# Patient Record
Sex: FEMALE | Race: WHITE | NOT HISPANIC OR LATINO | Employment: FULL TIME | URBAN - NONMETROPOLITAN AREA
[De-identification: names, ages, dates, MRNs, and addresses within clinical notes are randomized per-mention and may not be internally consistent; named-entity substitution may affect disease eponyms.]

---

## 2017-06-21 ENCOUNTER — HOSPITAL ENCOUNTER (EMERGENCY)
Facility: HOSPITAL | Age: 32
Discharge: HOME | End: 2017-06-21

## 2017-06-21 VITALS
OXYGEN SATURATION: 100 % | WEIGHT: 190 LBS | HEIGHT: 63 IN | SYSTOLIC BLOOD PRESSURE: 144 MMHG | RESPIRATION RATE: 18 BRPM | TEMPERATURE: 98.1 F | DIASTOLIC BLOOD PRESSURE: 96 MMHG | HEART RATE: 76 BPM | BODY MASS INDEX: 33.66 KG/M2

## 2017-06-21 DIAGNOSIS — J02.9 PHARYNGITIS, ACUTE: Primary | ICD-10-CM

## 2017-06-21 PROCEDURE — 99282 EMERGENCY DEPT VISIT SF MDM: CPT

## 2017-06-21 RX ORDER — IBUPROFEN 600 MG/1
600 TABLET ORAL EVERY 8 HOURS PRN
Qty: 15 TABLET | Refills: 0 | Status: SHIPPED | OUTPATIENT
Start: 2017-06-21 | End: 2018-03-15

## 2017-06-21 RX ORDER — DEXAMETHASONE 2 MG/1
10 TABLET ORAL ONCE
Qty: 5 TABLET | Refills: 0 | Status: SHIPPED | OUTPATIENT
Start: 2017-06-21 | End: 2017-06-21

## 2017-06-21 RX ORDER — AMOXICILLIN AND CLAVULANATE POTASSIUM 875; 125 MG/1; MG/1
1 TABLET, FILM COATED ORAL 2 TIMES DAILY
Qty: 14 TABLET | Refills: 0 | Status: SHIPPED | OUTPATIENT
Start: 2017-06-21 | End: 2017-06-28

## 2017-09-27 ENCOUNTER — HOSPITAL ENCOUNTER (EMERGENCY)
Facility: HOSPITAL | Age: 32
Discharge: HOME | End: 2017-09-27
Attending: EMERGENCY MEDICINE | Admitting: EMERGENCY MEDICINE
Payer: COMMERCIAL

## 2017-09-27 VITALS
HEART RATE: 81 BPM | RESPIRATION RATE: 16 BRPM | BODY MASS INDEX: 34.62 KG/M2 | HEIGHT: 62 IN | TEMPERATURE: 98 F | WEIGHT: 188.13 LBS | DIASTOLIC BLOOD PRESSURE: 88 MMHG | OXYGEN SATURATION: 100 % | SYSTOLIC BLOOD PRESSURE: 142 MMHG

## 2017-09-27 DIAGNOSIS — G56.03 BILATERAL CARPAL TUNNEL SYNDROME: Primary | ICD-10-CM

## 2017-09-27 PROCEDURE — 99283 EMERGENCY DEPT VISIT LOW MDM: CPT

## 2017-09-27 RX ORDER — TRAZODONE HYDROCHLORIDE 100 MG/1
50 TABLET ORAL
COMMUNITY
End: 2018-03-15

## 2017-09-27 RX ORDER — NAPROXEN 500 MG/1
500 TABLET ORAL 2 TIMES DAILY WITH MEALS
Qty: 30 TABLET | Refills: 0 | Status: SHIPPED | OUTPATIENT
Start: 2017-09-27 | End: 2018-03-15

## 2017-09-27 NOTE — ED NOTES
Pt states "lower back pain started last week, since then the L arm numbness has increased"  Pt denies fall/trauma  Appropriate safety measures in place   Call bell in em Pollard RN  09/27/17 0030

## 2017-09-27 NOTE — ED PROVIDER NOTES
History  Chief Complaint   Patient presents with    Numbness     c/o left arm numbness started "past week off and on, would wake up that way, and then it would go away"  Today states "L arm is constantly numb and feels heavy, starting in R fingers"  Denies fall/trauma  Denies facial numbness  Denies headache  Patient is a 56-year-old female  Over the last week she has had intermittent paresthesias to both her upper extremities  It is more pronounced in the left hand  It is less so in the right hand  It is to the thenar aspect of both palms  There is no weakness or paralysis  There are no symptoms in her legs or face  No visual or speech problems  No headache or neck pain  Patient does have some sacral pain  She denies trauma  There is no history of of overuse injury either at home or at work  There are no obvious aggravating or relieving factors  Prior to Admission Medications   Prescriptions Last Dose Informant Patient Reported? Taking?   ibuprofen (MOTRIN) 600 mg tablet   No No   Sig: Take 1 tablet by mouth every 8 (eight) hours as needed for mild pain or moderate pain for up to 5 days   traZODone (DESYREL) 100 mg tablet 9/26/2017 at 2300 Self Yes Yes   Sig: Take 50 mg by mouth daily at bedtime      Facility-Administered Medications: None       Past Medical History:   Diagnosis Date    Depression     Sleep disorder        Past Surgical History:   Procedure Laterality Date    TUBAL LIGATION         History reviewed  No pertinent family history  I have reviewed and agree with the history as documented  Social History   Substance Use Topics    Smoking status: Never Smoker    Smokeless tobacco: Never Used    Alcohol use No        Review of Systems   Constitutional: Negative for chills and fever  HENT: Negative for rhinorrhea and sore throat  Eyes: Negative for pain, redness and visual disturbance  Respiratory: Negative for cough and shortness of breath      Cardiovascular: Negative for chest pain and leg swelling  Gastrointestinal: Negative for abdominal pain, diarrhea and vomiting  Endocrine: Negative for polydipsia and polyuria  Genitourinary: Negative for dysuria, frequency, hematuria, vaginal bleeding and vaginal discharge  Musculoskeletal: Positive for back pain  Negative for neck pain  Skin: Negative for rash and wound  Allergic/Immunologic: Negative for immunocompromised state  Neurological: Positive for numbness  Negative for weakness and headaches  Hematological: Does not bruise/bleed easily  Psychiatric/Behavioral: Negative for hallucinations and suicidal ideas  Physical Exam  ED Triage Vitals [09/27/17 0025]   Temperature Pulse Respirations Blood Pressure SpO2   98 °F (36 7 °C) 81 16 142/88 100 %      Temp Source Heart Rate Source Patient Position - Orthostatic VS BP Location FiO2 (%)   Temporal Monitor Lying Right arm --      Pain Score       7           Physical Exam   Constitutional: She is oriented to person, place, and time  She appears well-developed and well-nourished  HENT:   Head: Normocephalic and atraumatic  Mouth/Throat: Oropharynx is clear and moist    Eyes: Conjunctivae are normal  Right eye exhibits no discharge  Left eye exhibits no discharge  No scleral icterus  Neck: Normal range of motion  Neck supple  Cardiovascular: Normal rate, regular rhythm, normal heart sounds and intact distal pulses  Exam reveals no gallop and no friction rub  No murmur heard  Pulmonary/Chest: Effort normal and breath sounds normal  No stridor  No respiratory distress  She has no wheezes  She has no rales  Abdominal: Soft  Bowel sounds are normal  She exhibits no distension  There is no tenderness  There is no rebound and no guarding  Musculoskeletal: Normal range of motion  She exhibits no edema, tenderness or deformity  No CVA tenderness  No calf tenderness/palpable cords     Neurological: She is alert and oriented to person, place, and time  She has normal strength  No sensory deficit  GCS eye subscore is 4  GCS verbal subscore is 5  GCS motor subscore is 6  Skin: Skin is warm and dry  No rash noted  Psychiatric: She has a normal mood and affect  Her behavior is normal    Vitals reviewed  ED Medications  Medications - No data to display    Diagnostic Studies  Labs Reviewed - No data to display    No orders to display       Procedures  Procedures      Phone Contacts  ED Phone Contact    ED Course  ED Course              NIH Stroke Scale    Flowsheet Row Most Recent Value   Level of Consciousness (1a )  0 Filed at: 09/27/2017 0031   LOC Questions (1b )  0 Filed at: 09/27/2017 0031   LOC Commands (1c )  0 Filed at: 09/27/2017 0031   Best Gaze (2 )  0 Filed at: 09/27/2017 0031   Visual (3 )  0 Filed at: 09/27/2017 0031   Facial Palsy (4 )  0 Filed at: 09/27/2017 0031   Motor Arm, Left (5a )  0 Filed at: 09/27/2017 0031   Motor Arm, Right (5b )  0 Filed at: 09/27/2017 0031   Motor Leg, Left (6a )  0 Filed at: 09/27/2017 0031   Motor Leg, Right (6b )  0 Filed at: 09/27/2017 0031   Limb Ataxia (7 )  0 Filed at: 09/27/2017 0031   Sensory (8 )  0 Filed at: 09/27/2017 0031   Best Language (9 )  0 Filed at: 09/27/2017 0031   Dysarthria (10 )  0 Filed at: 09/27/2017 0031   Extinction and Inattention (11 ) (Formerly Neglect)  0 Filed at: 09/27/2017 0031   Total  0 Filed at: 09/27/2017 0031                        MDM  Number of Diagnoses or Management Options  Diagnosis management comments: Neurologic examination is normal at this time  The distribution of patient's symptoms suggest carpal tunnel syndrome  This does not appear to be a CVA  Patient appropriate for discharge and follow up with Neurology  CritCare Time    Disposition  Final diagnoses:   Bilateral carpal tunnel syndrome     ED Disposition     ED Disposition Condition Comment    Discharge  Danay Cisneros discharge to home/self care      Condition at discharge: Good        Follow-up Information     Follow up With Specialties Details Why [id number] [facility] [id number] [facility]  [place]  In 3 days  [address] [address]  [phone]        Patient's Medications   Discharge Prescriptions    NAPROXEN (NAPROSYN) 500 MG TABLET    Take 1 tablet by mouth 2 (two) times a day with meals As needed for pain and tingling       Start Date: 9/27/2017 End Date: --       Order Dose: 500 mg       Quantity: 30 tablet    Refills: 0     No discharge procedures on file      ED Provider  Electronically Signed by       Nemesio Brown MD  09/27/17 0111

## 2017-11-28 ENCOUNTER — GENERIC CONVERSION - ENCOUNTER (OUTPATIENT)
Dept: OTHER | Facility: OTHER | Age: 32
End: 2017-11-28

## 2018-01-15 NOTE — MISCELLANEOUS
Provider Comments  Provider Comments:   No show  Left msg re: office policy and need to reschedule        Signatures   Electronically signed by : Juan Loja MD; Oct 12 2016 11:43AM EST                       (Author)

## 2018-03-09 ENCOUNTER — HOSPITAL ENCOUNTER (EMERGENCY)
Facility: HOSPITAL | Age: 33
Discharge: HOME | End: 2018-03-10
Attending: EMERGENCY MEDICINE | Admitting: EMERGENCY MEDICINE
Payer: COMMERCIAL

## 2018-03-09 DIAGNOSIS — R10.9 ABDOMINAL WALL PAIN: ICD-10-CM

## 2018-03-09 DIAGNOSIS — T14.8XXA BRUISING: Primary | ICD-10-CM

## 2018-03-09 LAB
ALBUMIN SERPL BCP-MCNC: 3.9 G/DL (ref 3.5–5)
ALP SERPL-CCNC: 75 U/L (ref 46–116)
ALT SERPL W P-5'-P-CCNC: 21 U/L (ref 12–78)
ANION GAP SERPL CALCULATED.3IONS-SCNC: 9 MMOL/L (ref 4–13)
APTT PPP: 28 SECONDS (ref 23–35)
AST SERPL W P-5'-P-CCNC: 13 U/L (ref 5–45)
BASOPHILS # BLD AUTO: 0.03 THOUSANDS/ΜL (ref 0–0.1)
BASOPHILS NFR BLD AUTO: 0 % (ref 0–1)
BILIRUB SERPL-MCNC: 0.5 MG/DL (ref 0.2–1)
BILIRUB UR QL STRIP: NEGATIVE
BUN SERPL-MCNC: 12 MG/DL (ref 5–25)
CALCIUM SERPL-MCNC: 8.7 MG/DL (ref 8.3–10.1)
CHLORIDE SERPL-SCNC: 100 MMOL/L (ref 100–108)
CLARITY UR: CLEAR
CO2 SERPL-SCNC: 26 MMOL/L (ref 21–32)
COLOR UR: YELLOW
CREAT SERPL-MCNC: 0.77 MG/DL (ref 0.6–1.3)
EOSINOPHIL # BLD AUTO: 0.15 THOUSAND/ΜL (ref 0–0.61)
EOSINOPHIL NFR BLD AUTO: 2 % (ref 0–6)
ERYTHROCYTE [DISTWIDTH] IN BLOOD BY AUTOMATED COUNT: 13.2 % (ref 11.6–15.1)
EXT PREG TEST URINE: NEGATIVE
GFR SERPL CREATININE-BSD FRML MDRD: 102 ML/MIN/1.73SQ M
GLUCOSE SERPL-MCNC: 103 MG/DL (ref 65–140)
GLUCOSE UR STRIP-MCNC: NEGATIVE MG/DL
HCT VFR BLD AUTO: 42 % (ref 34.8–46.1)
HGB BLD-MCNC: 14.5 G/DL (ref 11.5–15.4)
HGB UR QL STRIP.AUTO: NEGATIVE
INR PPP: 1.07 (ref 0.86–1.16)
KETONES UR STRIP-MCNC: ABNORMAL MG/DL
LEUKOCYTE ESTERASE UR QL STRIP: NEGATIVE
LIPASE SERPL-CCNC: 84 U/L (ref 73–393)
LYMPHOCYTES # BLD AUTO: 1.71 THOUSANDS/ΜL (ref 0.6–4.47)
LYMPHOCYTES NFR BLD AUTO: 17 % (ref 14–44)
MAGNESIUM SERPL-MCNC: 1.9 MG/DL (ref 1.6–2.6)
MCH RBC QN AUTO: 31.2 PG (ref 26.8–34.3)
MCHC RBC AUTO-ENTMCNC: 34.5 G/DL (ref 31.4–37.4)
MCV RBC AUTO: 90 FL (ref 82–98)
MONOCYTES # BLD AUTO: 0.83 THOUSAND/ΜL (ref 0.17–1.22)
MONOCYTES NFR BLD AUTO: 8 % (ref 4–12)
NEUTROPHILS # BLD AUTO: 7.23 THOUSANDS/ΜL (ref 1.85–7.62)
NEUTS SEG NFR BLD AUTO: 73 % (ref 43–75)
NITRITE UR QL STRIP: NEGATIVE
PH UR STRIP.AUTO: 5.5 [PH] (ref 4.5–8)
PHOSPHATE SERPL-MCNC: 3.1 MG/DL (ref 2.7–4.5)
PLATELET # BLD AUTO: 246 THOUSANDS/UL (ref 149–390)
PMV BLD AUTO: 10.2 FL (ref 8.9–12.7)
POTASSIUM SERPL-SCNC: 3.8 MMOL/L (ref 3.5–5.3)
PROT SERPL-MCNC: 7.5 G/DL (ref 6.4–8.2)
PROT UR STRIP-MCNC: NEGATIVE MG/DL
PROTHROMBIN TIME: 13.8 SECONDS (ref 12.1–14.4)
RBC # BLD AUTO: 4.65 MILLION/UL (ref 3.81–5.12)
SODIUM SERPL-SCNC: 135 MMOL/L (ref 136–145)
SP GR UR STRIP.AUTO: >=1.03 (ref 1–1.03)
TSH SERPL DL<=0.05 MIU/L-ACNC: 3.49 UIU/ML (ref 0.36–3.74)
UROBILINOGEN UR QL STRIP.AUTO: 0.2 E.U./DL
WBC # BLD AUTO: 9.95 THOUSAND/UL (ref 4.31–10.16)

## 2018-03-09 PROCEDURE — 83690 ASSAY OF LIPASE: CPT | Performed by: EMERGENCY MEDICINE

## 2018-03-09 PROCEDURE — 83550 IRON BINDING TEST: CPT | Performed by: EMERGENCY MEDICINE

## 2018-03-09 PROCEDURE — 81003 URINALYSIS AUTO W/O SCOPE: CPT | Performed by: EMERGENCY MEDICINE

## 2018-03-09 PROCEDURE — 85730 THROMBOPLASTIN TIME PARTIAL: CPT | Performed by: EMERGENCY MEDICINE

## 2018-03-09 PROCEDURE — 83540 ASSAY OF IRON: CPT | Performed by: EMERGENCY MEDICINE

## 2018-03-09 PROCEDURE — 85610 PROTHROMBIN TIME: CPT | Performed by: EMERGENCY MEDICINE

## 2018-03-09 PROCEDURE — 84100 ASSAY OF PHOSPHORUS: CPT | Performed by: EMERGENCY MEDICINE

## 2018-03-09 PROCEDURE — 84443 ASSAY THYROID STIM HORMONE: CPT | Performed by: EMERGENCY MEDICINE

## 2018-03-09 PROCEDURE — 82728 ASSAY OF FERRITIN: CPT | Performed by: EMERGENCY MEDICINE

## 2018-03-09 PROCEDURE — 80053 COMPREHEN METABOLIC PANEL: CPT | Performed by: EMERGENCY MEDICINE

## 2018-03-09 PROCEDURE — 83735 ASSAY OF MAGNESIUM: CPT | Performed by: EMERGENCY MEDICINE

## 2018-03-09 PROCEDURE — 85025 COMPLETE CBC W/AUTO DIFF WBC: CPT | Performed by: EMERGENCY MEDICINE

## 2018-03-09 PROCEDURE — 81025 URINE PREGNANCY TEST: CPT | Performed by: EMERGENCY MEDICINE

## 2018-03-09 PROCEDURE — 36415 COLL VENOUS BLD VENIPUNCTURE: CPT

## 2018-03-09 RX ORDER — ACETAMINOPHEN 325 MG/1
650 TABLET ORAL ONCE
Status: COMPLETED | OUTPATIENT
Start: 2018-03-09 | End: 2018-03-09

## 2018-03-09 RX ADMIN — ACETAMINOPHEN 650 MG: 325 TABLET, FILM COATED ORAL at 19:56

## 2018-03-10 VITALS
OXYGEN SATURATION: 98 % | SYSTOLIC BLOOD PRESSURE: 130 MMHG | RESPIRATION RATE: 16 BRPM | BODY MASS INDEX: 31.21 KG/M2 | HEART RATE: 68 BPM | HEIGHT: 62 IN | WEIGHT: 169.6 LBS | DIASTOLIC BLOOD PRESSURE: 75 MMHG | TEMPERATURE: 99.3 F

## 2018-03-10 LAB
FERRITIN SERPL-MCNC: 41 NG/ML (ref 8–388)
IRON SATN MFR SERPL: 24 %
IRON SERPL-MCNC: 80 UG/DL (ref 50–170)
TIBC SERPL-MCNC: 337 UG/DL (ref 250–450)

## 2018-03-10 PROCEDURE — 99284 EMERGENCY DEPT VISIT MOD MDM: CPT

## 2018-03-10 RX ORDER — IBUPROFEN 600 MG/1
600 TABLET ORAL ONCE
Status: COMPLETED | OUTPATIENT
Start: 2018-03-10 | End: 2018-03-10

## 2018-03-10 RX ADMIN — IBUPROFEN 600 MG: 600 TABLET, FILM COATED ORAL at 01:27

## 2018-03-10 NOTE — DISCHARGE INSTRUCTIONS
Abdominal Pain   WHAT YOU NEED TO KNOW:   Abdominal pain can be dull, achy, or sharp  You may have pain in one area of your abdomen, or in your entire abdomen  Your pain may be caused by a condition such as constipation, food sensitivity or poisoning, infection, or a blockage  Abdominal pain can also be from a hernia, appendicitis, or an ulcer  Liver, gallbladder, or kidney conditions can also cause abdominal pain  The cause of your abdominal pain may be unknown  DISCHARGE INSTRUCTIONS:   Return to the emergency department if:   · You have new chest pain or shortness of breath  · You have pulsing pain in your upper abdomen or lower back that suddenly becomes constant  · Your pain is in the right lower abdominal area and worsens with movement  · You have a fever over 100 4°F (38°C) or shaking chills  · You are vomiting and cannot keep food or liquids down  · Your pain does not improve or gets worse over the next 8 to 12 hours  · You see blood in your vomit or bowel movements, or they look black and tarry  · Your skin or the whites of your eyes turn yellow  · You are a woman and have a large amount of vaginal bleeding that is not your monthly period  Contact your healthcare provider if:   · You have pain in your lower back  · You are a man and have pain in your testicles  · You have pain when you urinate  · You have questions or concerns about your condition or care  Follow up with your healthcare provider within 24 hours or as directed:  Write down your questions so you remember to ask them during your visits  Medicines:   · Medicines  may be given to calm your stomach and prevent vomiting or to decrease pain  Ask how to take pain medicine safely  · Take your medicine as directed  Contact your healthcare provider if you think your medicine is not helping or if you have side effects  Tell him of her if you are allergic to any medicine   Keep a list of the medicines, vitamins, and herbs you take  Include the amounts, and when and why you take them  Bring the list or the pill bottles to follow-up visits  Carry your medicine list with you in case of an emergency  © 2017 [employer]  Information is for End User's use only and may not be sold, redistributed or otherwise used for commercial purposes  All illustrations and images included in CareNotes® are the copyrighted property of [employer]  or A[employer] Chowd[employer]  The above information is an  only  It is not intended as medical advice for individual conditions or treatments  Talk to your doctor, nurse or pharmacist before following any medical regimen to see if it is safe and effective for you  Contusion in Adults   WHAT YOU NEED TO KNOW:   A contusion is a bruise that appears on your skin after an injury  A bruise happens when small blood vessels tear but skin does not  When blood vessels tear, blood leaks into nearby tissue, such as soft tissue or muscle  DISCHARGE INSTRUCTIONS:   Return to the emergency department if:   · You have new trouble moving the injured area  · You have tingling or numbness in or near the injured area  · Your hand or foot below the bruise gets cold or turns pale  Contact your healthcare provider if:   · You find a new lump in the injured area  · Your symptoms do not improve with treatment after 4 to 5 days  · You have questions or concerns about your condition or care  Medicines: You may need any of the following:  · NSAIDs  help decrease swelling and pain or fever  This medicine is available with or without a doctor's order  NSAIDs can cause stomach bleeding or kidney problems in certain people  If you take blood thinner medicine, always ask your healthcare provider if NSAIDs are safe for you  Always read the medicine label and follow directions  · Prescription pain medicine  may be given   Do not wait until the pain is severe before you take your medicine  · Take your medicine as directed  Contact your healthcare provider if you think your medicine is not helping or if you have side effects  Tell him of her if you are allergic to any medicine  Keep a list of the medicines, vitamins, and herbs you take  Include the amounts, and when and why you take them  Bring the list or the pill bottles to follow-up visits  Carry your medicine list with you in case of an emergency  Follow up with your healthcare provider as directed: You may need to return within a week to check your injury again  Write down your questions so you remember to ask them during your visits  Help a contusion heal:   · Rest the injured area  or use it less than usual  If you bruised your leg or foot, you may need crutches or a cane to help you walk  This will help you keep weight off your injured body part  · Apply ice  to decrease swelling and pain  Ice may also help prevent tissue damage  Use an ice pack, or put crushed ice in a plastic bag  Cover it with a towel and place it on your bruise for 15 to 20 minutes every hour or as directed  · Use compression  to support the area and decrease swelling  Wrap an elastic bandage around the area over the bruised muscle  Make sure the bandage is not too tight  You should be able to fit 1 finger between the bandage and your skin  · Elevate (raise) your injured body part  above the level of your heart to help decrease pain and swelling  Use pillows, blankets, or rolled towels to elevate the area as often as you can  · Do not drink alcohol  as directed  Alcohol may slow healing  · Do not stretch injured muscles  right after your injury  Ask your healthcare provider when and how you may safely stretch after your injury  Gentle stretches can help increase your flexibility  · Do not massage the area or put heating pads  on the bruise right after your injury  Heat and massage may slow healing   Your healthcare provider may tell you to apply heat after several days  At that time, heat will start to help the injury heal   Prevent another contusion:   · Stretch and warm up before you play sports or exercise  · Wear protective gear when you play sports  Examples are shin guards and padding  · If you begin a new physical activity, start slowly to give your body a chance to adjust   © 2017 [employer]  Information is for End User's use only and may not be sold, redistributed or otherwise used for commercial purposes  All illustrations and images included in CareNotes® are the copyrighted property of [employer]  or Aryan Chowdary  The above information is an  only  It is not intended as medical advice for individual conditions or treatments  Talk to your doctor, nurse or pharmacist before following any medical regimen to see if it is safe and effective for you

## 2018-03-10 NOTE — ED PROVIDER NOTES
History  Chief Complaint   Patient presents with    Abdominal Pain     pt states she has abdominal pain like a pulled muscle for 3 days and bruising on her thighs  Patient: Atiya Jackson  89 y o /female  YOB: 1985  MRN: [id number]  PCP: Evon Chamberlain MD  Date of evaluation: 3/9/2018    (N B  Voice-recognition software may have been used in the preparation of this document )    3 days of low abdominal pain starting when she was lifting at work  It is constant, worse with movement, with no other related symptoms  She also c/o bruising diffusely without known injuries  No epistaxis, bleeding from gums, hematuria, hematochezia, abnl vaginal bleeding  History provided by:  Patient  Abdominal Pain   Pain location:  RLQ and LLQ  Pain quality: aching    Pain radiates to:  Does not radiate  Onset quality:  Sudden  Duration:  3 days  Timing:  Constant  Progression:  Unchanged  Chronicity:  New  Relieved by:  Not moving  Worsened by: Movement  Ineffective treatments:  None tried  Associated symptoms: no anorexia, no belching, no chest pain, no chills, no constipation, no cough, no diarrhea, no dysuria, no fever, no hematuria, no melena, no nausea, no shortness of breath, no vaginal bleeding, no vaginal discharge and no vomiting        Prior to Admission Medications   Prescriptions Last Dose Informant Patient Reported?  Taking?   ibuprofen (MOTRIN) 600 mg tablet   No No   Sig: Take 1 tablet by mouth every 8 (eight) hours as needed for mild pain or moderate pain for up to 5 days   naproxen (NAPROSYN) 500 mg tablet   No No   Sig: Take 1 tablet by mouth 2 (two) times a day with meals As needed for pain and tingling   traZODone (DESYREL) 100 mg tablet  Self Yes No   Sig: Take 50 mg by mouth daily at bedtime      Facility-Administered Medications: None       Past Medical History:   Diagnosis Date    Depression     Sleep disorder        Past Surgical History:   Procedure Laterality Date    TUBAL LIGATION         History reviewed  No pertinent family history  I have reviewed and agree with the history as documented  Social History   Substance Use Topics    Smoking status: Never Smoker    Smokeless tobacco: Never Used    Alcohol use No        Review of Systems   Constitutional: Negative for chills and fever  HENT: Negative for hearing loss, trouble swallowing and voice change  Eyes: Negative for pain, redness and visual disturbance  Respiratory: Negative for cough and shortness of breath  Cardiovascular: Negative for chest pain and palpitations  Gastrointestinal: Positive for abdominal pain  Negative for anorexia, constipation, diarrhea, melena, nausea and vomiting  Genitourinary: Negative for dysuria, hematuria, vaginal bleeding and vaginal discharge  Musculoskeletal: Negative for back pain, gait problem and neck pain  Skin: Negative for color change and rash  Neurological: Negative for weakness and light-headedness  Hematological: Bruises/bleeds easily (new)  Psychiatric/Behavioral: Negative for confusion and decreased concentration  The patient is not nervous/anxious  All other systems reviewed and are negative  Physical Exam  ED Triage Vitals [03/09/18 1925]   Temperature Pulse Respirations Blood Pressure SpO2   99 3 °F (37 4 °C) 90 16 157/97 100 %      Temp Source Heart Rate Source Patient Position - Orthostatic VS BP Location FiO2 (%)   Temporal Monitor Sitting Right arm --      Pain Score       9           Orthostatic Vital Signs  Vitals:    03/09/18 1925 03/09/18 2151 03/10/18 0050 03/10/18 0100   BP: 157/97 135/75 130/75 130/75   Pulse: 90 75 68    Patient Position - Orthostatic VS: Sitting Lying Lying Lying       Physical Exam   Constitutional: She is oriented to person, place, and time  She appears well-developed and well-nourished     HENT:   Mouth/Throat: Oropharynx is clear and moist and mucous membranes are normal    Voice normal   Eyes: EOM are normal  Pupils are equal, round, and reactive to light  Cardiovascular: Normal rate and regular rhythm  Pulmonary/Chest: Effort normal    Abdominal: Soft  Bowel sounds are normal    Tender abdominal wall, not deep tenderness   Neurological: She is alert and oriented to person, place, and time  GCS eye subscore is 4  GCS verbal subscore is 5  GCS motor subscore is 6  Skin: Skin is warm and dry  Capillary refill takes less than 2 seconds  Multiple diffuse brown bruises uniform in color various in size  Psychiatric: She has a normal mood and affect  Her speech is normal and behavior is normal    Nursing note and vitals reviewed  ED Medications  Medications   acetaminophen (TYLENOL) tablet 650 mg (650 mg Oral Given 3/9/18 1956)   ibuprofen (MOTRIN) tablet 600 mg (600 mg Oral Given 3/10/18 0127)       Diagnostic Studies  Results Reviewed     Procedure Component Value Units Date/Time    Iron Saturation % [[id number]] Collected:  03/09/18 1947    Lab Status: In process Specimen:  Blood from Arm, Right Updated:  03/09/18 2225    Ferritin [[id number]] Collected:  03/09/18 1947    Lab Status: In process Specimen:  Blood from Arm, Right Updated:  03/09/18 [place] [[id number]]  (Normal) Collected:  03/09/18 2028    Lab Status:  Final result Specimen:  Blood from Arm, Right Updated:  03/09/18 2058     Protime 13 8 seconds      INR 1 07    APTT [[id number]]  (Normal) Collected:  03/09/18 2028    Lab Status:  Final result Specimen:  Blood from Arm, Right Updated:  03/09/18 2058     PTT 28 seconds     Narrative: Therapeutic Heparin Range = 60-90 seconds    TSH [[id number]]  (Normal) Collected:  03/09/18 1947    Lab Status:  Final result Specimen:  Blood from Arm, Right Updated:  03/09/18 2048     TSH 3RD GENERATON 3 494 uIU/mL     Narrative:         Patients undergoing fluorescein dye angiography may retain small amounts of fluorescein in the body for 48-72 hours post procedure   Samples containing fluorescein can produce falsely depressed TSH values  If the patient had this procedure,a specimen should be resubmitted post fluorescein clearance  The recommended reference ranges for TSH during pregnancy are as follows:  First trimester 0 1 to 2 5 uIU/mL  Second trimester  0 2 to 3 0 uIU/mL  Third trimester 0 3 to 3 0 uIU/m      Magnesium [[id number]]  (Normal) Collected:  03/09/18 1947    Lab Status:  Final result Specimen:  Blood from Arm, Right Updated:  03/09/18 2048     Magnesium 1 9 mg/dL     Phosphorus [[id number]]  (Normal) Collected:  03/09/18 1947    Lab Status:  Final result Specimen:  Blood from Arm, Right Updated:  03/09/18 2048     Phosphorus 3 1 mg/dL     Comprehensive metabolic panel [[id number]]  (Abnormal) Collected:  03/09/18 1947    Lab Status:  Final result Specimen:  Blood from Arm, Right Updated:  03/09/18 2014     Sodium 135 (L) mmol/L      Potassium 3 8 mmol/L      Chloride 100 mmol/L      CO2 26 mmol/L      Anion Gap 9 mmol/L      BUN 12 mg/dL      Creatinine 0 77 mg/dL      Glucose 103 mg/dL      Calcium 8 7 mg/dL      AST 13 U/L      ALT 21 U/L      Alkaline Phosphatase 75 U/L      Total Protein 7 5 g/dL      Albumin 3 9 g/dL      Total Bilirubin 0 50 mg/dL      eGFR 102 ml/min/1 73sq m     Narrative:         National Kidney Disease Education Program recommendations are as follows:  GFR calculation is accurate only with a steady state creatinine  Chronic Kidney disease less than 60 ml/min/1 73 sq  meters  Kidney failure less than 15 ml/min/1 73 sq  meters      Lipase [[id number]]  (Normal) Collected:  03/09/18 1947    Lab Status:  Final result Specimen:  Blood from Arm, Right Updated:  03/09/18 2014     Lipase 84 u/L     UA w Reflex to Microscopic w Reflex to Culture [[id number]]  (Abnormal) Collected:  03/09/18 1947    Lab Status:  Final result Specimen:  Urine from Urine, Other Updated:  03/09/18 1954     Color, UA Yellow     Clarity, UA Clear     Specific Gravity, UA >=1 030     pH, UA 5 5     Leukocytes, UA Negative     Nitrite, UA Negative     Protein, UA Negative mg/dl      Glucose, UA Negative mg/dl      Ketones, UA 15 (1+) (A) mg/dl      Urobilinogen, UA 0 2 E U /dl      Bilirubin, UA Negative     Blood, UA Negative    CBC and differential [[id number]]  (Normal) Collected:  03/09/18 1947    Lab Status:  Final result Specimen:  Blood from Arm, Right Updated:  03/09/18 1953     WBC 9 95 Thousand/uL      RBC 4 65 Million/uL      Hemoglobin 14 5 g/dL      Hematocrit 42 0 %      MCV 90 fL      MCH 31 2 pg      MCHC 34 5 g/dL      RDW 13 2 %      MPV 10 2 fL      Platelets 683 Thousands/uL      Neutrophils Relative 73 %      Lymphocytes Relative 17 %      Monocytes Relative 8 %      Eosinophils Relative 2 %      Basophils Relative 0 %      Neutrophils Absolute 7 23 Thousands/µL      Lymphocytes Absolute 1 71 Thousands/µL      Monocytes Absolute 0 83 Thousand/µL      Eosinophils Absolute 0 15 Thousand/µL      Basophils Absolute 0 03 Thousands/µL     POCT pregnancy, urine [[id number]]  (Normal) Resulted:  03/09/18 1951    Lab Status:  Final result Specimen:  Urine Updated:  03/09/18 1951     EXT PREG TEST UR (Ref: Negative) negative                 No orders to display              Procedures  Procedures       Phone Contacts  ED Phone Contact    ED Course  ED Course                                MDM  Number of Diagnoses or Management Options  Abdominal wall pain:   Bruising:   Diagnosis management comments: Exam is not suggestive of vasculitis  No recognizable pattern of assault  Nml PT, aPTT, platelet count, Hb  She will require further evaluation by her PCP         Amount and/or Complexity of Data Reviewed  Tests in the medicine section of CPT®: ordered and reviewed    Patient Progress  Patient progress: stable    CritCare Time    Disposition  Final diagnoses:   Bruising   Abdominal wall pain     Time reflects when diagnosis was documented in both MDM as applicable and the Disposition within this note     Time User Action Codes Description Comment    3/10/2018 12:38 AM Pravin Carranza Amass  8XXA] Bruising     3/10/2018 12:39 AM Leandro Carranza Add [R10 9] Abdominal wall pain       ED Disposition     ED Disposition Condition Comment    Discharge  Mamie Areas discharge to home/self care  Condition at discharge: Good        Follow-up Information     Follow up With Specialties Details Why 107 Ila Da Silva MD Internal Medicine Call in 3 days Tell about this ER visit  [address]  [phone]          Discharge Medication List as of 3/10/2018  1:25 AM      CONTINUE these medications which have NOT CHANGED    Details   ibuprofen (MOTRIN) 600 mg tablet Take 1 tablet by mouth every 8 (eight) hours as needed for mild pain or moderate pain for up to 5 days, Starting Wed 6/21/2017, Until Mon 6/26/2017, Print      naproxen (NAPROSYN) 500 mg tablet Take 1 tablet by mouth 2 (two) times a day with meals As needed for pain and tingling, Starting Wed 9/27/2017, Print      traZODone (DESYREL) 100 mg tablet Take 50 mg by mouth daily at bedtime, Historical Med           No discharge procedures on file      ED Provider  Electronically Signed by           Paul Bennett MD  03/10/18 0247

## 2018-03-15 ENCOUNTER — OFFICE VISIT (OUTPATIENT)
Dept: INTERNAL MEDICINE CLINIC | Facility: CLINIC | Age: 33
End: 2018-03-15
Payer: COMMERCIAL

## 2018-03-15 VITALS
WEIGHT: 172 LBS | OXYGEN SATURATION: 97 % | HEIGHT: 62 IN | DIASTOLIC BLOOD PRESSURE: 80 MMHG | SYSTOLIC BLOOD PRESSURE: 128 MMHG | BODY MASS INDEX: 31.65 KG/M2 | RESPIRATION RATE: 16 BRPM | TEMPERATURE: 98.8 F | HEART RATE: 67 BPM

## 2018-03-15 DIAGNOSIS — R82.4 URINE KETONES: Primary | ICD-10-CM

## 2018-03-15 DIAGNOSIS — Z12.4 SCREENING FOR CERVICAL CANCER: ICD-10-CM

## 2018-03-15 DIAGNOSIS — M62.838 MUSCLE SPASM: ICD-10-CM

## 2018-03-15 DIAGNOSIS — R10.84 GENERALIZED ABDOMINAL PAIN: ICD-10-CM

## 2018-03-15 LAB
SL AMB  POCT GLUCOSE, UA: NEGATIVE
SL AMB LEUKOCYTE ESTERASE,UA: NEGATIVE
SL AMB POCT BILIRUBIN,UA: NEGATIVE
SL AMB POCT BLOOD,UA: NORMAL
SL AMB POCT CLARITY,UA: CLEAR
SL AMB POCT COLOR,UA: YELLOW
SL AMB POCT KETONES,UA: NEGATIVE
SL AMB POCT NITRITE,UA: NEGATIVE
SL AMB POCT PH,UA: 5
SL AMB POCT SPECIFIC GRAVITY,UA: 1.03
SL AMB POCT URINE PROTEIN: NEGATIVE
SL AMB POCT UROBILINOGEN: 0.2

## 2018-03-15 PROCEDURE — 81002 URINALYSIS NONAUTO W/O SCOPE: CPT | Performed by: NURSE PRACTITIONER

## 2018-03-15 PROCEDURE — 87086 URINE CULTURE/COLONY COUNT: CPT | Performed by: NURSE PRACTITIONER

## 2018-03-15 PROCEDURE — 99214 OFFICE O/P EST MOD 30 MIN: CPT | Performed by: NURSE PRACTITIONER

## 2018-03-15 RX ORDER — METHOCARBAMOL 500 MG/1
TABLET, FILM COATED ORAL
Qty: 20 TABLET | Refills: 0 | Status: SHIPPED | OUTPATIENT
Start: 2018-03-15 | End: 2018-10-19

## 2018-03-15 NOTE — PROGRESS NOTES
Assessment/Plan: Patient would like a muscle relaxer for her abdominal pain and feels she may have pulled a muscle when lifting her boxes  Will give her Robaxin 500 mg by mouth every 12 hours PRN  She states she does not want a US at this time  No abnormalities noted on exam   Urine dip in the office was negative ketones but did show a trace of blood will send out for UA Cs  She did contract for safety in the office and was instructed the importance of continuing therapy  Will follow up in one month of sooner if need be  GYN referral given as well  No problem-specific Assessment & Plan notes found for this encounter  Problem List Items Addressed This Visit     Generalized abdominal pain - Primary    Urine ketones    Relevant Orders    UA (URINE) with reflex to Microscopic    Urine culture      Other Visit Diagnoses     Muscle spasm        Relevant Medications    methocarbamol (ROBAXIN) 500 mg tablet    Screening for cervical cancer        Relevant Orders    Ambulatory referral to Obstetrics / Gynecology            Subjective:      Patient ID: Christian Williamson is a 34 y o  female  Mora Freitas is her today for an ER follow up visit  She states she was at work and she was lifting heavy boxes and started with lower abdominal pain  She states she did get this pain before and that she took muscle relaxers and they did help  She did have blood work done in the ER which was normal and a UA showing + ketones in the urine  She states she did have gestational diabetes while pregnant and is concerned about diabetes  She denies any chest pain, SOB, or palpitations  She does have a history of anxiety, PTSD, and depression and is seeing therapy and was taking Prozac, Clonidine and Trazodone but stopped taking these medications around several weeks ago due to not liking taking pills  She denies any suicidal or homicidal ideations  She states her menses is regular and has not seen a GYN since 2011   She denies any dysuria  She offers no other complaints today  The following portions of the patient's history were reviewed and updated as appropriate:   She  has a past medical history of Cat-scratch disease; Depression; Hypertension; and Sleep disorder  She   Patient Active Problem List    Diagnosis Date Noted    Generalized abdominal pain 03/15/2018    Urine ketones 03/15/2018    Depression 09/12/2016    Intention tremor 09/12/2016     She  has a past surgical history that includes Tubal ligation  Her family history includes Anxiety disorder in her father and mother; Bipolar disorder in her father and mother; Depression in her father and mother; Parkinsonism in her mother; Schizophrenia in her father and mother; Stomach cancer in her paternal grandmother  She  reports that she has never smoked  She has never used smokeless tobacco  She reports that she does not drink alcohol or use drugs  Current Outpatient Prescriptions   Medication Sig Dispense Refill    methocarbamol (ROBAXIN) 500 mg tablet Take one tablet by mouth every 12 hours PRN 20 tablet 0     No current facility-administered medications for this visit  Current Outpatient Prescriptions on File Prior to Visit   Medication Sig    [DISCONTINUED] ibuprofen (MOTRIN) 600 mg tablet Take 1 tablet by mouth every 8 (eight) hours as needed for mild pain or moderate pain for up to 5 days    [DISCONTINUED] naproxen (NAPROSYN) 500 mg tablet Take 1 tablet by mouth 2 (two) times a day with meals As needed for pain and tingling    [DISCONTINUED] traZODone (DESYREL) 100 mg tablet Take 50 mg by mouth daily at bedtime     No current facility-administered medications on file prior to visit  She is allergic to codeine       Review of Systems   Constitutional: Negative  HENT: Negative  Eyes: Negative  Respiratory: Negative  Cardiovascular: Negative  Gastrointestinal: Positive for abdominal pain  Endocrine: Negative  Genitourinary: Negative  Musculoskeletal: Negative  Allergic/Immunologic: Negative  Neurological: Negative  Hematological: Negative  Psychiatric/Behavioral: Negative  Below is the patient's most recent value for Albumin, ALT, AST, BUN, Calcium, Chloride, Cholesterol, CO2, Creatinine, GFR, Glucose, HDL, Hematocrit, Hemoglobin, Hemoglobin A1C, LDL, Magnesium, Phosphorus, Platelets, Potassium, PSA, Sodium, Triglycerides, and WBC  Lab Results   Component Value Date    ALT 21 03/09/2018    AST 13 03/09/2018    BUN 12 03/09/2018    CALCIUM 8 7 03/09/2018     03/09/2018    CO2 26 03/09/2018    CREATININE 0 77 03/09/2018    HCT 42 0 03/09/2018    HGB 14 5 03/09/2018    MG 1 9 03/09/2018    PHOS 3 1 03/09/2018     03/09/2018    K 3 8 03/09/2018     (L) 03/09/2018    WBC 9 95 03/09/2018     Note: for a comprehensive list of the patient's lab results, access the Results Review activity  Objective:      [facility] 02/17/2018          Physical Exam   Constitutional: She is oriented to person, place, and time  She appears well-developed and well-nourished  HENT:   Head: Normocephalic and atraumatic  Right Ear: External ear normal    Left Ear: External ear normal    Nose: Nose normal    Mouth/Throat: Oropharynx is clear and moist    Eyes: Conjunctivae and EOM are normal  Pupils are equal, round, and reactive to light  Neck: Normal range of motion  Neck supple  Cardiovascular: Normal rate, regular rhythm, normal heart sounds and intact distal pulses  Pulmonary/Chest: Effort normal and breath sounds normal    Abdominal: Soft  There is tenderness  Musculoskeletal: Normal range of motion  Neurological: She is alert and oriented to person, place, and time  She has normal reflexes  Skin: Skin is warm and dry  Psychiatric: She has a normal mood and affect  Her behavior is normal  Judgment and thought content normal    Vitals reviewed

## 2018-03-16 LAB — BACTERIA UR CULT: NORMAL

## 2018-03-19 DIAGNOSIS — Z86.2 HISTORY OF BRUISING EASILY: Primary | ICD-10-CM

## 2018-03-19 DIAGNOSIS — R10.2 PELVIC PAIN: ICD-10-CM

## 2018-04-11 ENCOUNTER — LAB (OUTPATIENT)
Dept: LAB | Facility: HOSPITAL | Age: 33
End: 2018-04-11
Payer: COMMERCIAL

## 2018-04-11 DIAGNOSIS — R10.2 PELVIC PAIN: ICD-10-CM

## 2018-04-11 DIAGNOSIS — Z86.2 HISTORY OF BRUISING EASILY: ICD-10-CM

## 2018-04-11 LAB
ALBUMIN SERPL BCP-MCNC: 3.5 G/DL (ref 3.5–5)
ALP SERPL-CCNC: 62 U/L (ref 46–116)
ALT SERPL W P-5'-P-CCNC: 23 U/L (ref 12–78)
ANION GAP SERPL CALCULATED.3IONS-SCNC: 8 MMOL/L (ref 4–13)
AST SERPL W P-5'-P-CCNC: 13 U/L (ref 5–45)
BASOPHILS # BLD AUTO: 0.04 THOUSANDS/ΜL (ref 0–0.1)
BASOPHILS NFR BLD AUTO: 1 % (ref 0–1)
BILIRUB SERPL-MCNC: 0.4 MG/DL (ref 0.2–1)
BILIRUB UR QL STRIP: NEGATIVE
BUN SERPL-MCNC: 10 MG/DL (ref 5–25)
CALCIUM SERPL-MCNC: 9.1 MG/DL
CHLORIDE SERPL-SCNC: 106 MMOL/L (ref 100–108)
CLARITY UR: CLEAR
CO2 SERPL-SCNC: 28 MMOL/L (ref 21–32)
COLOR UR: YELLOW
CREAT SERPL-MCNC: 0.67 MG/DL (ref 0.6–1.3)
EOSINOPHIL # BLD AUTO: 0.56 THOUSAND/ΜL (ref 0–0.61)
EOSINOPHIL NFR BLD AUTO: 7 % (ref 0–6)
ERYTHROCYTE [DISTWIDTH] IN BLOOD BY AUTOMATED COUNT: 13.5 % (ref 11.6–15.1)
GFR SERPL CREATININE-BSD FRML MDRD: 116 ML/MIN/1.73SQ M
GLUCOSE SERPL-MCNC: 89 MG/DL (ref 65–140)
GLUCOSE UR STRIP-MCNC: NEGATIVE MG/DL
HCT VFR BLD AUTO: 41.9 % (ref 34.8–46.1)
HGB BLD-MCNC: 14.2 G/DL (ref 11.5–15.4)
HGB UR QL STRIP.AUTO: NEGATIVE
INR PPP: 1.02 (ref 0.86–1.16)
KETONES UR STRIP-MCNC: NEGATIVE MG/DL
LEUKOCYTE ESTERASE UR QL STRIP: NEGATIVE
LYMPHOCYTES # BLD AUTO: 1.23 THOUSANDS/ΜL (ref 0.6–4.47)
LYMPHOCYTES NFR BLD AUTO: 16 % (ref 14–44)
MCH RBC QN AUTO: 31.6 PG (ref 26.8–34.3)
MCHC RBC AUTO-ENTMCNC: 33.9 G/DL (ref 31.4–37.4)
MCV RBC AUTO: 93 FL (ref 82–98)
MONOCYTES # BLD AUTO: 0.5 THOUSAND/ΜL (ref 0.17–1.22)
MONOCYTES NFR BLD AUTO: 6 % (ref 4–12)
NEUTROPHILS # BLD AUTO: 5.62 THOUSANDS/ΜL (ref 1.85–7.62)
NEUTS SEG NFR BLD AUTO: 70 % (ref 43–75)
NITRITE UR QL STRIP: NEGATIVE
PH UR STRIP.AUTO: 6.5 [PH] (ref 4.5–8)
PLATELET # BLD AUTO: 242 THOUSANDS/UL (ref 149–390)
PMV BLD AUTO: 10.3 FL (ref 8.9–12.7)
POTASSIUM SERPL-SCNC: 4 MMOL/L (ref 3.5–5.3)
PROT SERPL-MCNC: 6.5 G/DL (ref 6.4–8.2)
PROT UR STRIP-MCNC: NEGATIVE MG/DL
PROTHROMBIN TIME: 13.3 SECONDS (ref 12.1–14.4)
RBC # BLD AUTO: 4.5 MILLION/UL (ref 3.81–5.12)
SODIUM SERPL-SCNC: 142 MMOL/L (ref 136–145)
SP GR UR STRIP.AUTO: 1.02 (ref 1–1.03)
UROBILINOGEN UR QL STRIP.AUTO: 0.2 E.U./DL
WBC # BLD AUTO: 7.95 THOUSAND/UL (ref 4.31–10.16)

## 2018-04-11 PROCEDURE — 85025 COMPLETE CBC W/AUTO DIFF WBC: CPT

## 2018-04-11 PROCEDURE — 80053 COMPREHEN METABOLIC PANEL: CPT

## 2018-04-11 PROCEDURE — 81003 URINALYSIS AUTO W/O SCOPE: CPT | Performed by: NURSE PRACTITIONER

## 2018-04-11 PROCEDURE — 85610 PROTHROMBIN TIME: CPT

## 2018-04-11 PROCEDURE — 87086 URINE CULTURE/COLONY COUNT: CPT

## 2018-04-11 PROCEDURE — 36415 COLL VENOUS BLD VENIPUNCTURE: CPT

## 2018-04-11 NOTE — PROGRESS NOTES
Can you let Lizet New know her blood work came back normal and I did get another urine back which was negative for a UTI or ketones

## 2018-04-12 LAB — BACTERIA UR CULT: NORMAL

## 2018-05-01 ENCOUNTER — LAB (OUTPATIENT)
Dept: LAB | Facility: CLINIC | Age: 33
End: 2018-05-01
Payer: COMMERCIAL

## 2018-05-01 ENCOUNTER — TRANSCRIBE ORDERS (OUTPATIENT)
Dept: LAB | Facility: CLINIC | Age: 33
End: 2018-05-01

## 2018-05-01 ENCOUNTER — OFFICE VISIT (OUTPATIENT)
Dept: INTERNAL MEDICINE CLINIC | Facility: CLINIC | Age: 33
End: 2018-05-01
Payer: COMMERCIAL

## 2018-05-01 VITALS
OXYGEN SATURATION: 98 % | BODY MASS INDEX: 32.54 KG/M2 | HEIGHT: 62 IN | HEART RATE: 75 BPM | SYSTOLIC BLOOD PRESSURE: 128 MMHG | DIASTOLIC BLOOD PRESSURE: 82 MMHG | TEMPERATURE: 100.2 F | WEIGHT: 176.8 LBS

## 2018-05-01 DIAGNOSIS — R20.2 NUMBNESS AND TINGLING OF BOTH UPPER EXTREMITIES WHILE SLEEPING: ICD-10-CM

## 2018-05-01 DIAGNOSIS — J30.1 SEASONAL ALLERGIC RHINITIS DUE TO POLLEN: ICD-10-CM

## 2018-05-01 DIAGNOSIS — R20.0 NUMBNESS AND TINGLING OF BOTH UPPER EXTREMITIES WHILE SLEEPING: ICD-10-CM

## 2018-05-01 DIAGNOSIS — R20.2 NUMBNESS AND TINGLING OF BOTH UPPER EXTREMITIES WHILE SLEEPING: Primary | ICD-10-CM

## 2018-05-01 DIAGNOSIS — R20.0 LIP NUMBNESS: ICD-10-CM

## 2018-05-01 DIAGNOSIS — R20.0 NUMBNESS AND TINGLING OF BOTH UPPER EXTREMITIES WHILE SLEEPING: Primary | ICD-10-CM

## 2018-05-01 LAB
25(OH)D3 SERPL-MCNC: 9.9 NG/ML (ref 30–100)
VIT B12 SERPL-MCNC: 632 PG/ML (ref 100–900)

## 2018-05-01 PROCEDURE — 99214 OFFICE O/P EST MOD 30 MIN: CPT | Performed by: NURSE PRACTITIONER

## 2018-05-01 PROCEDURE — 82306 VITAMIN D 25 HYDROXY: CPT

## 2018-05-01 PROCEDURE — 3008F BODY MASS INDEX DOCD: CPT | Performed by: NURSE PRACTITIONER

## 2018-05-01 PROCEDURE — 36415 COLL VENOUS BLD VENIPUNCTURE: CPT

## 2018-05-01 PROCEDURE — 82607 VITAMIN B-12: CPT

## 2018-05-01 RX ORDER — CETIRIZINE HYDROCHLORIDE 10 MG/1
10 TABLET ORAL DAILY
Qty: 30 TABLET | Refills: 3 | Status: SHIPPED | OUTPATIENT
Start: 2018-05-01 | End: 2018-10-19

## 2018-05-01 NOTE — PROGRESS NOTES
Assessment/Plan: Will send patient for EMG of upper extremities due for her ongoing symptoms  Will order a Vitamin B12 and D level due to her ongoing numbness and tingling around her mouth  She was advised if any visual disturbances or facial drooping to go to ER  Will order Zyrtec 10 mg daily for her ongoing allergy symptoms  She was advised if any worsening of symptoms to call the office  No problem-specific Assessment & Plan notes found for this encounter  Problem List Items Addressed This Visit     Numbness and tingling of both upper extremities while sleeping - Primary    Relevant Orders    EMG 2 Limb Upper Extremity    Vitamin B12      Other Visit Diagnoses     Lip numbness        Relevant Orders    Vitamin D 25 hydroxy    Seasonal allergic rhinitis due to pollen        Relevant Medications    cetirizine (ZyrTEC) 10 mg tablet            Subjective:      Patient ID: Oscar Norton is a 34 y o  female  Leonardo Kirby is here today for an acute visit  She states for the past several months she is having pain, burning, and numbness/tingling from her fingertips going up her arms  She states it primarily happens at night and does not really notice any symptoms during the day  She is also having some numbness/tingling around her lips  She denies any visual disturbances, loss of vision, slurred speech, or facial drooping  She states this will occur throughout the day at various times  She denies any chest pain, SOB, or palpitations  She states she is also having really bad allergy symptoms and is having watery eyes, itching, and at times they will become swollen  She offers no other complaints  The following portions of the patient's history were reviewed and updated as appropriate:   She  has a past medical history of Cat-scratch disease; Depression; Hypertension; and Sleep disorder    She   Patient Active Problem List    Diagnosis Date Noted    Numbness and tingling of both upper extremities while sleeping 05/01/2018    Generalized abdominal pain 03/15/2018    Urine ketones 03/15/2018    Depression 09/12/2016    Intention tremor 09/12/2016     She  has a past surgical history that includes Tubal ligation  Her family history includes Anxiety disorder in her father and mother; Bipolar disorder in her father and mother; Depression in her father and mother; Parkinsonism in her mother; Schizophrenia in her father and mother; Stomach cancer in her paternal grandmother  She  reports that she has never smoked  She has never used smokeless tobacco  She reports that she does not drink alcohol or use drugs  Current Outpatient Prescriptions   Medication Sig Dispense Refill    methocarbamol (ROBAXIN) 500 mg tablet Take one tablet by mouth every 12 hours PRN 20 tablet 0    cetirizine (ZyrTEC) 10 mg tablet Take 1 tablet (10 mg total) by mouth daily 30 tablet 3     No current facility-administered medications for this visit  Current Outpatient Prescriptions on File Prior to Visit   Medication Sig    methocarbamol (ROBAXIN) 500 mg tablet Take one tablet by mouth every 12 hours PRN     No current facility-administered medications on file prior to visit  She is allergic to codeine       Review of Systems   Constitutional: Negative  HENT: Negative  Eyes: Positive for redness and itching  Respiratory: Negative  Cardiovascular: Negative  Gastrointestinal: Negative  Endocrine: Negative  Genitourinary: Negative  Musculoskeletal:        Numbness and tingling hands and arms B/L   Skin: Negative  Allergic/Immunologic: Negative  Neurological: Negative  Hematological: Negative  Psychiatric/Behavioral: Negative            Objective:      /82 (BP Location: Right arm, Patient Position: Sitting, Cuff Size: Adult)   Pulse 75   Temp 100 2 °F (37 9 °C) (Temporal)   Ht 5' 2" (1 575 m)   Wt 80 2 kg (176 lb 12 8 oz)   SpO2 98%   BMI 32 34 kg/m²          Physical Exam   Constitutional: She is oriented to person, place, and time  She appears well-developed and well-nourished  HENT:   Head: Normocephalic and atraumatic  Right Ear: External ear normal    Left Ear: External ear normal    Nose: Nose normal    Mouth/Throat: Oropharynx is clear and moist    Eyes: Conjunctivae and EOM are normal  Pupils are equal, round, and reactive to light  Reds reddened and swollen today, no drainage noted   Neck: Normal range of motion  Neck supple  Cardiovascular: Normal rate, regular rhythm, normal heart sounds and intact distal pulses  Pulmonary/Chest: Effort normal and breath sounds normal    Abdominal: Soft  Bowel sounds are normal    Musculoskeletal: Normal range of motion  Negative phalens and tinels sign   Neurological: She is alert and oriented to person, place, and time  She has normal reflexes  Skin: Skin is warm and dry  Psychiatric: She has a normal mood and affect  Her behavior is normal  Judgment and thought content normal    Vitals reviewed

## 2018-05-02 DIAGNOSIS — E55.9 VITAMIN D DEFICIENCY: Primary | ICD-10-CM

## 2018-05-02 RX ORDER — QUINIDINE SULFATE 200 MG
TABLET ORAL
Qty: 4 TABLET | Refills: 3 | Status: SHIPPED | OUTPATIENT
Start: 2018-05-02 | End: 2018-10-19

## 2018-05-08 ENCOUNTER — TELEPHONE (OUTPATIENT)
Dept: INTERNAL MEDICINE CLINIC | Facility: CLINIC | Age: 33
End: 2018-05-08

## 2018-05-22 DIAGNOSIS — E55.9 VITAMIN D DEFICIENCY: Primary | ICD-10-CM

## 2018-08-20 ENCOUNTER — HOSPITAL ENCOUNTER (EMERGENCY)
Facility: HOSPITAL | Age: 33
Discharge: HOME | End: 2018-08-20
Attending: EMERGENCY MEDICINE
Payer: COMMERCIAL

## 2018-08-20 VITALS
OXYGEN SATURATION: 100 % | WEIGHT: 184.4 LBS | SYSTOLIC BLOOD PRESSURE: 159 MMHG | DIASTOLIC BLOOD PRESSURE: 100 MMHG | BODY MASS INDEX: 33.73 KG/M2 | HEART RATE: 94 BPM | TEMPERATURE: 98.6 F | RESPIRATION RATE: 16 BRPM

## 2018-08-20 DIAGNOSIS — K04.7 DENTAL INFECTION: Primary | ICD-10-CM

## 2018-08-20 PROCEDURE — 99282 EMERGENCY DEPT VISIT SF MDM: CPT

## 2018-08-20 RX ORDER — TRAMADOL HYDROCHLORIDE 50 MG/1
TABLET ORAL
Qty: 30 TABLET | Refills: 0 | Status: SHIPPED | OUTPATIENT
Start: 2018-08-20 | End: 2018-10-19

## 2018-08-20 RX ORDER — IBUPROFEN 800 MG/1
800 TABLET ORAL 3 TIMES DAILY
Qty: 30 TABLET | Refills: 0 | Status: SHIPPED | OUTPATIENT
Start: 2018-08-20 | End: 2018-10-19

## 2018-08-20 RX ORDER — TRAMADOL HYDROCHLORIDE 50 MG/1
100 TABLET ORAL ONCE
Status: COMPLETED | OUTPATIENT
Start: 2018-08-20 | End: 2018-08-20

## 2018-08-20 RX ORDER — CLINDAMYCIN HYDROCHLORIDE 150 MG/1
450 CAPSULE ORAL EVERY 8 HOURS SCHEDULED
Qty: 90 CAPSULE | Refills: 0 | Status: SHIPPED | OUTPATIENT
Start: 2018-08-20 | End: 2018-08-30

## 2018-08-20 RX ORDER — CLINDAMYCIN HYDROCHLORIDE 150 MG/1
450 CAPSULE ORAL ONCE
Status: COMPLETED | OUTPATIENT
Start: 2018-08-20 | End: 2018-08-20

## 2018-08-20 RX ADMIN — CLINDAMYCIN HYDROCHLORIDE 450 MG: 150 CAPSULE ORAL at 19:38

## 2018-08-20 RX ADMIN — TRAMADOL HYDROCHLORIDE 100 MG: 50 TABLET, COATED ORAL at 19:37

## 2018-08-20 NOTE — DISCHARGE INSTRUCTIONS
Dental Abscess   WHAT YOU NEED TO KNOW:   A dental abscess is a collection of pus in or around a tooth  A dental abscess is caused by bacteria  The bacteria usually enter the tooth when the enamel (outer part of the tooth) is damaged by tooth decay  Bacteria may also enter the tooth through a break or chip in the tooth, or a cut in the gum  Food particles that are stuck between the teeth for a long time may also lead to an abscess  DISCHARGE INSTRUCTIONS:   Return to the emergency department if:   · You have severe pain  · You have trouble breathing because of pain or swelling  Contact your healthcare provider if:   · Your symptoms get worse, even after treatment  · Your mouth is bleeding  · You cannot eat or drink because of pain or swelling  · Your abscess returns  · You have an injury that causes a crack in your tooth  · You have questions or concerns about your condition or care  Medicines: You may  need any of the following:  · Antibiotics  help treat a bacterial infection  · NSAIDs , such as ibuprofen, help decrease swelling, pain, and fever  This medicine is available with or without a doctor's order  NSAIDs can cause stomach bleeding or kidney problems in certain people  If you take blood thinner medicine, always ask your healthcare provider if NSAIDs are safe for you  Always read the medicine label and follow directions  · Acetaminophen  decreases pain and fever  It is available without a doctor's order  Ask how much to take and how often to take it  Follow directions  Read the labels of all other medicines you are using to see if they also contain acetaminophen, or ask your doctor or pharmacist  Acetaminophen can cause liver damage if not taken correctly  Do not use more than 4 grams (4,000 milligrams) total of acetaminophen in one day  · Prescription pain medicine  may be given  Ask your healthcare provider how to take this medicine safely   Some prescription pain medicines contain acetaminophen  Do not take other medicines that contain acetaminophen without talking to your healthcare provider  Too much acetaminophen may cause liver damage  Prescription pain medicine may cause constipation  Ask your healthcare provider how to prevent or treat constipation  · Take your medicine as directed  Contact your healthcare provider if you think your medicine is not helping or if you have side effects  Tell him of her if you are allergic to any medicine  Keep a list of the medicines, vitamins, and herbs you take  Include the amounts, and when and why you take them  Bring the list or the pill bottles to follow-up visits  Carry your medicine list with you in case of an emergency  Self-care:   · Rinse your mouth every 2 hours with salt water  This will help keep the area clean  · Gently brush your teeth twice a day with a soft tooth brush  This will help keep the area clean  · Eat soft foods as directed  Soft foods may cause less pain  Examples include applesauce, yogurt, and cooked pasta  Ask your healthcare provider how long to follow this instruction  · Apply a warm compress to your tooth or gum  Use a cotton ball or gauze soaked in warm water  Remove the compress in 10 minutes or when it becomes cool  Repeat 3 times a day  Prevent another abscess:   · Brush your teeth at least 2 times a day with fluoride toothpaste  · Use dental floss to clean between your teeth at least once a day  · Rinse your mouth with water or mouthwash after meals and snacks  · Chew sugarless gum after meals and snacks  · Limit foods that are sticky and high in sugar such as raisons  Also limit drinks high in sugar, such as soda  · See your dentist every 6 months for dental cleanings and oral exams  Follow up with your healthcare provider in 24 hours: Your healthcare provider will need to check your teeth and gums   Write down your questions so you remember to ask them during your visits  © 2017 [facility] Information is for End User's use only and may not be sold, redistributed or otherwise used for commercial purposes  All illustrations and images included in CareNotes® are the copyrighted property of A D A M [employer] Inc  or Aryan Chowdary  The above information is an  only  It is not intended as medical advice for individual conditions or treatments  Talk to your doctor, nurse or pharmacist before following any medical regimen to see if it is safe and effective for you

## 2018-08-22 NOTE — ED PROVIDER NOTES
History  Chief Complaint   Patient presents with    Dental Swelling     PT STATES FOR 1 WEEK SHE HAS HAD SWELLING ON LEFT SIDE  TAKING NAPROXEN, AND ANTIBIOTICS WITHOUT RELIEF  DENTSIT APPOINTMENT IN OCTOBER     Patient: Sally Carpio  72 y o /female  YOB: 1985  MRN: [id number]  PCP: Socorro Kwon MD  Date of evaluation: 8/20/2018    (N B   Voice-recognition software may have been used in the preparation of this document  Occasional wrong word or "sound-alike" substitutions may have occurred due to the inherent limitations of voice recognition software  Interpretation should be guided by context )    History provided by:  Patient  Dental Pain   Location:  Lower  Lower teeth location:  20/LL 2nd bicuspid, 19/LL 1st molar and 18/LL 2nd molar (left)  Quality:  Throbbing and constant  Severity:  Severe  Onset quality:  Gradual  Duration:  1 week  Timing:  Constant  Progression:  Worsening  Chronicity:  Recurrent  Context: dental caries and poor dentition    Prior workup: Referral to oral surgery for extraction of all lower teeth  Recent similar problem, temporarily relieved with amoxicillin, now relapsed  Relieved by:  Nothing  Worsened by:  Nothing  Ineffective treatments:  NSAIDs  Associated symptoms: facial pain, facial swelling and gum swelling    Associated symptoms: no difficulty swallowing, no drooling, no fever and no neck swelling    Risk factors comment:  Ongoing dental problems in this patricio      Prior to Admission Medications   Prescriptions Last Dose Informant Patient Reported? Taking?    Cholecalciferol (VITAMIN D3) 5000 units TABS   No No   Sig: Take one tablet by mouth once weekly   Multiple Vitamins-Minerals (VITAMIN D3 COMPLETE) TABS   No No   Sig: Take one tablet by mouth once weekly   cetirizine (ZyrTEC) 10 mg tablet   No No   Sig: Take 1 tablet (10 mg total) by mouth daily   methocarbamol (ROBAXIN) 500 mg tablet  Self No No   Sig: Take one tablet by mouth every 12 hours PRN      Facility-Administered Medications: None       Past Medical History:   Diagnosis Date    Cat-scratch disease     last assessed 10/22/15    Depression     Hypertension     last assessed 11/11/14    Sleep disorder        Past Surgical History:   Procedure Laterality Date    TUBAL LIGATION         Family History   Problem Relation Age of Onset    Anxiety disorder Mother     Bipolar disorder Mother     Depression Mother     Schizophrenia Mother     Parkinsonism Mother         tremor    Anxiety disorder Father     Bipolar disorder Father     Depression Father     Schizophrenia Father     Stomach cancer Paternal Grandmother      I have reviewed and agree with the history as documented  Social History   Substance Use Topics    Smoking status: Never Smoker    Smokeless tobacco: Never Used    Alcohol use No        Review of Systems   Constitutional: Negative for chills and fever  HENT: Positive for dental problem and facial swelling  Negative for drooling, sore throat, trouble swallowing and voice change  Respiratory: Negative for cough and shortness of breath  Cardiovascular: Negative for chest pain and palpitations  Gastrointestinal: Negative for abdominal pain, diarrhea and vomiting  Genitourinary: Negative for decreased urine volume and difficulty urinating  Skin: Negative for color change and rash  Physical Exam  Physical Exam   Constitutional: She appears well-developed and well-nourished  HENT:   Mouth/Throat: Oropharynx is clear and moist and mucous membranes are normal    gum swelling and tenderness in painful area - no fluctuance   Cardiovascular: Normal rate and regular rhythm  Pulmonary/Chest: Effort normal and breath sounds normal    Abdominal: Soft  There is no tenderness  Neurological: She is alert  GCS eye subscore is 4  GCS verbal subscore is 5  GCS motor subscore is 6  Psychiatric: She has a normal mood and affect   Her speech is normal and behavior is normal    Nursing note and vitals reviewed  Vital Signs  ED Triage Vitals [08/20/18 1910]   Temperature Pulse Respirations Blood Pressure SpO2   98 6 °F (37 °C) 94 16 159/100 100 %      Temp Source Heart Rate Source Patient Position - Orthostatic VS BP Location FiO2 (%)   Temporal Monitor Sitting Right arm --      Pain Score       Worst Possible Pain           Vitals:    08/20/18 1910   BP: 159/100   Pulse: 94   Patient Position - Orthostatic VS: Sitting       Visual Acuity      ED Medications  Medications   clindamycin (CLEOCIN) capsule 450 mg (450 mg Oral Given 8/20/18 1938)   traMADol (ULTRAM) tablet 100 mg (100 mg Oral Given 8/20/18 1937)       Diagnostic Studies  Results Reviewed     None                 No orders to display              Procedures  Procedures       Phone Contacts  ED Phone Contact    ED Course                               MDM  CritCare Time    Disposition  Final diagnoses:   Dental infection     Time reflects when diagnosis was documented in both MDM as applicable and the Disposition within this note     Time User Action Codes Description Comment    8/20/2018  7:17 PM Amanda Watkins [K04 7] Dental infection       ED Disposition     ED Disposition Condition Comment    Discharge  Raina Patel discharge to home/self care      Condition at discharge: Good        Follow-up Information     Follow up With Specialties Details Why Hugh  Call For followup [address] [place] [address]  [phone]          Discharge Medication List as of 8/20/2018  7:20 PM      START taking these medications    Details   clindamycin (CLEOCIN) 150 mg capsule Take 3 capsules (450 mg total) by mouth every 8 (eight) hours for 10 days (antibiotic), Starting Mon 8/20/2018, Until Thu 8/30/2018, Print      ibuprofen (MOTRIN) 800 mg tablet Take 1 tablet (800 mg total) by mouth 3 (three) times a day as needed for pain, fever (= 4 of the 200 mg OTC ibuprofen), Starting Mon 8/20/2018, Print      traMADol (ULTRAM) 50 mg tablet 1-2 tab po q4-6h prn severe pain; max 8 per day , Print         CONTINUE these medications which have NOT CHANGED    Details   cetirizine (ZyrTEC) 10 mg tablet Take 1 tablet (10 mg total) by mouth daily, Starting Tue 5/1/2018, Normal      Cholecalciferol (VITAMIN D3) 5000 units TABS Take one tablet by mouth once weekly, Normal      methocarbamol (ROBAXIN) 500 mg tablet Take one tablet by mouth every 12 hours PRN, Normal      Multiple Vitamins-Minerals (VITAMIN D3 COMPLETE) TABS Take one tablet by mouth once weekly, Normal           No discharge procedures on file      ED Provider  Electronically Signed by           Desmond Valera MD  08/22/18 0980

## 2018-10-19 ENCOUNTER — OFFICE VISIT (OUTPATIENT)
Dept: INTERNAL MEDICINE CLINIC | Facility: CLINIC | Age: 33
End: 2018-10-19
Payer: COMMERCIAL

## 2018-10-19 VITALS
DIASTOLIC BLOOD PRESSURE: 86 MMHG | TEMPERATURE: 98.1 F | HEART RATE: 116 BPM | SYSTOLIC BLOOD PRESSURE: 124 MMHG | HEIGHT: 62 IN | OXYGEN SATURATION: 94 % | WEIGHT: 187.1 LBS | BODY MASS INDEX: 34.43 KG/M2

## 2018-10-19 DIAGNOSIS — J06.9 ACUTE UPPER RESPIRATORY INFECTION: Primary | ICD-10-CM

## 2018-10-19 PROCEDURE — 3008F BODY MASS INDEX DOCD: CPT | Performed by: NURSE PRACTITIONER

## 2018-10-19 PROCEDURE — 99214 OFFICE O/P EST MOD 30 MIN: CPT | Performed by: NURSE PRACTITIONER

## 2018-10-19 RX ORDER — PREDNISONE 10 MG/1
TABLET ORAL
Qty: 18 TABLET | Refills: 0 | Status: SHIPPED | OUTPATIENT
Start: 2018-10-19 | End: 2018-11-01 | Stop reason: ALTCHOICE

## 2018-10-19 RX ORDER — BENZONATATE 200 MG/1
200 CAPSULE ORAL 3 TIMES DAILY PRN
Qty: 30 CAPSULE | Refills: 0 | Status: SHIPPED | OUTPATIENT
Start: 2018-10-19 | End: 2018-11-01 | Stop reason: ALTCHOICE

## 2018-10-19 RX ORDER — AZITHROMYCIN 250 MG/1
TABLET, FILM COATED ORAL
Qty: 6 TABLET | Refills: 0 | Status: SHIPPED | OUTPATIENT
Start: 2018-10-19 | End: 2018-10-23

## 2018-10-19 NOTE — PROGRESS NOTES
Assessment/Plan: Will start patient on a Z pack take as directed, Prednisone taper, and Tessalon 200 mg TID PRN for cough  She was advised to continue supportive care increase fluid intake and take Tylenol or Motrin for pain or fever  If any worsening of symptoms she was advised to call the office  No problem-specific Assessment & Plan notes found for this encounter  Problem List Items Addressed This Visit     Acute upper respiratory infection - Primary    Relevant Medications    azithromycin (ZITHROMAX) 250 mg tablet    predniSONE 10 mg tablet    benzonatate (TESSALON) 200 MG capsule            Subjective:      Patient ID: Sarah Matias is a 28 y o  female  Radha Garter is here today for an acute visit  Patient states for the past several days she has been having a dry cough, congestion, sore throat, and at times notices she is wheezing  She denies any chest pain but does have some occasional SOB  She is eating and drinking  She states the cough is the worse and is making her chest feel very tight  She offers no complaints of fever but does have chills on and off  She offers no other concerns  The following portions of the patient's history were reviewed and updated as appropriate:   She  has a past medical history of Cat-scratch disease; Depression; Hypertension; and Sleep disorder  She   Patient Active Problem List    Diagnosis Date Noted    Acute upper respiratory infection 10/19/2018    Numbness and tingling of both upper extremities while sleeping 05/01/2018    Generalized abdominal pain 03/15/2018    Urine ketones 03/15/2018    Depression 09/12/2016    Intention tremor 09/12/2016     She  has a past surgical history that includes Tubal ligation    Her family history includes Anxiety disorder in her father and mother; Bipolar disorder in her father and mother; Depression in her father and mother; Parkinsonism in her mother; Schizophrenia in her father and mother; Stomach cancer in her paternal grandmother  She  reports that she has never smoked  She has never used smokeless tobacco  She reports that she does not drink alcohol or use drugs  Current Outpatient Prescriptions   Medication Sig Dispense Refill    azithromycin (ZITHROMAX) 250 mg tablet Take 2 tablets today then 1 tablet daily x 4 days 6 tablet 0    benzonatate (TESSALON) 200 MG capsule Take 1 capsule (200 mg total) by mouth 3 (three) times a day as needed for cough 30 capsule 0    predniSONE 10 mg tablet 30 mg by mouth daily for 3 days, then 20 mg by mouth daily for 3 days, then 10 mg by mouth daily for 3 days, then stop 18 tablet 0     No current facility-administered medications for this visit  Current Outpatient Prescriptions on File Prior to Visit   Medication Sig    [DISCONTINUED] cetirizine (ZyrTEC) 10 mg tablet Take 1 tablet (10 mg total) by mouth daily (Patient not taking: Reported on 10/19/2018 )    [DISCONTINUED] Cholecalciferol (VITAMIN D3) 5000 units TABS Take one tablet by mouth once weekly (Patient not taking: Reported on 10/19/2018 )    [DISCONTINUED] ibuprofen (MOTRIN) 800 mg tablet Take 1 tablet (800 mg total) by mouth 3 (three) times a day as needed for pain, fever (= 4 of the 200 mg OTC ibuprofen) (Patient not taking: Reported on 10/19/2018 )    [DISCONTINUED] methocarbamol (ROBAXIN) 500 mg tablet Take one tablet by mouth every 12 hours PRN (Patient not taking: Reported on 10/19/2018 )    [DISCONTINUED] Multiple Vitamins-Minerals (VITAMIN D3 COMPLETE) TABS Take one tablet by mouth once weekly (Patient not taking: Reported on 10/19/2018 )    [DISCONTINUED] traMADol (ULTRAM) 50 mg tablet 1-2 tab po q4-6h prn severe pain; max 8 per day  (Patient not taking: Reported on 10/19/2018 )     No current facility-administered medications on file prior to visit  She is allergic to codeine       Review of Systems   Constitutional: Positive for fatigue     HENT: Positive for congestion, postnasal drip and rhinorrhea  Respiratory: Positive for choking and chest tightness  Cardiovascular: Negative  Gastrointestinal: Negative  Endocrine: Negative  Genitourinary: Negative  Musculoskeletal: Negative  Allergic/Immunologic: Negative  Neurological: Negative  Hematological: Negative  Psychiatric/Behavioral: Negative  Objective:      /86 (BP Location: Right arm, Patient Position: Sitting, Cuff Size: Adult)   Pulse (!) 116   Temp 98 1 °F (36 7 °C) (Temporal)   Ht 5' 2" (1 575 m)   Wt 84 9 kg (187 lb 1 6 oz)   SpO2 94%   BMI 34 22 kg/m²          Physical Exam   Constitutional: She is oriented to person, place, and time  She appears well-developed and well-nourished  HENT:   Head: Normocephalic and atraumatic  Right Ear: External ear normal    Left Ear: External ear normal    Post nasal drip noted B/L turbinates red and edematous   Eyes: Pupils are equal, round, and reactive to light  Conjunctivae and EOM are normal    Neck: Normal range of motion  Neck supple  Cardiovascular: Normal rate, regular rhythm, normal heart sounds and intact distal pulses  Pulmonary/Chest: She has wheezes  Abdominal: Soft  Bowel sounds are normal    Musculoskeletal: Normal range of motion  Neurological: She is alert and oriented to person, place, and time  She has normal reflexes  Skin: Skin is warm and dry  Psychiatric: She has a normal mood and affect  Her behavior is normal  Judgment and thought content normal    Vitals reviewed

## 2018-10-19 NOTE — PATIENT INSTRUCTIONS
Upper Respiratory Infection   WHAT YOU NEED TO KNOW:   What is an upper respiratory infection? An upper respiratory infection is also called a common cold  It can affect your nose, throat, ears, and sinuses  What causes a cold? The common cold is caused by a virus  There are many different cold viruses, and each is contagious  This means the virus can be easily spread to another person when the sick person coughs or sneezes  The virus can also be spread if you touch something that a person with a cold has touched  You are more likely to get a cold in the winter  Your risk of getting a cold may be increased if you smoke cigarettes or have allergies, such as hay fever  What are the signs and symptoms of a cold? Cold symptoms are usually worst for the first 3 to 5 days  You may have any of the following:  · Runny or stuffy nose    · Sneezing and coughing    · Sore throat or hoarseness    · Red, watery, and sore eyes    · Fatigue     · Chills and fever    · Headache, body aches, or sore muscles  How is a cold treated? There is no cure for the common cold  Colds are caused by viruses and do not get better with antibiotics  Most people get better in 7 to 14 days  You may continue to cough for 2 to 3 weeks  The following may help decrease your symptoms:  · Decongestants  help reduce nasal congestion and help you breathe more easily  If you take decongestant pills, they may make you feel restless or cause problems with your sleep  Do not use decongestant sprays for more than a few days  · Cough suppressants  help reduce coughing  Ask your healthcare provider which type of cough medicine is best for you  · NSAIDs , such as ibuprofen, help decrease swelling, pain, and fever  NSAIDs can cause stomach bleeding or kidney problems in certain people  If you take blood thinner medicine, always ask your healthcare provider if NSAIDs are safe for you   Always read the medicine label and follow directions  · Acetaminophen  decreases pain and fever  It is available without a doctor's order  Ask how much to take and how often to take it  Follow directions  Read the labels of all other medicines you are using to see if they also contain acetaminophen, or ask your doctor or pharmacist  Acetaminophen can cause liver damage if not taken correctly  Do not use more than 4 grams (4,000 milligrams) total of acetaminophen in one day  How can I manage my cold? · Rest as much as possible  Slowly start to do more each day  · Drink more liquids as directed  Liquids will help thin and loosen mucus so you can cough it up  Liquids will also help prevent dehydration  Liquids that help prevent dehydration include water, fruit juice, and broth  Do not drink liquids that contain caffeine  Caffeine can increase your risk for dehydration  Ask your healthcare provider how much liquid to drink each day  · Soothe a sore throat  Gargle with warm salt water  This helps your sore throat feel better  Make salt water by dissolving ¼ teaspoon salt in 1 cup warm water  You may also suck on hard candy or throat lozenges  You may use a sore throat spray  · Use a humidifier or vaporizer  Use a cool mist humidifier or a vaporizer to increase air moisture in your home  This may make it easier for you to breathe and help decrease your cough  · Use saline nasal drops as directed  These help relieve congestion  · Apply petroleum-based jelly around the outside of your nostrils  This can decrease irritation from blowing your nose  · Do not smoke  Nicotine and other chemicals in cigarettes and cigars can make your symptoms worse  They can also cause infections such as bronchitis or pneumonia  Ask your healthcare provider for information if you currently smoke and need help to quit  E-cigarettes or smokeless tobacco still contain nicotine  Talk to your healthcare provider before you use these products    What can I do to prevent the spread of the common cold? · Try to stay away from other people during the first 2 to 3 days of your cold when it is more easily spread  · Do not share food or drinks  · Do not share hand towels with household members  · Wash your hands often, especially after you blow your nose  Turn away from other people and cover your mouth and nose with a tissue when you sneeze or cough  When should I seek immediate care? · You have chest pain or trouble breathing  When should I contact my healthcare provider? · You have a fever over 102ºF (39ºC)  · Your sore throat gets worse or you see white or yellow spots in your throat  · Your symptoms get worse after 3 to 5 days or your cold is not better in 14 days  · You have a rash anywhere on your skin  · You have large, tender lumps in your neck  · You have thick, green, or yellow drainage from your nose  · You cough up thick yellow, green, or bloody mucus  · You are vomiting for more than 24 hours and cannot keep fluids down  · You have a bad earache  · You have questions or concerns about your condition or care  CARE AGREEMENT:   You have the right to help plan your care  Learn about your health condition and how it may be treated  Discuss treatment options with your caregivers to decide what care you want to receive  You always have the right to refuse treatment  The above information is an  only  It is not intended as medical advice for individual conditions or treatments  Talk to your doctor, nurse or pharmacist before following any medical regimen to see if it is safe and effective for you  © 2017 [employer]  Information is for End User's use only and may not be sold, redistributed or otherwise used for commercial purposes  All illustrations and images included in CareNotes® are the copyrighted property of [employer]  or Aryan Chowdary

## 2018-10-19 NOTE — LETTER
October 19, 2018     Patient: Анна Other   YOB: 1985   Date of Visit: 10/19/2018       To Whom it May Concern:    Miranda Saint is under my professional care  She was seen in my office on 10/19/2018  She may return to work on Monday, 10/22/2018  Please excuse her for 10/19/18 and 10/20/18  If you have any questions or concerns, please don't hesitate to call           Sincerely,          ARIA Lopez        CC: No Recipients

## 2018-10-22 ENCOUNTER — TELEPHONE (OUTPATIENT)
Dept: INTERNAL MEDICINE CLINIC | Facility: CLINIC | Age: 33
End: 2018-10-22

## 2018-10-22 NOTE — TELEPHONE ENCOUNTER
Patient called stating that she is not feeling any better and still wheezing  I asked what medicine she is taking and she said Zpack and tessalon pearls but did not mention the prednisone  I asked her about the prednisone and she stated that the pharmacist told her only those two medicines  I told her to go  the prednisone from the pharmacy and start it immediately and call back tomorrow  I also told her to got to ED if she feels the wheezing is that bad

## 2018-11-01 ENCOUNTER — HOSPITAL ENCOUNTER (EMERGENCY)
Facility: HOSPITAL | Age: 33
Discharge: HOME | End: 2018-11-01
Attending: EMERGENCY MEDICINE
Payer: COMMERCIAL

## 2018-11-01 VITALS
OXYGEN SATURATION: 95 % | WEIGHT: 185 LBS | BODY MASS INDEX: 32.78 KG/M2 | TEMPERATURE: 98.9 F | HEIGHT: 63 IN | RESPIRATION RATE: 18 BRPM | DIASTOLIC BLOOD PRESSURE: 68 MMHG | SYSTOLIC BLOOD PRESSURE: 138 MMHG | HEART RATE: 82 BPM

## 2018-11-01 DIAGNOSIS — G89.29 CHRONIC DENTAL PAIN: Primary | ICD-10-CM

## 2018-11-01 DIAGNOSIS — K08.9 CHRONIC DENTAL PAIN: Primary | ICD-10-CM

## 2018-11-01 PROCEDURE — 99282 EMERGENCY DEPT VISIT SF MDM: CPT

## 2018-11-01 RX ORDER — PENICILLIN V POTASSIUM 500 MG/1
500 TABLET ORAL 4 TIMES DAILY
Qty: 28 TABLET | Refills: 0 | Status: SHIPPED | OUTPATIENT
Start: 2018-11-01 | End: 2018-11-08

## 2018-11-01 RX ORDER — ACETAMINOPHEN 325 MG/1
650 TABLET ORAL EVERY 6 HOURS PRN
Qty: 30 TABLET | Refills: 0 | Status: SHIPPED | OUTPATIENT
Start: 2018-11-01 | End: 2018-11-06

## 2018-11-01 RX ORDER — IBUPROFEN 400 MG/1
400 TABLET ORAL EVERY 6 HOURS PRN
Qty: 30 TABLET | Refills: 0 | Status: SHIPPED | OUTPATIENT
Start: 2018-11-01 | End: 2018-11-30 | Stop reason: ALTCHOICE

## 2018-11-01 NOTE — DISCHARGE INSTRUCTIONS
Dental Abscess   WHAT YOU NEED TO KNOW:   A dental abscess is a collection of pus in or around a tooth  A dental abscess is caused by bacteria  The bacteria usually enter the tooth when the enamel (outer part of the tooth) is damaged by tooth decay  Bacteria may also enter the tooth through a break or chip in the tooth, or a cut in the gum  Food particles that are stuck between the teeth for a long time may also lead to an abscess  DISCHARGE INSTRUCTIONS:   Return to the emergency department if:   · You have severe pain  · You have trouble breathing because of pain or swelling  Contact your healthcare provider if:   · Your symptoms get worse, even after treatment  · Your mouth is bleeding  · You cannot eat or drink because of pain or swelling  · Your abscess returns  · You have an injury that causes a crack in your tooth  · You have questions or concerns about your condition or care  Medicines: You may  need any of the following:  · Antibiotics  help treat a bacterial infection  · NSAIDs , such as ibuprofen, help decrease swelling, pain, and fever  This medicine is available with or without a doctor's order  NSAIDs can cause stomach bleeding or kidney problems in certain people  If you take blood thinner medicine, always ask your healthcare provider if NSAIDs are safe for you  Always read the medicine label and follow directions  · Acetaminophen  decreases pain and fever  It is available without a doctor's order  Ask how much to take and how often to take it  Follow directions  Read the labels of all other medicines you are using to see if they also contain acetaminophen, or ask your doctor or pharmacist  Acetaminophen can cause liver damage if not taken correctly  Do not use more than 4 grams (4,000 milligrams) total of acetaminophen in one day  · Prescription pain medicine  may be given  Ask your healthcare provider how to take this medicine safely   Some prescription pain medicines contain acetaminophen  Do not take other medicines that contain acetaminophen without talking to your healthcare provider  Too much acetaminophen may cause liver damage  Prescription pain medicine may cause constipation  Ask your healthcare provider how to prevent or treat constipation  · Take your medicine as directed  Contact your healthcare provider if you think your medicine is not helping or if you have side effects  Tell him of her if you are allergic to any medicine  Keep a list of the medicines, vitamins, and herbs you take  Include the amounts, and when and why you take them  Bring the list or the pill bottles to follow-up visits  Carry your medicine list with you in case of an emergency  Self-care:   · Rinse your mouth every 2 hours with salt water  This will help keep the area clean  · Gently brush your teeth twice a day with a soft tooth brush  This will help keep the area clean  · Eat soft foods as directed  Soft foods may cause less pain  Examples include applesauce, yogurt, and cooked pasta  Ask your healthcare provider how long to follow this instruction  · Apply a warm compress to your tooth or gum  Use a cotton ball or gauze soaked in warm water  Remove the compress in 10 minutes or when it becomes cool  Repeat 3 times a day  Prevent another abscess:   · Brush your teeth at least 2 times a day with fluoride toothpaste  · Use dental floss to clean between your teeth at least once a day  · Rinse your mouth with water or mouthwash after meals and snacks  · Chew sugarless gum after meals and snacks  · Limit foods that are sticky and high in sugar such as raisons  Also limit drinks high in sugar, such as soda  · See your dentist every 6 months for dental cleanings and oral exams  Follow up with your healthcare provider in 24 hours: Your healthcare provider will need to check your teeth and gums   Write down your questions so you remember to ask them during your visits  © 2017 [employer] Information is for End User's use only and may not be sold, redistributed or otherwise used for commercial purposes  All illustrations and images included in CareNotes® are the copyrighted property of A D [employer] M [employer]  or Aryan Chowdary  The above information is an  only  It is not intended as medical advice for individual conditions or treatments  Talk to your doctor, nurse or pharmacist before following any medical regimen to see if it is safe and effective for you

## 2018-11-01 NOTE — ED PROVIDER NOTES
History  Chief Complaint   Patient presents with    Dental Pain     Pt states she has left sided dental abcess x2 days  appt on the 15th to have all teeth removed     HPI      Patient is a pleasant 28 yof who presents with dental pain in the left lower molar area for the past two days  Pain is achi, non radiating  No systemic fevers, chills, sweats  No focal neurological defects  No visual disturbance  No hearing loss/tinnitus/vertigo  No pain behind the ear  No parotid gland tenderness  No neck pain or trouble swallowing  No deviation of the tonsils to suggest peritonsillar abscess  No obvious drainable intraoral abscess  No facial rash or blisters  No woodiness or swelling underneath the tongue  No claudication when chewing  No headache  MDM patient is a pleasant 28 yof, likely periapical abscess, she has severe dental caries and is going to have all her teeth extracted in the next coming weeks  Will treat with abx  No systemic fevers, chills, sweats  No focal neurological defects  No visual disturbance  No hearing loss/tinnitus/vertigo  No pain behind the ear  No parotid gland tenderness  No neck pain or trouble swallowing  No deviation of the tonsils to suggest peritonsillar abscess  No obvious drainable intraoral abscess  No facial rash or blisters  No woodiness or swelling underneath the tongue  No claudication when chewing  No headache          None       Past Medical History:   Diagnosis Date    Cat-scratch disease     last assessed 10/22/15    Depression     Hypertension     last assessed 11/11/14    Sleep disorder        Past Surgical History:   Procedure Laterality Date    TUBAL LIGATION         Family History   Problem Relation Age of Onset    Anxiety disorder Mother     Bipolar disorder Mother     Depression Mother     Schizophrenia Mother     Parkinsonism Mother         tremor    Anxiety disorder Father     Bipolar disorder Father     Depression Father  Schizophrenia Father     Stomach cancer Paternal Grandmother      I have reviewed and agree with the history as documented  Social History   Substance Use Topics    Smoking status: Never Smoker    Smokeless tobacco: Never Used    Alcohol use No        Review of Systems   HENT: Positive for dental problem  All other systems reviewed and are negative  Physical Exam  Physical Exam   Constitutional: She is oriented to person, place, and time  She appears well-developed and well-nourished  HENT:   Head: Normocephalic and atraumatic  Right Ear: External ear normal    Left Ear: External ear normal    Eyes: Conjunctivae and EOM are normal    Neck: Normal range of motion  Neck supple  No JVD present  No tracheal deviation present  Cardiovascular: Normal rate, regular rhythm and normal heart sounds  Pulmonary/Chest: Effort normal  No respiratory distress  She has no wheezes  She has no rales  Abdominal: Soft  Bowel sounds are normal  There is no tenderness  There is no rebound and no guarding  Musculoskeletal: She exhibits no edema or tenderness  Neurological: She is alert and oriented to person, place, and time  Skin: Skin is warm and dry  No rash noted  No erythema  Psychiatric: She has a normal mood and affect  Thought content normal    Nursing note and vitals reviewed        Vital Signs  ED Triage Vitals [11/01/18 1326]   Temperature Pulse Respirations Blood Pressure SpO2   98 9 °F (37 2 °C) 82 18 138/68 95 %      Temp Source Heart Rate Source Patient Position - Orthostatic VS BP Location FiO2 (%)   Temporal Monitor Sitting Left arm --      Pain Score       Worst Possible Pain           Vitals:    11/01/18 1326   BP: 138/68   Pulse: 82   Patient Position - Orthostatic VS: Sitting       Visual Acuity      ED Medications  Medications - No data to display    Diagnostic Studies  Results Reviewed     None                 No orders to display              Procedures  Procedures       Phone Contacts  ED Phone Contact    ED Course                               MDM  CritCare Time    Disposition  Final diagnoses:   Chronic dental pain     Time reflects when diagnosis was documented in both MDM as applicable and the Disposition within this note     Time User Action Codes Description Comment    11/1/2018  1:40 PM Ángela Sanches Add [K08 9,  G89 29] Chronic dental pain       ED Disposition     ED Disposition Condition Comment    Discharge  Ervin Millard discharge to home/self care  Condition at discharge: Good        Follow-up Information     Follow up With Specialties Details Why Contact Info    Dentist  In 2 days            Discharge Medication List as of 11/1/2018  1:41 PM      START taking these medications    Details   acetaminophen (TYLENOL) 325 mg tablet Take 2 tablets (650 mg total) by mouth every 6 (six) hours as needed for mild pain for up to 5 days, Starting Thu 11/1/2018, Until Tue 11/6/2018, Print      ibuprofen (MOTRIN) 400 mg tablet Take 1 tablet (400 mg total) by mouth every 6 (six) hours as needed for mild pain for up to 5 days, Starting Thu 11/1/2018, Until Tue 11/6/2018, Print      penicillin V potassium (VEETID) 500 mg tablet Take 1 tablet (500 mg total) by mouth 4 (four) times a day for 7 days, Starting Thu 11/1/2018, Until Thu 11/8/2018, Print           No discharge procedures on file      ED Provider  Electronically Signed by           Lyndsay King MD  11/01/18 3827

## 2018-11-30 ENCOUNTER — OFFICE VISIT (OUTPATIENT)
Dept: INTERNAL MEDICINE CLINIC | Facility: CLINIC | Age: 33
End: 2018-11-30
Payer: COMMERCIAL

## 2018-11-30 VITALS
OXYGEN SATURATION: 97 % | DIASTOLIC BLOOD PRESSURE: 80 MMHG | SYSTOLIC BLOOD PRESSURE: 126 MMHG | HEIGHT: 63 IN | BODY MASS INDEX: 33.72 KG/M2 | WEIGHT: 190.3 LBS | TEMPERATURE: 98.8 F | HEART RATE: 99 BPM

## 2018-11-30 DIAGNOSIS — J30.2 SEASONAL ALLERGIC RHINITIS, UNSPECIFIED TRIGGER: ICD-10-CM

## 2018-11-30 DIAGNOSIS — Z12.4 SCREENING FOR CERVICAL CANCER: ICD-10-CM

## 2018-11-30 DIAGNOSIS — Z02.4 DRIVER'S PERMIT PHYSICAL EXAMINATION: Primary | ICD-10-CM

## 2018-11-30 PROBLEM — J06.9 ACUTE UPPER RESPIRATORY INFECTION: Status: RESOLVED | Noted: 2018-10-19 | Resolved: 2018-11-30

## 2018-11-30 PROBLEM — R10.84 GENERALIZED ABDOMINAL PAIN: Status: RESOLVED | Noted: 2018-03-15 | Resolved: 2018-11-30

## 2018-11-30 PROCEDURE — 3008F BODY MASS INDEX DOCD: CPT | Performed by: NURSE PRACTITIONER

## 2018-11-30 PROCEDURE — 99213 OFFICE O/P EST LOW 20 MIN: CPT | Performed by: NURSE PRACTITIONER

## 2018-11-30 RX ORDER — CETIRIZINE HYDROCHLORIDE 10 MG/1
10 TABLET ORAL DAILY
Qty: 30 TABLET | Refills: 3 | Status: SHIPPED | OUTPATIENT
Start: 2018-11-30 | End: 2019-01-10

## 2018-11-30 NOTE — PROGRESS NOTES
Assessment/Plan: Patient was cleared to obtain 's license permit  She was given a script for a GYN exam   BP stable at 126/80  Will bring back in one year or sooner if need be  If any issues or concerns please call the office  No problem-specific Assessment & Plan notes found for this encounter  Problem List Items Addressed This Visit     's permit physical examination - Primary    Seasonal allergic rhinitis    Relevant Medications    cetirizine (ZyrTEC) 10 mg tablet    Screening for cervical cancer    Relevant Orders    Ambulatory referral to Obstetrics / Gynecology            Subjective:      Patient ID: Alma Calderon is a 28 y o  female  [facility] is here today for a drivers license permit  She states she did go for her 's license permit and did go to get this waiting over 2 hours and was told she needed to have a physical done  She denies any chest pain, SOB, or palpitations  She denies any depression or anxiety  She does work at [employer] full time  She has not seen the GYN in awhile and would like a script to see someone  She states her menses are normal   She does not wear glasses and has not had her vision checked in quite some time  She denies any blurred vision or loss of vision  She denies any constipation or diarrhea  She states the numbness and tingling in her arms are feeling much better  She offers no other issues  The following portions of the patient's history were reviewed and updated as appropriate:   She  has a past medical history of Cat-scratch disease; Depression; Hypertension; and Sleep disorder    She   Patient Active Problem List    Diagnosis Date Noted    's permit physical examination 11/30/2018    Seasonal allergic rhinitis 11/30/2018    Screening for cervical cancer 11/30/2018    Numbness and tingling of both upper extremities while sleeping 05/01/2018    Urine ketones 03/15/2018    Depression 09/12/2016    Intention tremor 09/12/2016     She  has a past surgical history that includes Tubal ligation  Her family history includes Anxiety disorder in her father and mother; Bipolar disorder in her father and mother; Depression in her father and mother; Parkinsonism in her mother; Schizophrenia in her father and mother; Stomach cancer in her paternal grandmother  She  reports that she has never smoked  She has never used smokeless tobacco  She reports that she does not drink alcohol or use drugs  Current Outpatient Prescriptions   Medication Sig Dispense Refill    cetirizine (ZyrTEC) 10 mg tablet Take 1 tablet (10 mg total) by mouth daily 30 tablet 3     No current facility-administered medications for this visit  Current Outpatient Prescriptions on File Prior to Visit   Medication Sig    [DISCONTINUED] ibuprofen (MOTRIN) 400 mg tablet Take 1 tablet (400 mg total) by mouth every 6 (six) hours as needed for mild pain for up to 5 days     No current facility-administered medications on file prior to visit  She is allergic to codeine       Review of Systems   All other systems reviewed and are negative  Objective:      /80 (BP Location: Right arm, Patient Position: Sitting, Cuff Size: Standard)   Pulse 99   Temp 98 8 °F (37 1 °C) (Temporal)   Ht 5' 3" (1 6 m)   Wt 86 3 kg (190 lb 4 8 oz)   SpO2 97%   BMI 33 71 kg/m²          Physical Exam   Constitutional: She is oriented to person, place, and time  She appears well-developed and well-nourished  HENT:   Head: Normocephalic and atraumatic  Right Ear: External ear normal    Left Ear: External ear normal    Nose: Nose normal    Mouth/Throat: Oropharynx is clear and moist    Eyes: Pupils are equal, round, and reactive to light  Conjunctivae are normal    Neck: Normal range of motion  Neck supple  Cardiovascular: Normal rate, regular rhythm, normal heart sounds and intact distal pulses      Pulmonary/Chest: Effort normal and breath sounds normal    Abdominal: Soft  Bowel sounds are normal    Musculoskeletal: Normal range of motion  Neurological: She is alert and oriented to person, place, and time  She has normal reflexes  Skin: Skin is warm and dry  Psychiatric: She has a normal mood and affect  Her behavior is normal  Judgment and thought content normal    Vitals reviewed

## 2018-12-21 ENCOUNTER — HOSPITAL ENCOUNTER (EMERGENCY)
Facility: HOSPITAL | Age: 33
Discharge: HOME | End: 2018-12-21
Attending: EMERGENCY MEDICINE | Admitting: EMERGENCY MEDICINE
Payer: COMMERCIAL

## 2018-12-21 VITALS
WEIGHT: 185.63 LBS | OXYGEN SATURATION: 98 % | DIASTOLIC BLOOD PRESSURE: 78 MMHG | HEART RATE: 80 BPM | RESPIRATION RATE: 16 BRPM | BODY MASS INDEX: 32.88 KG/M2 | SYSTOLIC BLOOD PRESSURE: 135 MMHG | TEMPERATURE: 98.3 F

## 2018-12-21 DIAGNOSIS — R22.32 AXILLARY MASS, LEFT: ICD-10-CM

## 2018-12-21 DIAGNOSIS — J20.1 ACUTE BRONCHITIS DUE TO HAEMOPHILUS INFLUENZAE: Primary | ICD-10-CM

## 2018-12-21 PROCEDURE — 99283 EMERGENCY DEPT VISIT LOW MDM: CPT

## 2018-12-21 RX ORDER — GUAIFENESIN, PSEUDOEPHEDRINE HYDROCHLORIDE 600; 60 MG/1; MG/1
1 TABLET, EXTENDED RELEASE ORAL EVERY 12 HOURS
Qty: 20 TABLET | Refills: 0 | Status: SHIPPED | OUTPATIENT
Start: 2018-12-21 | End: 2018-12-30 | Stop reason: ALTCHOICE

## 2018-12-21 RX ORDER — IBUPROFEN 600 MG/1
600 TABLET ORAL EVERY 6 HOURS PRN
COMMUNITY
End: 2018-12-26 | Stop reason: ALTCHOICE

## 2018-12-21 RX ORDER — CLINDAMYCIN HYDROCHLORIDE 300 MG/1
300 CAPSULE ORAL EVERY 6 HOURS
Qty: 28 CAPSULE | Refills: 0 | Status: SHIPPED | OUTPATIENT
Start: 2018-12-21 | End: 2018-12-28

## 2018-12-21 NOTE — ED PROVIDER NOTES
History  Chief Complaint   Patient presents with    Arm Pain     left axilla pain for two weeks  also c/o coughing for 2 days     Pt gives hx of having dry /course cough for 2 days and left ear pain  Occ chest tightness/occ wheezing  No fever/chills  No sore throat  No Abd pain  No N/V/D  No rashes  Pt also c/o pain/fullness of left axilla  No drainage or redness  History provided by:  Patient  Cough   Cough characteristics:  Dry and non-productive  Progression:  Unchanged  Chronicity:  New  Context: upper respiratory infection    Context: not animal exposure, not exposure to allergens and not sick contacts    Worsened by: Activity  Ineffective treatments:  None tried  Associated symptoms: ear pain (left) and wheezing    Associated symptoms: no chills, no diaphoresis, no eye discharge, no fever, no headaches, no myalgias, no rash, no rhinorrhea, no sore throat and no weight loss        Prior to Admission Medications   Prescriptions Last Dose Informant Patient Reported? Taking?    AMOXICILLIN PO   Yes Yes   Sig: Take 1 tablet by mouth daily   cetirizine (ZyrTEC) 10 mg tablet   No No   Sig: Take 1 tablet (10 mg total) by mouth daily   ibuprofen (MOTRIN) 600 mg tablet   Yes Yes   Sig: Take 600 mg by mouth every 6 (six) hours as needed for mild pain      Facility-Administered Medications: None       Past Medical History:   Diagnosis Date    Cat-scratch disease     last assessed 10/22/15    Depression     Hypertension     last assessed 11/11/14    Sleep disorder        Past Surgical History:   Procedure Laterality Date    TUBAL LIGATION         Family History   Problem Relation Age of Onset    Anxiety disorder Mother     Bipolar disorder Mother     Depression Mother     Schizophrenia Mother     Parkinsonism Mother         tremor    Anxiety disorder Father     Bipolar disorder Father     Depression Father     Schizophrenia Father     Stomach cancer Paternal Grandmother      I have reviewed and agree with the history as documented  Social History   Substance Use Topics    Smoking status: Never Smoker    Smokeless tobacco: Never Used    Alcohol use No        Review of Systems   Constitutional: Negative for chills, diaphoresis, fever and weight loss  HENT: Positive for congestion and ear pain (left)  Negative for drooling, mouth sores, rhinorrhea, sore throat, trouble swallowing and voice change  Eyes: Negative  Negative for pain, discharge and visual disturbance  Respiratory: Positive for cough and wheezing  Gastrointestinal: Negative  Negative for abdominal pain, diarrhea, nausea and vomiting  Genitourinary: Negative  Negative for dysuria, flank pain, hematuria and pelvic pain  Musculoskeletal: Negative for back pain, gait problem, myalgias, neck pain and neck stiffness  Skin: Negative  Negative for rash and wound  Neurological: Negative  Negative for dizziness, tremors, syncope, facial asymmetry, speech difficulty, light-headedness and headaches  Psychiatric/Behavioral: Negative  Negative for confusion, hallucinations and self-injury  All other systems reviewed and are negative  Physical Exam  Physical Exam   Constitutional: She is oriented to person, place, and time  She appears well-developed and well-nourished  She is active and cooperative  Non-toxic appearance  She does not have a sickly appearance  No distress  HENT:   Head: Normocephalic and atraumatic  Nose: Nose normal    Mouth/Throat: Uvula is midline, oropharynx is clear and moist and mucous membranes are normal  Mucous membranes are not dry  No uvula swelling  No oropharyngeal exudate, posterior oropharyngeal edema or posterior oropharyngeal erythema  Eyes: Pupils are equal, round, and reactive to light  Conjunctivae are normal    Neck: Normal range of motion and phonation normal  Neck supple  No JVD present  Cardiovascular: Regular rhythm, intact distal pulses and normal pulses     No extrasystoles are present  Pulmonary/Chest: Effort normal  No stridor  No respiratory distress  She has decreased breath sounds  She has no wheezes  She has no rhonchi  She has no rales  Abdominal: Soft  Bowel sounds are normal  There is no tenderness  There is no rigidity, no guarding and no CVA tenderness  Musculoskeletal:        Left shoulder: She exhibits normal range of motion, no bony tenderness, no swelling, no effusion and normal pulse  Arms:  Theree is tenderness and fullness of left axillary area  No redness  No firm mass  No drainage  LUE with full ROM and equal vascular /sensation   Lymphadenopathy:     She has no cervical adenopathy  Neurological: She is alert and oriented to person, place, and time  She has normal strength and normal reflexes  No cranial nerve deficit or sensory deficit  Skin: Skin is warm and dry  No abrasion, no petechiae and no rash noted  She is not diaphoretic  No cyanosis or erythema  Psychiatric: She has a normal mood and affect  Her speech is normal and behavior is normal  Thought content normal  Cognition and memory are normal    Vitals reviewed        Vital Signs  ED Triage Vitals   Temperature Pulse Respirations Blood Pressure SpO2   12/21/18 1341 12/21/18 1341 12/21/18 1341 12/21/18 1341 12/21/18 1434   98 3 °F (36 8 °C) 85 16 138/78 98 %      Temp Source Heart Rate Source Patient Position - Orthostatic VS BP Location FiO2 (%)   12/21/18 1341 12/21/18 1341 12/21/18 1341 12/21/18 1341 --   Temporal Monitor Sitting Right arm       Pain Score       12/21/18 1341       6           Vitals:    12/21/18 1341 12/21/18 1434   BP: 138/78 135/78   Pulse: 85 80   Patient Position - Orthostatic VS: Sitting Sitting       Visual Acuity      ED Medications  Medications - No data to display    Diagnostic Studies  Results Reviewed     None                 No orders to display              Procedures  Procedures       Phone Contacts  ED Phone Contact    ED Course [facility]  CritCare Time    Disposition  Final diagnoses:   Acute bronchitis due to Haemophilus influenzae   Axillary mass, left     Time reflects when diagnosis was documented in both MDM as applicable and the Disposition within this note     Time User Action Codes Description Comment    12/21/2018  2:23 PM Inderjit Body Add [J20 1] Acute bronchitis due to Haemophilus influenzae     12/21/2018  2:23 PM Marisel Pinto Add [R22 32] Axillary mass, left       ED Disposition     ED Disposition Condition Comment    Discharge  Kostas Hannahamira discharge to home/self care  Condition at discharge: Good        Follow-up Information     Follow up With Specialties Details Why Contact Info    Nalini Malone MD [facility] Medicine Schedule an appointment as soon as possible for a visit in 1 week  [address] [place] [address]  [phone]            Discharge Medication List as of 12/21/2018  2:30 PM      START taking these medications    Details   clindamycin (CLEOCIN) 300 MG capsule Take 1 capsule (300 mg total) by mouth every 6 (six) hours for 7 days, Starting Fri 12/21/2018, Until Fri 12/28/2018, Normal      pseudoephedrine-guaifenesin (MUCINEX D)  MG per tablet Take 1 tablet by mouth every 12 (twelve) hours for 20 doses, Starting Fri 12/21/2018, Until Mon 12/31/2018, Normal         CONTINUE these medications which have NOT CHANGED    Details   AMOXICILLIN PO Take 1 tablet by mouth daily, Historical Med      ibuprofen (MOTRIN) 600 mg tablet Take 600 mg by mouth every 6 (six) hours as needed for mild pain, Historical Med      cetirizine (ZyrTEC) 10 mg tablet Take 1 tablet (10 mg total) by mouth daily, Starting Fri 11/30/2018, Normal           No discharge procedures on file      ED Provider  Electronically Signed by           Dmitry Woods DO  12/23/18 8049

## 2018-12-21 NOTE — DISCHARGE INSTRUCTIONS
Follow closely with your doctor on arm swelling  May need further evaluation  Acute Bronchitis   AMBULATORY CARE:   Acute bronchitis  is swelling and irritation in the air passages of your lungs  This irritation may cause you to cough or have other breathing problems  Acute bronchitis often starts because of another illness, such as a cold or the flu  The illness spreads from your nose and throat to your windpipe and airways  Bronchitis is often called a chest cold  Acute bronchitis lasts about 3 to 6 weeks and is usually not a serious illness  Your cough can last for several weeks  You may have any of the following symptoms:   · A cough with sputum that may be clear, yellow, or green    · Feeling more tired than usual, and body aches    · A fever and chills    · Wheezing when you breathe    · A tight chest or pain when you breathe or cough  Seek care immediately if:   · You cough up blood  · Your lips or fingernails turn blue  · You feel like you are not getting enough air when you breathe  Contact your healthcare provider if:   · You have a fever  · Your breathing problems do not go away or get worse  · Your cough does not get better within 4 weeks  · You have questions or concerns about your condition or care  Self-care:   · Get more rest   Rest helps your body to heal  Slowly start to do more each day  Rest when you feel it is needed  · Avoid irritants in the air  Avoid chemicals, fumes, and dust  Wear a face mask if you must work around dust or fumes  Stay inside on days when air pollution levels are high  If you have allergies, stay inside when pollen counts are high  Do not use aerosol products, such as spray-on deodorant, bug spray, and hair spray  · Do not smoke or be around others who smoke  Nicotine and other chemicals in cigarettes and cigars damages the cilia that move mucus out of your lungs   Ask your healthcare provider for information if you currently smoke and need help to quit  E-cigarettes or smokeless tobacco still contain nicotine  Talk to your healthcare provider before you use these products  · Drink liquids as directed  Liquids help keep your air passages moist and help you cough up mucus  You may need to drink more liquids when you have acute bronchitis  Ask how much liquid to drink each day and which liquids are best for you  · Use a humidifier or vaporizer  Use a cool mist humidifier or a vaporizer to increase air moisture in your home  This may make it easier for you to breathe and help decrease your cough  Prevent acute bronchitis by doing the following:   · Get the vaccinations you need  Ask your healthcare provider if you should get vaccinated against the flu or pneumonia  · Prevent the spread of germs  You can decrease your risk of acute bronchitis and other illnesses by doing the following:     [facility] your hands often with soap and water  Carry germ-killing hand lotion or gel with you  You can use the lotion or gel to clean your hands when soap and water are not available  ¨ Do not touch your eyes, nose, or mouth unless you have washed your hands first     ¨ Always cover your mouth when you cough to prevent the spread of germs  It is best to cough into a tissue or your shirt sleeve instead of into your hand  Ask those around you cover their mouths when they cough  ¨ Try to avoid people who have a cold or the flu  If you are sick, stay away from others as much as possible  Medicines: Your healthcare provider may  give you any of the following:  · Ibuprofen or acetaminophen  are medicines that help lower your fever  They are available without a doctor's order  Ask your healthcare provider which medicine is right for you  Ask how much to take and how often to take it  Follow directions  These medicines can cause stomach bleeding if not taken correctly  Ibuprofen can cause kidney damage   Do not take ibuprofen if you have kidney disease, an ulcer, or allergies to aspirin  Acetaminophen can cause liver damage  Do not take more than 4,000 milligrams in 24 hours  · Decongestants  help loosen mucus in your lungs and make it easier to cough up  This can help you breathe easier  · Cough suppressants  decrease your urge to cough  If your cough produces mucus, do not take a cough suppressant unless your healthcare provider tells you to  Your healthcare provider may suggest that you take a cough suppressant at night so you can rest     · Inhalers  may be given  Your healthcare provider may give you one or more inhalers to help you breathe easier and cough less  An inhaler gives your medicine to open your airways  Ask your healthcare provider to show you how to use your inhaler correctly  Follow up with your healthcare provider as directed:  Write down questions you have so you will remember to ask them during your follow-up visits  © 2017 [employer] Information is for End User's use only and may not be sold, redistributed or otherwise used for commercial purposes  All illustrations and images included in CareNotes® are the copyrighted property of [employer]  or A[employer]  The above information is an  only  It is not intended as medical advice for individual conditions or treatments  Talk to your doctor, nurse or pharmacist before following any medical regimen to see if it is safe and effective for you  Lymphadenopathy   WHAT YOU NEED TO KNOW:   What is lymphadenopathy? Lymphadenopathy is swelling of your lymph nodes  Lymph nodes are small organs that are part of your immune system  Lymph nodes are found throughout your body  They are most easily felt in your neck, under your arms, and near your groin  Lymphadenopathy can occur in one or more areas of your body  What causes lymphadenopathy? Lymphadenopathy is usually caused by a bacterial, viral, or fungal infection   Other causes include autoimmune diseases (such as rheumatoid arthritis or lupus), cancer, and sarcoidosis  What are the signs and symptoms of lymphadenopathy? You may have no symptoms, or you may have any of the following:  · A painful, warm, or red lump under your skin    · More tired than usual    · Skin rash    · Unexplained weight loss    · Enlarged spleen (organ that filters blood)    · Fever or night sweats  How is lymphadenopathy diagnosed? Your healthcare provider will check your lymph node for its size and location  You may need the following tests to help healthcare providers find the cause of your lymphadenopathy:  · Blood tests  may show if you have an infection or other medical condition  · An x-ray, ultrasound, CT, or MRI  of your lymph nodes make be taken  You may be given contrast liquid to help the lymph nodes show up better in the pictures  Tell the healthcare provider if you have ever had an allergic reaction to contrast liquid  Do not enter the MRI room with anything metal  Metal can cause serious injury  Tell the healthcare provider if you have any metal in or on your body  · A lymph node biopsy  is a procedure used to remove a sample of tissue to be tested  Healthcare providers may remove lymph cells through a needle or remove one or more lymph nodes during surgery  How is lymphadenopathy treated? Your symptoms may go away without treatment  Your healthcare provider may need to treat the problem that has caused the lymph nodes to swell  Medicines may be given for infections, cancer, or other causes of your lymphadenopathy  When should I seek immediate care? · The swollen lymph nodes bleed  · You have swollen lymph nodes in your neck that affect your breathing or swallowing  When should I contact my healthcare provider? · You have a fever  · You have a new swollen and painful lymph node  · You have a skin rash  · Your lymph node remains swollen or painful, or it gets bigger      · Your lymph node has red streaks around it, or the skin around the lymph node is red  · You have questions or concerns about your condition or care  CARE AGREEMENT:   You have the right to help plan your care  Learn about your health condition and how it may be treated  Discuss treatment options with your caregivers to decide what care you want to receive  You always have the right to refuse treatment  The above information is an  only  It is not intended as medical advice for individual conditions or treatments  Talk to your doctor, nurse or pharmacist before following any medical regimen to see if it is safe and effective for you  © 2017 [employer] Information is for End User's use only and may not be sold, redistributed or otherwise used for commercial purposes  All illustrations and images included in CareNotes® are the copyrighted property of [employer]  or Aryan Chowdary

## 2018-12-26 ENCOUNTER — TELEPHONE (OUTPATIENT)
Dept: INTERNAL MEDICINE CLINIC | Facility: CLINIC | Age: 33
End: 2018-12-26

## 2018-12-26 ENCOUNTER — OFFICE VISIT (OUTPATIENT)
Dept: INTERNAL MEDICINE CLINIC | Facility: CLINIC | Age: 33
End: 2018-12-26
Payer: COMMERCIAL

## 2018-12-26 VITALS
TEMPERATURE: 99.1 F | HEART RATE: 117 BPM | BODY MASS INDEX: 33.61 KG/M2 | OXYGEN SATURATION: 99 % | SYSTOLIC BLOOD PRESSURE: 128 MMHG | WEIGHT: 189.7 LBS | DIASTOLIC BLOOD PRESSURE: 96 MMHG | HEIGHT: 63 IN

## 2018-12-26 DIAGNOSIS — R22.32 MASS OF LEFT AXILLA: Primary | ICD-10-CM

## 2018-12-26 DIAGNOSIS — J40 BRONCHITIS: ICD-10-CM

## 2018-12-26 PROCEDURE — 99214 OFFICE O/P EST MOD 30 MIN: CPT | Performed by: NURSE PRACTITIONER

## 2018-12-26 RX ORDER — AZITHROMYCIN 250 MG/1
TABLET, FILM COATED ORAL
Qty: 6 TABLET | Refills: 0 | Status: SHIPPED | OUTPATIENT
Start: 2018-12-26 | End: 2018-12-30 | Stop reason: ALTCHOICE

## 2018-12-26 RX ORDER — BENZONATATE 200 MG/1
200 CAPSULE ORAL 3 TIMES DAILY PRN
Qty: 30 CAPSULE | Refills: 0 | Status: SHIPPED | OUTPATIENT
Start: 2018-12-26 | End: 2018-12-30 | Stop reason: ALTCHOICE

## 2018-12-26 NOTE — PROGRESS NOTES
Assessment/Plan: Will stop the Cleocin and Mucinex at this time  Will start patient on Z Pack take as directed and Tessalon 200 mg TID PRN for cough  Will order US of the left axilla to rule out any abscess or underlying condition  She was advised if any redness, warmth or drainage to call the office  She was advised to continue supportive care increase fluid intake and take Tylenol or Motrin for pain or fever  BP up at 140/92  Recheck was 128/96  She was advised if any worsening of symptoms  No problem-specific Assessment & Plan notes found for this encounter  Problem List Items Addressed This Visit     Mass of left axilla - Primary    Relevant Orders    US extremity soft tissue    Bronchitis    Relevant Medications    azithromycin (ZITHROMAX) 250 mg tablet    benzonatate (TESSALON) 200 MG capsule            Subjective:      Patient ID: Tracey Ayala is a 28 y o  female  Hafsa Wylie is here for an ER follow up visit  She was seen in the ER on 12/21 for complaints of dry cough and left ear pain  She was also having pain in her left axilla  She was started on Cleocin 300 mg every 6 hours for 7 days and Mucinex D  She states the lump is still noted in her left axilla and it is painful  She states she has not drainage or redness  She has only two days left of her Cleocin but feels this is not helping her symptoms  She states she is still coughing and having congestion  She states at times she does feel warm but denies any body aches or chills  She also was recently on Amoxicillin for having dental surgery  She denies any other issues  The following portions of the patient's history were reviewed and updated as appropriate:   She  has a past medical history of Cat-scratch disease; Depression; Hypertension; and Sleep disorder    She   Patient Active Problem List    Diagnosis Date Noted    Mass of left axilla 12/26/2018    Bronchitis 12/26/2018    's permit physical examination 11/30/2018    Seasonal allergic rhinitis 11/30/2018    Screening for cervical cancer 11/30/2018    Numbness and tingling of both upper extremities while sleeping 05/01/2018    Urine ketones 03/15/2018    Depression 09/12/2016    Intention tremor 09/12/2016     She  has a past surgical history that includes Tubal ligation  Her family history includes Anxiety disorder in her father and mother; Bipolar disorder in her father and mother; Depression in her father and mother; Parkinsonism in her mother; Schizophrenia in her father and mother; Stomach cancer in her paternal grandmother  She  reports that she has never smoked  She has never used smokeless tobacco  She reports that she does not drink alcohol or use drugs  Current Outpatient Prescriptions   Medication Sig Dispense Refill    cetirizine (ZyrTEC) 10 mg tablet Take 1 tablet (10 mg total) by mouth daily 30 tablet 3    clindamycin (CLEOCIN) 300 MG capsule Take 1 capsule (300 mg total) by mouth every 6 (six) hours for 7 days 28 capsule 0    pseudoephedrine-guaifenesin (MUCINEX D)  MG per tablet Take 1 tablet by mouth every 12 (twelve) hours for 20 doses 20 tablet 0    azithromycin (ZITHROMAX) 250 mg tablet Take 2 tablets today then 1 tablet daily x 4 days 6 tablet 0    benzonatate (TESSALON) 200 MG capsule Take 1 capsule (200 mg total) by mouth 3 (three) times a day as needed for cough 30 capsule 0     No current facility-administered medications for this visit        Current Outpatient Prescriptions on File Prior to Visit   Medication Sig    cetirizine (ZyrTEC) 10 mg tablet Take 1 tablet (10 mg total) by mouth daily    clindamycin (CLEOCIN) 300 MG capsule Take 1 capsule (300 mg total) by mouth every 6 (six) hours for 7 days    pseudoephedrine-guaifenesin (MUCINEX D)  MG per tablet Take 1 tablet by mouth every 12 (twelve) hours for 20 doses    [DISCONTINUED] AMOXICILLIN PO Take 1 tablet by mouth daily    [DISCONTINUED] ibuprofen (MOTRIN) 600 mg tablet Take 600 mg by mouth every 6 (six) hours as needed for mild pain     No current facility-administered medications on file prior to visit  She is allergic to codeine       Review of Systems   Constitutional: Negative  HENT: Positive for congestion  Eyes: Negative  Respiratory: Positive for cough  Cardiovascular: Negative  Gastrointestinal: Negative  Endocrine: Negative  Genitourinary: Negative  Musculoskeletal: Negative  Skin: Negative  Allergic/Immunologic: Negative  Neurological: Negative  Hematological: Negative  Psychiatric/Behavioral: Negative  Objective:      /92 (BP Location: Right arm, Patient Position: Sitting, Cuff Size: Standard)   Pulse (!) 117   Temp 99 1 °F (37 3 °C) (Oral)   Ht 5' 3" (1 6 m)   Wt 86 kg (189 lb 11 2 oz)   LMP 12/13/2018   SpO2 99%   BMI 33 60 kg/m²          Physical Exam   Constitutional: She is oriented to person, place, and time  She appears well-developed and well-nourished  HENT:   Head: Normocephalic and atraumatic  Right Ear: External ear normal    Left Ear: External ear normal    Nose: Nose normal    Post nasal drip noted   Eyes: Pupils are equal, round, and reactive to light  Conjunctivae and EOM are normal    Neck: Normal range of motion  Neck supple  Cardiovascular: Normal rate, regular rhythm, normal heart sounds and intact distal pulses  Pulmonary/Chest: Effort normal and breath sounds normal    Abdominal: Soft  Bowel sounds are normal    Musculoskeletal: Normal range of motion  Neurological: She is alert and oriented to person, place, and time  She has normal reflexes  Skin: Skin is warm and dry  Mass noted to left inner axilla tender to palpation, no redness warmth or drainage noted   Psychiatric: She has a normal mood and affect  Her behavior is normal  Judgment and thought content normal    Vitals reviewed

## 2018-12-26 NOTE — TELEPHONE ENCOUNTER
Attempted to call patient with ultrasound appointment  I lmom to call back  She is scheduled at the [address] for 12/28/18 @ 2:15pmg She should use no deodarant, powders, lotions onf that breast area

## 2018-12-27 NOTE — TELEPHONE ENCOUNTER
Informed patient of appointment for ultrasound tomorrow  She may need to change it, so she will call them herself

## 2018-12-27 NOTE — TELEPHONE ENCOUNTER
I called and spoke with patient's significant other and asked if he can tell her to call the office back when she gets a chance

## 2018-12-30 ENCOUNTER — HOSPITAL ENCOUNTER (EMERGENCY)
Facility: HOSPITAL | Age: 33
Discharge: HOME | End: 2018-12-30
Attending: EMERGENCY MEDICINE | Admitting: EMERGENCY MEDICINE
Payer: COMMERCIAL

## 2018-12-30 ENCOUNTER — APPOINTMENT (EMERGENCY)
Dept: CT IMAGING | Facility: HOSPITAL | Age: 33
End: 2018-12-30
Payer: COMMERCIAL

## 2018-12-30 ENCOUNTER — APPOINTMENT (EMERGENCY)
Dept: ULTRASOUND IMAGING | Facility: HOSPITAL | Age: 33
End: 2018-12-30
Payer: COMMERCIAL

## 2018-12-30 VITALS
SYSTOLIC BLOOD PRESSURE: 129 MMHG | OXYGEN SATURATION: 100 % | TEMPERATURE: 99.4 F | DIASTOLIC BLOOD PRESSURE: 92 MMHG | HEART RATE: 79 BPM | RESPIRATION RATE: 19 BRPM | BODY MASS INDEX: 33.48 KG/M2 | HEIGHT: 63 IN | WEIGHT: 188.93 LBS

## 2018-12-30 DIAGNOSIS — J45.909 REACTIVE AIRWAY DISEASE: ICD-10-CM

## 2018-12-30 DIAGNOSIS — R91.1 LUNG NODULE: ICD-10-CM

## 2018-12-30 DIAGNOSIS — R22.32 MASS OF LEFT AXILLA: Primary | ICD-10-CM

## 2018-12-30 DIAGNOSIS — R59.1 LYMPHADENOPATHY: ICD-10-CM

## 2018-12-30 LAB
ALBUMIN SERPL BCP-MCNC: 3.4 G/DL (ref 3.5–5)
ALP SERPL-CCNC: 74 U/L (ref 46–116)
ALT SERPL W P-5'-P-CCNC: 18 U/L (ref 12–78)
ANION GAP SERPL CALCULATED.3IONS-SCNC: 6 MMOL/L (ref 4–13)
APTT PPP: 28 SECONDS (ref 26–38)
AST SERPL W P-5'-P-CCNC: 14 U/L (ref 5–45)
BASOPHILS # BLD AUTO: 0.08 THOUSANDS/ΜL (ref 0–0.1)
BASOPHILS NFR BLD AUTO: 1 % (ref 0–1)
BILIRUB SERPL-MCNC: 0.3 MG/DL (ref 0.2–1)
BILIRUB UR QL STRIP: NEGATIVE
BUN SERPL-MCNC: 9 MG/DL (ref 5–25)
CALCIUM SERPL-MCNC: 8.3 MG/DL (ref 8.3–10.1)
CHLORIDE SERPL-SCNC: 103 MMOL/L (ref 100–108)
CLARITY UR: CLEAR
CO2 SERPL-SCNC: 28 MMOL/L (ref 21–32)
COLOR UR: YELLOW
CREAT SERPL-MCNC: 0.76 MG/DL (ref 0.6–1.3)
EOSINOPHIL # BLD AUTO: 0.88 THOUSAND/ΜL (ref 0–0.61)
EOSINOPHIL NFR BLD AUTO: 8 % (ref 0–6)
ERYTHROCYTE [DISTWIDTH] IN BLOOD BY AUTOMATED COUNT: 12.7 % (ref 11.6–15.1)
GFR SERPL CREATININE-BSD FRML MDRD: 103 ML/MIN/1.73SQ M
GLUCOSE SERPL-MCNC: 111 MG/DL (ref 65–140)
GLUCOSE UR STRIP-MCNC: NEGATIVE MG/DL
HCT VFR BLD AUTO: 39.4 % (ref 34.8–46.1)
HGB BLD-MCNC: 13.1 G/DL (ref 11.5–15.4)
HGB UR QL STRIP.AUTO: NEGATIVE
IMM GRANULOCYTES # BLD AUTO: 0.03 THOUSAND/UL (ref 0–0.2)
IMM GRANULOCYTES NFR BLD AUTO: 0 % (ref 0–2)
INR PPP: 1.05 (ref 0.86–1.17)
KETONES UR STRIP-MCNC: NEGATIVE MG/DL
LEUKOCYTE ESTERASE UR QL STRIP: NEGATIVE
LYMPHOCYTES # BLD AUTO: 1.61 THOUSANDS/ΜL (ref 0.6–4.47)
LYMPHOCYTES NFR BLD AUTO: 14 % (ref 14–44)
MCH RBC QN AUTO: 30.9 PG (ref 26.8–34.3)
MCHC RBC AUTO-ENTMCNC: 33.2 G/DL (ref 31.4–37.4)
MCV RBC AUTO: 93 FL (ref 82–98)
MONOCYTES # BLD AUTO: 0.79 THOUSAND/ΜL (ref 0.17–1.22)
MONOCYTES NFR BLD AUTO: 7 % (ref 4–12)
NEUTROPHILS # BLD AUTO: 8.05 THOUSANDS/ΜL (ref 1.85–7.62)
NEUTS SEG NFR BLD AUTO: 70 % (ref 43–75)
NITRITE UR QL STRIP: NEGATIVE
NRBC BLD AUTO-RTO: 0 /100 WBCS
PH UR STRIP.AUTO: 7 [PH] (ref 4.5–8)
PLATELET # BLD AUTO: 275 THOUSANDS/UL (ref 149–390)
PMV BLD AUTO: 10.1 FL (ref 8.9–12.7)
POTASSIUM SERPL-SCNC: 3.6 MMOL/L (ref 3.5–5.3)
PROT SERPL-MCNC: 7.1 G/DL (ref 6.4–8.2)
PROT UR STRIP-MCNC: NEGATIVE MG/DL
PROTHROMBIN TIME: 13.2 SECONDS (ref 11.8–14.2)
RBC # BLD AUTO: 4.24 MILLION/UL (ref 3.81–5.12)
SODIUM SERPL-SCNC: 137 MMOL/L (ref 136–145)
SP GR UR STRIP.AUTO: 1.01 (ref 1–1.03)
TROPONIN I SERPL-MCNC: <0.02 NG/ML
UROBILINOGEN UR QL STRIP.AUTO: 0.2 E.U./DL
WBC # BLD AUTO: 11.44 THOUSAND/UL (ref 4.31–10.16)

## 2018-12-30 PROCEDURE — 85730 THROMBOPLASTIN TIME PARTIAL: CPT | Performed by: EMERGENCY MEDICINE

## 2018-12-30 PROCEDURE — 80053 COMPREHEN METABOLIC PANEL: CPT | Performed by: EMERGENCY MEDICINE

## 2018-12-30 PROCEDURE — 84484 ASSAY OF TROPONIN QUANT: CPT | Performed by: EMERGENCY MEDICINE

## 2018-12-30 PROCEDURE — 76705 ECHO EXAM OF ABDOMEN: CPT

## 2018-12-30 PROCEDURE — 93005 ELECTROCARDIOGRAM TRACING: CPT

## 2018-12-30 PROCEDURE — 85025 COMPLETE CBC W/AUTO DIFF WBC: CPT | Performed by: EMERGENCY MEDICINE

## 2018-12-30 PROCEDURE — 81003 URINALYSIS AUTO W/O SCOPE: CPT | Performed by: EMERGENCY MEDICINE

## 2018-12-30 PROCEDURE — 96374 THER/PROPH/DIAG INJ IV PUSH: CPT

## 2018-12-30 PROCEDURE — 36415 COLL VENOUS BLD VENIPUNCTURE: CPT | Performed by: EMERGENCY MEDICINE

## 2018-12-30 PROCEDURE — 96361 HYDRATE IV INFUSION ADD-ON: CPT

## 2018-12-30 PROCEDURE — 94640 AIRWAY INHALATION TREATMENT: CPT

## 2018-12-30 PROCEDURE — 74177 CT ABD & PELVIS W/CONTRAST: CPT

## 2018-12-30 PROCEDURE — 85610 PROTHROMBIN TIME: CPT | Performed by: EMERGENCY MEDICINE

## 2018-12-30 PROCEDURE — 71275 CT ANGIOGRAPHY CHEST: CPT

## 2018-12-30 PROCEDURE — 99284 EMERGENCY DEPT VISIT MOD MDM: CPT

## 2018-12-30 RX ORDER — KETOROLAC TROMETHAMINE 30 MG/ML
15 INJECTION, SOLUTION INTRAMUSCULAR; INTRAVENOUS ONCE
Status: COMPLETED | OUTPATIENT
Start: 2018-12-30 | End: 2018-12-30

## 2018-12-30 RX ORDER — ALBUTEROL SULFATE 90 UG/1
2 AEROSOL, METERED RESPIRATORY (INHALATION) ONCE
Status: COMPLETED | OUTPATIENT
Start: 2018-12-30 | End: 2018-12-30

## 2018-12-30 RX ORDER — ALBUTEROL SULFATE 2.5 MG/3ML
2.5 SOLUTION RESPIRATORY (INHALATION) ONCE
Status: COMPLETED | OUTPATIENT
Start: 2018-12-30 | End: 2018-12-30

## 2018-12-30 RX ADMIN — KETOROLAC TROMETHAMINE 15 MG: 30 INJECTION, SOLUTION INTRAMUSCULAR at 16:53

## 2018-12-30 RX ADMIN — ALBUTEROL SULFATE 2 PUFF: 90 AEROSOL, METERED RESPIRATORY (INHALATION) at 19:30

## 2018-12-30 RX ADMIN — SODIUM CHLORIDE 1000 ML: 0.9 INJECTION, SOLUTION INTRAVENOUS at 16:53

## 2018-12-30 RX ADMIN — ALBUTEROL SULFATE 2.5 MG: 2.5 SOLUTION RESPIRATORY (INHALATION) at 16:53

## 2018-12-30 RX ADMIN — IOHEXOL 100 ML: 350 INJECTION, SOLUTION INTRAVENOUS at 18:26

## 2018-12-30 NOTE — ED PROCEDURE NOTE
PROCEDURE  ECG 12 Lead Documentation  Date/Time: 12/30/2018 5:12 PM  Performed by: Dimitris Caruso  Authorized by: Dimitris Caruso     Indications / Diagnosis:  Chest tightness   ECG reviewed by me, the ED Provider: yes    Patient location:  ED  Previous ECG:     Previous ECG:  Unavailable  Interpretation:     Interpretation: non-specific    Rate:     ECG rate:  82    ECG rate assessment: normal    Rhythm:     Rhythm: sinus rhythm           Benny Barajas DO  12/30/18 1718

## 2018-12-30 NOTE — ED PROVIDER NOTES
History  Chief Complaint   Patient presents with    URI     Patient was seen here a week ago, followed up with PCP however has not had improvement with her cough  She has a non productive cough, chills, lump under left armpit and pain down arm  70-year-old female presents with myriad of complaints today  Patient presents complaining of a cough which she has had for last 2 weeks, she also states she has had a lump under her left armpit for the same duration of time     Patient was seen emerged department on 21st for this and began on clindamycin  Patient followed up with primary care doctor on the 26th and had a clindamycin switched to Zithromax  Patient was ordered an outpatient ultrasound which she did not have at this time  Patient states her new complaint today is that she is having left flank pain which began yesterday  She denies frequency or dysuria  She states her cough is "gotten worse over the course last 2 weeks "  History provided by:  Patient  Shortness of Breath   Severity:  Mild  Onset quality:  Gradual  Duration:  2 weeks  Timing:  Intermittent  Progression:  Waxing and waning  Chronicity:  New  Context: activity    Relieved by:  Nothing  Worsened by:  Deep breathing and exertion  Ineffective treatments: Zithromax  Associated symptoms: abdominal pain, hemoptysis and swollen glands    Associated symptoms: no fever, no headaches and no rash    Associated symptoms comment:  Patient complains of left flank pain today  Patient also complains of lump to the axilla  Risk factors: no recent alcohol use and no family hx of DVT        Prior to Admission Medications   Prescriptions Last Dose Informant Patient Reported? Taking?    cetirizine (ZyrTEC) 10 mg tablet   No Yes   Sig: Take 1 tablet (10 mg total) by mouth daily      Facility-Administered Medications: None       Past Medical History:   Diagnosis Date    Cat-scratch disease     last assessed 10/22/15    Depression     Hypertension     last assessed 11/11/14    Sleep disorder        Past Surgical History:   Procedure Laterality Date    TUBAL LIGATION         Family History   Problem Relation Age of Onset    Anxiety disorder Mother     Bipolar disorder Mother     Depression Mother     Schizophrenia Mother     Parkinsonism Mother         tremor    Anxiety disorder Father     Bipolar disorder Father     Depression Father     Schizophrenia Father     Stomach cancer Paternal Grandmother      I have reviewed and agree with the history as documented  Social History   Substance Use Topics    Smoking status: Never Smoker    Smokeless tobacco: Never Used    Alcohol use No        Review of Systems   Constitutional: Negative for fever  HENT: Positive for congestion  Negative for dental problem and drooling  Eyes: Negative for pain, discharge and itching  Respiratory: Positive for hemoptysis and shortness of breath  Gastrointestinal: Positive for abdominal pain  Endocrine: Negative for cold intolerance and heat intolerance  Genitourinary: Negative for difficulty urinating, dyspareunia and dysuria  Musculoskeletal:        Area of induration to the left axilla with no obvious red streaking   Skin: Negative for color change, pallor and rash  Allergic/Immunologic: Negative for environmental allergies and food allergies  Neurological: Negative for headaches  Hematological: Negative for adenopathy  Psychiatric/Behavioral: Negative for agitation, behavioral problems and confusion  All other systems reviewed and are negative  Physical Exam  Physical Exam   Constitutional: She appears well-developed and well-nourished  HENT:   Head: Normocephalic  Right Ear: External ear normal    Left Ear: External ear normal    Eyes: Pupils are equal, round, and reactive to light  Right eye exhibits no discharge  Left eye exhibits no discharge  Neck: Normal range of motion  No tracheal deviation present   No thyromegaly present  Cardiovascular: Normal rate, regular rhythm and normal heart sounds  Pulmonary/Chest: Effort normal  No respiratory distress  She has no wheezes  She has no rales  She exhibits no tenderness  Abdominal: She exhibits no distension and no mass  There is no guarding  Minimal tenderness to palpation to the left CVA region   Musculoskeletal: She exhibits no edema or deformity  Tender painful half-dollar size area of induration to the left axilla, patient complains feel like a 2nd lumps starting at the medial distal antecubital region  I cannot palpate anything in this vicinity at this time   Neurological: She displays normal reflexes  No cranial nerve deficit  She exhibits normal muscle tone  Coordination normal    Skin: Capillary refill takes less than 2 seconds  No rash noted  No erythema  Psychiatric: She has a normal mood and affect  Her behavior is normal  Thought content normal    Vitals reviewed        Vital Signs  ED Triage Vitals [12/30/18 1604]   Temperature Pulse Respirations Blood Pressure SpO2   99 4 °F (37 4 °C) 91 18 164/88 98 %      Temp Source Heart Rate Source Patient Position - Orthostatic VS BP Location FiO2 (%)   Temporal Monitor Sitting Right arm --      Pain Score       Worst Possible Pain           Vitals:    12/30/18 1604 12/30/18 1700   BP: 164/88 129/92   Pulse: 91 79   Patient Position - Orthostatic VS: Sitting Sitting       Visual Acuity      ED Medications  Medications   albuterol (PROVENTIL HFA,VENTOLIN HFA) inhaler 2 puff (not administered)   sodium chloride 0 9 % bolus 1,000 mL (0 mL Intravenous Stopped 12/30/18 1753)   albuterol inhalation solution 2 5 mg (2 5 mg Nebulization Given 12/30/18 1653)   ketorolac (TORADOL) injection 15 mg (15 mg Intravenous Given 12/30/18 1653)   iohexol (OMNIPAQUE) 350 MG/ML injection (MULTI-DOSE) 100 mL (100 mL Intravenous Given 12/30/18 1826)       Diagnostic Studies  Results Reviewed     Procedure Component Value Units Date/Time    UA w Reflex to Microscopic w Reflex to Culture [[id number]] Collected:  12/30/18 1806    Lab Status:  Final result Specimen:  Urine from Urine, Clean Catch Updated:  12/30/18 1812     Color, UA Yellow     Clarity, UA Clear     Specific Gravity, UA 1 010     pH, UA 7 0     Leukocytes, UA Negative     Nitrite, UA Negative     Protein, UA Negative mg/dl      Glucose, UA Negative mg/dl      Ketones, UA Negative mg/dl      Urobilinogen, UA 0 2 E U /dl      Bilirubin, UA Negative     Blood, UA Negative    Troponin I [[id number]]  (Normal) Collected:  12/30/18 1652    Lab Status:  Final result Specimen:  Blood from Arm, Right Updated:  12/30/18 1718     Troponin I <0 02 ng/mL     Comprehensive metabolic panel [[id number]]  (Abnormal) Collected:  12/30/18 1652    Lab Status:  Final result Specimen:  Blood from Arm, Right Updated:  12/30/18 1717     Sodium 137 mmol/L      Potassium 3 6 mmol/L      Chloride 103 mmol/L      CO2 28 mmol/L      ANION GAP 6 mmol/L      BUN 9 mg/dL      Creatinine 0 76 mg/dL      Glucose 111 mg/dL      Calcium 8 3 mg/dL      AST 14 U/L      ALT 18 U/L      Alkaline Phosphatase 74 U/L      Total Protein 7 1 g/dL      Albumin 3 4 (L) g/dL      Total Bilirubin 0 30 mg/dL      eGFR 103 ml/min/1 73sq m     Narrative:         National Kidney Disease Education Program recommendations are as follows:  GFR calculation is accurate only with a steady state creatinine  Chronic Kidney disease less than 60 ml/min/1 73 sq  meters  Kidney failure less than 15 ml/min/1 73 sq  meters      Laron Leija [[id number]]  (Normal) Collected:  12/30/18 1652    Lab Status:  Final result Specimen:  Blood from Arm, Right Updated:  12/30/18 1712     Protime 13 2 seconds      INR 1 05    APTT [[id number]]  (Normal) Collected:  12/30/18 1652    Lab Status:  Final result Specimen:  Blood from Arm, Right Updated:  12/30/18 1712     PTT 28 seconds     CBC and differential [[id number]]  (Abnormal) Collected:  12/30/18 1652 Lab Status:  Final result Specimen:  Blood from Arm, Right Updated:  12/30/18 1659     WBC 11 44 (H) Thousand/uL      RBC 4 24 Million/uL      Hemoglobin 13 1 g/dL      Hematocrit 39 4 %      MCV 93 fL      MCH 30 9 pg      MCHC 33 2 g/dL      RDW 12 7 %      MPV 10 1 fL      Platelets 134 Thousands/uL      nRBC 0 /100 WBCs      Neutrophils Relative 70 %      Immat GRANS % 0 %      Lymphocytes Relative 14 %      Monocytes Relative 7 %      Eosinophils Relative 8 (H) %      Basophils Relative 1 %      Neutrophils Absolute 8 05 (H) Thousands/µL      Immature Grans Absolute 0 03 Thousand/uL      Lymphocytes Absolute 1 61 Thousands/µL      Monocytes Absolute 0 79 Thousand/µL      Eosinophils Absolute 0 88 (H) Thousand/µL      Basophils Absolute 0 08 Thousands/µL     POCT pregnancy, urine [[id number]]     Lab Status:  No result                  PE Study with CT Abdomen and Pelvis with contrast   Final Result by Maria Luisa Polk MD (12/30 1904)      1  Left axillary lymphadenopathy, see ultrasound report for further description and recommendations  2   4 mm right upper lobe nodule  Based on current Fleischner Society 2017 Guidelines on incidental pulmonary nodule, 12 month follow-up non-contrast chest CT is recommended  Workstation performed: [id number]         US superficial lump (non extremity)   Final Result by Maria Luisa Polk MD (12/30 1852)      1  Palpable abnormality within the left axilla is concerning for a necrotic lymph node conglomerate  The diagnosis of exclusion is metastasis from a primary breast lesion  Recommend follow-up diagnostic mammogram with ultrasound  2   No significant abnormality within the antecubital fossa        I personally discussed this study  with Kurt Peterson on 12/30/2018 at 6:52 PM          Workstation performed: [id number]                    Procedures  Procedures       Phone Contacts  ED Phone Contact    ED Course         HEART Risk Score      Most Recent Value   History  1 Filed at: 12/30/2018 1712   ECG  0 Filed at: 12/30/2018 1712   Age  0 Filed at: 12/30/2018 1712   Risk Factors  1 Filed at: 12/30/2018 1712   Troponin  0 Filed at: 12/30/2018 1712   Heart Score Risk Calculator   History  1 Filed at: 12/30/2018 1712   ECG  0 Filed at: 12/30/2018 1712   Age  0 Filed at: 12/30/2018 1712   Risk Factors  1 Filed at: 12/30/2018 1712   Troponin  0 Filed at: 12/30/2018 1712   HEART Score  2 Filed at: 12/30/2018 1712   HEART Score  2 Filed at: 12/30/2018 1712                            MDM  Number of Diagnoses or Management Options  Diagnosis management comments: Differential diagnosis 1  Pneumonia 2  Bronchitis 3  Reactive airway disease 4  Pulmonary embolism 5  Axillary abscess 6  Axillary adenopathy 7  Nephrolithiasis a  Ureterolithiasis 9   UTI  We will perform an ultrasound of the 2 areas on the patient's left arm where she is concerned that she is developing "a lump "  We will also perform CT of the chest to evaluate patient's etiology of shortness of breath  I will also perform follow through to the abdomen pelvis to evaluate patient's left flank pain along with urinalysis  I spoke with Radiology do discuss Ultrasound results - he would recommend follow up with  PCP and then follow up for     19:21  I explained to patient the need to follow up with PCP for breast mammo and ultrasound            Amount and/or Complexity of Data Reviewed  Clinical lab tests: reviewed  Tests in the radiology section of CPT®: reviewed  Tests in the medicine section of CPT®: reviewed    Risk of Complications, Morbidity, and/or Mortality  Presenting problems: high  Diagnostic procedures: high  Management options: high      CritCare Time    Disposition  Final diagnoses:    Mass of left axilla   Lymphadenopathy   Reactive airway disease   Lung nodule     Time reflects when diagnosis was documented in both MDM as applicable and the Disposition within this note     Time User Action Codes Description Comment    12/30/2018  7:23 PM Elmo Marti Add [R22 32] Mass of left axilla     12/30/2018  7:24 PM Elmo Marti Add [R59 1] Lymphadenopathy     12/30/2018  7:24 PM Elmo Marti Add [O41 630] Reactive airway disease     12/30/2018  7:24 PM Elmo Marti Add [R91 1] Lung nodule       ED Disposition     ED Disposition Condition Comment    Discharge  Steve Pina discharge to home/self care  Condition at discharge: Good        Follow-up Information    None         Patient's Medications   Discharge Prescriptions    No medications on file     No discharge procedures on file      ED Provider  Electronically Signed by           Mason Aguilar DO  12/30/18 0913

## 2018-12-31 ENCOUNTER — TELEPHONE (OUTPATIENT)
Dept: INTERNAL MEDICINE CLINIC | Facility: CLINIC | Age: 33
End: 2018-12-31

## 2018-12-31 DIAGNOSIS — R22.32 AXILLARY MASS, LEFT: Primary | ICD-10-CM

## 2018-12-31 LAB
ATRIAL RATE: 82 BPM
P AXIS: 0 DEGREES
PR INTERVAL: 146 MS
QRS AXIS: 30 DEGREES
QRSD INTERVAL: 82 MS
QT INTERVAL: 396 MS
QTC INTERVAL: 462 MS
T WAVE AXIS: 9 DEGREES
VENTRICULAR RATE: 82 BPM

## 2018-12-31 PROCEDURE — 93010 ELECTROCARDIOGRAM REPORT: CPT | Performed by: INTERNAL MEDICINE

## 2018-12-31 NOTE — DISCHARGE INSTRUCTIONS
Asthma   WHAT YOU NEED TO KNOW:   Asthma is a lung disease that makes breathing difficult  Chronic inflammation and reactions to triggers narrow the airways in the lungs  Asthma can become life-threatening if it is not managed  DISCHARGE INSTRUCTIONS:   Return to the emergency department if:   · You have severe shortness of breath  · Your lips or nails turn blue or gray  · The skin around your neck and ribs pulls in with each breath  · You have shortness of breath, even after you take your short-term medicine as directed  · Your peak flow numbers are in the red zone of your AAP  Contact your healthcare provider if:   · You run out of medicine before your next refill is due  · Your symptoms get worse  · You need to take more medicine than usual to control your symptoms  · You have questions or concerns about your condition or care  Medicines:   · Medicines  decrease inflammation, open airways, and make it easier to breathe  Medicines may be inhaled, taken as a pill, or injected  Short-term medicines relieve your symptoms quickly  Long-term medicines are used to prevent future attacks  You may also need medicine to help control your allergies  Ask your healthcare provider for more information about the medicine you are given and how to take it safely  · Take your medicine as directed  Contact your healthcare provider if you think your medicine is not helping or if you have side effects  Tell him of her if you are allergic to any medicine  Keep a list of the medicines, vitamins, and herbs you take  Include the amounts, and when and why you take them  Bring the list or the pill bottles to follow-up visits  Carry your medicine list with you in case of an emergency  Follow up with your healthcare provider as directed: You will need to return to make sure your medicine is working and your symptoms are controlled   You may be referred to an asthma specialist  Anni Watkins may be asked to keep a record of your peak flow values and bring it with you to your appointments  Write down your questions so you remember to ask them during your visits  Manage your symptoms and prevent future attacks:   · Follow your Asthma Action Plan (AAP)  This is a written plan that you and your healthcare provider create  It explains which medicine you need and when to change doses if necessary  It also explains how you can monitor symptoms and use a peak flow meter  The meter measures how well your lungs are working  · Manage other health conditions , such as allergies, acid reflux, and sleep apnea  · Identify and avoid triggers  These may include pets, dust mites, mold, and cockroaches  · Do not smoke or be around others who smoke  Nicotine and other chemicals in cigarettes and cigars can cause lung damage  Ask your healthcare provider for information if you currently smoke and need help to quit  E-cigarettes or smokeless tobacco still contain nicotine  Talk to your healthcare provider before you use these products  · Ask about the flu vaccine  The flu can make your asthma worse  You may need a yearly flu shot  © 2017 [address] [facility] Information is for End User's use only and may not be sold, redistributed or otherwise used for commercial purposes  All illustrations and images included in CareNotes® are the copyrighted property of [employer] D [employer] M [employer]  or [employer]  The above information is an  only  It is not intended as medical advice for individual conditions or treatments  Talk to your doctor, nurse or pharmacist before following any medical regimen to see if it is safe and effective for you  Moderate and Severe Persistent Asthma, Ambulatory Care   GENERAL INFORMATION:   Moderate or severe persistent asthma occurs  when you have asthma symptoms every day  Your normal activities are affected by wheezing, shortness of breath, or chest tightness   You have frequent flare-ups when your symptoms become worse  Flare-ups at night can affect your sleep and happen at least once a week  Seek immediate care for the following symptoms:   · Lips or fingernails turn gray or blue    · Any severe symptoms    · The skin around your neck and ribs pulls in with each breath    · Peak flow numbers are in the red zone of your asthma action plan  Treatment for moderate or severe persistent asthma  includes medicines to decrease inflammation in your lungs  Medicines also open your airways and make it easier to breathe  The medicines may be inhaled, injected, or given as a pill  You may need medicine to relieve symptoms quickly and to prevent future attacks  Allergy shots may be given to help control allergies that trigger your asthma  Manage moderate or severe persistent asthma:   · Follow your asthma action plan  This is a written plan that you and your healthcare provider create  It explains which medicine you need and when to change doses if needed  The plan also explains how you can monitor symptoms and use a peak flow meter  The meter measures how well air moves in and out of your lungs  · Identify and avoid triggers  Keep your home free of pets, dust mites, cockroaches, and mold  · Manage other health conditions  such as allergies, sinus problems, sleep apnea, or acid reflux  · Do not smoke and avoid others who smoke  If you smoke, it is never too late to quit  Quitting smoking may reduce your symptoms  Ask your healthcare provider for information if you need help quitting  · Ask about a flu vaccine  The flu can make your asthma worse  You may need a yearly flu shot  Follow up with your healthcare provider as directed:  Write down your questions so you remember to ask them during your visits  CARE AGREEMENT:   You have the right to help plan your care  Learn about your health condition and how it may be treated   Discuss treatment options with your caregivers to decide what care you want to receive  You always have the right to refuse treatment  The above information is an  only  It is not intended as medical advice for individual conditions or treatments  Talk to your doctor, nurse or pharmacist before following any medical regimen to see if it is safe and effective for you  © 2014 [address] is for End User's use only and may not be sold, redistributed or otherwise used for commercial purposes  All illustrations and images included in CareNotes® are the copyrighted property of [employer]  or Aryan Chowdary  Pulmonary Nodules   WHAT YOU NEED TO KNOW:   What are pulmonary nodules? Pulmonary nodules are areas of abnormal tissue in your lungs  You may not have any symptoms, or you may have chest tightness, a cough, chest pain, or shortness of breath  Nodules are usually found with an x-ray or CT scan  Most nodules are not cancerous  However, it is still important for you to return for follow-up testing to monitor your condition  What increases my risk for pulmonary nodules? · Smoking    · A history of a lung infection or lung surgery    · Exposure to asbestos or air pollution  How are pulmonary nodules managed? · Your healthcare provider will refer you to a pulmonologist  Some nodules do not need treatment  You may need a CT scan every 3 to 12 months to monitor your nodules for any change or growth  You may need to continue these tests for 1 to 3 years  · If you have a history of smoking, a family history of lung cancer, or the nodule is large, you may need a PET scan or a biopsy  A PET scan takes pictures of your lungs after a small amount of radiation is injected into your body  A biopsy is a sample of tissue taken from the nodule  A biopsy can be done with a needle, or during a bronchoscopy or surgery  Ask your healthcare provider for more information about a lung biopsy    How can I decrease my risk for lung cancer? Do not smoke  Nicotine and other chemicals in cigarettes and cigars can cause lung damage or cancer  Stay away from others who smoke  Ask your healthcare provider for information if you or someone close to you currently smokes and needs help to quit  E-cigarettes or smokeless tobacco still contain nicotine  Talk to your healthcare provider before you use these products  Call 911 for any of the following:   · You have severe shortness of breath or trouble breathing  · Your lips or nails look blue or pale  When should I seek immediate care? · You cough up blood  · You suddenly feel lightheaded or are short of breath  · You have chest pain when you take a deep breath or cough  · You cannot think clearly  When should I contact my healthcare provider? · Your symptoms do not improve  · You have new symptoms  · You have questions or concerns about your condition or care  CARE AGREEMENT:   You have the right to help plan your care  Learn about your health condition and how it may be treated  Discuss treatment options with your caregivers to decide what care you want to receive  You always have the right to refuse treatment  The above information is an  only  It is not intended as medical advice for individual conditions or treatments  Talk to your doctor, nurse or pharmacist before following any medical regimen to see if it is safe and effective for you  © 2017 [address] Information is for End User's use only and may not be sold, redistributed or otherwise used for commercial purposes  All illustrations and images included in CareNotes® are the copyrighted property of A D A M , Inc  or Aryan Chowdary

## 2018-12-31 NOTE — TELEPHONE ENCOUNTER
Patient called and stated she was in the ED over the weekend  She stated the Provider she saw ordered the axillary US to have that completed, and it showed a lung nodule  He is recommending we put in a referral for Pulmonology and that we also order a mammogram on the patient and breast us  I did explain to the patient that the breat us is ordered and she is scheduled for that today at 1:30  She stated that she did not know she was scheduled and will need to change the appointment  I provided her with the phone number to central scheduling  I did let her know that you are not in the office until 1/3/19 and that I will speak to you RE: Mammogram and Pulmonary  I did give her the phone number to Pulmonary as she wanted to schedule the appointment

## 2019-01-02 ENCOUNTER — HOSPITAL ENCOUNTER (EMERGENCY)
Facility: HOSPITAL | Age: 34
Discharge: HOME | End: 2019-01-02
Attending: EMERGENCY MEDICINE | Admitting: EMERGENCY MEDICINE
Payer: COMMERCIAL

## 2019-01-02 ENCOUNTER — APPOINTMENT (EMERGENCY)
Dept: RADIOLOGY | Facility: HOSPITAL | Age: 34
End: 2019-01-02
Payer: COMMERCIAL

## 2019-01-02 VITALS
HEART RATE: 104 BPM | SYSTOLIC BLOOD PRESSURE: 156 MMHG | HEIGHT: 62 IN | RESPIRATION RATE: 18 BRPM | BODY MASS INDEX: 34.04 KG/M2 | WEIGHT: 185 LBS | DIASTOLIC BLOOD PRESSURE: 114 MMHG | TEMPERATURE: 98.8 F | OXYGEN SATURATION: 94 %

## 2019-01-02 DIAGNOSIS — R59.0 AXILLARY LYMPHADENOPATHY: ICD-10-CM

## 2019-01-02 DIAGNOSIS — I10 HYPERTENSION: ICD-10-CM

## 2019-01-02 DIAGNOSIS — J06.9 ACUTE UPPER RESPIRATORY INFECTION: Primary | ICD-10-CM

## 2019-01-02 PROCEDURE — 71046 X-RAY EXAM CHEST 2 VIEWS: CPT

## 2019-01-02 PROCEDURE — 99283 EMERGENCY DEPT VISIT LOW MDM: CPT

## 2019-01-02 RX ORDER — BENZONATATE 100 MG/1
100 CAPSULE ORAL EVERY 8 HOURS
Qty: 21 CAPSULE | Refills: 0 | Status: SHIPPED | OUTPATIENT
Start: 2019-01-02 | End: 2019-01-07

## 2019-01-02 RX ORDER — TRAMADOL HYDROCHLORIDE 50 MG/1
50 TABLET ORAL ONCE
Status: COMPLETED | OUTPATIENT
Start: 2019-01-02 | End: 2019-01-02

## 2019-01-02 RX ORDER — TRAMADOL HYDROCHLORIDE 50 MG/1
50 TABLET ORAL EVERY 8 HOURS PRN
Qty: 10 TABLET | Refills: 0 | Status: SHIPPED | OUTPATIENT
Start: 2019-01-02 | End: 2019-01-10

## 2019-01-02 RX ADMIN — TRAMADOL HYDROCHLORIDE 50 MG: 50 TABLET, COATED ORAL at 18:32

## 2019-01-02 NOTE — TELEPHONE ENCOUNTER
They are unsure if the lump under her arm is cancerous or something inflammatory this could be putting pressure on her arms and causing some of her continued symptoms    The next step would be sending her to Hematology

## 2019-01-02 NOTE — ED PROVIDER NOTES
History  Chief Complaint   Patient presents with    Cough     59-year-old female with complaints of nonproductive cough for 2 weeks with shortness of breath at times and some wheezing at night  Positive chest pain with cough  No fever chills  No sore throat or rhinorrhea  Patient was treated with erythromycin and Zithromax by her PCP  Patient also complains of a lymph node near her left axilla for 1 month for which she is being worked up as an outpatient - she states it is "killing" her  Pt c/i intermittent dizziness and frontal HA's  No n/v/d  Pt was seen at [facility] ED on 12/30 and had labs incl troponin, EKG, and CTA chest/abd pelvis only showing left axillary node and lung nodule  She was Rx'd proventil inhaler        History provided by:  Patient  Cough   Cough characteristics:  Non-productive  Timing:  Constant  Progression:  Unchanged  Relieved by:  Nothing  Worsened by:  Nothing  Associated symptoms: chest pain, headaches and shortness of breath (At times)    Associated symptoms: no chills, no fever, no myalgias, no rhinorrhea and no sore throat        Prior to Admission Medications   Prescriptions Last Dose Informant Patient Reported? Taking?    cetirizine (ZyrTEC) 10 mg tablet   No Yes   Sig: Take 1 tablet (10 mg total) by mouth daily      Facility-Administered Medications: None       Past Medical History:   Diagnosis Date    Cat-scratch disease     last assessed 10/22/15    Depression     Hypertension     last assessed 11/11/14    Sleep disorder        Past Surgical History:   Procedure Laterality Date    TUBAL LIGATION         Family History   Problem Relation Age of Onset    Anxiety disorder Mother     Bipolar disorder Mother     Depression Mother     Schizophrenia Mother     Parkinsonism Mother         tremor    Anxiety disorder Father     Bipolar disorder Father     Depression Father     Schizophrenia Father     Stomach cancer Paternal Grandmother      I have reviewed and agree with the history as documented  Social History   Substance Use Topics    Smoking status: Never Smoker    Smokeless tobacco: Never Used    Alcohol use No        Review of Systems   Constitutional: Negative for chills and fever  HENT: Negative for rhinorrhea and sore throat  Respiratory: Positive for cough and shortness of breath (At times)  Cardiovascular: Positive for chest pain  Gastrointestinal: Negative for diarrhea, nausea and vomiting  Musculoskeletal: Negative for myalgias  Neurological: Positive for dizziness and headaches  Negative for weakness and numbness  Physical Exam  Physical Exam   Constitutional: She is oriented to person, place, and time  She appears well-developed and well-nourished  HENT:   Nose: Nose normal    Mouth/Throat: Oropharynx is clear and moist  No oropharyngeal exudate  Eyes: Pupils are equal, round, and reactive to light  Conjunctivae are normal    Neck: Normal range of motion  Neck supple  Cardiovascular: Normal rate, regular rhythm and normal heart sounds  No murmur heard  Pulmonary/Chest: Effort normal and breath sounds normal  No respiratory distress  She has no wheezes  She has no rales  Good air movement   Abdominal: Soft  Bowel sounds are normal  There is no tenderness  There is no guarding  Musculoskeletal: Normal range of motion  She exhibits no edema or tenderness  No calf tenderness; no definitive lymph node palpated near the left axilla   Neurological: She is alert and oriented to person, place, and time  She exhibits normal muscle tone  5/5 motor, nl sens   Skin: Skin is warm and dry  Psychiatric: She has a normal mood and affect  Her behavior is normal    Nursing note and vitals reviewed        Vital Signs  ED Triage Vitals [01/02/19 1801]   Temperature Pulse Respirations Blood Pressure SpO2   98 8 °F (37 1 °C) 105 16 (!) 156/114 99 %      Temp Source Heart Rate Source Patient Position - Orthostatic VS BP Location FiO2 (%) Temporal -- -- -- --      Pain Score       Worst Possible Pain           Vitals:    01/02/19 1801   BP: (!) 156/114   Pulse: 105       Visual Acuity      ED Medications  Medications   traMADol (ULTRAM) tablet 50 mg (50 mg Oral Given 1/2/19 1832)       Diagnostic Studies  Results Reviewed     None                 XR chest 2 views   ED Interpretation by Lo Correa MD (01/02 1857)   CXR - NAD                 Procedures  Procedures       Phone Contacts  ED Phone Contact    ED Course  ED Course as of Jan 02 1915 Wed Jan 02, 2019 1856 CTA chest/abd/pelvis from 12/30/18: IMPRESSION:   1   Left axillary lymphadenopathy, see ultrasound report for further description and recommendations  2   4 mm right upper lobe nodule  Based on current Fleischner Society 2017 Guidelines on incidental pulmonary nodule, 12 month follow-up non-contrast chest CT is recommended    1857 I d/w patient that her sx are likely viral   Will Rx Tessilon Perls  She continues to c/o pain in left axilla - will give 3 days of tramadol    1858 Pt understands her BP is high and the need for f/u  She also knows about her lung nodule                                MDM  CritCare Time    Disposition  Final diagnoses:   Acute upper respiratory infection   Hypertension   Axillary lymphadenopathy     Time reflects when diagnosis was documented in both MDM as applicable and the Disposition within this note     Time User Action Codes Description Comment    1/2/2019  6:59 PM Marianne Laxmi Add [J06 9] Acute upper respiratory infection     1/2/2019  6:59 PM Marianne Laxmi Add [I10] Hypertension     1/2/2019  7:00 PM Marianne Laxmi Add [R59 0] Axillary lymphadenopathy       ED Disposition     ED Disposition Condition Comment    Discharge  Kim Beech discharge to home/self care      Condition at discharge: Stable        Follow-up Information     Follow up With Specialties Details Why Nadia Kaur [address], [address], Nurse Practitioner Schedule an appointment as soon as possible for a visit in 1 day Return if you worsen or any new problems occur  [address] [place] [address]  [phone]            Patient's Medications   Discharge Prescriptions    BENZONATATE (TESSALON PERLES) 100 MG CAPSULE    Take 1 capsule (100 mg total) by mouth every 8 (eight) hours       Start Date: 1/2/2019  End Date: --       Order Dose: 100 mg       Quantity: 21 capsule    Refills: 0    TRAMADOL (ULTRAM) 50 MG TABLET    Take 1 tablet (50 mg total) by mouth every 8 (eight) hours as needed for moderate pain for up to 10 doses       Start Date: 1/2/2019  End Date: --       Order Dose: 50 mg       Quantity: 10 tablet    Refills: 0     No discharge procedures on file      ED Provider  Electronically Signed by           Patti Navarrete MD  01/02/19 106 Abby Osman MD  01/02/19 106 Abby Osman MD  01/02/19 [id number]

## 2019-01-02 NOTE — TELEPHONE ENCOUNTER
Patient stated she is having some chest discomfort but states it is related to her bronchitis  I did advise her to go to the ER if she continues to have symptoms  She said "Ok"  She did say that they ultrasounded her entire arm when she was in the ER last and "didnt' find anything"

## 2019-01-02 NOTE — ED NOTES
Call rec'd from female claiming to be pt's sister and demanding to know "why nothing's being done for her" and why and ultrasound has not been ordered  Advised caller that pt has rec'd pain medication and the physician orders what is appropriate  Caller abruptly hung up       Jean Paul Lagos RN  01/02/19 [id number]

## 2019-01-02 NOTE — ED NOTES
Pt states L axillary lump present X 1 month, has been seen by PCP and ED's X 4 in past weeks   Advised by PCP to return to ED today     Melanie Gruber RN  01/02/19 [id number]

## 2019-01-02 NOTE — ED NOTES
Patient transported to X-ray and returned via W/C  Pt smiling, laughing, conversing with xray tech       Rubén Mckee RN  01/02/19 1167

## 2019-01-02 NOTE — TELEPHONE ENCOUNTER
If she is continuing to have symptoms she should go to ER I know she was there last week but not sure if this is related to the area of concern in her armpit    Is she having any chest pain or SOB

## 2019-01-02 NOTE — TELEPHONE ENCOUNTER
Jonny Elizabethgissel called today she wanted to let us know that central scheduling did call her  They had to cancel the U/S for tomorrow as they want the mammogram first and then u/s if needed  They scheduled the mammogram for 1/8/19 per catrachito and will then do the u/s if they find anything  She stated she did get an appt scheduled with pulmonary as well just to see  Jonny Gonzalez did have a complaint today of pain in the left arm> She said it starts at the armpit area and goes down the arm into the wrist  She is also c/o of leg cramps, getting dizzy  She would like to know if theres anything we can do?

## 2019-01-03 NOTE — ED NOTES
CXR results reviewed, encouraged follow up with PCP  Sister still not pleased with care provided       Gayathri Escalona RN  01/02/19 [id number]

## 2019-01-03 NOTE — ED NOTES
Attempted to discharge pt  Pt's sister now present demanding more information regarding the pt and requesting to speak with the "patient advocate    I know every hospital has one"  Supervisor paged       Isaiah Monsalve RN  01/02/19 1911

## 2019-01-03 NOTE — DISCHARGE INSTRUCTIONS
Hypertension   WHAT YOU NEED TO KNOW:   Hypertension is high blood pressure (BP)  Your BP is the force of your blood moving against the walls of your arteries  Normal BP is less than 120/80  Prehypertension is between 120/80 and 139/89  Hypertension is 140/90 or higher  Hypertension causes your BP to get so high that your heart has to work much harder than normal  This can damage your heart  You can control hypertension with a healthy lifestyle or medicines  A controlled blood pressure helps protect your organs, such as your heart, lungs, brain, and kidneys  DISCHARGE INSTRUCTIONS:   Call 911 for any of the following:   · You have discomfort in your chest that feels like squeezing, pressure, fullness, or pain  · You become confused or have difficulty speaking  · You suddenly feel lightheaded or have trouble breathing  · You have pain or discomfort in your back, neck, jaw, stomach, or arm  Return to the emergency department if:   · You have a severe headache or vision loss  · You have weakness in an arm or leg  Contact your healthcare provider if:   · You feel faint, dizzy, confused, or drowsy  · You have been taking your BP medicine and your BP is still higher than your healthcare provider says it should be  · You have questions or concerns about your condition or care  Medicines: You may  need any of the following:  · Medicine  may be used to help lower your BP  You may need more than one type of medicine  Take the medicine exactly as directed  · Diuretics  help decrease extra fluid that collects in your body  This will help lower your BP  You may urinate more often while you take this medicine  · Cholesterol medicine  helps lower your cholesterol level  A low cholesterol level helps prevent heart disease and makes it easier to control your blood pressure  · Take your medicine as directed    Contact your healthcare provider if you think your medicine is not helping or if you have side effects  Tell him or her if you are allergic to any medicine  Keep a list of the medicines, vitamins, and herbs you take  Include the amounts, and when and why you take them  Bring the list or the pill bottles to follow-up visits  Carry your medicine list with you in case of an emergency  Follow up with your healthcare provider as directed: You will need to return to have your BP checked and to have other lab tests done  Write down your questions so you remember to ask them during your visits  Manage hypertension:  Talk with your healthcare provider about these and other ways to manage hypertension:  · Check your BP at home  Sit and rest for 5 minutes before you take your BP  Extend your arm and support it on a flat surface  Your arm should be at the same level as your heart  Follow the directions that came with your BP monitor  If possible, take at least 2 BP readings each time  Take your BP at least twice a day at the same times each day, such as morning and evening  Keep a record of your BP readings and bring it to your follow-up visits  Ask your healthcare provider what your BP should be  · Limit sodium (salt) as directed  Too much sodium can affect your fluid balance  Check labels to find low-sodium or no-salt-added foods  Some low-sodium foods use potassium salts for flavor  Too much potassium can also cause health problems  Your healthcare provider will tell you how much sodium and potassium are safe for you to have in a day  He or she may recommend that you limit sodium to 2,300 mg a day  · Follow the meal plan recommended by your healthcare provider  A dietitian or your provider can give you more information on low-sodium plans or the DASH (Dietary Approaches to Stop Hypertension) eating plan  The DASH plan is low in sodium, unhealthy fats, and total fat  It is high in potassium, calcium, and fiber  · Exercise to maintain a healthy weight    Exercise at least 30 minutes per day, on most days of the week  This will help decrease your blood pressure  Ask your healthcare provider about the best exercise plan for you  · Decrease stress  This may help lower your BP  Learn ways to relax, such as deep breathing or listening to music  · Limit alcohol  Women should limit alcohol to 1 drink a day  Men should limit alcohol to 2 drinks a day  A drink of alcohol is 12 ounces of beer, 5 ounces of wine, or 1½ ounces of liquor  · Do not smoke  Nicotine and other chemicals in cigarettes and cigars can increase your BP and also cause lung damage  Ask your healthcare provider for information if you currently smoke and need help to quit  E-cigarettes or smokeless tobacco still contain nicotine  Talk to your healthcare provider before you use these products  · Manage any other health conditions you have  Health conditions such as diabetes can increase your risk for hypertension  Follow your healthcare provider's instructions and take all your medicines as directed  © 2017 [address] [employer] Information is for End User's use only and may not be sold, redistributed or otherwise used for commercial purposes  All illustrations and images included in CareNotes® are the copyrighted property of [employer]  or [employer]  The above information is an  only  It is not intended as medical advice for individual conditions or treatments  Talk to your doctor, nurse or pharmacist before following any medical regimen to see if it is safe and effective for you  Lymphadenopathy   WHAT YOU NEED TO KNOW:   Lymphadenopathy is swelling of your lymph nodes  Lymph nodes are small organs that are part of your immune system  The lymph nodes are found throughout your body  They are most easily felt in your neck, under your arms, and near your groin  Lymphadenopathy can occur in one or more areas of your body  It is usually caused by an infection    DISCHARGE INSTRUCTIONS:   Return to the emergency department if:   · The swollen lymph nodes bleed  · You have swollen lymph nodes in your neck that affect your breathing or swallowing  Contact your healthcare provider if:   · You have a fever  · You have a new swollen and painful lymph node  · You have a skin rash  · Your lymph node remains swollen or painful, or it gets bigger  · Your lymph node has red streaks around it, or the skin around the lymph node is red  · You have questions or concerns about your condition or care  Follow up with your healthcare provider as directed:  Write down your questions so you remember to ask them during your visits  Self-care:   · Do not poke or squeeze  the swollen lymph nodes  · Apply heat to the swollen glands  You may use warm compresses, or an electric heating pad set on low  · Rest as needed  If you have a fever, rest until your temperature returns to normal  Return to your normal daily activities slowly after your fever is gone  © 2017 [address] [facility] Information is for End User's use only and may not be sold, redistributed or otherwise used for commercial purposes  All illustrations and images included in CareNotes® are the copyrighted property of [employer]  or [employer]  The above information is an  only  It is not intended as medical advice for individual conditions or treatments  Talk to your doctor, nurse or pharmacist before following any medical regimen to see if it is safe and effective for you  Upper Respiratory Infection   WHAT YOU NEED TO KNOW:   An upper respiratory infection is also called the common cold  It is an infection that can affect your nose, throat, ears, and sinuses  For healthy people, the common cold is usually not serious and does not need special treatment  Cold symptoms are usually worst for the first 3 to 5 days  Most people get better in 7 to 14 days   You may continue to cough for 2 to 3 weeks  Colds are caused by viruses and do not get better with antibiotics  DISCHARGE INSTRUCTIONS:   Return to the emergency department if:   · You have chest pain or trouble breathing  Contact your healthcare provider if:   · You have a fever over 102ºF (39°C)  · Your sore throat gets worse or you see white or yellow spots in your throat  · Your symptoms get worse after 3 to 5 days or your cold is not better in 14 days  · You have a rash anywhere on your skin  · You have large, tender lumps in your neck  · You have thick, green or yellow drainage from your nose  · You cough up thick yellow, green, or bloody mucus  · You have vomiting for more than 24 hours and cannot keep fluids down  · You have a bad earache  · You have questions or concerns about your condition or care  Medicines: You may need any of the following:  · Decongestants  help reduce nasal congestion and help you breathe more easily  If you take decongestant pills, they may make you feel restless or cause problems with your sleep  Do not use decongestant sprays for more than a few days  · Cough suppressants  help reduce coughing  Ask your healthcare provider which type of cough medicine is best for you  · NSAIDs , such as ibuprofen, help decrease swelling, pain, and fever  NSAIDs can cause stomach bleeding or kidney problems in certain people  If you take blood thinner medicine, always ask your healthcare provider if NSAIDs are safe for you  Always read the medicine label and follow directions  · Acetaminophen  decreases pain and fever  It is available without a doctor's order  Ask how much to take and how often to take it  Follow directions  Read the labels of all other medicines you are using to see if they also contain acetaminophen, or ask your doctor or pharmacist  Acetaminophen can cause liver damage if not taken correctly   Do not use more than 4 grams (4,000 milligrams) total of acetaminophen in one day  · Take your medicine as directed  Contact your healthcare provider if you think your medicine is not helping or if you have side effects  Tell him or her if you are allergic to any medicine  Keep a list of the medicines, vitamins, and herbs you take  Include the amounts, and when and why you take them  Bring the list or the pill bottles to follow-up visits  Carry your medicine list with you in case of an emergency  Follow up with your healthcare provider as directed:  Write down your questions so you remember to ask them during your visits  Self-care:   · Rest as much as possible  Slowly start to do more each day  · Drink more liquids as directed  Liquids will help thin and loosen mucus so you can cough it up  Liquids will also help prevent dehydration  Liquids that help prevent dehydration include water, fruit juice, and broth  Do not drink liquids that contain caffeine  Caffeine can increase your risk for dehydration  Ask your healthcare provider how much liquid to drink each day  · Soothe a sore throat  Gargle with warm salt water  This helps your sore throat feel better  Make salt water by dissolving ¼ teaspoon salt in 1 cup warm water  You may also suck on hard candy or throat lozenges  You may use a sore throat spray  · Use a humidifier or vaporizer  Use a cool mist humidifier or a vaporizer to increase air moisture in your home  This may make it easier for you to breathe and help decrease your cough  · Use saline nasal drops as directed  These help relieve congestion  · Apply petroleum-based jelly around the outside of your nostrils  This can decrease irritation from blowing your nose  · Do not smoke  Nicotine and other chemicals in cigarettes and cigars can make your symptoms worse  They can also cause infections such as bronchitis or pneumonia  Ask your healthcare provider for information if you currently smoke and need help to quit  E-cigarettes or smokeless tobacco still contain nicotine  Talk to your healthcare provider before you use these products  Prevent spreading your cold to others:   · Try to stay away from other people during the first 2 to 3 days of your cold when it is more easily spread  · Do not share food or drinks  · Do not share hand towels with household members  · Wash your hands often, especially after you blow your nose  Turn away from other people and cover your mouth and nose with a tissue when you sneeze or cough  © 2017 [address] [facility] Information is for End User's use only and may not be sold, redistributed or otherwise used for commercial purposes  All illustrations and images included in CareNotes® are the copyrighted property of [employer]  or A[employer]  The above information is an  only  It is not intended as medical advice for individual conditions or treatments  Talk to your doctor, nurse or pharmacist before following any medical regimen to see if it is safe and effective for you

## 2019-01-04 ENCOUNTER — HOSPITAL ENCOUNTER (OUTPATIENT)
Dept: MAMMOGRAPHY | Facility: CLINIC | Age: 34
Discharge: HOME | End: 2019-01-04
Payer: COMMERCIAL

## 2019-01-04 ENCOUNTER — HOSPITAL ENCOUNTER (OUTPATIENT)
Dept: ULTRASOUND IMAGING | Facility: CLINIC | Age: 34
Discharge: HOME | End: 2019-01-04
Payer: COMMERCIAL

## 2019-01-04 ENCOUNTER — TELEPHONE (OUTPATIENT)
Dept: INTERNAL MEDICINE CLINIC | Facility: CLINIC | Age: 34
End: 2019-01-04

## 2019-01-04 ENCOUNTER — HOSPITAL ENCOUNTER (EMERGENCY)
Facility: HOSPITAL | Age: 34
Discharge: HOME | End: 2019-01-04
Attending: EMERGENCY MEDICINE | Admitting: EMERGENCY MEDICINE
Payer: COMMERCIAL

## 2019-01-04 VITALS
SYSTOLIC BLOOD PRESSURE: 135 MMHG | HEIGHT: 62 IN | OXYGEN SATURATION: 100 % | TEMPERATURE: 100 F | HEART RATE: 82 BPM | WEIGHT: 187.61 LBS | DIASTOLIC BLOOD PRESSURE: 79 MMHG | BODY MASS INDEX: 34.52 KG/M2 | RESPIRATION RATE: 16 BRPM

## 2019-01-04 VITALS — HEIGHT: 62 IN | WEIGHT: 187 LBS | BODY MASS INDEX: 34.41 KG/M2

## 2019-01-04 DIAGNOSIS — R59.9 SWOLLEN LYMPH NODES: Primary | ICD-10-CM

## 2019-01-04 DIAGNOSIS — R22.32 MASS OF LEFT AXILLA: ICD-10-CM

## 2019-01-04 DIAGNOSIS — R22.32 AXILLARY MASS, LEFT: ICD-10-CM

## 2019-01-04 DIAGNOSIS — M54.5 LOW BACK PAIN, UNSPECIFIED BACK PAIN LATERALITY, UNSPECIFIED CHRONICITY, WITH SCIATICA PRESENCE UNSPECIFIED: Primary | ICD-10-CM

## 2019-01-04 PROCEDURE — 77066 DX MAMMO INCL CAD BI: CPT

## 2019-01-04 PROCEDURE — G0279 TOMOSYNTHESIS, MAMMO: HCPCS

## 2019-01-04 PROCEDURE — 99283 EMERGENCY DEPT VISIT LOW MDM: CPT

## 2019-01-04 PROCEDURE — 96372 THER/PROPH/DIAG INJ SC/IM: CPT

## 2019-01-04 RX ORDER — ACETAMINOPHEN 160 MG/5ML
650 SUSPENSION, ORAL (FINAL DOSE FORM) ORAL ONCE
Status: COMPLETED | OUTPATIENT
Start: 2019-01-04 | End: 2019-01-04

## 2019-01-04 RX ORDER — CEPHALEXIN 250 MG/1
500 CAPSULE ORAL 4 TIMES DAILY
Qty: 80 CAPSULE | Refills: 0 | Status: SHIPPED | OUTPATIENT
Start: 2019-01-04 | End: 2019-01-10

## 2019-01-04 RX ORDER — KETOROLAC TROMETHAMINE 30 MG/ML
15 INJECTION, SOLUTION INTRAMUSCULAR; INTRAVENOUS ONCE
Status: COMPLETED | OUTPATIENT
Start: 2019-01-04 | End: 2019-01-04

## 2019-01-04 RX ORDER — ACETAMINOPHEN 325 MG/1
650 TABLET ORAL EVERY 6 HOURS PRN
Qty: 30 TABLET | Refills: 0 | Status: SHIPPED | OUTPATIENT
Start: 2019-01-04 | End: 2019-01-10

## 2019-01-04 RX ORDER — IBUPROFEN 400 MG/1
400 TABLET ORAL EVERY 6 HOURS PRN
Qty: 30 TABLET | Refills: 0 | Status: SHIPPED | OUTPATIENT
Start: 2019-01-04 | End: 2019-01-10

## 2019-01-04 RX ADMIN — KETOROLAC TROMETHAMINE 15 MG: 30 INJECTION, SOLUTION INTRAMUSCULAR at 15:42

## 2019-01-04 RX ADMIN — ACETAMINOPHEN 650 MG: 160 SUSPENSION ORAL at 15:41

## 2019-01-04 NOTE — DISCHARGE INSTRUCTIONS
Lymphadenopathy   WHAT YOU NEED TO KNOW:   Lymphadenopathy is swelling of your lymph nodes  Lymph nodes are small organs that are part of your immune system  The lymph nodes are found throughout your body  They are most easily felt in your neck, under your arms, and near your groin  Lymphadenopathy can occur in one or more areas of your body  It is usually caused by an infection  DISCHARGE INSTRUCTIONS:   Return to the emergency department if:   · The swollen lymph nodes bleed  · You have swollen lymph nodes in your neck that affect your breathing or swallowing  Contact your healthcare provider if:   · You have a fever  · You have a new swollen and painful lymph node  · You have a skin rash  · Your lymph node remains swollen or painful, or it gets bigger  · Your lymph node has red streaks around it, or the skin around the lymph node is red  · You have questions or concerns about your condition or care  Follow up with your healthcare provider as directed:  Write down your questions so you remember to ask them during your visits  Self-care:   · Do not poke or squeeze  the swollen lymph nodes  · Apply heat to the swollen glands  You may use warm compresses, or an electric heating pad set on low  · Rest as needed  If you have a fever, rest until your temperature returns to normal  Return to your normal daily activities slowly after your fever is gone  © 2017 [address] Information is for End User's use only and may not be sold, redistributed or otherwise used for commercial purposes  All illustrations and images included in CareNotes® are the copyrighted property of [employer]  or Aryan Chowd[employer]  The above information is an  only  It is not intended as medical advice for individual conditions or treatments  Talk to your doctor, nurse or pharmacist before following any medical regimen to see if it is safe and effective for you

## 2019-01-04 NOTE — PROGRESS NOTES
Meryle Kanaris did speak with patient regarding results and was advised to go to [facility] ER due to her continued pain   WIll make appointment with Dr Myesha Eli about findings and patient is coming in to office on Monday

## 2019-01-07 ENCOUNTER — OFFICE VISIT (OUTPATIENT)
Dept: INTERNAL MEDICINE CLINIC | Facility: CLINIC | Age: 34
End: 2019-01-07
Payer: COMMERCIAL

## 2019-01-07 ENCOUNTER — TELEPHONE (OUTPATIENT)
Dept: INTERNAL MEDICINE CLINIC | Facility: CLINIC | Age: 34
End: 2019-01-07

## 2019-01-07 VITALS
SYSTOLIC BLOOD PRESSURE: 122 MMHG | OXYGEN SATURATION: 98 % | DIASTOLIC BLOOD PRESSURE: 90 MMHG | WEIGHT: 189.4 LBS | HEIGHT: 62 IN | TEMPERATURE: 98.4 F | BODY MASS INDEX: 34.85 KG/M2 | HEART RATE: 86 BPM

## 2019-01-07 DIAGNOSIS — Z23 NEED FOR TUBERCULOSIS VACCINATION: ICD-10-CM

## 2019-01-07 DIAGNOSIS — R22.32 MASS OF LEFT AXILLA: Primary | ICD-10-CM

## 2019-01-07 PROBLEM — Z02.4 DRIVER'S PERMIT PHYSICAL EXAMINATION: Status: RESOLVED | Noted: 2018-11-30 | Resolved: 2019-01-07

## 2019-01-07 PROCEDURE — 86580 TB INTRADERMAL TEST: CPT | Performed by: NURSE PRACTITIONER

## 2019-01-07 PROCEDURE — 99214 OFFICE O/P EST MOD 30 MIN: CPT | Performed by: NURSE PRACTITIONER

## 2019-01-07 RX ORDER — KETOROLAC TROMETHAMINE 10 MG/1
10 TABLET, FILM COATED ORAL EVERY 6 HOURS PRN
Qty: 30 TABLET | Refills: 0 | Status: SHIPPED | OUTPATIENT
Start: 2019-01-07 | End: 2019-01-07 | Stop reason: DRUGHIGH

## 2019-01-07 RX ORDER — KETOROLAC TROMETHAMINE 10 MG/1
10 TABLET, FILM COATED ORAL EVERY 6 HOURS PRN
Qty: 12 TABLET | Refills: 0 | Status: SHIPPED | OUTPATIENT
Start: 2019-01-07 | End: 2019-01-08 | Stop reason: DRUGHIGH

## 2019-01-07 NOTE — TELEPHONE ENCOUNTER
[address] [facility] to cx the trazodone #30, and to keep the #12  Pharmacy aware and cx the #30

## 2019-01-07 NOTE — ED PROVIDER NOTES
History  Chief Complaint   Patient presents with    Mass     patient states that she has inflammed lymp node under left armpit     HPI    Patient is a 69-year-old female that reports to the emergency department with an inflamed lymph node under the left armpit  She has had this for several months now  On exam she is pleasant, interactive, no acute distress  No fevers, chills, sweats  No redness or signs of infection  There was some stranding seen on a prior imaging, as such, will treat with antibiotics and have the patient follow up with surgery asap  The patient (and any family present) verbalized understanding of the discharge instructions and warnings that would necessitate return to the Emergency Department  Gave verbal in addition to written discharge instructions  Specifically highlighted areas of special concern regarding the written and verbal discharge instructions and return precautions  All questions were answered prior to discharge  Prior to Admission Medications   Prescriptions Last Dose Informant Patient Reported?  Taking?   benzonatate (TESSALON PERLES) 100 mg capsule   No No   Sig: Take 1 capsule (100 mg total) by mouth every 8 (eight) hours   cetirizine (ZyrTEC) 10 mg tablet   No Yes   Sig: Take 1 tablet (10 mg total) by mouth daily   traMADol (ULTRAM) 50 mg tablet   No Yes   Sig: Take 1 tablet (50 mg total) by mouth every 8 (eight) hours as needed for moderate pain for up to 10 doses      Facility-Administered Medications: None       Past Medical History:   Diagnosis Date    Cat-scratch disease     last assessed 10/22/15    Depression     Hypertension     last assessed 11/11/14    Sleep disorder        Past Surgical History:   Procedure Laterality Date    BREAST BIOPSY Left     2016    TUBAL LIGATION         Family History   Problem Relation Age of Onset    Anxiety disorder Mother     Bipolar disorder Mother     Depression Mother    Lc Bermudez Schizophrenia Mother    Lc Bermudez Parkinsonism Mother         tremor    Anxiety disorder Father     Bipolar disorder Father     Depression Father     Schizophrenia Father     Stomach cancer Paternal Grandmother      I have reviewed and agree with the history as documented  Social History   Substance Use Topics    Smoking status: Never Smoker    Smokeless tobacco: Never Used    Alcohol use No        Review of Systems   All other systems reviewed and are negative  Physical Exam  Physical Exam   Constitutional: She is oriented to person, place, and time  She appears well-developed and well-nourished  HENT:   Head: Normocephalic and atraumatic  Right Ear: External ear normal    Left Ear: External ear normal    Eyes: Conjunctivae and EOM are normal    Neck: Normal range of motion  Neck supple  No JVD present  No tracheal deviation present  Cardiovascular: Normal rate, regular rhythm and normal heart sounds  Pulmonary/Chest: Effort normal  No respiratory distress  She has no wheezes  She has no rales  Abdominal: Soft  Bowel sounds are normal  There is no tenderness  There is no rebound and no guarding  Musculoskeletal: She exhibits no edema  Tender swollen lymph node under left arm   Neurological: She is alert and oriented to person, place, and time  Skin: Skin is warm and dry  No rash noted  No erythema  Psychiatric: She has a normal mood and affect  Thought content normal    Nursing note and vitals reviewed        Vital Signs  ED Triage Vitals   Temperature Pulse Respirations Blood Pressure SpO2   01/04/19 1516 01/04/19 1516 01/04/19 1516 01/04/19 1519 01/04/19 1516   100 °F (37 8 °C) 82 16 135/79 100 %      Temp src Heart Rate Source Patient Position - Orthostatic VS BP Location FiO2 (%)   -- 01/04/19 1516 01/04/19 1516 01/04/19 1516 --    Monitor Lying Right arm       Pain Score       01/04/19 1516       Worst Possible Pain           Vitals:    01/04/19 1516 01/04/19 1519   BP:  135/79   Pulse: 82    Patient Position - Orthostatic VS: Lying Lying       Visual Acuity      ED Medications  Medications   ketorolac (TORADOL) injection 15 mg (15 mg Intramuscular Given 1/4/19 1542)   acetaminophen (TYLENOL) oral suspension 650 mg (650 mg Oral Given 1/4/19 1541)       Diagnostic Studies  Results Reviewed     None                 No orders to display              Procedures  Procedures       Phone Contacts  ED Phone Contact    ED Course  ED Course as of Jan 06 2355 Fri Jan 04, 2019   [address] [facility] Wednesday, Dr Serenity braga, Jan 9th 10:30am                                [facility]  CritCare Time    Disposition  Final diagnoses:   Swollen lymph nodes     Time reflects when diagnosis was documented in both MDM as applicable and the Disposition within this note     Time User Action Codes Description Comment    1/4/2019  3:31 PM Mehnaz Crandall, [address] [R59 9] Swollen lymph nodes       ED Disposition     ED Disposition Condition Comment    Discharge  Wendy Marks discharge to home/self care      Condition at discharge: Good        Follow-up Information     Follow up With Specialties Details Why Na Natasha 033, [address], Nurse Practitioner In 2 days  [address]  [phone]      [facility] Dr Serenity Moreno    Wednesday, Dr Serenity braga, Jan 9th 10:30am - call Dr Serenity Moreno to [facility] [phone] PCP Ad           Discharge Medication List as of 1/4/2019  3:40 PM      START taking these medications    Details   acetaminophen (TYLENOL) 325 mg tablet Take 2 tablets (650 mg total) by mouth every 6 (six) hours as needed for mild pain for up to 5 days, Starting Fri 1/4/2019, Until Wed 1/9/2019, Print      cephalexin (KEFLEX) 250 mg capsule Take 2 capsules (500 mg total) by mouth 4 (four) times a day for 10 days, Starting Fri 1/4/2019, Until Mon 1/14/2019, Print      ibuprofen (MOTRIN) 400 mg tablet Take 1 tablet (400 mg total) by mouth every 6 (six) hours as needed for mild pain for up to 5 days, Starting Fri 1/4/2019, Until Wed 1/9/2019, Print         CONTINUE these medications which have NOT CHANGED    Details   cetirizine (ZyrTEC) 10 mg tablet Take 1 tablet (10 mg total) by mouth daily, Starting Fri 11/30/2018, Normal      traMADol (ULTRAM) 50 mg tablet Take 1 tablet (50 mg total) by mouth every 8 (eight) hours as needed for moderate pain for up to 10 doses, Starting Wed 1/2/2019, Print      benzonatate (TESSALON PERLES) 100 mg capsule Take 1 capsule (100 mg total) by mouth every 8 (eight) hours, Starting Wed 1/2/2019, Normal           No discharge procedures on file      ED Provider  Electronically Signed by           Linnea Franklin MD  01/06/19 7535

## 2019-01-07 NOTE — PROGRESS NOTES
Assessment/Plan: Patient does have a follow up appointment with surgery on 1/9  Recent mammogram did show There is partially imaged extensive hypertrophic left axillary lymphadenopathy  Please note this was recently imaged with both CT and targeted ultrasound on 12/30/2018  This was also imaged in 2015 overall these appear to have a similar appearance to prior with areas of necrosis  There are probably a greater number of nodes than on the prior  In 2015 this underwent a core biopsy sample, at which time the hypertrophic and partially necrotic nodes were shown to represent chronic inflammation and caseating granulomas  This finding would most commonly be associated with tuberculous infection or less commonly histoplasmosis  Review of the recent CT shows no typical pulmonary sequela of either of the above  Patient did have similar symptoms in 2015 with biopsy of the area showing caseating granulomas and chronic inflammation  Patient did have a recent CT of the chest while in the ER showing 4 mm right upper lobe nodule  Based on current Fleischner Society 2017 Guidelines on incidental pulmonary nodule, 12 month follow-up non-contrast chest CT is recommended  She did have prior work up done with ID 10/12 and did have biopsies done which were negative for AFB and fungal stains  At this time they were working her up for possible cat scratch disease due to a recent cat scratch at that time  Patient did have Baronella titers done which did showing IgG levels  Will refer patient back to ID and will order other labs  She will get PPD today in the office and last PPD was done when she was a child  Will call in Toradol 10 mg every 6 hours PRN for 3 days for pain as this did help her symptoms in the ER when administered  She was advised if any worsening of symptoms to go to ER  Bp was 122/90 and is having higher readings at home as well  Will bring her back in 2 weeks for BP recheck        No problem-specific Assessment & Plan notes found for this encounter  Problem List Items Addressed This Visit     Mass of left axilla - Primary    Relevant Medications    ketorolac (TORADOL) 10 mg tablet    Other Relevant Orders    Comprehensive metabolic panel    CBC and differential    Iron Panel    VERONICA Screen w/ Reflex to Titer/Pattern            Subjective:      Patient ID: Alba Rhodes is a 28 y o  female  Daivd Leghorn is here today for an ER follow up visit  She has been in the ER multiple times after finding a lump noted under her left axilla  She did have a mammogram done showing possible abscess that can not exclude TB or histoplasmosis  She was started on Cleocin and then I did start her on Azithromycin for complaints of a URI  She then was started on Keflex by the ER on Friday  She was having extreme pain as well and was given Tramadol and then Toradol in the ER  She does have an appointment on Wednesday with surgery  Hypertension   This is a recurrent problem  The problem has been waxing and waning since onset  The problem is uncontrolled  Associated symptoms include malaise/fatigue and orthopnea  There are no associated agents to hypertension  There are no known risk factors for coronary artery disease  The following portions of the patient's history were reviewed and updated as appropriate:   She  has a past medical history of Cat-scratch disease; Depression; Hypertension; and Sleep disorder  She   Patient Active Problem List    Diagnosis Date Noted    Mass of left axilla 12/26/2018    Bronchitis 12/26/2018    Seasonal allergic rhinitis 11/30/2018    Screening for cervical cancer 11/30/2018    Numbness and tingling of both upper extremities while sleeping 05/01/2018    Urine ketones 03/15/2018    Depression 09/12/2016    Intention tremor 09/12/2016     She  has a past surgical history that includes Tubal ligation and Breast biopsy (Left)    Her family history includes Anxiety disorder in her father and mother; Bipolar disorder in her father and mother; Depression in her father and mother; Parkinsonism in her mother; Schizophrenia in her father and mother; Stomach cancer in her paternal grandmother  She  reports that she has never smoked  She has never used smokeless tobacco  She reports that she does not drink alcohol or use drugs  Current Outpatient Prescriptions   Medication Sig Dispense Refill    acetaminophen (TYLENOL) 325 mg tablet Take 2 tablets (650 mg total) by mouth every 6 (six) hours as needed for mild pain for up to 5 days 30 tablet 0    cephalexin (KEFLEX) 250 mg capsule Take 2 capsules (500 mg total) by mouth 4 (four) times a day for 10 days 80 capsule 0    cetirizine (ZyrTEC) 10 mg tablet Take 1 tablet (10 mg total) by mouth daily 30 tablet 3    ibuprofen (MOTRIN) 400 mg tablet Take 1 tablet (400 mg total) by mouth every 6 (six) hours as needed for mild pain for up to 5 days 30 tablet 0    traMADol (ULTRAM) 50 mg tablet Take 1 tablet (50 mg total) by mouth every 8 (eight) hours as needed for moderate pain for up to 10 doses 10 tablet 0    ketorolac (TORADOL) 10 mg tablet Take 1 tablet (10 mg total) by mouth every 6 (six) hours as needed for moderate pain X 3 days 12 tablet 0     No current facility-administered medications for this visit        Current Outpatient Prescriptions on File Prior to Visit   Medication Sig    acetaminophen (TYLENOL) 325 mg tablet Take 2 tablets (650 mg total) by mouth every 6 (six) hours as needed for mild pain for up to 5 days    cephalexin (KEFLEX) 250 mg capsule Take 2 capsules (500 mg total) by mouth 4 (four) times a day for 10 days    cetirizine (ZyrTEC) 10 mg tablet Take 1 tablet (10 mg total) by mouth daily    ibuprofen (MOTRIN) 400 mg tablet Take 1 tablet (400 mg total) by mouth every 6 (six) hours as needed for mild pain for up to 5 days    traMADol (ULTRAM) 50 mg tablet Take 1 tablet (50 mg total) by mouth every 8 (eight) hours as needed for moderate pain for up to 10 doses    [DISCONTINUED] benzonatate (TESSALON PERLES) 100 mg capsule Take 1 capsule (100 mg total) by mouth every 8 (eight) hours (Patient not taking: Reported on 1/7/2019 )     No current facility-administered medications on file prior to visit  She is allergic to codeine       Review of Systems   Constitutional: Positive for fatigue and malaise/fatigue  HENT: Negative  Eyes: Negative  Respiratory: Negative  Cardiovascular: Positive for orthopnea  Gastrointestinal: Negative  Endocrine: Negative  Genitourinary: Negative  Musculoskeletal:        Left axilla pain   Skin: Negative  Allergic/Immunologic: Negative  Neurological: Negative  Hematological: Negative  Psychiatric/Behavioral: Negative  Objective:      /90 (BP Location: Right arm, Patient Position: Sitting, Cuff Size: Large)   Pulse 86   Temp 98 4 °F (36 9 °C) (Oral)   Ht 5' 2" (1 575 m)   Wt 85 9 kg (189 lb 6 4 oz)   LMP 12/13/2018   SpO2 98%   BMI 34 64 kg/m²          Physical Exam   Constitutional: She is oriented to person, place, and time  She appears well-developed and well-nourished  HENT:   Head: Normocephalic and atraumatic  Right Ear: External ear normal    Left Ear: External ear normal    Nose: Nose normal    Mouth/Throat: Oropharynx is clear and moist    Eyes: Pupils are equal, round, and reactive to light  Conjunctivae and EOM are normal    Neck: Normal range of motion  Neck supple  Cardiovascular: Normal rate, regular rhythm, normal heart sounds and intact distal pulses  Pulmonary/Chest: Effort normal and breath sounds normal    Abdominal: Soft  Bowel sounds are normal    Musculoskeletal: Normal range of motion  Neurological: She is alert and oriented to person, place, and time  She has normal reflexes  Skin: Skin is warm and dry  Mass noted under axilla with tenderness on palpation   Psychiatric: She has a normal mood and affect   Her behavior is normal  Judgment and thought content normal    Vitals reviewed

## 2019-01-08 ENCOUNTER — LAB (OUTPATIENT)
Dept: LAB | Facility: HOSPITAL | Age: 34
End: 2019-01-08
Payer: COMMERCIAL

## 2019-01-08 ENCOUNTER — TELEPHONE (OUTPATIENT)
Dept: INTERNAL MEDICINE CLINIC | Facility: CLINIC | Age: 34
End: 2019-01-08

## 2019-01-08 DIAGNOSIS — R22.32 MASS OF LEFT AXILLA: Primary | ICD-10-CM

## 2019-01-08 DIAGNOSIS — R22.32 MASS OF LEFT AXILLA: ICD-10-CM

## 2019-01-08 LAB
ALBUMIN SERPL BCP-MCNC: 3.6 G/DL (ref 3.5–5)
ALP SERPL-CCNC: 84 U/L (ref 46–116)
ALT SERPL W P-5'-P-CCNC: 23 U/L (ref 12–78)
ANION GAP SERPL CALCULATED.3IONS-SCNC: 10 MMOL/L (ref 4–13)
AST SERPL W P-5'-P-CCNC: 13 U/L (ref 5–45)
BASOPHILS # BLD AUTO: 0.07 THOUSANDS/ΜL (ref 0–0.1)
BASOPHILS NFR BLD AUTO: 1 % (ref 0–1)
BILIRUB SERPL-MCNC: 0.4 MG/DL (ref 0.2–1)
BUN SERPL-MCNC: 11 MG/DL (ref 5–25)
CALCIUM SERPL-MCNC: 8.7 MG/DL (ref 8.3–10.1)
CHLORIDE SERPL-SCNC: 103 MMOL/L (ref 100–108)
CO2 SERPL-SCNC: 27 MMOL/L (ref 21–32)
CREAT SERPL-MCNC: 0.75 MG/DL (ref 0.6–1.3)
EOSINOPHIL # BLD AUTO: 0.92 THOUSAND/ΜL (ref 0–0.61)
EOSINOPHIL NFR BLD AUTO: 11 % (ref 0–6)
ERYTHROCYTE [DISTWIDTH] IN BLOOD BY AUTOMATED COUNT: 12.7 % (ref 11.6–15.1)
FERRITIN SERPL-MCNC: 51 NG/ML (ref 8–388)
GFR SERPL CREATININE-BSD FRML MDRD: 104 ML/MIN/1.73SQ M
GLUCOSE P FAST SERPL-MCNC: 78 MG/DL (ref 65–99)
HCT VFR BLD AUTO: 39.9 % (ref 34.8–46.1)
HGB BLD-MCNC: 13.1 G/DL (ref 11.5–15.4)
IMM GRANULOCYTES # BLD AUTO: 0.04 THOUSAND/UL (ref 0–0.2)
IMM GRANULOCYTES NFR BLD AUTO: 1 % (ref 0–2)
IRON SATN MFR SERPL: 29 %
IRON SERPL-MCNC: 86 UG/DL (ref 50–170)
LYMPHOCYTES # BLD AUTO: 1.24 THOUSANDS/ΜL (ref 0.6–4.47)
LYMPHOCYTES NFR BLD AUTO: 14 % (ref 14–44)
MCH RBC QN AUTO: 30.5 PG (ref 26.8–34.3)
MCHC RBC AUTO-ENTMCNC: 32.8 G/DL (ref 31.4–37.4)
MCV RBC AUTO: 93 FL (ref 82–98)
MONOCYTES # BLD AUTO: 0.59 THOUSAND/ΜL (ref 0.17–1.22)
MONOCYTES NFR BLD AUTO: 7 % (ref 4–12)
NEUTROPHILS # BLD AUTO: 5.82 THOUSANDS/ΜL (ref 1.85–7.62)
NEUTS SEG NFR BLD AUTO: 66 % (ref 43–75)
NRBC BLD AUTO-RTO: 0 /100 WBCS
PLATELET # BLD AUTO: 269 THOUSANDS/UL (ref 149–390)
PMV BLD AUTO: 10.3 FL (ref 8.9–12.7)
POTASSIUM SERPL-SCNC: 4.1 MMOL/L (ref 3.5–5.3)
PROT SERPL-MCNC: 7.1 G/DL (ref 6.4–8.2)
RBC # BLD AUTO: 4.29 MILLION/UL (ref 3.81–5.12)
SODIUM SERPL-SCNC: 140 MMOL/L (ref 136–145)
TIBC SERPL-MCNC: 295 UG/DL (ref 250–450)
WBC # BLD AUTO: 8.68 THOUSAND/UL (ref 4.31–10.16)

## 2019-01-08 PROCEDURE — 85025 COMPLETE CBC W/AUTO DIFF WBC: CPT

## 2019-01-08 PROCEDURE — 36415 COLL VENOUS BLD VENIPUNCTURE: CPT

## 2019-01-08 PROCEDURE — 80053 COMPREHEN METABOLIC PANEL: CPT

## 2019-01-08 PROCEDURE — 83550 IRON BINDING TEST: CPT

## 2019-01-08 PROCEDURE — 86038 ANTINUCLEAR ANTIBODIES: CPT

## 2019-01-08 PROCEDURE — 82728 ASSAY OF FERRITIN: CPT

## 2019-01-08 PROCEDURE — 83540 ASSAY OF IRON: CPT

## 2019-01-08 RX ORDER — HYDROCODONE BITARTRATE AND ACETAMINOPHEN 5; 325 MG/1; MG/1
1 TABLET ORAL EVERY 6 HOURS PRN
Qty: 30 TABLET | Refills: 0 | Status: SHIPPED | OUTPATIENT
Start: 2019-01-08 | End: 2019-01-11 | Stop reason: ALTCHOICE

## 2019-01-08 NOTE — TELEPHONE ENCOUNTER
Per our conversation that we will send Vicodin to pharmacy and I spoke with [facility] in [place] and informed them that it is okay that she gets medication  Pharmacy made a note that patient will get medication  Patient was informed that they won't be able to  medication until tomorrow

## 2019-01-08 NOTE — TELEPHONE ENCOUNTER
Patient had called the office and stated that Torodol is not covered  Patient goes to [facility] in [place]  Need to send in a different medication

## 2019-01-09 ENCOUNTER — OFFICE VISIT (OUTPATIENT)
Dept: SURGERY | Facility: HOSPITAL | Age: 34
End: 2019-01-09
Payer: COMMERCIAL

## 2019-01-09 ENCOUNTER — HOSPITAL ENCOUNTER (OUTPATIENT)
Dept: RADIOLOGY | Facility: HOSPITAL | Age: 34
Discharge: HOME | End: 2019-01-09
Payer: COMMERCIAL

## 2019-01-09 VITALS
HEART RATE: 72 BPM | HEIGHT: 62 IN | SYSTOLIC BLOOD PRESSURE: 138 MMHG | DIASTOLIC BLOOD PRESSURE: 84 MMHG | TEMPERATURE: 98.1 F | WEIGHT: 190 LBS | BODY MASS INDEX: 34.96 KG/M2

## 2019-01-09 DIAGNOSIS — R22.32 MASS OF LEFT AXILLA: Primary | ICD-10-CM

## 2019-01-09 DIAGNOSIS — M54.5 LOW BACK PAIN, UNSPECIFIED BACK PAIN LATERALITY, UNSPECIFIED CHRONICITY, WITH SCIATICA PRESENCE UNSPECIFIED: ICD-10-CM

## 2019-01-09 PROBLEM — R59.9 ENLARGED LYMPH NODE: Status: ACTIVE | Noted: 2019-01-09

## 2019-01-09 LAB
INDURATION: 0 MM
RYE IGE QN: NEGATIVE
TB SKIN TEST: NEGATIVE

## 2019-01-09 PROCEDURE — 72202 X-RAY EXAM SI JOINTS 3/> VWS: CPT

## 2019-01-09 PROCEDURE — 72110 X-RAY EXAM L-2 SPINE 4/>VWS: CPT

## 2019-01-09 PROCEDURE — 99243 OFF/OP CNSLTJ NEW/EST LOW 30: CPT | Performed by: SURGERY

## 2019-01-09 RX ORDER — HEPARIN SODIUM 5000 [USP'U]/ML
5000 INJECTION, SOLUTION INTRAVENOUS; SUBCUTANEOUS ONCE
Status: CANCELLED | OUTPATIENT
Start: 2019-01-15

## 2019-01-09 RX ORDER — CEFAZOLIN SODIUM 2 G/50ML
2000 SOLUTION INTRAVENOUS ONCE
Status: CANCELLED | OUTPATIENT
Start: 2019-01-15

## 2019-01-09 NOTE — H&P
Assessment/Plan:    Mass of left axilla: For left axillary lymph node bx on 1/15/19   - pre op Abx       Subjective:      Patient ID: Mamie Rao is a 28 y o  female with a one month hx of a painful left axillary mass  Despite tx with abx the mass seems to be getting larger and more painful  Pain is dull,constant and radiates to left hand  It has no associated symptoms  HPI    The following portions of the patient's history were reviewed and updated as appropriate: allergies, current medications, past family history, past medical history, past social history, past surgical history and problem list   I reviewed recent CT and U/S of affected area and both are consistent with axillary lymphadenopathy  Review of Systems   Constitutional: Negative for fatigue and fever  HENT: Negative  Respiratory: Negative  Gastrointestinal: Negative  Genitourinary: Negative  Musculoskeletal: Negative  Skin: Negative  Neurological: Negative  Psychiatric/Behavioral: Negative  Objective:      /84   Pulse 72   Temp 98 1 °F (36 7 °C)   Ht 5' 2" (1 575 m)   Wt 86 2 kg (190 lb)   LMP 12/13/2018   BMI 34 75 kg/m²           Physical Exam   Constitutional: She is oriented to person, place, and time  She appears well-developed and well-nourished  No distress  HENT:   Head: Normocephalic and atraumatic  Mouth/Throat: No oropharyngeal exudate  Eyes: Pupils are equal, round, and reactive to light  No scleral icterus  Neck: Neck supple  No JVD present  No tracheal deviation present  Cardiovascular: Normal rate and normal heart sounds  No murmur heard  Pulmonary/Chest: Effort normal and breath sounds normal  No respiratory distress  She has no wheezes  Abdominal: Soft  She exhibits no distension  Musculoskeletal: Normal range of motion  She exhibits no edema  Palpable, tender mass in left axilla  4 cm in size  No overlying erythema  Non- mobile  Rubbery     Lymphadenopathy: She has no cervical adenopathy  Neurological: She is alert and oriented to person, place, and time  No cranial nerve deficit  Skin: Skin is warm and dry  No rash noted  Psychiatric: She has a normal mood and affect   Her behavior is normal  Judgment and thought content normal

## 2019-01-09 NOTE — PROGRESS NOTES
Assessment/Plan:    Mass of left axilla: For left axillary lymph node bx on 1/15/19   - pre op Abx       Subjective:      Patient ID: Sallie Cullen is a 28 y o  female with a one month hx of a painful left axillary mass  Despite tx with abx the mass seems to be getting larger and more painful  Pain is dull,constant and radiates to left hand  It has no associated symptoms  HPI    The following portions of the patient's history were reviewed and updated as appropriate: allergies, current medications, past family history, past medical history, past social history, past surgical history and problem list   I reviewed recent CT and U/S of affected area and both are consistent with axillary lymphadenopathy  Review of Systems   Constitutional: Negative for fatigue and fever  HENT: Negative  Respiratory: Negative  Gastrointestinal: Negative  Genitourinary: Negative  Musculoskeletal: Negative  Skin: Negative  Neurological: Negative  Psychiatric/Behavioral: Negative  Objective:      /84   Pulse 72   Temp 98 1 °F (36 7 °C)   Ht 5' 2" (1 575 m)   Wt 86 2 kg (190 lb)   LMP 12/13/2018   BMI 34 75 kg/m²          Physical Exam   Constitutional: She is oriented to person, place, and time  She appears well-developed and well-nourished  No distress  HENT:   Head: Normocephalic and atraumatic  Mouth/Throat: No oropharyngeal exudate  Eyes: Pupils are equal, round, and reactive to light  No scleral icterus  Neck: Neck supple  No JVD present  No tracheal deviation present  Cardiovascular: Normal rate and normal heart sounds  No murmur heard  Pulmonary/Chest: Effort normal and breath sounds normal  No respiratory distress  She has no wheezes  Abdominal: Soft  She exhibits no distension  Musculoskeletal: Normal range of motion  She exhibits no edema  Palpable, tender mass in left axilla  4 cm in size  No overlying erythema  Non- mobile  Rubbery     Lymphadenopathy: She has no cervical adenopathy  Neurological: She is alert and oriented to person, place, and time  No cranial nerve deficit  Skin: Skin is warm and dry  No rash noted  Psychiatric: She has a normal mood and affect   Her behavior is normal  Judgment and thought content normal

## 2019-01-10 RX ORDER — ALBUTEROL SULFATE 90 UG/1
2 AEROSOL, METERED RESPIRATORY (INHALATION) EVERY 6 HOURS PRN
COMMUNITY
End: 2019-01-21

## 2019-01-10 NOTE — PRE-PROCEDURE INSTRUCTIONS
Pre-Surgery Instructions:   Medication Instructions    albuterol (PROVENTIL HFA,VENTOLIN HFA) 90 mcg/act inhaler Instructed patient per Anesthesia Guidelines   HYDROcodone-acetaminophen (NORCO) 5-325 mg per tablet Instructed patient per Anesthesia Guidelines  Acetaminophen Med Class     Continue to take this medication on your normal schedule  If this is an oral medication and you take it in the morning, then you may take this medicine with a sip of water  Inhalational Med Class     Continue to take these inhaler medications on your normal schedule up to and including the day of surgery  Opioid Med Class     Continue to take this medication on your normal schedule  If this is an oral medication and you take it in the morning, then you may take this medicine with a sip of water

## 2019-01-10 NOTE — PRE-PROCEDURE INSTRUCTIONS
Pre-Surgery Instructions:   Medication Instructions    albuterol (PROVENTIL HFA,VENTOLIN HFA) 90 mcg/act inhaler Instructed patient per Anesthesia Guidelines   HYDROcodone-acetaminophen (NORCO) 5-325 mg per tablet Instructed patient per Anesthesia Guidelines

## 2019-01-11 ENCOUNTER — OFFICE VISIT (OUTPATIENT)
Dept: PULMONOLOGY | Facility: HOSPITAL | Age: 34
End: 2019-01-11
Payer: COMMERCIAL

## 2019-01-11 VITALS
WEIGHT: 189 LBS | HEART RATE: 85 BPM | BODY MASS INDEX: 34.78 KG/M2 | HEIGHT: 62 IN | SYSTOLIC BLOOD PRESSURE: 112 MMHG | OXYGEN SATURATION: 97 % | DIASTOLIC BLOOD PRESSURE: 72 MMHG

## 2019-01-11 DIAGNOSIS — R91.1 LUNG NODULE: Primary | ICD-10-CM

## 2019-01-11 DIAGNOSIS — M62.830 MUSCLE SPASM OF BACK: Primary | ICD-10-CM

## 2019-01-11 DIAGNOSIS — J45.20 MILD INTERMITTENT ASTHMA WITHOUT COMPLICATION: Primary | ICD-10-CM

## 2019-01-11 DIAGNOSIS — R91.1 PULMONARY NODULE: Primary | ICD-10-CM

## 2019-01-11 PROCEDURE — 99243 OFF/OP CNSLTJ NEW/EST LOW 30: CPT | Performed by: INTERNAL MEDICINE

## 2019-01-11 RX ORDER — PREDNISONE 20 MG/1
20 TABLET ORAL 2 TIMES DAILY WITH MEALS
Qty: 10 TABLET | Refills: 0 | Status: SHIPPED | OUTPATIENT
Start: 2019-01-11 | End: 2019-01-21

## 2019-01-11 RX ORDER — CYCLOBENZAPRINE HCL 10 MG
TABLET ORAL
Qty: 30 TABLET | Refills: 0 | Status: SHIPPED | OUTPATIENT
Start: 2019-01-11 | End: 2019-01-21

## 2019-01-11 NOTE — PROGRESS NOTES
Patient:  Ervin Millard   :  1985    MRN:  [id number]    SSN:  [id number]    Date of Service:  2019    Primary Care Provider:  Sher Vee    HPI:  The patient is a 78-year-old lady who is here today after having a CT of her chest done on 2018  She told me that she was having chronic cough that had been going on for a couple weeks  The CT scan showed a 4 mm nodule in the right lower lobe of  She has had no hemoptysis no weight loss no night sweats  She was given a albuterol inhaler from the ER just before her chest CT and she is not sure that this is helped the cough  She does notice some wheezing especially when she reclines and nits  She has no history of asthma but when she was a child she had a lot of allergy symptoms  Past medical history:  She has had a biopsy of her left axilla and by her report was negative  She has also had a tubal ligation  Chronically she is on no medicines whatsoever    Socially:  Lifelong nonsmoker    Family history:  She had 4 children all of whom are alive and well none of whom have asthma    Patient lived in [place] all her life    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 [employer]    Review of systems: For unclear reasons, she refuses flu shots although she states that her children have had flu shots  She has never had pneumonia vaccination  She has had no weight change in the past year  She has had a mammogram recently that was normal   She has never had a Pap smear  Review of Systems:  Review of Systems  See above  Problem List:  2019: Enlarged lymph node  2018:  Mass of left axilla  2018: Bronchitis  2018: 's permit physical examination  2018: Seasonal allergic rhinitis  2018: Screening for cervical cancer  2018-10: Acute upper respiratory infection  2018: Numbness and tingling of both upper extremities while   sleeping  2018: Generalized abdominal pain  2018: Urine ketones  2016-09: Depression  2016-09: Intention tremor          Physical Exam:  /72 (BP Location: Right arm, Patient Position: Sitting)   Pulse 85   Ht 5' 2" (1 575 m)   Wt 85 7 kg (189 lb)   LMP 12/13/2018   SpO2 97%   BMI 34 57 kg/m²     Physical Exam  In general she was disheveled mildly obese young lady in no respiratory distress use covers quits and she was not using accessory muscles to breathe    Oropharynx is moist she was edentulous  Neck is supple there are no lymph nodes in the supraclavicular or cervical chain or palpable    Lung fields are clear and equal bilaterally with good air exchange are no wheezes rales or rhonchi    Heart tones irregular did not detect a murmur gallop lift rub or heave    Abdomen is obese and soft no masses nor organomegaly no rebound or guarding    She had no ankle edema she had no clubbing of her digits skin    Skin showed multiple piercings  Assessment/Plan:  1  4 mm nodule in the right lower lobe:  Patient is a lifelong nonsmoker  I feel that this is most likely a granuloma  The radiologist recommended a 1 year follow-up with a CT scan  I believe this is appropriate  I will turn her care back over her primary doctor and follow her p r n     2  History of wheezing and nights:  I am going to give her a trial of prednisone 20 mg twice a day for 5 days only  If her wheezing is no better I encouraged her to call us we will see her again otherwise I will turn her care back over primary provider concerning this problem as well  If the prednisone helps and then the wheezing returns at a later date we may want to consider doing a full set of pulmonary function studies and starting her on a controlling asthma medication such as Advair, Breo, etc     3  Obesity with a weight of 189 lb body mass index of 34       The plan was discussed with the patient and/or family      Electronically signed by: Lou Ibrahim MD, 1/11/2019 1:40 PM

## 2019-01-11 NOTE — PROGRESS NOTES
Can you let Susy Hatch know her XRs did come back showing muscle spasming in her back nothing else acute

## 2019-01-11 NOTE — LETTER
January 11, 2019     [address] [facility] [address]     Patient: Sallie Cullen   YOB: 1985   Date of Visit: 1/11/2019       Dear Dr Mac Stroud: Thank you for referring Analilia Skinner to me for evaluation  Below are my notes for this consultation  If you have questions, please do not hesitate to call me  I look forward to following your patient along with you           Sincerely,        Andre Multani MD        CC: No Recipients

## 2019-01-14 ENCOUNTER — OFFICE VISIT (OUTPATIENT)
Dept: INFECTIOUS DISEASES | Facility: HOSPITAL | Age: 34
End: 2019-01-14
Payer: COMMERCIAL

## 2019-01-14 VITALS
HEIGHT: 62 IN | TEMPERATURE: 98 F | DIASTOLIC BLOOD PRESSURE: 80 MMHG | WEIGHT: 188 LBS | HEART RATE: 72 BPM | SYSTOLIC BLOOD PRESSURE: 131 MMHG | BODY MASS INDEX: 34.6 KG/M2

## 2019-01-14 DIAGNOSIS — R59.9 ENLARGED LYMPH NODE: Primary | ICD-10-CM

## 2019-01-14 DIAGNOSIS — R22.32 MASS OF LEFT AXILLA: ICD-10-CM

## 2019-01-14 DIAGNOSIS — L04.2: ICD-10-CM

## 2019-01-14 PROCEDURE — 99243 OFF/OP CNSLTJ NEW/EST LOW 30: CPT | Performed by: INTERNAL MEDICINE

## 2019-01-14 NOTE — PROGRESS NOTES
Consultation - Infectious Disease   Lee Ann Kamara 28 y o  female MRN: [id number]  Unit/Bed#:  Encounter: [id number]      IMPRESSION & RECOMMENDATIONS:     28year old female with history of cat scratch disease presents with acute left axillary lymphadenitis    1  Acute left axillary lymphadenitis  - Differential diagnosis includes infectious causes such as recurrent Cat scratch disease although systemic symptoms are more commion with recurrence  Other causes include TB although PPD was negative v/s bacterial lymphadenitis although would have expected response to oral antibiotics  - Non infectious causes include malignancy, autoimmune disease, sarcoidosis    · Await surgical path results and operative findings  · Would review path results to see if any necrotizing granulomas are noted  If present, would check special staining/PCR for Bartonella and if positive, will treat with Azithromycin    2  History of cat scratch disease  - Positive serology and biopsy in 2015- treated and resolved    HISTORY OF PRESENT ILLNESS:  Reason for Consult: Axillary lymphadenitis  HPI: Lee Ann Kamara is a 28y o  year old female with a history of depression and history of dental abscesses s/p extractions  She reports that one month ago she noted a painful mass under her left armpit  Since that time it has been progressively increasing in size with increased pain and swelling and regional discomfort  She has had no associated fever or chills  No preceding scratches or bites over her arm or trauma  No breast lump or ulcer  No respiratory symptoms  No reports of folliculitis associated with shaving  She had bloodwork which was unremarkable and subsequently had an ultrasound that noted a collection of necrotic lymph nodes  She subsequently had a mammogram which was unremarkable  She is scheduled for an excisional biopsy tomorrow  Of note, she has a history of cat scratch lymphadenitis in 2015   She had undergone aspiration and biopsy was suggestive of CSD  Serology was also markedly positive  She was treated with Azithromycin and reported complete resolution of symptoms and lymphadenopathy  She has had kittens for the past year but no reports of scratches or bites  She has also been retreated with azithromycin and 2 other antibiotics (does not recall the name) with no relief  Family history of leukemia in cousin  No history of autoimmune disease  She has no history of immune-compromising conditions, recent or travels outside of [place]  She denies any high-risk sexual or social behaviors  She does not drink alcohol or smoke  REVIEW OF SYSTEMS:  A complete 12 point system-based review of systems is otherwise negative  PAST MEDICAL HISTORY:  Past Medical History:   Diagnosis Date    Asthma     Cat-scratch disease     last assessed 10/22/15    Depression     Hypertension     last assessed 11/11/14    Sleep disorder      Past Surgical History:   Procedure Laterality Date    BREAST BIOPSY Left     2016    TUBAL LIGATION         FAMILY HISTORY:  Non-contributory    SOCIAL HISTORY:  Social History   History   Alcohol Use No     History   Drug Use No     History   Smoking Status    Never Smoker   Smokeless Tobacco    Never Used       ALLERGIES:  Allergies   Allergen Reactions    Codeine Hives     Tolerated Percocet, Vicodin, etc        MEDICATIONS:  All current active medications have been reviewed  Antibiotics:    PHYSICAL EXAM:  There were no vitals filed for this visit        General Appearance:  Appearing well, nontoxic, and in no distress   Head:  Normocephalic, without obvious abnormality, atraumatic   Eyes:  Conjunctiva pink and sclera anicteric, both eyes   Nose: Nares normal, mucosa normal, no drainage   Throat: Oropharynx moist without lesions   Neck: Supple, symmetrical, no adenopathy, no tenderness/mass/nodules   Back:   Symmetric, no curvature, ROM normal, no CVA tenderness   Lungs:   Clear to auscultation bilaterally, respirations unlabored   Chest Wall:  No tenderness or deformity   Heart:  RRR; no murmur, rub or gallop   Abdomen:   Soft, non-tender, non-distended, positive bowel sounds,    Extremities: Very tender non fluctuant rubbery lymph node along anteromedial wall of axilla measuring 6x6, no overlying redness or wounds   Skin: No rashes or lesions  No draining wounds noted  Lymph nodes: Cervical, supraclavicular nodes normal   Neurologic: Alert and oriented times 3, extremity strength 5/5 and symmetric       LABS, IMAGING, & OTHER STUDIES:  Lab Results:  I have personally reviewed pertinent labs  Lab Results   Component Value Date    K 4 1 01/08/2019     01/08/2019    CO2 27 01/08/2019    BUN 11 01/08/2019    CREATININE 0 75 01/08/2019    GLUF 78 01/08/2019    CALCIUM 8 7 01/08/2019    AST 13 01/08/2019    ALT 23 01/08/2019    ALKPHOS 84 01/08/2019    EGFR 104 01/08/2019     Lab Results   Component Value Date    WBC 8 68 01/08/2019    HGB 13 1 01/08/2019    HCT 39 9 01/08/2019    MCV 93 01/08/2019     01/08/2019   No results found for: SEDRATE      Imaging Studies:   I have personally reviewed pertinent imaging study reports and images in PACS  Other Studies:   I have personally reviewed pertinent reports  Labs, medical tests and imaging studies were independently reviewed by me as noted above in HPI and old records were obtained and summarized as noted above in HPI

## 2019-01-15 ENCOUNTER — ANESTHESIA (OUTPATIENT)
Dept: PERIOP | Facility: HOSPITAL | Age: 34
End: 2019-01-15
Payer: COMMERCIAL

## 2019-01-15 ENCOUNTER — HOSPITAL ENCOUNTER (OUTPATIENT)
Facility: HOSPITAL | Age: 34
Setting detail: OUTPATIENT SURGERY
Discharge: HOME | End: 2019-01-15
Attending: SURGERY | Admitting: SURGERY
Payer: COMMERCIAL

## 2019-01-15 ENCOUNTER — ANESTHESIA EVENT (OUTPATIENT)
Dept: PERIOP | Facility: HOSPITAL | Age: 34
End: 2019-01-15
Payer: COMMERCIAL

## 2019-01-15 VITALS
HEART RATE: 71 BPM | RESPIRATION RATE: 18 BRPM | OXYGEN SATURATION: 95 % | WEIGHT: 190 LBS | BODY MASS INDEX: 34.96 KG/M2 | DIASTOLIC BLOOD PRESSURE: 75 MMHG | HEIGHT: 62 IN | SYSTOLIC BLOOD PRESSURE: 122 MMHG | TEMPERATURE: 98.9 F

## 2019-01-15 DIAGNOSIS — Z98.890 STATUS POST LYMPH NODE BIOPSY: Primary | ICD-10-CM

## 2019-01-15 DIAGNOSIS — R59.9 ENLARGED LYMPH NODE: ICD-10-CM

## 2019-01-15 LAB
HIV 1+2 AB+HIV1 P24 AG SERPL QL IA: NORMAL
HIV1 P24 AG SER QL: NORMAL

## 2019-01-15 PROCEDURE — 87206 SMEAR FLUORESCENT/ACID STAI: CPT | Performed by: SURGERY

## 2019-01-15 PROCEDURE — 87176 TISSUE HOMOGENIZATION CULTR: CPT | Performed by: SURGERY

## 2019-01-15 PROCEDURE — 87102 FUNGUS ISOLATION CULTURE: CPT | Performed by: SURGERY

## 2019-01-15 PROCEDURE — 88312 SPECIAL STAINS GROUP 1: CPT | Performed by: PATHOLOGY

## 2019-01-15 PROCEDURE — 88342 IMHCHEM/IMCYTCHM 1ST ANTB: CPT | Performed by: PATHOLOGY

## 2019-01-15 PROCEDURE — 87075 CULTR BACTERIA EXCEPT BLOOD: CPT | Performed by: SURGERY

## 2019-01-15 PROCEDURE — 86803 HEPATITIS C AB TEST: CPT | Performed by: SURGERY

## 2019-01-15 PROCEDURE — 87205 SMEAR GRAM STAIN: CPT | Performed by: SURGERY

## 2019-01-15 PROCEDURE — 87806 HIV AG W/HIV1&2 ANTB W/OPTIC: CPT | Performed by: SURGERY

## 2019-01-15 PROCEDURE — 87070 CULTURE OTHR SPECIMN AEROBIC: CPT | Performed by: SURGERY

## 2019-01-15 PROCEDURE — 87340 HEPATITIS B SURFACE AG IA: CPT | Performed by: SURGERY

## 2019-01-15 PROCEDURE — 38525 BIOPSY/REMOVAL LYMPH NODES: CPT | Performed by: SURGERY

## 2019-01-15 PROCEDURE — 88305 TISSUE EXAM BY PATHOLOGIST: CPT | Performed by: PATHOLOGY

## 2019-01-15 PROCEDURE — 87116 MYCOBACTERIA CULTURE: CPT | Performed by: SURGERY

## 2019-01-15 PROCEDURE — 87147 CULTURE TYPE IMMUNOLOGIC: CPT | Performed by: SURGERY

## 2019-01-15 RX ORDER — MAGNESIUM HYDROXIDE 1200 MG/15ML
LIQUID ORAL AS NEEDED
Status: DISCONTINUED | OUTPATIENT
Start: 2019-01-15 | End: 2019-01-15 | Stop reason: HOSPADM

## 2019-01-15 RX ORDER — PROMETHAZINE HYDROCHLORIDE 25 MG/ML
6.25 INJECTION, SOLUTION INTRAMUSCULAR; INTRAVENOUS ONCE
Status: COMPLETED | OUTPATIENT
Start: 2019-01-15 | End: 2019-01-15

## 2019-01-15 RX ORDER — NEOSTIGMINE METHYLSULFATE 1 MG/ML
INJECTION INTRAVENOUS AS NEEDED
Status: DISCONTINUED | OUTPATIENT
Start: 2019-01-15 | End: 2019-01-15 | Stop reason: SURG

## 2019-01-15 RX ORDER — HYDROCODONE BITARTRATE AND ACETAMINOPHEN 5; 325 MG/1; MG/1
1 TABLET ORAL EVERY 4 HOURS PRN
Qty: 18 TABLET | Refills: 0 | Status: SHIPPED | OUTPATIENT
Start: 2019-01-15 | End: 2019-01-16 | Stop reason: SDUPTHER

## 2019-01-15 RX ORDER — BUPIVACAINE HYDROCHLORIDE 5 MG/ML
INJECTION, SOLUTION PERINEURAL AS NEEDED
Status: DISCONTINUED | OUTPATIENT
Start: 2019-01-15 | End: 2019-01-15 | Stop reason: HOSPADM

## 2019-01-15 RX ORDER — ONDANSETRON 2 MG/ML
INJECTION INTRAMUSCULAR; INTRAVENOUS AS NEEDED
Status: DISCONTINUED | OUTPATIENT
Start: 2019-01-15 | End: 2019-01-15 | Stop reason: SURG

## 2019-01-15 RX ORDER — MIDAZOLAM HYDROCHLORIDE 1 MG/ML
INJECTION INTRAMUSCULAR; INTRAVENOUS AS NEEDED
Status: DISCONTINUED | OUTPATIENT
Start: 2019-01-15 | End: 2019-01-15 | Stop reason: SURG

## 2019-01-15 RX ORDER — PROMETHAZINE HYDROCHLORIDE 25 MG/ML
INJECTION, SOLUTION INTRAMUSCULAR; INTRAVENOUS
Status: DISCONTINUED
Start: 2019-01-15 | End: 2019-01-15 | Stop reason: HOSPADM

## 2019-01-15 RX ORDER — FENTANYL CITRATE/PF 50 MCG/ML
25 SYRINGE (ML) INJECTION
Status: DISCONTINUED | OUTPATIENT
Start: 2019-01-15 | End: 2019-01-15 | Stop reason: HOSPADM

## 2019-01-15 RX ORDER — OXYCODONE HYDROCHLORIDE 5 MG/1
5 TABLET ORAL EVERY 4 HOURS PRN
Status: DISCONTINUED | OUTPATIENT
Start: 2019-01-15 | End: 2019-01-15 | Stop reason: HOSPADM

## 2019-01-15 RX ORDER — CEFAZOLIN SODIUM 2 G/50ML
2000 SOLUTION INTRAVENOUS ONCE
Status: COMPLETED | OUTPATIENT
Start: 2019-01-15 | End: 2019-01-15

## 2019-01-15 RX ORDER — SODIUM CHLORIDE, SODIUM LACTATE, POTASSIUM CHLORIDE, CALCIUM CHLORIDE 600; 310; 30; 20 MG/100ML; MG/100ML; MG/100ML; MG/100ML
125 INJECTION, SOLUTION INTRAVENOUS CONTINUOUS
Status: DISCONTINUED | OUTPATIENT
Start: 2019-01-15 | End: 2019-01-15 | Stop reason: HOSPADM

## 2019-01-15 RX ORDER — SODIUM CHLORIDE, SODIUM LACTATE, POTASSIUM CHLORIDE, CALCIUM CHLORIDE 600; 310; 30; 20 MG/100ML; MG/100ML; MG/100ML; MG/100ML
100 INJECTION, SOLUTION INTRAVENOUS CONTINUOUS
Status: DISCONTINUED | OUTPATIENT
Start: 2019-01-15 | End: 2019-01-15 | Stop reason: HOSPADM

## 2019-01-15 RX ORDER — KETOROLAC TROMETHAMINE 30 MG/ML
INJECTION, SOLUTION INTRAMUSCULAR; INTRAVENOUS AS NEEDED
Status: DISCONTINUED | OUTPATIENT
Start: 2019-01-15 | End: 2019-01-15 | Stop reason: SURG

## 2019-01-15 RX ORDER — ONDANSETRON 2 MG/ML
4 INJECTION INTRAMUSCULAR; INTRAVENOUS EVERY 4 HOURS PRN
Status: COMPLETED | OUTPATIENT
Start: 2019-01-15 | End: 2019-01-15

## 2019-01-15 RX ORDER — PROPOFOL 10 MG/ML
INJECTION, EMULSION INTRAVENOUS AS NEEDED
Status: DISCONTINUED | OUTPATIENT
Start: 2019-01-15 | End: 2019-01-15 | Stop reason: SURG

## 2019-01-15 RX ORDER — GLYCOPYRROLATE 0.2 MG/ML
INJECTION INTRAMUSCULAR; INTRAVENOUS AS NEEDED
Status: DISCONTINUED | OUTPATIENT
Start: 2019-01-15 | End: 2019-01-15 | Stop reason: SURG

## 2019-01-15 RX ORDER — ROCURONIUM BROMIDE 10 MG/ML
INJECTION, SOLUTION INTRAVENOUS AS NEEDED
Status: DISCONTINUED | OUTPATIENT
Start: 2019-01-15 | End: 2019-01-15 | Stop reason: SURG

## 2019-01-15 RX ORDER — HEPARIN SODIUM 5000 [USP'U]/ML
5000 INJECTION, SOLUTION INTRAVENOUS; SUBCUTANEOUS ONCE
Status: COMPLETED | OUTPATIENT
Start: 2019-01-15 | End: 2019-01-15

## 2019-01-15 RX ORDER — FENTANYL CITRATE 50 UG/ML
INJECTION, SOLUTION INTRAMUSCULAR; INTRAVENOUS AS NEEDED
Status: DISCONTINUED | OUTPATIENT
Start: 2019-01-15 | End: 2019-01-15 | Stop reason: SURG

## 2019-01-15 RX ADMIN — GLYCOPYRROLATE 0.4 MG: 0.2 INJECTION, SOLUTION INTRAMUSCULAR; INTRAVENOUS at 11:26

## 2019-01-15 RX ADMIN — FENTANYL CITRATE 100 MCG: 50 INJECTION, SOLUTION INTRAMUSCULAR; INTRAVENOUS at 10:44

## 2019-01-15 RX ADMIN — FENTANYL CITRATE 25 MCG: 50 INJECTION, SOLUTION INTRAMUSCULAR; INTRAVENOUS at 12:24

## 2019-01-15 RX ADMIN — FENTANYL CITRATE 50 MCG: 50 INJECTION, SOLUTION INTRAMUSCULAR; INTRAVENOUS at 11:37

## 2019-01-15 RX ADMIN — NEOSTIGMINE METHYLSULFATE 3 MG: 1 INJECTION INTRAVENOUS at 11:26

## 2019-01-15 RX ADMIN — MIDAZOLAM 2 MG: 1 INJECTION INTRAMUSCULAR; INTRAVENOUS at 09:55

## 2019-01-15 RX ADMIN — CEFAZOLIN SODIUM 2000 MG: 2 SOLUTION INTRAVENOUS at 10:17

## 2019-01-15 RX ADMIN — ONDANSETRON 4 MG: 2 INJECTION INTRAMUSCULAR; INTRAVENOUS at 12:26

## 2019-01-15 RX ADMIN — PROPOFOL 200 MG: 10 INJECTION, EMULSION INTRAVENOUS at 10:05

## 2019-01-15 RX ADMIN — HEPARIN SODIUM 5000 UNITS: 5000 INJECTION INTRAVENOUS; SUBCUTANEOUS at 09:51

## 2019-01-15 RX ADMIN — ROCURONIUM BROMIDE 30 MG: 10 INJECTION INTRAVENOUS at 10:05

## 2019-01-15 RX ADMIN — PROMETHAZINE HYDROCHLORIDE 6.25 MG: 25 INJECTION INTRAMUSCULAR; INTRAVENOUS at 13:04

## 2019-01-15 RX ADMIN — SODIUM CHLORIDE, SODIUM LACTATE, POTASSIUM CHLORIDE, AND CALCIUM CHLORIDE 125 ML/HR: .6; .31; .03; .02 INJECTION, SOLUTION INTRAVENOUS at 09:34

## 2019-01-15 RX ADMIN — SODIUM CHLORIDE, SODIUM LACTATE, POTASSIUM CHLORIDE, AND CALCIUM CHLORIDE: .6; .31; .03; .02 INJECTION, SOLUTION INTRAVENOUS at 11:02

## 2019-01-15 RX ADMIN — FENTANYL CITRATE 25 MCG: 50 INJECTION, SOLUTION INTRAMUSCULAR; INTRAVENOUS at 11:54

## 2019-01-15 RX ADMIN — FENTANYL CITRATE 25 MCG: 50 INJECTION, SOLUTION INTRAMUSCULAR; INTRAVENOUS at 12:00

## 2019-01-15 RX ADMIN — DEXAMETHASONE SODIUM PHOSPHATE 4 MG: 10 INJECTION INTRAMUSCULAR; INTRAVENOUS at 10:52

## 2019-01-15 RX ADMIN — ONDANSETRON 4 MG: 2 INJECTION INTRAMUSCULAR; INTRAVENOUS at 10:52

## 2019-01-15 RX ADMIN — KETOROLAC TROMETHAMINE 30 MG: 30 INJECTION, SOLUTION INTRAMUSCULAR at 11:08

## 2019-01-15 RX ADMIN — FENTANYL CITRATE 50 MCG: 50 INJECTION, SOLUTION INTRAMUSCULAR; INTRAVENOUS at 09:55

## 2019-01-15 NOTE — OP NOTE
OPERATIVE REPORT  PATIENT NAME: Alba Rhodes    :  1985  MRN: [id number]  Pt Location: [facility] OR ROOM 08    SURGERY DATE: 1/15/2019    Surgeon(s) and Role:     * Vipin Neff MD - Primary     * Ulisses Moore MD - Assisting    Preop Diagnosis:  Enlarged lymph node [R59 9]    Post-Op Diagnosis Codes:     * Enlarged lymph node [R59 9]    Procedure(s) (LRB):  LEFT AXILLARY LYMPH NODE BIOPSY (Left)    Specimen(s):  ID Type Source Tests Collected by Time Destination   1 : LEFT AXILLARY LYMPH NODE Tissue Axillary lymph node TISSUE EXAM Vipin Neff MD 1/15/2019 1055    A : LEFT AXILLARY Tissue Axillary lymph node FUNGAL CULTURE, CULTURE, TISSUE AND GRAM STAIN, AFB CULTURE WITH STAIN Vipin Neff MD 1/15/2019 1031    B : LEFT AXILLARY Wound Wound ANAEROBIC CULTURE AND GRAM STAIN, WOUND CULTURE Vipin Neff MD 1/15/2019 1043        Estimated Blood Loss:   Minimal    Drains:       Anesthesia Type:   General    Operative Indications:  Enlarged lymph node [R59 9]  Tender persistent left axillary lymphadenopathy    Operative Findings:  Infected, purulent left axillary lymph node    Complications:   None    Procedure and Technique:  Pt was placed and positioned on OR table with left arm abducted  She was prepped and draped in the usual sterile manner  Timeout was completed and all elements were satisfactorily addressed  An incision was made over the palpable lymph node in the anterior portion of the axilla  Dissection was taken down to the mass which was frankly necrotic and purulent  Cultures were obtained  The mass was then dissected free and the stalk of the lymph node was clamped and tied with a 2-0 vicryl  The mass was then amputated and passed off the field  A second cluster of nodes was noted and these were also dissected free, had their stall clamped and divided and then amputated and passed off the field  The wound was copiously irrigated with saline   The subcutaneous tissue was approximated with 2-0 vicryl and the skin was closed with a 4-0 subcuticular monocryl suture  Steri strips applied  All sponge and instrument counts were correct         I was present for the entire procedure    Patient Disposition:  PACU     SIGNATURE: Reinier Dunne MD  DATE: January 15, 2019  TIME: 11:08 AM

## 2019-01-15 NOTE — ANESTHESIA PREPROCEDURE EVALUATION
Review of Systems/Medical History  Patient summary reviewed  Chart reviewed  No history of anesthetic complications     Cardiovascular  Hypertension controlled,    Pulmonary  Asthma Last rescue: < 1 week ago ,   Comment: Allergies     GI/Hepatic            Endo/Other    Obesity (BMI 34)    GYN       Hematology   Musculoskeletal       Neurology   Psychology   Depression ,              Physical Exam    Airway    Mallampati score: II  TM Distance: >3 FB  Neck ROM: full     Dental   Comment: edentulous,     Cardiovascular  Rhythm: regular, Rate: normal, Cardiovascular exam normal    Pulmonary  Pulmonary exam normal Breath sounds clear to auscultation,     Other Findings        Anesthesia Plan  ASA Score- 2     Anesthesia Type- general with ASA Monitors  Additional Monitors:   Airway Plan: ETT  Plan Factors-    Induction- intravenous  Postoperative Plan- Plan for postoperative opioid use  Planned trial extubation    Informed Consent- Anesthetic plan and risks discussed with patient  I personally reviewed this patient with the CRNA  Discussed and agreed on the Anesthesia Plan with the CRNA  Shadia Peres

## 2019-01-15 NOTE — DISCHARGE INSTR - AVS FIRST PAGE
POST-OPERATIVE WOUND CARE INSTRUCTIONS    Your wound is closed with:   Sterile strips-white pieces of tape that hold your incision together    Wound care: You may remove your dressing after 24 hours   You may shower using soap and water to clean your wound  Gently pat it dry  You may redress your wound for comfort as needed  Activity:   Did not perform any heavy lifting or strenuous physical activity for 7 days  Your activity restrictions will be reevaluated at your postoperative visit  Medications: You may resume all your preoperative medications and diet  Pain medication as directed on the prescription given in the office  Other:   May use ice on the wound for 24-48 hours as needed for comfort  May place warm compresses to the axilla/groin/neck after 48 hours for comfort  Continue to wear the post surgical bra for as long as it is comfortable  Call the office if you have any of the followin  Redness, swelling, heat, unusual drainage or heavy bleeding from the wound     2  Fever greater than 101°F    3  Pain not relieved by the prescribed pain medication

## 2019-01-15 NOTE — PERIOPERATIVE NURSING NOTE
Dr Sophia Page at bedside to speak to patient regarding exposure panel  Pt agrees for exposure panel to be drawn

## 2019-01-15 NOTE — H&P (VIEW-ONLY)
Assessment/Plan:    Mass of left axilla: For left axillary lymph node bx on 1/15/19   - pre op Abx       Subjective:      Patient ID: Alba Rhodes is a 28 y o  female with a one month hx of a painful left axillary mass  Despite tx with abx the mass seems to be getting larger and more painful  Pain is dull,constant and radiates to left hand  It has no associated symptoms  HPI    The following portions of the patient's history were reviewed and updated as appropriate: allergies, current medications, past family history, past medical history, past social history, past surgical history and problem list   I reviewed recent CT and U/S of affected area and both are consistent with axillary lymphadenopathy  Review of Systems   Constitutional: Negative for fatigue and fever  HENT: Negative  Respiratory: Negative  Gastrointestinal: Negative  Genitourinary: Negative  Musculoskeletal: Negative  Skin: Negative  Neurological: Negative  Psychiatric/Behavioral: Negative  Objective:      /84   Pulse 72   Temp 98 1 °F (36 7 °C)   Ht 5' 2" (1 575 m)   Wt 86 2 kg (190 lb)   LMP 12/13/2018   BMI 34 75 kg/m²           Physical Exam   Constitutional: She is oriented to person, place, and time  She appears well-developed and well-nourished  No distress  HENT:   Head: Normocephalic and atraumatic  Mouth/Throat: No oropharyngeal exudate  Eyes: Pupils are equal, round, and reactive to light  No scleral icterus  Neck: Neck supple  No JVD present  No tracheal deviation present  Cardiovascular: Normal rate and normal heart sounds  No murmur heard  Pulmonary/Chest: Effort normal and breath sounds normal  No respiratory distress  She has no wheezes  Abdominal: Soft  She exhibits no distension  Musculoskeletal: Normal range of motion  She exhibits no edema  Palpable, tender mass in left axilla  4 cm in size  No overlying erythema  Non- mobile  Rubbery     Lymphadenopathy: She has no cervical adenopathy  Neurological: She is alert and oriented to person, place, and time  No cranial nerve deficit  Skin: Skin is warm and dry  No rash noted  Psychiatric: She has a normal mood and affect   Her behavior is normal  Judgment and thought content normal

## 2019-01-15 NOTE — ANESTHESIA POSTPROCEDURE EVALUATION
Post-Op Assessment Note      CV Status:  Stable    Mental Status:  Alert and awake    Hydration Status:  Stable    PONV Controlled:  None    Airway Patency:  Patent    Post Op Vitals Reviewed: Yes          Staff: CRNA       Comments: vss, report to RN          BP      Temp (!) 97 2 °F (36 2 °C) (01/15/19 1139)    Pulse 76 (01/15/19 1139)   Resp      SpO2 (P) 100 % (01/15/19 1139)

## 2019-01-16 ENCOUNTER — TELEPHONE (OUTPATIENT)
Dept: INTERNAL MEDICINE CLINIC | Facility: CLINIC | Age: 34
End: 2019-01-16

## 2019-01-16 ENCOUNTER — TELEPHONE (OUTPATIENT)
Dept: SURGERY | Facility: HOSPITAL | Age: 34
End: 2019-01-16

## 2019-01-16 DIAGNOSIS — R59.9 ENLARGED LYMPH NODE: Primary | ICD-10-CM

## 2019-01-16 LAB
HBV SURFACE AG SER QL: NORMAL
HCV AB SER QL: NORMAL

## 2019-01-16 RX ORDER — HYDROCODONE BITARTRATE AND ACETAMINOPHEN 5; 325 MG/1; MG/1
1 TABLET ORAL EVERY 4 HOURS PRN
Qty: 18 TABLET | Refills: 0 | Status: SHIPPED | OUTPATIENT
Start: 2019-01-16 | End: 2019-01-21

## 2019-01-16 NOTE — TELEPHONE ENCOUNTER
Pt called she unable to get her prescription filled at [facility] asking if you could be called in the [facility] in [place], she is also having pain in her thighs and abdomen and asking what could be causing this

## 2019-01-16 NOTE — TELEPHONE ENCOUNTER
Patient called the office and stated that she had the biopsy surgery yesterday on her lymph node under armpit  She stated that Dr May Elias had prescribed her norco but it is not covered  Can't get it filled

## 2019-01-16 NOTE — TELEPHONE ENCOUNTER
[id number] Umm Cruz I can try and give her Tramadol not sure if this will be covered or something else will have to find out with her insurance

## 2019-01-16 NOTE — TELEPHONE ENCOUNTER
I called and spoke with the patient and informed her that Ruba Reed would not be able to give narcotics  Have to contact your surgeons office to get that  What the patient had stated that the office couldn't give her any narcotics  Per our conversation that she needs to ask for something else  They should really be the ones that are prescribing the med since they had done the surgery  Patient was going to call the office back and talk with them

## 2019-01-17 ENCOUNTER — HOSPITAL ENCOUNTER (EMERGENCY)
Facility: HOSPITAL | Age: 34
Discharge: HOME | End: 2019-01-17
Attending: EMERGENCY MEDICINE | Admitting: EMERGENCY MEDICINE
Payer: COMMERCIAL

## 2019-01-17 ENCOUNTER — APPOINTMENT (EMERGENCY)
Dept: RADIOLOGY | Facility: HOSPITAL | Age: 34
End: 2019-01-17
Payer: COMMERCIAL

## 2019-01-17 ENCOUNTER — APPOINTMENT (EMERGENCY)
Dept: CT IMAGING | Facility: HOSPITAL | Age: 34
End: 2019-01-17
Payer: COMMERCIAL

## 2019-01-17 VITALS
TEMPERATURE: 99 F | DIASTOLIC BLOOD PRESSURE: 92 MMHG | SYSTOLIC BLOOD PRESSURE: 140 MMHG | RESPIRATION RATE: 16 BRPM | WEIGHT: 189.15 LBS | HEART RATE: 73 BPM | BODY MASS INDEX: 34.6 KG/M2 | OXYGEN SATURATION: 96 %

## 2019-01-17 DIAGNOSIS — R10.9 ABDOMINAL PAIN: Primary | ICD-10-CM

## 2019-01-17 DIAGNOSIS — E86.0 DEHYDRATION: ICD-10-CM

## 2019-01-17 DIAGNOSIS — K59.00 CONSTIPATION: ICD-10-CM

## 2019-01-17 LAB
ALBUMIN SERPL BCP-MCNC: 3.4 G/DL (ref 3.5–5)
ALP SERPL-CCNC: 81 U/L (ref 46–116)
ALT SERPL W P-5'-P-CCNC: 25 U/L (ref 12–78)
AMPHETAMINES SERPL QL SCN: NEGATIVE
ANION GAP SERPL CALCULATED.3IONS-SCNC: 5 MMOL/L (ref 4–13)
APAP SERPL-MCNC: <2 UG/ML (ref 10–30)
AST SERPL W P-5'-P-CCNC: 8 U/L (ref 5–45)
BACTERIA TISS AEROBE CULT: ABNORMAL
BARBITURATES UR QL: NEGATIVE
BASOPHILS # BLD AUTO: 0.08 THOUSANDS/ΜL (ref 0–0.1)
BASOPHILS NFR BLD AUTO: 1 % (ref 0–1)
BENZODIAZ UR QL: POSITIVE
BILIRUB SERPL-MCNC: 0.2 MG/DL (ref 0.2–1)
BILIRUB UR QL STRIP: NEGATIVE
BUN SERPL-MCNC: 13 MG/DL (ref 5–25)
CALCIUM SERPL-MCNC: 9 MG/DL (ref 8.3–10.1)
CHLORIDE SERPL-SCNC: 105 MMOL/L (ref 100–108)
CK SERPL-CCNC: 22 U/L (ref 26–192)
CLARITY UR: CLEAR
CO2 SERPL-SCNC: 32 MMOL/L (ref 21–32)
COCAINE UR QL: NEGATIVE
COLOR UR: YELLOW
CREAT SERPL-MCNC: 0.73 MG/DL (ref 0.6–1.3)
EOSINOPHIL # BLD AUTO: 0.12 THOUSAND/ΜL (ref 0–0.61)
EOSINOPHIL NFR BLD AUTO: 1 % (ref 0–6)
ERYTHROCYTE [DISTWIDTH] IN BLOOD BY AUTOMATED COUNT: 12.8 % (ref 11.6–15.1)
EXT PREG TEST URINE: NEGATIVE
GFR SERPL CREATININE-BSD FRML MDRD: 108 ML/MIN/1.73SQ M
GLUCOSE SERPL-MCNC: 90 MG/DL (ref 65–140)
GLUCOSE UR STRIP-MCNC: NEGATIVE MG/DL
GRAM STN SPEC: ABNORMAL
HCT VFR BLD AUTO: 42 % (ref 34.8–46.1)
HGB BLD-MCNC: 13.5 G/DL (ref 11.5–15.4)
HGB UR QL STRIP.AUTO: NEGATIVE
IMM GRANULOCYTES # BLD AUTO: 0.09 THOUSAND/UL (ref 0–0.2)
IMM GRANULOCYTES NFR BLD AUTO: 1 % (ref 0–2)
KETONES UR STRIP-MCNC: NEGATIVE MG/DL
LEUKOCYTE ESTERASE UR QL STRIP: NEGATIVE
LYMPHOCYTES # BLD AUTO: 2.24 THOUSANDS/ΜL (ref 0.6–4.47)
LYMPHOCYTES NFR BLD AUTO: 17 % (ref 14–44)
MAGNESIUM SERPL-MCNC: 1.8 MG/DL (ref 1.6–2.6)
MCH RBC QN AUTO: 30.8 PG (ref 26.8–34.3)
MCHC RBC AUTO-ENTMCNC: 32.1 G/DL (ref 31.4–37.4)
MCV RBC AUTO: 96 FL (ref 82–98)
METHADONE UR QL: NEGATIVE
MONOCYTES # BLD AUTO: 1.15 THOUSAND/ΜL (ref 0.17–1.22)
MONOCYTES NFR BLD AUTO: 9 % (ref 4–12)
NEUTROPHILS # BLD AUTO: 9.35 THOUSANDS/ΜL (ref 1.85–7.62)
NEUTS SEG NFR BLD AUTO: 71 % (ref 43–75)
NITRITE UR QL STRIP: NEGATIVE
NRBC BLD AUTO-RTO: 0 /100 WBCS
OPIATES UR QL SCN: NEGATIVE
PCP UR QL: NEGATIVE
PH UR STRIP.AUTO: 6 [PH] (ref 4.5–8)
PLATELET # BLD AUTO: 286 THOUSANDS/UL (ref 149–390)
PMV BLD AUTO: 9.8 FL (ref 8.9–12.7)
POTASSIUM SERPL-SCNC: 4 MMOL/L (ref 3.5–5.3)
PROT SERPL-MCNC: 7.4 G/DL (ref 6.4–8.2)
PROT UR STRIP-MCNC: NEGATIVE MG/DL
RBC # BLD AUTO: 4.39 MILLION/UL (ref 3.81–5.12)
SODIUM SERPL-SCNC: 142 MMOL/L (ref 136–145)
SP GR UR STRIP.AUTO: >=1.03 (ref 1–1.03)
THC UR QL: NEGATIVE
TROPONIN I SERPL-MCNC: <0.02 NG/ML
UROBILINOGEN UR QL STRIP.AUTO: 0.2 E.U./DL
WBC # BLD AUTO: 13.03 THOUSAND/UL (ref 4.31–10.16)

## 2019-01-17 PROCEDURE — 80329 ANALGESICS NON-OPIOID 1 OR 2: CPT | Performed by: EMERGENCY MEDICINE

## 2019-01-17 PROCEDURE — 82550 ASSAY OF CK (CPK): CPT | Performed by: EMERGENCY MEDICINE

## 2019-01-17 PROCEDURE — 74177 CT ABD & PELVIS W/CONTRAST: CPT

## 2019-01-17 PROCEDURE — 36415 COLL VENOUS BLD VENIPUNCTURE: CPT | Performed by: EMERGENCY MEDICINE

## 2019-01-17 PROCEDURE — 81003 URINALYSIS AUTO W/O SCOPE: CPT | Performed by: EMERGENCY MEDICINE

## 2019-01-17 PROCEDURE — 96374 THER/PROPH/DIAG INJ IV PUSH: CPT

## 2019-01-17 PROCEDURE — 96361 HYDRATE IV INFUSION ADD-ON: CPT

## 2019-01-17 PROCEDURE — 85025 COMPLETE CBC W/AUTO DIFF WBC: CPT | Performed by: EMERGENCY MEDICINE

## 2019-01-17 PROCEDURE — 71045 X-RAY EXAM CHEST 1 VIEW: CPT

## 2019-01-17 PROCEDURE — 99285 EMERGENCY DEPT VISIT HI MDM: CPT

## 2019-01-17 PROCEDURE — 80053 COMPREHEN METABOLIC PANEL: CPT | Performed by: EMERGENCY MEDICINE

## 2019-01-17 PROCEDURE — 93005 ELECTROCARDIOGRAM TRACING: CPT

## 2019-01-17 PROCEDURE — 71275 CT ANGIOGRAPHY CHEST: CPT

## 2019-01-17 PROCEDURE — 80307 DRUG TEST PRSMV CHEM ANLYZR: CPT | Performed by: EMERGENCY MEDICINE

## 2019-01-17 PROCEDURE — 84484 ASSAY OF TROPONIN QUANT: CPT | Performed by: EMERGENCY MEDICINE

## 2019-01-17 PROCEDURE — 83735 ASSAY OF MAGNESIUM: CPT | Performed by: EMERGENCY MEDICINE

## 2019-01-17 PROCEDURE — 81025 URINE PREGNANCY TEST: CPT | Performed by: EMERGENCY MEDICINE

## 2019-01-17 PROCEDURE — 96375 TX/PRO/DX INJ NEW DRUG ADDON: CPT

## 2019-01-17 RX ORDER — METOCLOPRAMIDE HYDROCHLORIDE 5 MG/ML
10 INJECTION INTRAMUSCULAR; INTRAVENOUS ONCE
Status: COMPLETED | OUTPATIENT
Start: 2019-01-17 | End: 2019-01-17

## 2019-01-17 RX ORDER — DIPHENHYDRAMINE HYDROCHLORIDE 50 MG/ML
25 INJECTION INTRAMUSCULAR; INTRAVENOUS ONCE
Status: COMPLETED | OUTPATIENT
Start: 2019-01-17 | End: 2019-01-17

## 2019-01-17 RX ORDER — KETOROLAC TROMETHAMINE 30 MG/ML
15 INJECTION, SOLUTION INTRAMUSCULAR; INTRAVENOUS ONCE
Status: COMPLETED | OUTPATIENT
Start: 2019-01-17 | End: 2019-01-17

## 2019-01-17 RX ORDER — POLYETHYLENE GLYCOL 3350 17 G/17G
17 POWDER, FOR SOLUTION ORAL DAILY
Qty: 68 G | Refills: 0 | Status: SHIPPED | OUTPATIENT
Start: 2019-01-17 | End: 2019-01-21

## 2019-01-17 RX ADMIN — METOCLOPRAMIDE 10 MG: 5 INJECTION, SOLUTION INTRAMUSCULAR; INTRAVENOUS at 17:20

## 2019-01-17 RX ADMIN — KETOROLAC TROMETHAMINE 15 MG: 30 INJECTION, SOLUTION INTRAMUSCULAR at 17:20

## 2019-01-17 RX ADMIN — IOHEXOL 85 ML: 350 INJECTION, SOLUTION INTRAVENOUS at 18:01

## 2019-01-17 RX ADMIN — IODIXANOL 60 ML: 320 INJECTION, SOLUTION INTRAVASCULAR at 18:17

## 2019-01-17 RX ADMIN — SODIUM CHLORIDE 1000 ML: 0.9 INJECTION, SOLUTION INTRAVENOUS at 17:19

## 2019-01-17 RX ADMIN — DIPHENHYDRAMINE HYDROCHLORIDE 25 MG: 50 INJECTION INTRAMUSCULAR; INTRAVENOUS at 17:20

## 2019-01-17 NOTE — ED PROVIDER NOTES
History  Chief Complaint   Patient presents with    Abdominal Pain     Patient states abdominal pain since yesterday  Patient states she had a lymph node biopsy done on Tuesday under her left arm  Patient also states she has pain in her right lower leg  Patient is a 76-year-old female here with parents coming in for abdominal pain  Patient states that started yesterday  On Tuesday she had if infected purulent lymph node removed by Dr Tayo Pathak  Patient states she was doing well until yesterday  She reports as a diffuse generalized abdominal discomfort  She has no fevers, nausea, vomiting, diarrhea  She last had pizza approximately 2 hr go without any discomfort  She took a Vicodin this morning without any relief  She has no trauma to the abdomen  She has never had any EGD or colonoscopy  She has no abdominal surgeries  She has no vaginal bleeding spotting discharge  She has no urinary signs and symptoms  History provided by:  Patient   used: No    Abdominal Pain   Pain location:  Generalized  Pain quality: cramping    Pain radiates to:  Does not radiate  Pain severity:  Mild  Onset quality:  Gradual  Duration:  2 days  Timing:  Constant  Progression:  Unchanged  Chronicity:  New  Context: not alcohol use, not awakening from sleep, not diet changes, not eating, not laxative use, not medication withdrawal, not previous surgeries, not recent illness, not recent sexual activity, not recent travel, not retching, not sick contacts, not suspicious food intake and not trauma    Relieved by:  Nothing  Worsened by: Movement  Ineffective treatments: Vicodin    Associated symptoms: no anorexia, no belching, no chest pain, no chills, no constipation, no cough, no diarrhea, no dysuria, no fatigue, no fever, no flatus, no hematemesis, no hematochezia, no hematuria, no melena, no nausea, no shortness of breath, no sore throat, no vaginal bleeding, no vaginal discharge and no vomiting Risk factors: obesity    Risk factors: no alcohol abuse, no aspirin use, not elderly, has not had multiple surgeries, no NSAID use and no recent hospitalization        Prior to Admission Medications   Prescriptions Last Dose Informant Patient Reported? Taking? HYDROcodone-acetaminophen (NORCO) 5-325 mg per tablet   No No   Sig: Take 1 tablet by mouth every 4 (four) hours as needed for pain Max Daily Amount: 6 tablets   albuterol (PROVENTIL HFA,VENTOLIN HFA) 90 mcg/act inhaler   Yes No   Sig: Inhale 2 puffs every 6 (six) hours as needed for wheezing   cyclobenzaprine (FLEXERIL) 10 mg tablet   No No   Sig: Take one tablet by mouth every 12 hours as needed   predniSONE 20 mg tablet   No No   Sig: Take 1 tablet (20 mg total) by mouth 2 (two) times a day with meals      Facility-Administered Medications: None       Past Medical History:   Diagnosis Date    Asthma     Cat-scratch disease     last assessed 10/22/15    Depression     Hypertension     last assessed 11/11/14    Sleep disorder        Past Surgical History:   Procedure Laterality Date    BREAST BIOPSY Left     2016    DE BX/REMV,LYMPH NODE,DEEP AXILL Left 1/15/2019    Procedure: LEFT AXILLARY LYMPH NODE BIOPSY;  Surgeon: Marisol Oliveira MD;  Location: [facility];  Service: General    TUBAL LIGATION         Family History   Problem Relation Age of Onset    Anxiety disorder Mother     Bipolar disorder Mother     Depression Mother     Schizophrenia Mother     Parkinsonism Mother         tremor    Anxiety disorder Father     Bipolar disorder Father     Depression Father     Schizophrenia Father     Stomach cancer Paternal Grandmother      I have reviewed and agree with the history as documented  Social History   Substance Use Topics    Smoking status: Never Smoker    Smokeless tobacco: Never Used    Alcohol use No        Review of Systems   Constitutional: Negative for chills, diaphoresis, fatigue and fever     HENT: Negative for ear pain and sore throat  Eyes: Negative for visual disturbance  Respiratory: Negative for cough, chest tightness and shortness of breath  Cardiovascular: Negative for chest pain and palpitations  Gastrointestinal: Positive for abdominal pain  Negative for anorexia, constipation, diarrhea, flatus, hematemesis, hematochezia, melena, nausea and vomiting  Genitourinary: Negative for difficulty urinating, dysuria, hematuria, vaginal bleeding and vaginal discharge  Musculoskeletal: Negative for back pain and neck pain  Skin: Negative for rash  Neurological: Negative for weakness  Psychiatric/Behavioral: Negative for confusion  All other systems reviewed and are negative  Physical Exam  Physical Exam   Constitutional: She is oriented to person, place, and time  She appears well-developed and well-nourished  No distress  HENT:   Head: Normocephalic and atraumatic  Mouth/Throat: Oropharynx is clear and moist    Eyes: Pupils are equal, round, and reactive to light  Conjunctivae and EOM are normal    Neck: Normal range of motion  Neck supple  Cardiovascular: Regular rhythm, normal heart sounds and intact distal pulses  Tachycardia present  No murmur heard  Pulses:       Radial pulses are 2+ on the right side, and 2+ on the left side  Dorsalis pedis pulses are 2+ on the right side, and 2+ on the left side  Pulmonary/Chest: Effort normal and breath sounds normal  No stridor  No respiratory distress  Abdominal: Soft  Bowel sounds are normal  She exhibits no distension  There is generalized tenderness  Negative Rovsing's  Negative psoas and obturator  Negative McBurney's and Dunn's  No peritoneal signs  Musculoskeletal: Normal range of motion  She exhibits no edema  Patient with full active range of motion of bilateral lower extremities  Patient has no lower extremity edema  There is no deformities  There are no rashes or lesions    Patient has tenderness at the right Achilles tendon  She has no posterior calf or knee tenderness  Neurological: She is alert and oriented to person, place, and time  She displays normal reflexes  No cranial nerve deficit or sensory deficit  She exhibits normal muscle tone  Coordination normal    Skin: Skin is warm  Capillary refill takes less than 2 seconds  She is not diaphoretic  Left axilla - well healing scars with steri strips  No surrounding erythema or warmth  No active drainage  Nursing note and vitals reviewed        Vital Signs  ED Triage Vitals [01/17/19 1603]   Temperature Pulse Respirations Blood Pressure SpO2   100 °F (37 8 °C) (!) 110 16 157/99 98 %      Temp Source Heart Rate Source Patient Position - Orthostatic VS BP Location FiO2 (%)   Temporal Monitor Sitting Right arm --      Pain Score       8           Vitals:    01/17/19 1603 01/17/19 1742 01/17/19 1830   BP: 157/99 127/69 140/92   Pulse: (!) 110 80 73   Patient Position - Orthostatic VS: Sitting Lying Sitting       Visual Acuity      ED Medications  Medications   sodium chloride 0 9 % bolus 1,000 mL (1,000 mL Intravenous New Bag 1/17/19 1719)   metoclopramide (REGLAN) injection 10 mg (10 mg Intravenous Given 1/17/19 1720)   diphenhydrAMINE (BENADRYL) injection 25 mg (25 mg Intravenous Given 1/17/19 1720)   ketorolac (TORADOL) injection 15 mg (15 mg Intravenous Given 1/17/19 1720)   iohexol (OMNIPAQUE) 350 MG/ML injection (SINGLE-DOSE) 100 mL (85 mL Intravenous Given 1/17/19 1801)   iodixanol (VISIPAQUE) 320 MG/ML injection 60 mL (60 mL Intravenous Given 1/17/19 1817)       Diagnostic Studies  Results Reviewed     Procedure Component Value Units Date/Time    Rapid drug screen, urine [[id number]]  (Abnormal) Collected:  01/17/19 1705    Lab Status:  Final result Specimen:  Urine from Urine, Clean Catch Updated:  01/17/19 1721     Amph/Meth UR Negative     Barbiturate Ur Negative     Benzodiazepine Urine Positive (A)     Cocaine Urine Negative     Methadone Urine Negative Opiate Urine Negative     PCP Ur Negative     THC Urine Negative    Narrative:         Presumptive report  If requested, specimen will be sent to reference lab for confirmation  FOR MEDICAL PURPOSES ONLY  IF CONFIRMATION NEEDED PLEASE CONTACT THE LAB WITHIN 5 DAYS  Drug Screen Cutoff Levels:  AMPHETAMINE/METHAMPHETAMINES  1000 ng/mL  BARBITURATES     200 ng/mL  BENZODIAZEPINES     200 ng/mL  COCAINE      300 ng/mL  METHADONE      300 ng/mL  OPIATES      300 ng/mL  PHENCYCLIDINE     25 ng/mL  THC       50 ng/mL    Comprehensive metabolic panel [[id number]]  (Abnormal) Collected:  01/17/19 1654    Lab Status:  Final result Specimen:  Blood from Arm, Left Updated:  01/17/19 1717     Sodium 142 mmol/L      Potassium 4 0 mmol/L      Chloride 105 mmol/L      CO2 32 mmol/L      ANION GAP 5 mmol/L      BUN 13 mg/dL      Creatinine 0 73 mg/dL      Glucose 90 mg/dL      Calcium 9 0 mg/dL      AST 8 U/L      ALT 25 U/L      Alkaline Phosphatase 81 U/L      Total Protein 7 4 g/dL      Albumin 3 4 (L) g/dL      Total Bilirubin 0 20 mg/dL      eGFR 108 ml/min/1 73sq m     Narrative:         National Kidney Disease Education Program recommendations are as follows:  GFR calculation is accurate only with a steady state creatinine  Chronic Kidney disease less than 60 ml/min/1 73 sq  meters  Kidney failure less than 15 ml/min/1 73 sq  meters      Magnesium [[id number]]  (Normal) Collected:  01/17/19 1654    Lab Status:  Final result Specimen:  Blood from Arm, Left Updated:  01/17/19 1717     Magnesium 1 8 mg/dL     Acetaminophen level [[id number]]  (Abnormal) Collected:  01/17/19 1654    Lab Status:  Final result Specimen:  Blood from Arm, Left Updated:  01/17/19 1716     Acetaminophen Level <2 (L) ug/mL     Troponin I [[id number]]  (Normal) Collected:  01/17/19 1654    Lab Status:  Final result Specimen:  Blood from Arm, Left Updated:  01/17/19 1716     Troponin I <0 02 ng/mL     CK Total with Reflex CKMB [[id number]]  (Abnormal) Collected:  01/17/19 1654    Lab Status:  Final result Specimen:  Blood from Arm, Left Updated:  01/17/19 1713     Total CK 22 (L) U/L     UA w Reflex to Microscopic [[id number]] Collected:  01/17/19 1705    Lab Status:  Final result Specimen:  Urine from Urine, Clean Catch Updated:  01/17/19 1713     Color, UA Yellow     Clarity, UA Clear     Specific Gravity, UA >=1 030     pH, UA 6 0     Leukocytes, UA Negative     Nitrite, UA Negative     Protein, UA Negative mg/dl      Glucose, UA Negative mg/dl      Ketones, UA Negative mg/dl      Urobilinogen, UA 0 2 E U /dl      Bilirubin, UA Negative     Blood, UA Negative    POCT pregnancy, urine [[id number]]  (Normal) Resulted:  01/17/19 1705    Lab Status:  Final result Updated:  01/17/19 1705     EXT PREG TEST UR (Ref: Negative) negative    CBC and differential [[id number]]  (Abnormal) Collected:  01/17/19 1654    Lab Status:  Final result Specimen:  Blood from Arm, Left Updated:  01/17/19 1700     WBC 13 03 (H) Thousand/uL      RBC 4 39 Million/uL      Hemoglobin 13 5 g/dL      Hematocrit 42 0 %      MCV 96 fL      MCH 30 8 pg      MCHC 32 1 g/dL      RDW 12 8 %      MPV 9 8 fL      Platelets 316 Thousands/uL      nRBC 0 /100 WBCs      Neutrophils Relative 71 %      Immat GRANS % 1 %      Lymphocytes Relative 17 %      Monocytes Relative 9 %      Eosinophils Relative 1 %      Basophils Relative 1 %      Neutrophils Absolute 9 35 (H) Thousands/µL      Immature Grans Absolute 0 09 Thousand/uL      Lymphocytes Absolute 2 24 Thousands/µL      Monocytes Absolute 1 15 Thousand/µL      Eosinophils Absolute 0 12 Thousand/µL      Basophils Absolute 0 08 Thousands/µL                  CT pe study w abdomen pelvis w contrast   Final Result by Grace Cash MD (01/17 1858)      No evidence for pulmonary embolism  Left axillary adenopathy  Postbiopsy changes to the left axilla including fluid and soft tissue air  Large amount of stool throughout the colon testing constipation  Workstation performed: [id number]         XR chest 1 view portable    (Results Pending)              Procedures  Procedures       Phone Contacts  ED Phone Contact    ED Course         HEART Risk Score      Most Recent Value   History  0 Filed at: 01/17/2019 1734   ECG  1 Filed at: 01/17/2019 1734   Age  0 Filed at: 01/17/2019 1734   Risk Factors  0 Filed at: 01/17/2019 1734   Troponin  0 Filed at: 01/17/2019 1734   Heart Score Risk Calculator   History  0 Filed at: 01/17/2019 1734   ECG  1 Filed at: 01/17/2019 1734   Age  0 Filed at: 01/17/2019 1734   Risk Factors  0 Filed at: 01/17/2019 1734   Troponin  0 Filed at: 01/17/2019 1734   HEART Score  1 Filed at: 01/17/2019 1734   HEART Score  1 Filed at: 01/17/2019 1734            PERC Rule for PE      Most Recent Value   PERC Rule for PE   Age >=50  0 Filed at: 01/17/2019 1734   HR >=100  1 Filed at: 01/17/2019 1734   O2 Sat on room air < 95%  0 Filed at: 01/17/2019 1734   History of PE or DVT  0 Filed at: 01/17/2019 1734   Recent trauma or surgery  1 Filed at: 01/17/2019 1734   Hemoptysis  0 Filed at: 01/17/2019 1734   Exogenous estrogen  0 Filed at: 01/17/2019 1734   Unilateral leg swelling  0 Filed at: 01/17/2019 1734   PERC Rule for PE Results  2 Filed at: 01/17/2019 1734                Wells' Criteria for PE      Most Recent Value   Wells' Criteria for PE   Clinical signs and symptoms of DVT  0 Filed at: 01/17/2019 1734   PE is primary diagnosis or equally likely  0 Filed at: 01/17/2019 1734   HR >100  1 5 Filed at: 01/17/2019 1734   Immobilization at least 3 days or Surgery in the previous 4 weeks  1 5 Filed at: 01/17/2019 1734   Previous, objectively diagnosed PE or DVT  0 Filed at: 01/17/2019 1734   Hemoptysis  0 Filed at: 01/17/2019 1734   Malignancy with treatment within 6 months or palliative  0 Filed at: 01/17/2019 1734   Wells' Criteria Total  3 Filed at: 01/17/2019 1734            [facility]  Number of Diagnoses or Management Options  Abdominal pain: Constipation:   Dehydration:   Diagnosis management comments: Patient 28year old female coming in today with abdominal pain that started yesterday  Patient is nontoxic appearing on exam however she is tachycardic with a temperature of a 100°  Will start abdominal workup including labs, EKG and troponin  Will also get a portable chest x-ray  EKG INTERPRETATION at 4:33 p m  RHYTHM:  Sinus tachy at 100 beats per minute  AXIS:  Normal axis  INTERVALS:  NM interval measured at 160 beats per minute  QRS COMPLEX:  QRS measured at 82 milliseconds  ST SEGMENT:  Nonspecific ST segment changes  Artifact present  QT INTERVAL:  QTC measured at 435 millisecond  COMPARED WITH PRIOR   [place] Organ Interpretation by Ramiro Cantu DO    5:33 PM  Patient's lab reviewed  No evidence of end-organ damage or acute kidney injury  No electrolyte dysfunction  Patient does have a mild leukocytosis which can be reactive due to recent surgery  Will Re vital as well as go to CT rule out PE as well as intra-abdominal pathology  Patient did just have surgery 2 days ago  D-dimer would be elevated  7:16 PM  CT reveals no acute pathology  No PE however there is noted constipation  Patient remains hemodynamically stable  Discussed with patient that during anesthesia and post with her narcotics this is a sought common side effect  Will give MiraLax for home  Discussed with her to avoid narcotics as this can increase the constipation  She remains alert oriented x3, cranial nerves 2-12 grossly intact with no focal deficit  Mild diffuse abdominal discomfort  However no peritoneal signs  No guarding or rebound  Will discharge home with follow-up instructions with PCP    Portions of the record may have been created with voice recognition software  Occasional wrong word or "sound a like" substitutions may have occurred due to the inherent limitations of voice recognition software   Read the chart carefully and recognize, using context, where substitutions have occurred  Amount and/or Complexity of Data Reviewed  Clinical lab tests: ordered and reviewed  Tests in the radiology section of CPT®: ordered and reviewed  Tests in the medicine section of CPT®: ordered and reviewed  Independent visualization of images, tracings, or specimens: yes      CritCare Time    Disposition  Final diagnoses:   Abdominal pain   Constipation   Dehydration     Time reflects when diagnosis was documented in both MDM as applicable and the Disposition within this note     Time User Action Codes Description Comment    1/17/2019  7:15 PM Jane Charles Add [R10 9] Abdominal pain     1/17/2019  7:15 PM Kylah Charles Add [K59 00] Constipation     1/17/2019  7:15 PM Sharita Charles Add [E86 0] Dehydration       ED Disposition     ED Disposition Condition Comment    Discharge  Tracey Ayala discharge to home/self care  Condition at discharge: Stable        Follow-up Information     Follow up With Specialties Details Why Contact Info      Schedule an appointment as soon as possible for a visit      Kemi Grayson, [address] [facility], Nurse Practitioner Schedule an appointment as soon as possible for a visit in 1 day  [address] [place] [address]  [phone]            Patient's Medications   Discharge Prescriptions    POLYETHYLENE GLYCOL (MIRALAX) 17 G PACKET    Take 17 g by mouth daily for 4 days       Start Date: 1/17/2019 End Date: 1/21/2019       Order Dose: 17 g       Quantity: 68 g    Refills: 0     No discharge procedures on file      ED Provider  Electronically Signed by           Ghada Hines DO  01/18/19 [id number]

## 2019-01-18 ENCOUNTER — TELEPHONE (OUTPATIENT)
Dept: INTERNAL MEDICINE CLINIC | Facility: CLINIC | Age: 34
End: 2019-01-18

## 2019-01-18 LAB
ATRIAL RATE: 101 BPM
BACTERIA SPEC ANAEROBE CULT: NO GROWTH
BACTERIA WND AEROBE CULT: NO GROWTH
GRAM STN SPEC: NORMAL
GRAM STN SPEC: NORMAL
P AXIS: 31 DEGREES
PR INTERVAL: 160 MS
QRS AXIS: 10 DEGREES
QRSD INTERVAL: 82 MS
QT INTERVAL: 336 MS
QTC INTERVAL: 435 MS
T WAVE AXIS: 14 DEGREES
VENTRICULAR RATE: 101 BPM

## 2019-01-18 PROCEDURE — 93010 ELECTROCARDIOGRAM REPORT: CPT | Performed by: INTERNAL MEDICINE

## 2019-01-18 NOTE — DISCHARGE INSTRUCTIONS
Acute Abdominal Pain   AMBULATORY CARE:   Acute abdominal pain  usually starts suddenly and gets worse quickly  Seek care immediately if:   · You vomit blood or cannot stop vomiting  · You have blood in your bowel movement or it looks like tar  · You have bleeding from your rectum  · Your abdomen is larger than usual, more painful, and hard  · You have severe pain in your abdomen  · You stop passing gas and having bowel movements  · You feel weak, dizzy, or faint  Contact your healthcare provider if:   · You have a fever  · You have new signs and symptoms  · Your symptoms do not get better with treatment  · You have questions or concerns about your condition or care  Treatment for acute abdominal pain  may depend on the cause of your abdominal pain  You may need any of the following:  · Medicines  may be given to decrease pain, treat an infection, and manage your symptoms, such as constipation  · Surgery  may be needed to treat a serious cause of abdominal pain  Examples include surgery to treat appendicitis or a blockage in your bowels  Manage your symptoms:   · Apply heat  on your abdomen for 20 to 30 minutes every 2 hours for as many days as directed  Heat helps decrease pain and muscle spasms  · Manage your stress  Stress may cause abdominal pain  Your healthcare provider may recommend relaxation techniques and deep breathing exercises to help decrease your stress  Your healthcare provider may recommend you talk to someone about your stress or anxiety, such as a counselor or a trusted friend  Get plenty of sleep and exercise regularly  · Limit or do not drink alcohol  Alcohol can make your abdominal pain worse  Ask your healthcare provider if it is safe for you to drink alcohol  Also ask how much is safe for you to drink  · Do not smoke  Nicotine and other chemicals in cigarettes can damage your esophagus and stomach   Ask your healthcare provider for information if you currently smoke and need help to quit  E-cigarettes or smokeless tobacco still contain nicotine  Talk to your healthcare provider before you use these products  Make changes to the food you eat as directed:  Do not eat foods that cause abdominal pain or other symptoms  Eat small meals more often  · Eat more high-fiber foods if you are constipated  High-fiber foods include fruits, vegetables, whole-grain foods, and legumes  · Do not eat foods that cause gas if you have bloating  Examples include broccoli, cabbage, and cauliflower  Do not drink soda or carbonated drinks, because these may also cause gas  · Do not eat foods or drinks that contain sorbitol or fructose if you have diarrhea and bloating  Some examples are fruit juices, candy, jelly, and sugar-free gum  · Do not eat high-fat foods, such as fried foods, cheeseburgers, hot dogs, and desserts  · Limit or do not drink caffeine  Caffeine may make symptoms, such as heart burn or nausea, worse  · Drink plenty of liquids to prevent dehydration from diarrhea or vomiting  Ask your healthcare provider how much liquid to drink each day and which liquids are best for you  Follow up with your healthcare provider as directed:  Write down your questions so you remember to ask them during your visits  © 2017 [facility] Information is for End User's use only and may not be sold, redistributed or otherwise used for commercial purposes  All illustrations and images included in CareNotes® are the copyrighted property of [employer]  or Aryan Chowdary  The above information is an  only  It is not intended as medical advice for individual conditions or treatments  Talk to your doctor, nurse or pharmacist before following any medical regimen to see if it is safe and effective for you      Constipation   WHAT YOU NEED TO KNOW:   Constipation is when you have hard, dry bowel movements, or you go longer than usual between bowel movements  DISCHARGE INSTRUCTIONS:   Seek care immediately if:   · You have blood in your bowel movements  · You have a fever and abdominal pain with the constipation  Contact your healthcare provider if:   · Your constipation gets worse  · You start to vomit  · You have questions or concerns about your condition or care  Medicines:   · Medicine or a fiber supplement  may help make your bowel movement softer  A laxative may help relax and loosen your intestines to help you have a bowel movement  You may also be given medicine to increase fluid in your intestines  The fluid may help move bowel movements through your intestines  · Take your medicine as directed  Contact your healthcare provider if you think your medicine is not helping or if you have side effects  Tell him of her if you are allergic to any medicine  Keep a list of the medicines, vitamins, and herbs you take  Include the amounts, and when and why you take them  Bring the list or the pill bottles to follow-up visits  Carry your medicine list with you in case of an emergency  Manage your constipation:   · Drink liquids as directed  You may need to drink extra liquids to help soften and move your bowels  Ask how much liquid to drink each day and which liquids are best for you  · Eat high-fiber foods  This may help decrease constipation by adding bulk to your bowel movements  High-fiber foods include fruit, vegetables, whole-grain breads and cereals, and beans  Your healthcare provider or dietitian can help you create a high-fiber meal plan  · Exercise regularly  Regular physical activity can help stimulate your intestines  Ask which exercises are best for you  · Schedule a time each day to have a bowel movement  This may help train your body to have regular bowel movements  Bend forward while you are on the toilet to help move the bowel movement out   Sit on the toilet for at least 10 minutes, even if you do not have a bowel movement  Follow up with your healthcare provider as directed:  Write down your questions so you remember to ask them during your visits  © 2017 [employer]  Information is for End User's use only and may not be sold, redistributed or otherwise used for commercial purposes  All illustrations and images included in CareNotes® are the copyrighted property of A [employer] M [employer]  or Aryan Chowdary  The above information is an  only  It is not intended as medical advice for individual conditions or treatments  Talk to your doctor, nurse or pharmacist before following any medical regimen to see if it is safe and effective for you  Dehydration   WHAT YOU NEED TO KNOW:   Dehydration is a condition that develops when your body does not have enough fluid  You may become dehydrated if you do not drink enough water or lose too much fluid  Fluid loss may also cause loss of electrolytes (minerals), such as sodium  DISCHARGE INSTRUCTIONS:   Seek care immediately if:   · You have a seizure  · You are confused or cannot think clearly  · You are extremely sleepy, or another person cannot wake you  · You become dizzy or faint when you stand  · You are not able to urinate  · You have trouble breathing  · You have a fast or irregular heartbeat  · Your hands or feet are cold, or your face is pale  Contact your healthcare provider if:   · You have trouble drinking liquids because you are vomiting  · Your symptoms get worse  · You have a fever  · You feel very weak or tired  · You have questions or concerns about your condition or care  Follow up with your healthcare provider as directed:  Write down your questions so you remember to ask them during your visits  Prevent or manage dehydration:   · Drink liquids as directed    Liquids that contain water, sugar, and minerals can help your body hold in fluid and help prevent dehydration  Drink liquids throughout the day, not just when you feel thirsty  Men should drink about 3 liters (13 eight-ounce cups) of liquid each day  Women should drink about 2 liters (9 eight-ounce cups) of liquid each day  Drink even more liquid if you will be outdoors, in the sun for a long time, or exercising  · Stay cool  Limit the time you spend outdoors during the hottest part of the day  Dress in lightweight clothes  · Keep track of how often you urinate  If you urinate less than usual or your urine is darker, drink more liquids  © 2017 [facility] Information is for End User's use only and may not be sold, redistributed or otherwise used for commercial purposes  All illustrations and images included in CareNotes® are the copyrighted property of [employer]  or Aryan Chowdary  The above information is an  only  It is not intended as medical advice for individual conditions or treatments  Talk to your doctor, nurse or pharmacist before following any medical regimen to see if it is safe and effective for you

## 2019-01-18 NOTE — TELEPHONE ENCOUNTER
Patient's sister states that this is second time she is calling today for the ECG results on patient  She states that she spoke to [place] this morning and gave her the phone number that she can be reached at but has not heard from anyone  She also states that [place] told her that you have not read the results yet but that you would look at them and call her back before you left today

## 2019-01-19 NOTE — TELEPHONE ENCOUNTER
If there was a serious problem the ER would have not discharged her    I will speak with Dr Sha Robbins and call her Monday

## 2019-01-21 ENCOUNTER — OFFICE VISIT (OUTPATIENT)
Dept: CARDIOLOGY CLINIC | Facility: CLINIC | Age: 34
End: 2019-01-21
Payer: COMMERCIAL

## 2019-01-21 ENCOUNTER — TELEPHONE (OUTPATIENT)
Dept: SURGERY | Facility: HOSPITAL | Age: 34
End: 2019-01-21

## 2019-01-21 ENCOUNTER — APPOINTMENT (EMERGENCY)
Dept: CT IMAGING | Facility: HOSPITAL | Age: 34
End: 2019-01-21
Payer: COMMERCIAL

## 2019-01-21 ENCOUNTER — HOSPITAL ENCOUNTER (EMERGENCY)
Facility: HOSPITAL | Age: 34
Discharge: HOME | End: 2019-01-21
Attending: EMERGENCY MEDICINE | Admitting: EMERGENCY MEDICINE
Payer: COMMERCIAL

## 2019-01-21 ENCOUNTER — TELEPHONE (OUTPATIENT)
Dept: OTHER | Facility: HOSPITAL | Age: 34
End: 2019-01-21

## 2019-01-21 VITALS
TEMPERATURE: 98.1 F | OXYGEN SATURATION: 99 % | RESPIRATION RATE: 20 BRPM | HEART RATE: 88 BPM | SYSTOLIC BLOOD PRESSURE: 142 MMHG | BODY MASS INDEX: 15.57 KG/M2 | DIASTOLIC BLOOD PRESSURE: 85 MMHG | WEIGHT: 85.13 LBS

## 2019-01-21 VITALS
WEIGHT: 188 LBS | HEIGHT: 62 IN | BODY MASS INDEX: 34.6 KG/M2 | SYSTOLIC BLOOD PRESSURE: 146 MMHG | DIASTOLIC BLOOD PRESSURE: 88 MMHG | HEART RATE: 70 BPM

## 2019-01-21 DIAGNOSIS — R07.89 OTHER CHEST PAIN: ICD-10-CM

## 2019-01-21 DIAGNOSIS — Z51.89 VISIT FOR WOUND CHECK: Primary | ICD-10-CM

## 2019-01-21 DIAGNOSIS — D72.829 LEUKOCYTOSIS: ICD-10-CM

## 2019-01-21 DIAGNOSIS — R94.31 ABNORMAL EKG: Primary | ICD-10-CM

## 2019-01-21 DIAGNOSIS — I10 ESSENTIAL HYPERTENSION: ICD-10-CM

## 2019-01-21 LAB
ALBUMIN SERPL BCP-MCNC: 3.8 G/DL (ref 3.5–5)
ALP SERPL-CCNC: 100 U/L (ref 46–116)
ALT SERPL W P-5'-P-CCNC: 18 U/L (ref 12–78)
ANION GAP SERPL CALCULATED.3IONS-SCNC: 7 MMOL/L (ref 4–13)
APTT PPP: 26 SECONDS (ref 26–38)
AST SERPL W P-5'-P-CCNC: 11 U/L (ref 5–45)
BASOPHILS # BLD AUTO: 0.07 THOUSANDS/ΜL (ref 0–0.1)
BASOPHILS NFR BLD AUTO: 1 % (ref 0–1)
BILIRUB SERPL-MCNC: 0.3 MG/DL (ref 0.2–1)
BILIRUB UR QL STRIP: NEGATIVE
BUN SERPL-MCNC: 14 MG/DL (ref 5–25)
CALCIUM SERPL-MCNC: 9.6 MG/DL (ref 8.3–10.1)
CHLORIDE SERPL-SCNC: 100 MMOL/L (ref 100–108)
CLARITY UR: CLEAR
CO2 SERPL-SCNC: 31 MMOL/L (ref 21–32)
COLOR UR: YELLOW
CREAT SERPL-MCNC: 0.81 MG/DL (ref 0.6–1.3)
EOSINOPHIL # BLD AUTO: 0.81 THOUSAND/ΜL (ref 0–0.61)
EOSINOPHIL NFR BLD AUTO: 6 % (ref 0–6)
ERYTHROCYTE [DISTWIDTH] IN BLOOD BY AUTOMATED COUNT: 12.8 % (ref 11.6–15.1)
EXT PREG TEST URINE: NEGATIVE
GFR SERPL CREATININE-BSD FRML MDRD: 95 ML/MIN/1.73SQ M
GLUCOSE SERPL-MCNC: 104 MG/DL (ref 65–140)
GLUCOSE UR STRIP-MCNC: NEGATIVE MG/DL
HCT VFR BLD AUTO: 47.9 % (ref 34.8–46.1)
HGB BLD-MCNC: 15.8 G/DL (ref 11.5–15.4)
HGB UR QL STRIP.AUTO: NEGATIVE
HOLD SPECIMEN: NORMAL
IMM GRANULOCYTES # BLD AUTO: 0.06 THOUSAND/UL (ref 0–0.2)
IMM GRANULOCYTES NFR BLD AUTO: 1 % (ref 0–2)
INR PPP: 0.98 (ref 0.86–1.17)
KETONES UR STRIP-MCNC: NEGATIVE MG/DL
LACTATE SERPL-SCNC: 1 MMOL/L (ref 0.5–2)
LEUKOCYTE ESTERASE UR QL STRIP: NEGATIVE
LYMPHOCYTES # BLD AUTO: 1.5 THOUSANDS/ΜL (ref 0.6–4.47)
LYMPHOCYTES NFR BLD AUTO: 12 % (ref 14–44)
MCH RBC QN AUTO: 30.7 PG (ref 26.8–34.3)
MCHC RBC AUTO-ENTMCNC: 33 G/DL (ref 31.4–37.4)
MCV RBC AUTO: 93 FL (ref 82–98)
MONOCYTES # BLD AUTO: 1 THOUSAND/ΜL (ref 0.17–1.22)
MONOCYTES NFR BLD AUTO: 8 % (ref 4–12)
NEUTROPHILS # BLD AUTO: 9.33 THOUSANDS/ΜL (ref 1.85–7.62)
NEUTS SEG NFR BLD AUTO: 72 % (ref 43–75)
NITRITE UR QL STRIP: NEGATIVE
NRBC BLD AUTO-RTO: 0 /100 WBCS
PH UR STRIP.AUTO: 6 [PH] (ref 4.5–8)
PLATELET # BLD AUTO: 316 THOUSANDS/UL (ref 149–390)
PMV BLD AUTO: 10 FL (ref 8.9–12.7)
POTASSIUM SERPL-SCNC: 4.2 MMOL/L (ref 3.5–5.3)
PROT SERPL-MCNC: 8.3 G/DL (ref 6.4–8.2)
PROT UR STRIP-MCNC: NEGATIVE MG/DL
PROTHROMBIN TIME: 12.5 SECONDS (ref 11.8–14.2)
RBC # BLD AUTO: 5.15 MILLION/UL (ref 3.81–5.12)
SODIUM SERPL-SCNC: 138 MMOL/L (ref 136–145)
SP GR UR STRIP.AUTO: 1.02 (ref 1–1.03)
UROBILINOGEN UR QL STRIP.AUTO: 0.2 E.U./DL
WBC # BLD AUTO: 12.77 THOUSAND/UL (ref 4.31–10.16)

## 2019-01-21 PROCEDURE — 81025 URINE PREGNANCY TEST: CPT | Performed by: PHYSICIAN ASSISTANT

## 2019-01-21 PROCEDURE — 99284 EMERGENCY DEPT VISIT MOD MDM: CPT

## 2019-01-21 PROCEDURE — 80053 COMPREHEN METABOLIC PANEL: CPT | Performed by: PHYSICIAN ASSISTANT

## 2019-01-21 PROCEDURE — 96374 THER/PROPH/DIAG INJ IV PUSH: CPT

## 2019-01-21 PROCEDURE — 99204 OFFICE O/P NEW MOD 45 MIN: CPT | Performed by: PHYSICIAN ASSISTANT

## 2019-01-21 PROCEDURE — 85610 PROTHROMBIN TIME: CPT | Performed by: PHYSICIAN ASSISTANT

## 2019-01-21 PROCEDURE — 96361 HYDRATE IV INFUSION ADD-ON: CPT

## 2019-01-21 PROCEDURE — 85025 COMPLETE CBC W/AUTO DIFF WBC: CPT | Performed by: PHYSICIAN ASSISTANT

## 2019-01-21 PROCEDURE — 71260 CT THORAX DX C+: CPT

## 2019-01-21 PROCEDURE — 85730 THROMBOPLASTIN TIME PARTIAL: CPT | Performed by: PHYSICIAN ASSISTANT

## 2019-01-21 PROCEDURE — 81003 URINALYSIS AUTO W/O SCOPE: CPT | Performed by: PHYSICIAN ASSISTANT

## 2019-01-21 PROCEDURE — 87040 BLOOD CULTURE FOR BACTERIA: CPT | Performed by: PHYSICIAN ASSISTANT

## 2019-01-21 PROCEDURE — 83605 ASSAY OF LACTIC ACID: CPT | Performed by: PHYSICIAN ASSISTANT

## 2019-01-21 RX ORDER — SULFAMETHOXAZOLE AND TRIMETHOPRIM 800; 160 MG/1; MG/1
1 TABLET ORAL 2 TIMES DAILY
Qty: 14 TABLET | Refills: 0 | Status: SHIPPED | OUTPATIENT
Start: 2019-01-21 | End: 2019-01-28

## 2019-01-21 RX ORDER — ACETAMINOPHEN 325 MG/1
650 TABLET ORAL ONCE
Status: COMPLETED | OUTPATIENT
Start: 2019-01-21 | End: 2019-01-21

## 2019-01-21 RX ORDER — CEPHALEXIN 500 MG/1
500 CAPSULE ORAL 4 TIMES DAILY
Qty: 28 CAPSULE | Refills: 0 | Status: SHIPPED | OUTPATIENT
Start: 2019-01-21 | End: 2019-01-28

## 2019-01-21 RX ORDER — CEPHALEXIN 250 MG/1
500 CAPSULE ORAL ONCE
Status: COMPLETED | OUTPATIENT
Start: 2019-01-21 | End: 2019-01-21

## 2019-01-21 RX ORDER — SULFAMETHOXAZOLE AND TRIMETHOPRIM 800; 160 MG/1; MG/1
1 TABLET ORAL ONCE
Status: COMPLETED | OUTPATIENT
Start: 2019-01-21 | End: 2019-01-21

## 2019-01-21 RX ORDER — ONDANSETRON 2 MG/ML
4 INJECTION INTRAMUSCULAR; INTRAVENOUS ONCE
Status: COMPLETED | OUTPATIENT
Start: 2019-01-21 | End: 2019-01-21

## 2019-01-21 RX ADMIN — IOHEXOL 85 ML: 350 INJECTION, SOLUTION INTRAVENOUS at 19:53

## 2019-01-21 RX ADMIN — ONDANSETRON HYDROCHLORIDE 4 MG: 2 SOLUTION INTRAMUSCULAR; INTRAVENOUS at 18:16

## 2019-01-21 RX ADMIN — CEPHALEXIN 500 MG: 250 CAPSULE ORAL at 21:01

## 2019-01-21 RX ADMIN — SULFAMETHOXAZOLE AND TRIMETHOPRIM 1 TABLET: 800; 160 TABLET ORAL at 21:01

## 2019-01-21 RX ADMIN — SODIUM CHLORIDE 1000 ML: 0.9 INJECTION, SOLUTION INTRAVENOUS at 17:54

## 2019-01-21 RX ADMIN — ACETAMINOPHEN 650 MG: 325 TABLET, FILM COATED ORAL at 19:57

## 2019-01-21 NOTE — ASSESSMENT & PLAN NOTE
Possible anterior infarct when compared to prior EKGs   This could be due to lead placement or body habitus     An Exercise stress echo will be checked to be sure there is no WMA in the anterior region

## 2019-01-21 NOTE — ASSESSMENT & PLAN NOTE
She is not currently being medically treated  Diet and exercise are recommended and I am in agreement at this time

## 2019-01-21 NOTE — TELEPHONE ENCOUNTER
Called patient back- path suspicious for bartonella because of rare positive warthin starry stain  Will await PCR results  Patient has completed a course of azithromycin  Coag negative staph on routine culture likely is a contaminant      ----- Message from Tasha Perez RN sent at 1/21/2019 11:05 AM EST -----  Regarding: FW: Test Results Question  Contact: [phone]  Pt would like a call re: coag neg staph on culture  Thanks! !  ----- Message -----  From: Atiya Jackson  Sent: 1/19/2019   9:17 AM  To: Infectious Disease [place]  Subject: Test Results Question                            Can you explain this to me some please?

## 2019-01-21 NOTE — PROGRESS NOTES
[facility]   Outpatient Note  Atiya Jackson  1985  [id number]  [facility] [id number] [phone]    Subjective:   Atiya Jackson is a 28 y o  female    The patient is referred to our office for an abnormal EKG that was obtained while she was in the hospital for a workup regarding a a lymph node resection and biopsy  She developed SOB and chest tightness that was thought to be pulmonic in nature  An EKG was obtained as a precautionary measure, and happened to be slightly changed from prior normal EKGs  Troponin levels were normal  Her chest pain was non-exertional and was only caused by the inability to take a deep breath  The EKG showed a possible anterior wall MI when compared to prior EKG           Social History  History   Smoking Status    Never Smoker   Smokeless Tobacco    Never Used   ,   History   Alcohol Use No   ,   History   Drug Use No     Family History   Problem Relation Age of Onset    Anxiety disorder Mother     Bipolar disorder Mother     Depression Mother     Schizophrenia Mother     Parkinsonism Mother         tremor    Anxiety disorder Father     Bipolar disorder Father     Depression Father     Schizophrenia Father     Stomach cancer Paternal Grandmother        Medical and Surgical History  Past Medical History:   Diagnosis Date    Asthma     Cat-scratch disease     last assessed 10/22/15    Depression     Hypertension     last assessed 11/11/14    Sleep disorder      Past Surgical History:   Procedure Laterality Date    BREAST BIOPSY Left     2016    MD BX/REMV,LYMPH NODE,DEEP AXILL Left 1/15/2019    Procedure: LEFT AXILLARY LYMPH NODE BIOPSY;  Surgeon: Maurilio Beckwith MD;  Location: [facility];  Service: General    TUBAL LIGATION           Current Outpatient Prescriptions:     albuterol (PROVENTIL HFA,VENTOLIN HFA) 90 mcg/act inhaler, Inhale 2 puffs every 6 (six) hours as needed for wheezing, Disp: , Rfl:     cyclobenzaprine (FLEXERIL) 10 mg tablet, Take one tablet by mouth every 12 hours as needed (Patient not taking: Reported on 1/21/2019 ), Disp: 30 tablet, Rfl: 0    HYDROcodone-acetaminophen (NORCO) 5-325 mg per tablet, Take 1 tablet by mouth every 4 (four) hours as needed for pain Max Daily Amount: 6 tablets (Patient not taking: Reported on 1/21/2019 ), Disp: 18 tablet, Rfl: 0    polyethylene glycol (MIRALAX) 17 g packet, Take 17 g by mouth daily for 4 days (Patient not taking: Reported on 1/21/2019 ), Disp: 68 g, Rfl: 0    predniSONE 20 mg tablet, Take 1 tablet (20 mg total) by mouth 2 (two) times a day with meals (Patient not taking: Reported on 1/21/2019 ), Disp: 10 tablet, Rfl: 0  Allergies   Allergen Reactions    Codeine Hives     Tolerated Percocet, Vicodin, etc        Review of Systems   Constitution: Negative  HENT: Negative  Eyes: Negative  Cardiovascular: Positive for chest pain  Negative for claudication, cyanosis, dyspnea on exertion, irregular heartbeat, leg swelling, near-syncope, orthopnea, palpitations, paroxysmal nocturnal dyspnea and syncope  Respiratory: Positive for shortness of breath and wheezing  Negative for cough, hemoptysis, sleep disturbances due to breathing, snoring and sputum production  Endocrine: Negative  Hematologic/Lymphatic: Negative  Skin: Negative  Musculoskeletal: Negative  Gastrointestinal: Negative  Genitourinary: Negative  Neurological: Negative  Psychiatric/Behavioral: Negative  Allergic/Immunologic: Negative  Objective:   /88   Pulse 70   Ht 5' 2" (1 575 m)   Wt 85 3 kg (188 lb)   LMP 01/13/2019   BMI 34 39 kg/m²   Physical Exam   Constitutional: She is oriented to person, place, and time  She appears well-developed and well-nourished  HENT:   Head: Normocephalic and atraumatic     Mouth/Throat: Oropharynx is clear and moist    Eyes: Conjunctivae and EOM are normal  No scleral icterus  Neck: Normal range of motion  Neck supple  No JVD present  No tracheal deviation present  Cardiovascular: Normal rate, regular rhythm, normal heart sounds and intact distal pulses  Exam reveals no gallop and no friction rub  No murmur heard  Aorta not palpable   Pulmonary/Chest: Effort normal and breath sounds normal  No respiratory distress  She has no wheezes  She has no rales  She exhibits no tenderness  Abdominal: Soft  Bowel sounds are normal  She exhibits no distension  There is no tenderness  Musculoskeletal: Normal range of motion  She exhibits no edema or tenderness  Neurological: She is alert and oriented to person, place, and time  Skin: Skin is warm and dry  No rash noted  No erythema  No pallor  Psychiatric: She has a normal mood and affect  Her behavior is normal    Nursing note and vitals reviewed  Lab Review:   No results found for: CHOL  No results found for: HDL  No results found for: LDLCALC  No results found for: TRIG  Results Reviewed     None        Results Reviewed     None        Results Reviewed     None          Recent Cardiovascular Testing:   none    ECG Review:   Normal sinus, anterior wall MI changed when compared to EKG on 12/31/18    Assessment and Plan:     Problem List Items Addressed This Visit        Cardiovascular and Mediastinum    Essential hypertension     She is not currently being medically treated  Diet and exercise are recommended and I am in agreement at this time  Relevant Orders    Echo stress test w contrast if indicated       Other    Other chest pain     This was more likely related to pulmonary etiology    But stress test will rule out cardiac cause            Relevant Orders    Echo stress test w contrast if indicated    Abnormal EKG - Primary     Possible anterior infarct when compared to prior EKGs   This could be due to lead placement or body habitus     An Exercise stress echo will be checked to be sure there is no WMA in the anterior region  Relevant Orders    Echo stress test w contrast if indicated           Additional Plan:   If stress test is normal, we would be happy to see her on an as needed basis  thank you for the consult

## 2019-01-21 NOTE — ED PROVIDER NOTES
History  Chief Complaint   Patient presents with    Wound Check     pt had lymph node surgery done by dr Samantha Agudelo on 1/15/2019  now largd     Patient presents to the emergency department today via private vehicle with family  She provides her own history stating she woke up this morning with a headache with some nausea  She noted that she had some drainage from the left axillary recent surgical site  There was an extensive review of records may  She states she has increased pain and drainage today  She has not noted drainage thus far until today since the surgery  She states she called the surgical office who told her to come to the emergency department  She is noted to have evidence of tachycardia as well as low-grade fever here today  None       Past Medical History:   Diagnosis Date    Asthma     Cat-scratch disease     last assessed 10/22/15    Depression     Hypertension     last assessed 11/11/14    Sleep disorder        Past Surgical History:   Procedure Laterality Date    BREAST BIOPSY Left     2016    MO BX/REMV,LYMPH NODE,DEEP AXILL Left 1/15/2019    Procedure: LEFT AXILLARY LYMPH NODE BIOPSY;  Surgeon: Vipin Neff MD;  Location: [facility];  Service: General    TUBAL LIGATION         Family History   Problem Relation Age of Onset    Anxiety disorder Mother     Bipolar disorder Mother     Depression Mother     Schizophrenia Mother     Parkinsonism Mother         tremor    Anxiety disorder Father     Bipolar disorder Father     Depression Father     Schizophrenia Father     Stomach cancer Paternal Grandmother      I have reviewed and agree with the history as documented  Social History   Substance Use Topics    Smoking status: Never Smoker    Smokeless tobacco: Never Used    Alcohol use No        Review of Systems   Constitutional: Negative for appetite change, chills, diaphoresis, fatigue, fever and unexpected weight change  HENT: Negative      Eyes: Negative  Respiratory: Negative  Negative for chest tightness  Cardiovascular: Negative  Endocrine: Negative  Genitourinary: Negative  Musculoskeletal:        Left axillary pain   Skin: Positive for wound  Allergic/Immunologic: Negative  Hematological: Negative  Psychiatric/Behavioral: Negative  All other systems reviewed and are negative  Physical Exam  Physical Exam   Constitutional: She is oriented to person, place, and time  She appears well-developed and well-nourished  No distress  HENT:   Head: Normocephalic  Eyes: Pupils are equal, round, and reactive to light  EOM are normal    Neck: Normal range of motion  Cardiovascular:   Regular tachycardic rate around 115 beats per minute   Pulmonary/Chest: Effort normal    Abdominal: Soft  There is no tenderness  Musculoskeletal: Normal range of motion  Neurological: She is alert and oriented to person, place, and time  Skin: Capillary refill takes less than 2 seconds  She is not diaphoretic  Vertical axillary incision noted  Wound appears well with the exception of the very most inferior aspect of the wound which appears to be very slightly dehisced  Steri-Strips were initially in place and now are very damp and removed by myself   Psychiatric: She has a normal mood and affect  Vitals reviewed        Vital Signs  ED Triage Vitals [01/21/19 1657]   Temperature Pulse Respirations Blood Pressure SpO2   100 5 °F (38 1 °C) (!) 114 20 (!) 140/104 98 %      Temp Source Heart Rate Source Patient Position - Orthostatic VS BP Location FiO2 (%)   Temporal Right Sitting Right arm --      Pain Score       8           Vitals:    01/21/19 1657 01/21/19 1933   BP: (!) 140/104 142/85   Pulse: (!) 114 88   Patient Position - Orthostatic VS: Sitting Lying       Visual Acuity      ED Medications  Medications   cephalexin (KEFLEX) capsule 500 mg (not administered)   sulfamethoxazole-trimethoprim (BACTRIM DS) 800-160 mg per tablet 1 tablet (not administered)   sodium chloride 0 9 % bolus 1,000 mL (0 mL Intravenous Stopped 1/21/19 1933)   ondansetron (ZOFRAN) injection 4 mg (4 mg Intravenous Given 1/21/19 1816)   acetaminophen (TYLENOL) tablet 650 mg (650 mg Oral Given 1/21/19 1957)   iohexol (OMNIPAQUE) 350 MG/ML injection (SINGLE-DOSE) 85 mL (85 mL Intravenous Given 1/21/19 1953)       Diagnostic Studies  Results Reviewed     Procedure Component Value Units Date/Time    [facility] draw [[id number]] Collected:  01/21/19 1816    Lab Status:  Final result Specimen:  Blood Updated:  01/21/19 2001    Narrative: The following orders were created for panel order [facility] draw  Procedure                               Abnormality         Status                     ---------                               -----------         ------                     Kyara Urena top on JRRO[[id number]]                                 Final result               Green / Black tube on CORI[[id number]]                       Final result                 Please view results for these tests on the individual orders      UA w Reflex to Microscopic [[id number]] Collected:  01/21/19 1935    Lab Status:  Final result Specimen:  Urine from Urine, Clean Catch Updated:  01/21/19 1945     Color, UA Yellow     Clarity, UA Clear     Specific Gravity, UA 1 025     pH, UA 6 0     Leukocytes, UA Negative     Nitrite, UA Negative     Protein, UA Negative mg/dl      Glucose, UA Negative mg/dl      Ketones, UA Negative mg/dl      Urobilinogen, UA 0 2 E U /dl      Bilirubin, UA Negative     Blood, UA Negative    POCT pregnancy, urine [[id number]]  (Normal) Resulted:  01/21/19 1935    Lab Status:  Final result Updated:  01/21/19 1936     EXT PREG TEST UR (Ref: Negative) Negative    Lactic acid, plasma [[id number]]  (Normal) Collected:  01/21/19 1752    Lab Status:  Final result Specimen:  Blood from Arm, Right Updated:  01/21/19 1835     LACTIC ACID 1 0 mmol/L     Narrative:         Result may be elevated if tourniquet was used during collection  Comprehensive metabolic panel [[id number]]  (Abnormal) Collected:  01/21/19 1752    Lab Status:  Final result Specimen:  Blood from Arm, Right Updated:  01/21/19 1832     Sodium 138 mmol/L      Potassium 4 2 mmol/L      Chloride 100 mmol/L      CO2 31 mmol/L      ANION GAP 7 mmol/L      BUN 14 mg/dL      Creatinine 0 81 mg/dL      Glucose 104 mg/dL      Calcium 9 6 mg/dL      AST 11 U/L      ALT 18 U/L      Alkaline Phosphatase 100 U/L      Total Protein 8 3 (H) g/dL      Albumin 3 8 g/dL      Total Bilirubin 0 30 mg/dL      eGFR 95 ml/min/1 73sq m     Narrative:         National Kidney Disease Education Program recommendations are as follows:  GFR calculation is accurate only with a steady state creatinine  Chronic Kidney disease less than 60 ml/min/1 73 sq  meters  Kidney failure less than 15 ml/min/1 73 sq  meters      Protime-INR [[id number]]  (Normal) Collected:  01/21/19 1752    Lab Status:  Final result Specimen:  Blood from Arm, Right Updated:  01/21/19 1821     Protime 12 5 seconds      INR 0 98    APTT [[id number]]  (Normal) Collected:  01/21/19 1752    Lab Status:  Final result Specimen:  Blood from Arm, Right Updated:  01/21/19 1821     PTT 26 seconds     CBC and differential [[id number]]  (Abnormal) Collected:  01/21/19 1752    Lab Status:  Final result Specimen:  Blood from Arm, Right Updated:  01/21/19 1813     WBC 12 77 (H) Thousand/uL      RBC 5 15 (H) Million/uL      Hemoglobin 15 8 (H) g/dL      Hematocrit 47 9 (H) %      MCV 93 fL      MCH 30 7 pg      MCHC 33 0 g/dL      RDW 12 8 %      MPV 10 0 fL      Platelets 862 Thousands/uL      nRBC 0 /100 WBCs      Neutrophils Relative 72 %      Immat GRANS % 1 %      Lymphocytes Relative 12 (L) %      Monocytes Relative 8 %      Eosinophils Relative 6 %      Basophils Relative 1 %      Neutrophils Absolute 9 33 (H) Thousands/µL      Immature Grans Absolute 0 06 Thousand/uL      Lymphocytes Absolute 1 50 Thousands/µL Monocytes Absolute 1 00 Thousand/µL      Eosinophils Absolute 0 81 (H) Thousand/µL      Basophils Absolute 0 07 Thousands/µL     Blood culture #2 [[id number]] Collected:  01/21/19 1752    Lab Status: In process Specimen:  Blood from Arm, Right Updated:  01/21/19 1810    Blood culture #1 [[id number]]     Lab Status:  No result Specimen:  Blood                  CT chest with contrast   Final Result by Abena Díaz MD (01/21 2002)      Left axillary lymphadenopathy again seen with postoperative changes present related to recent biopsy  Soft tissue gas and inflammatory changes may be postoperative however developing infectious etiology cannot be excluded  No discrete organized abscess    collection is identified  Workstation performed: [id number]                    Procedures  Procedures       Phone Contacts  ED Phone Contact    ED Course  ED Course as of Jan 21 2048 Mon Jan 21, 2019   1719 Blood Pressure: (!) 140/104   1719 Temperature: 100 5 °F (38 1 °C)   1719 Pulse: (!) 114   1719 Respirations: 20   1719 SpO2: 98 %   1831 INR: 0 98   1831 PTT: 26   1831 WBC: (!) 12 77   1831 Hemoglobin: (!) 15 8   1831 Platelet Count: 238   1930 eGFR: 95   1930 Chloride: 100   1940 Pt at CT    2013 Impression       Left axillary lymphadenopathy again seen with postoperative changes present related to recent biopsy   Soft tissue gas and inflammatory changes may be postoperative however developing infectious etiology cannot be excluded   No discrete organized abscess   collection is identified  2035 Blood, UA: Negative   2035 Bilirubin, UA: Negative   2035 Clarity, UA: Clear   2039 Current heart rate 91    2040 In summary this is a 79-year-old female who presents for evaluation of left axillary pain with discharge  A CT scan shows no identifiable abscess collection  Her current heart rate has improved as well as her temperature  She does not wish to be admitted    She does have follow-up in a day and half with surgery  I believe it is appropriate due to the fact that her white count is trending down to place her on Keflex and Bactrim as an outpatient  MDM  CritCare Time    Disposition  Final diagnoses:   Visit for wound check   Leukocytosis - Slightly improved     Time reflects when diagnosis was documented in both MDM as applicable and the Disposition within this note     Time User Action Codes Description Comment    1/21/2019  8:45 PM Venice Jeans Add [Z51 89] Visit for wound check     1/21/2019  8:45 PM [place] Jeans Add [A23 105] Leukocytosis     1/21/2019  8:45 PM Velia Marbella SKY Modify [A10 982] Leukocytosis Slightly improved      ED Disposition     ED Disposition Condition Comment    Discharge  Wendy Marks discharge to home/self care  Condition at discharge: Good        Follow-up Information     Follow up With Specialties Details Why Natali Gonsalez MD [facility] Medicine Schedule an appointment as soon as possible for a visit  [address]      Rip Gauthier MD General Surgery, [facility]  in 1 day  [address] [id number] [phone]            Patient's Medications   Discharge Prescriptions    CEPHALEXIN (KEFLEX) 500 MG CAPSULE    Take 1 capsule (500 mg total) by mouth 4 (four) times a day for 7 days       Start Date: 1/21/2019 End Date: 1/28/2019       Order Dose: 500 mg       Quantity: 28 capsule    Refills: 0    SULFAMETHOXAZOLE-TRIMETHOPRIM (BACTRIM DS) 800-160 MG PER TABLET    Take 1 tablet by mouth 2 (two) times a day for 7 days smx-tmp DS (BACTRIM) 800-160 mg tabs (1tab q12 D10)       Start Date: 1/21/2019 End Date: 1/28/2019       Order Dose: 1 tablet       Quantity: 14 tablet    Refills: 0     No discharge procedures on file      ED Provider  Electronically Signed by           Randi Heck PA-C  01/21/19 2048

## 2019-01-21 NOTE — TELEPHONE ENCOUNTER
Did let Trixie's sister and reviewed that ekg was not changed from previous, and that if Eduardo Obregon would like to go to see cardiology to f/u, she can  Did speak with Eduardo Obregon and reviewed  She did want to f/u with cardiology  Offered to schedule an appt for her, but she stated she would schedule herself

## 2019-01-21 NOTE — TELEPHONE ENCOUNTER
Pt called having some drainage and pain, nausea and vomitting I made a follow up for Wednesday but I did advise pt to go to ED for evaluation

## 2019-01-22 NOTE — DISCHARGE INSTRUCTIONS
Heat Pack Application   WHAT YOU NEED TO KNOW:   Heat is used to increase blood flow to an injured area to promote healing after an injury or surgery  Heat also helps decrease pain  You can apply heat with an electric heating pad, hot water bottle, or warm compress  Heat should be put applied for about 20 to 30 minutes or as long and as often as directed  Always put a cloth between your skin and the heat pack to prevent burns  Check your skin for color changes or blisters about every 5 minutes  Remove the heat if you notice skin changes  DISCHARGE INSTRUCTIONS:   Electric heating pad: Make sure the cord is not broken open in any areas before you plug it in  Once plugged in, set to the heat setting you were told to use  Cover the heating pad to protect your skin  Hot water bottle:  Fill the hot water bottle about half full so it does not get too heavy  Remove air from the bottle by squeezing it until you see water at the opening  Cap the bottle and wrap a cloth around it before placing on your skin  Warm compress:  Fill a sink with hot water that is 131°F  Wet a towel in the water and squeeze out extra water  Place the towel inside a waterproof cover before placing on your skin  Contact your healthcare provider if:   · You see blisters, whitening of your skin, or more swelling after you use heat  · You have redness that does not improve after you use heat  · You have questions about the use of heat packs  © 2017 [employer] Information is for End User's use only and may not be sold, redistributed or otherwise used for commercial purposes  All illustrations and images included in CareNotes® are the copyrighted property of A D A M [employer] Inc  or Aryan Chowdary  The above information is an  only  It is not intended as medical advice for individual conditions or treatments   Talk to your doctor, nurse or pharmacist before following any medical regimen to see if it is safe and effective for you

## 2019-01-23 ENCOUNTER — OFFICE VISIT (OUTPATIENT)
Dept: SURGERY | Facility: HOSPITAL | Age: 34
End: 2019-01-23

## 2019-01-23 VITALS
WEIGHT: 186 LBS | DIASTOLIC BLOOD PRESSURE: 81 MMHG | SYSTOLIC BLOOD PRESSURE: 129 MMHG | TEMPERATURE: 99.4 F | HEIGHT: 62 IN | BODY MASS INDEX: 34.23 KG/M2 | HEART RATE: 105 BPM

## 2019-01-23 DIAGNOSIS — R59.9 ENLARGED LYMPH NODE: Primary | ICD-10-CM

## 2019-01-23 PROCEDURE — 99024 POSTOP FOLLOW-UP VISIT: CPT | Performed by: SURGERY

## 2019-01-23 NOTE — ASSESSMENT & PLAN NOTE
Status post lymph node excision of left axilla with postoperative drainage  Mild blanching erythema seen in the ER and started antibiotics  Patient encouraged to finish the entire course antibiotics  Potential for cat scratch disease however PCR still pending  Patient is currently following with Infectious Disease    Patient will follow up in 1 week for wound check

## 2019-01-23 NOTE — PROGRESS NOTES
Assessment/Plan:    Enlarged lymph node  Status post lymph node excision of left axilla with postoperative drainage  Mild blanching erythema seen in the ER and started antibiotics  Potential for cat scratch disease however PC are still pending  Patient is currently following with Infectious Disease  Patient will follow up in 1 week for wound check       Diagnoses and all orders for this visit:    Enlarged lymph node          Subjective:      Patient ID: Tylor Cazares is a 28 y o  female  59-year-old female status post excisional biopsy of left axillary lymph nodes  Patient with history of cat scratch disease in currently following with Infectious Disease  Currently still complains of some significant pain along the incision  Also some noted pink drainage  Was recently seen in the ER for worsening pain and low-grade fevers  Underwent a CT of the chest at that time which showed potential gas which could be postoperative versus developing infection and no obvious organized fluid collection  She was started on antibiotics by the ER physician  Currently states she has been having some low-grade temperatures and she is 89 here in the office  Does still complain of some left axillary pain but overall states that this somewhat improved  She is also following closely with the fracture disease was awaiting PCR results of before it has definitively treating for Bartonella  Denies any chest pain shortness of breath  The following portions of the patient's history were reviewed and updated as appropriate:   She  has a past medical history of Asthma; Cat-scratch disease; Depression; Hypertension; and Sleep disorder    She   Patient Active Problem List    Diagnosis Date Noted    Other chest pain 01/21/2019    Abnormal EKG 01/21/2019    Essential hypertension 01/21/2019    Enlarged lymph node 01/09/2019    Mass of left axilla 12/26/2018    Bronchitis 12/26/2018    Seasonal allergic rhinitis 11/30/2018    Screening for cervical cancer 11/30/2018    Numbness and tingling of both upper extremities while sleeping 05/01/2018    Urine ketones 03/15/2018    Depression 09/12/2016    Intention tremor 09/12/2016     She  has a past surgical history that includes Tubal ligation; Breast biopsy (Left); and pr bx/remv,lymph node,deep axill (Left, 1/15/2019)  Her family history includes Anxiety disorder in her father and mother; Bipolar disorder in her father and mother; Depression in her father and mother; Parkinsonism in her mother; Schizophrenia in her father and mother; Stomach cancer in her paternal grandmother  She  reports that she has never smoked  She has never used smokeless tobacco  She reports that she does not drink alcohol or use drugs  Current Outpatient Prescriptions   Medication Sig Dispense Refill    cephalexin (KEFLEX) 500 mg capsule Take 1 capsule (500 mg total) by mouth 4 (four) times a day for 7 days 28 capsule 0    sulfamethoxazole-trimethoprim (BACTRIM DS) 800-160 mg per tablet Take 1 tablet by mouth 2 (two) times a day for 7 days smx-tmp DS (BACTRIM) 800-160 mg tabs (1tab q12 D10) 14 tablet 0     No current facility-administered medications for this visit  She is allergic to codeine       Review of Systems   Constitutional: Negative  Gastrointestinal: Negative  Skin: Positive for color change (Mild blanching erythema on the superior portion of the wound ) and wound ( left axilla)  Negative for pallor and rash  Objective:      /81   Pulse 105   Temp 99 4 °F (37 4 °C)   Ht 5' 2" (1 575 m)   Wt 84 4 kg (186 lb)   LMP 01/13/2019   BMI 34 02 kg/m²          Physical Exam   Constitutional: She appears well-developed and well-nourished  No distress  Skin: Skin is warm  No rash noted  She is not diaphoretic  There is erythema (Mild blanching erythema of the superior portion of the wound of the left axilla)  No pallor     Left axilla incision clean no active drainage noted although there is some serosanguineous fluid on the gauze  There is approximately a 0 5 cm dehiscence at the skin level at the inferior portion of the wound  Significant tenderness  No crepitus noted  Drainage non foul smelling   Vitals reviewed

## 2019-01-26 LAB — BACTERIA BLD CULT: NORMAL

## 2019-01-27 ENCOUNTER — HOSPITAL ENCOUNTER (EMERGENCY)
Facility: HOSPITAL | Age: 34
Discharge: HOME | End: 2019-01-27
Attending: EMERGENCY MEDICINE | Admitting: EMERGENCY MEDICINE
Payer: COMMERCIAL

## 2019-01-27 ENCOUNTER — APPOINTMENT (EMERGENCY)
Dept: ULTRASOUND IMAGING | Facility: HOSPITAL | Age: 34
End: 2019-01-27
Payer: COMMERCIAL

## 2019-01-27 VITALS
SYSTOLIC BLOOD PRESSURE: 140 MMHG | HEART RATE: 125 BPM | DIASTOLIC BLOOD PRESSURE: 99 MMHG | BODY MASS INDEX: 34.03 KG/M2 | RESPIRATION RATE: 18 BRPM | OXYGEN SATURATION: 99 % | TEMPERATURE: 98.8 F | WEIGHT: 186.07 LBS

## 2019-01-27 DIAGNOSIS — G89.18 POSTOPERATIVE PAIN: Primary | ICD-10-CM

## 2019-01-27 DIAGNOSIS — I88.9 AXILLARY LYMPHADENITIS: ICD-10-CM

## 2019-01-27 LAB
ANION GAP SERPL CALCULATED.3IONS-SCNC: 11 MMOL/L (ref 4–13)
BASOPHILS # BLD AUTO: 0.01 THOUSANDS/ΜL (ref 0–0.1)
BASOPHILS NFR BLD AUTO: 0 % (ref 0–1)
BUN SERPL-MCNC: 12 MG/DL (ref 5–25)
CALCIUM SERPL-MCNC: 8.7 MG/DL (ref 8.3–10.1)
CHLORIDE SERPL-SCNC: 102 MMOL/L (ref 100–108)
CO2 SERPL-SCNC: 26 MMOL/L (ref 21–32)
CREAT SERPL-MCNC: 0.8 MG/DL (ref 0.6–1.3)
EOSINOPHIL # BLD AUTO: 0.74 THOUSAND/ΜL (ref 0–0.61)
EOSINOPHIL NFR BLD AUTO: 14 % (ref 0–6)
ERYTHROCYTE [DISTWIDTH] IN BLOOD BY AUTOMATED COUNT: 13.1 % (ref 11.6–15.1)
GFR SERPL CREATININE-BSD FRML MDRD: 96 ML/MIN/1.73SQ M
GLUCOSE SERPL-MCNC: 101 MG/DL (ref 65–140)
HCT VFR BLD AUTO: 43.1 % (ref 34.8–46.1)
HGB BLD-MCNC: 14.3 G/DL (ref 11.5–15.4)
IMM GRANULOCYTES # BLD AUTO: 0.02 THOUSAND/UL (ref 0–0.2)
IMM GRANULOCYTES NFR BLD AUTO: 0 % (ref 0–2)
LACTATE SERPL-SCNC: 1.1 MMOL/L (ref 0.5–2)
LYMPHOCYTES # BLD AUTO: 0.52 THOUSANDS/ΜL (ref 0.6–4.47)
LYMPHOCYTES NFR BLD AUTO: 10 % (ref 14–44)
MCH RBC QN AUTO: 30.6 PG (ref 26.8–34.3)
MCHC RBC AUTO-ENTMCNC: 33.2 G/DL (ref 31.4–37.4)
MCV RBC AUTO: 92 FL (ref 82–98)
MONOCYTES # BLD AUTO: 0.74 THOUSAND/ΜL (ref 0.17–1.22)
MONOCYTES NFR BLD AUTO: 14 % (ref 4–12)
NEUTROPHILS # BLD AUTO: 3.11 THOUSANDS/ΜL (ref 1.85–7.62)
NEUTS SEG NFR BLD AUTO: 62 % (ref 43–75)
NRBC BLD AUTO-RTO: 0 /100 WBCS
PLATELET # BLD AUTO: 193 THOUSANDS/UL (ref 149–390)
PMV BLD AUTO: 10.1 FL (ref 8.9–12.7)
POTASSIUM SERPL-SCNC: 4.4 MMOL/L (ref 3.5–5.3)
RBC # BLD AUTO: 4.67 MILLION/UL (ref 3.81–5.12)
SODIUM SERPL-SCNC: 139 MMOL/L (ref 136–145)
WBC # BLD AUTO: 5.14 THOUSAND/UL (ref 4.31–10.16)

## 2019-01-27 PROCEDURE — 80048 BASIC METABOLIC PNL TOTAL CA: CPT | Performed by: PHYSICIAN ASSISTANT

## 2019-01-27 PROCEDURE — 93971 EXTREMITY STUDY: CPT

## 2019-01-27 PROCEDURE — 85025 COMPLETE CBC W/AUTO DIFF WBC: CPT | Performed by: PHYSICIAN ASSISTANT

## 2019-01-27 PROCEDURE — 93971 EXTREMITY STUDY: CPT | Performed by: SURGERY

## 2019-01-27 PROCEDURE — 83605 ASSAY OF LACTIC ACID: CPT | Performed by: PHYSICIAN ASSISTANT

## 2019-01-27 PROCEDURE — 99284 EMERGENCY DEPT VISIT MOD MDM: CPT

## 2019-01-27 PROCEDURE — 96374 THER/PROPH/DIAG INJ IV PUSH: CPT

## 2019-01-27 RX ORDER — KETOROLAC TROMETHAMINE 10 MG/1
10 TABLET, FILM COATED ORAL 3 TIMES DAILY PRN
Qty: 20 TABLET | Refills: 0 | Status: SHIPPED | OUTPATIENT
Start: 2019-01-27 | End: 2019-02-18 | Stop reason: ALTCHOICE

## 2019-01-27 RX ORDER — KETOROLAC TROMETHAMINE 30 MG/ML
15 INJECTION, SOLUTION INTRAMUSCULAR; INTRAVENOUS ONCE
Status: COMPLETED | OUTPATIENT
Start: 2019-01-27 | End: 2019-01-27

## 2019-01-27 RX ORDER — ACETAMINOPHEN 325 MG/1
650 TABLET ORAL EVERY 6 HOURS PRN
Qty: 30 TABLET | Refills: 0 | Status: SHIPPED | OUTPATIENT
Start: 2019-01-27 | End: 2019-02-18 | Stop reason: ALTCHOICE

## 2019-01-27 RX ADMIN — KETOROLAC TROMETHAMINE 15 MG: 30 INJECTION, SOLUTION INTRAMUSCULAR at 14:37

## 2019-01-27 NOTE — DISCHARGE INSTRUCTIONS
Adenitis   WHAT YOU NEED TO KNOW:   Adenitis is a condition that causes your lymph nodes to become swollen and tender You may also have a fever  Adenitis is a sign of infection usually caused by bacteria  DISCHARGE INSTRUCTIONS:   Medicines: You may  need any of the following:  · Antibiotics  will treat your bacterial infection  · NSAIDs or acetaminophen  will help decrease pain, swelling, and fever  These medicines are available without a doctor's order  NSAIDs can cause stomach bleeding or kidney problems in certain people  If you take blood thinner medicine, always ask if NSAIDs are safe for you  Always read the medicine label and follow directions  Do not give these medicines to children under 10months of age without direction from your child's healthcare provider  · Take your medicine as directed  Contact your healthcare provider if you think your medicine is not helping or if you have side effects  Tell him of her if you are allergic to any medicine  Keep a list of the medicines, vitamins, and herbs you take  Include the amounts, and when and why you take them  Bring the list or the pill bottles to follow-up visits  Carry your medicine list with you in case of an emergency  Follow up with your healthcare provider within 2 days: You may be referred to a dentist or need more tests  Write down your questions so you remember to ask them during your visits  Manage your symptoms:   · Apply moist heat  on your swollen lymph nodes for 20 to 30 minutes every 2 hours or as directed  Heat helps decrease pain and swelling  You can make a moist heat pack by soaking a small towel in hot water  Let it cool until you can hold it with your bare hands  Then wring out the excess water  Place the towel in a plastic bag, and wrap the bag with a dry towel around the bag  Place the pack over your swollen lymph nodes  · Elevate your head and upper back    Keep your head and upper back elevated when you rest, such as in a recliner  Place extra pillows under your head and neck when you sleep in bed  Elevation helps decrease swelling  Contact your healthcare provider if:   · Your symptoms do not improve after 10 days of treatment  · You have questions or concerns about your condition or care  Return to the emergency department if:   · You have new or worsening redness or swelling  · You develop a large, soft bump that may leak pus  · You have difficulty breathing or swallowing  © 2017 [facility] Information is for End User's use only and may not be sold, redistributed or otherwise used for commercial purposes  All illustrations and images included in CareNotes® are the copyrighted property of [employer]  or Aryan Chowdary  The above information is an  only  It is not intended as medical advice for individual conditions or treatments  Talk to your doctor, nurse or pharmacist before following any medical regimen to see if it is safe and effective for you  Warm Compress or Soak   WHAT YOU NEED TO KNOW:   A warm compress or soak helps improve blood flow to tissues and relieve pain and swelling  This will help you heal from an injury or illness  You may need a warm compress or soak to help manage any of the following:  · A sinus infection or upper respiratory infection    · A blocked tear duct, eye infection, or a stye    · A skin abscess or infection    · An ingrown toenail    · An ear infection    · A soft or deep tissue injury    · A muscle or joint injury, such as a sprain  DISCHARGE INSTRUCTIONS:   Contact your healthcare provider if:   · Your symptoms do not improve or you have new symptoms  · You see blisters on the area where you applied the compress or soak  · You have questions or concerns about your condition or care  How to prepare and use a moist warm compress:   Your healthcare provider will tell you how often to apply a warm compress:  · Wash your hands      · Use a washcloth, small towel, or gauze as your compress  · You can place the compress under running water or place it in a bowl with warm water  Check the temperature of the water with a thermometer  The water should not be warmer than 100°F for babies, 105°F for children, and 120°F for adults  Adults should use water that is 105°F if they will apply the compress to an eye  · If directed, add 1 tablespoon of salt to the water  Squeeze extra water out of the compress  · Place the compress directly on the area  If directed, gently massage the area with the compress  Check your skin in 2 minutes for blisters or bright red skin  Your skin should look pink to light red  · You may need to rewarm the compress every 5 minutes  · Remove the compress in 15 to 30 minutes, or when the compress starts to feel cold  Gently pat your skin dry with a clean towel  · Wash your hands  · Reapply the compress as many times as directed each day  Use a clean compress every time  How to use a dry warm compress:  A dry compress may be a hot water bottle or a heating pad  You can also buy a prepared hot pack  Follow the package directions for how to use these devices  Cover a bottle or hot pack with a towel before you apply it to your skin  Do not leave a dry compress on your skin for more than 20 minutes or as directed  Do not fall asleep with a dry compress on your skin  A dry compress may burn your skin if it is left on for too long  How to prepare and use a warm soak:   · Fill a clean container or tub with warm water and soap  The container should be deep enough to cover the area completely  · Check the temperature of the water with a thermometer  The water should not be warmer than 100°F for children and babies, and 110°F for adults  · If directed, add 1 tablespoon of salt to the water  · Remove any bandages  · Soak the area for 30 minutes or as long as directed   Gently pat your skin dry when you are done soaking  · Replace bandages as directed  · Clean the container or tub when finished  · Wash your hands  Follow up with your healthcare provider as directed:  Write down your questions so you remember to ask them during your visits  © 2017 [employer] Information is for End User's use only and may not be sold, redistributed or otherwise used for commercial purposes  All illustrations and images included in CareNotes® are the copyrighted property of [employer]  or Aryan Chowdary  The above information is an  only  It is not intended as medical advice for individual conditions or treatments  Talk to your doctor, nurse or pharmacist before following any medical regimen to see if it is safe and effective for you

## 2019-01-27 NOTE — ED NOTES
Patient has an inflamed incision underneath her armpit that extends about three inches  The top of the incision is excoriated and the bottom of the incision has "popped open" and is draining clear fluids  The incision is red and raised   The area is painful to the patient to touch [id number] Andre Liu RN  01/27/19 5886

## 2019-01-27 NOTE — ED PROVIDER NOTES
History  Chief Complaint   Patient presents with    Wound Check     had biopsy of left axilla by dr Stef Haas on 1/15/19  now area red painful and drainage     28 yr female presents to ED today Sunday 01/27/19 for evaluation of left axilla wound increasing pain and redness with new swelling into left forearm and hand  Onset: gradual  Duration: 1 month  Location: left axilla, now with pain and swelling into arm and hand  Frequency: constant, worsening  Character:throbbing, burning  Severity: severe  Better with: nothing  Worse with: movement, palpation  Tx prior to arrival with: currently on keflex/bactrim today is day 6 of abx; no other current meds or local treatment aside from bandages  Assoc Sx: no f/c no n/v/d no rash/wounds elsewhere no sob/cp/palpitations normal urine not pregnant/breastfeeding    pt seen by multiple providers for this over past 2 months, did undergo left axillary lymph node resection 1/15/19 by general surgery, had 2 postop visit in ED 1/17 and 1/21 and 1 postop visit with surgery 1/23 in office so far  Has been on zithromax, keflex, bactrim, doxycycline in the past 2 months  ID saw her in the office two weeks ago 1/14, is awaiting PCR results regarding bartonella as has h/o Cat scratch in 2015 tx and resolved per notes  Only growth was staph coag neg otherwise all fungal/AFB/gram stains were negative  She had CT chest twice in the past 2 months showing lung nodule/granuloma, without lung mass or PE  Axillary lymph node  Pt has had tachycardia on a few occasions as well, she actually saw cardiology last week 1/21 for incidental abnormal EKG and was overall reassured and will be going for stress test           History provided by:  Patient      Prior to Admission Medications   Prescriptions Last Dose Informant Patient Reported? Taking?    cephalexin (KEFLEX) 500 mg capsule   No Yes   Sig: Take 1 capsule (500 mg total) by mouth 4 (four) times a day for 7 days sulfamethoxazole-trimethoprim (BACTRIM DS) 800-160 mg per tablet   No Yes   Sig: Take 1 tablet by mouth 2 (two) times a day for 7 days smx-tmp DS (BACTRIM) 800-160 mg tabs (1tab q12 D10)      Facility-Administered Medications: None       Past Medical History:   Diagnosis Date    Asthma     Cat-scratch disease     last assessed 10/22/15    Depression     Hypertension     last assessed 11/11/14    Sleep disorder        Past Surgical History:   Procedure Laterality Date    BREAST BIOPSY Left     2016    [place] BX/REMV,LYMPH NODE,DEEP AXILL Left 1/15/2019    Procedure: LEFT AXILLARY LYMPH NODE BIOPSY;  Surgeon: Bhavna Sorenson MD;  Location: [facility];  Service: General    TUBAL LIGATION         Family History   Problem Relation Age of Onset    Anxiety disorder Mother     Bipolar disorder Mother     Depression Mother     Schizophrenia Mother     Parkinsonism Mother         tremor    Anxiety disorder Father     Bipolar disorder Father     Depression Father     Schizophrenia Father     Stomach cancer Paternal Grandmother      I have reviewed and agree with the history as documented  Social History   Substance Use Topics    Smoking status: Never Smoker    Smokeless tobacco: Never Used    Alcohol use No        Review of Systems   Constitutional: Negative for chills and fever  HENT: Negative for congestion and sore throat  Eyes: Negative for pain and visual disturbance  Respiratory: Negative for cough and shortness of breath  Cardiovascular: Negative for chest pain and leg swelling  Gastrointestinal: Negative for abdominal pain, diarrhea and vomiting  Genitourinary: Negative for decreased urine volume and difficulty urinating  Musculoskeletal: Negative for back pain and gait problem  Skin: Positive for wound (left axilla)  Negative for rash  Allergic/Immunologic: Negative for immunocompromised state  Neurological: Negative for dizziness and headaches     Hematological: Positive for adenopathy (left axilla)  Does not bruise/bleed easily  Physical Exam  Physical Exam   Constitutional: She is oriented to person, place, and time  She appears well-developed and well-nourished  No distress  HENT:   Head: Normocephalic and atraumatic  Mouth/Throat: Oropharynx is clear and moist    Eyes: Pupils are equal, round, and reactive to light  Conjunctivae are normal    Neck: Normal range of motion  Neck supple  Cardiovascular: Normal rate, regular rhythm, normal heart sounds and intact distal pulses  No murmur heard  Pulmonary/Chest: Effort normal and breath sounds normal  No respiratory distress  She exhibits no tenderness  Abdominal: Soft  Bowel sounds are normal    Musculoskeletal: She exhibits no edema  Mild puffiness to dorsal left hand, forearm   Lymphadenopathy:     She has no cervical adenopathy  She has axillary adenopathy  Right axillary: No lateral adenopathy present  Left axillary: Lateral (left axilla with semi healed straight incision (lymphadenectomy) with) adenopathy present  Right: No supraclavicular adenopathy present  Left: No supraclavicular adenopathy present  Neurological: She is alert and oriented to person, place, and time  Skin: Skin is warm and dry  Capillary refill takes less than 2 seconds  No rash noted  She is not diaphoretic  No erythema  No pallor  Psychiatric: She has a normal mood and affect  Nursing note and vitals reviewed        Vital Signs  ED Triage Vitals [01/27/19 1125]   Temperature Pulse Respirations Blood Pressure SpO2   98 8 °F (37 1 °C) (!) 125 18 140/99 99 %      Temp Source Heart Rate Source Patient Position - Orthostatic VS BP Location FiO2 (%)   Temporal Right Sitting Right arm --      Pain Score       Worst Possible Pain           Vitals:    01/27/19 1125   BP: 140/99   Pulse: (!) 125   Patient Position - Orthostatic VS: Sitting       Visual Acuity      ED Medications  Medications   ketorolac (TORADOL) injection 15 mg (15 mg Intravenous Given 1/27/19 1437)       Diagnostic Studies  Results Reviewed     Procedure Component Value Units Date/Time    [place] draw [[id number]] Collected:  01/27/19 1227    Lab Status:  Final result Specimen:  Blood Updated:  01/27/19 1401    Narrative: The following orders were created for panel order [place] draw  Procedure                               Abnormality         Status                     ---------                               -----------         ------                     Loyde Rigoberto top on LBZP[[id number]]                                 Final result                 Please view results for these tests on the individual orders  Lactic acid, plasma [[id number]]  (Normal) Collected:  01/27/19 1221    Lab Status:  Final result Specimen:  Blood from Arm, Right Updated:  01/27/19 1252     LACTIC ACID 1 1 mmol/L     Narrative:         Result may be elevated if tourniquet was used during collection  Basic metabolic panel [[id number]] Collected:  01/27/19 1221    Lab Status:  Final result Specimen:  Blood from Arm, Right Updated:  01/27/19 1243     Sodium 139 mmol/L      Potassium 4 4 mmol/L      Chloride 102 mmol/L      CO2 26 mmol/L      ANION GAP 11 mmol/L      BUN 12 mg/dL      Creatinine 0 80 mg/dL      Glucose 101 mg/dL      Calcium 8 7 mg/dL      eGFR 96 ml/min/1 73sq m     Narrative:         National Kidney Disease Education Program recommendations are as follows:  GFR calculation is accurate only with a steady state creatinine  Chronic Kidney disease less than 60 ml/min/1 73 sq  meters  Kidney failure less than 15 ml/min/1 73 sq  meters      CBC and differential [[id number]]  (Abnormal) Collected:  01/27/19 1221    Lab Status:  Final result Specimen:  Blood from Arm, Right Updated:  01/27/19 1230     WBC 5 14 Thousand/uL      RBC 4 67 Million/uL      Hemoglobin 14 3 g/dL      Hematocrit 43 1 %      MCV 92 fL      MCH 30 6 pg      MCHC 33 2 g/dL      RDW 13 1 % MPV 10 1 fL      Platelets 362 Thousands/uL      nRBC 0 /100 WBCs      Neutrophils Relative 62 %      Immat GRANS % 0 %      Lymphocytes Relative 10 (L) %      Monocytes Relative 14 (H) %      Eosinophils Relative 14 (H) %      Basophils Relative 0 %      Neutrophils Absolute 3 11 Thousands/µL      Immature Grans Absolute 0 02 Thousand/uL      Lymphocytes Absolute 0 52 (L) Thousands/µL      Monocytes Absolute 0 74 Thousand/µL      Eosinophils Absolute 0 74 (H) Thousand/µL      Basophils Absolute 0 01 Thousands/µL                  VAS upper limb venous duplex scan, unilateral/limited    (Results Pending)              Procedures  Procedures       Phone Contacts  ED Phone Contact    ED Course  ED Course as of Jan 27 1542   Sun Jan 27, 2019   1241 WBC: 5 14   1241 Hemoglobin: 14 3   1241 HCT: 43 1   1241 Platelet Count: 055   1255 LACTIC ACID: 1 1   1255 Sodium: 139   1255 Potassium: 4 4   1255 Chloride: 102   1255 CO2: 26   1255 Anion Gap: 11   1255 BUN: 12   1255 Creatinine: 0 80   1255 Glucose, Random: 101   1255 Calcium: 8 7   1255 eGFR: 96   1312 Labwork is overall very normal today  Improved/resolved compared to 6 days ago  Pt is going for US shortly       2928 Pt over at [address] now    1411 Doppler negative for DVT left upper extremity    1447 Reassess patient doing OK  Reviewed labs and US results  Took additional images of wound for comparison to priors 1/21 in ED  Will prep for discharge with PO nsaid and acetaminophen for pain control  To call for wound check with surgery office this week          MDM  Number of Diagnoses or Management Options  Postoperative pain: established and worsening     Amount and/or Complexity of Data Reviewed  Review and summarize past medical records: yes  Discuss the patient with other providers: yes  Independent visualization of images, tracings, or specimens: yes    Risk of Complications, Morbidity, and/or Mortality  Presenting problems: moderate  Diagnostic procedures: moderate  Management options: moderate    Patient Progress  Patient progress: stable    CritCare Time    Disposition  Final diagnoses:   Postoperative pain     Time reflects when diagnosis was documented in both MDM as applicable and the Disposition within this note     Time User Action Codes Description Comment    1/27/2019  2:30 PM Onesimo Esquedabarbi [G89 18] Postoperative pain     1/27/2019  2:30 PM Hermes Byrd Add [I88 9] Axillary lymphadenitis     1/27/2019  2:30 PM Hermes Byrd Modify [I88 9] Axillary lymphadenitis subsequent encounter      ED Disposition     ED Disposition Condition Comment    Discharge  Coby Bravo discharge to home/self care      Condition at discharge: Good        Follow-up Information     Follow up With Specialties Details Why Contact Info Additional 088 Jorge Shepherd MD Family Medicine Schedule an appointment as soon as possible for a visit For wound re-check [address] [place] [address] 17  [facility] Schedule an appointment as soon as possible for a visit For wound re-check [address] [phone]  [address] [place] [address], [phone]          Discharge Medication List as of 1/27/2019  2:51 PM      START taking these medications    Details   acetaminophen (TYLENOL) 325 mg tablet Take 2 tablets (650 mg total) by mouth every 6 (six) hours as needed (pain), Starting Sun 1/27/2019, Normal      ketorolac (TORADOL) 10 mg tablet Take 1 tablet (10 mg total) by mouth 3 (three) times a day as needed for moderate pain, Starting Sun 1/27/2019, Normal         CONTINUE these medications which have NOT CHANGED    Details   cephalexin (KEFLEX) 500 mg capsule Take 1 capsule (500 mg total) by mouth 4 (four) times a day for 7 days, Starting Mon 1/21/2019, Until Mon 1/28/2019, Print sulfamethoxazole-trimethoprim (BACTRIM DS) 800-160 mg per tablet Take 1 tablet by mouth 2 (two) times a day for 7 days smx-tmp DS (BACTRIM) 800-160 mg tabs (1tab q12 D10), Starting Mon 1/21/2019, Until Mon 1/28/2019, Print           No discharge procedures on file      ED Provider  Electronically Signed by           Rickie Gaona PA-C  01/27/19 1890

## 2019-01-28 ENCOUNTER — TELEPHONE (OUTPATIENT)
Dept: SURGERY | Facility: HOSPITAL | Age: 34
End: 2019-01-28

## 2019-01-28 NOTE — TELEPHONE ENCOUNTER
Called patient insurance company for second time to get paperwork for patient, have not received fax, spoke to patient she is going to call to have them fax again

## 2019-01-29 ENCOUNTER — OFFICE VISIT (OUTPATIENT)
Dept: SURGERY | Facility: HOSPITAL | Age: 34
End: 2019-01-29

## 2019-01-29 VITALS
HEIGHT: 62 IN | DIASTOLIC BLOOD PRESSURE: 89 MMHG | HEART RATE: 119 BPM | WEIGHT: 179 LBS | BODY MASS INDEX: 32.94 KG/M2 | SYSTOLIC BLOOD PRESSURE: 140 MMHG | TEMPERATURE: 99.1 F

## 2019-01-29 DIAGNOSIS — R22.32 MASS OF LEFT AXILLA: Primary | ICD-10-CM

## 2019-01-29 DIAGNOSIS — G89.18 POSTOPERATIVE PAIN: ICD-10-CM

## 2019-01-29 PROCEDURE — 99024 POSTOP FOLLOW-UP VISIT: CPT | Performed by: SURGERY

## 2019-01-29 NOTE — PROGRESS NOTES
Assessment/Plan:    Enlarged lymph node  Status post excision of biopsy of necrotic lymph nodes of the left axilla  Continues to have some pain and swelling of the left axilla and left arm  Multiple trips to the ER  Recent trip she underwent a duplex which did not reveal any clot  On exam there is a small wound dehiscence with some serous drainage  There is still some mild erythema around the wound which could just be from Wound Healing peeling in addition there is some noted blanching erythema that shows posterior to the incision  She is currently on both Bactrim and Keflex  CT scan from the 21st of this month was unremarkable for any significant fluid collection  Patient will finish the course of antibiotics  She will follow up with Dr Jessica Bojorquez infectious disease on this coming Monday  I will send her for an ultrasound of the left axilla to rule out any underlying fluid collection  The edema of the left upper extremity likely represents lymphedema from the recent excision  Will continue to monitor for now but if does not improve she may need to go to lymphedema Clinic for appropriate wrapping  I will see her in 2 weeks to follow up in the wound  Diagnoses and all orders for this visit:    Mass of left axilla  -     US extremity soft tissue; Future    Postoperative pain  -     US extremity soft tissue; Future          Subjective:      Patient ID: Lee Ann Kamara is a 28 y o  female  69-year-old female with a history of Bartonella now status post excision of necrotic lymph nodes left axilla, presents for follow-up  Patient continues to have significant pain at the at the incision of the left axilla  She also continues to have some serous drainage  Non foul smelling  Denies any fevers or chills  Multiple trips to the ER due to this worsening pain  CT scan on the 21st revealed postoperative changes with no fluid collection    Her recent this the 27th she underwent a duplex of the left upper extremity due to some edema and was found not to have any clots  She is currently on antibiotics for potential surrounding infection of the left axilla incision  The incision itself is slightly red as per the patient does have some drainage  Patient currently states the pain is more less stable continues to throbbing now travels down her left arm  She also complains of some worsening edema of the left upper extremity  The following portions of the patient's history were reviewed and updated as appropriate:   She  has a past medical history of Asthma; Cat-scratch disease; Depression; Hypertension; and Sleep disorder  She   Patient Active Problem List    Diagnosis Date Noted    Other chest pain 01/21/2019    Abnormal EKG 01/21/2019    Essential hypertension 01/21/2019    Enlarged lymph node 01/09/2019    Mass of left axilla 12/26/2018    Bronchitis 12/26/2018    Seasonal allergic rhinitis 11/30/2018    Screening for cervical cancer 11/30/2018    Numbness and tingling of both upper extremities while sleeping 05/01/2018    Urine ketones 03/15/2018    Depression 09/12/2016    Intention tremor 09/12/2016     She  has a past surgical history that includes Tubal ligation; Breast biopsy (Left); and pr bx/remv,lymph node,deep axill (Left, 1/15/2019)  Her family history includes Anxiety disorder in her father and mother; Bipolar disorder in her father and mother; Depression in her father and mother; Parkinsonism in her mother; Schizophrenia in her father and mother; Stomach cancer in her paternal grandmother  She  reports that she has never smoked  She has never used smokeless tobacco  She reports that she does not drink alcohol or use drugs    Current Outpatient Prescriptions   Medication Sig Dispense Refill    acetaminophen (TYLENOL) 325 mg tablet Take 2 tablets (650 mg total) by mouth every 6 (six) hours as needed (pain) 30 tablet 0    ketorolac (TORADOL) 10 mg tablet Take 1 tablet (10 mg total) by mouth 3 (three) times a day as needed for moderate pain 20 tablet 0     No current facility-administered medications for this visit  She is allergic to codeine       Review of Systems   Constitutional: Negative for activity change, appetite change, chills, diaphoresis, fatigue, fever and unexpected weight change  Skin: Positive for color change (Left upper extremity at/axilla) and wound  Pallor:  left axilla  Objective:      /89   Pulse (!) 119   Temp 99 1 °F (37 3 °C)   Ht 5' 2" (1 575 m)   Wt 81 2 kg (179 lb)   LMP 01/13/2019   BMI 32 74 kg/m²          Physical Exam   Constitutional: She appears well-developed and well-nourished  No distress  Musculoskeletal: She exhibits edema (Left upper extremity) and tenderness (Left axilla upper extremity)  There is a left axillary incision which is slightly swollen and the more superior anterior position with some noted erythema  No fluctuance noted there is some firmness consistent with postop changes  The more inferior posterior portion incision is slightly open approximately 3 millimeters and there appears to be some serous drainage  There is some noted posterior blanching erythema  The entire area is very tender with palpation  Cold to touch though  Skin: Skin is warm  No rash noted  She is not diaphoretic  There is erythema  No pallor  Vitals reviewed

## 2019-01-29 NOTE — ASSESSMENT & PLAN NOTE
Status post excision of biopsy of necrotic lymph nodes of the left axilla  Continues to have some pain and swelling of the left axilla and left arm  Multiple trips to the ER  Recent trip she underwent a duplex which did not reveal any clot  On exam there is a small wound dehiscence with some serous drainage  There is still some mild erythema around the wound which could just be from Wound Healing peeling in addition there is some noted blanching erythema that shows posterior to the incision  She is currently on both Bactrim and Keflex  CT scan from the 21st of this month was unremarkable for any significant fluid collection  Patient will finish the course of antibiotics  She will follow up with Dr Mary Lopez infectious disease on this coming Monday  I will send her for an ultrasound of the left axilla to rule out any underlying fluid collection  The edema of the left upper extremity likely represents lymphedema from the recent excision  Will continue to monitor for now but if does not improve she may need to go to lymphedema Clinic for appropriate wrapping  I will see her in 2 weeks to follow up in the wound

## 2019-02-01 ENCOUNTER — HOSPITAL ENCOUNTER (OUTPATIENT)
Dept: ULTRASOUND IMAGING | Facility: HOSPITAL | Age: 34
Discharge: HOME | End: 2019-02-01
Attending: SURGERY
Payer: COMMERCIAL

## 2019-02-01 DIAGNOSIS — G89.18 POSTOPERATIVE PAIN: ICD-10-CM

## 2019-02-01 DIAGNOSIS — R22.32 MASS OF LEFT AXILLA: ICD-10-CM

## 2019-02-01 PROCEDURE — 76882 US LMTD JT/FCL EVL NVASC XTR: CPT

## 2019-02-04 ENCOUNTER — PATIENT MESSAGE (OUTPATIENT)
Dept: SURGERY | Facility: HOSPITAL | Age: 34
End: 2019-02-04

## 2019-02-04 ENCOUNTER — OFFICE VISIT (OUTPATIENT)
Dept: INFECTIOUS DISEASES | Facility: HOSPITAL | Age: 34
End: 2019-02-04
Payer: COMMERCIAL

## 2019-02-04 ENCOUNTER — APPOINTMENT (OUTPATIENT)
Dept: LAB | Facility: HOSPITAL | Age: 34
End: 2019-02-04
Attending: INTERNAL MEDICINE
Payer: COMMERCIAL

## 2019-02-04 VITALS
TEMPERATURE: 99.8 F | BODY MASS INDEX: 34.78 KG/M2 | HEIGHT: 62 IN | WEIGHT: 189 LBS | DIASTOLIC BLOOD PRESSURE: 90 MMHG | HEART RATE: 82 BPM | SYSTOLIC BLOOD PRESSURE: 149 MMHG

## 2019-02-04 DIAGNOSIS — A28.1 CAT-SCRATCH DISEASE: ICD-10-CM

## 2019-02-04 DIAGNOSIS — L04.9 LYMPHADENITIS, ACUTE: ICD-10-CM

## 2019-02-04 DIAGNOSIS — Z98.890 S/P LYMPH NODE BIOPSY: ICD-10-CM

## 2019-02-04 DIAGNOSIS — D72.10 EOSINOPHILIA: ICD-10-CM

## 2019-02-04 DIAGNOSIS — I88.8 NECROTIZING GRANULOMA PRESENT ON BIOPSY OF LYMPH NODE: ICD-10-CM

## 2019-02-04 DIAGNOSIS — L04.9 LYMPHADENITIS, ACUTE: Primary | ICD-10-CM

## 2019-02-04 LAB
ALBUMIN SERPL BCP-MCNC: 3.7 G/DL (ref 3.5–5)
ALP SERPL-CCNC: 78 U/L (ref 46–116)
ALT SERPL W P-5'-P-CCNC: 27 U/L (ref 12–78)
ANION GAP SERPL CALCULATED.3IONS-SCNC: 9 MMOL/L (ref 4–13)
AST SERPL W P-5'-P-CCNC: 13 U/L (ref 5–45)
BASOPHILS # BLD AUTO: 0.12 THOUSANDS/ΜL (ref 0–0.1)
BASOPHILS NFR BLD AUTO: 1 % (ref 0–1)
BILIRUB SERPL-MCNC: 0.3 MG/DL (ref 0.2–1)
BUN SERPL-MCNC: 10 MG/DL (ref 5–25)
CALCIUM SERPL-MCNC: 9 MG/DL (ref 8.3–10.1)
CHLORIDE SERPL-SCNC: 103 MMOL/L (ref 100–108)
CO2 SERPL-SCNC: 26 MMOL/L (ref 21–32)
CREAT SERPL-MCNC: 0.65 MG/DL (ref 0.6–1.3)
EOSINOPHIL # BLD AUTO: 1.25 THOUSAND/ΜL (ref 0–0.61)
EOSINOPHIL NFR BLD AUTO: 14 % (ref 0–6)
ERYTHROCYTE [DISTWIDTH] IN BLOOD BY AUTOMATED COUNT: 13.4 % (ref 11.6–15.1)
GFR SERPL CREATININE-BSD FRML MDRD: 116 ML/MIN/1.73SQ M
GLUCOSE SERPL-MCNC: 99 MG/DL (ref 65–140)
HCT VFR BLD AUTO: 42.9 % (ref 34.8–46.1)
HGB BLD-MCNC: 13.8 G/DL (ref 11.5–15.4)
IMM GRANULOCYTES # BLD AUTO: 0.03 THOUSAND/UL (ref 0–0.2)
IMM GRANULOCYTES NFR BLD AUTO: 0 % (ref 0–2)
LYMPHOCYTES # BLD AUTO: 1.52 THOUSANDS/ΜL (ref 0.6–4.47)
LYMPHOCYTES NFR BLD AUTO: 17 % (ref 14–44)
MCH RBC QN AUTO: 30.1 PG (ref 26.8–34.3)
MCHC RBC AUTO-ENTMCNC: 32.2 G/DL (ref 31.4–37.4)
MCV RBC AUTO: 94 FL (ref 82–98)
MONOCYTES # BLD AUTO: 0.59 THOUSAND/ΜL (ref 0.17–1.22)
MONOCYTES NFR BLD AUTO: 7 % (ref 4–12)
NEUTROPHILS # BLD AUTO: 5.39 THOUSANDS/ΜL (ref 1.85–7.62)
NEUTS SEG NFR BLD AUTO: 61 % (ref 43–75)
NRBC BLD AUTO-RTO: 0 /100 WBCS
PLATELET # BLD AUTO: 300 THOUSANDS/UL (ref 149–390)
PMV BLD AUTO: 9.7 FL (ref 8.9–12.7)
POTASSIUM SERPL-SCNC: 4.5 MMOL/L (ref 3.5–5.3)
PROT SERPL-MCNC: 7.1 G/DL (ref 6.4–8.2)
RBC # BLD AUTO: 4.58 MILLION/UL (ref 3.81–5.12)
SODIUM SERPL-SCNC: 138 MMOL/L (ref 136–145)
WBC # BLD AUTO: 8.9 THOUSAND/UL (ref 4.31–10.16)

## 2019-02-04 PROCEDURE — 99214 OFFICE O/P EST MOD 30 MIN: CPT | Performed by: INTERNAL MEDICINE

## 2019-02-04 PROCEDURE — 86611 BARTONELLA ANTIBODY: CPT

## 2019-02-04 PROCEDURE — 36415 COLL VENOUS BLD VENIPUNCTURE: CPT | Performed by: INTERNAL MEDICINE

## 2019-02-04 PROCEDURE — 86038 ANTINUCLEAR ANTIBODIES: CPT

## 2019-02-04 PROCEDURE — 85025 COMPLETE CBC W/AUTO DIFF WBC: CPT | Performed by: INTERNAL MEDICINE

## 2019-02-04 PROCEDURE — 80053 COMPREHEN METABOLIC PANEL: CPT | Performed by: INTERNAL MEDICINE

## 2019-02-04 NOTE — PROGRESS NOTES
FOLLOW UP - Infectious Disease   Niecy Clemons 28 y o  female MRN: [id number]  Unit/Bed#:  Encounter: [id number]      IMPRESSION & RECOMMENDATIONS:     28year old female with history of cat scratch disease presents with acute left axillary lymphadenitis     1  Acute left axillary lymphadenitis  - s/p excisional biopsy  - Path notes necrotizing granulomatous infection, some Warthin Starry stain positivity but negative PCR  Differential diagnosis includes infectious causes such as recurrent Cat scratch disease  Other causes include TB although PPD was negative   - Non infectious causes include malignancy, autoimmune disease, sarcoidosis  - Patient had post op pain and swellng with concern for post op infection that now appears to be responding to oral antibiotics     · Repeat bartonella serology, check CBC/diff and VERONICA  · Recommend symptomatic care and monitoring  If symptoms worsen or she notes recurrent swelling/erythema will need to pursue additional work up  · No further abx treatment at present, patient has completed a course of azithromycin     2  History of cat scratch disease  - Positive serology and biopsy in 2015- treated and resolved     2/7 Addendum: Patient has new peripheral eosinophilia  No new symptoms  She has new onset wheezing and mild shortness of breath, no history of asthma in the past    Plan to refer to Oncology and check stool for parasites    HISTORY OF PRESENT ILLNESS:    HPI: Niecy Clemons is a 28y o  year old female with a history of cat scratch disease who was seen in the office on 1/14 with acute left axillary lymphadenitis  INTERVAL HISTORY:  She underwent an excision biopsy on 1/14  Path noted necrotizing granulomatous inflammation involving fibroadipose tissue  Warthin Starry stain highlights very rare occasional possible organisms raising the possibility of Bartonella, although PCR was negative   She was seen in the ER on 1/21 with a low grade fever of 100 5 and left axillary pain and discharge  CT showed post op changes  She was discharged from the ER on keflex and bactrim  She has been evaluated multiple times since then for axillary pain and discharge  Denies fevers, chills, or sweats  Denies nausea, vomiting, or diarrhea  REVIEW OF SYSTEMS:  A complete 12 point system-based review of systems is otherwise negative  PAST MEDICAL HISTORY:  Past Medical History:   Diagnosis Date    Asthma     Cat-scratch disease     last assessed 10/22/15    Depression     Hypertension     last assessed 11/11/14    Sleep disorder      Past Surgical History:   Procedure Laterality Date    BREAST BIOPSY Left     2016    LA BX/REMV,LYMPH NODE,DEEP AXILL Left 1/15/2019    Procedure: LEFT AXILLARY LYMPH NODE BIOPSY;  Surgeon: Elyssa Harper MD;  Location: [facility];  Service: General    TUBAL LIGATION         FAMILY HISTORY:  Non-contributory    SOCIAL HISTORY:  Social History   History   Alcohol Use No     History   Drug Use No     History   Smoking Status    Never Smoker   Smokeless Tobacco    Never Used       ALLERGIES:  Allergies   Allergen Reactions    Codeine Hives     Tolerated Percocet, Vicodin, etc        MEDICATIONS:  All current active medications have been reviewed    Antibiotics:    PHYSICAL EXAM:  Vitals:    02/04/19 1024   BP: 149/90   Pulse: 82   Temp: 99 8 °F (37 7 °C)   Weight: 85 7 kg (189 lb)   Height: 5' 2" (1 575 m)         General Appearance:  Appearing well, nontoxic, and in no distress   Head:  Normocephalic, without obvious abnormality, atraumatic   Eyes:  Conjunctiva pink and sclera anicteric, both eyes   Nose: Nares normal, mucosa normal, no drainage   Throat: Oropharynx moist without lesions   Neck: Supple, symmetrical, no adenopathy, no tenderness/mass/nodules   Back:   Symmetric, no curvature, ROM normal, no CVA tenderness   Lungs:   Clear to auscultation bilaterally, respirations unlabored   Chest Wall:  No tenderness or deformity   Heart:  RRR; no murmur, rub or gallop   Abdomen:   Soft, non-tender, non-distended, positive bowel sounds,    Extremities: Mild edema left arm    Skin: Mild superficial wound dehiscence, no drainage, no surrounding erythema  Tender to palpation   Lymph nodes: Cervical, supraclavicular nodes normal   Neurologic: Alert and oriented times 3, extremity strength 5/5 and symmetric       LABS, IMAGING, & OTHER STUDIES:  Lab Results:  I have personally reviewed pertinent labs  Lab Results   Component Value Date    K 4 4 01/27/2019     01/27/2019    CO2 26 01/27/2019    BUN 12 01/27/2019    CREATININE 0 80 01/27/2019    GLUF 78 01/08/2019    CALCIUM 8 7 01/27/2019    AST 11 01/21/2019    ALT 18 01/21/2019    ALKPHOS 100 01/21/2019    EGFR 96 01/27/2019     Lab Results   Component Value Date    WBC 5 14 01/27/2019    HGB 14 3 01/27/2019    HCT 43 1 01/27/2019    MCV 92 01/27/2019     01/27/2019   No results found for: SEDRATE      Imaging Studies:   I have personally reviewed pertinent imaging study reports and images in PACS  Other Studies:   I have personally reviewed pertinent reports  Labs, medical tests and imaging studies were independently reviewed by me as noted above in HPI and old records were obtained and summarized as noted above in HPI

## 2019-02-05 ENCOUNTER — OFFICE VISIT (OUTPATIENT)
Dept: SURGERY | Facility: HOSPITAL | Age: 34
End: 2019-02-05

## 2019-02-05 VITALS
HEIGHT: 62 IN | SYSTOLIC BLOOD PRESSURE: 126 MMHG | TEMPERATURE: 97.9 F | WEIGHT: 190 LBS | BODY MASS INDEX: 34.96 KG/M2 | HEART RATE: 97 BPM | DIASTOLIC BLOOD PRESSURE: 82 MMHG

## 2019-02-05 DIAGNOSIS — I89.0 LYMPHEDEMA: Primary | ICD-10-CM

## 2019-02-05 PROCEDURE — 99024 POSTOP FOLLOW-UP VISIT: CPT | Performed by: SURGERY

## 2019-02-05 NOTE — LETTER
February 8, 2019     Patient: Julio Baires   YOB: 1985   Date of Visit: 2/5/2019       To Whom it May Concern:    Nereyda Monsivais is under my professional care  She was seen in my office on 2/5/2019  She is unable to return to work until 3/4/2019 due to her medical condition  She will be re-evaluated on 2/28/19  If you have any questions or concerns, please don't hesitate to call           Sincerely,          Carrie Evans DO        CC: No Recipients

## 2019-02-06 LAB — RYE IGE QN: NEGATIVE

## 2019-02-07 ENCOUNTER — OFFICE VISIT (OUTPATIENT)
Dept: INTERNAL MEDICINE CLINIC | Facility: CLINIC | Age: 34
End: 2019-02-07
Payer: COMMERCIAL

## 2019-02-07 ENCOUNTER — TELEPHONE (OUTPATIENT)
Dept: INTERNAL MEDICINE CLINIC | Facility: CLINIC | Age: 34
End: 2019-02-07

## 2019-02-07 VITALS
BODY MASS INDEX: 35.2 KG/M2 | WEIGHT: 191.3 LBS | SYSTOLIC BLOOD PRESSURE: 128 MMHG | TEMPERATURE: 98.5 F | HEIGHT: 62 IN | HEART RATE: 88 BPM | OXYGEN SATURATION: 98 % | DIASTOLIC BLOOD PRESSURE: 80 MMHG

## 2019-02-07 DIAGNOSIS — R22.32 MASS OF LEFT AXILLA: Primary | ICD-10-CM

## 2019-02-07 DIAGNOSIS — A28.1 CAT-SCRATCH DISEASE: ICD-10-CM

## 2019-02-07 DIAGNOSIS — Z91.89: ICD-10-CM

## 2019-02-07 PROBLEM — Z12.4 SCREENING FOR CERVICAL CANCER: Status: RESOLVED | Noted: 2018-11-30 | Resolved: 2019-02-07

## 2019-02-07 PROBLEM — J40 BRONCHITIS: Status: RESOLVED | Noted: 2018-12-26 | Resolved: 2019-02-07

## 2019-02-07 LAB
B HENSELAE IGG TITR SER IF: ABNORMAL TITER
B HENSELAE IGM TITR SER IF: NEGATIVE TITER
B QUINTANA IGG TITR SER IF: NEGATIVE TITER
B QUINTANA IGM TITR SER IF: NEGATIVE TITER
SL AMB POCT HEMOGLOBIN AIC: 5.3 (ref ?–6.5)

## 2019-02-07 PROCEDURE — 83036 HEMOGLOBIN GLYCOSYLATED A1C: CPT | Performed by: NURSE PRACTITIONER

## 2019-02-07 PROCEDURE — 3044F HG A1C LEVEL LT 7.0%: CPT | Performed by: NURSE PRACTITIONER

## 2019-02-07 PROCEDURE — 99214 OFFICE O/P EST MOD 30 MIN: CPT | Performed by: NURSE PRACTITIONER

## 2019-02-07 NOTE — PROGRESS NOTES
Assessment/Plan:Acute left axillary Lymphadenitis-  s/p excisional biopsy  Path notes necrotizing granulomatous infection, some Warthin Starry stain positivity but negative PCR  Differential diagnosis includes infectious causes such as recurrent Cat scratch disease  Other causes include TB although PPD was negative     Non infectious causes include malignancy, autoimmune disease, sarcoidosis  Patient had post op pain and swellng with concern for post op infection that now appears to be responding to oral antibiotics  Patient was noted to have increased eosinophilia  She is to have repeat lab work and stools for parasites and was referred to Oncology  History of Cat Scratch Disease- positive serology and biopsy in 2015 treated and resolved  Patient was advised if she does not hear back from Oncology to call our office and will schedule  A1C in the office was 5 3 which was stable  She is following up with Surgery and at this time we did notify Dr Phi Alba office regarding her continued pain and drainage  She is following up with the wound clinic on Monday  They did advise her if any increased pain or drainage to go to ER  Will follow up as needed at this time  No problem-specific Assessment & Plan notes found for this encounter  Problem List Items Addressed This Visit     Mass of left axilla - Primary    Cat-scratch disease      Other Visit Diagnoses     At risk for altered blood glucose level        Relevant Orders    POCT hemoglobin A1c (Completed)            Subjective:      Patient ID: Ervin Millard is a 28 y o  female  Blondie Reas is here today for an acute visit  She did have an excision biopsy on 1/14  Pathology noted necrotizing granulomatous inflammation involving fibroadipose tissue  Warthin starry stain highlights were very rare occasional possible organisms raising the possibility of Bartonella, although PCR was negative    She was then seen in the ER on 1/21 with a low grade fever of 100 5 and left axillary pain  Recent CT showing post op changes she was then sent home on Keflex and Bactrim  She has followed back up with Surgery regarding her pain in her left axilla and lymphedema in her left arm  She is going to the Lymphedema clinic on Monday  She states her wound did open up more and she is having pain and yellow thick drainage  She currently is not on an antibiotic  She does need to make an appointment with Oncology and does need to give stool samples  She denies any fever,chills, or body aches  She states she is getting very worried about her lab work and she was told by ID to get checked for DM  She states she is to have other lab work done for ID and did call regarding her Eosinophilias being elevated on her lab work  She is getting very frustrated with everything  She offers no other issues  The following portions of the patient's history were reviewed and updated as appropriate:   She  has a past medical history of Asthma; Cat-scratch disease; Depression; Hypertension; and Sleep disorder  She   Patient Active Problem List    Diagnosis Date Noted    Cat-scratch disease 02/07/2019    Lymphedema 02/05/2019    Other chest pain 01/21/2019    Abnormal EKG 01/21/2019    Essential hypertension 01/21/2019    Enlarged lymph node 01/09/2019    Mass of left axilla 12/26/2018    Seasonal allergic rhinitis 11/30/2018    Numbness and tingling of both upper extremities while sleeping 05/01/2018    Urine ketones 03/15/2018    Depression 09/12/2016    Intention tremor 09/12/2016     She  has a past surgical history that includes Tubal ligation; Breast biopsy (Left); and pr bx/remv,lymph node,deep axill (Left, 1/15/2019)    Her family history includes Anxiety disorder in her father and mother; Bipolar disorder in her father and mother; Depression in her father and mother; Parkinsonism in her mother; Schizophrenia in her father and mother; Stomach cancer in her paternal grandmother  She  reports that she has never smoked  She has never used smokeless tobacco  She reports that she does not drink alcohol or use drugs  Current Outpatient Prescriptions   Medication Sig Dispense Refill    acetaminophen (TYLENOL) 325 mg tablet Take 2 tablets (650 mg total) by mouth every 6 (six) hours as needed (pain) 30 tablet 0    ketorolac (TORADOL) 10 mg tablet Take 1 tablet (10 mg total) by mouth 3 (three) times a day as needed for moderate pain 20 tablet 0     No current facility-administered medications for this visit  Current Outpatient Prescriptions on File Prior to Visit   Medication Sig    acetaminophen (TYLENOL) 325 mg tablet Take 2 tablets (650 mg total) by mouth every 6 (six) hours as needed (pain)    ketorolac (TORADOL) 10 mg tablet Take 1 tablet (10 mg total) by mouth 3 (three) times a day as needed for moderate pain     No current facility-administered medications on file prior to visit  She is allergic to codeine       Review of Systems   Constitutional: Negative  HENT: Negative  Eyes: Negative  Respiratory: Negative  Cardiovascular: Negative  Gastrointestinal: Negative  Endocrine: Negative  Genitourinary: Negative  Musculoskeletal: Positive for joint swelling  Skin: Positive for wound  Allergic/Immunologic: Negative  Neurological: Negative  Hematological: Negative  Psychiatric/Behavioral: Negative  Below is the patient's most recent value for Albumin, ALT, AST, BUN, Calcium, Chloride, Cholesterol, CO2, Creatinine, GFR, Glucose, HDL, Hematocrit, Hemoglobin, Hemoglobin A1C, LDL, Magnesium, Phosphorus, Platelets, Potassium, PSA, Sodium, Triglycerides, and WBC     Lab Results   Component Value Date    ALT 27 02/04/2019    AST 13 02/04/2019    BUN 10 02/04/2019    CALCIUM 9 0 02/04/2019     02/04/2019    CO2 26 02/04/2019    CREATININE 0 65 02/04/2019    HCT 42 9 02/04/2019    HGB 13 8 02/04/2019    HGBA1C 5 3 02/07/2019    MG 1 8 01/17/2019    PHOS 3 1 03/09/2018     02/04/2019    K 4 5 02/04/2019    WBC 8 90 02/04/2019     Note: for a comprehensive list of the patient's lab results, access the Results Review activity  Objective:      /80 (BP Location: Right arm, Patient Position: Sitting, Cuff Size: Adult)   Pulse 88   Temp 98 5 °F (36 9 °C) (Temporal)   Ht 5' 2" (1 575 m)   Wt 86 8 kg (191 lb 4 8 oz)   LMP 01/13/2019   SpO2 98%   BMI 34 99 kg/m²          Physical Exam   Constitutional: She is oriented to person, place, and time  She appears well-developed and well-nourished  HENT:   Head: Normocephalic and atraumatic  Right Ear: External ear normal    Left Ear: External ear normal    Nose: Nose normal    Mouth/Throat: Oropharynx is clear and moist    Eyes: Pupils are equal, round, and reactive to light  Conjunctivae and EOM are normal    Neck: Normal range of motion  Neck supple  Cardiovascular: Normal rate, regular rhythm, normal heart sounds and intact distal pulses  Pulmonary/Chest: Effort normal and breath sounds normal    Abdominal: Soft  Musculoskeletal: Normal range of motion  Neurological: She is alert and oriented to person, place, and time  She has normal reflexes  Skin: Skin is warm and dry  Lymphedema noted to left arm, left axillary incision slightly swollen there is slight wound dehiscence noted with tenderness noted on exam   Small amount of yellow drainage noted no redness noted   Psychiatric: She has a normal mood and affect  Her behavior is normal  Judgment and thought content normal    Vitals reviewed

## 2019-02-07 NOTE — TELEPHONE ENCOUNTER
Called Dr Zaina Rose office and made aware of Trixie's concerns  Per Megha Banuelos the area where she had surgery has opened more, and it is painful, and burning  Per the office, they spoke with Dr Jory Diaz, and she stated that Megha Vin has an appt with wound care and with the lymphedema clinic on Monday, and that she can f/u there  If the area opens further, or is more painful to go to the ED  Did review with Megha Banuelos

## 2019-02-08 PROBLEM — R59.9 ENLARGED LYMPH NODE: Status: RESOLVED | Noted: 2019-01-09 | Resolved: 2019-02-08

## 2019-02-08 NOTE — ASSESSMENT & PLAN NOTE
Will have patient follow-up with lymphedema Clinic given the persistent swelling of the left upper extremity

## 2019-02-08 NOTE — PROGRESS NOTES
Assessment/Plan:    Lymphedema  Will have patient follow-up with lymphedema Clinic given the persistent swelling of the left upper extremity  Diagnoses and all orders for this visit:    Lymphedema  -     Ambulatory referral to PT/OT lymphedema therapy; Future          Subjective:      Patient ID: Dana Napier is a 28 y o  female  57-year-old female with a history of cat scratch disease, now status post excision of multiple necrotic lymph nodes the left axilla by my partner Dr Cathryn Bear, also suspicious for recurrent cat scratch disease for which she has been treated, now with persistent pain left axilla presents for follow-up  Overall patient is doing okay  She states she was feeling better however recently she started having worsening pain  She underwent ultrasound of the left upper extremity/axilla for which did not reveal any noted fluid collection  Denies any nausea vomiting  No fevers or chills  Does state there is some small amount of drainage from the wound  The following portions of the patient's history were reviewed and updated as appropriate:   She  has a past medical history of Asthma; Cat-scratch disease; Depression; Hypertension; and Sleep disorder  She   Patient Active Problem List    Diagnosis Date Noted    Cat-scratch disease 02/07/2019    Lymphedema 02/05/2019    Other chest pain 01/21/2019    Abnormal EKG 01/21/2019    Essential hypertension 01/21/2019    Mass of left axilla 12/26/2018    Seasonal allergic rhinitis 11/30/2018    Numbness and tingling of both upper extremities while sleeping 05/01/2018    Urine ketones 03/15/2018    Depression 09/12/2016    Intention tremor 09/12/2016     She  has a past surgical history that includes Tubal ligation; Breast biopsy (Left); and pr bx/remv,lymph node,deep axill (Left, 1/15/2019)    Her family history includes Anxiety disorder in her father and mother; Bipolar disorder in her father and mother; Depression in her father and mother; Parkinsonism in her mother; Schizophrenia in her father and mother; Stomach cancer in her paternal grandmother  She  reports that she has never smoked  She has never used smokeless tobacco  She reports that she does not drink alcohol or use drugs  Current Outpatient Prescriptions   Medication Sig Dispense Refill    acetaminophen (TYLENOL) 325 mg tablet Take 2 tablets (650 mg total) by mouth every 6 (six) hours as needed (pain) 30 tablet 0    ketorolac (TORADOL) 10 mg tablet Take 1 tablet (10 mg total) by mouth 3 (three) times a day as needed for moderate pain 20 tablet 0     No current facility-administered medications for this visit  She is allergic to codeine       Review of Systems   Constitutional: Negative  Musculoskeletal:        Left axillary pain  Skin: Positive for wound  Negative for color change, pallor and rash  Patient states that the redness of the left axilla has resolved  Objective:      /82   Pulse 97   Temp 97 9 °F (36 6 °C)   Ht 5' 2" (1 575 m)   Wt 86 2 kg (190 lb)   LMP 01/13/2019   BMI 34 75 kg/m²          Physical Exam   Constitutional: She appears well-developed and well-nourished  No distress  Musculoskeletal: She exhibits edema and tenderness  Noted edema of the left axillary region in addition to left upper extremity likely consistent lymphedema from excision multiple lymph nodes  Market tenderness at the site incision  No obvious drainage  No erythema  Skin: Skin is warm  No rash noted  She is not diaphoretic  No erythema  No pallor  The previous left axillary a erythema that was noted just posterior to the incision has resolved  There is still small dehiscence of the wound  No obvious drainage  Noted edema of the surrounding skin and subcutaneous tissue   Vitals reviewed

## 2019-02-08 NOTE — ASSESSMENT & PLAN NOTE
Patient status post excision of multiple lymph nodes the left upper extremity which were necrotic  She was treated for cat scratch disease  Currently still have some left axillary pain  Recent ultrasound showing no discrete fluid collection although there is some edema in the tissue  On exam wound does not appear to be any worse  Scant drainage noted  Small dehiscence in the posterior portion of the incision  For now will likely send her to [facility]ymphedema Clinic for this persistent pain and swelling left upper extremity  I believe she is also scheduled to be seen at the wound center for this small dehiscence

## 2019-02-09 ENCOUNTER — HOSPITAL ENCOUNTER (EMERGENCY)
Facility: HOSPITAL | Age: 34
Discharge: HOME | End: 2019-02-09
Attending: EMERGENCY MEDICINE
Payer: COMMERCIAL

## 2019-02-09 ENCOUNTER — APPOINTMENT (EMERGENCY)
Dept: RADIOLOGY | Facility: HOSPITAL | Age: 34
End: 2019-02-09
Payer: COMMERCIAL

## 2019-02-09 VITALS
WEIGHT: 188.49 LBS | HEIGHT: 62 IN | HEART RATE: 96 BPM | TEMPERATURE: 98.8 F | BODY MASS INDEX: 34.69 KG/M2 | DIASTOLIC BLOOD PRESSURE: 72 MMHG | RESPIRATION RATE: 16 BRPM | SYSTOLIC BLOOD PRESSURE: 127 MMHG | OXYGEN SATURATION: 97 %

## 2019-02-09 DIAGNOSIS — T81.30XA WOUND DEHISCENCE: ICD-10-CM

## 2019-02-09 DIAGNOSIS — J06.9 URI (UPPER RESPIRATORY INFECTION): ICD-10-CM

## 2019-02-09 DIAGNOSIS — J40 BRONCHITIS: Primary | ICD-10-CM

## 2019-02-09 PROCEDURE — 99283 EMERGENCY DEPT VISIT LOW MDM: CPT

## 2019-02-09 PROCEDURE — 94640 AIRWAY INHALATION TREATMENT: CPT

## 2019-02-09 PROCEDURE — 87070 CULTURE OTHR SPECIMN AEROBIC: CPT | Performed by: EMERGENCY MEDICINE

## 2019-02-09 PROCEDURE — 87205 SMEAR GRAM STAIN: CPT | Performed by: EMERGENCY MEDICINE

## 2019-02-09 PROCEDURE — 71046 X-RAY EXAM CHEST 2 VIEWS: CPT

## 2019-02-09 RX ORDER — ALBUTEROL SULFATE 90 UG/1
2 AEROSOL, METERED RESPIRATORY (INHALATION) EVERY 4 HOURS PRN
Qty: 1 INHALER | Refills: 0 | Status: SHIPPED | OUTPATIENT
Start: 2019-02-09 | End: 2020-10-19 | Stop reason: SDUPTHER

## 2019-02-09 RX ORDER — CLINDAMYCIN HYDROCHLORIDE 300 MG/1
300 CAPSULE ORAL EVERY 6 HOURS
Qty: 40 CAPSULE | Refills: 0 | Status: SHIPPED | OUTPATIENT
Start: 2019-02-09 | End: 2019-02-18 | Stop reason: ALTCHOICE

## 2019-02-09 RX ORDER — ALBUTEROL SULFATE 2.5 MG/3ML
2.5 SOLUTION RESPIRATORY (INHALATION) ONCE
Status: COMPLETED | OUTPATIENT
Start: 2019-02-09 | End: 2019-02-09

## 2019-02-09 RX ADMIN — ALBUTEROL SULFATE 2.5 MG: 2.5 SOLUTION RESPIRATORY (INHALATION) at 10:30

## 2019-02-09 NOTE — DISCHARGE INSTRUCTIONS
Medication as prescribed  Lots of clear liquids  See Dr Brenda Dumont this week  Acute Bronchitis   AMBULATORY CARE:   Acute bronchitis  is swelling and irritation in the air passages of your lungs  This irritation may cause you to cough or have other breathing problems  Acute bronchitis often starts because of another illness, such as a cold or the flu  The illness spreads from your nose and throat to your windpipe and airways  Bronchitis is often called a chest cold  Acute bronchitis lasts about 3 to 6 weeks and is usually not a serious illness  Your cough can last for several weeks  You may have any of the following symptoms:   · A cough with sputum that may be clear, yellow, or green    · Feeling more tired than usual, and body aches    · A fever and chills    · Wheezing when you breathe    · A tight chest or pain when you breathe or cough  Seek care immediately if:   · You cough up blood  · Your lips or fingernails turn blue  · You feel like you are not getting enough air when you breathe  Contact your healthcare provider if:   · You have a fever  · Your breathing problems do not go away or get worse  · Your cough does not get better within 4 weeks  · You have questions or concerns about your condition or care  Self-care:   · Get more rest   Rest helps your body to heal  Slowly start to do more each day  Rest when you feel it is needed  · Avoid irritants in the air  Avoid chemicals, fumes, and dust  Wear a face mask if you must work around dust or fumes  Stay inside on days when air pollution levels are high  If you have allergies, stay inside when pollen counts are high  Do not use aerosol products, such as spray-on deodorant, bug spray, and hair spray  · Do not smoke or be around others who smoke  Nicotine and other chemicals in cigarettes and cigars damages the cilia that move mucus out of your lungs   Ask your healthcare provider for information if you currently smoke and need help to quit  E-cigarettes or smokeless tobacco still contain nicotine  Talk to your healthcare provider before you use these products  · Drink liquids as directed  Liquids help keep your air passages moist and help you cough up mucus  You may need to drink more liquids when you have acute bronchitis  Ask how much liquid to drink each day and which liquids are best for you  · Use a humidifier or vaporizer  Use a cool mist humidifier or a vaporizer to increase air moisture in your home  This may make it easier for you to breathe and help decrease your cough  Prevent acute bronchitis by doing the following:   · Get the vaccinations you need  Ask your healthcare provider if you should get vaccinated against the flu or pneumonia  · Prevent the spread of germs  You can decrease your risk of acute bronchitis and other illnesses by doing the following:     [facility] your hands often with soap and water  Carry germ-killing hand lotion or gel with you  You can use the lotion or gel to clean your hands when soap and water are not available  ¨ Do not touch your eyes, nose, or mouth unless you have washed your hands first     ¨ Always cover your mouth when you cough to prevent the spread of germs  It is best to cough into a tissue or your shirt sleeve instead of into your hand  Ask those around you cover their mouths when they cough  ¨ Try to avoid people who have a cold or the flu  If you are sick, stay away from others as much as possible  Medicines: Your healthcare provider may  give you any of the following:  · Ibuprofen or acetaminophen  are medicines that help lower your fever  They are available without a doctor's order  Ask your healthcare provider which medicine is right for you  Ask how much to take and how often to take it  Follow directions  These medicines can cause stomach bleeding if not taken correctly  Ibuprofen can cause kidney damage   Do not take ibuprofen if you have kidney disease, an ulcer, or allergies to aspirin  Acetaminophen can cause liver damage  Do not take more than 4,000 milligrams in 24 hours  · Decongestants  help loosen mucus in your lungs and make it easier to cough up  This can help you breathe easier  · Cough suppressants  decrease your urge to cough  If your cough produces mucus, do not take a cough suppressant unless your healthcare provider tells you to  Your healthcare provider may suggest that you take a cough suppressant at night so you can rest     · Inhalers  may be given  Your healthcare provider may give you one or more inhalers to help you breathe easier and cough less  An inhaler gives your medicine to open your airways  Ask your healthcare provider to show you how to use your inhaler correctly  Follow up with your healthcare provider as directed:  Write down questions you have so you will remember to ask them during your follow-up visits  © 2017 [employer] [employer] Information is for End User's use only and may not be sold, redistributed or otherwise used for commercial purposes  All illustrations and images included in CareNotes® are the copyrighted property of [employer]  or Aryan Chowdary  The above information is an  only  It is not intended as medical advice for individual conditions or treatments  Talk to your doctor, nurse or pharmacist before following any medical regimen to see if it is safe and effective for you  Upper Respiratory Infection   WHAT YOU NEED TO KNOW:   An upper respiratory infection is also called the common cold  It is an infection that can affect your nose, throat, ears, and sinuses  For healthy people, the common cold is usually not serious and does not need special treatment  Cold symptoms are usually worst for the first 3 to 5 days  Most people get better in 7 to 14 days  You may continue to cough for 2 to 3 weeks   Colds are caused by viruses and do not get better with antibiotics  DISCHARGE INSTRUCTIONS:   Seek care immediately if:   · You have chest pain or trouble breathing  Contact your healthcare provider if:   · You have a fever over 102ºF (39°C)  · Your sore throat gets worse or you see white or yellow spots in your throat  · Your symptoms get worse after 3 to 5 days or your cold is not better in 14 days  · You have a rash anywhere on your skin  · You have large, tender lumps in your neck  · You have thick, green, or yellow drainage from your nose  · You cough up thick yellow, green, or bloody mucus  · You are vomiting for more than 24 hours and cannot keep fluids down  · You have a bad earache  · You have questions or concerns about your condition or care  Medicines: You may need any of the following:  · Decongestants  help reduce nasal congestion and help you breathe more easily  If you take decongestant pills, they may make you feel restless or cause problems with your sleep  Do not use decongestant sprays for more than a few days  · Cough suppressants  help reduce coughing  Ask your healthcare provider which type of cough medicine is best for you  · NSAIDs , such as ibuprofen, help decrease swelling, pain, and fever  NSAIDs can cause stomach bleeding or kidney problems in certain people  If you take blood thinner medicine, always ask your healthcare provider if NSAIDs are safe for you  Always read the medicine label and follow directions  · Acetaminophen  decreases pain and fever  It is available without a doctor's order  Ask how much to take and how often to take it  Follow directions  Read the labels of all other medicines you are using to see if they also contain acetaminophen, or ask your doctor or pharmacist  Acetaminophen can cause liver damage if not taken correctly  Do not use more than 4 grams (4,000 milligrams) total of acetaminophen in one day  · Take your medicine as directed    Contact your healthcare provider if you think your medicine is not helping or if you have side effects  Tell him or her if you are allergic to any medicine  Keep a list of the medicines, vitamins, and herbs you take  Include the amounts, and when and why you take them  Bring the list or the pill bottles to follow-up visits  Carry your medicine list with you in case of an emergency  Follow up with your healthcare provider as directed:  Write down your questions so you remember to ask them during your visits  Self-care:   · Rest as much as possible  Slowly start to do more each day  · Drink more liquids as directed  Liquids will help thin and loosen mucus so you can cough it up  Liquids will also help prevent dehydration  Liquids that help prevent dehydration include water, fruit juice, and broth  Do not drink liquids that contain caffeine  Caffeine can increase your risk for dehydration  Ask your healthcare provider how much liquid to drink each day  · Soothe a sore throat  Gargle with warm salt water  This helps your sore throat feel better  Make salt water by dissolving ¼ teaspoon salt in 1 cup warm water  You may also suck on hard candy or throat lozenges  You may use a sore throat spray  · Use a humidifier or vaporizer  Use a cool mist humidifier or a vaporizer to increase air moisture in your home  This may make it easier for you to breathe and help decrease your cough  · Use saline nasal drops as directed  These help relieve congestion  · Apply petroleum-based jelly around the outside of your nostrils  This can decrease irritation from blowing your nose  · Do not smoke  Nicotine and other chemicals in cigarettes and cigars can make your symptoms worse  They can also cause infections such as bronchitis or pneumonia  Ask your healthcare provider for information if you currently smoke and need help to quit  E-cigarettes or smokeless tobacco still contain nicotine   Talk to your healthcare provider before you use these products  Prevent spreading your cold to others:   · Try to stay away from other people during the first 2 to 3 days of your cold when it is more easily spread  · Do not share food or drinks  · Do not share hand towels with household members  · Wash your hands often, especially after you blow your nose  Turn away from other people and cover your mouth and nose with a tissue when you sneeze or cough  © 2017 [address] [employer] [employer] Information is for End User's use only and may not be sold, redistributed or otherwise used for commercial purposes  All illustrations and images included in CareNotes® are the copyrighted property of [employer]  or Aryan Chowdary  The above information is an  only  It is not intended as medical advice for individual conditions or treatments  Talk to your doctor, nurse or pharmacist before following any medical regimen to see if it is safe and effective for you  Wound Infection   WHAT YOU NEED TO KNOW:   What is a wound infection? A wound infection occurs when bacteria enters a break in the skin  The infection may involve just the skin, or affect deeper tissues or organs close to the wound  What increases my risk for a wound infection? Anything that decreases your body's ability to heal wounds may put you at risk for a wound infection  This includes any of the following:  · Age older than 72    · Smoking or being overweight    · Medical conditions that weaken the immune system such as diabetes, HIV, or cancer    · Medicines that cause a weak immune system such as steroids    · Radiation, chemotherapy, or poor nutrition    · Foreign objects in the wound such as glass or metal    · Decreased blood flow to the wound caused by high blood pressure, or blocked or narrowed blood vessels  What are the signs and symptoms of a wound infection?   Your symptoms may start a few days after you get the wound, or may not occur for a month or two after the wound happens:  · Fever    · Warm, red, painful, or swollen skin near the wound    · Blood or pus coming from the wound     · A foul odor coming from the wound  How is a wound infection diagnosed? Your healthcare provider will ask about your medical history and examine you  He will ask how and when you were wounded  You may have any of the following tests:  · Blood tests  may be done to check for infection  · X-ray or CT  may be done to look for infection in deep tissues or a foreign object in your wound  You may be given contrast liquid to help the pictures show up better  Tell the healthcare provider if you have ever had an allergic reaction to contrast liquid  · A wound culture  is a sample of fluid or tissue that taken from the wound  It is sent to a lab and tested for the germ that is causing the infection  How is a wound infection treated? Treatment will depend on how severe the wound is, its location, and whether other areas are affected  It may also depend on your health and the length of time you have had the wound  Ask your healthcare provider about these and other treatments you may need:  · Medicine  will be given to treat the infection and decrease pain and swelling  · Wound care  may be done to clean your wound and help it heal  A wound vacuum may also be placed over your wound to help it heal      · Hyperbaric oxygen therapy  (HBO) may be used to get more oxygen to your tissues to help them heal  The pressurized oxygen is given as you sit in a pressure chamber  · Surgery  may be needed to clean the wound or remove infected or dead tissue  Surgery may also be needed to remove a foreign object  How can I help my wound heal?   · Care for your wound as directed  Keep your wound clean and dry  You may need to cover your wound when you bathe so it does not get wet  Clean your wound as directed with soap and water or wound   Put on new, clean bandages as directed  Change your bandages when they get wet or dirty  · Eat a variety of healthy foods  Examples include fruits, vegetables, whole-grain breads, low-fat dairy products, beans, lean meats, and fish  Healthy foods may help you heal faster  You may also need to take vitamins and minerals  Ask if you need to be on a special diet  · Manage other health conditions  Follow your healthcare provider's directions to manage health conditions that can cause slow wound healing  Examples include high blood pressure and diabetes  · Do not smoke  Nicotine and other chemicals in cigarettes and cigars can cause slow wound healing  Ask your healthcare provider for information if you currently smoke and need help to quit  E-cigarettes or smokeless tobacco still contain nicotine  Talk to your healthcare provider before you use these products  When should I seek immediate care? · You feel short of breath  · Your heart is beating faster than usual      · You feel confused  · Blood soaks through your bandages  · Your wound comes apart or feels like it is ripping  · You have severe pain  · You see red streaks coming from the infected area  When should I contact my healthcare provider? · You have a fever or chills  · You have more pain, redness, or swelling near your wound  · Your symptoms do not improve  · The skin around your wound feels numb  · You have questions or concerns about your condition or care  CARE AGREEMENT:   You have the right to help plan your care  Learn about your health condition and how it may be treated  Discuss treatment options with your caregivers to decide what care you want to receive  You always have the right to refuse treatment  The above information is an  only  It is not intended as medical advice for individual conditions or treatments   Talk to your doctor, nurse or pharmacist before following any medical regimen to see if it is safe and effective for you  © 2017 [address]  Information is for End User's use only and may not be sold, redistributed or otherwise used for commercial purposes  All illustrations and images included in CareNotes® are the copyrighted property of [employer]  or Aryan Chowdary  Wound Dehiscence   WHAT YOU NEED TO KNOW:   What is wound dehiscence? Wound dehiscence is when part or all of a wound comes apart  The wound may come apart if it does not heal completely, or it may heal and then open again  A surgical wound is an example of a wound can that develop dehiscence  Wound dehiscence can become life-threatening  What are the signs and symptoms of wound dehiscence? Wounds may split open even when they appear to be healing  You may notice the following when your wound starts to come apart:  · A feeling that the wound is ripping apart or giving way     · Leaking pink or yellow fluid from the wound    · Signs of infection at the wound site, such as yellow or green pus, swelling, redness, or warmth  What increases my risk for wound dehiscence? · Wound infection, or blood or fluid under the wound    · Diabetes, or liver, kidney, or heart disease    · Being overweight or not getting enough nutrition    · Smoking    · Certain medicines, such as steroids or immune-therapy drugs    · Anything that puts pressure on the wound such as coughing or lifting    · A weak immune system  How is wound dehiscence diagnosed and treated? Your healthcare provider will know your wound has opened by looking at it  You may need an ultrasound, x-ray, or CT to check for problems deeper in the wound  You may need any of the following to treat wound dehiscence:  · Medicines  may be needed to treat an infection, help your wound heal, or decrease pain  · Daily wound care  includes examining, cleaning, and bandaging your wound  If your wound is left open to heal, you will need to pack your wound with bandages      · A wound vacuum  is a device that is placed over your wound  This device helps remove fluid or infection from your wound so it can heal and close  · Splints or binders  may be used to decrease stress on your wound and help hold it together  · Surgery  may be done to remove infected tissue or close the open wound  Skin grafts, mesh, or stitches may be used to close your wound  How should I care for my wound? · Wash your hands often  Use soap and water  Wash your hands before and after you touch your wound  This will help to prevent an infection  · Clean your wound as directed  Ask your healthcare provider if it okay to shower or take a bath  Let the soap and water run over your wound  Gently pat the area dry  Look for signs of infection, such as redness, swelling, or pus  · Change your bandages as directed  Replace bandages after you clean the wound or bathe  Change your bandages when they get wet or dirty  If directed, pack your wound  Change the packing as directed  · Do not swim or go in hot tubs until your healthcare provider says it is okay  Hot tubs and pools can cause infection and prevent wound healing  · Wear your binder or splint at all times or as directed  These devices help hold your wound together  · Use devices as directed to help the wound heal   Your healthcare provider will show you how to care for your wound device  What can I do to promote healing? · Rest as directed  Do not lift anything heavier than 5 pounds  Do not do activities that may put stress on your wound, such as running or sports  Ask your healthcare provider when you can return to your usual activities  · Eat foods high in protein  Protein will help your wound heal  Protein can be found in lean meat, fish, beans, and low-fat dairy  Your healthcare provider may also recommend certain drinks for added protein  · Do not smoke    Nicotine and other chemicals in cigarettes and cigars can prevent your wound from healing  Ask your healthcare provider for information if you currently smoke and need help to quit  E-cigarettes or smokeless tobacco still contain nicotine  Talk to your healthcare provider before you use these products  When should I seek immediate care? · Your heart is beating faster than usual, or you feel dizzy or lightheaded  · Blood soaks through your bandage  · You see tissue coming through your wound  · You feel like your wound is opening up more  · Your wound oozes yellow or green pus, looks swollen or red, or feels warm  When should I contact my healthcare provider? · You have a fever or chills  · Your wound leaks fluid or a small amount of blood  · Your pain gets worse or does not get better after you take pain medicine  · You have nausea or are vomiting  · You have questions or concerns about your condition or care  CARE AGREEMENT:   You have the right to help plan your care  Learn about your health condition and how it may be treated  Discuss treatment options with your caregivers to decide what care you want to receive  You always have the right to refuse treatment  The above information is an  only  It is not intended as medical advice for individual conditions or treatments  Talk to your doctor, nurse or pharmacist before following any medical regimen to see if it is safe and effective for you  © 2017 [address] Information is for End User's use only and may not be sold, redistributed or otherwise used for commercial purposes  All illustrations and images included in CareNotes® are the copyrighted property of [employer]  or Aryan Chowdary

## 2019-02-09 NOTE — ED PROVIDER NOTES
History  Chief Complaint   Patient presents with    URI     SInce thursday patient states she has been having a dry/congested cough, right ear pain,wheezing     Pt with multiple complaints  Pt states primarily here for 3rd day of head/chest congestion, right ear pain, green nasal,  l discharge  Tried OTC cold med with no help  Denies fever/chills  No CP or Abd pain  No N/V/D  Pt also has had lymph node bx left axilla done Ryan 15 and has had recent issues with wound dehiscence  Pt following with Dr Federica Corado Pt saw PCP 2/5 as well  Pt states area has opened more and has foul smelling discharge  Again pt denies fever/chills  History provided by:  Patient  URI   Presenting symptoms: congestion, cough (dry), ear pain (right) and rhinorrhea    Presenting symptoms: no fever    Progression:  Worsening  Chronicity:  New  Relieved by:  Nothing  Ineffective treatments:  OTC medications  Associated symptoms: wheezing    Associated symptoms: no arthralgias, no headaches, no myalgias, no neck pain, no sneezing and no swollen glands    Risk factors: no chronic cardiac disease, no chronic kidney disease, no diabetes mellitus, no recent travel and no sick contacts    Wound Check    Treatments since wound repair include regular soap and water washings  There has been colored discharge from the wound  There is no redness present  The pain has worsened  Prior to Admission Medications   Prescriptions Last Dose Informant Patient Reported?  Taking?   acetaminophen (TYLENOL) 325 mg tablet   No Yes   Sig: Take 2 tablets (650 mg total) by mouth every 6 (six) hours as needed (pain)   ketorolac (TORADOL) 10 mg tablet   No Yes   Sig: Take 1 tablet (10 mg total) by mouth 3 (three) times a day as needed for moderate pain      Facility-Administered Medications: None       Past Medical History:   Diagnosis Date    Asthma     Cat-scratch disease     last assessed 10/22/15    Depression     Hypertension     last assessed 11/11/14    Sleep disorder        Past Surgical History:   Procedure Laterality Date    BREAST BIOPSY Left     2016    [place] BX/REMV,LYMPH NODE,DEEP AXILL Left 1/15/2019    Procedure: LEFT AXILLARY LYMPH NODE BIOPSY;  Surgeon: Bhavna Sorenson MD;  Location: [facility];  Service: General    TUBAL LIGATION         Family History   Problem Relation Age of Onset    Anxiety disorder Mother     Bipolar disorder Mother     Depression Mother     Schizophrenia Mother     Parkinsonism Mother         tremor    Anxiety disorder Father     Bipolar disorder Father     Depression Father     Schizophrenia Father     Stomach cancer Paternal Grandmother      I have reviewed and agree with the history as documented  Social History     Tobacco Use    Smoking status: Never Smoker    Smokeless tobacco: Never Used   Substance Use Topics    Alcohol use: No    Drug use: No        Review of Systems   Constitutional: Negative  Negative for activity change, appetite change, chills, diaphoresis and fever  HENT: Positive for congestion, ear pain (right), rhinorrhea and sinus pressure  Negative for drooling, mouth sores, sneezing, trouble swallowing and voice change  Eyes: Negative  Negative for pain, discharge and visual disturbance  Respiratory: Positive for cough (dry) and wheezing  Cardiovascular: Negative  Negative for chest pain, palpitations and leg swelling  Gastrointestinal: Negative  Negative for abdominal pain, diarrhea, nausea and vomiting  Genitourinary: Negative  Negative for dysuria, flank pain, hematuria and pelvic pain  Musculoskeletal: Negative  Negative for arthralgias, back pain, joint swelling, myalgias, neck pain and neck stiffness  Skin: Positive for wound  Negative for rash  Neurological: Negative  Negative for dizziness, tremors, syncope, facial asymmetry, speech difficulty, numbness and headaches  Psychiatric/Behavioral: Negative    Negative for confusion, hallucinations and self-injury  All other systems reviewed and are negative  Physical Exam  Physical Exam   Constitutional: She is oriented to person, place, and time  She appears well-developed and well-nourished  She is active and cooperative  Non-toxic appearance  She does not have a sickly appearance  She does not appear ill  No distress  HENT:   Head: Normocephalic  Right Ear: Hearing, tympanic membrane and ear canal normal    Left Ear: Hearing, tympanic membrane and ear canal normal    Nose: Nose normal    Mouth/Throat: Uvula is midline, oropharynx is clear and moist and mucous membranes are normal  Mucous membranes are not dry  No oropharyngeal exudate, posterior oropharyngeal edema or posterior oropharyngeal erythema  Eyes: Pupils are equal, round, and reactive to light  Conjunctivae, EOM and lids are normal    Neck: Normal range of motion and phonation normal  Neck supple  No JVD present  Cardiovascular: Normal rate, regular rhythm and intact distal pulses  No extrasystoles are present  Pulmonary/Chest: Effort normal  No stridor  No respiratory distress  She has decreased breath sounds  She has wheezes (few scattered)  Rhonchi: few course-cleared with cough  She has no rales  Abdominal: Soft  Bowel sounds are normal  There is no rigidity, no guarding and no CVA tenderness  Lymphadenopathy:     She has no cervical adenopathy  Neurological: She is alert and oriented to person, place, and time  She has normal strength and normal reflexes  No cranial nerve deficit  Gait normal    Skin: Skin is warm and dry  Capillary refill takes less than 2 seconds  No rash noted  She is not diaphoretic  No cyanosis  Psychiatric: Her speech is normal and behavior is normal  Thought content normal  Her mood appears anxious  Her affect is not inappropriate  Cognition and memory are normal    Vitals reviewed        Vital Signs  ED Triage Vitals   Temperature Pulse Respirations Blood Pressure SpO2   02/09/19 0945 02/09/19 0945 02/09/19 0945 02/09/19 0945 02/09/19 0945   98 8 °F (37 1 °C) (!) 111 18 125/76 100 %      Temp Source Heart Rate Source Patient Position - Orthostatic VS BP Location FiO2 (%)   02/09/19 0945 02/09/19 0945 02/09/19 0945 02/09/19 0945 --   Temporal Monitor Sitting Right arm       Pain Score       02/09/19 1030       Worst Possible Pain           Vitals:    02/09/19 0945 02/09/19 1030 02/09/19 1100 02/09/19 1200   BP: 125/76 123/79 122/72 127/72   Pulse: (!) 111 92 89 96   Patient Position - Orthostatic VS: Sitting Sitting Sitting Sitting       Visual Acuity      ED Medications  Medications   albuterol inhalation solution 2 5 mg (2 5 mg Nebulization Given 2/9/19 1030)       Diagnostic Studies  Results Reviewed     Procedure Component Value Units Date/Time    Wound culture and Gram stain [[id number]]  (Abnormal) Collected:  02/09/19 1029    Lab Status:  Final result Specimen:  Wound from Axillary Updated:  02/11/19 1128     Wound Culture 2+ Growth of      Gram Stain Result Rare Epithelial cells per low power field      No polys seen      1+ Gram positive cocci in pairs      1+ Gram positive rods                 XR chest 2 views   Final Result by Christ Wray DO (02/09 2204)      No acute cardiopulmonary disease              Workstation performed: [id number]                    Procedures  Procedures       Phone Contacts  ED Phone Contact    ED Course  ED Course as of Feb 11 1308   Sat Feb 09, 2019   1147 Discussed OP tx and seeing surgeon for wound dehiscence and culture results                                  MDM    Disposition  Final diagnoses:   Bronchitis   Wound dehiscence   URI (upper respiratory infection)     Time reflects when diagnosis was documented in both MDM as applicable and the Disposition within this note     Time User Action Codes Description Comment    2/9/2019 11:51 AM Erick Sweeney Add [J40] Bronchitis     2/9/2019 11:51 AM Erick Sweeney Add [T81 30XA] Wound dehiscence 2/11/2019  1:02 PM Yesi Lighter Add [J06 9] URI (upper respiratory infection)       ED Disposition     ED Disposition Condition Date/Time Comment    Discharge  Sat Feb 9, 2019 11:56 AM Анна Other discharge to home/self care  Condition at discharge: Stable        Follow-up Information     Follow up With Specialties Details Why Nadia Kaur [address], [address], Nurse Practitioner   [address],  General Surgery Schedule an appointment as soon as possible for a visit  [address] [place] [address]  [phone]            Discharge Medication List as of 2/9/2019 11:52 AM      START taking these medications    Details   albuterol (PROVENTIL HFA,VENTOLIN HFA) 90 mcg/act inhaler Inhale 2 puffs every 4 (four) hours as needed for wheezing, Starting Sat 2/9/2019, Print      clindamycin (CLEOCIN) 300 MG capsule Take 1 capsule (300 mg total) by mouth every 6 (six) hours for 10 days, Starting Sat 2/9/2019, Until Tue 2/19/2019, Print         CONTINUE these medications which have NOT CHANGED    Details   acetaminophen (TYLENOL) 325 mg tablet Take 2 tablets (650 mg total) by mouth every 6 (six) hours as needed (pain), Starting Sun 1/27/2019, Normal      ketorolac (TORADOL) 10 mg tablet Take 1 tablet (10 mg total) by mouth 3 (three) times a day as needed for moderate pain, Starting Sun 1/27/2019, Normal           No discharge procedures on file      ED Provider  Electronically Signed by           Dior Olson DO  02/11/19 5017

## 2019-02-10 ENCOUNTER — HOSPITAL ENCOUNTER (EMERGENCY)
Facility: HOSPITAL | Age: 34
Discharge: HOME | End: 2019-02-10
Attending: EMERGENCY MEDICINE | Admitting: EMERGENCY MEDICINE
Payer: COMMERCIAL

## 2019-02-10 VITALS
DIASTOLIC BLOOD PRESSURE: 93 MMHG | HEART RATE: 97 BPM | RESPIRATION RATE: 18 BRPM | BODY MASS INDEX: 34.97 KG/M2 | WEIGHT: 190.04 LBS | OXYGEN SATURATION: 99 % | HEIGHT: 62 IN | SYSTOLIC BLOOD PRESSURE: 140 MMHG | TEMPERATURE: 98.8 F

## 2019-02-10 DIAGNOSIS — J32.9 SINUSITIS: ICD-10-CM

## 2019-02-10 DIAGNOSIS — R22.0 LEFT FACIAL SWELLING: Primary | ICD-10-CM

## 2019-02-10 PROCEDURE — 99283 EMERGENCY DEPT VISIT LOW MDM: CPT

## 2019-02-10 RX ORDER — PREDNISONE 20 MG/1
40 TABLET ORAL ONCE
Status: COMPLETED | OUTPATIENT
Start: 2019-02-10 | End: 2019-02-10

## 2019-02-10 RX ORDER — CLINDAMYCIN HYDROCHLORIDE 150 MG/1
300 CAPSULE ORAL ONCE
Status: COMPLETED | OUTPATIENT
Start: 2019-02-10 | End: 2019-02-10

## 2019-02-10 RX ORDER — PREDNISONE 20 MG/1
TABLET ORAL
Qty: 9 TABLET | Refills: 0 | Status: SHIPPED | OUTPATIENT
Start: 2019-02-10 | End: 2019-02-18 | Stop reason: ALTCHOICE

## 2019-02-10 RX ADMIN — PREDNISONE 40 MG: 20 TABLET ORAL at 11:24

## 2019-02-10 RX ADMIN — CLINDAMYCIN HYDROCHLORIDE 300 MG: 150 CAPSULE ORAL at 11:24

## 2019-02-10 NOTE — ED PROVIDER NOTES
History  Chief Complaint   Patient presents with    Eye Problem     Swelling left eye started last night,Was seen yesterday for bronchitis     Pt was seen yesterday in ER for URI/bronchitis and wound dehiscence left axilla  Was prescribed ABX and inhaler  Pt states she did not get RX filled yet  Here today with new c/o left cheek swelling  Since last PM  Took a benadryl tab last PM-none since  No change  DEnies any new exposures  No eye trauma/compalints  No visual change  No fever/chills  No throat closing/pain  History provided by:  Patient      Prior to Admission Medications   Prescriptions Last Dose Informant Patient Reported?  Taking?   acetaminophen (TYLENOL) 325 mg tablet   No No   Sig: Take 2 tablets (650 mg total) by mouth every 6 (six) hours as needed (pain)   albuterol (PROVENTIL HFA,VENTOLIN HFA) 90 mcg/act inhaler   No No   Sig: Inhale 2 puffs every 4 (four) hours as needed for wheezing   clindamycin (CLEOCIN) 300 MG capsule   No No   Sig: Take 1 capsule (300 mg total) by mouth every 6 (six) hours for 10 days   ketorolac (TORADOL) 10 mg tablet   No No   Sig: Take 1 tablet (10 mg total) by mouth 3 (three) times a day as needed for moderate pain      Facility-Administered Medications: None       Past Medical History:   Diagnosis Date    Asthma     Cat-scratch disease     last assessed 10/22/15    Depression     Hypertension     last assessed 11/11/14    Sleep disorder        Past Surgical History:   Procedure Laterality Date    BREAST BIOPSY Left     2016    [place] BX/REMV,LYMPH NODE,DEEP AXILL Left 1/15/2019    Procedure: LEFT AXILLARY LYMPH NODE BIOPSY;  Surgeon: Salvador Alcantara MD;  [facility];  Service: General    TUBAL LIGATION         Family History   Problem Relation Age of Onset    Anxiety disorder Mother     Bipolar disorder Mother     Depression Mother     Schizophrenia Mother     Parkinsonism Mother         tremor    Anxiety disorder Father     Bipolar disorder Father  Depression Father     Schizophrenia Father     Stomach cancer Paternal Grandmother      I have reviewed and agree with the history as documented  Social History     Tobacco Use    Smoking status: Never Smoker    Smokeless tobacco: Never Used   Substance Use Topics    Alcohol use: No    Drug use: No        Review of Systems   Constitutional: Negative for activity change, appetite change, chills, diaphoresis and fever  HENT: Positive for congestion, facial swelling (under left eye) and sinus pressure  Negative for drooling, ear discharge, ear pain, mouth sores, nosebleeds, trouble swallowing and voice change  Dental problem: pt is edentulous  Eyes: Negative  Negative for photophobia, pain, discharge, redness, itching and visual disturbance  Respiratory: Negative for apnea, choking and shortness of breath  Cardiovascular: Negative  Negative for chest pain and palpitations  Gastrointestinal: Negative  Negative for abdominal pain, nausea and vomiting  Genitourinary: Negative  Negative for dysuria, flank pain, hematuria and pelvic pain  Musculoskeletal: Negative  Negative for joint swelling, myalgias, neck pain and neck stiffness  Skin: Negative  Negative for rash and wound  Neurological: Negative for dizziness, tremors, syncope, speech difficulty, numbness and headaches  Psychiatric/Behavioral: Negative  All other systems reviewed and are negative  Physical Exam  Physical Exam   Constitutional: She is oriented to person, place, and time  She appears well-developed and well-nourished  She is active and cooperative  Non-toxic appearance  She does not have a sickly appearance  She does not appear ill  No distress  HENT:   Head: Normocephalic and atraumatic         Right Ear: Hearing, tympanic membrane and ear canal normal    Left Ear: Hearing, tympanic membrane and ear canal normal    Nose: Nose normal    Mouth/Throat: Oropharynx is clear and moist  Mucous membranes are not dry and not cyanotic  No oropharyngeal exudate, posterior oropharyngeal edema or posterior oropharyngeal erythema  Eyes: Pupils are equal, round, and reactive to light  Conjunctivae and EOM are normal    Neck: Normal range of motion and phonation normal  Neck supple  No spinous process tenderness and no muscular tenderness present  No tracheal deviation present  Cardiovascular: Normal rate, regular rhythm, intact distal pulses and normal pulses  No extrasystoles are present  Pulmonary/Chest: Effort normal  No accessory muscle usage or stridor  No tachypnea  No respiratory distress  She has decreased breath sounds  She has no wheezes  Abdominal: Soft  Bowel sounds are normal  There is no tenderness  There is no rigidity, no guarding and no CVA tenderness  Neurological: She is alert and oriented to person, place, and time  She has normal strength and normal reflexes  No cranial nerve deficit  Skin: Skin is warm and dry  No rash noted  She is not diaphoretic  No cyanosis  No pallor  Psychiatric: She has a normal mood and affect  Her speech is normal and behavior is normal  Thought content normal  Cognition and memory are normal    Vitals reviewed        Vital Signs  ED Triage Vitals [02/10/19 1023]   Temperature Pulse Respirations Blood Pressure SpO2   98 8 °F (37 1 °C) 97 18 140/93 99 %      Temp Source Heart Rate Source Patient Position - Orthostatic VS BP Location FiO2 (%)   Temporal Monitor Sitting Right arm --      Pain Score       7           Vitals:    02/10/19 1023   BP: 140/93   Pulse: 97   Patient Position - Orthostatic VS: Sitting       Visual Acuity  Visual Acuity      Most Recent Value   Visual acuity R eye is  20/20   Visual acuity Left eye is  20/20   Visual acuity in both eyes is  20/20          ED Medications  Medications   clindamycin (CLEOCIN) capsule 300 mg (300 mg Oral Given 2/10/19 1124)   predniSONE tablet 40 mg (40 mg Oral Given 2/10/19 1124)       Diagnostic Studies  Results Reviewed     None                 No orders to display              Procedures  Procedures       Phone Contacts  ED Phone Contact    ED Course     Discussed probable sinus correlation, need to get ABX, will add prednisone  Also again discussed close follow up with Moshe Roach Lights                          MDM    Disposition  Final diagnoses:   Left facial swelling   Sinusitis     Time reflects when diagnosis was documented in both MDM as applicable and the Disposition within this note     Time User Action Codes Description Comment    2/10/2019 11:16 AM Florencio Reed Add [R22 0] Left facial swelling     2/10/2019 11:16 AM Maydeon Brianna Add [J32 9] Sinusitis       ED Disposition     ED Disposition Condition Date/Time Comment    Discharge Stable Sun Feb 10, 2019 11:16 AM Mio Davidson discharge to home/self care              Follow-up Information     Follow up With Specialties Details Why Nadia Kaur [address], [address] Nurse Practitioner Schedule an appointment as soon as possible for a visit   [address] General Surgery Schedule an appointment as soon as possible for a visit   Rachna Kenney [address] [place] [address]  [phone]            Discharge Medication List as of 2/10/2019 11:20 AM      CONTINUE these medications which have NOT CHANGED    Details   acetaminophen (TYLENOL) 325 mg tablet Take 2 tablets (650 mg total) by mouth every 6 (six) hours as needed (pain), Starting Sun 1/27/2019, Normal      albuterol (PROVENTIL HFA,VENTOLIN HFA) 90 mcg/act inhaler Inhale 2 puffs every 4 (four) hours as needed for wheezing, Starting Sat 2/9/2019, Print      clindamycin (CLEOCIN) 300 MG capsule Take 1 capsule (300 mg total) by mouth every 6 (six) hours for 10 days, Starting Sat 2/9/2019, Until Tue 2/19/2019, Print      ketorolac (TORADOL) 10 mg tablet Take 1 tablet (10 mg total) by mouth 3 (three) times a day as needed for moderate pain, Starting Sun 1/27/2019, Normal           No discharge procedures on file      ED Provider  Electronically Signed by           Roxanne Santa, DO  02/11/19 1212 [facility], DO  02/11/19 1871

## 2019-02-10 NOTE — DISCHARGE INSTRUCTIONS
Pt antibiotics and prednisone as prescribed  Lots of clear liquids   See your doctors this week for recheck

## 2019-02-11 ENCOUNTER — EVALUATION (OUTPATIENT)
Dept: PHYSICAL THERAPY | Facility: HOME HEALTHCARE | Age: 34
End: 2019-02-11
Payer: COMMERCIAL

## 2019-02-11 ENCOUNTER — APPOINTMENT (OUTPATIENT)
Dept: WOUND CARE | Facility: CLINIC | Age: 34
End: 2019-02-11
Payer: COMMERCIAL

## 2019-02-11 DIAGNOSIS — I89.0 LYMPHEDEMA: ICD-10-CM

## 2019-02-11 LAB
BACTERIA WND AEROBE CULT: ABNORMAL
GRAM STN SPEC: ABNORMAL

## 2019-02-11 PROCEDURE — 99213 OFFICE O/P EST LOW 20 MIN: CPT | Performed by: REGISTERED NURSE

## 2019-02-11 PROCEDURE — 97535 SELF CARE MNGMENT TRAINING: CPT | Performed by: PHYSICAL THERAPIST

## 2019-02-11 PROCEDURE — 97162 PT EVAL MOD COMPLEX 30 MIN: CPT | Performed by: PHYSICAL THERAPIST

## 2019-02-11 NOTE — PROGRESS NOTES
PT Evaluation     Today's date: 2019  Patient name: Mio Davidson  : 1985  MRN: [id number]  Referring provider: Mary Snell DO  Dx:   Encounter Diagnosis     ICD-10-CM    1  Lymphedema I89 0 Ambulatory referral to PT/OT lymphedema therapy                  Assessment  Assessment details: Patient presents to OPPT with s/s consistent with left UE lymphedema  PT interventions to include CDT (complete decongestive therapy) including MLD (manual lymphatic drainage) and compression using short stretch bandages as able  PT to further provide extensive patient education on self bandaging, self MLD techniques, and skin care  Patient was provided with information to purchase compression sleeve due to minimal edema present at this time and pt request to avoid compression bandage application if able  PT reviewed wearing scheduled, garment care, and long term maintenance plan with pt verbalizing understanding  She will return to therapy to address edema or garment instruction as needed  Thank you for this referral and the opportunity to participate in the care of this patient    Impairments: pain with function  Other impairment: lymphedema in L UE     Goals  STGs to be achieved in 2 - 4 weeks  Demonstrate at least 75% compliance with self MLD  Demonstrate at least 75% compliance with self compression  Demonstrate at least 75% compliance with self skin and nail inspection  Free from s/s of infection  Demonstrate understanding of importance of compliance with POC    LTGs to be achieved in 4 - 6 weeks  Demonstrate at least 100% compliance with self MLD  Demonstrate at least 100% compliance with self compression  Demonstrate at least 100% compliance with self skin and nail inspection  Free from s/s of infection  Demonstrate understanding of day/night compression wearing schedule  Obtain alternative compression to ensure independence with self management    Plan  Patient would benefit from: skilled physical therapy  Other planned therapy interventions: CDT (complete decongestive therapy)   Frequency: 1-2x/week  Duration in weeks: 4  Plan of Care beginning date: 2019  Plan of Care expiration date: 3/11/2019  Treatment plan discussed with: patient        Subjective Evaluation    History of Present Illness  Mechanism of injury: Pt reporting that she felt a lump which was painful in the L axilla and went to see physician  She had lymph nodes removed to biopsy on 1/15/19; pt states that more lymph nodes were removed than anticipated due to infection and necrotic tissue  MD is now referring pt to OPPT for management of edema in L UE  She will return to MD again on 19  Quality of life: good    Pain  At best pain ratin  At worst pain ratin    Treatments  Current treatment: physical therapy  Current treatment comments: lymphedema treatment       Patient Goals  Patient goals for therapy: decreased edema          Objective     Functional Assessment        Comments  Lymphedema Evaluation    Medical Considerations:  (-)  Cardiac  (-)  Pulmonary  (-)  Kidney  (-)  Liver  (-) Current Fever/Infection  (+) Current/Recent Wounds    ROM Considerations: ROM WFL     Strength Considerations: R UE: grossly 5/5   L UE: grossly 4 to 4-/5    Girth:     UE girth measurements (cm)      Right           Left   Hand                                       19 0               20 0  Wrist                                       16 4               18 5  +8 cm                           20 5               23 3  +16 cm                          26 0               27 0   +24 cm                                   28 3               31 2  +32                                         35 0               38 5

## 2019-02-13 ENCOUNTER — TELEPHONE (OUTPATIENT)
Dept: INTERNAL MEDICINE CLINIC | Facility: CLINIC | Age: 34
End: 2019-02-13

## 2019-02-13 ENCOUNTER — CONSULT (OUTPATIENT)
Dept: HEMATOLOGY ONCOLOGY | Facility: CLINIC | Age: 34
End: 2019-02-13
Payer: COMMERCIAL

## 2019-02-13 VITALS
OXYGEN SATURATION: 98 % | HEIGHT: 62 IN | HEART RATE: 73 BPM | TEMPERATURE: 97.6 F | DIASTOLIC BLOOD PRESSURE: 100 MMHG | BODY MASS INDEX: 34.23 KG/M2 | SYSTOLIC BLOOD PRESSURE: 140 MMHG | WEIGHT: 186 LBS | RESPIRATION RATE: 18 BRPM

## 2019-02-13 DIAGNOSIS — D72.10 EOSINOPHILIA: ICD-10-CM

## 2019-02-13 DIAGNOSIS — I88.8 NECROTIZING GRANULOMA PRESENT ON BIOPSY OF LYMPH NODE: Primary | ICD-10-CM

## 2019-02-13 DIAGNOSIS — R91.1 LUNG NODULE < 6CM ON CT: ICD-10-CM

## 2019-02-13 PROBLEM — IMO0001 LUNG NODULE < 6CM ON CT: Status: ACTIVE | Noted: 2019-02-13

## 2019-02-13 PROBLEM — D71 GRANULOMATOUS DISEASE (HCC): Status: ACTIVE | Noted: 2019-02-13

## 2019-02-13 PROCEDURE — 99244 OFF/OP CNSLTJ NEW/EST MOD 40: CPT | Performed by: INTERNAL MEDICINE

## 2019-02-13 NOTE — PATIENT INSTRUCTIONS
The patient is aware seek medical attention for fever i e  Temperature 100 5° or higher, chills, persistent or progressive pain of the left axillary area, left upper extremity swelling, cough, shortness of breath, excessive fatigue, or if other new problems arise

## 2019-02-13 NOTE — TELEPHONE ENCOUNTER
Did review with [facility] and she states they hadn't called her yet  She did see wound care on Monday, and they packed it, and she stated that they will see her again on Thursday  At that point they will look and see if they need it repacked or resutured  She went to oncology today and they are doing testing for  Sarcoidosis

## 2019-02-13 NOTE — TELEPHONE ENCOUNTER
Regarding: FW: Test Results Question  Contact: [phone]      ----- Message -----  From: ARIA Duran  Sent: 2/13/2019   3:35 PM  To: Romeo Sanchez  Subject: FW: Test Results Question                        Can you let Hafsa Wylie know her wound culture did come back negative and that is the important one we do look at  The ER should have notified her  Is she following up with wound management? ?    ----- Message -----  From: Romeo Sanchez  Sent: 2/13/2019   3:00 PM  To: ARIA Duran  Subject: FW: Test Results Question                            ----- Message -----  From: Tracey Ayala  Sent: 2/13/2019   2:58 PM  To: [facility]  Subject: Test Results Question                            ----- Message from [address] [place] [address] 951, Generic sent at 2/13/2019  2:58 PM EST -----    I have a question about CULTURE, WOUND resulted on 2/11/19 at 11:28 AM   Can you please explain what this means

## 2019-02-13 NOTE — PROGRESS NOTES
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 diagnosed by ultrasound-guided biopsy on October 12, 2015 and Bartonella hensella IgG 1:2560, IgM negative and Bartonella sorenson IgG 1:640, IgM negative serology from November 5, 2015 implying cat scratch disease  She presents with left axillary area pain since November 2018 and then upper extremity lymphedema since January 15, 2019  She had been diagnosed with bronchitis twice recently on October 22, 2018 at which time she was treated with a 5 day course of azithromycin and then again on February 9, 2019 at which time she was treated with a 10 day course of clindamycin 300 mg q 6 hours  She has history of allergies all year round and postnasal drip  There has been no fever, chills or sore throat  She has been seen by a lymphedema therapist who recommended a left upper extremity compression sleeve 23 hr per day, 7 days per week    Hematology/Oncology History:  None    Review of Systems:     General: Feels well, no chills or swaets  Head and Neck: No nosebleeds, no oral cavity or throat soreness  Cardiovascular: No chest pain, no lower extremity edema  Respriatory:  Cough has abated with clindamycin and albuterol meter dosed inhaler  She has dyspnea on exertion especially going up steps  GI: Appetite is good, no abdominal pain, chronic constipation has been managed with polyethylene glycol  : No urinary frequency  Menstrual periods have been regular and of normal flow (her last pelvic examination was about 7 years ago )  Musculoskeletal: No back pains or joint pain    She has had muscle cramps of the distal lower extremities at nighttime since later in October 2018  Skin: No skin rash  Neurological: No headache, no numbness, no weakness  Hematologic: No easy bruising  Psychiatric: No emotional problems    Medications:    Current Outpatient Medications   Medication Sig Dispense Refill    acetaminophen (TYLENOL) 325 mg tablet Take 2 tablets (650 mg total) by mouth every 6 (six) hours as needed (pain) 30 tablet 0    albuterol (PROVENTIL HFA,VENTOLIN HFA) 90 mcg/act inhaler Inhale 2 puffs every 4 (four) hours as needed for wheezing 1 Inhaler 0    clindamycin (CLEOCIN) 300 MG capsule Take 1 capsule (300 mg total) by mouth every 6 (six) hours for 10 days 40 capsule 0    ketorolac (TORADOL) 10 mg tablet Take 1 tablet (10 mg total) by mouth 3 (three) times a day as needed for moderate pain 20 tablet 0    predniSONE 20 mg tablet Take 3 tablets daily for 3 days 9 tablet 0     No current facility-administered medications for this visit  Past Medical History: Allergies possibly asthma  Cat scratch fever October 2015    Past Surgical History:    Tubal ligation  Complete upper and lower dental extractions    Family History:    Her mother age 47 has familial tremor and many other health issues (of which the patient does not know details)  Her father age 64 has allergies  Her sister has asthma and fibromyalgia  She has 2 sons and 2 daughters, 9, 15, 13 and 16years old, all 3 of her children are in good health  Social History:    She is  and lives with her   She works [employer] and [employer]  She has never been a cigarette smoker  She is not an alcohol drinker  Physical Examination:    General appearance: Appears well  Head: Normocephalic  Eyes: Extraocular movements intact  Ears: No gross hearing deficit  Oropharynx: Clear  Neck: Supple, No lymphadenopathy  Chest: No axillary adenopathy, there is a dry surgical dressing over the left anterior axilla    Lungs: Clear to auscultation bilaterally  Heart: Regular rate and rhythm  Abdomen: No tenderness, bowel sounds normal, no masses, no hepatic or splenic enlargement; No inguinal  lymphadenopathy  Extremities: There is mild proximal and distal left upper extremity lymphedema including the left hand  No lower extremity edema bilaterally  Skin: No rashes or ecchymoses  Neurologic: Grossly intact, no focal neurological deficit  Psychiatric: Oriented to person, place and time, normal mood and affect    ECOG 1    Laboratory:    From August 8, 2019:  VERONICA is negative    From February 4, 2019:  Bartonella henselea IgG 1:1280, IgM negative and Bartonella sorenson IgG and IgM are negative, creatinine 0 65, calcium 9 0, AST 13, ALT 27, alk-phos 78, bili 0 3, WBC 8 90 with absolute eosinophil count of 1 25 (0 0-0 61), hemoglobin 13 8, platelets 330, on WBC differential neutrophils 61%, lymphs 17%, monos 7%, eos 14%, basos 1%    Excision of left axillary node from January 15, 2019: There is necrotizing granulomatous inflammation involving fibroadipose tissue, a well-defined lymph node is not identified, GMS stain is negative for fungal elements, AFB stain is negative for AFB, Gram stain is negative for bacterial organisms,Warthin Starry stain highlights rare occasional organisms raising possibility of Bartonella although not definitive, Bartonella PCR is negative  3D bilateral diagnostic mammogram from January 4, 2019: There are scattered areas of fibroglandular density, there are multiple nodes in the left axilla with associated surrounding soft tissue stranding and inflammation, both breast appear within normal limits      CT angiogram of chest with contrast enhanced CT of abdomen pelvis on January 17, 2019:  No evidence of pulmonary embolism, there is stable 4 mm RUL nodule compared to December 30, 2018, mediastinum and hilar are unremarkable, left axillary adenopathy with post biopsy changes of the left axilla including fluid and soft tissue air, liver, spleen, pancreas, adrenals, kidneys are unremarkable, no abdominal pelvic lymphadenopathy there is a large amount of stool throughout the colon, no destructive osseous lesion  Assessment:    1  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 diagnosed by excision of left axillary node from January 15, 2019  There is history of cat scratch disease involving the left axillary tail of the left breast diagnosed in October 2015  I reviewed the surgical pathology with Dr Nneka Salas by telephone today who noted that the left axillary tissue review is not suggestive of underlying neoplasm and may represent sarcoidosis although so keratosis is usually not necrotizing  2  Right upper lobe lung nodule 4 mm 1st noted on CT scan of December 30, 2018  3  Eosinophilia, mild, is most likely related to underlying allergies, possibly there is an underlying inflammatory condition such as sarcoidosis  Recommendations:    Further evaluation is to include angiotensin-converting enzyme to assess for potential of sarcoidosis and LD to assess for underlying lymphoproliferative or myeloproliferative disorder although not highly suspected  A follow-up contrast enhanced CT of the chest is recommended in regards to the 4 mm RUL nodule and left axillary area inflammation  Also, the patient is being evaluated by pulmonologist Dr Jose Watts in regards to 4 mm RLL nodule and asthma  Gynecology follow-up is recommended as to pelvic examination to screen for pelvic malignancies  The patient and her stepmother who was with her in the office today are aware seek medical attention for fever i e  temperature 100 5° or higher, chills, persistent or progressive pain of the left axillary area, left upper extremity swelling, cough, shortness of breath, excessive fatigue, or if other new problems arise  Otherwise, plan to see her again in 3 months

## 2019-02-14 ENCOUNTER — APPOINTMENT (OUTPATIENT)
Dept: LAB | Facility: HOSPITAL | Age: 34
End: 2019-02-14
Attending: INTERNAL MEDICINE
Payer: COMMERCIAL

## 2019-02-14 ENCOUNTER — APPOINTMENT (OUTPATIENT)
Dept: WOUND CARE | Facility: CLINIC | Age: 34
End: 2019-02-14
Payer: COMMERCIAL

## 2019-02-14 DIAGNOSIS — I88.8 NECROTIZING GRANULOMA PRESENT ON BIOPSY OF LYMPH NODE: ICD-10-CM

## 2019-02-14 DIAGNOSIS — D72.10 EOSINOPHILIA: ICD-10-CM

## 2019-02-14 LAB
ALBUMIN SERPL BCP-MCNC: 3.6 G/DL (ref 3.5–5)
ALP SERPL-CCNC: 85 U/L (ref 46–116)
ALT SERPL W P-5'-P-CCNC: 21 U/L (ref 12–78)
ANION GAP SERPL CALCULATED.3IONS-SCNC: 10 MMOL/L (ref 4–13)
AST SERPL W P-5'-P-CCNC: 8 U/L (ref 5–45)
BASOPHILS # BLD AUTO: 0.03 THOUSANDS/ΜL (ref 0–0.1)
BASOPHILS NFR BLD AUTO: 0 % (ref 0–1)
BILIRUB SERPL-MCNC: 0.3 MG/DL (ref 0.2–1)
BUN SERPL-MCNC: 13 MG/DL (ref 5–25)
CALCIUM SERPL-MCNC: 9.1 MG/DL (ref 8.3–10.1)
CHLORIDE SERPL-SCNC: 101 MMOL/L (ref 100–108)
CO2 SERPL-SCNC: 28 MMOL/L (ref 21–32)
CREAT SERPL-MCNC: 0.75 MG/DL (ref 0.6–1.3)
EOSINOPHIL # BLD AUTO: 0.02 THOUSAND/ΜL (ref 0–0.61)
EOSINOPHIL NFR BLD AUTO: 0 % (ref 0–6)
ERYTHROCYTE [DISTWIDTH] IN BLOOD BY AUTOMATED COUNT: 13.2 % (ref 11.6–15.1)
GFR SERPL CREATININE-BSD FRML MDRD: 104 ML/MIN/1.73SQ M
GLUCOSE P FAST SERPL-MCNC: 94 MG/DL (ref 65–99)
HCT VFR BLD AUTO: 43.3 % (ref 34.8–46.1)
HGB BLD-MCNC: 14.1 G/DL (ref 11.5–15.4)
IMM GRANULOCYTES # BLD AUTO: 0.12 THOUSAND/UL (ref 0–0.2)
IMM GRANULOCYTES NFR BLD AUTO: 1 % (ref 0–2)
LDH SERPL-CCNC: 88 U/L (ref 81–234)
LYMPHOCYTES # BLD AUTO: 1.72 THOUSANDS/ΜL (ref 0.6–4.47)
LYMPHOCYTES NFR BLD AUTO: 12 % (ref 14–44)
MCH RBC QN AUTO: 30.7 PG (ref 26.8–34.3)
MCHC RBC AUTO-ENTMCNC: 32.6 G/DL (ref 31.4–37.4)
MCV RBC AUTO: 94 FL (ref 82–98)
MONOCYTES # BLD AUTO: 0.93 THOUSAND/ΜL (ref 0.17–1.22)
MONOCYTES NFR BLD AUTO: 7 % (ref 4–12)
NEUTROPHILS # BLD AUTO: 11.14 THOUSANDS/ΜL (ref 1.85–7.62)
NEUTS SEG NFR BLD AUTO: 80 % (ref 43–75)
NRBC BLD AUTO-RTO: 0 /100 WBCS
PLATELET # BLD AUTO: 284 THOUSANDS/UL (ref 149–390)
PMV BLD AUTO: 9.9 FL (ref 8.9–12.7)
POTASSIUM SERPL-SCNC: 4.3 MMOL/L (ref 3.5–5.3)
PROT SERPL-MCNC: 7.2 G/DL (ref 6.4–8.2)
RBC # BLD AUTO: 4.6 MILLION/UL (ref 3.81–5.12)
SODIUM SERPL-SCNC: 139 MMOL/L (ref 136–145)
WBC # BLD AUTO: 13.96 THOUSAND/UL (ref 4.31–10.16)

## 2019-02-14 PROCEDURE — 80053 COMPREHEN METABOLIC PANEL: CPT

## 2019-02-14 PROCEDURE — 82164 ANGIOTENSIN I ENZYME TEST: CPT

## 2019-02-14 PROCEDURE — 83615 LACTATE (LD) (LDH) ENZYME: CPT

## 2019-02-14 PROCEDURE — 99213 OFFICE O/P EST LOW 20 MIN: CPT

## 2019-02-14 PROCEDURE — 85025 COMPLETE CBC W/AUTO DIFF WBC: CPT

## 2019-02-14 PROCEDURE — 36415 COLL VENOUS BLD VENIPUNCTURE: CPT

## 2019-02-15 ENCOUNTER — APPOINTMENT (OUTPATIENT)
Dept: LAB | Facility: HOSPITAL | Age: 34
End: 2019-02-15
Attending: INTERNAL MEDICINE
Payer: COMMERCIAL

## 2019-02-15 DIAGNOSIS — D72.10 EOSINOPHILIA: ICD-10-CM

## 2019-02-15 LAB — ACE SERPL-CCNC: 18 U/L (ref 14–82)

## 2019-02-15 PROCEDURE — 87177 OVA AND PARASITES SMEARS: CPT

## 2019-02-15 PROCEDURE — 87209 SMEAR COMPLEX STAIN: CPT

## 2019-02-17 LAB — O+P STL CONC: NORMAL

## 2019-02-18 ENCOUNTER — OFFICE VISIT (OUTPATIENT)
Dept: PULMONOLOGY | Facility: HOSPITAL | Age: 34
End: 2019-02-18
Payer: COMMERCIAL

## 2019-02-18 ENCOUNTER — TELEPHONE (OUTPATIENT)
Dept: HEMATOLOGY ONCOLOGY | Facility: CLINIC | Age: 34
End: 2019-02-18

## 2019-02-18 ENCOUNTER — TELEPHONE (OUTPATIENT)
Dept: INFECTIOUS DISEASES | Facility: CLINIC | Age: 34
End: 2019-02-18

## 2019-02-18 ENCOUNTER — OFFICE VISIT (OUTPATIENT)
Dept: SURGERY | Facility: HOSPITAL | Age: 34
End: 2019-02-18
Payer: COMMERCIAL

## 2019-02-18 ENCOUNTER — TELEPHONE (OUTPATIENT)
Dept: INTERNAL MEDICINE CLINIC | Facility: CLINIC | Age: 34
End: 2019-02-18

## 2019-02-18 VITALS
DIASTOLIC BLOOD PRESSURE: 84 MMHG | BODY MASS INDEX: 34.96 KG/M2 | OXYGEN SATURATION: 96 % | HEIGHT: 62 IN | SYSTOLIC BLOOD PRESSURE: 130 MMHG | WEIGHT: 190 LBS | HEART RATE: 111 BPM

## 2019-02-18 VITALS
HEART RATE: 111 BPM | BODY MASS INDEX: 34.96 KG/M2 | HEIGHT: 62 IN | WEIGHT: 190 LBS | TEMPERATURE: 98.1 F | SYSTOLIC BLOOD PRESSURE: 130 MMHG | DIASTOLIC BLOOD PRESSURE: 84 MMHG

## 2019-02-18 DIAGNOSIS — D72.10 EOSINOPHILIA: ICD-10-CM

## 2019-02-18 DIAGNOSIS — J30.9 ALLERGIC RHINITIS, UNSPECIFIED SEASONALITY, UNSPECIFIED TRIGGER: ICD-10-CM

## 2019-02-18 DIAGNOSIS — A28.1 CAT-SCRATCH DISEASE: Primary | ICD-10-CM

## 2019-02-18 DIAGNOSIS — J45.40 MODERATE PERSISTENT ASTHMA WITHOUT COMPLICATION: Primary | ICD-10-CM

## 2019-02-18 PROBLEM — J45.909 ASTHMA: Status: ACTIVE | Noted: 2019-02-18

## 2019-02-18 LAB — FUNGUS SPEC CULT: NORMAL

## 2019-02-18 PROCEDURE — 99214 OFFICE O/P EST MOD 30 MIN: CPT | Performed by: INTERNAL MEDICINE

## 2019-02-18 PROCEDURE — 99211 OFF/OP EST MAY X REQ PHY/QHP: CPT | Performed by: SURGERY

## 2019-02-18 RX ORDER — FLUTICASONE FUROATE AND VILANTEROL 100; 25 UG/1; UG/1
1 POWDER RESPIRATORY (INHALATION) DAILY
Qty: 28 EACH | Refills: 12 | Status: SHIPPED | OUTPATIENT
Start: 2019-02-18 | End: 2019-10-30

## 2019-02-18 NOTE — TELEPHONE ENCOUNTER
Called Verline Necessary and lmom to return call  She called back and reviewed  Verbraeden Necessary would like to have the labs done again next week

## 2019-02-18 NOTE — TELEPHONE ENCOUNTER
Regarding: FW: Test Results Question  Contact: [phone]  Can you let Yadiel Knapp know her White count is up most likely due to her arm and the recent surgery she did have I can repeat the lab again in one week    ----- Message -----  From: Georgiana Preciado  Sent: 2/18/2019   7:17 AM  To: ARIA Messer  Subject: FW: Test Results Question                            ----- Message -----  From: Coby Bravo  Sent: 2/16/2019  11:52 AM  To: [facility]  Subject: Test Results Question                            ----- Message from [address] [place] [address] [id number], [facility] sent at 2/16/2019 11:52 AM EST -----    I have a question about CBC WITH DIFFERENTIAL resulted on 2/14/19 at 9:51 AM Can you explain what it means

## 2019-02-18 NOTE — PROGRESS NOTES
Pulmonary Follow Up Note   Antolin Amor 28 y o  female MRN: [id number]  2/18/2019      Assessment:  1  Cough, dyspnea, wheeze - symptoms consistent with moderate persistent asthma  · Symptoms are consistent with asthma  · Will start ICS/LABA with Breo 100/25 1puff daily and prn ANNALISA  · May consider trial of singulair in the future  · Check complete PFTs  · Consider NE RAST panel, IgE testing in the future  · Declines Flu vaccine today    2  Allergic Rhinitis  · Add trial of flonase daily  · Consider Neilmed sinus rinse    3  Eosinophilia - trend, may consider anti-IL-5 therapies in future should symptoms not be controlled    4  Pulmonary Nodule - further imaging scheduled per Dr Shahab Alberts:    Diagnoses and all orders for this visit:    Moderate persistent asthma without complication  -     fluticasone-vilanterol (BREO ELLIPTA) 100-25 mcg/inh inhaler; Inhale 1 puff daily Rinse mouth after use  -     Pulmonary function test; Future    Eosinophilia    Allergic rhinitis, unspecified seasonality, unspecified trigger        Return in about 3 months (around 5/18/2019) for Recheck  History of Present Illness   HPI:  Antolin Amor is a 28 y o  female who presents for cough and wheeze follow up  She was initially seen by Dr Judy Rojas in Jan 2019 for reported cough and dyspnea with noted 4mm RUL pulmonary nodule seen during evaluation for axillary granulomatous inflammation  She has been followed by Dr Maco Shah and has repeat CT chest scheduled at 3 month interval  At last visit with Dr Judy Rojas, she was given course of prednisone and advised to return of symptoms persist     She reports frequent nonproductive cough, wheeze and dyspnea  She is using her rescue inhaler 2-3 times a day and awakens frequently at night  She reported triggered with cold air, strong odors and smoke inhalation  She reported post nasal drainage symptoms  She denied reflux, no dysphagia, no hemoptysis    She denied any other regions of adenopathy, no rashes  She gets relief with rescue inhaler use  She denied change with OCS previously but just completed course for bronchitis last week  Additional Social History  · Lifelong nonsmoker  · Works at [employer]  · Pet dog/cats    Review of Systems   Constitutional: Negative for activity change, appetite change, fatigue and fever  HENT: Positive for postnasal drip and rhinorrhea  Negative for ear pain, sneezing, sore throat and trouble swallowing  Eyes: Negative for visual disturbance  Respiratory: Positive for cough, shortness of breath and wheezing  Cardiovascular: Negative for chest pain, palpitations and leg swelling  Gastrointestinal: Negative for abdominal distention, abdominal pain, diarrhea, nausea and vomiting  Endocrine: Positive for cold intolerance  Negative for heat intolerance  Musculoskeletal: Negative for arthralgias and myalgias  Skin: Negative for rash  Allergic/Immunologic: Negative for immunocompromised state  Neurological: Negative for syncope and headaches  Hematological: Negative for adenopathy  Psychiatric/Behavioral: Positive for sleep disturbance         Historical Information   Past Medical History:   Diagnosis Date    Asthma     Cat-scratch disease     last assessed 10/22/15    Depression     Hypertension     last assessed 11/11/14    Sleep disorder      Past Surgical History:   Procedure Laterality Date    BREAST BIOPSY Left     2016    FL BX/REMV,LYMPH NODE,DEEP AXILL Left 1/15/2019    Procedure: LEFT AXILLARY LYMPH NODE BIOPSY;  Surgeon: Vipin Neff MD;  Location: [facility];  Service: General    TUBAL LIGATION       Family History   Problem Relation Age of Onset    Anxiety disorder Mother     Bipolar disorder Mother     Depression Mother     Schizophrenia Mother     Parkinsonism Mother         tremor    Anxiety disorder Father     Bipolar disorder Father     Depression Father     Schizophrenia Father     Stomach cancer Paternal Grandmother          Meds/Allergies     Current Outpatient Medications:     albuterol (PROVENTIL HFA,VENTOLIN HFA) 90 mcg/act inhaler, Inhale 2 puffs every 4 (four) hours as needed for wheezing, Disp: 1 Inhaler, Rfl: 0    fluticasone-vilanterol (BREO ELLIPTA) 100-25 mcg/inh inhaler, Inhale 1 puff daily Rinse mouth after use , Disp: 28 each, Rfl: 12  Allergies   Allergen Reactions    Codeine Hives     Tolerated Percocet, Vicodin, etc        Vitals: Blood pressure 130/84, pulse (!) 111, height 5' 2" (1 575 m), weight 86 2 kg (190 lb), last menstrual period 02/03/2019, SpO2 96 %, not currently breastfeeding  Body mass index is 34 75 kg/m²  Oxygen Therapy  SpO2: 96 %      Physical Exam  Physical Exam   Constitutional: She is oriented to person, place, and time  She appears well-developed and well-nourished  No distress  HENT:   Head: Normocephalic and atraumatic  Right Ear: External ear normal    Left Ear: External ear normal    Mouth/Throat: No oropharyngeal exudate  Moderate nasal turbinate erythema and nodularity  Mild posterior pharyngeal cobblestoning   Eyes: Pupils are equal, round, and reactive to light  Conjunctivae are normal  Right eye exhibits no discharge  Left eye exhibits no discharge  No scleral icterus  Neck: No JVD present  No tracheal deviation present  Cardiovascular: Normal rate, regular rhythm and normal heart sounds  No murmur heard  Pulmonary/Chest: Effort normal and breath sounds normal  No stridor  No respiratory distress  She has no wheezes  She has no rales  Clear no wheeze or rales   Abdominal: Soft  Bowel sounds are normal  She exhibits no distension  There is no tenderness  Musculoskeletal: She exhibits no edema or deformity  Lymphadenopathy:     She has no cervical adenopathy  Neurological: She is alert and oriented to person, place, and time  Skin: Skin is warm and dry  No rash noted  No erythema  Psychiatric: She has a normal mood and affect  Her behavior is normal        Labs: I have personally reviewed pertinent lab results  Lab Results   Component Value Date    WBC 13 96 (H) 02/14/2019    HGB 14 1 02/14/2019    HCT 43 3 02/14/2019    MCV 94 02/14/2019     02/14/2019     Lab Results   Component Value Date    CALCIUM 9 1 02/14/2019    K 4 3 02/14/2019    CO2 28 02/14/2019     02/14/2019    BUN 13 02/14/2019    CREATININE 0 75 02/14/2019     No results found for: IGE  Lab Results   Component Value Date    ALT 21 02/14/2019    AST 8 02/14/2019    ALKPHOS 85 02/14/2019     ABS Eos reviewed - currently 20 but have ranged 740-->1250    Imaging and other studies: I have personally reviewed pertinent reports  and I have personally reviewed pertinent films in PACS  CT Chest 1/21/19 - images personally reviewed - left axillary adenopathy noted, no sig mediastinal adenopathy, noted 4mm RUL nodule not reported but appears less conspicuous from 12/30/18 imaging    Melissa Elias DO, [facility]

## 2019-02-18 NOTE — PATIENT INSTRUCTIONS
· Start Breo 100/25 1puff once a day  · Use rescue inhaler when short of breath or wheeze  · Start flonase 1 spray each nostril daily  · Follow up in 3 months    Asthma, Ambulatory Care   GENERAL INFORMATION:   Asthma  is a lung disease that makes breathing difficult  Chronic inflammation and reactions to triggers narrow the airways in your lungs  Asthma can become life-threatening if it is not managed  Common symptoms include the following:   · Coughing     · Wheezing     · Shortness of breath     · Chest tightness  Seek immediate care for the following symptoms:   · Severe shortness of breath    · Blue or gray lips or nails    · Skin around your neck and ribs pulls in with each breath    · Shortness of breath, even after you take your short-term medicine as directed     · Peak flow numbers in the red zone of your asthma action plan  Treatment for asthma  will depend on how severe it is  Medicine may decrease inflammation, open airways, and make it easier to breathe  Medicines may be inhaled, taken as a pill, or injected  Short-term medicines relieve your symptoms quickly  Long-term medicines are used to prevent future attacks  You may also need medicine to help control your allergies  Manage and prevent future asthma attacks:   · Follow your asthma action pan  This is a written plan that you and your healthcare provider create  It explains which medicine you need and when to change doses if necessary  It also explains how you can monitor symptoms and use a peak flow meter  The meter measures how well your lungs are working  · Manage other health conditions , such as allergies, acid reflux, and sleep apnea  · Identify and avoid triggers  These may include pets, dust mites, mold, and cockroaches  · Do not smoke and avoid others who smoke  If you smoke, it is never too late to quit  Ask your healthcare provider if you need help quitting  · Ask about a flu vaccine    The flu can make your asthma worse  You may need a yearly flu shot  Follow up with your healthcare provider as directed: You will need to return to make sure your medicine is working and your symptoms are controlled  You may be referred to an asthma or allergy specialist  Silverandre Feliciano may be asked to keep a record of your peak flow values and bring it with you to your appointments  Write down your questions so you remember to ask them during your visits  CARE AGREEMENT:   You have the right to help plan your care  Learn about your health condition and how it may be treated  Discuss treatment options with your caregivers to decide what care you want to receive  You always have the right to refuse treatment  The above information is an  only  It is not intended as medical advice for individual conditions or treatments  Talk to your doctor, nurse or pharmacist before following any medical regimen to see if it is safe and effective for you  © 2014 [address] is for End User's use only and may not be sold, redistributed or otherwise used for commercial purposes  All illustrations and images included in CareNotes® are the copyrighted property of A D A M , Inc  or Aryan Chowdary

## 2019-02-18 NOTE — TELEPHONE ENCOUNTER
Patient is requesting her bloodwork results in Epic,she seen the results and has questions about levels being high?  Also she said there is two diagnosis in her last visit with Dr Maria Del Carmen Lindsey that she has questions about/

## 2019-02-18 NOTE — PROGRESS NOTES
Assessment/Plan:    Cat-scratch disease:  S/p Lymph node biopsy  Open surgical wound  - healing well    - follow in wound care   - Wound care instructions given    Subjective:      Patient ID: Tylor Cazares is a 28 y o  female who had frankly infected lymph node excised 1 month ago that was found be secondary to infection  Now has open wound in left axilla at excision site  HPI    The following portions of the patient's history were reviewed and updated as appropriate: allergies, current medications, past family history, past medical history, past social history, past surgical history and problem list     Review of Systems   Constitutional: Negative  Skin: Positive for wound  Objective:      /84   Pulse (!) 111   Temp 98 1 °F (36 7 °C)   Ht 5' 2" (1 575 m)   Wt 86 2 kg (190 lb)   LMP 02/03/2019   BMI 34 75 kg/m²          Physical Exam   Constitutional: She appears well-nourished  No distress  Skin: Skin is warm  Partially open wound in left axilla with cavity  No erythema, mild tenderness  Seropurulent drainage on packing but no fluid in cavity

## 2019-02-18 NOTE — TELEPHONE ENCOUNTER
Received a call from pt that she missed a call from Dr Charla Khan  Per Dr Charla Khan, she left a message on patients voicemail that her antibody test was positive and that the stool test was negative  We will wait to see what the test that the oncologist ordered shows  Contacted pt back to make sure she got the voicemail and explained to patient that this was the plan for now and that we would contact her after she follows up with the oncologist testing  Pt insists on speaking to the doctor  I informed Dr Charla Khan of this

## 2019-02-19 ENCOUNTER — DOCUMENTATION (OUTPATIENT)
Dept: CARDIOLOGY CLINIC | Facility: HOSPITAL | Age: 34
End: 2019-02-19

## 2019-02-19 ENCOUNTER — TELEPHONE (OUTPATIENT)
Dept: CARDIOLOGY CLINIC | Facility: CLINIC | Age: 34
End: 2019-02-19

## 2019-02-19 ENCOUNTER — HOSPITAL ENCOUNTER (OUTPATIENT)
Dept: NON INVASIVE DIAGNOSTICS | Facility: HOSPITAL | Age: 34
Discharge: HOME | End: 2019-02-19

## 2019-02-19 DIAGNOSIS — I10 ESSENTIAL HYPERTENSION: ICD-10-CM

## 2019-02-19 DIAGNOSIS — R94.31 ABNORMAL EKG: ICD-10-CM

## 2019-02-19 DIAGNOSIS — R07.89 OTHER CHEST PAIN: ICD-10-CM

## 2019-02-19 NOTE — PROGRESS NOTES
Pt @ [facility] this am for SE  Unable to do D/T /108  Pt informs that ''BP has been running on the high side and primary care had talked about starting a low dose BP med but has not yet " Call to Rin Crane' office, ordering provider  Spoke with MA there  Informed that pt has OV with Dr Makenna Otoole @ [facility] on 2/21 and he can determine if pt needs BP med @ that time  Informed pt of same

## 2019-02-19 NOTE — TELEPHONE ENCOUNTER
I returned call to patient yesterday and reviewed her lab work  She had some specific questions about her diagnosis and asked to speak with Dr Beltran Woody regarding this - can you have him talk to the patient when he returns to the office    I did explain that he was off yesterday    Thank you

## 2019-02-19 NOTE — TELEPHONE ENCOUNTER
TIESHA:  Nurse from [facility] called  Patient presented for stress echo today, which was ordered by Gerald Miner s/gisselle Elkins [facility]  Resting heart rate was 140/108 at rest with a manual cuff with a pulse of 88 bpm    Per protocol, the test was cancelled  According to the patient, her BP does run high, at times, and her PCP had talked to her about the possibility of starting a blood pressure medication in the future  She is scheduled for a new patient appt with Dr Dorita Arias on Thursday, 2/21/19 at [facility]

## 2019-02-21 ENCOUNTER — OFFICE VISIT (OUTPATIENT)
Dept: CARDIOLOGY CLINIC | Facility: HOSPITAL | Age: 34
End: 2019-02-21
Payer: COMMERCIAL

## 2019-02-21 ENCOUNTER — APPOINTMENT (OUTPATIENT)
Dept: WOUND CARE | Facility: CLINIC | Age: 34
End: 2019-02-21
Payer: COMMERCIAL

## 2019-02-21 VITALS
HEIGHT: 62 IN | SYSTOLIC BLOOD PRESSURE: 136 MMHG | HEART RATE: 88 BPM | WEIGHT: 193.4 LBS | DIASTOLIC BLOOD PRESSURE: 94 MMHG | BODY MASS INDEX: 35.59 KG/M2

## 2019-02-21 DIAGNOSIS — R94.31 ABNORMAL EKG: ICD-10-CM

## 2019-02-21 DIAGNOSIS — I10 ESSENTIAL HYPERTENSION: Primary | ICD-10-CM

## 2019-02-21 PROCEDURE — 99214 OFFICE O/P EST MOD 30 MIN: CPT | Performed by: INTERNAL MEDICINE

## 2019-02-21 PROCEDURE — 93000 ELECTROCARDIOGRAM COMPLETE: CPT | Performed by: INTERNAL MEDICINE

## 2019-02-21 PROCEDURE — 99213 OFFICE O/P EST LOW 20 MIN: CPT

## 2019-02-21 RX ORDER — AMLODIPINE BESYLATE 5 MG/1
5 TABLET ORAL DAILY
Qty: 30 TABLET | Refills: 3 | Status: SHIPPED | OUTPATIENT
Start: 2019-02-21 | End: 2019-05-21 | Stop reason: SDUPTHER

## 2019-02-21 NOTE — PROGRESS NOTES
Cardiology Follow Up    Shellie Silva  1985  [id number]  [address] [facility]  [address] [place] [address]  [phone]    1  Essential hypertension     2  Abnormal EKG         Diagnoses and all orders for this visit:    Essential hypertension    Abnormal EKG    Other orders  -     POCT ECG      I had the pleasure of seeing Shellie Silva for a follow up visit  Interval History: none    History of the presenting illness, Discussion/Summary and My Plan are as follows: She is a very pleasant 62-year-old lady without a prior history of diabetes, hypertension or dyslipidemia or cardiac disease  She does not have a family history of premature vascular disease or hypertension in first-degree relatives  Her grandparents but not her parents have hypertension  Recently during an axillary lymph node resection, had complained of chest pains which might have been purely musculoskeletal based on the description, with an ECG that looks slightly different with loss of R-waves in V3 leading to evaluation by Cardiology at [facility] and stress test was ordered  She presented for the stress echocardiogram and diastolic blood pressure was 104 and this was cancel  She is asymptomatic from a cardiac standpoint, till five weeks ago, she was physically quite active-unloading at [employer] any cardiac symptoms  Plan:    Hypertension:  Start amlodipine 5 mg  TSH was normal in March 2018, normal BMP  Check TSH with her next blood work    She will complete her stress echocardiogram     Provided stress echocardiogram was unremarkable and she remains asymptomatic, she can follow up as needed  Follow up as needed    Results for Tasneem Meza (MRN [id number]) as of 2/21/2019 14:01   Ref   Range 3/9/2018 19:47   TSH 3RD GENERATON Latest Ref Range: 0 358 - 3 740 uIU/mL 3 494       Patient Active Problem List   Diagnosis  Depression    Intention tremor    Urine ketones    Numbness and tingling of both upper extremities while sleeping    Allergic rhinitis    Mass of left axilla    Other chest pain    Abnormal EKG    Essential hypertension    Lymphedema    Cat-scratch disease    Wound dehiscence    Granulomatous disease (HCC)    Lung nodule < 6cm on CT    Eosinophilia    Asthma     Past Medical History:   Diagnosis Date    Asthma     Cat-scratch disease     last assessed 10/22/15    Depression     Hypertension     last assessed 11/11/14    Sleep disorder      Social History     Socioeconomic History    Marital status: /Civil Union     Spouse name: Not on file    Number of children: Not on file    Years of education: Not on file    Highest education level: Not on file   Occupational History    Not on file   Social Needs    Financial resource strain: Not on file    Food insecurity:     Worry: Not on file     Inability: Not on file    Transportation needs:     Medical: Not on file     Non-medical: Not on file   Tobacco Use    Smoking status: Never Smoker    Smokeless tobacco: Never Used   Substance and Sexual Activity    Alcohol use: No    Drug use: No    Sexual activity: Yes     Partners: Male     Birth control/protection: None   Lifestyle    Physical activity:     Days per week: Not on file     Minutes per session: Not on file    Stress: Not on file   Relationships    Social connections:     Talks on phone: Not on file     Gets together: Not on file     Attends Jain service: Not on file     Active member of club or organization: Not on file     Attends meetings of clubs or organizations: Not on file     Relationship status: Not on file    Intimate partner violence:     Fear of current or ex partner: Not on file     Emotionally abused: Not on file     Physically abused: Not on file     Forced sexual activity: Not on file   Other Topics Concern    Not on file   Social History Narrative Always uses seat belt    Daily caffeine consumption(occasional)    Economic stress    Exercises daily    Lives with     Lives with relatives    Pets: Dog      Family History   Problem Relation Age of Onset    Anxiety disorder Mother     Bipolar disorder Mother     Depression Mother     Schizophrenia Mother     Parkinsonism Mother         tremor    Anxiety disorder Father     Bipolar disorder Father     Depression Father     Schizophrenia Father     Stomach cancer Paternal Grandmother      Past Surgical History:   Procedure Laterality Date    BREAST BIOPSY Left     2016    DE BX/REMV,LYMPH NODE,DEEP AXILL Left 1/15/2019    Procedure: LEFT AXILLARY LYMPH NODE BIOPSY;  Surgeon: Pancho Irving MD;  [facility];  Service: General    TUBAL LIGATION         Current Outpatient Medications:     albuterol (PROVENTIL HFA,VENTOLIN HFA) 90 mcg/act inhaler, Inhale 2 puffs every 4 (four) hours as needed for wheezing, Disp: 1 Inhaler, Rfl: 0    fluticasone-vilanterol (BREO ELLIPTA) 100-25 mcg/inh inhaler, Inhale 1 puff daily Rinse mouth after use , Disp: 28 each, Rfl: 12  Allergies   Allergen Reactions    Codeine Hives     Tolerated Percocet, Vicodin, etc        Labs:not applicable  Imaging: Xr Chest 2 Views    Result Date: 2/9/2019  Narrative: CHEST INDICATION:  Cough  COMPARISON:  1/17/2019, CT chest 1/21/2019 EXAM PERFORMED/VIEWS:  XR CHEST PA & LATERAL FINDINGS: Cardiomediastinal silhouette appears unremarkable  The lungs are clear  No pneumothorax or pleural effusion  Osseous structures appear within normal limits for patient age  Impression: No acute cardiopulmonary disease  Workstation performed: [id number]     Vas Upper Limb Venous Duplex Scan, Unilateral/limited    Result Date: 1/27/2019  Narrative:  [facility] REPORT [facility]ICAL: Indications:  Left arm pain and swelling for over 1 week  History of left axillary lymphnode biopsy on 1/15/2019   Risk Factors The patient has no history of DVT  FINDINGS:  Segment       Right            Left                        Impression       Impression       Int  Jugular  Normal (Patent)  Normal (Patent)     CONCLUSION:  Impression RIGHT UPPER LIMB LIMITED: Evaluation shows no evidence of thrombus in the internal jugular vein, subclavian vein, and the brachiocephalic vein  LEFT UPPER LIMB: No evidence of acute or chronic deep vein thrombosis  No evidence of superficial thrombophlebitis noted  Doppler evaluation shows a normal response to augmentation maneuvers  Technical findings given to Nya Darby   SIGNATURE: Electronically Signed by: Marii Sanchez on 2019-01-27 07:46:09 PM    Us Extremity Soft Tissue    Result Date: 2/5/2019  Narrative: EXTREMITY SOFT TISSUE ULTRASOUND INDICATION:   G89 18: Other acute postprocedural pain R22 32: Localized swelling, mass and lump, left upper limb  COMPARISON:  9/28/2015 TECHNIQUE:   Real-time ultrasound of the left axilla and area of concern was performed with a transducer with both volumetric sweeps and still imaging techniques  FINDINGS:  There is no fluid collection within the area of concern  There is some subcutaneous edema within the soft tissues  Impression: No well-formed fluid collection within or concern  Subcutaneous edema within the soft tissues is present  Workstation performed: [id number]       Review of Systems:  Review of Systems   Constitutional: Negative  HENT: Negative  Eyes: Negative  Respiratory: Negative  Cardiovascular: Negative  Gastrointestinal: Negative  Endocrine: Negative  Genitourinary: Negative  Musculoskeletal: Negative  Allergic/Immunologic: Negative  Neurological: Negative  Hematological: Negative  Psychiatric/Behavioral: Positive for decreased concentration  Negative for agitation, behavioral problems, confusion, dysphoric mood, hallucinations and self-injury  The patient is nervous/anxious  The patient is not hyperactive  Physical Exam:  /94 (BP Location: Right arm, Patient Position: Sitting, Cuff Size: Standard)   Pulse 88   Ht 5' 2" (1 575 m)   Wt 87 7 kg (193 lb 6 4 oz)   LMP 02/03/2019   BMI 35 37 kg/m²   Physical Exam   Constitutional: She appears well-developed and well-nourished  No distress  HENT:   Head: Normocephalic  Eyes: Pupils are equal, round, and reactive to light  Right eye exhibits no discharge  Left eye exhibits no discharge  Neck: Normal range of motion  No JVD present  No tracheal deviation present  No thyromegaly present  Cardiovascular: Normal rate and regular rhythm  Exam reveals no gallop and no friction rub  No murmur heard  Pulmonary/Chest: Effort normal  No stridor  No respiratory distress  She has no wheezes  Skin: Skin is warm  She is not diaphoretic  Psychiatric: She has a normal mood and affect

## 2019-02-25 ENCOUNTER — APPOINTMENT (OUTPATIENT)
Dept: LAB | Facility: HOSPITAL | Age: 34
End: 2019-02-25
Attending: INTERNAL MEDICINE
Payer: COMMERCIAL

## 2019-02-25 ENCOUNTER — HOSPITAL ENCOUNTER (OUTPATIENT)
Dept: PULMONOLOGY | Facility: HOSPITAL | Age: 34
Discharge: HOME | End: 2019-02-25
Payer: COMMERCIAL

## 2019-02-25 DIAGNOSIS — J45.40 MODERATE PERSISTENT ASTHMA WITHOUT COMPLICATION: ICD-10-CM

## 2019-02-25 LAB — TSH SERPL DL<=0.05 MIU/L-ACNC: 2.51 UIU/ML (ref 0.36–3.74)

## 2019-02-25 PROCEDURE — 94060 EVALUATION OF WHEEZING: CPT | Performed by: INTERNAL MEDICINE

## 2019-02-25 PROCEDURE — 94729 DIFFUSING CAPACITY: CPT | Performed by: INTERNAL MEDICINE

## 2019-02-25 PROCEDURE — 84443 ASSAY THYROID STIM HORMONE: CPT | Performed by: INTERNAL MEDICINE

## 2019-02-25 PROCEDURE — 94760 N-INVAS EAR/PLS OXIMETRY 1: CPT

## 2019-02-25 PROCEDURE — 94729 DIFFUSING CAPACITY: CPT

## 2019-02-25 PROCEDURE — 94727 GAS DIL/WSHOT DETER LNG VOL: CPT

## 2019-02-25 PROCEDURE — 94060 EVALUATION OF WHEEZING: CPT

## 2019-02-25 PROCEDURE — 94726 PLETHYSMOGRAPHY LUNG VOLUMES: CPT | Performed by: INTERNAL MEDICINE

## 2019-02-25 PROCEDURE — 36415 COLL VENOUS BLD VENIPUNCTURE: CPT | Performed by: INTERNAL MEDICINE

## 2019-02-25 RX ORDER — ALBUTEROL SULFATE 2.5 MG/3ML
2.5 SOLUTION RESPIRATORY (INHALATION) ONCE AS NEEDED
Status: COMPLETED | OUTPATIENT
Start: 2019-02-25 | End: 2019-02-25

## 2019-02-25 RX ADMIN — ALBUTEROL SULFATE 2.5 MG: 2.5 SOLUTION RESPIRATORY (INHALATION) at 08:47

## 2019-02-26 ENCOUNTER — TELEPHONE (OUTPATIENT)
Dept: CARDIOLOGY CLINIC | Facility: CLINIC | Age: 34
End: 2019-02-26

## 2019-02-26 NOTE — TELEPHONE ENCOUNTER
----- Message from Ginette Johnson MD sent at 2/26/2019  3:59 PM EST -----  Results are normal  Please notify pt

## 2019-02-28 ENCOUNTER — APPOINTMENT (OUTPATIENT)
Dept: WOUND CARE | Facility: CLINIC | Age: 34
End: 2019-02-28
Payer: COMMERCIAL

## 2019-02-28 PROCEDURE — 97597 DBRDMT OPN WND 1ST 20 CM/<: CPT

## 2019-03-05 DIAGNOSIS — I89.0 LYMPHEDEMA: Primary | ICD-10-CM

## 2019-03-05 LAB
MYCOBACTERIUM SPEC CULT: NORMAL
RHODAMINE-AURAMINE STN SPEC: NORMAL

## 2019-03-05 NOTE — PROGRESS NOTES
PT DISCHARGE     Pt without need to return to therapy after initial evaluation and instruction on purchase of compression garment  She will be D/C from [facility] due to script expiration at this time  Thank you!

## 2019-03-14 ENCOUNTER — APPOINTMENT (OUTPATIENT)
Dept: WOUND CARE | Facility: CLINIC | Age: 34
End: 2019-03-14
Payer: COMMERCIAL

## 2019-03-14 PROCEDURE — 99213 OFFICE O/P EST LOW 20 MIN: CPT

## 2019-03-26 ENCOUNTER — HOSPITAL ENCOUNTER (EMERGENCY)
Facility: HOSPITAL | Age: 34
Discharge: HOME | End: 2019-03-26
Attending: EMERGENCY MEDICINE
Payer: COMMERCIAL

## 2019-03-26 VITALS
DIASTOLIC BLOOD PRESSURE: 104 MMHG | BODY MASS INDEX: 36.25 KG/M2 | WEIGHT: 198.19 LBS | RESPIRATION RATE: 16 BRPM | OXYGEN SATURATION: 100 % | HEART RATE: 104 BPM | TEMPERATURE: 99 F | SYSTOLIC BLOOD PRESSURE: 151 MMHG

## 2019-03-26 DIAGNOSIS — L02.91 ABSCESS: Primary | ICD-10-CM

## 2019-03-26 PROCEDURE — 87205 SMEAR GRAM STAIN: CPT | Performed by: PHYSICIAN ASSISTANT

## 2019-03-26 PROCEDURE — 99283 EMERGENCY DEPT VISIT LOW MDM: CPT

## 2019-03-26 PROCEDURE — 87070 CULTURE OTHR SPECIMN AEROBIC: CPT | Performed by: PHYSICIAN ASSISTANT

## 2019-03-26 RX ORDER — SULFAMETHOXAZOLE AND TRIMETHOPRIM 800; 160 MG/1; MG/1
1 TABLET ORAL 2 TIMES DAILY
Qty: 14 TABLET | Refills: 0 | OUTPATIENT
Start: 2019-03-26 | End: 2019-03-27

## 2019-03-26 RX ORDER — CEPHALEXIN 500 MG/1
500 CAPSULE ORAL EVERY 6 HOURS SCHEDULED
Qty: 28 CAPSULE | Refills: 0 | Status: SHIPPED | OUTPATIENT
Start: 2019-03-26 | End: 2019-04-02

## 2019-03-26 NOTE — ED PROVIDER NOTES
History  Chief Complaint   Patient presents with    Abscess     pt has abscess on left side under panus  noticed sunday  states painful  Patient presents to the emergency department today via private vehicle alone offering complaint of an abscess in for abdominal region the supraumbilical region under her pannus slightly left-sided  Denies bleeding or drainage  Has been present over the last 2-3 days  Has been utilizing warm soaks  Denies any fevers chills sweats currently however states 2 days ago she felt so she was febrile  She is eating and drinking normally  No nausea vomiting  Producing urine and stool without difficulty  Prior to Admission Medications   Prescriptions Last Dose Informant Patient Reported? Taking? albuterol (PROVENTIL HFA,VENTOLIN HFA) 90 mcg/act inhaler  Self No No   Sig: Inhale 2 puffs every 4 (four) hours as needed for wheezing   amLODIPine (NORVASC) 5 mg tablet   No No   Sig: Take 1 tablet (5 mg total) by mouth daily   fluticasone-vilanterol (BREO ELLIPTA) 100-25 mcg/inh inhaler   No No   Sig: Inhale 1 puff daily Rinse mouth after use        Facility-Administered Medications: None       Past Medical History:   Diagnosis Date    Asthma     Cat-scratch disease     last assessed 10/22/15    Depression     Hypertension     last assessed 11/11/14    Sleep disorder        Past Surgical History:   Procedure Laterality Date    BREAST BIOPSY Left     2016    [place] BX/REMV,LYMPH NODE,DEEP AXILL Left 1/15/2019    Procedure: LEFT AXILLARY LYMPH NODE BIOPSY;  Surgeon: Perry Strange MD;  [facility];  Service: General    TUBAL LIGATION         Family History   Problem Relation Age of Onset    Anxiety disorder Mother     Bipolar disorder Mother     Depression Mother     Schizophrenia Mother     Parkinsonism Mother         tremor    Anxiety disorder Father     Bipolar disorder Father     Depression Father     Schizophrenia Father     Stomach cancer Paternal Grandmother      I have reviewed and agree with the history as documented  Social History     Tobacco Use    Smoking status: Never Smoker    Smokeless tobacco: Never Used   Substance Use Topics    Alcohol use: No    Drug use: No        Review of Systems   Constitutional: Negative  HENT: Negative  Eyes: Negative  Respiratory: Negative  Cardiovascular: Negative  Gastrointestinal: Negative for abdominal distention, abdominal pain, anal bleeding, blood in stool, constipation, diarrhea, nausea, rectal pain and vomiting  Endocrine: Negative  Genitourinary: Negative  Musculoskeletal: Negative  Skin: Negative for color change, pallor, rash and wound  Abdominal abscess   Allergic/Immunologic: Negative  Neurological: Negative  Hematological: Negative  Psychiatric/Behavioral: Negative  All other systems reviewed and are negative  Physical Exam  Physical Exam   Constitutional: She is oriented to person, place, and time  She appears well-developed and well-nourished  No distress  HENT:   Head: Atraumatic  Eyes: Pupils are equal, round, and reactive to light  EOM are normal    Neck: Normal range of motion  Cardiovascular: Normal rate and regular rhythm  Pulmonary/Chest: Effort normal and breath sounds normal  No stridor  No respiratory distress  She has no wheezes  She has no rales  She exhibits no tenderness  Abdominal: Soft  There is no tenderness  Musculoskeletal: Normal range of motion  Neurological: She is alert and oriented to person, place, and time  Skin: Capillary refill takes less than 2 seconds  She is not diaphoretic  Patient has an approximate 3 cm x 2 cm soft fluctuance abdominal abscess under the pannus left side  No surrounding erythema  No bleeding or drainage   Psychiatric: She has a normal mood and affect  Vitals reviewed        Vital Signs  ED Triage Vitals [03/26/19 1105]   Temperature Pulse Respirations Blood Pressure SpO2   99 °F (37 2 °C) 104 16 (!) 151/104 100 %      Temp Source Heart Rate Source Patient Position - Orthostatic VS BP Location FiO2 (%)   Temporal Monitor Sitting Right arm --      Pain Score       8           Vitals:    03/26/19 1105   BP: (!) 151/104   Pulse: 104   Patient Position - Orthostatic VS: Sitting         Visual Acuity      ED Medications  Medications - No data to display    Diagnostic Studies  Results Reviewed     None                 No orders to display              Procedures  Procedures       Phone Contacts  ED Phone Contact    ED Course  ED Course as of Mar 26 1126   Tue Mar 26, 2019   1112 Blood Pressure(!): 151/104   1112 Temperature: 99 °F (37 2 °C)   1112 Pulse: 104   1112 Respirations: 16   1112 SpO2: 100 %                               MDM    Disposition  Final diagnoses:   None     ED Disposition     None      Follow-up Information    None         Patient's Medications   Discharge Prescriptions    No medications on file     No discharge procedures on file      ED Provider  Electronically Signed by           Diamond Preston PA-C  03/29/19 0900

## 2019-03-26 NOTE — ED PROCEDURE NOTE
Procedure  Incision/Drainage  Date/Time: 3/26/2019 11:29 AM  Performed by: Michael Sanford PA-C  Authorized by: Michael Sanford PA-C     Patient location:  Bedside  Other Assisting Provider: No    Consent:     Consent obtained:  Verbal    Consent given by:  Patient    Risks discussed:  Bleeding, incomplete drainage, pain, infection and damage to other organs  Universal protocol:     Procedure explained and questions answered to patient or proxy's satisfaction: yes      Immediately prior to procedure a time out was called: yes      Patient identity confirmed:  Verbally with patient  Location:     Type:  Abscess    Size:  [id number]    Location: lower abdomen  Pre-procedure details:     Skin preparation:  Betadine  Anesthesia (see MAR for exact dosages): Anesthesia method:  Topical application    Topical anesthesia: Ethyl chloride  Procedure details:     Complexity:  Simple    Needle aspiration: no      Incision types:  Stab incision    Scalpel blade:  11    Approach:  Open    Incision depth:  Subcutaneous    Wound management:  Probed and deloculated and irrigated with saline    Drainage:  Purulent    Drainage amount: Moderate    Wound treatment:  Wound left open    Packing materials:  None  Post-procedure details:     Patient tolerance of procedure:   Tolerated well, no immediate complications                     Michael Sanford PA-C  03/26/19 1132

## 2019-03-27 ENCOUNTER — APPOINTMENT (EMERGENCY)
Dept: CT IMAGING | Facility: HOSPITAL | Age: 34
End: 2019-03-27
Payer: COMMERCIAL

## 2019-03-27 ENCOUNTER — HOSPITAL ENCOUNTER (EMERGENCY)
Facility: HOSPITAL | Age: 34
Discharge: HOME | End: 2019-03-27
Attending: EMERGENCY MEDICINE | Admitting: EMERGENCY MEDICINE
Payer: COMMERCIAL

## 2019-03-27 ENCOUNTER — TELEPHONE (OUTPATIENT)
Dept: INFECTIOUS DISEASES | Facility: CLINIC | Age: 34
End: 2019-03-27

## 2019-03-27 VITALS
RESPIRATION RATE: 20 BRPM | HEIGHT: 63 IN | WEIGHT: 198.41 LBS | SYSTOLIC BLOOD PRESSURE: 117 MMHG | HEART RATE: 112 BPM | OXYGEN SATURATION: 98 % | BODY MASS INDEX: 35.16 KG/M2 | TEMPERATURE: 98.9 F | DIASTOLIC BLOOD PRESSURE: 68 MMHG

## 2019-03-27 DIAGNOSIS — Z88.1 ALLERGY TO ANTIBIOTIC: Primary | ICD-10-CM

## 2019-03-27 LAB
ALBUMIN SERPL BCP-MCNC: 3.5 G/DL (ref 3.5–5)
ALP SERPL-CCNC: 82 U/L (ref 46–116)
ALT SERPL W P-5'-P-CCNC: 20 U/L (ref 12–78)
ANION GAP SERPL CALCULATED.3IONS-SCNC: 12 MMOL/L (ref 4–13)
AST SERPL W P-5'-P-CCNC: 14 U/L (ref 5–45)
BASOPHILS # BLD MANUAL: 0 THOUSAND/UL (ref 0–0.1)
BASOPHILS NFR MAR MANUAL: 0 % (ref 0–1)
BILIRUB SERPL-MCNC: 0.4 MG/DL (ref 0.2–1)
BUN SERPL-MCNC: 12 MG/DL (ref 5–25)
CALCIUM SERPL-MCNC: 9 MG/DL (ref 8.3–10.1)
CHLORIDE SERPL-SCNC: 104 MMOL/L (ref 100–108)
CO2 SERPL-SCNC: 21 MMOL/L (ref 21–32)
CREAT SERPL-MCNC: 0.84 MG/DL (ref 0.6–1.3)
DEPRECATED D DIMER PPP: 523 NG/ML (FEU)
EOSINOPHIL # BLD MANUAL: 0.27 THOUSAND/UL (ref 0–0.4)
EOSINOPHIL NFR BLD MANUAL: 2 % (ref 0–6)
ERYTHROCYTE [DISTWIDTH] IN BLOOD BY AUTOMATED COUNT: 13.2 % (ref 11.6–15.1)
GFR SERPL CREATININE-BSD FRML MDRD: 91 ML/MIN/1.73SQ M
GLUCOSE SERPL-MCNC: 110 MG/DL (ref 65–140)
HCT VFR BLD AUTO: 45.1 % (ref 34.8–46.1)
HGB BLD-MCNC: 14.8 G/DL (ref 11.5–15.4)
LYMPHOCYTES # BLD AUTO: 0.27 THOUSAND/UL (ref 0.6–4.47)
LYMPHOCYTES # BLD AUTO: 2 % (ref 14–44)
MCH RBC QN AUTO: 30 PG (ref 26.8–34.3)
MCHC RBC AUTO-ENTMCNC: 32.8 G/DL (ref 31.4–37.4)
MCV RBC AUTO: 92 FL (ref 82–98)
MONOCYTES # BLD AUTO: 0.13 THOUSAND/UL (ref 0–1.22)
MONOCYTES NFR BLD: 1 % (ref 4–12)
NEUTROPHILS # BLD MANUAL: 12.71 THOUSAND/UL (ref 1.85–7.62)
NEUTS BAND NFR BLD MANUAL: 2 % (ref 0–8)
NEUTS SEG NFR BLD AUTO: 93 % (ref 43–75)
NRBC BLD AUTO-RTO: 0 /100 WBCS
PLATELET # BLD AUTO: 216 THOUSANDS/UL (ref 149–390)
PLATELET BLD QL SMEAR: ADEQUATE
PMV BLD AUTO: 10.1 FL (ref 8.9–12.7)
POTASSIUM SERPL-SCNC: 3.5 MMOL/L (ref 3.5–5.3)
PROT SERPL-MCNC: 7.1 G/DL (ref 6.4–8.2)
RBC # BLD AUTO: 4.93 MILLION/UL (ref 3.81–5.12)
RBC MORPH BLD: NORMAL
SODIUM SERPL-SCNC: 137 MMOL/L (ref 136–145)
TOTAL CELLS COUNTED SPEC: 100
WBC # BLD AUTO: 13.38 THOUSAND/UL (ref 4.31–10.16)

## 2019-03-27 PROCEDURE — 96361 HYDRATE IV INFUSION ADD-ON: CPT

## 2019-03-27 PROCEDURE — 85027 COMPLETE CBC AUTOMATED: CPT | Performed by: PHYSICIAN ASSISTANT

## 2019-03-27 PROCEDURE — 85007 BL SMEAR W/DIFF WBC COUNT: CPT | Performed by: PHYSICIAN ASSISTANT

## 2019-03-27 PROCEDURE — 96374 THER/PROPH/DIAG INJ IV PUSH: CPT

## 2019-03-27 PROCEDURE — 96375 TX/PRO/DX INJ NEW DRUG ADDON: CPT

## 2019-03-27 PROCEDURE — 99284 EMERGENCY DEPT VISIT MOD MDM: CPT

## 2019-03-27 PROCEDURE — 71275 CT ANGIOGRAPHY CHEST: CPT

## 2019-03-27 PROCEDURE — 36415 COLL VENOUS BLD VENIPUNCTURE: CPT | Performed by: PHYSICIAN ASSISTANT

## 2019-03-27 PROCEDURE — 80053 COMPREHEN METABOLIC PANEL: CPT | Performed by: PHYSICIAN ASSISTANT

## 2019-03-27 PROCEDURE — 85379 FIBRIN DEGRADATION QUANT: CPT | Performed by: PHYSICIAN ASSISTANT

## 2019-03-27 RX ORDER — EPINEPHRINE 0.3 MG/.3ML
0.3 INJECTION SUBCUTANEOUS ONCE
Qty: 0.3 ML | Refills: 0 | Status: SHIPPED | OUTPATIENT
Start: 2019-03-27 | End: 2019-12-26

## 2019-03-27 RX ORDER — METHYLPREDNISOLONE SODIUM SUCCINATE 125 MG/2ML
125 INJECTION, POWDER, LYOPHILIZED, FOR SOLUTION INTRAMUSCULAR; INTRAVENOUS ONCE
Status: COMPLETED | OUTPATIENT
Start: 2019-03-27 | End: 2019-03-27

## 2019-03-27 RX ORDER — METHYLPREDNISOLONE 4 MG/1
TABLET ORAL
Qty: 21 TABLET | Refills: 0 | Status: SHIPPED | OUTPATIENT
Start: 2019-03-27 | End: 2019-06-18

## 2019-03-27 RX ORDER — KETOROLAC TROMETHAMINE 30 MG/ML
15 INJECTION, SOLUTION INTRAMUSCULAR; INTRAVENOUS ONCE
Status: COMPLETED | OUTPATIENT
Start: 2019-03-27 | End: 2019-03-27

## 2019-03-27 RX ORDER — DIPHENHYDRAMINE HYDROCHLORIDE 50 MG/ML
25 INJECTION INTRAMUSCULAR; INTRAVENOUS ONCE
Status: COMPLETED | OUTPATIENT
Start: 2019-03-27 | End: 2019-03-27

## 2019-03-27 RX ORDER — HYDROXYZINE HYDROCHLORIDE 25 MG/1
25 TABLET, FILM COATED ORAL EVERY 6 HOURS
Qty: 12 TABLET | Refills: 0 | Status: SHIPPED | OUTPATIENT
Start: 2019-03-27 | End: 2019-06-18

## 2019-03-27 RX ORDER — FAMOTIDINE 20 MG/1
20 TABLET, FILM COATED ORAL ONCE
Status: COMPLETED | OUTPATIENT
Start: 2019-03-27 | End: 2019-03-27

## 2019-03-27 RX ADMIN — FAMOTIDINE 20 MG: 20 TABLET ORAL at 17:12

## 2019-03-27 RX ADMIN — METHYLPREDNISOLONE SODIUM SUCCINATE 125 MG: 125 INJECTION, POWDER, FOR SOLUTION INTRAMUSCULAR; INTRAVENOUS at 17:13

## 2019-03-27 RX ADMIN — SODIUM CHLORIDE 1000 ML: 0.9 INJECTION, SOLUTION INTRAVENOUS at 17:16

## 2019-03-27 RX ADMIN — KETOROLAC TROMETHAMINE 15 MG: 30 INJECTION, SOLUTION INTRAMUSCULAR; INTRAVENOUS at 17:45

## 2019-03-27 RX ADMIN — DIPHENHYDRAMINE HYDROCHLORIDE 25 MG: 50 INJECTION INTRAMUSCULAR; INTRAVENOUS at 17:13

## 2019-03-27 RX ADMIN — SODIUM CHLORIDE 1000 ML: 0.9 INJECTION, SOLUTION INTRAVENOUS at 18:15

## 2019-03-27 RX ADMIN — IOHEXOL 85 ML: 350 INJECTION, SOLUTION INTRAVENOUS at 18:56

## 2019-03-27 NOTE — TELEPHONE ENCOUNTER
Pt call to report to Dr Leticia Kirkpatrick that pt has an abscess on pt's left side of abdomen  It was drained in the ED yesterday  Pt has been having burning in her eyes over the last week  She questions whether any of these things can be related to her enlarged lymph node on the left side

## 2019-03-28 ENCOUNTER — APPOINTMENT (OUTPATIENT)
Dept: WOUND CARE | Facility: CLINIC | Age: 34
End: 2019-03-28
Payer: COMMERCIAL

## 2019-03-28 ENCOUNTER — TELEPHONE (OUTPATIENT)
Dept: INFECTIOUS DISEASES | Facility: CLINIC | Age: 34
End: 2019-03-28

## 2019-03-28 LAB
BACTERIA WND AEROBE CULT: ABNORMAL
GRAM STN SPEC: ABNORMAL
GRAM STN SPEC: ABNORMAL

## 2019-03-28 PROCEDURE — 97597 DBRDMT OPN WND 1ST 20 CM/<: CPT

## 2019-03-28 NOTE — ED PROVIDER NOTES
History  Chief Complaint   Patient presents with    Abscess     pt has abscess on left side under panus  noticed sunday  states painful  HPI    Prior to Admission Medications   Prescriptions Last Dose Informant Patient Reported? Taking? albuterol (PROVENTIL HFA,VENTOLIN HFA) 90 mcg/act inhaler  Self No Yes   Sig: Inhale 2 puffs every 4 (four) hours as needed for wheezing   amLODIPine (NORVASC) 5 mg tablet   No Yes   Sig: Take 1 tablet (5 mg total) by mouth daily   fluticasone-vilanterol (BREO ELLIPTA) 100-25 mcg/inh inhaler   No Yes   Sig: Inhale 1 puff daily Rinse mouth after use  Facility-Administered Medications: None       Past Medical History:   Diagnosis Date    Asthma     Cat-scratch disease     last assessed 10/22/15    Depression     Hypertension     last assessed 11/11/14    Sleep disorder        Past Surgical History:   Procedure Laterality Date    BREAST BIOPSY Left     2016    [place] BX/REMV,LYMPH NODE,DEEP AXILL Left 1/15/2019    Procedure: LEFT AXILLARY LYMPH NODE BIOPSY;  Surgeon: Bhavna Sorenson MD;  [facility];  Service: General    TUBAL LIGATION         Family History   Problem Relation Age of Onset    Anxiety disorder Mother     Bipolar disorder Mother     Depression Mother     Schizophrenia Mother     Parkinsonism Mother         tremor    Anxiety disorder Father     Bipolar disorder Father     Depression Father     Schizophrenia Father     Stomach cancer Paternal Grandmother      I have reviewed and agree with the history as documented      Social History     Tobacco Use    Smoking status: Never Smoker    Smokeless tobacco: Never Used   Substance Use Topics    Alcohol use: No    Drug use: No        Review of Systems    Physical Exam  Physical Exam    Vital Signs  ED Triage Vitals [03/26/19 1105]   Temperature Pulse Respirations Blood Pressure SpO2   99 °F (37 2 °C) 104 16 (!) 151/104 100 %      Temp Source Heart Rate Source Patient Position - Orthostatic VS BP Location FiO2 (%)   Temporal Monitor Sitting Right arm --      Pain Score       8           Vitals:    03/26/19 1105   BP: (!) 151/104   Pulse: 104   Patient Position - Orthostatic VS: Sitting         Visual Acuity      ED Medications  Medications - No data to display    Diagnostic Studies  Results Reviewed     Procedure Component Value Units Date/Time    Wound culture and Gram stain [[id number]]  (Abnormal) Collected:  03/26/19 1131    Lab Status:  Final result Specimen:  Wound from Abdominal Updated:  03/28/19 1249     Wound Culture 1+ Growth of      Gram Stain Result 1+ Polys      1+ Gram positive cocci in pairs                 No orders to display              Procedures  Procedures       Phone Contacts  ED Phone Contact    ED Course                               MDM    Disposition  Final diagnoses:   Abscess     Time reflects when diagnosis was documented in both MDM as applicable and the Disposition within this note     Time User Action Codes Description Comment    3/26/2019 11:31 AM Jonny SKY Add [L02 91] Abscess       ED Disposition     ED Disposition Condition Date/Time Comment    Discharge Good Tue Mar 26, 2019 11:31 AM Lisa Leahy discharge to home/self care              Follow-up Information     Follow up With Specialties Details Why Nadia [address], Nurse Practitioner Schedule an appointment as soon as possible for a visit   [address] [place] [address]  [phone]            Discharge Medication List as of 3/26/2019 11:32 AM      START taking these medications    Details   cephalexin (KEFLEX) 500 mg capsule Take 1 capsule (500 mg total) by mouth every 6 (six) hours for 7 days, Starting Tue 3/26/2019, Until Tue 4/2/2019, Print      sulfamethoxazole-trimethoprim (BACTRIM DS) 800-160 mg per tablet Take 1 tablet by mouth 2 (two) times a day for 7 days smx-tmp DS (BACTRIM) 800-160 mg tabs (1tab q12 D10), Starting Tue 3/26/2019, Until Tue 4/2/2019, Print         CONTINUE these medications which have NOT CHANGED    Details   albuterol (PROVENTIL HFA,VENTOLIN HFA) 90 mcg/act inhaler Inhale 2 puffs every 4 (four) hours as needed for wheezing, Starting Sat 2/9/2019, Print      amLODIPine (NORVASC) 5 mg tablet Take 1 tablet (5 mg total) by mouth daily, Starting Thu 2/21/2019, Normal      fluticasone-vilanterol (BREO ELLIPTA) 100-25 mcg/inh inhaler Inhale 1 puff daily Rinse mouth after use , Starting Mon 2/18/2019, Normal           No discharge procedures on file      ED Provider  Electronically Signed by           Yosvany Colunga MD  03/29/19 0614

## 2019-03-28 NOTE — TELEPHONE ENCOUNTER
Contacted and discussed with pt, scheduled appt with pt     ----- Message from Phuong Ross MD sent at 3/28/2019 11:15 AM EDT -----  Unlikely to be related  However looks like the lymph node swelling is unchanged and it has been over 6 weeks  Recommend a non urgent follow up with me  Once I see her I will discuss with Oncology and Surgery if any additional evaluation is needed      Thanks    ----- Message -----  From: Neal Goldman  Sent: 3/27/2019   1:18 PM  To: Phuong Ross MD

## 2019-04-01 ENCOUNTER — TELEPHONE (OUTPATIENT)
Dept: INTERNAL MEDICINE CLINIC | Facility: CLINIC | Age: 34
End: 2019-04-01

## 2019-04-04 ENCOUNTER — HOSPITAL ENCOUNTER (OUTPATIENT)
Dept: NON INVASIVE DIAGNOSTICS | Facility: HOSPITAL | Age: 34
Discharge: HOME | End: 2019-04-04
Payer: COMMERCIAL

## 2019-04-04 DIAGNOSIS — R07.89 OTHER CHEST PAIN: ICD-10-CM

## 2019-04-04 DIAGNOSIS — I10 ESSENTIAL HYPERTENSION: ICD-10-CM

## 2019-04-04 DIAGNOSIS — R94.31 ABNORMAL EKG: ICD-10-CM

## 2019-04-04 PROCEDURE — 93351 STRESS TTE COMPLETE: CPT | Performed by: INTERNAL MEDICINE

## 2019-04-04 PROCEDURE — 93350 STRESS TTE ONLY: CPT

## 2019-04-05 LAB
CHEST PAIN STATEMENT: NORMAL
MAX DIASTOLIC BP: 88 MMHG
MAX HEART RATE: 164 BPM
MAX PREDICTED HEART RATE: 186 BPM
MAX. SYSTOLIC BP: 128 MMHG
PROTOCOL NAME: NORMAL
REASON FOR TERMINATION: NORMAL
TARGET HR FORMULA: NORMAL
TEST INDICATION: NORMAL
TIME IN EXERCISE PHASE: NORMAL

## 2019-05-06 ENCOUNTER — HOSPITAL ENCOUNTER (OUTPATIENT)
Dept: CT IMAGING | Facility: HOSPITAL | Age: 34
Discharge: HOME | End: 2019-05-06
Attending: INTERNAL MEDICINE
Payer: COMMERCIAL

## 2019-05-06 DIAGNOSIS — I88.8 NECROTIZING GRANULOMA PRESENT ON BIOPSY OF LYMPH NODE: ICD-10-CM

## 2019-05-06 PROCEDURE — 71260 CT THORAX DX C+: CPT

## 2019-05-06 RX ADMIN — IOHEXOL 85 ML: 350 INJECTION, SOLUTION INTRAVENOUS at 08:41

## 2019-05-21 ENCOUNTER — TELEPHONE (OUTPATIENT)
Dept: INTERNAL MEDICINE CLINIC | Facility: CLINIC | Age: 34
End: 2019-05-21

## 2019-05-21 DIAGNOSIS — I10 ESSENTIAL HYPERTENSION: ICD-10-CM

## 2019-05-21 RX ORDER — AMLODIPINE BESYLATE 5 MG/1
5 TABLET ORAL DAILY
Qty: 90 TABLET | Refills: 2 | Status: SHIPPED | OUTPATIENT
Start: 2019-05-21 | End: 2019-12-26

## 2019-05-24 DIAGNOSIS — R20.0 NUMBNESS AND TINGLING OF BOTH UPPER EXTREMITIES WHILE SLEEPING: Primary | ICD-10-CM

## 2019-05-24 DIAGNOSIS — R20.2 NUMBNESS AND TINGLING OF BOTH UPPER EXTREMITIES WHILE SLEEPING: Primary | ICD-10-CM

## 2019-05-30 ENCOUNTER — TELEPHONE (OUTPATIENT)
Dept: INTERNAL MEDICINE CLINIC | Facility: CLINIC | Age: 34
End: 2019-05-30

## 2019-06-13 ENCOUNTER — TRANSCRIBE ORDERS (OUTPATIENT)
Dept: ADMINISTRATIVE | Facility: HOSPITAL | Age: 34
End: 2019-06-13

## 2019-06-13 DIAGNOSIS — G56.00 CARPAL TUNNEL SYNDROME, UNSPECIFIED LATERALITY: Primary | ICD-10-CM

## 2019-06-18 ENCOUNTER — OFFICE VISIT (OUTPATIENT)
Dept: INTERNAL MEDICINE CLINIC | Facility: CLINIC | Age: 34
End: 2019-06-18
Payer: COMMERCIAL

## 2019-06-18 VITALS
BODY MASS INDEX: 33.96 KG/M2 | TEMPERATURE: 99.1 F | DIASTOLIC BLOOD PRESSURE: 78 MMHG | WEIGHT: 191.7 LBS | SYSTOLIC BLOOD PRESSURE: 122 MMHG | HEIGHT: 63 IN | OXYGEN SATURATION: 97 % | HEART RATE: 90 BPM

## 2019-06-18 DIAGNOSIS — D50.9 IRON DEFICIENCY ANEMIA, UNSPECIFIED IRON DEFICIENCY ANEMIA TYPE: ICD-10-CM

## 2019-06-18 DIAGNOSIS — M54.50 LUMBAR BACK PAIN: ICD-10-CM

## 2019-06-18 DIAGNOSIS — M54.2 NECK PAIN: Primary | ICD-10-CM

## 2019-06-18 PROCEDURE — 1036F TOBACCO NON-USER: CPT | Performed by: NURSE PRACTITIONER

## 2019-06-18 PROCEDURE — 99214 OFFICE O/P EST MOD 30 MIN: CPT | Performed by: NURSE PRACTITIONER

## 2019-06-18 PROCEDURE — 3008F BODY MASS INDEX DOCD: CPT | Performed by: NURSE PRACTITIONER

## 2019-06-18 RX ORDER — METHYLPREDNISOLONE 4 MG/1
TABLET ORAL
Qty: 21 EACH | Refills: 0 | Status: SHIPPED | OUTPATIENT
Start: 2019-06-18 | End: 2019-12-26

## 2019-06-18 RX ORDER — METHOCARBAMOL 750 MG/1
TABLET, FILM COATED ORAL
Qty: 60 TABLET | Refills: 0 | Status: SHIPPED | OUTPATIENT
Start: 2019-06-18 | End: 2019-12-26

## 2019-07-08 ENCOUNTER — APPOINTMENT (EMERGENCY)
Dept: CT IMAGING | Facility: HOSPITAL | Age: 34
End: 2019-07-08
Payer: COMMERCIAL

## 2019-07-08 ENCOUNTER — HOSPITAL ENCOUNTER (EMERGENCY)
Facility: HOSPITAL | Age: 34
Discharge: HOME | End: 2019-07-08
Attending: EMERGENCY MEDICINE
Payer: COMMERCIAL

## 2019-07-08 VITALS
TEMPERATURE: 98 F | RESPIRATION RATE: 18 BRPM | HEART RATE: 81 BPM | SYSTOLIC BLOOD PRESSURE: 146 MMHG | DIASTOLIC BLOOD PRESSURE: 88 MMHG | OXYGEN SATURATION: 98 %

## 2019-07-08 DIAGNOSIS — M54.9 BACK PAIN: Primary | ICD-10-CM

## 2019-07-08 LAB
BILIRUB UR QL STRIP: NEGATIVE
CLARITY UR: CLEAR
COLOR UR: YELLOW
EXT PREG TEST URINE: NEGATIVE
EXT. CONTROL ED NAV: NORMAL
GLUCOSE UR STRIP-MCNC: NEGATIVE MG/DL
HGB UR QL STRIP.AUTO: NEGATIVE
KETONES UR STRIP-MCNC: NEGATIVE MG/DL
LEUKOCYTE ESTERASE UR QL STRIP: NEGATIVE
NITRITE UR QL STRIP: NEGATIVE
PH UR STRIP.AUTO: 5.5 [PH]
PROT UR STRIP-MCNC: NEGATIVE MG/DL
SP GR UR STRIP.AUTO: >=1.03 (ref 1–1.03)
UROBILINOGEN UR QL STRIP.AUTO: 0.2 E.U./DL

## 2019-07-08 PROCEDURE — 81025 URINE PREGNANCY TEST: CPT | Performed by: EMERGENCY MEDICINE

## 2019-07-08 PROCEDURE — 74176 CT ABD & PELVIS W/O CONTRAST: CPT

## 2019-07-08 PROCEDURE — 99284 EMERGENCY DEPT VISIT MOD MDM: CPT

## 2019-07-08 PROCEDURE — 96372 THER/PROPH/DIAG INJ SC/IM: CPT

## 2019-07-08 PROCEDURE — 99283 EMERGENCY DEPT VISIT LOW MDM: CPT | Performed by: EMERGENCY MEDICINE

## 2019-07-08 PROCEDURE — 81003 URINALYSIS AUTO W/O SCOPE: CPT | Performed by: EMERGENCY MEDICINE

## 2019-07-08 RX ORDER — KETOROLAC TROMETHAMINE 30 MG/ML
30 INJECTION, SOLUTION INTRAMUSCULAR; INTRAVENOUS ONCE
Status: COMPLETED | OUTPATIENT
Start: 2019-07-08 | End: 2019-07-08

## 2019-07-08 RX ORDER — METHOCARBAMOL 500 MG/1
500 TABLET, FILM COATED ORAL 2 TIMES DAILY
Qty: 20 TABLET | Refills: 0 | Status: SHIPPED | OUTPATIENT
Start: 2019-07-08 | End: 2019-12-26

## 2019-07-08 RX ORDER — METHOCARBAMOL 500 MG/1
500 TABLET, FILM COATED ORAL ONCE
Status: COMPLETED | OUTPATIENT
Start: 2019-07-08 | End: 2019-07-08

## 2019-07-08 RX ORDER — NAPROXEN 500 MG/1
500 TABLET ORAL 2 TIMES DAILY WITH MEALS
Qty: 30 TABLET | Refills: 0 | Status: SHIPPED | OUTPATIENT
Start: 2019-07-08 | End: 2019-12-26

## 2019-07-08 RX ADMIN — KETOROLAC TROMETHAMINE 30 MG: 30 INJECTION, SOLUTION INTRAMUSCULAR at 16:25

## 2019-07-08 RX ADMIN — METHOCARBAMOL TABLETS 500 MG: 500 TABLET, COATED ORAL at 16:25

## 2019-07-08 NOTE — ED PROVIDER NOTES
Pt Name: Adriane Rich  MRN: [id number]  [place] 1985  Age/Sex: 27 y o  female  Date of evaluation: 7/8/2019  PCP: Cornelio Conrad, [address] [place] [address]    Chief Complaint   Patient presents with    Back Pain     right sided flank pain for 2 days and increased urination         HPI    Kate Jay presents to the Emergency Department complaining of right side flank pain  She has no known injury  No fever  No vomiting or diarrhea  She has no prior hx of similar  HPI      Past Medical and Surgical History    Past Medical History:   Diagnosis Date    Asthma     Cat-scratch disease     last assessed 10/22/15    Depression     Hypertension     last assessed 11/11/14    Sleep disorder        Past Surgical History:   Procedure Laterality Date    BREAST BIOPSY Left     2016    [place] BX/REMV,LYMPH NODE,DEEP AXILL Left 1/15/2019    Procedure: LEFT AXILLARY LYMPH NODE BIOPSY;  Surgeon: Rafyf Parker MD;  [facility];  Service: General    TUBAL LIGATION         Family History   Problem Relation Age of Onset    Anxiety disorder Mother     Bipolar disorder Mother     Depression Mother     Schizophrenia Mother     Parkinsonism Mother         tremor    Anxiety disorder Father     Bipolar disorder Father     Depression Father     Schizophrenia Father     Stomach cancer Paternal Grandmother        Social History     Tobacco Use    Smoking status: Never Smoker    Smokeless tobacco: Never Used   Substance Use Topics    Alcohol use: No    Drug use: No              Allergies    Allergies   Allergen Reactions    Bactrim [Sulfamethoxazole-Trimethoprim]     Codeine Hives     Tolerated Percocet, Vicodin, etc        Home Medications    Prior to Admission medications    Medication Sig Start Date End Date Taking?  Authorizing Provider   albuterol (PROVENTIL HFA,VENTOLIN HFA) 90 mcg/act inhaler Inhale 2 puffs every 4 (four) hours as needed for wheezing 2/9/19   Naresh Quijano DO   amLODIPine (NORVASC) 5 mg tablet Take 1 tablet (5 mg total) by mouth daily 5/21/19   Zac Martines MD   EPINEPHrine (EPIPEN) 0 3 mg/0 3 mL SOAJ Inject 0 3 mL (0 3 mg total) into a muscle once for 1 dose 3/27/19 6/18/19  Cecil Rojas PA-C   fluticasone-vilanterol (BREO ELLIPTA) 100-25 mcg/inh inhaler Inhale 1 puff daily Rinse mouth after use  2/18/19   Cheryl Raymundo DO   methocarbamol (ROBAXIN) 750 mg tablet Take one tablet by mouth every 8 hours as needed 6/18/19   ARIA Carpenter   methylPREDNISolone 4 MG tablet therapy pack Use as directed on package 6/18/19   ARIA Carpenter           Review of Systems    Review of Systems   Constitutional: Negative for activity change, appetite change, chills, diaphoresis, fatigue and fever  HENT: Negative for congestion, postnasal drip, rhinorrhea, sinus pressure, sneezing and sore throat  Eyes: Negative for pain and visual disturbance  Respiratory: Negative for cough, chest tightness and shortness of breath  Cardiovascular: Negative for chest pain, palpitations and leg swelling  Gastrointestinal: Negative for abdominal distention, abdominal pain, constipation, diarrhea, nausea and vomiting  Endocrine: Negative for polydipsia, polyphagia and polyuria  Genitourinary: Positive for flank pain and frequency  Negative for decreased urine volume, difficulty urinating, dysuria and hematuria  Musculoskeletal: Negative for arthralgias, gait problem, joint swelling and neck pain  Skin: Negative for pallor and rash  Allergic/Immunologic: Negative for immunocompromised state  Neurological: Negative for syncope, speech difficulty, weakness, light-headedness, numbness and headaches  All other systems reviewed and are negative          Physical Exam      ED Triage Vitals [07/08/19 1430]   Temperature Pulse Respirations Blood Pressure SpO2   98 °F (36 7 °C) 81 18 146/88 98 %      Temp Source Heart Rate Source Patient Position - Orthostatic VS BP Location FiO2 (%)   Temporal Left Sitting Left arm --      Pain Score       9               Physical Exam   Constitutional: She is oriented to person, place, and time  She appears well-developed and well-nourished  No distress  HENT:   Head: Normocephalic and atraumatic  Nose: Nose normal    Mouth/Throat: Oropharynx is clear and moist    Eyes: Pupils are equal, round, and reactive to light  Conjunctivae, EOM and lids are normal    Neck: Normal range of motion  Neck supple  Cardiovascular: Normal rate, regular rhythm and normal heart sounds  Exam reveals no gallop and no friction rub  No murmur heard  Pulmonary/Chest: Effort normal and breath sounds normal  No accessory muscle usage  No respiratory distress  She has no wheezes  She has no rales  Abdominal: Soft  She exhibits no distension  There is no tenderness  There is no rebound and no guarding  Musculoskeletal:        Arms:  Neurological: She is alert and oriented to person, place, and time  No cranial nerve deficit or sensory deficit  Skin: Skin is warm and dry  No rash noted  She is not diaphoretic  No erythema  Psychiatric: She has a normal mood and affect  Her speech is normal and behavior is normal  Judgment and thought content normal    Nursing note and vitals reviewed  Assessment and 81 Carmina Medina is a 27 y o  female who presents with right flank pain  Physical examination otherwise unremarkable  Differential diagnosis (not completely inclusive) includes renal colic, pyelonephritis, shingles prior to development of rash  Plan will be to perform diagnostic testing and treat symptomatically        MDM    Diagnostic Results      Labs:    Results for orders placed or performed during the hospital encounter of 07/08/19   UA w Reflex to Microscopic w Reflex to Culture   Result Value Ref Range    Color, UA Yellow     Clarity, UA Clear     Specific Gravity, UA >=1 030 1 003 - 1 030    pH, UA 5 5 4 5, 5 0, 5 5, 6 0, 6 5, 7 0, 7  5, 8 0    Leukocytes, UA Negative Negative    Nitrite, UA Negative Negative    Protein, UA Negative Negative mg/dl    Glucose, UA Negative Negative mg/dl    Ketones, UA Negative Negative mg/dl    Urobilinogen, UA 0 2 0 2, 1 0 E U /dl E U /dl    Bilirubin, UA Negative Negative    Blood, UA Negative Negative   POCT pregnancy, urine   Result Value Ref Range    EXT PREG TEST UR (Ref: Negative) negative     Control valid        All labs reviewed and utilized in the medical decision making process    Radiology:    CT renal stone study abdomen pelvis without contrast   Final Result      No acute CT findings  Workstation performed: [id number]             All radiology studies independently viewed by me and interpreted by the radiologist     Procedure    Procedures    [facility]      ED Course of Care and Re-Assessments      Medications   ketorolac (TORADOL) injection 30 mg (30 mg Intramuscular Given 7/8/19 1625)   methocarbamol (ROBAXIN) tablet 500 mg (500 mg Oral Given 7/8/19 1625)           FINAL IMPRESSION    Final diagnoses:   Back pain         DISPOSITION/PLAN    Time reflects when diagnosis was documented in both MDM as applicable and the Disposition within this note     Time User Action Codes Description Comment    7/8/2019  4:59 PM Skylar Watkins [M54 9] Back pain       ED Disposition     ED Disposition Condition Date/Time Comment    Discharge Stable Mon Jul 8, 2019  4:59 PM Page Gall discharge to home/self care              Follow-up Information     Follow up With Specialties Details Why Nadia Kaur 017, [address] Nurse Practitioner Schedule an appointment as soon as possible for a visit   [address] [place] [address]  [phone]              PATIENT REFERRED TO:    Marlon 9610, [address] [facility] [address]  [phone]    Schedule an appointment as soon as possible for a visit         DISCHARGE MEDICATIONS:    Discharge Medication List as of 7/8/2019  5:00 PM      START taking these medications    Details   !! methocarbamol (ROBAXIN) 500 mg tablet Take 1 tablet (500 mg total) by mouth 2 (two) times a day, Starting Mon 7/8/2019, Print      naproxen (NAPROSYN) 500 mg tablet Take 1 tablet (500 mg total) by mouth 2 (two) times a day with meals, Starting Mon 7/8/2019, Print       !! - Potential duplicate medications found  Please discuss with provider  CONTINUE these medications which have NOT CHANGED    Details   albuterol (PROVENTIL HFA,VENTOLIN HFA) 90 mcg/act inhaler Inhale 2 puffs every 4 (four) hours as needed for wheezing, Starting Sat 2/9/2019, Print      amLODIPine (NORVASC) 5 mg tablet Take 1 tablet (5 mg total) by mouth daily, Starting Tue 5/21/2019, Normal      EPINEPHrine (EPIPEN) 0 3 mg/0 3 mL SOAJ Inject 0 3 mL (0 3 mg total) into a muscle once for 1 dose, Starting Wed 3/27/2019, Print      fluticasone-vilanterol (BREO ELLIPTA) 100-25 mcg/inh inhaler Inhale 1 puff daily Rinse mouth after use , Starting Mon 2/18/2019, Normal      !! methocarbamol (ROBAXIN) 750 mg tablet Take one tablet by mouth every 8 hours as needed, Normal      methylPREDNISolone 4 MG tablet therapy pack Use as directed on package, Normal       !! - Potential duplicate medications found  Please discuss with provider  No discharge procedures on file           Lexa Edgar, DO Lexa Edgar DO  07/09/19 2924

## 2019-09-23 ENCOUNTER — HOSPITAL ENCOUNTER (EMERGENCY)
Facility: HOSPITAL | Age: 34
Discharge: HOME | End: 2019-09-23
Attending: EMERGENCY MEDICINE
Payer: COMMERCIAL

## 2019-09-23 ENCOUNTER — APPOINTMENT (EMERGENCY)
Dept: RADIOLOGY | Facility: HOSPITAL | Age: 34
End: 2019-09-23
Payer: COMMERCIAL

## 2019-09-23 VITALS
HEART RATE: 85 BPM | SYSTOLIC BLOOD PRESSURE: 151 MMHG | HEIGHT: 62 IN | TEMPERATURE: 98.5 F | BODY MASS INDEX: 33.35 KG/M2 | WEIGHT: 181.22 LBS | RESPIRATION RATE: 18 BRPM | OXYGEN SATURATION: 100 % | DIASTOLIC BLOOD PRESSURE: 96 MMHG

## 2019-09-23 DIAGNOSIS — M79.672 FOOT PAIN, LEFT: Primary | ICD-10-CM

## 2019-09-23 PROCEDURE — 29515 APPLICATION SHORT LEG SPLINT: CPT | Performed by: EMERGENCY MEDICINE

## 2019-09-23 PROCEDURE — 73610 X-RAY EXAM OF ANKLE: CPT

## 2019-09-23 PROCEDURE — 73630 X-RAY EXAM OF FOOT: CPT

## 2019-09-23 PROCEDURE — 99284 EMERGENCY DEPT VISIT MOD MDM: CPT | Performed by: EMERGENCY MEDICINE

## 2019-09-23 PROCEDURE — 99284 EMERGENCY DEPT VISIT MOD MDM: CPT

## 2019-09-23 PROCEDURE — 73590 X-RAY EXAM OF LOWER LEG: CPT

## 2019-09-23 RX ORDER — NAPROXEN 500 MG/1
500 TABLET ORAL 2 TIMES DAILY WITH MEALS
Qty: 14 TABLET | Refills: 0 | Status: SHIPPED | OUTPATIENT
Start: 2019-09-23 | End: 2019-12-26

## 2019-09-23 RX ORDER — ACETAMINOPHEN 325 MG/1
650 TABLET ORAL ONCE
Status: COMPLETED | OUTPATIENT
Start: 2019-09-23 | End: 2019-09-23

## 2019-09-23 RX ADMIN — ACETAMINOPHEN 650 MG: 325 TABLET, FILM COATED ORAL at 08:43

## 2019-09-23 NOTE — ED PROCEDURE NOTE
PROCEDURE  Splint application  Date/Time: 9/23/2019 8:32 AM  Performed by: Robert Booth DO  Authorized by: Robert Booth DO     Patient location:  Bedside  Procedure performed by emergency physician: Yes    Other Assisting Provider: Yes (comment)    Consent:     Consent obtained:  Verbal    Consent given by:  Patient    Risks discussed:  Discoloration    Alternatives discussed:  No treatment  Universal protocol:     Patient identity confirmed:  Verbally with patient  Indication:     Indications: sprain/strain    Pre-procedure details:     Sensation:  Normal  Procedure details:     Laterality:  Left    Location:  Ankle    Splint type:  Short leg    Supplies:  Ortho-Glass  Post-procedure details:     Pain:  Improved    Sensation:  Normal    Neurovascular Exam: skin pink      Patient tolerance of procedure:   Tolerated well, no immediate complications         Robert Booth DO  09/23/19 3179

## 2019-09-23 NOTE — ED PROVIDER NOTES
History  Chief Complaint   Patient presents with    Foot Pain     fell on stair at 0320 today  Landed on left side  C/o left foot/ankle pain radiating up leg  Denies LOC, or other injury  26-year-old female presents complaining of pain in the left midfoot after inversion this morning at 0320  Patient states she was walking down the steps missed a step and inverted her ankle  She denies any pain in the hands wrists head chest abdomen pelvis  History provided by:  Patient  Foot Injury - Major   Location:  Foot  Foot location:  L foot  Pain details:     Quality:  Aching    Severity:  Mild    Onset quality:  Sudden    Duration:  5 hours    Timing:  Intermittent    Progression:  Waxing and waning  Chronicity:  New  Relieved by:  Nothing  Worsened by: Adduction and bearing weight  Ineffective treatments:  None tried  Associated symptoms: decreased ROM    Associated symptoms: no back pain    Risk factors: obesity    Risk factors: no concern for non-accidental trauma and no frequent fractures        Prior to Admission Medications   Prescriptions Last Dose Informant Patient Reported? Taking? EPINEPHrine (EPIPEN) 0 3 mg/0 3 mL SOAJ  Self No No   Sig: Inject 0 3 mL (0 3 mg total) into a muscle once for 1 dose   albuterol (PROVENTIL HFA,VENTOLIN HFA) 90 mcg/act inhaler  Self No No   Sig: Inhale 2 puffs every 4 (four) hours as needed for wheezing   amLODIPine (NORVASC) 5 mg tablet  Self No No   Sig: Take 1 tablet (5 mg total) by mouth daily   fluticasone-vilanterol (BREO ELLIPTA) 100-25 mcg/inh inhaler  Self No No   Sig: Inhale 1 puff daily Rinse mouth after use     methocarbamol (ROBAXIN) 500 mg tablet   No No   Sig: Take 1 tablet (500 mg total) by mouth 2 (two) times a day   methocarbamol (ROBAXIN) 750 mg tablet   No No   Sig: Take one tablet by mouth every 8 hours as needed   methylPREDNISolone 4 MG tablet therapy pack   No No   Sig: Use as directed on package   naproxen (NAPROSYN) 500 mg tablet   No No   Sig: Take 1 tablet (500 mg total) by mouth 2 (two) times a day with meals      Facility-Administered Medications: None       Past Medical History:   Diagnosis Date    Asthma     Cat-scratch disease     last assessed 10/22/15    Depression     Hypertension     last assessed 11/11/14    Sleep disorder        Past Surgical History:   Procedure Laterality Date    BREAST BIOPSY Left     2016    [place] BX/REMV,LYMPH NODE,DEEP AXILL Left 1/15/2019    Procedure: LEFT AXILLARY LYMPH NODE BIOPSY;  Surgeon: Esme Reynoso MD;  [facility];  Service: General    TUBAL LIGATION         Family History   Problem Relation Age of Onset    Anxiety disorder Mother     Bipolar disorder Mother     Depression Mother     Schizophrenia Mother     Parkinsonism Mother         tremor    Anxiety disorder Father     Bipolar disorder Father     Depression Father     Schizophrenia Father     Stomach cancer Paternal Grandmother      I have reviewed and agree with the history as documented  Social History     Tobacco Use    Smoking status: Never Smoker    Smokeless tobacco: Never Used   Substance Use Topics    Alcohol use: No    Drug use: No        Review of Systems   Constitutional: Negative  HENT: Negative  Negative for congestion  Eyes: Negative  Respiratory: Negative  Cardiovascular: Negative  Gastrointestinal: Negative  Endocrine: Negative  Genitourinary: Negative  Musculoskeletal: Negative for back pain  Tender palpation the left midfoot   Skin: Negative for color change, pallor and rash  Allergic/Immunologic: Negative  Neurological: Negative  Hematological: Negative  Psychiatric/Behavioral: Negative for agitation  All other systems reviewed and are negative  Physical Exam  Physical Exam   Constitutional: She appears well-developed and well-nourished  HENT:   Head: Normocephalic     Right Ear: External ear normal    Left Ear: External ear normal    Eyes: Pupils are equal, round, and reactive to light  Right eye exhibits no discharge  Left eye exhibits no discharge  Neck: No JVD present  No tracheal deviation present  Cardiovascular: Normal rate, regular rhythm and normal heart sounds  Pulmonary/Chest: Effort normal and breath sounds normal    Abdominal: Soft  There is no tenderness  Musculoskeletal: She exhibits tenderness  Tender palpation in the left midfoot  Neurological: She is alert  She displays normal reflexes  No cranial nerve deficit  Coordination normal    Skin: Skin is warm  Capillary refill takes less than 2 seconds  No erythema  Psychiatric: She has a normal mood and affect  Her behavior is normal    Vitals reviewed  Vital Signs  ED Triage Vitals   Temperature Pulse Respirations Blood Pressure SpO2   09/23/19 0827 09/23/19 0828 09/23/19 0828 09/23/19 0828 09/23/19 0828   98 5 °F (36 9 °C) 85 18 151/96 100 %      Temp Source Heart Rate Source Patient Position - Orthostatic VS BP Location FiO2 (%)   09/23/19 0827 09/23/19 0828 09/23/19 0828 09/23/19 0828 --   Temporal Monitor Sitting Right arm       Pain Score       09/23/19 0828       Worst Possible Pain           Vitals:    09/23/19 0828   BP: 151/96   Pulse: 85   Patient Position - Orthostatic VS: Sitting         Visual Acuity      ED Medications  Medications   acetaminophen (TYLENOL) tablet 650 mg (650 mg Oral Given 9/23/19 0843)       Diagnostic Studies  Results Reviewed     Procedure Component Value Units Date/Time    POCT pregnancy, urine [[id number]]     Lab Status:  No result                  XR tibia fibula 2 views LEFT   ED Interpretation by Kacey Zepeda DO (09/23 6293)   No fx       Final Result by José Miguel Parks MD (09/23 0416)      No acute osseous abnormality  Workstation performed: [id number]         XR ankle 3+ views LEFT   ED Interpretation by Kacey Zepeda DO (09/23 9367)   No fx       Final Result by José Miguel Parks MD (09/23 4058)      No acute osseous abnormality  Workstation performed: [id number]         XR foot 3+ views LEFT   ED Interpretation by Kandis Mathias DO (09/23 1742)   No fx       Final Result by Lennox Valadez MD (09/23 6587)      No acute osseous abnormality  Workstation performed: [id number]                    Procedures  Procedures       ED Course                               MDM  Number of Diagnoses or Management Options  Diagnosis management comments: Differential diagnosis 1  Sprain 2  Strain 3  Fracture       Amount and/or Complexity of Data Reviewed  Tests in the radiology section of CPT®: reviewed and ordered    Risk of Complications, Morbidity, and/or Mortality  Presenting problems: low  Diagnostic procedures: low  Management options: low        Disposition  Final diagnoses: Foot pain, left     Time reflects when diagnosis was documented in both MDM as applicable and the Disposition within this note     Time User Action Codes Description Comment    9/23/2019  8:57 AM Jose David Watkins [[id number]] Foot pain, left       ED Disposition     ED Disposition Condition Date/Time Comment    Discharge Stable Mon Sep 23, 2019  8:57 AM Mee Going discharge to home/self care  Follow-up Information     Follow up With Specialties Details Why Contact Info    Jasmin Kim MD Orthopedic Surgery   [address] [place] [address]  [phone]            Patient's Medications   Discharge Prescriptions    NAPROXEN (NAPROSYN) 500 MG TABLET    Take 1 tablet (500 mg total) by mouth 2 (two) times a day with meals       Start Date: 9/23/2019 End Date: --       Order Dose: 500 mg       Quantity: 14 tablet    Refills: 0     No discharge procedures on file      ED Provider  Electronically Signed by           Kandis Mathias DO  09/23/19 5931

## 2019-09-24 ENCOUNTER — OFFICE VISIT (OUTPATIENT)
Dept: OBGYN CLINIC | Facility: CLINIC | Age: 34
End: 2019-09-24
Payer: COMMERCIAL

## 2019-09-24 VITALS
SYSTOLIC BLOOD PRESSURE: 128 MMHG | HEART RATE: 79 BPM | BODY MASS INDEX: 33.68 KG/M2 | DIASTOLIC BLOOD PRESSURE: 89 MMHG | WEIGHT: 183 LBS | HEIGHT: 62 IN

## 2019-09-24 DIAGNOSIS — S93.402A SPRAIN OF UNSPECIFIED LIGAMENT OF LEFT ANKLE, INITIAL ENCOUNTER: Primary | ICD-10-CM

## 2019-09-24 DIAGNOSIS — S90.32XA CONTUSION OF LEFT FOOT, INITIAL ENCOUNTER: ICD-10-CM

## 2019-09-24 PROCEDURE — 99203 OFFICE O/P NEW LOW 30 MIN: CPT | Performed by: ORTHOPAEDIC SURGERY

## 2019-09-24 NOTE — PROGRESS NOTES
Assessment/Plan:  Assessment/Plan   Diagnoses and all orders for this visit:    Sprain of unspecified ligament of left ankle, initial encounter    Contusion of left foot, initial encounter     Discussed physical exam findings as well as radiographic findings with patient at the time of visit  She has been provided with a Cam walker boot to be used for all weight-bearing activity including work  She has also been provided a note that states that she can return to work using the boot  She can use ice and NSAIDs/Tylenol as needed for pain, soreness and swelling  She does not need to continue use of the crutches as long she can walk without a limp  She will be seen for follow-up in 2 weeks for re-evaluation  Subjective:   Patient ID: Emely Fowler is a 27 y o  female  HPI  Patient presents today with chief complaint of left generalized foot and ankle pain secondary to injury sustained 9/23/2019  Patient reports that she was getting ready for work early in the morning at her home, when she tripped down a flight of stairs and landed hard on her left foot resulting in a forced inversion VLADIMIR  She was seen at the local [address] [place] [address] ED where x-rays were taken initially read as negative  She was placed in a posterior slab Ortho Glass splint and given nonweightbearing restriction via the use of bilateral crutches  On today's presentation she states that she continues to have pain on the top of her foot, and on the outside of her ankle  She reports swelling and bruising but denies any numbness or tingling    She currently takes Advil as needed for pain, but she says it does not provide significant relief    The following portions of the patient's history were reviewed and updated as appropriate: allergies, current medications, past family history, past medical history, past social history, past surgical history and problem list     Past Medical History:   Diagnosis Date    Asthma     Cat-scratch disease last assessed 10/22/15    Depression     Hypertension     last assessed 11/11/14    Sleep disorder      Past Surgical History:   Procedure Laterality Date    BREAST BIOPSY Left     2016    MD BX/REMV,LYMPH NODE,DEEP AXILL Left 1/15/2019    Procedure: LEFT AXILLARY LYMPH NODE BIOPSY;  Surgeon: Ramy Andres MD;  [facility];  Service: General    TUBAL LIGATION       Family History   Problem Relation Age of Onset    Anxiety disorder Mother     Bipolar disorder Mother     Depression Mother    Amna Honour Schizophrenia Mother     Parkinsonism Mother         tremor    Anxiety disorder Father     Bipolar disorder Father     Depression Father     Schizophrenia Father     Stomach cancer Paternal Grandmother      Social History     Socioeconomic History    Marital status: /Civil Union     Spouse name: None    Number of children: None    Years of education: None    Highest education level: None   Occupational History    None   Social Needs    Financial resource strain: None    Food insecurity:     Worry: None     Inability: None    Transportation needs:     Medical: None     Non-medical: None   Tobacco Use    Smoking status: Never Smoker    Smokeless tobacco: Never Used   Substance and Sexual Activity    Alcohol use: No    Drug use: No    Sexual activity: Yes     Partners: Male     Birth control/protection: None   Lifestyle    Physical activity:     Days per week: None     Minutes per session: None    Stress: None   Relationships    Social connections:     Talks on phone: None     Gets together: None     Attends Worship service: None     Active member of club or organization: None     Attends meetings of clubs or organizations: None     Relationship status: None    Intimate partner violence:     Fear of current or ex partner: None     Emotionally abused: None     Physically abused: None     Forced sexual activity: None   Other Topics Concern    None   Social History Narrative Always uses seat belt    Daily caffeine consumption(occasional)    Economic stress    Exercises daily    Lives with     Lives with relatives    Pets: Dog       Current Outpatient Medications:     albuterol (PROVENTIL HFA,VENTOLIN HFA) 90 mcg/act inhaler, Inhale 2 puffs every 4 (four) hours as needed for wheezing (Patient not taking: Reported on 9/24/2019), Disp: 1 Inhaler, Rfl: 0    amLODIPine (NORVASC) 5 mg tablet, Take 1 tablet (5 mg total) by mouth daily (Patient not taking: Reported on 9/24/2019), Disp: 90 tablet, Rfl: 2    EPINEPHrine (EPIPEN) 0 3 mg/0 3 mL SOAJ, Inject 0 3 mL (0 3 mg total) into a muscle once for 1 dose, Disp: 0 3 mL, Rfl: 0    fluticasone-vilanterol (BREO ELLIPTA) 100-25 mcg/inh inhaler, Inhale 1 puff daily Rinse mouth after use  (Patient not taking: Reported on 9/24/2019), Disp: 28 each, Rfl: 12    methocarbamol (ROBAXIN) 500 mg tablet, Take 1 tablet (500 mg total) by mouth 2 (two) times a day (Patient not taking: Reported on 9/24/2019), Disp: 20 tablet, Rfl: 0    methocarbamol (ROBAXIN) 750 mg tablet, Take one tablet by mouth every 8 hours as needed (Patient not taking: Reported on 9/24/2019), Disp: 60 tablet, Rfl: 0    methylPREDNISolone 4 MG tablet therapy pack, Use as directed on package (Patient not taking: Reported on 9/24/2019), Disp: 21 each, Rfl: 0    naproxen (NAPROSYN) 500 mg tablet, Take 1 tablet (500 mg total) by mouth 2 (two) times a day with meals (Patient not taking: Reported on 9/24/2019), Disp: 30 tablet, Rfl: 0    naproxen (NAPROSYN) 500 mg tablet, Take 1 tablet (500 mg total) by mouth 2 (two) times a day with meals (Patient not taking: Reported on 9/24/2019), Disp: 14 tablet, Rfl: 0    Allergies   Allergen Reactions    Bactrim [Sulfamethoxazole-Trimethoprim]     Codeine Hives     Tolerated Percocet, Vicodin, etc        Review of Systems   Constitutional: Negative for chills, fever and unexpected weight change     HENT: Negative for hearing loss, nosebleeds and sore throat  Eyes: Negative for pain, redness and visual disturbance  Respiratory: Negative for cough, shortness of breath and wheezing  Cardiovascular: Negative for chest pain, palpitations and leg swelling  Gastrointestinal: Negative for abdominal pain, nausea and vomiting  Endocrine: Negative for polydipsia and polyuria  Genitourinary: Negative for dysuria and hematuria  Musculoskeletal:        As noted in HPI   Skin: Negative for rash and wound  Neurological: Negative for dizziness, numbness and headaches  Psychiatric/Behavioral: Negative for decreased concentration and suicidal ideas  The patient is not nervous/anxious  Objective:  /89   Pulse 79   Ht 5' 2" (1 575 m)   Wt 83 kg (183 lb)   LMP 09/13/2019   BMI 33 47 kg/m²     Ortho Exam  Left foot and ankle - patient presents with splint intact and observing nonweightbearing restriction via the use of bilateral crutches  Patient is neurovascularly intact both prior to and following splint removal   There is notable soft tissue swelling and medial colored ecchymotic findings in the lateral aspect of the ankle and dorsum of the left foot  She is tender to palpation over the ATFL and CFL  She is also tender to palpation at the base of the 5th metatarsal, and over the shaft of the 4th, and 3rd metatarsals  She is nontender to palpation over the head of the fibula, fibular shaft, lateral malleolus, or medial malleolus  She has no pain reproduction with calcaneal squeeze  Achilles tendon is intact  Negative Homans sign  She is able to demonstrate active plantar flexion, extension, inversion, and eversion; although motion is limited due to pain  2+ tibialis posterior and dorsal pedal pulses with brisk capillary refill  Sensation intact distally  Physical Exam   Constitutional: She is oriented to person, place, and time  She appears well-developed and well-nourished     HENT:   Right Ear: External ear normal    Left Ear: External ear normal    Nose: Nose normal    Eyes: Pupils are equal, round, and reactive to light  Conjunctivae and EOM are normal    Neck: Normal range of motion  Cardiovascular: Intact distal pulses  Pulmonary/Chest: Effort normal    Musculoskeletal: Normal range of motion  Neurological: She is alert and oriented to person, place, and time  Skin: Skin is warm and dry  Psychiatric: She has a normal mood and affect  Her behavior is normal  Judgment and thought content normal        Attending Physician has personally reviewed pertinent imaging and/or reports in PACS, impression is as follows:    Review of radiographic series taken 9/23/2019 of the left ankle and foot show no signs of osseous abnormalities or malalignment        Scribe Attestation    I,:   Jae Herrmann am acting as a scribe while in the presence of the attending physician :        I,:   Suhail Sanders MD personally performed the services described in this documentation    as scribed in my presence :

## 2019-09-24 NOTE — LETTER
September 24, 2019     Patient: Danay Cisneros   YOB: 1985   Date of Visit: 9/24/2019       To Whom it May Concern:    Malachi Whipple is under my professional care  She was seen in my office on 9/24/2019  She is cleared to return to weight-bearing activity as tolerated in the Cam boot  She will be re-evaluated in 2 weeks  If you have any questions or concerns, please don't hesitate to call           Sincerely,          Hayes Carson MD        CC: No Recipients

## 2019-10-28 ENCOUNTER — TELEPHONE (OUTPATIENT)
Dept: PULMONOLOGY | Facility: CLINIC | Age: 34
End: 2019-10-28

## 2019-10-30 DIAGNOSIS — J45.909 ASTHMA: Primary | ICD-10-CM

## 2019-10-30 NOTE — TELEPHONE ENCOUNTER
Alternate formulary to [facility] options are wixela, flovent, airduo, fluticasone   Will forward for med to be sent to pharmacy

## 2019-12-21 ENCOUNTER — APPOINTMENT (EMERGENCY)
Dept: RADIOLOGY | Facility: HOSPITAL | Age: 34
End: 2019-12-21
Payer: COMMERCIAL

## 2019-12-21 ENCOUNTER — HOSPITAL ENCOUNTER (EMERGENCY)
Facility: HOSPITAL | Age: 34
Discharge: HOME | End: 2019-12-21
Attending: EMERGENCY MEDICINE | Admitting: EMERGENCY MEDICINE
Payer: COMMERCIAL

## 2019-12-21 ENCOUNTER — APPOINTMENT (EMERGENCY)
Dept: NON INVASIVE DIAGNOSTICS | Facility: HOSPITAL | Age: 34
End: 2019-12-21
Payer: COMMERCIAL

## 2019-12-21 VITALS
SYSTOLIC BLOOD PRESSURE: 160 MMHG | HEART RATE: 77 BPM | TEMPERATURE: 97.9 F | OXYGEN SATURATION: 100 % | BODY MASS INDEX: 34.04 KG/M2 | DIASTOLIC BLOOD PRESSURE: 99 MMHG | WEIGHT: 185 LBS | RESPIRATION RATE: 16 BRPM | HEIGHT: 62 IN

## 2019-12-21 DIAGNOSIS — M25.561 RIGHT KNEE PAIN: ICD-10-CM

## 2019-12-21 DIAGNOSIS — S83.91XA SPRAIN OF RIGHT KNEE, UNSPECIFIED LIGAMENT, INITIAL ENCOUNTER: Primary | ICD-10-CM

## 2019-12-21 LAB
ANION GAP SERPL CALCULATED.3IONS-SCNC: 7 MMOL/L (ref 4–13)
BASOPHILS # BLD AUTO: 0.06 THOUSANDS/ΜL (ref 0–0.1)
BASOPHILS NFR BLD AUTO: 1 % (ref 0–1)
BUN SERPL-MCNC: 7 MG/DL (ref 5–25)
CALCIUM SERPL-MCNC: 8.8 MG/DL (ref 8.3–10.1)
CHLORIDE SERPL-SCNC: 104 MMOL/L (ref 100–108)
CO2 SERPL-SCNC: 27 MMOL/L (ref 21–32)
CREAT SERPL-MCNC: 0.75 MG/DL (ref 0.6–1.3)
EOSINOPHIL # BLD AUTO: 0.52 THOUSAND/ΜL (ref 0–0.61)
EOSINOPHIL NFR BLD AUTO: 5 % (ref 0–6)
ERYTHROCYTE [DISTWIDTH] IN BLOOD BY AUTOMATED COUNT: 12.7 % (ref 11.6–15.1)
GFR SERPL CREATININE-BSD FRML MDRD: 104 ML/MIN/1.73SQ M
GLUCOSE SERPL-MCNC: 99 MG/DL (ref 65–140)
HCT VFR BLD AUTO: 42 % (ref 34.8–46.1)
HGB BLD-MCNC: 13.7 G/DL (ref 11.5–15.4)
IMM GRANULOCYTES # BLD AUTO: 0.03 THOUSAND/UL (ref 0–0.2)
IMM GRANULOCYTES NFR BLD AUTO: 0 % (ref 0–2)
LYMPHOCYTES # BLD AUTO: 1.55 THOUSANDS/ΜL (ref 0.6–4.47)
LYMPHOCYTES NFR BLD AUTO: 14 % (ref 14–44)
MCH RBC QN AUTO: 31.2 PG (ref 26.8–34.3)
MCHC RBC AUTO-ENTMCNC: 32.6 G/DL (ref 31.4–37.4)
MCV RBC AUTO: 96 FL (ref 82–98)
MONOCYTES # BLD AUTO: 0.76 THOUSAND/ΜL (ref 0.17–1.22)
MONOCYTES NFR BLD AUTO: 7 % (ref 4–12)
NEUTROPHILS # BLD AUTO: 8.56 THOUSANDS/ΜL (ref 1.85–7.62)
NEUTS SEG NFR BLD AUTO: 73 % (ref 43–75)
NRBC BLD AUTO-RTO: 0 /100 WBCS
PLATELET # BLD AUTO: 250 THOUSANDS/UL (ref 149–390)
PMV BLD AUTO: 10 FL (ref 8.9–12.7)
POTASSIUM SERPL-SCNC: 3.9 MMOL/L (ref 3.5–5.3)
RBC # BLD AUTO: 4.39 MILLION/UL (ref 3.81–5.12)
SODIUM SERPL-SCNC: 138 MMOL/L (ref 136–145)
WBC # BLD AUTO: 11.48 THOUSAND/UL (ref 4.31–10.16)

## 2019-12-21 PROCEDURE — 93970 EXTREMITY STUDY: CPT

## 2019-12-21 PROCEDURE — 80048 BASIC METABOLIC PNL TOTAL CA: CPT | Performed by: EMERGENCY MEDICINE

## 2019-12-21 PROCEDURE — 36415 COLL VENOUS BLD VENIPUNCTURE: CPT | Performed by: EMERGENCY MEDICINE

## 2019-12-21 PROCEDURE — 73564 X-RAY EXAM KNEE 4 OR MORE: CPT

## 2019-12-21 PROCEDURE — 85025 COMPLETE CBC W/AUTO DIFF WBC: CPT | Performed by: EMERGENCY MEDICINE

## 2019-12-21 PROCEDURE — 99284 EMERGENCY DEPT VISIT MOD MDM: CPT | Performed by: EMERGENCY MEDICINE

## 2019-12-21 PROCEDURE — 99284 EMERGENCY DEPT VISIT MOD MDM: CPT

## 2019-12-21 RX ORDER — NAPROXEN 500 MG/1
500 TABLET ORAL 2 TIMES DAILY WITH MEALS
Qty: 14 TABLET | Refills: 0 | Status: SHIPPED | OUTPATIENT
Start: 2019-12-21 | End: 2019-12-26

## 2019-12-21 NOTE — ED PROVIDER NOTES
History  Chief Complaint   Patient presents with    Knee Pain     patient reports three weeks of right posterior knee pain that is getting worse  patient has taken motrin, tylenol, robaxin without relief  13-year-old female presents complaining of right posterior knee pain which been present for greater than 3 weeks  She states she did have appoint with PCP for this but missed it because she could not make the appointment  She states the pain is intermittent is located behind her right knee and occasional trouble down her right leg  She states she is on her feet a lot for work  She works at [employer]  She denies any specific injury to the knee  History provided by:  Patient  Knee Pain   Location:  Knee  Time since incident:  3 weeks  Pain details:     Quality:  Cramping and dull    Radiates to: Into the right calf  Severity:  Mild    Onset quality:  Gradual    Duration:  3 weeks    Timing:  Constant  Associated symptoms: decreased ROM    Associated symptoms: no back pain and no fatigue    Risk factors: no concern for non-accidental trauma        Prior to Admission Medications   Prescriptions Last Dose Informant Patient Reported? Taking? EPINEPHrine (EPIPEN) 0 3 mg/0 3 mL SOAJ  Self No No   Sig: Inject 0 3 mL (0 3 mg total) into a muscle once for 1 dose   albuterol (PROVENTIL HFA,VENTOLIN HFA) 90 mcg/act inhaler 12/20/2019 at Unknown time Self No Yes   Sig: Inhale 2 puffs every 4 (four) hours as needed for wheezing   amLODIPine (NORVASC) 5 mg tablet  Self No No   Sig: Take 1 tablet (5 mg total) by mouth daily   Patient not taking: Reported on 9/24/2019   fluticasone-salmeterol (ADVAIR, [address]) 250-50 mcg/dose inhaler 12/20/2019 at Unknown time  No Yes   Sig: Inhale 1 puff 2 (two) times a day Rinse mouth after use     methocarbamol (ROBAXIN) 500 mg tablet   No No   Sig: Take 1 tablet (500 mg total) by mouth 2 (two) times a day   Patient not taking: Reported on 9/24/2019   methocarbamol (ROBAXIN) 750 mg tablet   No No   Sig: Take one tablet by mouth every 8 hours as needed   Patient not taking: Reported on 9/24/2019   methylPREDNISolone 4 MG tablet therapy pack   No No   Sig: Use as directed on package   Patient not taking: Reported on 9/24/2019   naproxen (NAPROSYN) 500 mg tablet   No No   Sig: Take 1 tablet (500 mg total) by mouth 2 (two) times a day with meals   Patient not taking: Reported on 9/24/2019   naproxen (NAPROSYN) 500 mg tablet   No No   Sig: Take 1 tablet (500 mg total) by mouth 2 (two) times a day with meals   Patient not taking: Reported on 9/24/2019      Facility-Administered Medications: None       Past Medical History:   Diagnosis Date    Asthma     Cat-scratch disease     last assessed 10/22/15    Depression     Hypertension     last assessed 11/11/14    Sleep disorder        Past Surgical History:   Procedure Laterality Date    BREAST BIOPSY Left     2016   [facility] D[facility]AL SURGERY      [facility] BX/REMV,LYMPH NODE,DEEP AXILL Left 1/15/2019    Procedure: LEFT AXILLARY LYMPH NODE BIOPSY;  Surgeon: Tegan Jackson MD;  [facility];  Service: General    TUBAL LIGATION         Family History   Problem Relation Age of Onset    Anxiety disorder Mother     Bipolar disorder Mother     Depression Mother     Schizophrenia Mother     Parkinsonism Mother         tremor    Anxiety disorder Father     Bipolar disorder Father     Depression Father     Schizophrenia Father     Stomach cancer Paternal Grandmother      I have reviewed and agree with the history as documented  Social History     Tobacco Use    Smoking status: Never Smoker    Smokeless tobacco: Never Used   Substance Use Topics    Alcohol use: No    Drug use: No        Review of Systems   Constitutional: Negative  Negative for fatigue  HENT: Negative  Eyes: Negative  Respiratory: Negative  Cardiovascular: Negative  Gastrointestinal: Negative  Endocrine: Negative  Genitourinary: Negative  Musculoskeletal: Positive for gait problem  Negative for back pain  Right posterior knee pain   Skin: Negative  Allergic/Immunologic: Negative  Hematological: Negative  Psychiatric/Behavioral: Negative  All other systems reviewed and are negative  Physical Exam  Physical Exam   Constitutional: She appears well-developed and well-nourished  HENT:   Head: Normocephalic  Right Ear: External ear normal    Left Ear: External ear normal    Eyes: Pupils are equal, round, and reactive to light  Neck: Normal range of motion  No tracheal deviation present  No thyromegaly present  Cardiovascular: Normal rate, regular rhythm and normal heart sounds  Pulmonary/Chest: Effort normal  No stridor  No respiratory distress  She has no wheezes  Abdominal: Soft  She exhibits no distension  There is no tenderness  Musculoskeletal:   Negative Homans sign  Tender palpation to the right popliteal fossa  Neurological: She is alert  She displays normal reflexes  No cranial nerve deficit  She exhibits normal muscle tone  Coordination normal    Skin: Skin is warm  Capillary refill takes less than 2 seconds  Psychiatric: She has a normal mood and affect  Her behavior is normal    Vitals reviewed        Vital Signs  ED Triage Vitals [12/21/19 1329]   Temperature Pulse Respirations Blood Pressure SpO2   97 9 °F (36 6 °C) 77 16 160/99 100 %      Temp Source Heart Rate Source Patient Position - Orthostatic VS BP Location FiO2 (%)   Temporal Monitor Sitting Left arm --      Pain Score       Worst Possible Pain           Vitals:    12/21/19 1329   BP: 160/99   Pulse: 77   Patient Position - Orthostatic VS: Sitting         Visual Acuity      ED Medications  Medications - No data to display    Diagnostic Studies  Results Reviewed     Procedure Component Value Units Date/Time    Basic metabolic panel [[id number]] Collected:  12/21/19 1336    Lab Status:  Final result Specimen:  Blood from Arm, Right Updated: 12/21/19 1350     Sodium 138 mmol/L      Potassium 3 9 mmol/L      Chloride 104 mmol/L      CO2 27 mmol/L      ANION GAP 7 mmol/L      BUN 7 mg/dL      Creatinine 0 75 mg/dL      Glucose 99 mg/dL      Calcium 8 8 mg/dL      eGFR 104 ml/min/1 73sq m     Narrative:       [place] guidelines for Chronic Kidney Disease (CKD):     Stage 1 with normal or high GFR (GFR > 90 mL/min/1 73 square meters)    Stage 2 Mild CKD (GFR = 60-89 mL/min/1 73 square meters)    Stage 3A Moderate CKD (GFR = 45-59 mL/min/1 73 square meters)    Stage 3B Moderate CKD (GFR = 30-44 mL/min/1 73 square meters)    Stage 4 Severe CKD (GFR = 15-29 mL/min/1 73 square meters)    Stage 5 End Stage CKD (GFR <15 mL/min/1 73 square meters)  Note: GFR calculation is accurate only with a steady state creatinine    CBC and differential [[id number]]  (Abnormal) Collected:  12/21/19 1336    Lab Status:  Final result Specimen:  Blood from Arm, Right Updated:  12/21/19 1341     WBC 11 48 Thousand/uL      RBC 4 39 Million/uL      Hemoglobin 13 7 g/dL      Hematocrit 42 0 %      MCV 96 fL      MCH 31 2 pg      MCHC 32 6 g/dL      RDW 12 7 %      MPV 10 0 fL      Platelets 465 Thousands/uL      nRBC 0 /100 WBCs      Neutrophils Relative 73 %      Immat GRANS % 0 %      Lymphocytes Relative 14 %      Monocytes Relative 7 %      Eosinophils Relative 5 %      Basophils Relative 1 %      Neutrophils Absolute 8 56 Thousands/µL      Immature Grans Absolute 0 03 Thousand/uL      Lymphocytes Absolute 1 55 Thousands/µL      Monocytes Absolute 0 76 Thousand/µL      Eosinophils Absolute 0 52 Thousand/µL      Basophils Absolute 0 06 Thousands/µL                  XR knee 4+ views Right injury   ED Interpretation by Fiorella Crook DO (12/21 2409)   No fx       VAS lower limb venous duplex study, complete bilateral    (Results Pending)              Procedures  Procedures         ED Course                               MDM  Number of Diagnoses or Management Options  Sprain of right knee, unspecified ligament, initial encounter:   Diagnosis management comments: Differential diagnosis 1  Baker cyst 2  DVT 3  Electrolyte abnormality in 4  Sprain  Amount and/or Complexity of Data Reviewed  Clinical lab tests: ordered and reviewed  Tests in the radiology section of CPT®: ordered and reviewed          Disposition  Final diagnoses:   Sprain of right knee, unspecified ligament, initial encounter     Time reflects when diagnosis was documented in both MDM as applicable and the Disposition within this note     Time User Action Codes Description Comment    12/21/2019  1:33 PM Martine Watkins Shannan Domingo Sprain of right knee, unspecified ligament, initial encounter     12/21/2019  2:02 PM Adalberto Harley Located within [facility] Right knee pain       ED Disposition     ED Disposition Condition Date/Time Comment    Discharge Stable Sat Dec 21, 2019  1:33 PM Anabella Thorne discharge to home/self care  Follow-up Information     Follow up With Specialties Details Why Contact Info    Khadar Anne MD Orthopedic Surgery   [address] [place] [address]  [phone]            Patient's Medications   Discharge Prescriptions    NAPROXEN (NAPROSYN) 500 MG TABLET    Take 1 tablet (500 mg total) by mouth 2 (two) times a day with meals       Start Date: 12/21/2019End Date: --       Order Dose: 500 mg       Quantity: 14 tablet    Refills: 0     No discharge procedures on file      ED Provider  Electronically Signed by           Fiorella Crook DO  12/21/19 Stu Nina DO  12/21/19 5985

## 2019-12-22 PROCEDURE — 93970 EXTREMITY STUDY: CPT | Performed by: SURGERY

## 2019-12-26 ENCOUNTER — OFFICE VISIT (OUTPATIENT)
Dept: INTERNAL MEDICINE CLINIC | Facility: CLINIC | Age: 34
End: 2019-12-26
Payer: COMMERCIAL

## 2019-12-26 VITALS
SYSTOLIC BLOOD PRESSURE: 132 MMHG | OXYGEN SATURATION: 99 % | HEIGHT: 62 IN | WEIGHT: 184.9 LBS | BODY MASS INDEX: 34.03 KG/M2 | HEART RATE: 75 BPM | DIASTOLIC BLOOD PRESSURE: 86 MMHG | TEMPERATURE: 99.7 F

## 2019-12-26 DIAGNOSIS — M25.561 ACUTE PAIN OF RIGHT KNEE: Primary | ICD-10-CM

## 2019-12-26 PROBLEM — R07.89 OTHER CHEST PAIN: Status: RESOLVED | Noted: 2019-01-21 | Resolved: 2019-12-26

## 2019-12-26 PROCEDURE — 3008F BODY MASS INDEX DOCD: CPT | Performed by: NURSE PRACTITIONER

## 2019-12-26 PROCEDURE — 99213 OFFICE O/P EST LOW 20 MIN: CPT | Performed by: NURSE PRACTITIONER

## 2019-12-26 PROCEDURE — 1036F TOBACCO NON-USER: CPT | Performed by: NURSE PRACTITIONER

## 2019-12-26 RX ORDER — METHYLPREDNISOLONE 4 MG/1
TABLET ORAL
Qty: 21 EACH | Refills: 0 | Status: SHIPPED | OUTPATIENT
Start: 2019-12-26 | End: 2020-01-08 | Stop reason: ALTCHOICE

## 2019-12-26 RX ORDER — NAPROXEN 500 MG/1
500 TABLET ORAL 2 TIMES DAILY WITH MEALS
Qty: 60 TABLET | Refills: 0 | Status: SHIPPED | OUTPATIENT
Start: 2019-12-26 | End: 2020-01-08 | Stop reason: ALTCHOICE

## 2019-12-26 NOTE — PROGRESS NOTES
Assessment/Plan: Will get authorization for MRI of the right knee  Will give a medrol dose pack and Naprosyn as needed for her continued pain  She was advised if pain continues will send her to Ortho  Will notify her once MRI is approved  No problem-specific Assessment & Plan notes found for this encounter  Problem List Items Addressed This Visit        Other    Acute pain of right knee - Primary    Relevant Medications    methylPREDNISolone 4 MG tablet therapy pack    naproxen (NAPROSYN) 500 mg tablet    Other Relevant Orders    MRI knee right  wo contrast      Other Visit Diagnoses     BMI 33 0-33 9,adult                Subjective:      Patient ID: Joshua Frazier is a 27 y o  female  Jacqueline Lomeli is here today for an ER follow up visit  She was seen in the ER on 12/21 with complaints of right knee pain  She did have an  XR and doppler done which was normal   She states she is having a lot of pain in the back of her leg and feels like something is going to pop  She did get a knee brace but can not work with this on  She states she is having such pain some days she can barely but pressure on it  She was taking OTC medication but nothing is alleviating the pain  She denies any injury and does work at [employer] and is standing her whole shift  She offers no other issues  The following portions of the patient's history were reviewed and updated as appropriate:   She  has a past medical history of Asthma, Cat-scratch disease, Depression, Hypertension, and Sleep disorder    She   Patient Active Problem List    Diagnosis Date Noted    Acute pain of right knee 12/26/2019    Neck pain 06/18/2019    Lumbar back pain 06/18/2019    Asthma 02/18/2019    Granulomatous disease ([facility] [address] ) 02/13/2019    Lung nodule < 6cm on CT 02/13/2019    Eosinophilia 02/13/2019    Wound dehiscence 02/09/2019    Cat-scratch disease 02/07/2019    Lymphedema 02/05/2019    Abnormal EKG 01/21/2019    Essential hypertension 01/21/2019  Mass of left axilla 12/26/2018    Allergic rhinitis 11/30/2018    Numbness and tingling of both upper extremities while sleeping 05/01/2018    Urine ketones 03/15/2018    Depression 09/12/2016    Intention tremor 09/12/2016     She  has a past surgical history that includes Tubal ligation; Breast biopsy (Left); pr bx/remv,lymph node,deep axill (Left, 1/15/2019); and Dental surgery  Her family history includes Anxiety disorder in her father and mother; Bipolar disorder in her father and mother; Depression in her father and mother; Parkinsonism in her mother; Schizophrenia in her father and mother; Stomach cancer in her paternal grandmother  She  reports that she has never smoked  She has never used smokeless tobacco  She reports that she does not drink alcohol or use drugs  Current Outpatient Medications   Medication Sig Dispense Refill    albuterol (PROVENTIL HFA,VENTOLIN HFA) 90 mcg/act inhaler Inhale 2 puffs every 4 (four) hours as needed for wheezing 1 Inhaler 0    fluticasone-salmeterol (ADVAIR, WIXELA) 250-50 mcg/dose inhaler Inhale 1 puff 2 (two) times a day Rinse mouth after use  3 Inhaler 2    methylPREDNISolone 4 MG tablet therapy pack Use as directed on package 21 each 0    naproxen (NAPROSYN) 500 mg tablet Take 1 tablet (500 mg total) by mouth 2 (two) times a day with meals As needed 60 tablet 0     No current facility-administered medications for this visit  Current Outpatient Medications on File Prior to Visit   Medication Sig    albuterol (PROVENTIL HFA,VENTOLIN HFA) 90 mcg/act inhaler Inhale 2 puffs every 4 (four) hours as needed for wheezing    fluticasone-salmeterol (ADVAIR, WIXELA) 250-50 mcg/dose inhaler Inhale 1 puff 2 (two) times a day Rinse mouth after use      [DISCONTINUED] amLODIPine (NORVASC) 5 mg tablet Take 1 tablet (5 mg total) by mouth daily (Patient not taking: Reported on 9/24/2019)    [DISCONTINUED] EPINEPHrine (EPIPEN) 0 3 mg/0 3 mL SOAJ Inject 0 3 mL (0 3 mg total) into a muscle once for 1 dose    [DISCONTINUED] methocarbamol (ROBAXIN) 500 mg tablet Take 1 tablet (500 mg total) by mouth 2 (two) times a day (Patient not taking: Reported on 9/24/2019)    [DISCONTINUED] methocarbamol (ROBAXIN) 750 mg tablet Take one tablet by mouth every 8 hours as needed (Patient not taking: Reported on 9/24/2019)    [DISCONTINUED] methylPREDNISolone 4 MG tablet therapy pack Use as directed on package (Patient not taking: Reported on 9/24/2019)    [DISCONTINUED] naproxen (NAPROSYN) 500 mg tablet Take 1 tablet (500 mg total) by mouth 2 (two) times a day with meals (Patient not taking: Reported on 9/24/2019)    [DISCONTINUED] naproxen (NAPROSYN) 500 mg tablet Take 1 tablet (500 mg total) by mouth 2 (two) times a day with meals (Patient not taking: Reported on 9/24/2019)    [DISCONTINUED] naproxen (NAPROSYN) 500 mg tablet Take 1 tablet (500 mg total) by mouth 2 (two) times a day with meals     No current facility-administered medications on file prior to visit  She is allergic to bactrim [sulfamethoxazole-trimethoprim] and codeine       Review of Systems   Constitutional: Negative  HENT: Negative  Eyes: Negative  Respiratory: Negative  Cardiovascular: Negative  Gastrointestinal: Negative  Endocrine: Negative  Genitourinary: Negative  Musculoskeletal:        Right posterior knee pain   Skin: Negative  Allergic/Immunologic: Negative  Neurological: Negative  Hematological: Negative  Psychiatric/Behavioral: Negative  Objective:      /86 (BP Location: Left arm, Patient Position: Sitting, Cuff Size: Large)   Pulse 75   Temp 99 7 °F (37 6 °C) (Temporal)   Ht 5' 2" (1 575 m)   Wt 83 9 kg (184 lb 14 4 oz)   LMP 12/21/2019   SpO2 99%   BMI 33 82 kg/m²          Physical Exam   Constitutional: She is oriented to person, place, and time  She appears well-developed and well-nourished     Cardiovascular: Normal rate, regular rhythm, normal heart sounds and intact distal pulses  Pulmonary/Chest: Effort normal and breath sounds normal    Musculoskeletal: She exhibits tenderness  Pain with flexion and extension to posterior aspect of right knee   Neurological: She is alert and oriented to person, place, and time  Skin: Skin is warm and dry  Capillary refill takes less than 2 seconds  Psychiatric: She has a normal mood and affect  Her behavior is normal  Judgment normal    Vitals reviewed  BMI Counseling: Body mass index is 33 82 kg/m²  The BMI is above normal  Nutrition recommendations include reducing portion sizes, decreasing overall calorie intake, 3-5 servings of fruits/vegetables daily, reducing fast food intake, consuming healthier snacks, decreasing soda and/or juice intake, moderation in carbohydrate intake, increasing intake of lean protein, reducing intake of saturated fat and trans fat and reducing intake of cholesterol

## 2019-12-26 NOTE — PATIENT INSTRUCTIONS

## 2020-01-08 ENCOUNTER — OFFICE VISIT (OUTPATIENT)
Dept: OBGYN CLINIC | Facility: CLINIC | Age: 35
End: 2020-01-08
Payer: COMMERCIAL

## 2020-01-08 VITALS
SYSTOLIC BLOOD PRESSURE: 138 MMHG | DIASTOLIC BLOOD PRESSURE: 97 MMHG | HEART RATE: 83 BPM | RESPIRATION RATE: 16 BRPM | BODY MASS INDEX: 34.04 KG/M2 | HEIGHT: 62 IN | WEIGHT: 185 LBS

## 2020-01-08 DIAGNOSIS — S83.231A COMPLEX TEAR OF MEDIAL MENISCUS OF RIGHT KNEE AS CURRENT INJURY, INITIAL ENCOUNTER: Primary | ICD-10-CM

## 2020-01-08 PROCEDURE — 20610 DRAIN/INJ JOINT/BURSA W/O US: CPT | Performed by: ORTHOPAEDIC SURGERY

## 2020-01-08 PROCEDURE — 99214 OFFICE O/P EST MOD 30 MIN: CPT | Performed by: ORTHOPAEDIC SURGERY

## 2020-01-08 RX ORDER — BUPIVACAINE HYDROCHLORIDE 5 MG/ML
6 INJECTION, SOLUTION EPIDURAL; INTRACAUDAL
Status: COMPLETED | OUTPATIENT
Start: 2020-01-08 | End: 2020-01-08

## 2020-01-08 RX ORDER — METHYLPREDNISOLONE ACETATE 40 MG/ML
2 INJECTION, SUSPENSION INTRA-ARTICULAR; INTRALESIONAL; INTRAMUSCULAR; SOFT TISSUE
Status: COMPLETED | OUTPATIENT
Start: 2020-01-08 | End: 2020-01-08

## 2020-01-08 RX ADMIN — BUPIVACAINE HYDROCHLORIDE 6 ML: 5 INJECTION, SOLUTION EPIDURAL; INTRACAUDAL at 14:43

## 2020-01-08 RX ADMIN — METHYLPREDNISOLONE ACETATE 2 ML: 40 INJECTION, SUSPENSION INTRA-ARTICULAR; INTRALESIONAL; INTRAMUSCULAR; SOFT TISSUE at 14:43

## 2020-01-08 NOTE — PROGRESS NOTES
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 [employer]  Patient has sense of instability in the knee  Symptoms are gradually getting worse  Patient presents with radiographs of the knee      Current Medications  Current Outpatient Medications   Medication Sig Dispense Refill    albuterol (PROVENTIL HFA,VENTOLIN HFA) 90 mcg/act inhaler Inhale 2 puffs every 4 (four) hours as needed for wheezing 1 Inhaler 0    fluticasone-salmeterol (ADVAIR, WIXELA) 250-50 mcg/dose inhaler Inhale 1 puff 2 (two) times a day Rinse mouth after use  3 Inhaler 2     No current facility-administered medications for this visit          Current Problems    Active Problems:   Patient Active Problem List    Diagnosis Date Noted    Acute pain of right knee 12/26/2019    Neck pain 06/18/2019    Lumbar back pain 06/18/2019    Asthma 02/18/2019    [facility]ulomatous disease ([facility] [address] ) 02/13/2019    Lung nodule < 6cm on CT 02/13/2019    Eosinophilia 02/13/2019    Wound dehiscence 02/09/2019    Cat-scratch disease 02/07/2019    Lymphedema 02/05/2019    Abnormal EKG 01/21/2019    Essential hypertension 01/21/2019    Mass of left axilla 12/26/2018    Allergic rhinitis 11/30/2018    Numbness and tingling of both upper extremities while sleeping 05/01/2018    Urine ketones 03/15/2018    Depression 09/12/2016    Intention tremor 09/12/2016         Review of Systems:    General: negative for - chills, fatigue, fever,  weight gain or weight loss  Psychological: negative for - anxiety, behavioral disorder, concentration difficulties  Ophthalmic: negative for - blurry vision, decreased vision, double vision,      Past Medical History:   Past Medical History:   Diagnosis Date    Asthma     Cat-scratch disease     last assessed 10/22/15    Depression     Hypertension     last assessed 11/11/14    Sleep disorder        Past Surgical History:   Past Surgical History:   Procedure Laterality Date    BREAST BIOPSY Left     2016   [facility]      OR BX/REMV,LYMPH NODE,DEEP AXILL Left 1/15/2019    Procedure: LEFT AXILLARY LYMPH NODE BIOPSY;  Surgeon: Tarsha Deluna MD;  Location:  MAIN OR;  Service: General    TUBAL LIGATION         Family History:  Family history reviewed and non-contributory  Family History   Problem Relation Age of Onset    Anxiety disorder Mother     Bipolar disorder Mother     Depression Mother     Schizophrenia Mother     Parkinsonism Mother         tremor    Anxiety disorder Father     Bipolar disorder Father     Depression Father     Schizophrenia Father     Stomach cancer Paternal Grandmother        Social History:  Social History     Socioeconomic History    Marital status: /Civil Union     Spouse name: None    Number of children: None    Years of education: None    Highest education level: None   Occupational History    None   Social Needs    Financial resource strain: None    Food insecurity:     Worry: None     Inability: None    Transportation needs:     Medical: None     Non-medical: None   Tobacco Use    Smoking status: Never Smoker    Smokeless tobacco: Never Used   Substance and Sexual Activity    Alcohol use: No    Drug use: No    Sexual activity: Yes     Partners: Male     Birth control/protection: None   Lifestyle    Physical activity:     Days per week: None     Minutes per session: None    Stress: None   Relationships    Social connections:     Talks on phone: None     Gets together: None     Attends Anabaptist service: None     Active member of club or organization: None     Attends meetings of clubs or organizations: None     Relationship status: None    Intimate partner violence:     Fear of current or ex partner: None     Emotionally abused: None     Physically abused: None     Forced sexual activity: None   Other Topics Concern    None   Social History Narrative    Always uses seat belt    Daily caffeine consumption(occasional)    Economic stress    Exercises daily    Lives with     Lives with relatives    Pets: Dog       Allergies:    Allergies   Allergen Reactions    Bactrim [Sulfamethoxazole-Trimethoprim]     Codeine Hives     Tolerated Percocet, Vicodin, etc            Physical ExaminationBP 138/97 (BP Location: Right arm, Patient Position: Sitting, Cuff Size: Large)   Pulse 83   Resp 16   Ht 5' 2" (1 575 m)   Wt 83 9 kg (185 lb)   LMP 12/21/2019   BMI 33 84 kg/m²   Gen: Alert and oriented to person, place, time  HEENT: EOMI, eyes clear, moist mucus membranes, hearing intact      Orthopedic Exam   right knee minimal effusion present   Full extension   flexion beyond 100 causes discomfort in the knee   medial and lateral joint line tenderness  Medial more than lateral with Blanca's positive   fullness of the popliteal fossa   calf is soft non-tender no distal deficits   radiographs right knee normal          Impression   right knee pain due to meniscus tear        Plan     discussed treatment with the patient   right knee joint injected with Depo-Medrol and local anesthetic   patient ice the knee, use over-the-counter pain medication, do home exercise program and start physical therapy   follow-up in 2 months   if the knee still symptomatic we will arrange for an MRI  Large joint arthrocentesis: R knee  Date/Time: 1/8/2020 2:43 PM  Consent given by: patient  Site marked: site marked  Timeout: Immediately prior to procedure a time out was called to verify the correct patient, procedure, equipment, support staff and site/side marked as required   Supporting Documentation  Indications: pain and joint swelling   Procedure Details  Location: knee - R knee  Preparation: Patient was prepped and draped in the usual sterile fashion  Needle size: 22 G  Ultrasound guidance: no  Approach: anterolateral  Medications administered: 6 mL bupivacaine (PF) 0 5 %; 2 mL methylPREDNISolone acetate 40 mg/mL    Patient tolerance: patient tolerated the procedure well with no immediate complications  Dressing:  Sterile dressing applied        Lexa Wells MD        Portions of the record may have been created with voice recognition software  Occasional wrong word or "sound a like" substitutions may have occurred due to the inherent limitations of voice recognition software  Read the chart carefully and recognize, using context, where substitutions have occurred

## 2020-01-08 NOTE — PATIENT INSTRUCTIONS
Bakers Cyst   WHAT YOU NEED TO KNOW:   What is a Bakers cyst?  A Bakers cyst, or popliteal cyst, is a bulging lump behind your knee  Inside the lump is a sac filled with fluid  The cyst is caused by fluid buildup in your knee joint  This can happen if you have a knee injury, such as a cartilage tear  Osteoarthritis or rheumatoid arthritis can also cause an abnormal buildup of joint fluid  What are the signs and symptoms of a Bakers cyst?   · A lump or swelling in the back of your knee when you stand or walk    · Knee swelling that goes away when you bend your knee    · Knee pain    · Stiffness or tightness in your knee that may get worse with movement  How is a Bakers cyst diagnosed? · Transillumination  is a test that can show if the cyst is filled with fluid  Your healthcare provider will shine a light through your cyst     · X-ray, MRI, or ultrasound  pictures may be taken of the bones and tissues in your knee joint  The pictures will show any problems, such as arthritis, a knee injury, or fluid buildup  You may be given contrast liquid to help the pictures show up better  Tell the healthcare provider if you have ever had an allergic reaction to contrast liquid  Do not enter the MRI room with anything metal  Metal can cause serious injury  Tell the healthcare provider if you have any metal in or on your body  How is a Bakers cyst treated? A Bakers cyst will usually go away on its own  If it is large and painful, you may need any of the following:  · NSAIDs  help decrease swelling and pain  This medicine is available without a doctor's order  Your healthcare provider will tell you which medicine to take and how often to take it  Follow directions  NSAIDs can cause stomach bleeding or kidney problems if they are not taken correctly  · Steroid medicine  may be injected into the cyst to decrease fluid, redness, pain, and swelling       · Aspiration  is a procedure used to drain fluid from the cyst through a needle  · Arthroscopic surgery  is done to remove the cyst completely or repair any torn or damaged cartilage  A scope and small surgical instruments are inserted through a small incision in your knee  A scope is a flexible tube with a light, camera, and magnifying glass on the end  How can I care for my knee? · Rest as needed  Limit movement as your knee heals  This will help decrease the risk of more damage to your knee  You may need crutches to take weight off your injured knee  Use crutches as directed  · Ice your knee  Ice helps decrease swelling and pain  Use an ice pack, or put ice in a plastic bag  Cover the ice pack with a towel and place the ice on your knee for 15 to 20 minutes, 3 to 4 times each day  Do this for 2 to 3 days  · Support your knee  Wrap your knee with an elastic bandage  Ask your healthcare provider if you need a brace for more support  This will help decrease swelling and movement so your knee can heal     · Elevate your knee  Use pillows to raise your knee above the level of your heart as often as you can  This will help decrease swelling  · Go to physical therapy as directed  A physical therapist teaches you exercises to help improve movement and strength, and to decrease pain  When should I seek immediate care? · You have severe pain  · You have bruising on the ankle below the cyst     · Your calf turns blue below the cyst     · Your calf or knee is swollen or bleeding  When should I contact my healthcare provider? · You have a fever  · Your pain does not improve with medicine  · You have questions or concerns about your condition or care  CARE AGREEMENT:   You have the right to help plan your care  Learn about your health condition and how it may be treated  Discuss treatment options with your caregivers to decide what care you want to receive  You always have the right to refuse treatment  The above information is an  only   It is not intended as medical advice for individual conditions or treatments  Talk to your doctor, nurse or pharmacist before following any medical regimen to see if it is safe and effective for you  © 2017 [employer]  Information is for End User's use only and may not be sold, redistributed or otherwise used for commercial purposes  All illustrations and images included in CareNotes® are the copyrighted property of [employer]  or Aryan Chowdary  Meniscus Tear   AMBULATORY CARE:   A meniscus tear  is a tear in the cartilage of your knee  The meniscus is a piece of cartilage (strong tissue) between your thighbone and shinbone  The meniscus helps to cushion your knee joint and keep it stable  Common symptoms include the following:   · A pop or tear when the injury happens    · Pain and swelling    · Tenderness    · Stiffness    · Popping, catching, or locking of your knee    · Not being able to extend your knee fully  Call 911 for any of the following:   · Your arm or leg feels warm, tender, and painful  It may look swollen and red  Seek care immediately if:   · You cannot move your knee at all  Contact your healthcare provider if:   · Your symptoms do not improve with treatment  · You have questions or concerns about your condition or care  Treatment for a meniscus tear  depends on the type of tear you have  Some types of meniscus tears can heal on their own  You may need any of the following:  · NSAIDs , such as ibuprofen, help decrease swelling, pain, and fever  This medicine is available with or without a doctor's order  NSAIDs can cause stomach bleeding or kidney problems in certain people  If you take blood thinner medicine, always ask if NSAIDs are safe for you  Always read the medicine label and follow directions  Do not give these medicines to children under 10months of age without direction from your child's healthcare provider  · Rest  your knee   Avoid activities that make the swelling or pain worse  You may need to avoid putting weight on your leg while you have pain  Your healthcare provider may recommend that you use crutches  · Apply ice  on your knee for 15 to 20 minutes every hour or as directed  Use an ice pack, or put crushed ice in a plastic bag  Cover it with a towel  Ice helps prevent tissue damage and decreases swelling and pain  · Compress  your knee with an elastic bandage, air cast, medical boot, or splint to reduce swelling  Ask your healthcare provider which compression device to use, and how tight it should be  · Elevate  your knee above the level of your heart as often as you can  This will help decrease swelling and pain  Prop your knee on pillows or blankets to keep it elevated comfortably  · Surgery  may be needed if your symptoms do not improve  Your healthcare provider may trim away or repair damaged tissue  Follow up with your healthcare provider as directed:  Write down your questions so you remember to ask them during your visits  © 2017 [employer] [facility] Information is for End User's use only and may not be sold, redistributed or otherwise used for commercial purposes  All illustrations and images included in CareNotes® are the copyrighted property of [employer]  or Aryan Chowdary  The above information is an  only  It is not intended as medical advice for individual conditions or treatments  Talk to your doctor, nurse or pharmacist before following any medical regimen to see if it is safe and effective for you  Knee Exercises   WHAT YOU NEED TO KNOW:   What do I need to know about knee exercises? Knee exercises help strengthen the muscles around your knee  Strong muscles can help reduce pain and decrease your risk of future injury  Knee exercises also help you heal after an injury or surgery  · Start slow  These are beginning exercises   Ask your healthcare provider if you need to see a physical therapist for more advanced exercises  As you get stronger, you may be able to do more sets of each exercise or add weights  · Stop if you feel pain  It is normal to feel some discomfort at first  Regular exercise will help decrease your discomfort over time  · Do the exercises on both legs  Do this so both knees remain strong  · Warm up before you do knee exercises  Walk or ride a stationary bike for 5 or 10 minutes to warm your muscles  How do I perform knee stretches safely? Always stretch before you do strengthening exercises  Do these stretching exercises again after you do the strengthening exercises  Do these stretches 4 or 5 days a week, or as directed  · Standing calf stretch: Face a wall and place both palms flat on the wall, or hold the back of a chair for balance  Keep a slight bend in your knees  Take a big step backward with one leg  Keep your other leg directly under you  Keep both heels flat and press your hips forward  Hold the stretch for 30 seconds, and then relax for 30 seconds  Switch legs  Repeat 2 or 3 times on each leg  · Standing quadriceps stretch:  Stand and place one hand against a wall or hold the back of a chair for balance  With your weight on one leg, bend your other leg and grab your ankle  Bring your heel toward your buttocks  Hold the stretch for 30 to 60 seconds  Switch legs  Repeat 2 or 3 times on each leg  · Sitting hamstring stretch:  Sit with both legs straight in front of you  Do not point or flex your toes  Place your palms on the floor and slide your hands forward until you feel the stretch  Do not round your back  Hold the stretch for 30 seconds  Repeat 2 or 3 times  How do I perform knee strengthening exercises safely? Do these exercises 4 or 5 days a week, or as directed  · Standing half squats:  Stand with your feet shoulder-width apart  Lean your back against a wall or hold the back of a chair for balance, if needed  Slowly sit down about 10 inches, as if you are going to sit in a chair  Your body weight should be mostly over your heels  Hold the squat for 5 seconds, then rise to a standing position  Do 3 sets of 10 squats to strengthen your buttocks and thighs  · Standing hamstring curls: Face a wall and place both palms flat on the wall, or hold the back of a chair for balance  With your weight on one leg, lift your other foot as close to your buttocks as you can  Hold for 5 seconds and then lower your leg  Do 2 sets of 10 curls on each leg  This exercise strengthens the muscles in the back of your thigh  · Standing calf raises:  Face a wall and place both palms flat on the wall, or hold the back of a chair for balance  Stand up straight, and do not lean  Place all your weight on one leg by lifting the other foot off the floor  Raise the heel of the foot that is on the floor as high as you can and then lower it  Do 2 sets of 10 calf raises on each leg to strengthen your calf muscles  · Straight leg lifts:  Lie on your stomach with straight legs  Fold your arms in front of you and rest your head in your arms  Tighten your leg muscles and raise one leg as high as you can  Hold for 5 seconds, then lower your leg  Do 2 sets of 10 lifts on each leg to strengthen your buttocks  · Sitting leg lifts:  Sit in a chair  Slowly straighten and raise one leg  Squeeze your thigh muscles and hold for 5 seconds  Relax and return your foot to the floor  Do 2 sets of 10 lifts on each leg  This helps strengthen the muscles in the front of your thigh  When should I contact my healthcare provider? · You have new pain or your pain becomes worse  · You have questions or concerns about your condition or care  CARE AGREEMENT:   You have the right to help plan your care  Learn about your health condition and how it may be treated   Discuss treatment options with your caregivers to decide what care you want to receive  You always have the right to refuse treatment  The above information is an  only  It is not intended as medical advice for individual conditions or treatments  Talk to your doctor, nurse or pharmacist before following any medical regimen to see if it is safe and effective for you  © 2017 [employer] Information is for End User's use only and may not be sold, redistributed or otherwise used for commercial purposes  All illustrations and images included in CareNotes® are the copyrighted property of [employer]  or Aryan Chowdary

## 2020-02-26 ENCOUNTER — TELEPHONE (OUTPATIENT)
Dept: INTERNAL MEDICINE CLINIC | Facility: CLINIC | Age: 35
End: 2020-02-26

## 2020-02-26 DIAGNOSIS — J45.909 ASTHMA: ICD-10-CM

## 2020-02-26 NOTE — TELEPHONE ENCOUNTER
Pt called stating she cannot get into [facility] right away  She asked for an appt for depression  Stated she needed one after her work time  I asked her if she felt like she needed to be seen right away stated no    Informed her if she neeeds help right away to go to [facility]

## 2020-02-28 ENCOUNTER — OFFICE VISIT (OUTPATIENT)
Dept: INTERNAL MEDICINE CLINIC | Facility: CLINIC | Age: 35
End: 2020-02-28
Payer: COMMERCIAL

## 2020-02-28 VITALS
TEMPERATURE: 98.8 F | WEIGHT: 184.1 LBS | OXYGEN SATURATION: 99 % | RESPIRATION RATE: 18 BRPM | DIASTOLIC BLOOD PRESSURE: 84 MMHG | SYSTOLIC BLOOD PRESSURE: 132 MMHG | HEART RATE: 98 BPM | BODY MASS INDEX: 33.88 KG/M2 | HEIGHT: 62 IN

## 2020-02-28 DIAGNOSIS — Z13.6 SCREENING FOR CARDIOVASCULAR CONDITION: ICD-10-CM

## 2020-02-28 DIAGNOSIS — F32.A ANXIETY AND DEPRESSION: Primary | ICD-10-CM

## 2020-02-28 DIAGNOSIS — F41.9 ANXIETY AND DEPRESSION: Primary | ICD-10-CM

## 2020-02-28 DIAGNOSIS — I10 ESSENTIAL HYPERTENSION: ICD-10-CM

## 2020-02-28 PROCEDURE — 3079F DIAST BP 80-89 MM HG: CPT | Performed by: NURSE PRACTITIONER

## 2020-02-28 PROCEDURE — 3075F SYST BP GE 130 - 139MM HG: CPT | Performed by: NURSE PRACTITIONER

## 2020-02-28 PROCEDURE — 99213 OFFICE O/P EST LOW 20 MIN: CPT | Performed by: NURSE PRACTITIONER

## 2020-02-28 PROCEDURE — 1036F TOBACCO NON-USER: CPT | Performed by: NURSE PRACTITIONER

## 2020-02-28 PROCEDURE — 3008F BODY MASS INDEX DOCD: CPT | Performed by: NURSE PRACTITIONER

## 2020-02-28 RX ORDER — BUSPIRONE HYDROCHLORIDE 7.5 MG/1
7.5 TABLET ORAL 2 TIMES DAILY
Qty: 60 TABLET | Refills: 5 | Status: SHIPPED | OUTPATIENT
Start: 2020-02-28 | End: 2020-08-26

## 2020-02-28 RX ORDER — FLUOXETINE 10 MG/1
10 CAPSULE ORAL DAILY
Qty: 30 CAPSULE | Refills: 3 | Status: SHIPPED | OUTPATIENT
Start: 2020-02-28 | End: 2020-12-14

## 2020-02-28 NOTE — PATIENT INSTRUCTIONS
Anxiety   WHAT YOU NEED TO KNOW:   What do I need to know about anxiety? Anxiety is a condition that causes you to feel extremely worried or nervous  The feelings are so strong that they can cause problems with your daily activities or sleep  Anxiety may be triggered by something you fear, or it may happen without a cause  Family or work stress, smoking, caffeine, and alcohol can increase your risk for anxiety  Certain medicines or health conditions can also increase your risk  Anxiety can become a long-term condition if it is not managed or treated  What other common signs and symptoms may occur with anxiety? · Fatigue or muscle tightness     · Shaking, restlessness, or irritability     · Problems focusing     · Trouble sleeping     · Feeling jumpy, easily startled, or dizzy     · Rapid heartbeat or shortness of breath  What do I need to tell my healthcare provider about my anxiety? Tell your healthcare provider when your symptoms began and what triggers them  Tell your provider if anxiety affects your daily activities  Your provider will also ask about your medical history and if you have family members with a similar condition  Tell your provider about your past and present alcohol, nicotine, or drug use  What can I do to manage anxiety? You may get medicines to help you feel calm and relaxed, and to decrease your symptoms  Medicines are usually given together with therapy or other treatments  The following can help you manage anxiety:  · Talk to someone about your anxiety  Your healthcare provider may suggest counseling  Cognitive behavioral therapy can help you understand and change how you react to events that trigger your symptoms  You might feel more comfortable talking with a friend or family member about your anxiety  Choose someone you know will be supportive and encouraging  · Find ways to relax  Activities such as exercise, meditation, or listening to music can help you relax   Spend time with friends, or do things you enjoy  · Practice deep breathing  Deep breathing can help you relax when you feel anxious  Focus on taking slow, deep breaths several times a day, or during an anxiety attack  Breathe in through your nose and out through your mouth  · Create a regular sleep routine  Regular sleep can help you feel calmer during the day  Go to sleep and wake up at the same times every day  Do not watch television or use the computer right before bed  Your room should be comfortable, dark, and quiet  · Eat a variety of healthy foods  Healthy foods include fruits, vegetables, low-fat dairy products, lean meats, fish, whole-grain breads, and cooked beans  Healthy foods can help you feel less anxious and have more energy  · Exercise regularly  Exercise can increase your energy level  Exercise may also lift your mood and help you sleep better  Your healthcare provider can help you create an exercise plan  · Do not smoke  Nicotine and other chemicals in cigarettes and cigars can increase anxiety  Ask your healthcare provider for information if you currently smoke and need help to quit  E-cigarettes or smokeless tobacco still contain nicotine  Talk to your healthcare provider before you use these products  · Do not have caffeine  Caffeine can make your symptoms worse  Do not have foods or drinks that are meant to increase your energy level  · Limit or do not drink alcohol  Ask your healthcare provider if alcohol is safe for you  You may not be able to drink alcohol if you take certain anxiety or depression medicines  Limit alcohol to 1 drink per day if you are a woman  Limit alcohol to 2 drinks per day if you are a man  A drink of alcohol is 12 ounces of beer, 5 ounces of wine, or 1½ ounces of liquor  · Do not use drugs  Drugs can make your anxiety worse  It can also make anxiety hard to manage  Talk to your healthcare provider if you use drugs and want help to quit    Call 911 if:   · You have chest pain, tightness, or heaviness that may spread to your shoulders, arms, jaw, neck, or back  · You feel like hurting yourself or someone else  When should I contact my healthcare provider? · Your symptoms get worse or do not get better with treatment  · Your anxiety keeps you from doing your regular daily activities  · You have new symptoms since your last visit  · You have questions or concerns about your condition or care  CARE AGREEMENT:   You have the right to help plan your care  Learn about your health condition and how it may be treated  Discuss treatment options with your caregivers to decide what care you want to receive  You always have the right to refuse treatment  The above information is an  only  It is not intended as medical advice for individual conditions or treatments  Talk to your doctor, nurse or pharmacist before following any medical regimen to see if it is safe and effective for you  © 2017 [employer] Information is for End User's use only and may not be sold, redistributed or otherwise used for commercial purposes  All illustrations and images included in CareNotes® are the copyrighted property of [employer]  or Aryan Chowdary

## 2020-02-28 NOTE — PROGRESS NOTES
Assessment/Plan: Will order fasting labs  Will start on Prozac 10 mg daily with Buspar 7 5 mg BID  She does contract for safety and is seeing [facility] next month  Bp at 132/84 recheck 132/82  Will bring back in one month and will recheck BP then  No problem-specific Assessment & Plan notes found for this encounter  Problem List Items Addressed This Visit        Cardiovascular and Mediastinum    Essential hypertension    Relevant Orders    Comprehensive metabolic panel    CBC and differential    TSH, 3rd generation with Free T4 reflex       Other    Anxiety and depression - Primary    Relevant Medications    FLUoxetine (PROzac) 10 mg capsule    busPIRone (BUSPAR) 7 5 mg tablet    Other Relevant Orders    Comprehensive metabolic panel    CBC and differential    TSH, 3rd generation with Free T4 reflex      Other Visit Diagnoses     Screening for cardiovascular condition        Relevant Orders    Lipid panel            Subjective:      Patient ID: Alissa Day is a 27 y o  female  [place] is here today for an acute visit  She state she is having worsening anxiety and depression  She is seeing [facility] next month  She has seen counseling in the past and was on Prozac which did help  She is not sure what is causing her symptoms  She does have PTSD  She state at times she is getting very hot and feels dizzy  She was on Norvasc around two years ago but stopped taking it  She denies any suicidal ideations  She has not had labs done in quite some time as well  She offers no other issues  The following portions of the patient's history were reviewed and updated as appropriate:   She  has a past medical history of Asthma, Cat-scratch disease, Depression, Hypertension, and Sleep disorder    She   Patient Active Problem List    Diagnosis Date Noted    Anxiety and depression 02/28/2020    Acute pain of right knee 12/26/2019    Neck pain 06/18/2019    Lumbar back pain 06/18/2019    Asthma 02/18/2019    Granulomatous disease ([facility] [address] ) 02/13/2019    Lung nodule < 6cm on CT 02/13/2019    Eosinophilia 02/13/2019    Wound dehiscence 02/09/2019    Cat-scratch disease 02/07/2019    Lymphedema 02/05/2019    Abnormal EKG 01/21/2019    Essential hypertension 01/21/2019    Mass of left axilla 12/26/2018    Allergic rhinitis 11/30/2018    Numbness and tingling of both upper extremities while sleeping 05/01/2018    Urine ketones 03/15/2018    Depression 09/12/2016    Intention tremor 09/12/2016     She  has a past surgical history that includes Tubal ligation; Breast biopsy (Left); pr bx/remv,lymph node,deep axill (Left, 1/15/2019); and Dental surgery  Her family history includes Anxiety disorder in her father and mother; Bipolar disorder in her father and mother; Depression in her father and mother; Parkinsonism in her mother; Schizophrenia in her father and mother; Stomach cancer in her paternal grandmother  She  reports that she has never smoked  She has never used smokeless tobacco  She reports that she does not drink alcohol or use drugs  Current Outpatient Medications   Medication Sig Dispense Refill    albuterol (PROVENTIL HFA,VENTOLIN HFA) 90 mcg/act inhaler Inhale 2 puffs every 4 (four) hours as needed for wheezing 1 Inhaler 0    fluticasone-salmeterol (ADVAIR, WIXELA) 250-50 mcg/dose inhaler Inhale 1 puff 2 (two) times a day Rinse mouth after use  3 Inhaler 2    busPIRone (BUSPAR) 7 5 mg tablet Take 1 tablet (7 5 mg total) by mouth 2 (two) times a day 60 tablet 5    FLUoxetine (PROzac) 10 mg capsule Take 1 capsule (10 mg total) by mouth daily 30 capsule 3     No current facility-administered medications for this visit        Current Outpatient Medications on File Prior to Visit   Medication Sig    albuterol (PROVENTIL HFA,VENTOLIN HFA) 90 mcg/act inhaler Inhale 2 puffs every 4 (four) hours as needed for wheezing    fluticasone-salmeterol (ADVAIR, WIXELA) 250-50 mcg/dose inhaler Inhale 1 puff 2 (two) times a day Rinse mouth after use  No current facility-administered medications on file prior to visit  She is allergic to bactrim [sulfamethoxazole-trimethoprim] and codeine       Review of Systems   All other systems reviewed and are negative  Objective:      /84 (BP Location: Right arm, Patient Position: Sitting, Cuff Size: Adult)   Pulse 98   Temp 98 8 °F (37 1 °C) (Oral)   Resp 18   Ht 5' 2" (1 575 m)   Wt 83 5 kg (184 lb 1 6 oz)   SpO2 99%   BMI 33 67 kg/m²          Physical Exam   Constitutional: She is oriented to person, place, and time  She appears well-developed and well-nourished  HENT:   Head: Normocephalic and atraumatic  Right Ear: External ear normal    Left Ear: External ear normal    Nose: Nose normal    Mouth/Throat: Oropharynx is clear and moist    Eyes: Pupils are equal, round, and reactive to light  Conjunctivae and EOM are normal    Neck: Normal range of motion  Neck supple  Cardiovascular: Normal rate, regular rhythm, normal heart sounds and intact distal pulses  Pulmonary/Chest: Effort normal and breath sounds normal    Abdominal: Soft  Bowel sounds are normal    Musculoskeletal: Normal range of motion  Neurological: She is alert and oriented to person, place, and time  Skin: Skin is warm and dry  Capillary refill takes less than 2 seconds  Psychiatric: She has a normal mood and affect  Her behavior is normal  Judgment and thought content normal    Vitals reviewed

## 2020-02-29 ENCOUNTER — APPOINTMENT (OUTPATIENT)
Dept: LAB | Facility: HOSPITAL | Age: 35
End: 2020-02-29
Payer: COMMERCIAL

## 2020-02-29 DIAGNOSIS — F32.A ANXIETY AND DEPRESSION: ICD-10-CM

## 2020-02-29 DIAGNOSIS — I10 ESSENTIAL HYPERTENSION: ICD-10-CM

## 2020-02-29 DIAGNOSIS — Z13.6 SCREENING FOR CARDIOVASCULAR CONDITION: ICD-10-CM

## 2020-02-29 DIAGNOSIS — D50.9 IRON DEFICIENCY ANEMIA, UNSPECIFIED IRON DEFICIENCY ANEMIA TYPE: ICD-10-CM

## 2020-02-29 DIAGNOSIS — F41.9 ANXIETY AND DEPRESSION: ICD-10-CM

## 2020-02-29 LAB
ALBUMIN SERPL BCP-MCNC: 3.9 G/DL (ref 3.5–5)
ALP SERPL-CCNC: 70 U/L (ref 46–116)
ALT SERPL W P-5'-P-CCNC: 20 U/L (ref 12–78)
ANION GAP SERPL CALCULATED.3IONS-SCNC: 10 MMOL/L (ref 4–13)
AST SERPL W P-5'-P-CCNC: 9 U/L (ref 5–45)
BASOPHILS # BLD AUTO: 0.07 THOUSANDS/ΜL (ref 0–0.1)
BASOPHILS NFR BLD AUTO: 1 % (ref 0–1)
BILIRUB SERPL-MCNC: 0.4 MG/DL (ref 0.2–1)
BUN SERPL-MCNC: 12 MG/DL (ref 5–25)
CALCIUM SERPL-MCNC: 9 MG/DL (ref 8.3–10.1)
CHLORIDE SERPL-SCNC: 105 MMOL/L (ref 100–108)
CHOLEST SERPL-MCNC: 185 MG/DL (ref 50–200)
CO2 SERPL-SCNC: 27 MMOL/L (ref 21–32)
CREAT SERPL-MCNC: 0.85 MG/DL (ref 0.6–1.3)
EOSINOPHIL # BLD AUTO: 0.29 THOUSAND/ΜL (ref 0–0.61)
EOSINOPHIL NFR BLD AUTO: 4 % (ref 0–6)
ERYTHROCYTE [DISTWIDTH] IN BLOOD BY AUTOMATED COUNT: 12.8 % (ref 11.6–15.1)
FERRITIN SERPL-MCNC: 38 NG/ML (ref 8–388)
GFR SERPL CREATININE-BSD FRML MDRD: 89 ML/MIN/1.73SQ M
GLUCOSE P FAST SERPL-MCNC: 72 MG/DL (ref 65–99)
HCT VFR BLD AUTO: 44.4 % (ref 34.8–46.1)
HDLC SERPL-MCNC: 56 MG/DL
HGB BLD-MCNC: 14.5 G/DL (ref 11.5–15.4)
IMM GRANULOCYTES # BLD AUTO: 0.01 THOUSAND/UL (ref 0–0.2)
IMM GRANULOCYTES NFR BLD AUTO: 0 % (ref 0–2)
IRON SATN MFR SERPL: 33 %
IRON SERPL-MCNC: 116 UG/DL (ref 50–170)
LDLC SERPL CALC-MCNC: 117 MG/DL (ref 0–100)
LYMPHOCYTES # BLD AUTO: 1.24 THOUSANDS/ΜL (ref 0.6–4.47)
LYMPHOCYTES NFR BLD AUTO: 15 % (ref 14–44)
MCH RBC QN AUTO: 31 PG (ref 26.8–34.3)
MCHC RBC AUTO-ENTMCNC: 32.7 G/DL (ref 31.4–37.4)
MCV RBC AUTO: 95 FL (ref 82–98)
MONOCYTES # BLD AUTO: 0.59 THOUSAND/ΜL (ref 0.17–1.22)
MONOCYTES NFR BLD AUTO: 7 % (ref 4–12)
NEUTROPHILS # BLD AUTO: 6 THOUSANDS/ΜL (ref 1.85–7.62)
NEUTS SEG NFR BLD AUTO: 73 % (ref 43–75)
NONHDLC SERPL-MCNC: 129 MG/DL
NRBC BLD AUTO-RTO: 0 /100 WBCS
PLATELET # BLD AUTO: 250 THOUSANDS/UL (ref 149–390)
PMV BLD AUTO: 10 FL (ref 8.9–12.7)
POTASSIUM SERPL-SCNC: 4 MMOL/L (ref 3.5–5.3)
PROT SERPL-MCNC: 7.7 G/DL (ref 6.4–8.2)
RBC # BLD AUTO: 4.68 MILLION/UL (ref 3.81–5.12)
SODIUM SERPL-SCNC: 142 MMOL/L (ref 136–145)
TIBC SERPL-MCNC: 349 UG/DL (ref 250–450)
TRIGL SERPL-MCNC: 58 MG/DL
TSH SERPL DL<=0.05 MIU/L-ACNC: 2.08 UIU/ML (ref 0.36–3.74)
WBC # BLD AUTO: 8.2 THOUSAND/UL (ref 4.31–10.16)

## 2020-02-29 PROCEDURE — 36415 COLL VENOUS BLD VENIPUNCTURE: CPT

## 2020-02-29 PROCEDURE — 83540 ASSAY OF IRON: CPT

## 2020-02-29 PROCEDURE — 84443 ASSAY THYROID STIM HORMONE: CPT

## 2020-02-29 PROCEDURE — 83550 IRON BINDING TEST: CPT

## 2020-02-29 PROCEDURE — 82728 ASSAY OF FERRITIN: CPT

## 2020-02-29 PROCEDURE — 80053 COMPREHEN METABOLIC PANEL: CPT

## 2020-02-29 PROCEDURE — 85025 COMPLETE CBC W/AUTO DIFF WBC: CPT

## 2020-02-29 PROCEDURE — 80061 LIPID PANEL: CPT

## 2020-03-22 DIAGNOSIS — J45.909 ASTHMA: ICD-10-CM

## 2020-03-24 ENCOUNTER — TELEPHONE (OUTPATIENT)
Dept: PULMONOLOGY | Facility: CLINIC | Age: 35
End: 2020-03-24

## 2020-03-25 ENCOUNTER — TELEPHONE (OUTPATIENT)
Dept: PULMONOLOGY | Facility: CLINIC | Age: 35
End: 2020-03-25

## 2020-03-25 NOTE — TELEPHONE ENCOUNTER
I received a message, called stating she requires a prior auth for Advair  After reviewing chart, pt was to rto 5/2019 and cx / ns   I lmlm for pt to c/b, will need appt before auth will be obtained

## 2020-05-11 DIAGNOSIS — K13.0 ANGULAR CHEILITIS: Primary | ICD-10-CM

## 2020-05-11 DIAGNOSIS — Z13.21 ENCOUNTER FOR VITAMIN DEFICIENCY SCREENING: ICD-10-CM

## 2020-05-11 DIAGNOSIS — R23.8 EASY BRUISING: ICD-10-CM

## 2020-05-11 RX ORDER — FLUCONAZOLE 150 MG/1
TABLET ORAL
Qty: 3 TABLET | Refills: 0 | Status: SHIPPED | OUTPATIENT
Start: 2020-05-11 | End: 2020-05-13

## 2020-05-16 ENCOUNTER — NURSE TRIAGE (OUTPATIENT)
Dept: OTHER | Facility: OTHER | Age: 35
End: 2020-05-16

## 2020-05-17 ENCOUNTER — OFFICE VISIT (OUTPATIENT)
Dept: URGENT CARE | Facility: CLINIC | Age: 35
End: 2020-05-17
Payer: COMMERCIAL

## 2020-05-17 VITALS
RESPIRATION RATE: 16 BRPM | TEMPERATURE: 98.8 F | HEART RATE: 93 BPM | WEIGHT: 180 LBS | HEIGHT: 61 IN | OXYGEN SATURATION: 99 % | BODY MASS INDEX: 33.99 KG/M2

## 2020-05-17 DIAGNOSIS — R50.9 FEVER, UNSPECIFIED FEVER CAUSE: Primary | ICD-10-CM

## 2020-05-17 PROCEDURE — 99283 EMERGENCY DEPT VISIT LOW MDM: CPT | Performed by: PHYSICIAN ASSISTANT

## 2020-05-17 PROCEDURE — U0003 INFECTIOUS AGENT DETECTION BY NUCLEIC ACID (DNA OR RNA); SEVERE ACUTE RESPIRATORY SYNDROME CORONAVIRUS 2 (SARS-COV-2) (CORONAVIRUS DISEASE [COVID-19]), AMPLIFIED PROBE TECHNIQUE, MAKING USE OF HIGH THROUGHPUT TECHNOLOGIES AS DESCRIBED BY CMS-2020-01-R: HCPCS | Performed by: PHYSICIAN ASSISTANT

## 2020-05-17 PROCEDURE — 99203 OFFICE O/P NEW LOW 30 MIN: CPT | Performed by: PHYSICIAN ASSISTANT

## 2020-05-17 PROCEDURE — G0382 LEV 3 HOSP TYPE B ED VISIT: HCPCS | Performed by: PHYSICIAN ASSISTANT

## 2020-05-18 ENCOUNTER — TELEPHONE (OUTPATIENT)
Dept: URGENT CARE | Facility: CLINIC | Age: 35
End: 2020-05-18

## 2020-05-18 ENCOUNTER — APPOINTMENT (OUTPATIENT)
Dept: LAB | Facility: HOSPITAL | Age: 35
End: 2020-05-18
Payer: COMMERCIAL

## 2020-05-18 DIAGNOSIS — K13.0 ANGULAR CHEILITIS: ICD-10-CM

## 2020-05-18 DIAGNOSIS — R23.8 EASY BRUISING: ICD-10-CM

## 2020-05-18 DIAGNOSIS — Z13.21 ENCOUNTER FOR VITAMIN DEFICIENCY SCREENING: ICD-10-CM

## 2020-05-18 LAB
25(OH)D3 SERPL-MCNC: 11.5 NG/ML (ref 30–100)
ALBUMIN SERPL BCP-MCNC: 3.4 G/DL (ref 3.5–5)
ALP SERPL-CCNC: 77 U/L (ref 46–116)
ALT SERPL W P-5'-P-CCNC: 50 U/L (ref 12–78)
ANION GAP SERPL CALCULATED.3IONS-SCNC: 7 MMOL/L (ref 4–13)
AST SERPL W P-5'-P-CCNC: 41 U/L (ref 5–45)
BASOPHILS # BLD AUTO: 0.05 THOUSANDS/ΜL (ref 0–0.1)
BASOPHILS NFR BLD AUTO: 1 % (ref 0–1)
BILIRUB SERPL-MCNC: 0.3 MG/DL (ref 0.2–1)
BUN SERPL-MCNC: 10 MG/DL (ref 5–25)
CALCIUM SERPL-MCNC: 8.4 MG/DL (ref 8.3–10.1)
CHLORIDE SERPL-SCNC: 103 MMOL/L (ref 100–108)
CO2 SERPL-SCNC: 27 MMOL/L (ref 21–32)
CREAT SERPL-MCNC: 0.78 MG/DL (ref 0.6–1.3)
EOSINOPHIL # BLD AUTO: 0.25 THOUSAND/ΜL (ref 0–0.61)
EOSINOPHIL NFR BLD AUTO: 3 % (ref 0–6)
ERYTHROCYTE [DISTWIDTH] IN BLOOD BY AUTOMATED COUNT: 13.1 % (ref 11.6–15.1)
GFR SERPL CREATININE-BSD FRML MDRD: 99 ML/MIN/1.73SQ M
GLUCOSE P FAST SERPL-MCNC: 113 MG/DL (ref 65–99)
HCT VFR BLD AUTO: 42.8 % (ref 34.8–46.1)
HGB BLD-MCNC: 13.8 G/DL (ref 11.5–15.4)
IMM GRANULOCYTES # BLD AUTO: 0.02 THOUSAND/UL (ref 0–0.2)
IMM GRANULOCYTES NFR BLD AUTO: 0 % (ref 0–2)
LYMPHOCYTES # BLD AUTO: 0.88 THOUSANDS/ΜL (ref 0.6–4.47)
LYMPHOCYTES NFR BLD AUTO: 10 % (ref 14–44)
MCH RBC QN AUTO: 30.7 PG (ref 26.8–34.3)
MCHC RBC AUTO-ENTMCNC: 32.2 G/DL (ref 31.4–37.4)
MCV RBC AUTO: 95 FL (ref 82–98)
MONOCYTES # BLD AUTO: 0.63 THOUSAND/ΜL (ref 0.17–1.22)
MONOCYTES NFR BLD AUTO: 7 % (ref 4–12)
NEUTROPHILS # BLD AUTO: 7.13 THOUSANDS/ΜL (ref 1.85–7.62)
NEUTS SEG NFR BLD AUTO: 79 % (ref 43–75)
NRBC BLD AUTO-RTO: 0 /100 WBCS
PLATELET # BLD AUTO: 202 THOUSANDS/UL (ref 149–390)
PMV BLD AUTO: 10.8 FL (ref 8.9–12.7)
POTASSIUM SERPL-SCNC: 3.6 MMOL/L (ref 3.5–5.3)
PROT SERPL-MCNC: 6.8 G/DL (ref 6.4–8.2)
RBC # BLD AUTO: 4.5 MILLION/UL (ref 3.81–5.12)
SODIUM SERPL-SCNC: 137 MMOL/L (ref 136–145)
T4 FREE SERPL-MCNC: 0.84 NG/DL (ref 0.76–1.46)
TSH SERPL DL<=0.05 MIU/L-ACNC: 5.7 UIU/ML (ref 0.36–3.74)
VIT B12 SERPL-MCNC: 640 PG/ML (ref 100–900)
WBC # BLD AUTO: 8.96 THOUSAND/UL (ref 4.31–10.16)

## 2020-05-18 PROCEDURE — 85025 COMPLETE CBC W/AUTO DIFF WBC: CPT

## 2020-05-18 PROCEDURE — 36415 COLL VENOUS BLD VENIPUNCTURE: CPT

## 2020-05-18 PROCEDURE — 84439 ASSAY OF FREE THYROXINE: CPT

## 2020-05-18 PROCEDURE — 84443 ASSAY THYROID STIM HORMONE: CPT

## 2020-05-18 PROCEDURE — 82306 VITAMIN D 25 HYDROXY: CPT

## 2020-05-18 PROCEDURE — 82607 VITAMIN B-12: CPT

## 2020-05-18 PROCEDURE — 80053 COMPREHEN METABOLIC PANEL: CPT

## 2020-05-19 LAB — SARS-COV-2 RNA SPEC QL NAA+PROBE: NOT DETECTED

## 2020-05-20 DIAGNOSIS — E55.9 VITAMIN D DEFICIENCY: Primary | ICD-10-CM

## 2020-05-20 RX ORDER — ERGOCALCIFEROL 1.25 MG/1
50000 CAPSULE ORAL WEEKLY
Qty: 4 CAPSULE | Refills: 3 | Status: SHIPPED | OUTPATIENT
Start: 2020-05-20 | End: 2021-02-13 | Stop reason: SDUPTHER

## 2020-05-21 ENCOUNTER — TELEPHONE (OUTPATIENT)
Dept: URGENT CARE | Facility: CLINIC | Age: 35
End: 2020-05-21

## 2020-08-07 DIAGNOSIS — K13.0 CHEILITIS: Primary | ICD-10-CM

## 2020-08-07 RX ORDER — FLUCONAZOLE 150 MG/1
TABLET ORAL
Qty: 4 TABLET | Refills: 0 | Status: SHIPPED | OUTPATIENT
Start: 2020-08-07 | End: 2020-08-10

## 2020-08-09 ENCOUNTER — PATIENT MESSAGE (OUTPATIENT)
Dept: INTERNAL MEDICINE CLINIC | Facility: CLINIC | Age: 35
End: 2020-08-09

## 2020-08-26 DIAGNOSIS — F41.9 ANXIETY AND DEPRESSION: ICD-10-CM

## 2020-08-26 DIAGNOSIS — F32.A ANXIETY AND DEPRESSION: ICD-10-CM

## 2020-08-26 RX ORDER — BUSPIRONE HYDROCHLORIDE 7.5 MG/1
TABLET ORAL
Qty: 60 TABLET | Refills: 5 | Status: SHIPPED | OUTPATIENT
Start: 2020-08-26 | End: 2020-12-14

## 2020-10-19 ENCOUNTER — APPOINTMENT (EMERGENCY)
Dept: RADIOLOGY | Facility: HOSPITAL | Age: 35
End: 2020-10-19
Payer: COMMERCIAL

## 2020-10-19 ENCOUNTER — HOSPITAL ENCOUNTER (EMERGENCY)
Facility: HOSPITAL | Age: 35
Discharge: HOME | End: 2020-10-19
Attending: EMERGENCY MEDICINE | Admitting: EMERGENCY MEDICINE
Payer: COMMERCIAL

## 2020-10-19 VITALS
HEART RATE: 94 BPM | WEIGHT: 193.12 LBS | HEIGHT: 63 IN | SYSTOLIC BLOOD PRESSURE: 150 MMHG | RESPIRATION RATE: 20 BRPM | BODY MASS INDEX: 34.22 KG/M2 | OXYGEN SATURATION: 95 % | TEMPERATURE: 98.3 F | DIASTOLIC BLOOD PRESSURE: 90 MMHG

## 2020-10-19 DIAGNOSIS — J40 BRONCHITIS: Primary | ICD-10-CM

## 2020-10-19 DIAGNOSIS — J45.901 ASTHMA EXACERBATION: ICD-10-CM

## 2020-10-19 LAB
ALBUMIN SERPL BCP-MCNC: 3.7 G/DL (ref 3.5–5)
ALP SERPL-CCNC: 76 U/L (ref 46–116)
ALT SERPL W P-5'-P-CCNC: 16 U/L (ref 12–78)
ANION GAP SERPL CALCULATED.3IONS-SCNC: 6 MMOL/L (ref 4–13)
APTT PPP: 27 SECONDS (ref 23–37)
AST SERPL W P-5'-P-CCNC: 13 U/L (ref 5–45)
BASOPHILS # BLD AUTO: 0.07 THOUSANDS/ΜL (ref 0–0.1)
BASOPHILS NFR BLD AUTO: 1 % (ref 0–1)
BILIRUB SERPL-MCNC: 0.5 MG/DL (ref 0.2–1)
BUN SERPL-MCNC: 9 MG/DL (ref 5–25)
CALCIUM SERPL-MCNC: 9 MG/DL (ref 8.3–10.1)
CHLORIDE SERPL-SCNC: 104 MMOL/L (ref 100–108)
CO2 SERPL-SCNC: 28 MMOL/L (ref 21–32)
CREAT SERPL-MCNC: 0.69 MG/DL (ref 0.6–1.3)
D DIMER PPP FEU-MCNC: 0.29 UG/ML FEU
EOSINOPHIL # BLD AUTO: 0.82 THOUSAND/ΜL (ref 0–0.61)
EOSINOPHIL NFR BLD AUTO: 11 % (ref 0–6)
ERYTHROCYTE [DISTWIDTH] IN BLOOD BY AUTOMATED COUNT: 13.1 % (ref 11.6–15.1)
EXT PREG TEST URINE: NEGATIVE
EXT. CONTROL ED NAV: NORMAL
GFR SERPL CREATININE-BSD FRML MDRD: 112 ML/MIN/1.73SQ M
GLUCOSE SERPL-MCNC: 101 MG/DL (ref 65–140)
HCT VFR BLD AUTO: 42.7 % (ref 34.8–46.1)
HGB BLD-MCNC: 14.2 G/DL (ref 11.5–15.4)
IMM GRANULOCYTES # BLD AUTO: 0.04 THOUSAND/UL (ref 0–0.2)
IMM GRANULOCYTES NFR BLD AUTO: 1 % (ref 0–2)
INR PPP: 1.06 (ref 0.84–1.19)
LYMPHOCYTES # BLD AUTO: 1.07 THOUSANDS/ΜL (ref 0.6–4.47)
LYMPHOCYTES NFR BLD AUTO: 14 % (ref 14–44)
MAGNESIUM SERPL-MCNC: 1.9 MG/DL (ref 1.6–2.6)
MCH RBC QN AUTO: 31.1 PG (ref 26.8–34.3)
MCHC RBC AUTO-ENTMCNC: 33.3 G/DL (ref 31.4–37.4)
MCV RBC AUTO: 94 FL (ref 82–98)
MONOCYTES # BLD AUTO: 0.55 THOUSAND/ΜL (ref 0.17–1.22)
MONOCYTES NFR BLD AUTO: 7 % (ref 4–12)
NEUTROPHILS # BLD AUTO: 5.24 THOUSANDS/ΜL (ref 1.85–7.62)
NEUTS SEG NFR BLD AUTO: 66 % (ref 43–75)
NRBC BLD AUTO-RTO: 0 /100 WBCS
NT-PROBNP SERPL-MCNC: 46 PG/ML
PLATELET # BLD AUTO: 255 THOUSANDS/UL (ref 149–390)
PMV BLD AUTO: 10 FL (ref 8.9–12.7)
POTASSIUM SERPL-SCNC: 4.1 MMOL/L (ref 3.5–5.3)
PROT SERPL-MCNC: 7.4 G/DL (ref 6.4–8.2)
PROTHROMBIN TIME: 13.6 SECONDS (ref 11.6–14.5)
RBC # BLD AUTO: 4.56 MILLION/UL (ref 3.81–5.12)
SARS-COV-2 RNA RESP QL NAA+PROBE: NEGATIVE
SODIUM SERPL-SCNC: 138 MMOL/L (ref 136–145)
TROPONIN I SERPL-MCNC: <0.02 NG/ML
WBC # BLD AUTO: 7.79 THOUSAND/UL (ref 4.31–10.16)

## 2020-10-19 PROCEDURE — 84484 ASSAY OF TROPONIN QUANT: CPT | Performed by: PHYSICIAN ASSISTANT

## 2020-10-19 PROCEDURE — 85025 COMPLETE CBC W/AUTO DIFF WBC: CPT | Performed by: PHYSICIAN ASSISTANT

## 2020-10-19 PROCEDURE — 93005 ELECTROCARDIOGRAM TRACING: CPT

## 2020-10-19 PROCEDURE — 85379 FIBRIN DEGRADATION QUANT: CPT | Performed by: PHYSICIAN ASSISTANT

## 2020-10-19 PROCEDURE — 85610 PROTHROMBIN TIME: CPT | Performed by: PHYSICIAN ASSISTANT

## 2020-10-19 PROCEDURE — 83880 ASSAY OF NATRIURETIC PEPTIDE: CPT | Performed by: PHYSICIAN ASSISTANT

## 2020-10-19 PROCEDURE — 81025 URINE PREGNANCY TEST: CPT | Performed by: PHYSICIAN ASSISTANT

## 2020-10-19 PROCEDURE — 99284 EMERGENCY DEPT VISIT MOD MDM: CPT | Performed by: PHYSICIAN ASSISTANT

## 2020-10-19 PROCEDURE — 99285 EMERGENCY DEPT VISIT HI MDM: CPT

## 2020-10-19 PROCEDURE — 96375 TX/PRO/DX INJ NEW DRUG ADDON: CPT

## 2020-10-19 PROCEDURE — 83735 ASSAY OF MAGNESIUM: CPT | Performed by: PHYSICIAN ASSISTANT

## 2020-10-19 PROCEDURE — 87635 SARS-COV-2 COVID-19 AMP PRB: CPT | Performed by: PHYSICIAN ASSISTANT

## 2020-10-19 PROCEDURE — 71045 X-RAY EXAM CHEST 1 VIEW: CPT

## 2020-10-19 PROCEDURE — 36415 COLL VENOUS BLD VENIPUNCTURE: CPT | Performed by: PHYSICIAN ASSISTANT

## 2020-10-19 PROCEDURE — 85730 THROMBOPLASTIN TIME PARTIAL: CPT | Performed by: PHYSICIAN ASSISTANT

## 2020-10-19 PROCEDURE — 96365 THER/PROPH/DIAG IV INF INIT: CPT

## 2020-10-19 PROCEDURE — 80053 COMPREHEN METABOLIC PANEL: CPT | Performed by: PHYSICIAN ASSISTANT

## 2020-10-19 RX ORDER — METHYLPREDNISOLONE SODIUM SUCCINATE 125 MG/2ML
80 INJECTION, POWDER, LYOPHILIZED, FOR SOLUTION INTRAMUSCULAR; INTRAVENOUS ONCE
Status: COMPLETED | OUTPATIENT
Start: 2020-10-19 | End: 2020-10-19

## 2020-10-19 RX ORDER — AZITHROMYCIN 250 MG/1
TABLET, FILM COATED ORAL
Qty: 6 TABLET | Refills: 0 | Status: SHIPPED | OUTPATIENT
Start: 2020-10-19 | End: 2020-10-23

## 2020-10-19 RX ORDER — MAGNESIUM SULFATE HEPTAHYDRATE 40 MG/ML
2 INJECTION, SOLUTION INTRAVENOUS ONCE
Status: COMPLETED | OUTPATIENT
Start: 2020-10-19 | End: 2020-10-19

## 2020-10-19 RX ORDER — PREDNISONE 10 MG/1
TABLET ORAL
Qty: 21 TABLET | Refills: 0 | Status: SHIPPED | OUTPATIENT
Start: 2020-10-20 | End: 2020-10-29

## 2020-10-19 RX ORDER — ALBUTEROL SULFATE 90 UG/1
2 AEROSOL, METERED RESPIRATORY (INHALATION) EVERY 4 HOURS PRN
Qty: 1 INHALER | Refills: 0 | Status: SHIPPED | OUTPATIENT
Start: 2020-10-19 | End: 2020-12-14

## 2020-10-19 RX ORDER — BENZONATATE 100 MG/1
100 CAPSULE ORAL 3 TIMES DAILY PRN
Qty: 15 CAPSULE | Refills: 0 | Status: SHIPPED | OUTPATIENT
Start: 2020-10-19 | End: 2020-12-14

## 2020-10-19 RX ADMIN — METHYLPREDNISOLONE SODIUM SUCCINATE 80 MG: 125 INJECTION, POWDER, FOR SOLUTION INTRAMUSCULAR; INTRAVENOUS at 12:45

## 2020-10-19 RX ADMIN — MAGNESIUM SULFATE IN WATER 2 G: 40 INJECTION, SOLUTION INTRAVENOUS at 12:45

## 2020-10-24 LAB
ATRIAL RATE: 81 BPM
P AXIS: 34 DEGREES
PR INTERVAL: 158 MS
QRS AXIS: 44 DEGREES
QRSD INTERVAL: 74 MS
QT INTERVAL: 392 MS
QTC INTERVAL: 455 MS
T WAVE AXIS: 25 DEGREES
VENTRICULAR RATE: 81 BPM

## 2020-10-24 PROCEDURE — 93010 ELECTROCARDIOGRAM REPORT: CPT | Performed by: INTERNAL MEDICINE

## 2020-12-04 ENCOUNTER — APPOINTMENT (EMERGENCY)
Dept: NON INVASIVE DIAGNOSTICS | Facility: HOSPITAL | Age: 35
End: 2020-12-04
Payer: COMMERCIAL

## 2020-12-04 ENCOUNTER — HOSPITAL ENCOUNTER (EMERGENCY)
Facility: HOSPITAL | Age: 35
Discharge: HOME | End: 2020-12-04
Attending: EMERGENCY MEDICINE
Payer: COMMERCIAL

## 2020-12-04 ENCOUNTER — APPOINTMENT (EMERGENCY)
Dept: RADIOLOGY | Facility: HOSPITAL | Age: 35
End: 2020-12-04
Payer: COMMERCIAL

## 2020-12-04 VITALS
BODY MASS INDEX: 20.38 KG/M2 | HEIGHT: 63 IN | OXYGEN SATURATION: 100 % | WEIGHT: 115 LBS | RESPIRATION RATE: 16 BRPM | TEMPERATURE: 99 F | DIASTOLIC BLOOD PRESSURE: 88 MMHG | SYSTOLIC BLOOD PRESSURE: 158 MMHG | HEART RATE: 97 BPM

## 2020-12-04 DIAGNOSIS — M79.661 RIGHT CALF PAIN: ICD-10-CM

## 2020-12-04 DIAGNOSIS — M79.604 RIGHT LEG PAIN: Primary | ICD-10-CM

## 2020-12-04 LAB
EXT PREG TEST URINE: NEGATIVE
EXT. CONTROL ED NAV: NORMAL

## 2020-12-04 PROCEDURE — 96372 THER/PROPH/DIAG INJ SC/IM: CPT

## 2020-12-04 PROCEDURE — 93971 EXTREMITY STUDY: CPT | Performed by: SURGERY

## 2020-12-04 PROCEDURE — 81025 URINE PREGNANCY TEST: CPT | Performed by: STUDENT IN AN ORGANIZED HEALTH CARE EDUCATION/TRAINING PROGRAM

## 2020-12-04 PROCEDURE — 99284 EMERGENCY DEPT VISIT MOD MDM: CPT

## 2020-12-04 PROCEDURE — 73552 X-RAY EXAM OF FEMUR 2/>: CPT

## 2020-12-04 PROCEDURE — 93971 EXTREMITY STUDY: CPT

## 2020-12-04 PROCEDURE — 73502 X-RAY EXAM HIP UNI 2-3 VIEWS: CPT

## 2020-12-04 PROCEDURE — 99284 EMERGENCY DEPT VISIT MOD MDM: CPT | Performed by: EMERGENCY MEDICINE

## 2020-12-04 RX ORDER — CITALOPRAM 20 MG/1
20 TABLET ORAL DAILY
COMMUNITY
End: 2020-12-14

## 2020-12-04 RX ORDER — KETOROLAC TROMETHAMINE 30 MG/ML
15 INJECTION, SOLUTION INTRAMUSCULAR; INTRAVENOUS ONCE
Status: COMPLETED | OUTPATIENT
Start: 2020-12-04 | End: 2020-12-04

## 2020-12-04 RX ORDER — CYCLOBENZAPRINE HCL 10 MG
5 TABLET ORAL 3 TIMES DAILY
Qty: 20 TABLET | Refills: 0 | Status: SHIPPED | OUTPATIENT
Start: 2020-12-04 | End: 2021-02-10 | Stop reason: DRUGHIGH

## 2020-12-04 RX ORDER — NAPROXEN 500 MG/1
500 TABLET ORAL 2 TIMES DAILY WITH MEALS
Qty: 30 TABLET | Refills: 0 | Status: SHIPPED | OUTPATIENT
Start: 2020-12-04 | End: 2021-02-22 | Stop reason: ALTCHOICE

## 2020-12-04 RX ADMIN — KETOROLAC TROMETHAMINE 15 MG: 30 INJECTION, SOLUTION INTRAMUSCULAR at 09:26

## 2020-12-14 ENCOUNTER — TELEMEDICINE (OUTPATIENT)
Dept: INTERNAL MEDICINE CLINIC | Facility: CLINIC | Age: 35
End: 2020-12-14
Payer: COMMERCIAL

## 2020-12-14 DIAGNOSIS — M54.41 ACUTE RIGHT-SIDED LOW BACK PAIN WITH RIGHT-SIDED SCIATICA: Primary | ICD-10-CM

## 2020-12-14 PROBLEM — M25.561 ACUTE PAIN OF RIGHT KNEE: Status: RESOLVED | Noted: 2019-12-26 | Resolved: 2020-12-14

## 2020-12-14 PROCEDURE — G2012 BRIEF CHECK IN BY MD/QHP: HCPCS | Performed by: NURSE PRACTITIONER

## 2020-12-14 RX ORDER — ARIPIPRAZOLE 10 MG/1
10 TABLET ORAL EVERY MORNING
COMMUNITY
End: 2021-11-12 | Stop reason: ALTCHOICE

## 2020-12-14 RX ORDER — PREDNISONE 10 MG/1
TABLET ORAL
Qty: 18 TABLET | Refills: 0 | Status: SHIPPED | OUTPATIENT
Start: 2020-12-14 | End: 2021-02-10 | Stop reason: SDUPTHER

## 2020-12-14 RX ORDER — SERTRALINE HYDROCHLORIDE 100 MG/1
200 TABLET, FILM COATED ORAL
COMMUNITY
End: 2021-11-12 | Stop reason: ALTCHOICE

## 2020-12-14 RX ORDER — CLONAZEPAM 0.5 MG/1
0.5 TABLET ORAL 2 TIMES DAILY
COMMUNITY
End: 2021-03-08

## 2020-12-20 ENCOUNTER — NURSE TRIAGE (OUTPATIENT)
Dept: OTHER | Facility: OTHER | Age: 35
End: 2020-12-20

## 2021-02-10 ENCOUNTER — OFFICE VISIT (OUTPATIENT)
Dept: INTERNAL MEDICINE CLINIC | Facility: CLINIC | Age: 36
End: 2021-02-10
Payer: COMMERCIAL

## 2021-02-10 VITALS
DIASTOLIC BLOOD PRESSURE: 82 MMHG | HEART RATE: 98 BPM | TEMPERATURE: 98.6 F | HEIGHT: 63 IN | SYSTOLIC BLOOD PRESSURE: 134 MMHG | BODY MASS INDEX: 35.79 KG/M2 | OXYGEN SATURATION: 99 % | WEIGHT: 202 LBS

## 2021-02-10 DIAGNOSIS — M54.41 ACUTE RIGHT-SIDED LOW BACK PAIN WITH RIGHT-SIDED SCIATICA: Primary | ICD-10-CM

## 2021-02-10 PROCEDURE — 99213 OFFICE O/P EST LOW 20 MIN: CPT | Performed by: NURSE PRACTITIONER

## 2021-02-10 PROCEDURE — 96372 THER/PROPH/DIAG INJ SC/IM: CPT | Performed by: NURSE PRACTITIONER

## 2021-02-10 RX ORDER — TIZANIDINE HYDROCHLORIDE 2 MG/1
CAPSULE, GELATIN COATED ORAL
Qty: 14 CAPSULE | Refills: 0 | Status: SHIPPED | OUTPATIENT
Start: 2021-02-10 | End: 2021-03-08

## 2021-02-10 RX ORDER — PREDNISONE 10 MG/1
TABLET ORAL
Qty: 18 TABLET | Refills: 0 | Status: SHIPPED | OUTPATIENT
Start: 2021-02-10 | End: 2021-03-08

## 2021-02-10 RX ORDER — DEXAMETHASONE SODIUM PHOSPHATE 4 MG/ML
4 INJECTION, SOLUTION INTRA-ARTICULAR; INTRALESIONAL; INTRAMUSCULAR; INTRAVENOUS; SOFT TISSUE ONCE
Status: COMPLETED | OUTPATIENT
Start: 2021-02-10 | End: 2021-02-10

## 2021-02-10 RX ADMIN — DEXAMETHASONE SODIUM PHOSPHATE 4 MG: 4 INJECTION, SOLUTION INTRA-ARTICULAR; INTRALESIONAL; INTRAMUSCULAR; INTRAVENOUS; SOFT TISSUE at 14:09

## 2021-02-10 NOTE — PATIENT INSTRUCTIONS
Low Fat Diet   AMBULATORY CARE:   A low-fat diet  is an eating plan that is low in total fat, unhealthy fat, and cholesterol  You may need to follow a low-fat diet if you have trouble digesting or absorbing fat  You may also need to follow this diet if you have high cholesterol  You can also lower your cholesterol by increasing the amount of fiber in your diet  Soluble fiber is a type of fiber that helps to decrease cholesterol levels  Different types of fat in food:   · Limit unhealthy fats  A diet that is high in cholesterol, saturated fat, and trans fat may cause unhealthy cholesterol levels  Unhealthy cholesterol levels increase your risk of heart disease  ? Cholesterol:  Limit intake of cholesterol to less than 200 mg per day  Cholesterol is found in meat, eggs, and dairy  ? Saturated fat:  Limit saturated fat to less than 7% of your total daily calories  Ask your dietitian how many calories you need each day  Saturated fat is found in butter, cheese, ice cream, whole milk, and palm oil  Saturated fat is also found in meat, such as beef, pork, chicken skin, and processed meats  Processed meats include sausage, hot dogs, and bologna  ? Trans fat:  Avoid trans fat as much as possible  Trans fat is used in fried and baked foods  Foods that say trans fat free on the label may still have up to 0 5 grams of trans fat per serving  · Include healthy fats  Replace foods that are high in saturated and trans fat with foods high in healthy fats  This may help to decrease high cholesterol levels  ? Monounsaturated fats: These are found in avocados, nuts, and vegetable oils, such as olive, canola, and sunflower oil  ? Polyunsaturated fats: These can be found in vegetable oils, such as soybean or corn oil  Omega-3 fats can help to decrease the risk of heart disease  Omega-3 fats are found in fish, such as salmon, herring, trout, and tuna   Omega-3 fats can also be found in plant foods, such as walnuts, flaxseed, soybeans, and canola oil  Foods to limit or avoid:   · Grains:      ? Snacks that are made with partially hydrogenated oils, such as chips, regular crackers, and butter-flavored popcorn    ? High-fat baked goods, such as biscuits, croissants, doughnuts, pies, cookies, and pastries    · Dairy:      ? Whole milk, 2% milk, and yogurt and ice cream made with whole milk    ? Half and half creamer, heavy cream, and whipping cream    ? Cheese, cream cheese, and sour cream    · Meats and proteins:      ? High-fat cuts of meat (T-bone steak, regular hamburger, and ribs)    ? Fried meat, poultry (turkey and chicken), and fish    ? Poultry (chicken and turkey) with skin    ? Cold cuts (salami or bologna), hot dogs, victor, and sausage    ? Whole eggs and egg yolks    · Vegetables and fruits with added fat:      ? Fried vegetables or vegetables in butter or high-fat sauces, such as cream or cheese sauces    ? Fried fruit or fruit served with butter or cream    · Fats:      ? Butter, stick margarine, and shortening    ? Coconut, palm oil, and palm kernel oil    Foods to include:   · Grains:      ? Whole-grain breads, cereals, pasta, and brown rice    ? Low-fat crackers and pretzels    · Vegetables and fruits:      ? Fresh, frozen, or canned vegetables (no salt or low-sodium)    ? Fresh, frozen, dried, or canned fruit (canned in light syrup or fruit juice)    ? Avocado    · Low-fat dairy products:      ? Nonfat (skim) or 1% milk    ? Nonfat or low-fat cheese, yogurt, and cottage cheese    · Meats and proteins:      ? Chicken or turkey with no skin    ? Baked or broiled fish    ? Lean beef and pork (loin, round, extra lean hamburger)    ? Beans and peas, unsalted nuts, soy products    ? Egg whites and substitutes    ? Seeds and nuts    · Fats:      ? Unsaturated oil, such as canola, olive, peanut, soybean, or sunflower oil    ? Soft or liquid margarine and vegetable oil spread    ?  Low-fat salad dressing    Other ways to decrease fat:   · Read food labels before you buy foods  Choose foods that have less than 30% of calories from fat  Choose low-fat or fat-free dairy products  Remember that fat free does not mean calorie free  These foods still contain calories, and too many calories can lead to weight gain  · Trim fat from meat and avoid fried food  Trim all visible fat from meat before you cook it  Remove the skin from poultry  Do not west[place] meat, fish, or poultry  Bake, roast, boil, or broil these foods instead  Avoid fried foods  Eat a baked potato instead of [place] fries  Steam vegetables instead of sautéing them in butter  · Add less fat to foods  Use imitation victor bits on salads and baked potatoes instead of regular victor bits  Use fat-free or low-fat salad dressings instead of regular dressings  Use low-fat or nonfat butter-flavored topping instead of regular butter or margarine on popcorn and other foods  Ways to decrease fat in recipes:  Replace high-fat ingredients with low-fat or nonfat ones  This may cause baked goods to be drier than usual  You may need to use nonfat cooking spray on pans to prevent food from sticking  You also may need to change the amount of other ingredients, such as water, in the recipe  Try the following:  · Use low-fat or light margarine instead of regular margarine or shortening  · Use lean ground turkey breast or chicken, or lean ground beef (less than 5% fat) instead of hamburger  · Add 1 teaspoon of canola oil to 8 ounces of skim milk instead of using cream or half and half  · Use grated zucchini, carrots, or apples in breads instead of coconut  · Use blenderized, low-fat cottage cheese, plain tofu, or low-fat ricotta cheese instead of cream cheese  · Use 1 egg white and 1 teaspoon of canola oil, or use ¼ cup (2 ounces) of fat-free egg substitute instead of a whole egg       · Replace half of the oil that is called for in a recipe with applesauce when you bake  Use 3 tablespoons of cocoa powder and 1 tablespoon of canola oil instead of a square of baking chocolate  How to increase fiber:  Eat enough high-fiber foods to get 20 to 30 grams of fiber every day  Slowly increase your fiber intake to avoid stomach cramps, gas, and other problems  · Eat 3 ounces of whole-grain foods each day  An ounce is about 1 slice of bread  Eat whole-grain breads, such as whole-wheat bread  Whole wheat, whole-wheat flour, or other whole grains should be listed as the first ingredient on the food label  Replace white flour with whole-grain flour or use half of each in recipes  Whole-grain flour is heavier than white flour, so you may have to add more yeast or baking powder  · Eat a high-fiber cereal for breakfast   Oatmeal is a good source of soluble fiber  Look for cereals that have bran or fiber in the name  Choose whole-grain products, such as brown rice, barley, and whole-wheat pasta  · Eat more beans, peas, and lentils  For example, add beans to soups or salads  Eat at least 5 cups of fruits and vegetables each day  Eat fruits and vegetables with the peel because the peel is high in fiber  © Copyright [address] Information is for End User's use only and may not be sold, redistributed or otherwise used for commercial purposes  All illustrations and images included in CareNotes® are the copyrighted property of A D A M , [employer]  or [address] [place] [address]  The above information is an  only  It is not intended as medical advice for individual conditions or treatments  Talk to your doctor, nurse or pharmacist before following any medical regimen to see if it is safe and effective for you  Heart Healthy Diet   AMBULATORY CARE:   A heart healthy diet  is an eating plan low in unhealthy fats and sodium (salt)  The plan is high in healthy fats and fiber   A heart healthy diet helps improve your cholesterol levels and lowers your risk for heart disease and stroke  A dietitian will teach you how to read and understand food labels  Heart healthy diet guidelines to follow:   · Choose foods that contain healthy fats  ? Unsaturated fats  include monounsaturated and polyunsaturated fats  Unsaturated fat is found in foods such as soybean, canola, olive, corn, and safflower oils  It is also found in soft tub margarine that is made with liquid vegetable oil  ? Omega-3 fat  is found in certain fish, such as salmon, tuna, and trout, and in walnuts and flaxseed  Eat fish high in omega-3 fats at least 2 times a week  · Get 20 to 30 grams of fiber each day  Fruits, vegetables, whole-grain foods, and legumes (cooked beans) are good sources of fiber  · Limit or do not have unhealthy fats  ? Cholesterol  is found in animal foods, such as eggs and lobster, and in dairy products made from whole milk  Limit cholesterol to less than 200 mg each day  ? Saturated fat  is found in meats, such as victor and hamburger  It is also found in chicken or turkey skin, whole milk, and butter  Limit saturated fat to less than 7% of your total daily calories  ? Trans fat  is found in packaged foods, such as potato chips and cookies  It is also in hard margarine, some fried foods, and shortening  Do not eat foods that contain trans fats  · Limit sodium as directed  You may be told to limit sodium to 2,000 to 2,300 mg each day  Choose low-sodium or no-salt-added foods  Add little or no salt to food you prepare  Use herbs and spices in place of salt  Include the following in your heart healthy plan:  Ask your dietitian or healthcare provider how many servings to have from each of the following food groups:  · Grains:      ? Whole-wheat breads, cereals, and pastas, and brown rice    ? Low-fat, low-sodium crackers and chips    · Vegetables:      ? Broccoli, green beans, green peas, and spinach    ? Collards, kale, and lima beans    ?  Carrots, sweet potatoes, tomatoes, and peppers    ? Canned vegetables with no salt added    · Fruits:      ? Bananas, peaches, pears, and pineapple    ? Grapes, raisins, and dates    ? Oranges, tangerines, grapefruit, orange juice, and grapefruit juice    ? Apricots, mangoes, melons, and papaya    ? Raspberries and strawberries    ? Canned fruit with no added sugar    · Low-fat dairy:      ? Nonfat (skim) milk, 1% milk, and low-fat almond, cashew, or soy milks fortified with calcium    ? Low-fat cheese, regular or frozen yogurt, and cottage cheese    · Meats and proteins:      ? Lean cuts of beef and pork (loin, leg, round), skinless chicken and turkey    ? Legumes, soy products, egg whites, or nuts    Limit or do not include the following in your heart healthy plan:   · Unhealthy fats and oils:      ? Whole or 2% milk, cream cheese, sour cream, or cheese    ? High-fat cuts of beef (T-bone steaks, ribs), chicken or turkey with skin, and organ meats such as liver    ? Butter, stick margarine, shortening, and cooking oils such as coconut or palm oil    · Foods and liquids high in sodium:      ? Packaged foods, such as frozen dinners, cookies, macaroni and cheese, and cereals with more than 300 mg of sodium per serving    ? Vegetables with added sodium, such as instant potatoes, vegetables with added sauces, or regular canned vegetables    ? Cured or smoked meats, such as hot dogs, victor, and sausage    ? High-sodium ketchup, barbecue sauce, salad dressing, pickles, olives, soy sauce, or miso    · Foods and liquids high in sugar:      ? Candy, cake, cookies, pies, or doughnuts    ? Soft drinks (soda), sports drinks, or sweetened tea    ? Canned or dry mixes for cakes, soups, sauces, or gravies    Other healthy heart guidelines:   · Do not smoke  Nicotine and other chemicals in cigarettes and cigars can cause lung and heart damage  Ask your healthcare provider for information if you currently smoke and need help to quit  E-cigarettes or smokeless tobacco still contain nicotine  Talk to your healthcare provider before you use these products  · Limit or do not drink alcohol as directed  Alcohol can damage your heart and raise your blood pressure  Your healthcare provider may give you specific daily and weekly limits  The general recommended limit is 1 drink a day for women 21 or older and for men 72 or older  Do not have more than 3 drinks in a day or 7 in a week  The recommended limit is 2 drinks a day for men 24to 59years of age  Do not have more than 4 drinks in a day or 14 in a week  A drink of alcohol is 12 ounces of beer, 5 ounces of wine, or 1½ ounces of liquor  · Exercise regularly  Exercise can help you maintain a healthy weight and improve your blood pressure and cholesterol levels  Regular exercise can also decrease your risk for heart problems  Ask your healthcare provider about the best exercise plan for you  Do not start an exercise program without asking your healthcare provider  Follow up with your doctor or cardiologist as directed:  Write down your questions so you remember to ask them during your visits  © Copyright [address] Information is for End User's use only and may not be sold, redistributed or otherwise used for commercial purposes  All illustrations and images included in CareNotes® are the copyrighted property of [employer] A M , Inc  or [address] [place] [address]  The above information is an  only  It is not intended as medical advice for individual conditions or treatments  Talk to your doctor, nurse or pharmacist before following any medical regimen to see if it is safe and effective for you

## 2021-02-10 NOTE — PROGRESS NOTES
Assessment/Plan: Will give Decadron 4 mg IM today  Will start on Prednisone tapered dose and Zanaflex as needed  She was advised if pain continues will send for imaging or PT  She will call office  No problem-specific Assessment & Plan notes found for this encounter  Problem List Items Addressed This Visit        Other    Acute right-sided low back pain with right-sided sciatica - Primary    Relevant Medications    dexamethasone (DECADRON) injection 4 mg (Completed)    TiZANidine (ZANAFLEX) 2 MG capsule    predniSONE 10 mg tablet    BMI 35 0-35 9,adult            Subjective:      Patient ID: Anabella Thorne is a 38 y o  female  Luann Police is for an acute visit  She state for the past several days she has been having extreme pain in her right buttock radiating down her thigh  She states she is in tears and is not sure what else to do  She is taking Naprosyn and did take one of her husbands gabapentin which did not help  She states she is applying heat and ice which is not helping  She can not take off of work as well due to getting points  She offers no other issues  The following portions of the patient's history were reviewed and updated as appropriate: She  has a past medical history of Asthma, Cat-scratch disease, Depression, Hypertension, and Sleep disorder    She   Patient Active Problem List    Diagnosis Date Noted    BMI 35 0-35 9,adult 02/10/2021    Acute right-sided low back pain with right-sided sciatica 12/14/2020    Anxiety and depression 02/28/2020    Neck pain 06/18/2019    Lumbar back pain 06/18/2019    Asthma 02/18/2019    Granulomatous disease ([address] ) 02/13/2019    Lung nodule < 6cm on CT 02/13/2019    Eosinophilia 02/13/2019    Wound dehiscence 02/09/2019    Cat-scratch disease 02/07/2019    Lymphedema 02/05/2019    Abnormal EKG 01/21/2019    Essential hypertension 01/21/2019    Mass of left axilla 12/26/2018    Allergic rhinitis 11/30/2018    Numbness and tingling of both upper extremities while sleeping 05/01/2018    Urine ketones 03/15/2018    Depression 09/12/2016    Intention tremor 09/12/2016     She  has a past surgical history that includes Tubal ligation; Breast biopsy (Left); pr bx/remv,lymph node,deep axill (Left, 1/15/2019); and Dental surgery  Her family history includes Anxiety disorder in her father and mother; Bipolar disorder in her father and mother; Depression in her father and mother; Parkinsonism in her mother; Schizophrenia in her father and mother; Stomach cancer in her paternal grandmother  She  reports that she has never smoked  She has never used smokeless tobacco  She reports that she does not drink alcohol or use drugs  Current Outpatient Medications   Medication Sig Dispense Refill    ARIPiprazole (ABILIFY) 10 mg tablet Take 10 mg by mouth daily      clonazePAM (KlonoPIN) 0 5 mg tablet Take 0 5 mg by mouth 2 (two) times a day      sertraline (ZOLOFT) 100 mg tablet Take 200 mg by mouth daily at bedtime      ergocalciferol (VITAMIN D2) 50,000 units Take 1 capsule (50,000 Units total) by mouth once a week (Patient not taking: Reported on 12/14/2020) 4 capsule 3    naproxen (NAPROSYN) 500 mg tablet Take 1 tablet (500 mg total) by mouth 2 (two) times a day with meals (Patient not taking: Reported on 12/14/2020) 30 tablet 0    predniSONE 10 mg tablet 30 mg by mouth daily for 3 days, then 20 mg by mouth daily for 3 days, then 10 mg by mouth daily for 3 days, then stop 18 tablet 0    TiZANidine (ZANAFLEX) 2 MG capsule Take one capsule by mouth every 12 hours as needed 14 capsule 0     No current facility-administered medications for this visit        Current Outpatient Medications on File Prior to Visit   Medication Sig    ARIPiprazole (ABILIFY) 10 mg tablet Take 10 mg by mouth daily    clonazePAM (KlonoPIN) 0 5 mg tablet Take 0 5 mg by mouth 2 (two) times a day    sertraline (ZOLOFT) 100 mg tablet Take 200 mg by mouth daily at bedtime    ergocalciferol (VITAMIN D2) 50,000 units Take 1 capsule (50,000 Units total) by mouth once a week (Patient not taking: Reported on 12/14/2020)    naproxen (NAPROSYN) 500 mg tablet Take 1 tablet (500 mg total) by mouth 2 (two) times a day with meals (Patient not taking: Reported on 12/14/2020)    [DISCONTINUED] cyclobenzaprine (FLEXERIL) 10 mg tablet Take 0 5 tablets (5 mg total) by mouth 3 (three) times a day (Patient not taking: Reported on 12/14/2020)    [DISCONTINUED] predniSONE 10 mg tablet 30 mg by mouth daily for 3 days, then 20 mg by mouth daily for 3 days, then 10 mg by mouth daily for 3 days, then stop (Patient not taking: Reported on 2/10/2021)     No current facility-administered medications on file prior to visit  She is allergic to bactrim [sulfamethoxazole-trimethoprim] and codeine       Review of Systems   Constitutional: Negative  HENT: Negative  Eyes: Negative  Respiratory: Negative  Cardiovascular: Negative  Gastrointestinal: Negative  Endocrine: Negative  Genitourinary: Negative  Musculoskeletal: Positive for back pain  Skin: Negative  Allergic/Immunologic: Negative  Neurological: Negative  Hematological: Negative  Psychiatric/Behavioral: Negative  Objective:      /82 (BP Location: Left arm, Patient Position: Sitting, Cuff Size: Large)   Pulse 98   Temp 98 6 °F (37 °C) (Temporal)   Ht 5' 3" (1 6 m)   Wt 91 6 kg (202 lb)   SpO2 99%   BMI 35 78 kg/m²          Physical Exam  Vitals signs reviewed  Constitutional:       Appearance: Normal appearance  She is normal weight  Cardiovascular:      Rate and Rhythm: Normal rate and regular rhythm  Pulses: Normal pulses  Heart sounds: Normal heart sounds  Pulmonary:      Effort: Pulmonary effort is normal       Breath sounds: Normal breath sounds  Musculoskeletal: Normal range of motion  General: Tenderness present  Comments:  To right buttock and back of thigh Skin:     General: Skin is warm and dry  Capillary Refill: Capillary refill takes less than 2 seconds  Neurological:      General: No focal deficit present  Mental Status: She is alert and oriented to person, place, and time  Mental status is at baseline  Psychiatric:         Mood and Affect: Mood normal          Behavior: Behavior normal          Thought Content: Thought content normal          Judgment: Judgment normal          BMI Counseling: Body mass index is 35 78 kg/m²  The BMI is above normal  Nutrition recommendations include reducing portion sizes, decreasing overall calorie intake, 3-5 servings of fruits/vegetables daily, reducing fast food intake, consuming healthier snacks, decreasing soda and/or juice intake, moderation in carbohydrate intake, increasing intake of lean protein, reducing intake of saturated fat and trans fat and reducing intake of cholesterol

## 2021-02-11 DIAGNOSIS — M54.41 ACUTE RIGHT-SIDED LOW BACK PAIN WITH RIGHT-SIDED SCIATICA: Primary | ICD-10-CM

## 2021-02-13 DIAGNOSIS — E55.9 VITAMIN D DEFICIENCY: ICD-10-CM

## 2021-02-15 RX ORDER — ERGOCALCIFEROL 1.25 MG/1
50000 CAPSULE ORAL WEEKLY
Qty: 4 CAPSULE | Refills: 0 | Status: SHIPPED | OUTPATIENT
Start: 2021-02-15 | End: 2021-04-02 | Stop reason: SDUPTHER

## 2021-02-18 ENCOUNTER — TELEPHONE (OUTPATIENT)
Dept: INTERNAL MEDICINE CLINIC | Facility: CLINIC | Age: 36
End: 2021-02-18

## 2021-02-18 ENCOUNTER — EVALUATION (OUTPATIENT)
Dept: PHYSICAL THERAPY | Facility: HOME HEALTHCARE | Age: 36
End: 2021-02-18
Payer: COMMERCIAL

## 2021-02-18 DIAGNOSIS — M54.41 ACUTE RIGHT-SIDED LOW BACK PAIN WITH RIGHT-SIDED SCIATICA: ICD-10-CM

## 2021-02-18 PROCEDURE — 97163 PT EVAL HIGH COMPLEX 45 MIN: CPT | Performed by: PHYSICAL THERAPIST

## 2021-02-18 PROCEDURE — 97110 THERAPEUTIC EXERCISES: CPT | Performed by: PHYSICAL THERAPIST

## 2021-02-18 PROCEDURE — 97530 THERAPEUTIC ACTIVITIES: CPT | Performed by: PHYSICAL THERAPIST

## 2021-02-18 NOTE — TELEPHONE ENCOUNTER
Patient called stating that the muscle relaxer is not helping and the pain is getting worse and worse  Per Maria Isabel Queen, did ask if she started PT yet  She stated her first appointment is today  Encouraged patient to keep the physical therapy appointment as we can see if that helps and advanced imaging usually does require PT  Did discuss with patient that catrachito can call in motrin or naproxen  She stated she wanted something stronger than that for pain  Maria Isabel Queen said she doesn't prescribe anything stronger  I did inform patient of this, and she said "I've tried both of these already and they don't help"  Patient then disconnected the call  While I was still speaking  Maria Isabel Queen was going to offer to order X-rays for patient  I was not able to offer this as the call was disconnected  I did try to call her back, however she would not answer the phone  Left a message for her to call back

## 2021-02-18 NOTE — PROGRESS NOTES
PT Evaluation     Today's date: 2021  Patient name: Luisana Peterson  : 1985  MRN: [id number]  Referring provider: Faiza Wilkins*  Dx:   Encounter Diagnosis     ICD-10-CM    1  Acute right-sided low back pain with right-sided sciatica  M54 41 Ambulatory referral to Physical Therapy                  Assessment  Assessment details: Pt Floyd Birch is a 38 y o  who presents to OPPT with s/s consistent with acute lower back injury with radiating symptoms into R LE  PT presented pt with severe pain(10/10 pain), inability to tolerate sitting with normal upright posture, difficulty with transfers, and antalgic gait  Pt with limited spinal mobility in all planes, poor R LE mobility and strength, postural dysfunction, and increased pain with all positional changes  PT with difficulty completing full assessment 2* to severeity of symptoms  She notes that she continues to work which has been aggravating 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 PT feels pt would benefit from MRI to rule out pathology due to symptoms irritability     Impairments: abnormal gait, abnormal or restricted ROM, abnormal movement, activity intolerance, difficulty understanding, impaired balance, impaired physical strength, lacks appropriate home exercise program, pain with function, safety issue, weight-bearing intolerance and poor posture     Symptom irritability: highUnderstanding of Dx/Px/POC: poor   Prognosis: poor    Goals  STGs to be achieved in 4 weeks:  -Pt to demonstrate reduced subjective pain rating "at worst" by at least 2-3 points from Initial Eval to allow for reduced pain with ADLs and improved functional activity tolerance    -Pt to demonstrate improved L/S to minimal-moderate limitations in order to maximize joint mobility and function and allow for progression of exercise program and achievement of goals    -Pt to demonstrate increased MMT of RLE by at least 1/2-1 grade in order to improve safety and stability with ADLs and functional mobility  LTGs to be achieved in 6-8 weeks:  -Pt will be I with HEP in order to continue to improve quality of life and independence and reduce risk for re-injury    -Pt to demonstrate return to activities of daily living without limiations or restrictions    -Pt to demonstrate return to work activities without limitations or restrictions    -Pt to demonstrate improved function as noted by achieving or exceeding predicted score on FOTO outcomes assessment tool  Plan  Plan details: PT provided pt with detailed HEP program; pt verbalized understanding of program    Patient would benefit from: skilled physical therapy  Planned modality interventions: thermotherapy: hydrocollator packs  Planned therapy interventions: abdominal trunk stabilization, activity modification, manual therapy, neuromuscular re-education, patient education, postural training, therapeutic exercise, therapeutic activities, home exercise program, functional ROM exercises and flexibility  Frequency: 2x week  Duration in weeks: 4  Plan of Care beginning date: 2/18/2021  Plan of Care expiration date: 3/18/2021  Treatment plan discussed with: patient        Subjective Evaluation    History of Present Illness  Mechanism of injury: Pt reporting that she began to have pain in the R leg about 2 weeks ago without any incident  She went to family Dr Jinny Marcial told her it is coming from her back but patient feels it isn't her back because the pain is only in the buttocks and further down the leg  MD wanted to order MRI but denied by insurance pending PT  MD referral to OPPT for conservative management of s/s at this time     Quality of life: poor    Pain  At best pain rating: 10  At worst pain rating: 10  Quality: dull ache and sharp  Progression: worsening    Social Support  Stairs in house: yes Treatments  Current treatment: physical therapy  Patient Goals  Patient goals for therapy: decreased pain, increased motion, increased strength, independence with ADLs/IADLs and return to work          Objective     Concurrent Complaints  Positive for disturbed sleep  Postural Observations  Seated posture: poor  Standing posture: poor    Additional Postural Observation Details  Seated posture: pt with left lateral trunk lean to decreased WB'ing through R buttocks while sitting     Neurological Testing     Sensation     Lumbar   Left   Intact: light touch    Right   Intact: light touch    Active Range of Motion     Lumbar   Flexion:  with pain Restriction level: maximal  Extension:  with pain Restriction level: maximal  Left lateral flexion:  with pain Restriction level: maximal  Right lateral flexion:  with pain Restriction level: maximal    Right Hip   Flexion: 80 degrees with pain  Abduction: 20 degrees with pain  Mechanical Assessment    Cervical      Thoracic      Lumbar    Standing extension:   Pain location: centralized  Pain intensity: worse    Strength/Myotome Testing     Right Hip   Planes of Motion   Flexion: 3-  Abduction: 3-  Adduction: 3-    Tests     Lumbar     Left   Positive crossed SLR  Right   Positive crossed SLR and passive SLR                  Re-eval Date: 3/18/21    Date 2/18       Visit Count 1       FOTO Completed             Precautions: high sx irritability        Manuals                                        There-ex          NuStep         Standing L/S extension         Standing hip flex/abd/ext        HR/TR        Marches         Step-ups         Seated hip add        Seated hip abd                                                 Neuro                                                                        Ther Activity        Education  L/S disc mechanics and postural awareness               Gait Training                        Modalities

## 2021-02-22 ENCOUNTER — APPOINTMENT (OUTPATIENT)
Dept: PHYSICAL THERAPY | Facility: HOME HEALTHCARE | Age: 36
End: 2021-02-22
Payer: COMMERCIAL

## 2021-02-22 ENCOUNTER — OFFICE VISIT (OUTPATIENT)
Dept: FAMILY MEDICINE CLINIC | Facility: HOME HEALTHCARE | Age: 36
End: 2021-02-22
Payer: COMMERCIAL

## 2021-02-22 VITALS
DIASTOLIC BLOOD PRESSURE: 78 MMHG | HEIGHT: 63 IN | SYSTOLIC BLOOD PRESSURE: 118 MMHG | TEMPERATURE: 98.9 F | BODY MASS INDEX: 36 KG/M2 | HEART RATE: 96 BPM | OXYGEN SATURATION: 96 % | RESPIRATION RATE: 18 BRPM | WEIGHT: 203.2 LBS

## 2021-02-22 DIAGNOSIS — M54.31 SCIATICA OF RIGHT SIDE: Primary | ICD-10-CM

## 2021-02-22 PROCEDURE — 99213 OFFICE O/P EST LOW 20 MIN: CPT | Performed by: FAMILY MEDICINE

## 2021-02-22 PROCEDURE — T1015 CLINIC SERVICE: HCPCS | Performed by: FAMILY MEDICINE

## 2021-02-22 RX ORDER — LIDOCAINE 50 MG/G
1 PATCH TOPICAL DAILY
Qty: 5 PATCH | Refills: 0 | Status: SHIPPED | OUTPATIENT
Start: 2021-02-22 | End: 2021-03-17 | Stop reason: ALTCHOICE

## 2021-02-22 RX ORDER — CLONAZEPAM 1 MG/1
1 TABLET ORAL 2 TIMES DAILY
COMMUNITY
Start: 2021-02-18 | End: 2021-12-03 | Stop reason: ALTCHOICE

## 2021-02-22 RX ORDER — MELOXICAM 7.5 MG/1
7.5 TABLET ORAL DAILY
Qty: 30 TABLET | Refills: 0 | Status: SHIPPED | OUTPATIENT
Start: 2021-02-22 | End: 2021-04-12

## 2021-02-22 NOTE — PROGRESS NOTES
Assessment/Plan:    Alexx Silva is a 38year old female presents today for evaluation of right buttock pain  Symptoms and physical exam consistent with Right sided Sciatica vs  piriformis syndrome  DDx: herniated disc  Continue with twice weekly physical therapy  Will try another trial of NSAIDS with Meloxicam  If no improvement will repeat X-ray of spine and further imaging MRI spine to r/o herniated disk or spinal degenerative disease  F/u in 3 weeks  Case d/w Dr Lisa Paul  Diagnoses and all orders for this visit:    Sciatica of right side  -     lidocaine (LIDODERM) 5 %; Apply 1 patch topically daily Remove & Discard patch within 12 hours or as directed by MD  -     meloxicam (MOBIC) 7 5 mg tablet; Take 1 tablet (7 5 mg total) by mouth daily    Other orders  -     clonazePAM (KlonoPIN) 1 mg tablet; Take 1 mg by mouth 2 (two) times a day        Subjective:      Patient ID: Alissa Day is a 38 y o  female  HPI     Patient is a 26years old female presents today complaining right back pain  Reports similar pain for past 2-3 years about 2-3 episodes per year  This episode started two weeks ago reported shock-like sharp and throbbing pain goes from right buttock and radiates lateral and posteriorly toward right calf  Pain worse with position change from supine to sitting or sitting to standing  Denied any muscle weakness, numbness, tingling, change in sensation, urinary or bowel incontinence  Denied any fever, chills, weight loss  Per chart review,  Lumbar spine x-ray in 2019 without acute or significant degenerative changes  X-ray of right femur and right hip in December 2020 without acute finding  Patient received steroid IM injection and prednisone tapering course at beginning of February 2021 without improvement  Zanaflex was not approved by her insurance  Patient tried Naproxen, Flexeril without relief  Patient started physical therapy on 2/18/21   She will attend twice weekly PT session for one month duration  The following portions of the patient's history were reviewed and updated as appropriate: allergies, current medications, past family history, past medical history, past social history, past surgical history and problem list     Review of Systems   Constitutional: Negative for chills and fever  HENT: Negative for congestion, rhinorrhea and sore throat  Respiratory: Negative for shortness of breath and wheezing  Cardiovascular: Negative for chest pain and palpitations  Gastrointestinal: Negative for abdominal pain, diarrhea, nausea and vomiting  Genitourinary: Negative for dysuria  Musculoskeletal: Positive for back pain  Neurological: Negative for dizziness  Psychiatric/Behavioral: Negative for confusion  Objective:      /78   Pulse 96   Temp 98 9 °F (37 2 °C) (Tympanic)   Resp 18   Ht 5' 3" (1 6 m)   Wt 92 2 kg (203 lb 3 2 oz)   SpO2 96%   BMI 36 00 kg/m²          Physical Exam  Vitals signs reviewed  Constitutional:       General: She is not in acute distress  Appearance: She is not ill-appearing  HENT:      Head: Normocephalic  Nose: No congestion or rhinorrhea  Eyes:      General: No scleral icterus  Right eye: No discharge  Left eye: No discharge  Cardiovascular:      Rate and Rhythm: Normal rate  Heart sounds: No murmur  Pulmonary:      Effort: Pulmonary effort is normal  No respiratory distress  Breath sounds: No wheezing  Abdominal:      General: Abdomen is flat  Bowel sounds are normal  There is no distension  Tenderness: There is no abdominal tenderness  Musculoskeletal:      Right lower leg: No edema  Left lower leg: No edema  Comments: Sensation intact in b/l lower extremities  Straight leg test: pain elicited at 20 degree in RLE  LLE at >60 degree    Tenderness on palpation of right mid-buttock   Internal and external rotation of the right hip elicited tenderness at right mid-buttock with radiating pain toward right lateral thigh  Patellar reflex equal bilaterally    No tenderness on palpation of mid thoracic and lumbar spine   Skin:     General: Skin is warm  Neurological:      Mental Status: She is alert and oriented to person, place, and time     Psychiatric:         Mood and Affect: Mood normal          Behavior: Behavior normal

## 2021-02-24 ENCOUNTER — OFFICE VISIT (OUTPATIENT)
Dept: PHYSICAL THERAPY | Facility: HOME HEALTHCARE | Age: 36
End: 2021-02-24
Payer: COMMERCIAL

## 2021-02-24 DIAGNOSIS — M54.41 ACUTE RIGHT-SIDED LOW BACK PAIN WITH RIGHT-SIDED SCIATICA: Primary | ICD-10-CM

## 2021-02-24 PROCEDURE — 97110 THERAPEUTIC EXERCISES: CPT

## 2021-02-24 NOTE — PROGRESS NOTES
Daily Note     Today's date: 2021  Patient name: Kayla Ruggiero  : 1985  MRN: [id number]  Referring provider: Adrian Phipps*  Dx:   Encounter Diagnosis     ICD-10-CM    1  Acute right-sided low back pain with right-sided sciatica  M54 41                   Subjective: I saw my Dr on Monday and was prescribed Meloxicam  I fell outside of my house today  Pain of 10/10 at LB and L LE to ankle          Objective: See treatment diary below    Assessment: Pt declined advise to go to ED 2* pain of 10/10  Pt with pain t/o session though able to complete as per flow sheet  Discussed use of MHP/CP for pain relief  Pt has tried both at home with no relief and declined at end  Advised pt to contact Dr or go to ED 2* pain of 10/10 at end of session  Patient would benefit from continued PT    Plan: Continue per plan of care          Re-eval Date: 3/18/21    Date  2-24      Visit Count 1 2      FOTO Completed             Precautions: high sx irritability        Manuals  2-24                      There-ex    2-24      NuStep   Declined      Standing L/S extension   5" 2 x 5 to start  5" 1 x 5 at end        Standing hip flex/abd/ext  1 x 5 ea David      HR/TR  1 x 10      Marches   1 x 10      Step-ups         Seated hip add  3" x 10      Seated hip abd   Red 3" x 10                                              Neuro                                Ther Activity        Education  L/S disc mechanics and postural awareness               Gait Training                        Modalities

## 2021-02-26 ENCOUNTER — TELEPHONE (OUTPATIENT)
Dept: PHYSICAL THERAPY | Facility: OTHER | Age: 36
End: 2021-02-26

## 2021-02-26 ENCOUNTER — TELEPHONE (OUTPATIENT)
Dept: FAMILY MEDICINE CLINIC | Facility: HOME HEALTHCARE | Age: 36
End: 2021-02-26

## 2021-02-26 ENCOUNTER — APPOINTMENT (EMERGENCY)
Dept: CT IMAGING | Facility: HOSPITAL | Age: 36
End: 2021-02-26
Payer: COMMERCIAL

## 2021-02-26 ENCOUNTER — HOSPITAL ENCOUNTER (EMERGENCY)
Facility: HOSPITAL | Age: 36
Discharge: HOME | End: 2021-02-26
Attending: EMERGENCY MEDICINE
Payer: COMMERCIAL

## 2021-02-26 VITALS
BODY MASS INDEX: 36.02 KG/M2 | DIASTOLIC BLOOD PRESSURE: 91 MMHG | HEIGHT: 63 IN | SYSTOLIC BLOOD PRESSURE: 136 MMHG | RESPIRATION RATE: 18 BRPM | HEART RATE: 82 BPM | WEIGHT: 203.26 LBS | OXYGEN SATURATION: 98 % | TEMPERATURE: 98.6 F

## 2021-02-26 DIAGNOSIS — M51.9 LUMBAR DISC DISEASE: Primary | ICD-10-CM

## 2021-02-26 PROCEDURE — 72131 CT LUMBAR SPINE W/O DYE: CPT

## 2021-02-26 PROCEDURE — 96372 THER/PROPH/DIAG INJ SC/IM: CPT

## 2021-02-26 PROCEDURE — 99284 EMERGENCY DEPT VISIT MOD MDM: CPT | Performed by: PHYSICIAN ASSISTANT

## 2021-02-26 PROCEDURE — 99283 EMERGENCY DEPT VISIT LOW MDM: CPT

## 2021-02-26 PROCEDURE — G1004 CDSM NDSC: HCPCS

## 2021-02-26 RX ORDER — KETOROLAC TROMETHAMINE 30 MG/ML
60 INJECTION, SOLUTION INTRAMUSCULAR; INTRAVENOUS ONCE
Status: COMPLETED | OUTPATIENT
Start: 2021-02-26 | End: 2021-02-26

## 2021-02-26 RX ADMIN — KETOROLAC TROMETHAMINE 60 MG: 30 INJECTION, SOLUTION INTRAMUSCULAR at 09:34

## 2021-02-26 NOTE — TELEPHONE ENCOUNTER
Pt was just seen in ER today and they told her to call her provider and ask for a light duty note for work  She stated she has pain in back going down buttock into leg

## 2021-02-26 NOTE — TELEPHONE ENCOUNTER
Patient called us per her ED visit and referral     This RN did review in detail the [facility] and what we can provide for their back pain  Patient states she is in PT already and has an appointment with Dr Eneida Beckford om 3/8/21  Explained to her that this is what my program would have offered her  Patient states understanding and was appreciative for the assistance  Referral Closed

## 2021-02-26 NOTE — TELEPHONE ENCOUNTER
Work note with light duty restriction until next scheduled visit at [facility] on 3/15/21 provided  Patient notified

## 2021-02-26 NOTE — ED PROVIDER NOTES
History  Chief Complaint   Patient presents with    Leg Pain     patient reports two weeks of constant right sided buttocks pain that radiates down posterior leg to ankle  taking maloxicam for a week without relief  also placed on prednisone without relief  Patient presents to the emergency department today for acute on chronic right-sided leg symptomatology  Patient was seen here in the emergency department approximately 2 months ago for similar complaints, was placed on cyclobenzaprine after negative DVT study as well as hip x-rays were completed  She has follow-up with primary care who had utilize medicines ranging from meloxicam to topical therapy to prednisone  Patient notes no relief with any medications that she has tried including medications given to her by her   She states she has completed 2 episodes of physical therapy thus far she does not feel as though is helping  She does state however that she had a fall  Initially she told with the fall was yesterday however my question her after reading her notes from physical therapy she states the fall may have been 2 or 3 days ago  She states she fell landing on the right side  No history of incontinence of urine or stool  No history of leg weakness  There is no history of saddle anesthesia  No concern for acute cauda equina syndrome at this point  No fever  Prior to Admission Medications   Prescriptions Last Dose Informant Patient Reported? Taking?    ARIPiprazole (ABILIFY) 10 mg tablet 2/25/2021 at Unknown time Self Yes Yes   Sig: Take 10 mg by mouth daily   TiZANidine (ZANAFLEX) 2 MG capsule Not Taking at Unknown time  No No   Sig: Take one capsule by mouth every 12 hours as needed   Patient not taking: Reported on 2/22/2021   clonazePAM (KlonoPIN) 0 5 mg tablet 2/25/2021 at Unknown time Self Yes Yes   Sig: Take 0 5 mg by mouth 2 (two) times a day   clonazePAM (KlonoPIN) 1 mg tablet 2/25/2021 at Unknown time  Yes Yes Sig: Take 1 mg by mouth 2 (two) times a day   ergocalciferol (VITAMIN D2) 50,000 units 2021  No Yes   Sig: Take 1 capsule (50,000 Units total) by mouth once a week   lidocaine (LIDODERM) 5 % 2021 at Unknown time  No Yes   Sig: Apply 1 patch topically daily Remove & Discard patch within 12 hours or as directed by MD   meloxicam (MOBIC) 7 5 mg tablet 2021 at 0400  No Yes   Sig: Take 1 tablet (7 5 mg total) by mouth daily   predniSONE 10 mg tablet Not Taking at Unknown time  No No   Si mg by mouth daily for 3 days, then 20 mg by mouth daily for 3 days, then 10 mg by mouth daily for 3 days, then stop   Patient not taking: Reported on 2021   sertraline (ZOLOFT) 100 mg tablet 2021 at Unknown time Self Yes Yes   Sig: Take 200 mg by mouth daily at bedtime      Facility-Administered Medications: None       Past Medical History:   Diagnosis Date    Asthma     Cat-scratch disease     last assessed 10/22/15    Depression     Hypertension     last assessed 14    Sleep disorder        Past Surgical History:   Procedure Laterality Date    BREAST BIOPSY Left     2016   Julieann Frankel DENTAL SURGERY      OR BX/REMV,LYMPH NODE,DEEP AXILL Left 1/15/2019    Procedure: LEFT AXILLARY LYMPH NODE BIOPSY;  Surgeon: Sandeep Angel MD;  Location: [facility];  Service: General    TUBAL LIGATION         Family History   Problem Relation Age of Onset    Anxiety disorder Mother     Bipolar disorder Mother     Depression Mother     Schizophrenia Mother     Parkinsonism Mother         tremor    Anxiety disorder Father     Bipolar disorder Father     Depression Father     Schizophrenia Father     Stomach cancer Paternal Grandmother      I have reviewed and agree with the history as documented      E-Cigarette/Vaping    E-Cigarette Use Never User      E-Cigarette/Vaping Substances     Social History     Tobacco Use    Smoking status: Never Smoker    Smokeless tobacco: Never Used   Substance Use Topics    Alcohol use: No    Drug use: No       Review of Systems   Constitutional: Negative for chills and fever  HENT: Negative for ear pain and sore throat  Eyes: Negative for pain and visual disturbance  Respiratory: Negative for cough and shortness of breath  Cardiovascular: Negative for chest pain and palpitations  Gastrointestinal: Negative for abdominal pain and vomiting  Genitourinary: Negative for dysuria and hematuria  Musculoskeletal: Positive for back pain  Negative for arthralgias  R leg pain   Skin: Negative for color change and rash  Neurological: Negative for seizures and syncope  All other systems reviewed and are negative  Physical Exam  Physical Exam  Vitals signs reviewed  Constitutional:       General: She is not in acute distress  Appearance: She is normal weight  She is not ill-appearing or toxic-appearing  Eyes:      Extraocular Movements: Extraocular movements intact  Cardiovascular:      Rate and Rhythm: Normal rate  Pulses: Normal pulses  Pulmonary:      Effort: Pulmonary effort is normal    Musculoskeletal: Normal range of motion  Comments: Normal dorsalis pedis pulse right leg  No calf tenderness or swelling  No erythema or warmth  No reproducible hip tenderness  Skin:     General: Skin is warm  Neurological:      General: No focal deficit present  Mental Status: She is alert and oriented to person, place, and time  Mental status is at baseline     Psychiatric:         Mood and Affect: Mood normal          Vital Signs  ED Triage Vitals [02/26/21 0900]   Temperature Pulse Respirations Blood Pressure SpO2   98 6 °F (37 °C) 82 18 136/91 98 %      Temp Source Heart Rate Source Patient Position - Orthostatic VS BP Location FiO2 (%)   Temporal Monitor Sitting Right arm --      Pain Score       Worst Possible Pain           Vitals:    02/26/21 0900   BP: 136/91   Pulse: 82   Patient Position - Orthostatic VS: Sitting         Visual Acuity      ED Medications  Medications   ketorolac (TORADOL) injection 60 mg (60 mg Intramuscular Given 2/26/21 0934)       Diagnostic Studies  Results Reviewed     None                 CT spine lumbar without contrast   Final Result by Jarrell Oleary MD (02/26 1027)   No acute fracture  Disc protrusion at L5-S1 as described  Workstation performed: [id number]                    Procedures  Procedures         ED Course  ED Course as of Feb 26 1049   Fri Feb 26, 2021   0903 Blood Pressure: 136/91   0903 Temperature: 98 6 °F (37 °C)   0903 Pulse: 82   0903 Respirations: 18   0903 SpO2: 98 %   1001 Awaiting CT read      1013 Patient noted some improvement with Toradol, awaiting CT read      1037 IMPRESSION:  No acute fracture  Disc protrusion at L5-S1 as described          1047 Ultimately patient should follow up with comprehensive spine management to discuss options  I will put in this referral for her  SBIRT 22yo+      Most Recent Value   SBIRT (22 yo +)   In order to provide better care to our patients, we are screening all of our patients for alcohol and drug use  Would it be okay to ask you these screening questions? Yes Filed at: 02/26/2021 0909   Initial Alcohol Screen: US AUDIT-C    1  How often do you have a drink containing alcohol?  0 Filed at: 02/26/2021 0909   2  How many drinks containing alcohol do you have on a typical day you are drinking? 0 Filed at: 02/26/2021 0909   3a  Male UNDER 65: How often do you have five or more drinks on one occasion? 0 Filed at: 02/26/2021 0909   3b  FEMALE Any Age, or MALE 65+: How often do you have 4 or more drinks on one occassion? 0 Filed at: 02/26/2021 0909   Audit-C Score  0 Filed at: 02/26/2021 1980   YOAN: How many times in the past year have you    Used an illegal drug or used a prescription medication for non-medical reasons?   Never Filed at: 02/26/2021 0909                    MDM    Disposition  Final diagnoses:   Lumbar disc disease     Time reflects when diagnosis was documented in both MDM as applicable and the Disposition within this note     Time User Action Codes Description Comment    2/26/2021 10:48 AM Onesimo SKY Add [M51 9] Lumbar disc disease       ED Disposition     ED Disposition Condition Date/Time Comment    Discharge Stable Fri Feb 26, 2021 10:48 AM Kimberly Davies discharge to home/self care              Follow-up Information     Follow up With Specialties Details Why Contact Info    Akanksha Robledo MD Pain Medicine Schedule an appointment as soon as possible for a visit   [address] [place] [address]  [phone]            Patient's Medications   Discharge Prescriptions    No medications on file         PDMP Review     None          ED Provider  Electronically Signed by           Graciela Zee PA-C  02/26/21 0659

## 2021-03-01 ENCOUNTER — PATIENT OUTREACH (OUTPATIENT)
Dept: CASE MANAGEMENT | Facility: HOSPITAL | Age: 36
End: 2021-03-01

## 2021-03-01 NOTE — PROGRESS NOTES
Outpatient Care Management Note:  Patient was identified via report as having a recent non urgent and non life threatening ED visit at [address] [place] [address]  Chart reviewed  Patient was in the ED on 02/26/21 for evaluation of pain on right leg  Patient has followed up with PCP and with the spine center  Has a PT appointment on 3/3/21  ED followup call deferred  [facility] questionnaire sent via [facility]

## 2021-03-08 ENCOUNTER — CONSULT (OUTPATIENT)
Dept: PAIN MEDICINE | Facility: CLINIC | Age: 36
End: 2021-03-08
Payer: COMMERCIAL

## 2021-03-08 ENCOUNTER — OFFICE VISIT (OUTPATIENT)
Dept: PHYSICAL THERAPY | Facility: HOME HEALTHCARE | Age: 36
End: 2021-03-08

## 2021-03-08 VITALS
WEIGHT: 203 LBS | DIASTOLIC BLOOD PRESSURE: 102 MMHG | BODY MASS INDEX: 35.97 KG/M2 | HEIGHT: 63 IN | SYSTOLIC BLOOD PRESSURE: 156 MMHG

## 2021-03-08 DIAGNOSIS — M54.16 LUMBAR RADICULOPATHY: Primary | ICD-10-CM

## 2021-03-08 DIAGNOSIS — M51.16 LUMBAR DISC DISEASE WITH RADICULOPATHY: ICD-10-CM

## 2021-03-08 DIAGNOSIS — M54.41 ACUTE RIGHT-SIDED LOW BACK PAIN WITH RIGHT-SIDED SCIATICA: Primary | ICD-10-CM

## 2021-03-08 DIAGNOSIS — G89.4 CHRONIC PAIN SYNDROME: ICD-10-CM

## 2021-03-08 PROCEDURE — 99244 OFF/OP CNSLTJ NEW/EST MOD 40: CPT | Performed by: ANESTHESIOLOGY

## 2021-03-08 RX ORDER — GABAPENTIN 100 MG/1
100 CAPSULE ORAL 3 TIMES DAILY
Qty: 90 CAPSULE | Refills: 1 | Status: SHIPPED | OUTPATIENT
Start: 2021-03-08 | End: 2021-03-17 | Stop reason: SDUPTHER

## 2021-03-08 NOTE — PROGRESS NOTES
PT Discharge    Today's date: 3/8/2021  Patient name: Gerald Cedeño  : 1985  MRN: [id number]  Referring provider: Michael Guadarrama*  Dx:   Encounter Diagnosis     ICD-10-CM    1  Acute right-sided low back pain with right-sided sciatica  M54 41        Start Time: 1345  Stop Time: 1400  Total time in clinic (min): 15 minutes    Assessment  Assessment details: Pt Sadia Salazar is a 38 y o  who presents to OPPT with s/s consistent with acute lower back injury with radiating symptoms into R LE  PT presented pt with severe pain(10/10 pain), inability to tolerate sitting with normal upright posture, difficulty with transfers, and antalgic gait  Pt with limited spinal mobility in all planes, poor R LE mobility and strength, postural dysfunction, and increased pain with all positional changes  PT with difficulty completing full assessment 2* to severeity of symptoms  She notes that she continues to work which has been aggravating 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 PT feels pt would benefit from MRI to rule out pathology due to symptoms irritability  UPDATE: Pt only attended 2 sessions of OPPT  She presented to ED on 21 and did not show up to her next 2 3 scheduled appts for therapy  She will be D/C from OPPT 2* to no show policy at this time     Impairments: abnormal gait, abnormal or restricted ROM, abnormal movement, activity intolerance, difficulty understanding, impaired balance, impaired physical strength, lacks appropriate home exercise program, pain with function, safety issue, weight-bearing intolerance and poor posture     Symptom irritability: highUnderstanding of Dx/Px/POC: poor   Prognosis: poor    Goals  Goals NOT MET 2* pt did not continue with POC as prescribed     STGs to be achieved in 4 weeks:  -Pt to demonstrate reduced subjective pain rating "at worst" by at least 2-3 points from Initial Eval to allow for reduced pain with ADLs and improved functional activity tolerance    -Pt to demonstrate improved L/S to minimal-moderate limitations in order to maximize joint mobility and function and allow for progression of exercise program and achievement of goals    -Pt to demonstrate increased MMT of RLE by at least 1/2-1 grade in order to improve safety and stability with ADLs and functional mobility  LTGs to be achieved in 6-8 weeks:  -Pt will be I with HEP in order to continue to improve quality of life and independence and reduce risk for re-injury    -Pt to demonstrate return to activities of daily living without limiations or restrictions    -Pt to demonstrate return to work activities without limitations or restrictions    -Pt to demonstrate improved function as noted by achieving or exceeding predicted score on FOTO outcomes assessment tool  Plan  Plan details: Pt to be D/C from OPPT   Patient would benefit from: skilled physical therapy  Planned modality interventions: thermotherapy: hydrocollator packs  Planned therapy interventions: abdominal trunk stabilization, activity modification, manual therapy, neuromuscular re-education, patient education, postural training, therapeutic exercise, therapeutic activities, home exercise program, functional ROM exercises and flexibility  Frequency: 2x week  Duration in weeks: 4  Plan of Care beginning date: 2/18/2021  Plan of Care expiration date: 3/18/2021  Treatment plan discussed with: patient        Subjective Evaluation    History of Present Illness  Mechanism of injury: Pt reporting that she began to have pain in the R leg about 2 weeks ago without any incident  She went to family Dr Michael Burt told her it is coming from her back but patient feels it isn't her back because the pain is only in the buttocks and further down the leg  MD wanted to order MRI but denied by insurance pending PT   MD referral to OPPT for conservative management of s/s at this time  Quality of life: poor    Pain  At best pain rating: 10  At worst pain rating: 10  Quality: dull ache and sharp  Progression: worsening    Social Support  Stairs in house: yes     Treatments  Current treatment: physical therapy  Patient Goals  Patient goals for therapy: decreased pain, increased motion, increased strength, independence with ADLs/IADLs and return to work          Objective     Concurrent Complaints  Positive for disturbed sleep  Postural Observations  Seated posture: poor  Standing posture: poor    Additional Postural Observation Details  Seated posture: pt with left lateral trunk lean to decreased WB'ing through R buttocks while sitting     Neurological Testing     Sensation     Lumbar   Left   Intact: light touch    Right   Intact: light touch    Active Range of Motion     Lumbar   Flexion:  with pain Restriction level: maximal  Extension:  with pain Restriction level: maximal  Left lateral flexion:  with pain Restriction level: maximal  Right lateral flexion:  with pain Restriction level: maximal    Right Hip   Flexion: 80 degrees with pain  Abduction: 20 degrees with pain  Mechanical Assessment    Cervical      Thoracic      Lumbar    Standing extension:   Pain location: centralized  Pain intensity: worse    Strength/Myotome Testing     Right Hip   Planes of Motion   Flexion: 3-  Abduction: 3-  Adduction: 3-    Tests     Lumbar     Left   Positive crossed SLR  Right   Positive crossed SLR and passive SLR

## 2021-03-08 NOTE — H&P (VIEW-ONLY)
Assessment:  1  Lumbar radiculopathy    2  Lumbar disc disease with radiculopathy    3  Chronic pain syndrome        Plan:   patient is a 29-year-old female with complaint of right-sided leg pain with chronic pain syndrome secondary to right-sided disc protrusion resulting right S1 nerve root impingement presents office for initial consultation  Patient has tried and failed oral steroids  Patient reports pain is often on however the periods of a lasting are increasing over the several months  1  We will schedule patient for right L5-S1 and S1 transforaminal epidural   steroid injection  2  We will trial gabapentin 100 mg p o  t i d  for lumbar radiculopathy    Complete risks and benefits including bleeding, infection, tissue reaction, nerve injury and allergic reaction were discussed  The approach was demonstrated using models and literature was provided  Verbal and written consent was obtained  History of Present Illness: The patient is a 38 y o  female who presents for consultation in regards to Hip Pain and Leg Pain  Symptoms have been present for  Several months  Symptoms began without any precipitating injury or trauma  Pain is reported to be 10 on the numeric rating scale  Symptoms are felt nearly constantly and worst in the morning  Symptoms are characterized as cramping, shooting, sharp, tingling and throbbing  Symptoms are associated with right leg weakness  Aggravating factors include lying down, standing, bending, leaning forward, leaning bckward, sitting, walking, exercise and coughing/sneezing  Relieving factors include nothing  No change in symptoms with kneeling and bowel movements  Treatments that have been helpful include nothing  home exercise and heat/ice have provided no relief  Medications to relieve symptoms include none  Review of Systems:    Review of Systems   Constitutional: Positive for unexpected weight change  Endocrine: Positive for polydipsia and polyuria  Musculoskeletal: Positive for myalgias  All other systems reviewed and are negative          Past Medical History:   Diagnosis Date    Asthma     Cat-scratch disease     last assessed 10/22/15    Depression     Hypertension     last assessed 11/11/14    Sleep disorder        Past Surgical History:   Procedure Laterality Date    BREAST BIOPSY Left     2016   [facility]      [place] BX/REMV,LYMPH NODE,DEEP AXILL Left 1/15/2019    Procedure: LEFT AXILLARY LYMPH NODE BIOPSY;  Surgeon: Erin Yusuf MD;  Location: [facility];  Service: General    TUBAL LIGATION         Family History   Problem Relation Age of Onset    Anxiety disorder Mother     Bipolar disorder Mother     Depression Mother    Fernanda Elizondo Schizophrenia Mother     Parkinsonism Mother         tremor    Anxiety disorder Father     Bipolar disorder Father     Depression Father     Schizophrenia Father     Stomach cancer Paternal Grandmother        Social History     Occupational History    Not on file   Tobacco Use    Smoking status: Never Smoker    Smokeless tobacco: Never Used   Substance and Sexual Activity    Alcohol use: No    Drug use: No    Sexual activity: Yes     Partners: Male     Birth control/protection: None         Current Outpatient Medications:     ARIPiprazole (ABILIFY) 10 mg tablet, Take 10 mg by mouth daily, Disp: , Rfl:     clonazePAM (KlonoPIN) 0 5 mg tablet, Take 0 5 mg by mouth 2 (two) times a day, Disp: , Rfl:     clonazePAM (KlonoPIN) 1 mg tablet, Take 1 mg by mouth 2 (two) times a day, Disp: , Rfl:     ergocalciferol (VITAMIN D2) 50,000 units, Take 1 capsule (50,000 Units total) by mouth once a week, Disp: 4 capsule, Rfl: 0    lidocaine (LIDODERM) 5 %, Apply 1 patch topically daily Remove & Discard patch within 12 hours or as directed by MD, Disp: 5 patch, Rfl: 0    meloxicam (MOBIC) 7 5 mg tablet, Take 1 tablet (7 5 mg total) by mouth daily, Disp: 30 tablet, Rfl: 0    predniSONE 10 mg tablet, 30 mg by mouth daily for 3 days, then 20 mg by mouth daily for 3 days, then 10 mg by mouth daily for 3 days, then stop (Patient not taking: Reported on 2/22/2021), Disp: 18 tablet, Rfl: 0    sertraline (ZOLOFT) 100 mg tablet, Take 200 mg by mouth daily at bedtime, Disp: , Rfl:     TiZANidine (ZANAFLEX) 2 MG capsule, Take one capsule by mouth every 12 hours as needed (Patient not taking: Reported on 2/22/2021), Disp: 14 capsule, Rfl: 0    Allergies   Allergen Reactions    Bactrim [Sulfamethoxazole-Trimethoprim]     Codeine Hives     Tolerated Percocet, Vicodin, etc        Physical Exam:    BP (!) 156/102 (BP Location: Right arm, Patient Position: Sitting, Cuff Size: Large)   Ht 5' 3" (1 6 m)   Wt 92 1 kg (203 lb)   LMP 02/12/2021   BMI 35 96 kg/m²     Constitutional: normal, well developed, well nourished, alert, in no distress and non-toxic and no overt pain behavior  and obese  Eyes: anicteric  HEENT: grossly intact  Neck: supple, symmetric, trachea midline and no masses   Pulmonary:even and unlabored  Cardiovascular:No edema or pitting edema present  Skin:Normal without rashes or lesions and well hydrated  Psychiatric:Mood and affect appropriate  Neurologic:Cranial Nerves II-XII grossly intact  Musculoskeletal:antalgic      Lumbar/Sacral Spine examination demonstrates  Decreased range of motion lumbar spine with pain upon: flexion, lateral rotation to the left/right, and bending to the left/right  Bilateral lumbar paraspinals tender to palpation  Muscle spasms noted in the lumbar area bilaterally  4/5 right  lower extremity strength in all muscle groups  Positive seated straight leg raise for bilateral lower extremities  Sensitivity to light touch intact bilateral lower extremities  2+ reflexes in the patella and Achilles    No ankle clonus     Imaging  CT LUMBAR SPINE     INDICATION:   Back pain or radiculopathy, trauma  trauma, R sided leg symptoms      COMPARISON: X-rays dated 1/9/2019      TECHNIQUE:  Contiguous axial images through the lumbar spine were obtained  Sagittal and coronal reconstructions were performed        Radiation dose length product (DLP) for this visit:  758 15 mGy-cm   This examination, like all CT scans performed in the [facility], was performed utilizing techniques to minimize radiation dose exposure, including the use of iterative   reconstruction and automated exposure control        IMAGE QUALITY:  Diagnostic      FINDINGS:     ALIGNMENT:  There are 5 lumbar type vertebral bodies  Normal alignment of the lumbar spine  No spondylolisthesis or spondylolysis      VERTEBRAL BODIES:  No fracture  No lytic or blastic lesion      DEGENERATIVE CHANGES:     Lower Thoracic spine:  Normal lower thoracic disc spaces  ]     L1-2:  Normal disc height  No herniation  Normal facet joints  No canal or foraminal stenosis      L2-3:  Normal disc height  No herniation  Normal facet joints  No canal or foraminal stenosis      L3-4:  Normal disc height  No herniation  Mild facet arthropathy  No canal or foraminal stenosis      L4-5:  Normal disc height  Small disc bulge and mild facet arthropathy  No canal or foraminal stenosis      L5-S1: Central disc protrusion asymmetric to the right with posterior displacement of the right S1 nerve root  There may also be contact with the left S1 nerve root     Correlate for S1 radiculopathy  Mild canal stenosis  No foraminal stenosis      PARASPINAL SOFT TISSUES:   Normal      IMPRESSION:  No acute fracture  Disc protrusion at L5-S1 as described

## 2021-03-08 NOTE — PROGRESS NOTES
Assessment:  1  Lumbar radiculopathy    2  Lumbar disc disease with radiculopathy    3  Chronic pain syndrome        Plan:   patient is a 53-year-old female with complaint of right-sided leg pain with chronic pain syndrome secondary to right-sided disc protrusion resulting right S1 nerve root impingement presents office for initial consultation  Patient has tried and failed oral steroids  Patient reports pain is often on however the periods of a lasting are increasing over the several months  1  We will schedule patient for right L5-S1 and S1 transforaminal epidural   steroid injection  2  We will trial gabapentin 100 mg p o  t i d  for lumbar radiculopathy    Complete risks and benefits including bleeding, infection, tissue reaction, nerve injury and allergic reaction were discussed  The approach was demonstrated using models and literature was provided  Verbal and written consent was obtained  History of Present Illness: The patient is a 38 y o  female who presents for consultation in regards to Hip Pain and Leg Pain  Symptoms have been present for  Several months  Symptoms began without any precipitating injury or trauma  Pain is reported to be 10 on the numeric rating scale  Symptoms are felt nearly constantly and worst in the morning  Symptoms are characterized as cramping, shooting, sharp, tingling and throbbing  Symptoms are associated with right leg weakness  Aggravating factors include lying down, standing, bending, leaning forward, leaning bckward, sitting, walking, exercise and coughing/sneezing  Relieving factors include nothing  No change in symptoms with kneeling and bowel movements  Treatments that have been helpful include nothing  home exercise and heat/ice have provided no relief  Medications to relieve symptoms include none  Review of Systems:    Review of Systems   Constitutional: Positive for unexpected weight change  Endocrine: Positive for polydipsia and polyuria  Musculoskeletal: Positive for myalgias  All other systems reviewed and are negative          Past Medical History:   Diagnosis Date    Asthma     Cat-scratch disease     last assessed 10/22/15    Depression     Hypertension     last assessed 11/11/14    Sleep disorder        Past Surgical History:   Procedure Laterality Date    BREAST BIOPSY Left     2016   [facility]ENT[facility] SURGERY      [place] BX/REMV,LYMPH NODE,DEEP AXILL Left 1/15/2019    Procedure: LEFT AXILLARY LYMPH NODE BIOPSY;  Surgeon: Mellody Bloch, MD;  Location: [facility];  Service: General    TUBAL LIGATION         Family History   Problem Relation Age of Onset    Anxiety disorder Mother     Bipolar disorder Mother     Depression Mother    Gerry Moose Schizophrenia Mother     Parkinsonism Mother         tremor    Anxiety disorder Father     Bipolar disorder Father     Depression Father     Schizophrenia Father     Stomach cancer Paternal Grandmother        Social History     Occupational History    Not on file   Tobacco Use    Smoking status: Never Smoker    Smokeless tobacco: Never Used   Substance and Sexual Activity    Alcohol use: No    Drug use: No    Sexual activity: Yes     Partners: Male     Birth control/protection: None         Current Outpatient Medications:     ARIPiprazole (ABILIFY) 10 mg tablet, Take 10 mg by mouth daily, Disp: , Rfl:     clonazePAM (KlonoPIN) 0 5 mg tablet, Take 0 5 mg by mouth 2 (two) times a day, Disp: , Rfl:     clonazePAM (KlonoPIN) 1 mg tablet, Take 1 mg by mouth 2 (two) times a day, Disp: , Rfl:     ergocalciferol (VITAMIN D2) 50,000 units, Take 1 capsule (50,000 Units total) by mouth once a week, Disp: 4 capsule, Rfl: 0    lidocaine (LIDODERM) 5 %, Apply 1 patch topically daily Remove & Discard patch within 12 hours or as directed by MD, Disp: 5 patch, Rfl: 0    meloxicam (MOBIC) 7 5 mg tablet, Take 1 tablet (7 5 mg total) by mouth daily, Disp: 30 tablet, Rfl: 0    predniSONE 10 mg tablet, 30 mg by mouth daily for 3 days, then 20 mg by mouth daily for 3 days, then 10 mg by mouth daily for 3 days, then stop (Patient not taking: Reported on 2/22/2021), Disp: 18 tablet, Rfl: 0    sertraline (ZOLOFT) 100 mg tablet, Take 200 mg by mouth daily at bedtime, Disp: , Rfl:     TiZANidine (ZANAFLEX) 2 MG capsule, Take one capsule by mouth every 12 hours as needed (Patient not taking: Reported on 2/22/2021), Disp: 14 capsule, Rfl: 0    Allergies   Allergen Reactions    Bactrim [Sulfamethoxazole-Trimethoprim]     Codeine Hives     Tolerated Percocet, Vicodin, etc        Physical Exam:    BP (!) 156/102 (BP Location: Right arm, Patient Position: Sitting, Cuff Size: Large)   Ht 5' 3" (1 6 m)   Wt 92 1 kg (203 lb)   LMP 02/12/2021   BMI 35 96 kg/m²     Constitutional: normal, well developed, well nourished, alert, in no distress and non-toxic and no overt pain behavior  and obese  Eyes: anicteric  HEENT: grossly intact  Neck: supple, symmetric, trachea midline and no masses   Pulmonary:even and unlabored  Cardiovascular:No edema or pitting edema present  Skin:Normal without rashes or lesions and well hydrated  Psychiatric:Mood and affect appropriate  Neurologic:Cranial Nerves II-XII grossly intact  Musculoskeletal:antalgic      Lumbar/Sacral Spine examination demonstrates  Decreased range of motion lumbar spine with pain upon: flexion, lateral rotation to the left/right, and bending to the left/right  Bilateral lumbar paraspinals tender to palpation  Muscle spasms noted in the lumbar area bilaterally  4/5 right  lower extremity strength in all muscle groups  Positive seated straight leg raise for bilateral lower extremities  Sensitivity to light touch intact bilateral lower extremities  2+ reflexes in the patella and Achilles    No ankle clonus     Imaging  CT LUMBAR SPINE     INDICATION:   Back pain or radiculopathy, trauma  trauma, R sided leg symptoms      COMPARISON: X-rays dated 1/9/2019      TECHNIQUE:  Contiguous axial images through the lumbar spine were obtained  Sagittal and coronal reconstructions were performed        Radiation dose length product (DLP) for this visit:  758 15 mGy-cm   This examination, like all CT scans performed in the [facility], was performed utilizing techniques to minimize radiation dose exposure, including the use of iterative   reconstruction and automated exposure control        IMAGE QUALITY:  Diagnostic      FINDINGS:     ALIGNMENT:  There are 5 lumbar type vertebral bodies  Normal alignment of the lumbar spine  No spondylolisthesis or spondylolysis      VERTEBRAL BODIES:  No fracture  No lytic or blastic lesion      DEGENERATIVE CHANGES:     Lower Thoracic spine:  Normal lower thoracic disc spaces  ]     L1-2:  Normal disc height  No herniation  Normal facet joints  No canal or foraminal stenosis      L2-3:  Normal disc height  No herniation  Normal facet joints  No canal or foraminal stenosis      L3-4:  Normal disc height  No herniation  Mild facet arthropathy  No canal or foraminal stenosis      L4-5:  Normal disc height  Small disc bulge and mild facet arthropathy  No canal or foraminal stenosis      L5-S1: Central disc protrusion asymmetric to the right with posterior displacement of the right S1 nerve root  There may also be contact with the left S1 nerve root     Correlate for S1 radiculopathy  Mild canal stenosis  No foraminal stenosis      PARASPINAL SOFT TISSUES:   Normal      IMPRESSION:  No acute fracture  Disc protrusion at L5-S1 as described

## 2021-03-12 ENCOUNTER — HOSPITAL ENCOUNTER (OUTPATIENT)
Dept: RADIOLOGY | Facility: HOSPITAL | Age: 36
Discharge: HOME | End: 2021-03-12
Admitting: ANESTHESIOLOGY
Payer: COMMERCIAL

## 2021-03-12 VITALS
RESPIRATION RATE: 20 BRPM | SYSTOLIC BLOOD PRESSURE: 171 MMHG | HEART RATE: 97 BPM | TEMPERATURE: 99.1 F | DIASTOLIC BLOOD PRESSURE: 97 MMHG | OXYGEN SATURATION: 97 %

## 2021-03-12 DIAGNOSIS — M54.16 LUMBAR RADICULOPATHY: ICD-10-CM

## 2021-03-12 DIAGNOSIS — M51.16 LUMBAR DISC DISEASE WITH RADICULOPATHY: ICD-10-CM

## 2021-03-12 DIAGNOSIS — G89.4 CHRONIC PAIN SYNDROME: ICD-10-CM

## 2021-03-12 RX ORDER — LIDOCAINE HYDROCHLORIDE 10 MG/ML
5 INJECTION, SOLUTION EPIDURAL; INFILTRATION; INTRACAUDAL; PERINEURAL ONCE
Status: COMPLETED | OUTPATIENT
Start: 2021-03-12 | End: 2021-03-12

## 2021-03-12 RX ORDER — PAPAVERINE HCL 150 MG
15 CAPSULE, EXTENDED RELEASE ORAL ONCE
Status: COMPLETED | OUTPATIENT
Start: 2021-03-12 | End: 2021-03-12

## 2021-03-12 RX ADMIN — LIDOCAINE HYDROCHLORIDE 5 ML: 10 INJECTION, SOLUTION EPIDURAL; INFILTRATION; INTRACAUDAL; PERINEURAL at 09:35

## 2021-03-12 RX ADMIN — IOHEXOL 1.5 ML: 300 INJECTION, SOLUTION INTRAVENOUS at 09:45

## 2021-03-12 RX ADMIN — DEXAMETHASONE SODIUM PHOSPHATE 15 MG: 10 INJECTION, SOLUTION INTRAMUSCULAR; INTRAVENOUS at 09:46

## 2021-03-12 NOTE — INTERVAL H&P NOTE
Update: (This section must be completed if the H&P was completed greater than 24 hrs to procedure or admission)    H&P reviewed  After examining the patient, I find no changed to the H&P since it had been written  Patient re-evaluated   Accept as history and physical     Akanksha Robledo MD/March 12, 2021/9:16 AM

## 2021-03-12 NOTE — DISCHARGE INSTR - LAB
Epidural Steroid Injection   WHAT YOU NEED TO KNOW:   An epidural steroid injection (SOCO) is a procedure to inject steroid medicine into the epidural space  The epidural space is between your spinal cord and vertebrae  Steroids reduce inflammation and fluid buildup in your spine that may be causing pain  You may be given pain medicine along with the steroids  ACTIVITY  · Do not drive or operate machinery today  · No strenuous activity today - bending, lifting, etc   · You may resume normal activites starting tomorrow - start slowly and as tolerated  · You may shower today, but no tub baths or hot tubs  · You may have numbness for several hours from the local anesthetic  Please use caution and common sense, especially with weight-bearing activities  CARE OF THE INJECTION SITE  · If you have soreness or pain, apply ice to the area today (20 minutes on/20 minutes off)  · Starting tomorrow, you may use warm, moist heat or ice if needed  · You may have an increase or change in your discomfort for 36-48 hours after your treatment  · Apply ice and continue with any pain medication you have been prescribed  · Notify the [facility] if you have any of the following: redness, drainage, swelling, headache, stiff neck or fever above 100°F     SPECIAL INSTRUCTIONS  · Our office will contact you in approximately 7 days for a progress report  MEDICATIONS  · Continue to take all routine medications  · Our office may have instructed you to hold some medications  If you have a problem specifically related to your procedure, please call our office at [phone]  Problems not related to your procedure should be directed to your primary care physician

## 2021-03-12 NOTE — DISCHARGE INSTRUCTIONS
Epidural Steroid Injection   WHAT YOU NEED TO KNOW:   An epidural steroid injection (SOCO) is a procedure to inject steroid medicine into the epidural space  The epidural space is between your spinal cord and vertebrae  Steroids reduce inflammation and fluid buildup in your spine that may be causing pain  You may be given pain medicine along with the steroids  ACTIVITY  · Do not drive or operate machinery today  · No strenuous activity today - bending, lifting, etc   · You may resume normal activites starting tomorrow - start slowly and as tolerated  · You may shower today, but no tub baths or hot tubs  · You may have numbness for several hours from the local anesthetic  Please use caution and common sense, especially with weight-bearing activities  CARE OF THE INJECTION SITE  · If you have soreness or pain, apply ice to the area today (20 minutes on/20 minutes off)  · Starting tomorrow, you may use warm, moist heat or ice if needed  · You may have an increase or change in your discomfort for 36-48 hours after your treatment  · Apply ice and continue with any pain medication you have been prescribed  · Notify the [facility] if you have any of the following: redness, drainage, swelling, headache, stiff neck or fever above 100°F     SPECIAL INSTRUCTIONS  · Our office will contact you in approximately 7 days for a progress report  MEDICATIONS  · Continue to take all routine medications  · Our office may have instructed you to hold some medications  If you have a problem specifically related to your procedure, please call our office at [phone]  Problems not related to your procedure should be directed to your primary care physician

## 2021-03-14 DIAGNOSIS — E55.9 VITAMIN D DEFICIENCY: ICD-10-CM

## 2021-03-15 RX ORDER — ERGOCALCIFEROL 1.25 MG/1
CAPSULE ORAL
Qty: 4 CAPSULE | Refills: 0 | OUTPATIENT
Start: 2021-03-15

## 2021-03-17 ENCOUNTER — TELEMEDICINE (OUTPATIENT)
Dept: FAMILY MEDICINE CLINIC | Facility: CLINIC | Age: 36
End: 2021-03-17
Payer: COMMERCIAL

## 2021-03-17 ENCOUNTER — TELEPHONE (OUTPATIENT)
Dept: PAIN MEDICINE | Facility: CLINIC | Age: 36
End: 2021-03-17

## 2021-03-17 DIAGNOSIS — R53.82 CHRONIC FATIGUE: ICD-10-CM

## 2021-03-17 DIAGNOSIS — Z86.2 HISTORY OF IRON DEFICIENCY ANEMIA: ICD-10-CM

## 2021-03-17 DIAGNOSIS — M54.16 LUMBAR RADICULOPATHY: ICD-10-CM

## 2021-03-17 DIAGNOSIS — E55.9 VITAMIN D DEFICIENCY: ICD-10-CM

## 2021-03-17 DIAGNOSIS — I10 ESSENTIAL HYPERTENSION: Primary | ICD-10-CM

## 2021-03-17 DIAGNOSIS — G89.4 CHRONIC PAIN SYNDROME: ICD-10-CM

## 2021-03-17 DIAGNOSIS — M51.16 LUMBAR DISC DISEASE WITH RADICULOPATHY: ICD-10-CM

## 2021-03-17 DIAGNOSIS — M54.50 LUMBAR BACK PAIN: ICD-10-CM

## 2021-03-17 PROCEDURE — G0071 COMM SVCS BY RHC/FQHC 5 MIN: HCPCS | Performed by: FAMILY MEDICINE

## 2021-03-17 RX ORDER — HYDROCHLOROTHIAZIDE 12.5 MG/1
12.5 TABLET ORAL DAILY
Qty: 30 TABLET | Refills: 0 | Status: SHIPPED | OUTPATIENT
Start: 2021-03-17 | End: 2021-04-22 | Stop reason: SDUPTHER

## 2021-03-17 RX ORDER — GABAPENTIN 300 MG/1
300 CAPSULE ORAL 3 TIMES DAILY
Qty: 90 CAPSULE | Refills: 1 | Status: SHIPPED | OUTPATIENT
Start: 2021-03-17 | End: 2021-11-10

## 2021-03-17 NOTE — TELEPHONE ENCOUNTER
S/w pt who states she is s/p R L5-S1 and S1 TFESI w/ RM on 3/12  Pt states that she is currently experiencing 9/10 pain, she denies relief from the injection stating her pain "has pretty much stayed the same " Pt reports that the pain is radiating through her R leg to her R ankle  Pt states that she is taking Gabapentin 100 mg TID w/ no relief, denies s/e's  Pt reports that none of the medications she tries ever seem to help, states she has taken mobic and used lidocaine patches previously w/ no relief  Pt states she has tried ice/heat w/ no relief  Next OV 4/8/21 w/ BK  Pt was advised that RN will forward this information and c/b w/ recs when available  Pt appreciative  Please advise  Thank you

## 2021-03-17 NOTE — PATIENT INSTRUCTIONS
Hypertension, Ambulatory Care   GENERAL INFORMATION:   Hypertension  is high blood pressure (BP)  Your BP is the force of your blood moving against the walls of your arteries  Hypertension is a BP of 140/90 or higher  Hypertension causes your BP to get so high that your heart has to work much harder than normal  This can cause damage to your heart  Common symptoms include the following:   · Headache     · Blurred vision     · Chest pain     · Dizziness or weakness     · Trouble breathing    · Nosebleeds  Seek immediate care for the following symptoms:   · Severe headache or vision loss    · Weakness in an arm or leg    · Confusion or difficulty speaking    · Discomfort in your chest that feels like squeezing, pressure, fullness, or pain    · Suddenly feeling lightheaded or trouble breathing    · Pain or discomfort in your back, neck, jaw, stomach, or arm  Treatment for hypertension  may include medicine to lower your BP  You may also need to make lifestyle changes  Take your medicine exactly as directed  Manage hypertension:   · Take your BP at home  Sit and rest for 5 minutes before you take your BP  Extend your arm and support it on a flat surface  Your arm should be at the same level as your heart  Follow the directions that came with your BP monitor  If possible, take at least 2 BP readings each time  Take your BP at least twice a day at the same times each day, such as morning and evening  Keep a log of your BP readings and bring it to your follow-up visits  · Eat less sodium (salt)  Do not add sodium to your food  Limit foods that are high in sodium, such as canned foods, potato chips, and cold cuts  Your healthcare provider may suggest that you follow the [address] [place] [address]  The plan is low in sodium, unhealthy fats, and total fat  It is high in potassium, calcium, and fiber  · Exercise regularly  Exercise at least 30 minutes per day, on most days of the week  This will help decrease your BP  Ask your healthcare provider about the best exercise plan for you  · Limit alcohol  Women should limit alcohol to 1 drink a day  Men should limit alcohol to 2 drinks a day  A drink of alcohol is 12 ounces of beer, 5 ounces of wine, or 1½ ounces of liquor  · Do not smoke  If you smoke, it is never too late to quit  Smoking can increase your BP  Smoking also worsens other health conditions you may have that can increase your risk for hypertension  Ask your healthcare provider for information if you need help quitting  Follow up with your healthcare provider as directed: You will need to return to have your BP checked and to have other lab tests done  Write down your questions so you remember to ask them during your visits  CARE AGREEMENT:   You have the right to help plan your care  Learn about your health condition and how it may be treated  Discuss treatment options with your caregivers to decide what care you want to receive  You always have the right to refuse treatment  The above information is an  only  It is not intended as medical advice for individual conditions or treatments  Talk to your doctor, nurse or pharmacist before following any medical regimen to see if it is safe and effective for you  © 2014 [address] is for End User's use only and may not be sold, redistributed or otherwise used for commercial purposes  All illustrations and images included in CareNotes® are the copyrighted property of A D [employer] M [employer]  or Aryan Chowdary

## 2021-03-17 NOTE — PROGRESS NOTES
Virtual Brief Visit    Assessment/Plan:    Problem List Items Addressed This Visit        Cardiovascular and Mediastinum    Essential hypertension - Primary    Relevant Medications    hydrochlorothiazide (HYDRODIURIL) 12 5 mg tablet       Other    Lumbar back pain    Vitamin D deficiency    Relevant Orders    Vitamin D 25 hydroxy      Other Visit Diagnoses     Chronic fatigue        Relevant Orders    CBC and differential    Comprehensive metabolic panel    TSH, 3rd generation with Free T4 reflex    Vitamin B12    Iron Panel (Includes Ferritin, Iron Sat%, Iron, and TIBC)    History of iron deficiency anemia        Relevant Orders    CBC and differential    Iron Panel (Includes Ferritin, Iron Sat%, Iron, and TIBC)        -F/U with pain management as scheduled  -labs as ordered for further evaluation of fatigue  We will with results and further recommendations  -will start HCTZ given patient's office and home BP readings  -RTC 2 weeks for BP check or sooner if concerns arise         Reason for visit is   Chief Complaint   Patient presents with    Back Pain     patient states that she has a lot of back pain still  She states she saw Spine and pain and was given an injection this past Friday, but it didn't help    Virtual Brief Visit        Encounter provider Juan Miguel Larios PA-C    Provider located at [address] [place] [id number]    Recent Visits  No visits were found meeting these conditions  Showing recent visits within past 7 days and meeting all other requirements     Today's Visits  Date Type Provider Dept   03/17/21 Telemedicine Jillian Sullivan PA-C [facility]   Showing today's visits and meeting all other requirements     Future Appointments  No visits were found meeting these conditions     Showing future appointments within next 150 days and meeting all other requirements        After connecting through telephone, the patient was identified by name and date of birth  Dayne Barnett was informed that this is a telemedicine visit and that the visit is being conducted through telephone  My office door was closed  No one else was in the room  She acknowledged consent and understanding of privacy and security of the platform  The patient has agreed to participate and understands she can discontinue the visit at any time  Patient is aware this is a billable service  Subjective    Dayne Barnett is a 38 y o  female who presents via telemedicine for routine follow up  She complains of lumbar back pain and is currently following with pain management  She had an injection 3/12 which did not relieve her symptoms  Per chart review she was instructed to increase gabapentin to 200 mg TID for 4 days and then 300 mg TID  She has follow up with them scheduled for 4/8  Chart review of previous visits show patient's BP elevated  She is not on anti-hypertensive medication  Viral Brown notes she was previously told by her PCP her BP was elevated and she was provided a BP cuff  She intermittently takes her BP at home and states her last reading 2 days ago was "154 over something " it is consistently elevated  She denies symptoms such as headache, dizziness, lightheadedness, CP, SOB  She complains of chronic fatigue  She states a history of iron deficiency anemia and notes she often feels like this when her iron is low  She is requesting to have it checked via lab work  No other complaints today         HPI     Past Medical History:   Diagnosis Date    Asthma     Cat-scratch disease     last assessed 10/22/15    Depression     Hypertension     last assessed 11/11/14    Sleep disorder        Past Surgical History:   Procedure Laterality Date    BREAST BIOPSY Left     2016   [facility]      WI BX/REMV,LYMPH NODE,DEEP AXILL Left 1/15/2019    Procedure: LEFT AXILLARY LYMPH NODE BIOPSY;  Surgeon: Titus Vicente MD;  Location: [facility];  Service: General    TUBAL LIGATION         Current Outpatient Medications   Medication Sig Dispense Refill    ARIPiprazole (ABILIFY) 10 mg tablet Take 10 mg by mouth daily      clonazePAM (KlonoPIN) 1 mg tablet Take 1 mg by mouth 2 (two) times a day      ergocalciferol (VITAMIN D2) 50,000 units Take 1 capsule (50,000 Units total) by mouth once a week 4 capsule 0    gabapentin (NEURONTIN) 300 mg capsule Take 1 capsule (300 mg total) by mouth 3 (three) times a day 90 capsule 1    hydrochlorothiazide (HYDRODIURIL) 12 5 mg tablet Take 1 tablet (12 5 mg total) by mouth daily 30 tablet 0    meloxicam (MOBIC) 7 5 mg tablet Take 1 tablet (7 5 mg total) by mouth daily 30 tablet 0    sertraline (ZOLOFT) 100 mg tablet Take 200 mg by mouth daily at bedtime       No current facility-administered medications for this visit  Allergies   Allergen Reactions    Bactrim [Sulfamethoxazole-Trimethoprim]     Codeine Hives     Tolerated Percocet, Vicodin, etc        Review of Systems   Constitutional: Positive for fatigue  Negative for activity change, appetite change, chills, diaphoresis, fever and unexpected weight change  HENT: Negative  Eyes: Negative  Respiratory: Negative for cough, shortness of breath and wheezing  Cardiovascular: Negative for chest pain  Gastrointestinal: Negative  Endocrine: Negative  Genitourinary: Negative  Musculoskeletal: Positive for back pain  Negative for arthralgias and myalgias  Skin: Negative for color change and pallor  Neurological: Negative for dizziness, weakness, light-headedness and headaches  Hematological: Does not bruise/bleed easily  There were no vitals filed for this visit        I spent 10 minutes with patient today in which greater than 50% of the time was spent in counseling/coordination of care regarding assessment and plan of care    [address] acknowledges that she has consented to an online visit or consultation  She understands that the online visit is based solely on information provided by her, and that, in the absence of a face-to-face physical evaluation by the physician, the diagnosis she receives is both limited and provisional in terms of accuracy and completeness  This is not intended to replace a full medical face-to-face evaluation by the physician  Baron Grajeda understands and accepts these terms  It was my intent to perform this visit via video technology but the patient was not able to do a video connection so the visit was completed via audio telephone only

## 2021-03-17 NOTE — TELEPHONE ENCOUNTER
Patient   [phone]  Dr Giancarlo Daniel     Patient is calling in stating that she is still in a lot of pain  She got an injection on 3/12/21, her pain level is a 9/10   Please follow up with pt

## 2021-03-17 NOTE — TELEPHONE ENCOUNTER
Please call patient explained to her that it is not uncommon to have increased pain for several days to a week after an injection  It can take up to 2 weeks after the injection to start seeing some improvement  She can apply warm moist heat to her back for 10-15 minutes in our if needed  If not contraindicated, she can take Tylenol 1000 mg up to 3 times daily in addition to her prescription medications

## 2021-03-17 NOTE — TELEPHONE ENCOUNTER
We can try increasing her gabapentin  Please advise her to increase to 200 mg 3 times daily for the next 4 days  Then, we will increase to 300 mg 3 times daily  I will send a new prescription to her pharmacy for gabapentin 300 mg 3 times daily  Please remind her the prescription on sending to her pharmacy is a 300 mg pill, so she will only have to take 1 pill 3 times daily

## 2021-03-21 ENCOUNTER — HOSPITAL ENCOUNTER (EMERGENCY)
Facility: HOSPITAL | Age: 36
Discharge: HOME | End: 2021-03-22
Attending: EMERGENCY MEDICINE | Admitting: EMERGENCY MEDICINE
Payer: COMMERCIAL

## 2021-03-21 ENCOUNTER — APPOINTMENT (EMERGENCY)
Dept: CT IMAGING | Facility: HOSPITAL | Age: 36
End: 2021-03-21
Payer: COMMERCIAL

## 2021-03-21 ENCOUNTER — TELEPHONE (OUTPATIENT)
Dept: OTHER | Facility: OTHER | Age: 36
End: 2021-03-21

## 2021-03-21 DIAGNOSIS — G89.29 ACUTE EXACERBATION OF CHRONIC LOW BACK PAIN: Primary | ICD-10-CM

## 2021-03-21 DIAGNOSIS — M54.50 ACUTE EXACERBATION OF CHRONIC LOW BACK PAIN: Primary | ICD-10-CM

## 2021-03-21 LAB
ALBUMIN SERPL BCP-MCNC: 3.3 G/DL (ref 3.5–5)
ALP SERPL-CCNC: 75 U/L (ref 46–116)
ALT SERPL W P-5'-P-CCNC: 27 U/L (ref 12–78)
ANION GAP SERPL CALCULATED.3IONS-SCNC: 10 MMOL/L (ref 4–13)
APTT PPP: 26 SECONDS (ref 23–37)
AST SERPL W P-5'-P-CCNC: 14 U/L (ref 5–45)
BASOPHILS # BLD AUTO: 0.06 THOUSANDS/ΜL (ref 0–0.1)
BASOPHILS NFR BLD AUTO: 1 % (ref 0–1)
BILIRUB SERPL-MCNC: 0.2 MG/DL (ref 0.2–1)
BUN SERPL-MCNC: 12 MG/DL (ref 5–25)
CALCIUM ALBUM COR SERPL-MCNC: 9.3 MG/DL (ref 8.3–10.1)
CALCIUM SERPL-MCNC: 8.7 MG/DL (ref 8.3–10.1)
CHLORIDE SERPL-SCNC: 110 MMOL/L (ref 100–108)
CO2 SERPL-SCNC: 23 MMOL/L (ref 21–32)
CREAT SERPL-MCNC: 0.89 MG/DL (ref 0.6–1.3)
EOSINOPHIL # BLD AUTO: 0.28 THOUSAND/ΜL (ref 0–0.61)
EOSINOPHIL NFR BLD AUTO: 3 % (ref 0–6)
ERYTHROCYTE [DISTWIDTH] IN BLOOD BY AUTOMATED COUNT: 12.7 % (ref 11.6–15.1)
GFR SERPL CREATININE-BSD FRML MDRD: 84 ML/MIN/1.73SQ M
GLUCOSE SERPL-MCNC: 210 MG/DL (ref 65–140)
HCT VFR BLD AUTO: 41.5 % (ref 34.8–46.1)
HGB BLD-MCNC: 13.5 G/DL (ref 11.5–15.4)
IMM GRANULOCYTES # BLD AUTO: 0.05 THOUSAND/UL (ref 0–0.2)
IMM GRANULOCYTES NFR BLD AUTO: 1 % (ref 0–2)
INR PPP: 0.97 (ref 0.84–1.19)
LIPASE SERPL-CCNC: 163 U/L (ref 73–393)
LYMPHOCYTES # BLD AUTO: 1.5 THOUSANDS/ΜL (ref 0.6–4.47)
LYMPHOCYTES NFR BLD AUTO: 15 % (ref 14–44)
MAGNESIUM SERPL-MCNC: 1.8 MG/DL (ref 1.6–2.6)
MCH RBC QN AUTO: 31.2 PG (ref 26.8–34.3)
MCHC RBC AUTO-ENTMCNC: 32.5 G/DL (ref 31.4–37.4)
MCV RBC AUTO: 96 FL (ref 82–98)
MONOCYTES # BLD AUTO: 0.93 THOUSAND/ΜL (ref 0.17–1.22)
MONOCYTES NFR BLD AUTO: 9 % (ref 4–12)
NEUTROPHILS # BLD AUTO: 7.11 THOUSANDS/ΜL (ref 1.85–7.62)
NEUTS SEG NFR BLD AUTO: 71 % (ref 43–75)
NRBC BLD AUTO-RTO: 0 /100 WBCS
PLATELET # BLD AUTO: 238 THOUSANDS/UL (ref 149–390)
PMV BLD AUTO: 10.2 FL (ref 8.9–12.7)
POTASSIUM SERPL-SCNC: 3.9 MMOL/L (ref 3.5–5.3)
PROT SERPL-MCNC: 6.8 G/DL (ref 6.4–8.2)
PROTHROMBIN TIME: 12.7 SECONDS (ref 11.6–14.5)
RBC # BLD AUTO: 4.33 MILLION/UL (ref 3.81–5.12)
SODIUM SERPL-SCNC: 143 MMOL/L (ref 136–145)
TROPONIN I SERPL-MCNC: <0.02 NG/ML
WBC # BLD AUTO: 9.93 THOUSAND/UL (ref 4.31–10.16)

## 2021-03-21 PROCEDURE — 74177 CT ABD & PELVIS W/CONTRAST: CPT

## 2021-03-21 PROCEDURE — 85025 COMPLETE CBC W/AUTO DIFF WBC: CPT | Performed by: EMERGENCY MEDICINE

## 2021-03-21 PROCEDURE — 85610 PROTHROMBIN TIME: CPT | Performed by: EMERGENCY MEDICINE

## 2021-03-21 PROCEDURE — 96374 THER/PROPH/DIAG INJ IV PUSH: CPT

## 2021-03-21 PROCEDURE — 80053 COMPREHEN METABOLIC PANEL: CPT | Performed by: EMERGENCY MEDICINE

## 2021-03-21 PROCEDURE — 96375 TX/PRO/DX INJ NEW DRUG ADDON: CPT

## 2021-03-21 PROCEDURE — 36415 COLL VENOUS BLD VENIPUNCTURE: CPT | Performed by: EMERGENCY MEDICINE

## 2021-03-21 PROCEDURE — 93005 ELECTROCARDIOGRAM TRACING: CPT

## 2021-03-21 PROCEDURE — 99284 EMERGENCY DEPT VISIT MOD MDM: CPT

## 2021-03-21 PROCEDURE — 83690 ASSAY OF LIPASE: CPT | Performed by: EMERGENCY MEDICINE

## 2021-03-21 PROCEDURE — 85730 THROMBOPLASTIN TIME PARTIAL: CPT | Performed by: EMERGENCY MEDICINE

## 2021-03-21 PROCEDURE — 83735 ASSAY OF MAGNESIUM: CPT | Performed by: EMERGENCY MEDICINE

## 2021-03-21 PROCEDURE — 84484 ASSAY OF TROPONIN QUANT: CPT | Performed by: EMERGENCY MEDICINE

## 2021-03-21 PROCEDURE — G1004 CDSM NDSC: HCPCS

## 2021-03-21 RX ORDER — LORAZEPAM 2 MG/ML
2 INJECTION INTRAMUSCULAR ONCE
Status: COMPLETED | OUTPATIENT
Start: 2021-03-21 | End: 2021-03-21

## 2021-03-21 RX ORDER — ONDANSETRON 2 MG/ML
4 INJECTION INTRAMUSCULAR; INTRAVENOUS ONCE
Status: COMPLETED | OUTPATIENT
Start: 2021-03-21 | End: 2021-03-21

## 2021-03-21 RX ORDER — MORPHINE SULFATE 4 MG/ML
4 INJECTION, SOLUTION INTRAMUSCULAR; INTRAVENOUS ONCE
Status: COMPLETED | OUTPATIENT
Start: 2021-03-21 | End: 2021-03-21

## 2021-03-21 RX ORDER — SODIUM CHLORIDE 9 MG/ML
3 INJECTION INTRAVENOUS
Status: DISCONTINUED | OUTPATIENT
Start: 2021-03-21 | End: 2021-03-22 | Stop reason: HOSPADM

## 2021-03-21 RX ADMIN — LORAZEPAM 2 MG: 2 INJECTION INTRAMUSCULAR; INTRAVENOUS at 22:13

## 2021-03-21 RX ADMIN — MORPHINE SULFATE 4 MG: 4 INJECTION, SOLUTION INTRAMUSCULAR; INTRAVENOUS at 22:13

## 2021-03-21 RX ADMIN — IOHEXOL 100 ML: 350 INJECTION, SOLUTION INTRAVENOUS at 23:52

## 2021-03-21 RX ADMIN — ONDANSETRON 4 MG: 2 INJECTION INTRAMUSCULAR; INTRAVENOUS at 22:13

## 2021-03-21 NOTE — Clinical Note
Teofilo Arden was seen and treated in our emergency department on 3/21/2021  Diagnosis:     Gene Cabral  may return to work on return date  She may return on this date: 03/25/2021         If you have any questions or concerns, please don't hesitate to call        Baldomero Hastings, DO    ______________________________           _______________          _______________  Hospital Representative                              Date                                Time

## 2021-03-22 ENCOUNTER — TELEPHONE (OUTPATIENT)
Dept: PAIN MEDICINE | Facility: CLINIC | Age: 36
End: 2021-03-22

## 2021-03-22 ENCOUNTER — HOSPITAL ENCOUNTER (OUTPATIENT)
Dept: MRI IMAGING | Facility: HOSPITAL | Age: 36
Discharge: HOME | End: 2021-03-22
Attending: ANESTHESIOLOGY
Payer: COMMERCIAL

## 2021-03-22 ENCOUNTER — PATIENT OUTREACH (OUTPATIENT)
Dept: CASE MANAGEMENT | Facility: HOSPITAL | Age: 36
End: 2021-03-22

## 2021-03-22 VITALS
BODY MASS INDEX: 36.84 KG/M2 | HEART RATE: 94 BPM | TEMPERATURE: 98.6 F | DIASTOLIC BLOOD PRESSURE: 95 MMHG | WEIGHT: 207.89 LBS | HEIGHT: 63 IN | OXYGEN SATURATION: 99 % | RESPIRATION RATE: 18 BRPM | SYSTOLIC BLOOD PRESSURE: 135 MMHG

## 2021-03-22 DIAGNOSIS — G83.4 CAUDA EQUINA SYNDROME (HCC): ICD-10-CM

## 2021-03-22 DIAGNOSIS — M54.16 LUMBAR RADICULOPATHY: Primary | ICD-10-CM

## 2021-03-22 DIAGNOSIS — M54.16 LUMBAR RADICULOPATHY: ICD-10-CM

## 2021-03-22 PROBLEM — G89.29 ACUTE EXACERBATION OF CHRONIC LOW BACK PAIN: Status: ACTIVE | Noted: 2021-03-22

## 2021-03-22 PROBLEM — M54.50 ACUTE EXACERBATION OF CHRONIC LOW BACK PAIN: Status: ACTIVE | Noted: 2021-03-22

## 2021-03-22 PROCEDURE — G1004 CDSM NDSC: HCPCS

## 2021-03-22 PROCEDURE — 72148 MRI LUMBAR SPINE W/O DYE: CPT

## 2021-03-22 PROCEDURE — 99285 EMERGENCY DEPT VISIT HI MDM: CPT | Performed by: EMERGENCY MEDICINE

## 2021-03-22 RX ORDER — OXYCODONE HYDROCHLORIDE AND ACETAMINOPHEN 5; 325 MG/1; MG/1
1 TABLET ORAL EVERY 4 HOURS PRN
Qty: 12 TABLET | Refills: 0 | Status: SHIPPED | OUTPATIENT
Start: 2021-03-22 | End: 2021-04-14 | Stop reason: HOSPADM

## 2021-03-22 RX ORDER — METHYLPREDNISOLONE 4 MG/1
TABLET ORAL
Qty: 21 TABLET | Refills: 0 | Status: SHIPPED | OUTPATIENT
Start: 2021-03-22 | End: 2021-03-26 | Stop reason: ALTCHOICE

## 2021-03-22 RX ORDER — CYCLOBENZAPRINE HCL 10 MG
10 TABLET ORAL 3 TIMES DAILY PRN
Qty: 30 TABLET | Refills: 0 | Status: SHIPPED | OUTPATIENT
Start: 2021-03-22 | End: 2021-06-17 | Stop reason: ALTCHOICE

## 2021-03-22 RX ORDER — ONDANSETRON 4 MG/1
4 TABLET, FILM COATED ORAL EVERY 8 HOURS PRN
Qty: 30 TABLET | Refills: 0 | Status: SHIPPED | OUTPATIENT
Start: 2021-03-22 | End: 2021-05-08 | Stop reason: ALTCHOICE

## 2021-03-22 RX ORDER — NAPROXEN 500 MG/1
500 TABLET ORAL 2 TIMES DAILY WITH MEALS
Qty: 10 TABLET | Refills: 0 | Status: SHIPPED | OUTPATIENT
Start: 2021-03-22 | End: 2021-04-12

## 2021-03-22 NOTE — TELEPHONE ENCOUNTER
--pt to schedule stat MRI, look for results--    S/W pt  Advised pt of the same  Gave her central scheduling's phone #  Pt verbalized understanding

## 2021-03-22 NOTE — PROGRESS NOTES
Outpatient Care Management Note:  Patient has not returned the Social Determinants of Health Questionnaire sent via [id number] on 03/01/21  ED followup episode will be closed at this time

## 2021-03-22 NOTE — ED PROVIDER NOTES
History  Chief Complaint   Patient presents with    Back Pain     Patient was seen last week at a spine specialist for injections because of right sided sciatica  Now she is c/o increase in pain and having problems with urination  She either cannot urinate, or cannot make it to the bathroom and is incontinent  HPI       Pt presents from home, hx of lumbar radiculopathy with sciatica, asthma, HTN, c/o low back pain, right sided, with radiation into her buttock and right, lateral leg, constant, worse w/ bending over, mild to moderate intensity and currently present  Pt states she feels that it is hard to urinate at times because of the pain  She sometimes has had some dribbling of urine  Pt o/w denies any jean incontinence or retention  No saddle anesthesia  Pt denies ha, fevers, cough, cp, sob, n/v/d/c, abd pain, dysuria, focal def or syncope  Prior to Admission Medications   Prescriptions Last Dose Informant Patient Reported? Taking?    ARIPiprazole (ABILIFY) 10 mg tablet 3/21/2021 at Unknown time Self Yes Yes   Sig: Take 10 mg by mouth daily   clonazePAM (KlonoPIN) 1 mg tablet 3/21/2021 at Unknown time  Yes Yes   Sig: Take 1 mg by mouth 2 (two) times a day   ergocalciferol (VITAMIN D2) 50,000 units 3/21/2021 at Unknown time  No Yes   Sig: Take 1 capsule (50,000 Units total) by mouth once a week   gabapentin (NEURONTIN) 300 mg capsule 3/21/2021 at Unknown time  No Yes   Sig: Take 1 capsule (300 mg total) by mouth 3 (three) times a day   hydrochlorothiazide (HYDRODIURIL) 12 5 mg tablet 3/21/2021 at Unknown time  No Yes   Sig: Take 1 tablet (12 5 mg total) by mouth daily   meloxicam (MOBIC) 7 5 mg tablet 3/21/2021 at Unknown time  No Yes   Sig: Take 1 tablet (7 5 mg total) by mouth daily   sertraline (ZOLOFT) 100 mg tablet 3/20/2021 at Unknown time Self Yes Yes   Sig: Take 200 mg by mouth daily at bedtime      Facility-Administered Medications: None       Past Medical History:   Diagnosis Date    Asthma     Cat-scratch disease     last assessed 10/22/15    Depression     Hypertension     last assessed 11/11/14    Sciatica     Sleep disorder        Past Surgical History:   Procedure Laterality Date    BREAST BIOPSY Left     2016   [facility]      TN BX/REMV,LYMPH NODE,DEEP AXILL Left 1/15/2019    Procedure: LEFT AXILLARY LYMPH NODE BIOPSY;  Surgeon: Angelita Rm MD;  Location: [facility];  Service: General    TUBAL LIGATION         Family History   Problem Relation Age of Onset    Anxiety disorder Mother     Bipolar disorder Mother     Depression Mother     Schizophrenia Mother     Parkinsonism Mother         tremor    Anxiety disorder Father     Bipolar disorder Father     Depression Father     Schizophrenia Father     Stomach cancer Paternal Grandmother      I have reviewed and agree with the history as documented  E-Cigarette/Vaping    E-Cigarette Use Never User      E-Cigarette/Vaping Substances     Social History     Tobacco Use    Smoking status: Never Smoker    Smokeless tobacco: Never Used   Substance Use Topics    Alcohol use: No    Drug use: No       Review of Systems   Constitutional: Negative for activity change, appetite change, diaphoresis, fatigue and fever  HENT: Negative for congestion, facial swelling, mouth sores and trouble swallowing  Eyes: Negative for photophobia, discharge and visual disturbance  Respiratory: Negative for apnea, cough, shortness of breath and wheezing  Cardiovascular: Negative for chest pain and leg swelling  Gastrointestinal: Negative for abdominal pain, constipation, diarrhea, nausea and vomiting  Endocrine: Negative for heat intolerance and polydipsia  Genitourinary: Negative for dysuria, flank pain, frequency and hematuria  Musculoskeletal: Positive for back pain  Negative for gait problem, myalgias and neck pain  Skin: Negative for rash and wound  Allergic/Immunologic: Negative for immunocompromised state  Neurological: Negative for dizziness, syncope, weakness, light-headedness and headaches  Hematological: Negative for adenopathy  Psychiatric/Behavioral: Negative for agitation, confusion and self-injury  The patient is not nervous/anxious  Physical Exam  Physical Exam  Vitals signs and nursing note reviewed  Constitutional:       General: She is not in acute distress  Appearance: She is well-developed  She is not diaphoretic  HENT:      Head: Normocephalic and atraumatic  Right Ear: External ear normal       Left Ear: External ear normal       Nose: Nose normal       Mouth/Throat:      Pharynx: No oropharyngeal exudate  Eyes:      General: No scleral icterus  Right eye: No discharge  Left eye: No discharge  Conjunctiva/sclera: Conjunctivae normal       Pupils: Pupils are equal, round, and reactive to light  Neck:      Musculoskeletal: Normal range of motion and neck supple  Thyroid: No thyromegaly  Vascular: No JVD  Trachea: No tracheal deviation  Cardiovascular:      Rate and Rhythm: Normal rate and regular rhythm  Heart sounds: Normal heart sounds  No murmur  Pulmonary:      Effort: Pulmonary effort is normal  No respiratory distress  Breath sounds: Normal breath sounds  No stridor  No wheezing or rales  Chest:      Chest wall: No tenderness  Abdominal:      General: Bowel sounds are normal  There is no distension  Palpations: Abdomen is soft  There is no mass  Tenderness: There is no abdominal tenderness  There is no guarding or rebound  Genitourinary:     Rectum: Normal       Comments: Pt with good rectal tone  Brown stool that is occult heme negative  Musculoskeletal: Normal range of motion  General: Tenderness present  No deformity  Arms:         Legs:    Lymphadenopathy:      Cervical: No cervical adenopathy  Skin:     General: Skin is warm and dry  Coloration: Skin is not pale  Findings: No erythema or rash  Neurological:      Mental Status: She is alert and oriented to person, place, and time  Cranial Nerves: No cranial nerve deficit  Motor: No abnormal muscle tone  Coordination: Coordination normal       Deep Tendon Reflexes: Reflexes are normal and symmetric  Reflexes normal    Psychiatric:         Behavior: Behavior normal          Thought Content:  Thought content normal          Judgment: Judgment normal          Vital Signs  ED Triage Vitals [03/21/21 2128]   Temperature Pulse Respirations Blood Pressure SpO2   98 6 °F (37 °C) 100 20 (S) (!) 164/104 97 %      Temp Source Heart Rate Source Patient Position - Orthostatic VS BP Location FiO2 (%)   Temporal Monitor Sitting Left arm --      Pain Score       Worst Possible Pain           Vitals:    03/21/21 2215 03/21/21 2300 03/21/21 2330 03/22/21 0100   BP: 157/93 140/88 132/82 135/95   Pulse: 89 (!) 107 99 94   Patient Position - Orthostatic VS:  Lying Lying          Visual Acuity      ED Medications  Medications   ondansetron (ZOFRAN) injection 4 mg (4 mg Intravenous Given 3/21/21 2213)   morphine (PF) 4 mg/mL injection 4 mg (4 mg Intravenous Given 3/21/21 2213)   LORazepam (ATIVAN) injection 2 mg (2 mg Intravenous Given 3/21/21 2213)   iohexol (OMNIPAQUE) 350 MG/ML injection (SINGLE-DOSE) 100 mL (100 mL Intravenous Given 3/21/21 2352)       Diagnostic Studies  Results Reviewed     Procedure Component Value Units Date/Time    Troponin I [[id number]]  (Normal) Collected: 03/21/21 2231    Lab Status: Final result Specimen: Blood from Arm, Left Updated: 03/21/21 2256     Troponin I <0 02 ng/mL     Comprehensive metabolic panel [[id number]]  (Abnormal) Collected: 03/21/21 2231    Lab Status: Final result Specimen: Blood from Arm, Left Updated: 03/21/21 2254     Sodium 143 mmol/L      Potassium 3 9 mmol/L      Chloride 110 mmol/L      CO2 23 mmol/L      ANION GAP 10 mmol/L      BUN 12 mg/dL      Creatinine 0 89 mg/dL Glucose 210 mg/dL      Calcium 8 7 mg/dL      Corrected Calcium 9 3 mg/dL      AST 14 U/L      ALT 27 U/L      Alkaline Phosphatase 75 U/L      Total Protein 6 8 g/dL      Albumin 3 3 g/dL      Total Bilirubin 0 20 mg/dL      eGFR 84 ml/min/1 73sq m     Narrative:      [place] guidelines for Chronic Kidney Disease (CKD):     Stage 1 with normal or high GFR (GFR > 90 mL/min/1 73 square meters)    Stage 2 Mild CKD (GFR = 60-89 mL/min/1 73 square meters)    Stage 3A Moderate CKD (GFR = 45-59 mL/min/1 73 square meters)    Stage 3B Moderate CKD (GFR = 30-44 mL/min/1 73 square meters)    Stage 4 Severe CKD (GFR = 15-29 mL/min/1 73 square meters)    Stage 5 End Stage CKD (GFR <15 mL/min/1 73 square meters)  Note: GFR calculation is accurate only with a steady state creatinine    Lipase [[id number]]  (Normal) Collected: 03/21/21 2231    Lab Status: Final result Specimen: Blood from Arm, Left Updated: 03/21/21 2248     Lipase 163 u/L     Magnesium [[id number]]  (Normal) Collected: 03/21/21 2231    Lab Status: Final result Specimen: Blood from Arm, Left Updated: 03/21/21 2248     Magnesium 1 8 mg/dL     Protime-INR [[id number]]  (Normal) Collected: 03/21/21 2231    Lab Status: Final result Specimen: Blood from Arm, Left Updated: 03/21/21 2247     Protime 12 7 seconds      INR 0 97    APTT [[id number]]  (Normal) Collected: 03/21/21 2231    Lab Status: Final result Specimen: Blood from Arm, Left Updated: 03/21/21 2247     PTT 26 seconds     CBC and differential [[id number]] Collected: 03/21/21 2231    Lab Status: Final result Specimen: Blood from Arm, Left Updated: 03/21/21 2238     WBC 9 93 Thousand/uL      RBC 4 33 Million/uL      Hemoglobin 13 5 g/dL      Hematocrit 41 5 %      MCV 96 fL      MCH 31 2 pg      MCHC 32 5 g/dL      RDW 12 7 %      MPV 10 2 fL      Platelets 654 Thousands/uL      nRBC 0 /100 WBCs      Neutrophils Relative 71 %      Immat GRANS % 1 %      Lymphocytes Relative 15 % Monocytes Relative 9 %      Eosinophils Relative 3 %      Basophils Relative 1 %      Neutrophils Absolute 7 11 Thousands/µL      Immature Grans Absolute 0 05 Thousand/uL      Lymphocytes Absolute 1 50 Thousands/µL      Monocytes Absolute 0 93 Thousand/µL      Eosinophils Absolute 0 28 Thousand/µL      Basophils Absolute 0 06 Thousands/µL                  CT abdomen pelvis with contrast   Final Result by Calvin Jaramillo MD (03/22 0023)      No evidence of acute intra-abdominal or pelvic pathology            Workstation performed: [id number]                    Procedures  Procedures         ED Course                             SBIRT 22yo+      Most Recent Value   SBIRT (24 yo +)   In order to provide better care to our patients, we are screening all of our patients for alcohol and drug use  Would it be okay to ask you these screening questions? Yes Filed at: 03/21/2021 2132   Initial Alcohol Screen: US AUDIT-C    1  How often do you have a drink containing alcohol?  0 Filed at: 03/21/2021 2132   2  How many drinks containing alcohol do you have on a typical day you are drinking? 0 Filed at: 03/21/2021 2132   3a  Male UNDER 65: How often do you have five or more drinks on one occasion? 0 Filed at: 03/21/2021 2132   3b  FEMALE Any Age, or MALE 65+: How often do you have 4 or more drinks on one occassion? 0 Filed at: 03/21/2021 2132   Audit-C Score  0 Filed at: 03/21/2021 2132   YOAN: How many times in the past year have you    Used an illegal drug or used a prescription medication for non-medical reasons? Never Filed at: 03/21/2021 2132                    MDM  Number of Diagnoses or Management Options  Acute exacerbation of chronic low back pain:   Diagnosis management comments: IMP: lumbar radiculopathy with sciatica versus lumbar/sciatica strain, kidney stone  Doubt cauda equina, bacteremia, surgical abd process    Plan: check cardiac labs, ekg, ct abd/pelvis, give ivf and iv narcotic pain meds prn   - ekg no acute ischemia  -   - ct scan no acute  - Pt with likely lumbar radiculopathy with sciatica  Pt is improved and will f/up w/ her pcp  Amount and/or Complexity of Data Reviewed  Clinical lab tests: ordered and reviewed  Tests in the radiology section of CPT®: ordered and reviewed  Tests in the medicine section of CPT®: ordered and reviewed  Decide to obtain previous medical records or to obtain history from someone other than the patient: yes  Review and summarize past medical records: yes  Independent visualization of images, tracings, or specimens: yes    Risk of Complications, Morbidity, and/or Mortality  Presenting problems: high  Diagnostic procedures: high  Management options: high    Patient Progress  Patient progress: improved      Disposition  Final diagnoses:   Acute exacerbation of chronic low back pain     Time reflects when diagnosis was documented in both MDM as applicable and the Disposition within this note     Time User Action Codes Description Comment    3/22/2021 12:48 AM Marylu Gutierrez Add [M54 5,  G89 29] Acute exacerbation of chronic low back pain       ED Disposition     ED Disposition Condition Date/Time Comment    Discharge Stable Mon Mar 22, 2021 12:48 AM Kimberly Davies discharge to home/self care              Follow-up Information     Follow up With Specialties Details Why Contact Info    Jillian Sullivan PA-C Family Medicine, Physician Assistant Schedule an appointment as soon as possible for a visit in 2 days Return immediately, If symptoms worsen [address]  [phone]            Discharge Medication List as of 3/22/2021 12:50 AM      START taking these medications    Details   cyclobenzaprine (FLEXERIL) 10 mg tablet Take 1 tablet (10 mg total) by mouth 3 (three) times a day as needed for muscle spasms, Starting Mon 3/22/2021, Until Wed 4/21/2021, Normal      naproxen (NAPROSYN) 500 mg tablet Take 1 tablet (500 mg total) by mouth 2 (two) times a day with meals for 10 doses, Starting Mon 3/22/2021, Until Sat 3/27/2021, Normal      ondansetron (ZOFRAN) 4 mg tablet Take 1 tablet (4 mg total) by mouth every 8 (eight) hours as needed for nausea for up to 30 doses, Starting Mon 3/22/2021, Normal      oxyCODONE-acetaminophen (PERCOCET) 5-325 mg per tablet Take 1 tablet by mouth every 4 (four) hours as needed for moderate pain for up to 12 dosesMax Daily Amount: 6 tablets, Starting Mon 3/22/2021, Normal         CONTINUE these medications which have NOT CHANGED    Details   ARIPiprazole (ABILIFY) 10 mg tablet Take 10 mg by mouth daily, Historical Med      clonazePAM (KlonoPIN) 1 mg tablet Take 1 mg by mouth 2 (two) times a day, Starting Thu 2/18/2021, Historical Med      ergocalciferol (VITAMIN D2) 50,000 units Take 1 capsule (50,000 Units total) by mouth once a week, Starting Mon 2/15/2021, Normal      gabapentin (NEURONTIN) 300 mg capsule Take 1 capsule (300 mg total) by mouth 3 (three) times a day, Starting Wed 3/17/2021, Until Fri 4/16/2021, Normal      hydrochlorothiazide (HYDRODIURIL) 12 5 mg tablet Take 1 tablet (12 5 mg total) by mouth daily, Starting Wed 3/17/2021, Until Fri 4/16/2021, Normal      meloxicam (MOBIC) 7 5 mg tablet Take 1 tablet (7 5 mg total) by mouth daily, Starting Mon 2/22/2021, Normal      sertraline (ZOLOFT) 100 mg tablet Take 200 mg by mouth daily at bedtime, Historical Med           No discharge procedures on file      PDMP Review     None          ED Provider  Electronically Signed by           Vicente Frazier DO  03/25/21 5512

## 2021-03-22 NOTE — TELEPHONE ENCOUNTER
Patient called in stating that shes having extreme pain since injection    patient was seen in ED over the weekend & is having incontince & has had a few accidents going to the bathroom  Is having numbness & tingling in feet     cant dress herself from the waist down     Thank you       [phone]

## 2021-03-23 ENCOUNTER — HOSPITAL ENCOUNTER (EMERGENCY)
Facility: HOSPITAL | Age: 36
Discharge: HOME | End: 2021-03-23
Attending: EMERGENCY MEDICINE | Admitting: EMERGENCY MEDICINE
Payer: COMMERCIAL

## 2021-03-23 VITALS
WEIGHT: 207.89 LBS | TEMPERATURE: 97.9 F | OXYGEN SATURATION: 97 % | BODY MASS INDEX: 36.83 KG/M2 | RESPIRATION RATE: 18 BRPM | HEART RATE: 100 BPM | DIASTOLIC BLOOD PRESSURE: 80 MMHG | SYSTOLIC BLOOD PRESSURE: 140 MMHG

## 2021-03-23 DIAGNOSIS — M51.26 LUMBAR DISC HERNIATION: Primary | ICD-10-CM

## 2021-03-23 DIAGNOSIS — G89.29 ACUTE EXACERBATION OF CHRONIC LOW BACK PAIN: Primary | ICD-10-CM

## 2021-03-23 DIAGNOSIS — M54.16 LUMBAR RADICULOPATHY: ICD-10-CM

## 2021-03-23 DIAGNOSIS — M54.50 ACUTE EXACERBATION OF CHRONIC LOW BACK PAIN: Primary | ICD-10-CM

## 2021-03-23 DIAGNOSIS — M51.16 LUMBAR DISC DISEASE WITH RADICULOPATHY: ICD-10-CM

## 2021-03-23 LAB
ATRIAL RATE: 86 BPM
P AXIS: -2 DEGREES
PR INTERVAL: 138 MS
QRS AXIS: 40 DEGREES
QRSD INTERVAL: 74 MS
QT INTERVAL: 380 MS
QTC INTERVAL: 454 MS
T WAVE AXIS: -3 DEGREES
VENTRICULAR RATE: 86 BPM

## 2021-03-23 PROCEDURE — 99285 EMERGENCY DEPT VISIT HI MDM: CPT | Performed by: EMERGENCY MEDICINE

## 2021-03-23 PROCEDURE — 99283 EMERGENCY DEPT VISIT LOW MDM: CPT

## 2021-03-23 PROCEDURE — 93010 ELECTROCARDIOGRAM REPORT: CPT | Performed by: INTERNAL MEDICINE

## 2021-03-23 RX ORDER — OXYCODONE HYDROCHLORIDE 5 MG/1
5 TABLET ORAL ONCE
Status: COMPLETED | OUTPATIENT
Start: 2021-03-23 | End: 2021-03-23

## 2021-03-23 RX ORDER — PREDNISONE 20 MG/1
60 TABLET ORAL DAILY
Qty: 12 TABLET | Refills: 0 | Status: SHIPPED | OUTPATIENT
Start: 2021-03-23 | End: 2021-03-26 | Stop reason: ALTCHOICE

## 2021-03-23 RX ORDER — DIAZEPAM 5 MG/1
5 TABLET ORAL ONCE
Status: COMPLETED | OUTPATIENT
Start: 2021-03-23 | End: 2021-03-23

## 2021-03-23 RX ADMIN — DIAZEPAM 5 MG: 5 TABLET ORAL at 01:33

## 2021-03-23 RX ADMIN — OXYCODONE HYDROCHLORIDE 5 MG: 5 TABLET ORAL at 01:35

## 2021-03-23 RX ADMIN — PREDNISONE 50 MG: 20 TABLET ORAL at 01:26

## 2021-03-23 NOTE — TELEPHONE ENCOUNTER
Neurosurgical consultation placed for patient with Neftaly Bojorquez   patient has a severe disc herniation with impingement on the right  Exiting nerve correlating with right lower extremity pain and

## 2021-03-23 NOTE — ED PROVIDER NOTES
History  Chief Complaint   Patient presents with    Back Pain     Seen here yesterday for same, MRI done, and meds given on discharge  Returns with same c/o  This is a 67-year-old female with a history of chronic low back pain, hypertension who presents with low back pain  Patient states that over the past week, she has been experiencing worsening of her chronic low back pain  States that the pain is sharp, shooting down her right leg into her right ankle  Patient was seen in the emergency department for the same yesterday  She had a CT performed which revealed a lumbar disc protrusion  She was ultimately discharged on Flexeril and Percocet  Patient has been taking the medications without relief  Pain is made worse with bending and twisting  She called her pain specialist who ordered a stat MRI  The MRI revealed "L5-S1 right paracentral to foraminal zone disc protrusion and extrusion, resulting and severe mass effect on the traversing right S1 nerve root  No significant change when compared to the prior CT  "Her pain continues to get worse so she comes to the emergency department for evaluation  Denies fever/chills, nausea/vomiting, lightheadedness/dizziness, numbness/weakness, headache, change in vision, URI symptoms, neck pain, chest pain, palpitations, shortness of breath, cough, flank pain, abdominal pain, diarrhea, hematochezia, melena, dysuria, hematuria, abnormal vaginal discharge/bleeding  Prior to Admission Medications   Prescriptions Last Dose Informant Patient Reported? Taking?    ARIPiprazole (ABILIFY) 10 mg tablet  Self Yes No   Sig: Take 10 mg by mouth daily   clonazePAM (KlonoPIN) 1 mg tablet   Yes No   Sig: Take 1 mg by mouth 2 (two) times a day   cyclobenzaprine (FLEXERIL) 10 mg tablet   No No   Sig: Take 1 tablet (10 mg total) by mouth 3 (three) times a day as needed for muscle spasms   ergocalciferol (VITAMIN D2) 50,000 units   No No   Sig: Take 1 capsule (50,000 Units total) by mouth once a week   gabapentin (NEURONTIN) 300 mg capsule   No No   Sig: Take 1 capsule (300 mg total) by mouth 3 (three) times a day   hydrochlorothiazide (HYDRODIURIL) 12 5 mg tablet   No No   Sig: Take 1 tablet (12 5 mg total) by mouth daily   meloxicam (MOBIC) 7 5 mg tablet   No No   Sig: Take 1 tablet (7 5 mg total) by mouth daily   methylPREDNISolone 4 MG tablet therapy pack   No No   Sig: Use as directed on package   naproxen (NAPROSYN) 500 mg tablet   No No   Sig: Take 1 tablet (500 mg total) by mouth 2 (two) times a day with meals for 10 doses   ondansetron (ZOFRAN) 4 mg tablet   No No   Sig: Take 1 tablet (4 mg total) by mouth every 8 (eight) hours as needed for nausea for up to 30 doses   oxyCODONE-acetaminophen (PERCOCET) 5-325 mg per tablet   No No   Sig: Take 1 tablet by mouth every 4 (four) hours as needed for moderate pain for up to 12 dosesMax Daily Amount: 6 tablets   sertraline (ZOLOFT) 100 mg tablet  Self Yes No   Sig: Take 200 mg by mouth daily at bedtime      Facility-Administered Medications: None       Past Medical History:   Diagnosis Date    Asthma     Cat-scratch disease     last assessed 10/22/15    Depression     Hypertension     last assessed 11/11/14    Sciatica     Sleep disorder        Past Surgical History:   Procedure Laterality Date    BREAST BIOPSY Left     2016   [facility]      ID BX/REMV,LYMPH NODE,DEEP AXILL Left 1/15/2019    Procedure: LEFT AXILLARY LYMPH NODE BIOPSY;  Surgeon: Yao Salazar MD;  Location: [facility];  Service: General    TUBAL LIGATION         Family History   Problem Relation Age of Onset    Anxiety disorder Mother     Bipolar disorder Mother     Depression Mother     Schizophrenia Mother     Parkinsonism Mother         tremor    Anxiety disorder Father     Bipolar disorder Father     Depression Father     Schizophrenia Father     Stomach cancer Paternal Grandmother      I have reviewed and agree with the history as documented  E-Cigarette/Vaping    E-Cigarette Use Never User      E-Cigarette/Vaping Substances     Social History     Tobacco Use    Smoking status: Never Smoker    Smokeless tobacco: Never Used   Substance Use Topics    Alcohol use: No    Drug use: No       Review of Systems   Constitutional: Negative for chills and fever  HENT: Negative for congestion, rhinorrhea, sore throat and trouble swallowing  Respiratory: Negative for cough, chest tightness, shortness of breath and wheezing  Cardiovascular: Negative for chest pain and palpitations  Gastrointestinal: Negative for abdominal pain, blood in stool, diarrhea, nausea and vomiting  Musculoskeletal: Positive for back pain  Negative for neck pain  All other systems reviewed and are negative  Physical Exam  Physical Exam  Constitutional:       Appearance: Normal appearance  She is well-developed  She is not ill-appearing or toxic-appearing  HENT:      Head: Normocephalic  Mouth/Throat:      Pharynx: Uvula midline  Tonsils: No tonsillar exudate  Eyes:      General: Lids are normal       Conjunctiva/sclera: Conjunctivae normal       Pupils: Pupils are equal, round, and reactive to light  Cardiovascular:      Rate and Rhythm: Normal rate and regular rhythm  Pulmonary:      Effort: Pulmonary effort is normal  No tachypnea or respiratory distress  Abdominal:      General: There is no distension  Palpations: Abdomen is soft  Abdomen is not rigid  Musculoskeletal:      Comments: Tenderness over the paraspinal lumbar muscles of the right side  Tenderness over right SI joint  Neurological:      Mental Status: She is alert  Sensory: Sensation is intact  Motor: Motor function is intact  Deep Tendon Reflexes: Reflexes are normal and symmetric  Reflex Scores:       Patellar reflexes are 2+ on the right side and 2+ on the left side       Comments: Strength 5 out of 5 bilateral lower extremities, sensation fully intact bilateral lower extremities  Patellar DTRs are normal and symmetric  Walks with a limp secondary to pain  Psychiatric:         Speech: Speech normal          Behavior: Behavior normal  Behavior is cooperative  Vital Signs  ED Triage Vitals [03/23/21 0114]   Temperature Pulse Respirations Blood Pressure SpO2   97 9 °F (36 6 °C) (!) 109 19 (!) 169/105 93 %      Temp Source Heart Rate Source Patient Position - Orthostatic VS BP Location FiO2 (%)   Oral Monitor Sitting Left arm --      Pain Score       Worst Possible Pain           Vitals:    03/23/21 0114   BP: (!) 169/105   Pulse: (!) 109   Patient Position - Orthostatic VS: Sitting         Visual Acuity      ED Medications  Medications   diazepam (VALIUM) tablet 5 mg (5 mg Oral Given 3/23/21 0133)   oxyCODONE (ROXICODONE) IR tablet 5 mg (5 mg Oral Given 3/23/21 0135)   predniSONE tablet 50 mg (50 mg Oral Given 3/23/21 0126)       Diagnostic Studies  Results Reviewed     None                 No orders to display              Procedures  Procedures         ED Course                                           MDM  Number of Diagnoses or Management Options  Diagnosis management comments: Acute on chronic low back pain with sciatica  I told the patient that we will attempt to control her pain  She ultimately needs to follow-up with her pain specialist   She may end up needing surgery for relief of pain  Discussed the results of the patient's MRI as well  Will start a course of steroids  Strict return precautions given        Disposition  Final diagnoses:   Acute exacerbation of chronic low back pain     Time reflects when diagnosis was documented in both MDM as applicable and the Disposition within this note     Time User Action Codes Description Comment    3/23/2021  1:39 AM Travis Montez Add [M54 5,  G89 29] Acute exacerbation of chronic low back pain       ED Disposition     ED Disposition Condition Date/Time Comment    Discharge Stable Tue Mar 23, 2021  1:39 AM Miladys Tyler discharge to home/self care  Follow-up Information     Follow up With Specialties Details Why Contact Info Additional Information    Akanksha Robledo MD Pain Medicine Schedule an appointment as soon as possible for a visit   [address] [place] [address] Emergency Department Emergency Medicine Go to  If symptoms worsen [phone]  [address] [place] [address] Emergency Department[address], [address]          Patient's Medications   Discharge Prescriptions    PREDNISONE 20 MG TABLET    Take 3 tablets (60 mg total) by mouth daily for 4 days       Start Date: 3/23/2021 End Date: 3/27/2021       Order Dose: 60 mg       Quantity: 12 tablet    Refills: 0     No discharge procedures on file      PDMP Review     None          ED Provider  Electronically Signed by           Lc Reese MD  03/23/21 0735

## 2021-03-23 NOTE — TELEPHONE ENCOUNTER
S/w pt and advised of Dr Luciano Dean notation  Pt verbalized understanding and was appreciative  RN provided Dr Liz Trujillo office phone number, pt will call to scheduled consultation

## 2021-03-24 ENCOUNTER — TELEPHONE (OUTPATIENT)
Dept: PAIN MEDICINE | Facility: CLINIC | Age: 36
End: 2021-03-24

## 2021-03-24 ENCOUNTER — TELEPHONE (OUTPATIENT)
Dept: OTHER | Facility: OTHER | Age: 36
End: 2021-03-24

## 2021-03-24 NOTE — TELEPHONE ENCOUNTER
Pt called in asking if provider can give her a letter that states how long she'll be out of work for  Pt is waiting to be seen by Neuro, but in the meantime needs to provider her employer with a letter  Pt calling back with fax number  Thank you          [phone]

## 2021-03-24 NOTE — TELEPHONE ENCOUNTER
**COPIED FROM PT ADVICE REQUEST SENT THIS AM TO **    Wilver Guevara  to Justin Crockett MD        3/24/21 7:10 AM  I was wondering if you can put me on a cuauhtemoc for work  I can barely even walk and have bathroom issues  My manager said I should go on a cuauhtemoc  Please let me know  If so I need a fax number and the office address  Thanks catrachito anguiano

## 2021-03-24 NOTE — TELEPHONE ENCOUNTER
Pt called in to provide the fax # for [facility]      Fax# [phone]      Please contact pt once completed @ [phone]

## 2021-03-25 ENCOUNTER — TELEPHONE (OUTPATIENT)
Dept: OTHER | Facility: OTHER | Age: 36
End: 2021-03-25

## 2021-03-25 NOTE — ASSESSMENT & PLAN NOTE
Pleasant 38year old female that presents as a new consult with low back pain and review of her imaging  Imaging:    · MRI lumbar 3/22/21: L5-S1 right paracentral to foraminal zone disc protrusion and extrusion, resulting and severe mass effect on the traversing right S1 nerve root  No significant change when compared to the prior CT  Spine service consultation may be helpful  · CT abdomen and pelvis: 3/21/21:No evidence of acute intra-abdominal or pelvic pathology    Plan:  · Recommend L5-S1 minimally invasive surgery for decompression and removal of herniated disc  · All risks and benefits were explained including but not limiting to bleed risk, risk damage to surrounding structures,risk of infection and stroke, and re-operation  · After surgery, the patient may want to consider a SI joint injection by her pain specialist if her back pain does not improve  · An internal message was sent to her PCP regarding work notes  · Consent was obtained and affixed to her chart  · She will be called by our office for scheduling of her surgery

## 2021-03-25 NOTE — TELEPHONE ENCOUNTER
Requesting a new  Leave of absence for my employer  There is a new MRI in my records  Please call me with any questions

## 2021-03-25 NOTE — TELEPHONE ENCOUNTER
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

## 2021-03-25 NOTE — TELEPHONE ENCOUNTER
Patient called to find out the status if Dr Orlando Gayle got her message from yesterday regarding her employee letter   Please reach out to her at # [phone]

## 2021-03-25 NOTE — TELEPHONE ENCOUNTER
Called patient yesterday March 24, 2021 at 10:45 p m  Regarding her concerns and MRI findings  Which demonstrated the significant L5-S1 disc extrusion with mass effect no significant progression since prior CT  Patient continues to report ongoing pain with radicular symptoms into the right lower extremity as well as bladder incontinence and questionable weakness  She has already been seen at the ED and was discharged home and advised to follow-up with pain management  Extensive conversation regarding her symptoms, concerns  and advised patient if she develops any progressively worsening weakness, bowel or bladder incontinence,  Saddle anesthesia to go back to ED immediately  she does have a consult with Neurosurgery Friday March 26, 2021     Called her primary pain physician this morning at 7:40 a m    To discuss case and will attempt to have her seen by neuro surgery today    Thank you

## 2021-03-26 ENCOUNTER — DOCUMENTATION (OUTPATIENT)
Dept: FAMILY MEDICINE CLINIC | Facility: HOME HEALTHCARE | Age: 36
End: 2021-03-26

## 2021-03-26 ENCOUNTER — OFFICE VISIT (OUTPATIENT)
Dept: NEUROSURGERY | Facility: CLINIC | Age: 36
End: 2021-03-26
Payer: COMMERCIAL

## 2021-03-26 VITALS
HEIGHT: 63 IN | BODY MASS INDEX: 36.68 KG/M2 | TEMPERATURE: 98.1 F | SYSTOLIC BLOOD PRESSURE: 147 MMHG | DIASTOLIC BLOOD PRESSURE: 98 MMHG | HEART RATE: 135 BPM | RESPIRATION RATE: 16 BRPM | WEIGHT: 207 LBS

## 2021-03-26 DIAGNOSIS — M51.16 LUMBAR DISC DISEASE WITH RADICULOPATHY: ICD-10-CM

## 2021-03-26 DIAGNOSIS — M54.16 LUMBAR RADICULOPATHY: ICD-10-CM

## 2021-03-26 DIAGNOSIS — M51.26 LUMBAR DISC HERNIATION: ICD-10-CM

## 2021-03-26 PROCEDURE — 3008F BODY MASS INDEX DOCD: CPT | Performed by: NEUROLOGICAL SURGERY

## 2021-03-26 PROCEDURE — 1036F TOBACCO NON-USER: CPT | Performed by: NEUROLOGICAL SURGERY

## 2021-03-26 PROCEDURE — 99205 OFFICE O/P NEW HI 60 MIN: CPT | Performed by: NEUROLOGICAL SURGERY

## 2021-03-26 RX ORDER — GABAPENTIN 300 MG/1
300 CAPSULE ORAL ONCE
Status: CANCELLED | OUTPATIENT
Start: 2021-03-26 | End: 2021-03-26

## 2021-03-26 RX ORDER — CEFAZOLIN SODIUM 2 G/50ML
2000 SOLUTION INTRAVENOUS ONCE
Status: CANCELLED | OUTPATIENT
Start: 2021-03-26 | End: 2021-03-26

## 2021-03-26 RX ORDER — CHLORHEXIDINE GLUCONATE 0.12 MG/ML
15 RINSE ORAL ONCE
Status: CANCELLED | OUTPATIENT
Start: 2021-03-26 | End: 2021-03-26

## 2021-03-26 RX ORDER — ACETAMINOPHEN 325 MG/1
975 TABLET ORAL ONCE
Status: CANCELLED | OUTPATIENT
Start: 2021-03-26 | End: 2021-03-26

## 2021-03-26 NOTE — PROGRESS NOTES
Neurosurgery Office Note  Kayla Ruggiero 38 y o  female MRN: [id number]      Assessment/Plan     Lumbar back pain  Pleasant 38year old female that presents as a new consult with low back pain and review of her imaging  Imaging:    · MRI lumbar 3/22/21: L5-S1 right paracentral to foraminal zone disc protrusion and extrusion, resulting and severe mass effect on the traversing right S1 nerve root  No significant change when compared to the prior CT  Spine service consultation may be helpful  · CT abdomen and pelvis: 3/21/21:No evidence of acute intra-abdominal or pelvic pathology    Plan:  · Recommend L5-S1 minimally invasive surgery for decompression and removal of herniated disc  · All risks and benefits were explained including but not limiting to bleed risk, risk damage to surrounding structures,risk of infection and stroke, and re-operation  · After surgery, the patient may want to consider a SI joint injection by her pain specialist if her back pain does not improve  · An internal message was sent to her PCP regarding work notes  · Consent was obtained and affixed to her chart  · She will be called by our office for scheduling of her surgery  CHIEF COMPLAINT    Chief Complaint   Patient presents with    Consult     Shared Kiersten/[facility] patient with MRI lumbar spine wo contrast 3/22/21 for LBP  Has done PT in the past and [facility] on 3/12/21 by Dr Ford Heart  HISTORY    History of Present Illness     38y o  year old female     Pleasant 38year old female that presents as a new consult with low back pain and review of her imaging  The patient explains that she has been having low back pain with pain into her right leg and left buttock (that started yesterday ) She has a chronic history of back pain but in the last three months her pain has become more severe and constant  Her pain radiates down her right leg into her toes laterally   She also has intermittent numbness and tingling in the same distribution  Her leg pain is worse than her back pain  She is not a smoker  She does note some episodes of bladder incontinence (only) and urinary rentention  The patient denies any saddle anesthesia  Sitting excerbates her pain along with the transitioning from a sitting to standing positon  She is unable to sit comfortably for a long period of time without constant movement  Of note, the patient has undergone and failed conservative management  She has been under the care of pain management where oral therapy has not helped her pain  Her last steriod injection was 2 weeks ago providing no relief  Her last session of physical therapy was two weeks ago  The patient works at [employer]  REVIEW OF SYSTEMS    Review of Systems   Constitutional: Positive for activity change (severe problems due to pain) and fatigue (tired)  HENT: Negative  Eyes: Negative  Respiratory: Negative  Cardiovascular: Negative  Gastrointestinal: Positive for constipation  Endocrine: Negative  Genitourinary: Positive for decreased urine volume (retention)  Urinary Incontinence   Musculoskeletal: Positive for back pain (LBP into the right leg; left buttocks) and gait problem (using a cane)  Skin: Negative  Allergic/Immunologic: Negative  Neurological: Positive for weakness (right leg) and numbness (n/t in bilateral feet)  Hematological: Negative  Psychiatric/Behavioral: Positive for sleep disturbance (due to pain)  All other systems reviewed and are negative      ROS was personally reviewed and changes made as needed       Meds/Allergies     Current Outpatient Medications   Medication Sig Dispense Refill    ARIPiprazole (ABILIFY) 10 mg tablet Take 10 mg by mouth daily      clonazePAM (KlonoPIN) 1 mg tablet Take 1 mg by mouth 2 (two) times a day      cyclobenzaprine (FLEXERIL) 10 mg tablet Take 1 tablet (10 mg total) by mouth 3 (three) times a day as needed for muscle spasms 30 tablet 0    ergocalciferol (VITAMIN D2) 50,000 units Take 1 capsule (50,000 Units total) by mouth once a week 4 capsule 0    gabapentin (NEURONTIN) 300 mg capsule Take 1 capsule (300 mg total) by mouth 3 (three) times a day 90 capsule 1    hydrochlorothiazide (HYDRODIURIL) 12 5 mg tablet Take 1 tablet (12 5 mg total) by mouth daily 30 tablet 0    meloxicam (MOBIC) 7 5 mg tablet Take 1 tablet (7 5 mg total) by mouth daily 30 tablet 0    naproxen (NAPROSYN) 500 mg tablet Take 1 tablet (500 mg total) by mouth 2 (two) times a day with meals for 10 doses 10 tablet 0    ondansetron (ZOFRAN) 4 mg tablet Take 1 tablet (4 mg total) by mouth every 8 (eight) hours as needed for nausea for up to 30 doses 30 tablet 0    oxyCODONE-acetaminophen (PERCOCET) 5-325 mg per tablet Take 1 tablet by mouth every 4 (four) hours as needed for moderate pain for up to 12 dosesMax Daily Amount: 6 tablets 12 tablet 0    sertraline (ZOLOFT) 100 mg tablet Take 200 mg by mouth daily at bedtime       No current facility-administered medications for this visit          Allergies   Allergen Reactions    Bactrim [Sulfamethoxazole-Trimethoprim]     Codeine Hives     Tolerated Percocet, Vicodin, etc        PAST HISTORY    Past Medical History:   Diagnosis Date    Asthma     Cat-scratch disease     last assessed 10/22/15    Depression     Hypertension     last assessed 11/11/14    Sciatica     Sleep disorder        Past Surgical History:   Procedure Laterality Date    BREAST BIOPSY Left     2016   [facility] D[facility]      [place] BX/REMV,LYMPH NODE,DEEP AXILL Left 1/15/2019    Procedure: LEFT AXILLARY LYMPH NODE BIOPSY;  Surgeon: Yaniv Garcia MD;  Location: [facility];  Service: General    TUBAL LIGATION         Social History     Tobacco Use    Smoking status: Never Smoker    Smokeless tobacco: Never Used   Substance Use Topics    Alcohol use: No    Drug use: No       Family History   Problem Relation Age of Onset  Anxiety disorder Mother     Bipolar disorder Mother     Depression Mother     Schizophrenia Mother     Parkinsonism Mother         tremor    Anxiety disorder Father     Bipolar disorder Father     Depression Father     Schizophrenia Father     Stomach cancer Paternal Grandmother          Above history personally reviewed  EXAM    Vitals:Blood pressure 147/98, pulse (!) 135, temperature 98 1 °F (36 7 °C), temperature source Probe, resp  rate 16, height 5' 3" (1 6 m), weight 93 9 kg (207 lb), last menstrual period 03/14/2021, not currently breastfeeding  ,Body mass index is 36 67 kg/m²  Physical Exam  Vitals signs reviewed  Constitutional:       General: She is not in acute distress  Appearance: She is not ill-appearing  HENT:      Head: Normocephalic  Nose: Nose normal       Mouth/Throat:      Mouth: Mucous membranes are dry  Eyes:      Extraocular Movements: Extraocular movements intact  Conjunctiva/sclera: Conjunctivae normal       Pupils: Pupils are equal, round, and reactive to light  Neck:      Musculoskeletal: Normal range of motion  Cardiovascular:      Rate and Rhythm: Normal rate and regular rhythm  Pulses: Normal pulses  Heart sounds: Normal heart sounds, S1 normal and S2 normal    Pulmonary:      Effort: Pulmonary effort is normal       Breath sounds: Normal breath sounds and air entry  No decreased breath sounds, wheezing, rhonchi or rales  Abdominal:      General: Abdomen is flat  Skin:     General: Skin is warm and dry  Neurological:      General: No focal deficit present  Mental Status: She is alert and oriented to person, place, and time  GCS: GCS eye subscore is 4  GCS verbal subscore is 5  GCS motor subscore is 6  Cranial Nerves: Cranial nerves are intact  Sensory: Sensation is intact  Coordination: Coordination is intact        Gait: Gait abnormal       Deep Tendon Reflexes:      Reflex Scores: Brachioradialis reflexes are 1+ on the right side  Patellar reflexes are 1+ on the right side and 1+ on the left side  Psychiatric:         Attention and Perception: Attention and perception normal          Mood and Affect: Mood and affect normal          Speech: Speech normal          Behavior: Behavior normal  Behavior is cooperative  Thought Content: Thought content normal          Cognition and Memory: Cognition and memory normal          Judgment: Judgment normal          Neurologic Exam     Mental Status   Oriented to person, place, and time  Attention: normal  Concentration: normal    Speech: speech is normal   Level of consciousness: alert  Knowledge: good  Cranial Nerves   Cranial nerves II through XII intact  CN III, IV, VI   Pupils are equal, round, and reactive to light  Motor Exam   Muscle bulk: normal  Overall muscle tone: normal    Strength   Strength 5/5 except as noted  Right iliopsoas: 4/5  Right quadriceps: 4/5  Right hamstrin/5    Sensory Exam   Light touch normal      Gait, Coordination, and Reflexes     Reflexes   Right brachioradialis: 1+  Right patellar: 1+  Left patellar: 1+  Right Velásquez: absent  Left Velásquez: absent  Right ankle clonus: absent  Left ankle clonus: absent        MEDICAL DECISION MAKING    Imaging Studies:     Ct Spine Lumbar Without Contrast    Result Date: 2021  Narrative: CT LUMBAR SPINE INDICATION:   Back pain or radiculopathy, trauma trauma, R sided leg symptoms  COMPARISON: X-rays dated 2019  TECHNIQUE:  Contiguous axial images through the lumbar spine were obtained  Sagittal and coronal reconstructions were performed  Radiation dose length product (DLP) for this visit:  758 15 mGy-cm   This examination, like all CT scans performed in the [facility], was performed utilizing techniques to minimize radiation dose exposure, including the use of iterative  reconstruction and automated exposure control    IMAGE QUALITY:  Diagnostic  FINDINGS: ALIGNMENT:  There are 5 lumbar type vertebral bodies  Normal alignment of the lumbar spine  No spondylolisthesis or spondylolysis  VERTEBRAL BODIES:  No fracture  No lytic or blastic lesion  DEGENERATIVE CHANGES: Lower Thoracic spine:  Normal lower thoracic disc spaces ] L1-2:  Normal disc height  No herniation  Normal facet joints  No canal or foraminal stenosis  L2-3:  Normal disc height  No herniation  Normal facet joints  No canal or foraminal stenosis  L3-4:  Normal disc height  No herniation  Mild facet arthropathy  No canal or foraminal stenosis  L4-5:  Normal disc height  Small disc bulge and mild facet arthropathy  No canal or foraminal stenosis  L5-S1: Central disc protrusion asymmetric to the right with posterior displacement of the right S1 nerve root  There may also be contact with the left S1 nerve root     Correlate for S1 radiculopathy  Mild canal stenosis  No foraminal stenosis  PARASPINAL SOFT TISSUES:   Normal      Impression: No acute fracture  Disc protrusion at L5-S1 as described  Workstation performed: [id number]     Mri Lumbar Spine Wo Contrast    Result Date: 3/22/2021  Narrative: MRI LUMBAR SPINE WITHOUT CONTRAST INDICATION: M54 16: Radiculopathy, lumbar region G83 4: Cauda equina syndrome  COMPARISON:  2/26/2021  TECHNIQUE:  Sagittal T1, sagittal T2, sagittal inversion recovery, axial T1 and axial T2, coronal T2   IMAGE QUALITY:  Diagnostic FINDINGS: VERTEBRAL BODIES:  There are 5 lumbar type vertebral bodies  Normal alignment of the lumbar spine  Endplate signal abnormality at L5-S1 consistent with chronic degenerative change  SACRUM:  Unremarkable  DISTAL CORD AND CONUS:  Normal signal morphology of the distal thoracic cord and conus, terminating at L1  PARASPINAL SOFT TISSUES:  No mass or fluid collection  LOWER THORACIC DISC SPACES:  Normal disc height and signal   No disc herniation, canal stenosis or foraminal narrowing   LUMBAR DISC SPACES: L1-L2:  No central canal stenosis or neural foraminal narrowing  L2-L3:  No central canal stenosis or neural foraminal narrowing  L3-L4:  No central canal stenosis or neural foraminal narrowing  L4-L5:  Loss of disc signal and mild posterior disc bulge  Left paracentral annular tear  No central canal stenosis or neural foraminal narrowing  L5-S1:  Right paracentral to foraminal zone disc protrusion and extrusion  Extruded disc measures 10 x 8 x 10 mm  There is significant mass effect on the traversing right S1 nerve root  Mild central canal stenosis  No neural foraminal narrowing  Impression: L5-S1 right paracentral to foraminal zone disc protrusion and extrusion, resulting and severe mass effect on the traversing right S1 nerve root  No significant change when compared to the prior CT  Spine service consultation may be helpful  Workstation performed: [id number]     Ct Abdomen Pelvis With Contrast    Result Date: 3/22/2021  Narrative: CT ABDOMEN AND PELVIS WITH IV CONTRAST INDICATION:   Pyelonephritis, complicated back pain ; r/o pyelo  COMPARISON:  6/8/2019  TECHNIQUE:  CT examination of the abdomen and pelvis was performed  Axial, sagittal, and coronal 2D reformatted images were created from the source data and submitted for interpretation  Radiation dose length product (DLP) for this visit:  906 28 mGy-cm   This examination, like all CT scans performed in the [facility], was performed utilizing techniques to minimize radiation dose exposure, including the use of iterative  reconstruction and automated exposure control  IV Contrast:  100 mL of iohexol (OMNIPAQUE) Enteric Contrast:  Enteric contrast was not administered  FINDINGS: ABDOMEN LOWER CHEST:  Small hiatal hernia noted  No other clinically significant abnormality identified in the visualized lower chest  LIVER/BILIARY TREE:  Unremarkable  GALLBLADDER:  No calcified gallstones   No pericholecystic inflammatory change  SPLEEN:  Unremarkable  PANCREAS:  Unremarkable  ADRENAL GLANDS:  Unremarkable  KIDNEYS/URETERS:  Unremarkable  No hydronephrosis  STOMACH AND BOWEL:  Unremarkable  APPENDIX:  No findings to suggest appendicitis  ABDOMINOPELVIC CAVITY:  No ascites  No pneumoperitoneum  No lymphadenopathy  VESSELS:  Unremarkable for patient's age  PELVIS REPRODUCTIVE ORGANS:  Unremarkable for patient's age  URINARY BLADDER:  Unremarkable  ABDOMINAL WALL/INGUINAL REGIONS:  Unremarkable  OSSEOUS STRUCTURES:  No acute fracture or destructive osseous lesion  Impression: No evidence of acute intra-abdominal or pelvic pathology Workstation performed: [id number]     Fl Spine And Pain Procedure    Result Date: 3/12/2021  Narrative: [facility] SPINE AND PAIN PROCEDURE Procedure Note PATIENT NAME: Anabella Thorne : 1985 MRN: [id number] Pre-op Diagnosis: 1  Lumbar radiculopathy  2  Lumbar disc disease with radiculopathy  3  Chronic pain syndrome  Post-op Diagnosis: 1  Lumbar radiculopathy  2  Lumbar disc disease with radiculopathy  3  Chronic pain syndrome  Surgeon: Yeny Blancas MD PROCEDURE DESCRIPTION: Fluoroscopically-guided right L5-S1 and S1 transforaminal epidural steroid injection under fluoroscopy After discussing the risks, benefits, and alternatives to the procedure, the patient expressed understanding and wished to proceed  The patient was brought to the fluoroscopy suite and placed in the prone position  A procedural pause was conducted to verify:  correct patient identity, procedure to be performed and as applicable, correct side and site, correct patient position, and availability of implants, special equipment and special requirements  After identifying the right L5 pedicles fluoroscopically with an oblique view, the skin was sterilely prepped and draped in the usual fashion using Chloraprep skin prep  The skin and subcutaneous tissue were anesthetized with 0 5% lidocaine    A 5 inch 22 gauge spinal needle was then advanced under fluoroscopic guidance to the posterior aspect of the right L5-S1 neural foramens  Appropriate foraminal depth was determined with a lateral fluoroscopic view, and AP visualization confirmed needle positioning at approximately the 6 oclock position relative to the pedicles  After negative aspiration, 1 mL of Omnipaque 300 contrast was injected using live fluoroscopy/digital subtraction angiography, confirming appropriate transforaminal spread without evidence of intravascular or intrathecal uptake  Next, a local anesthetic test dose consisting of 1 mL of 2% lidocaine was injected through the needle at each level  After an appropriate period of observation, a directed neurological exam was performed which revealed no new neurologic deficits  A 5 inch 22 gauge spinal needle was then advanced under fluoroscopic guidance to the posterior aspect of the right S1 neural foramen  Appropriate foraminal depth was determined with a lateral fluoroscopic view, and AP visualization confirmed needle positioning at approximately slightly past the posterior sacrum canal   After negative aspiration, 1 mL of Omnipaque 300 contrast was injected using live fluoroscopy/digital subtraction angiography, confirming appropriate transforaminal spread without evidence of intravascular or intrathecal uptake  Next, a local anesthetic test dose consisting of 1 mL of 2% lidocaine was injected through the needle at each level  After an appropriate period of observation, a directed neurological exam was performed which revealed no new neurologic deficits  Next, a 1 5 ml solution consisting of 7 5 mg of dexamethasone in sterile saline was injected slowly and incrementally into the epidural space at each level  Following the injection the needles were withdrawn slightly and flushed with lidocaine as they were fully extracted  The patient tolerated the procedure well and there were no apparent complications    The patient did not develop any new neurologic deficits  After appropriate observation, the patient was dismissed from the clinic in good condition under their own power   Estimated Blood Loss: none Findings: NONE Specimens: NONE Complications:  NONE Anesthesia: NONE  I was present throughout the entire procedure DISPOSITION: Patient tolerated the procedure well and sent to recovery room awake, alert, and hemodynamically stable       I have personally reviewed pertinent films in PACS with the [facility]

## 2021-03-26 NOTE — PROGRESS NOTES
Was given message regarding this patient yesterday close to closing time by a coworker  Pt was asking for appt to be seen to have ANNALEE form filled out and schedules were going to be opened today  Schedules were opened but soonest appt is still 04/02/2021 with David Persaud PA-C  Pt called office asking for update  I gave her this appt  Pt is asking if ANNALEE could be filled out before this appt as she is at the maximum amount of 'points' at her job and is supposed to work this weekend  I see from the chart she did see Dr Gianni Farley around 4 weeks ago  I asked pt if we have the form  Pt states she has not gotten the form to us yet  I provided our fax number and told her I will ask Dr Gianni Farley about it as soon as I get the form but it is now 1:00pm on a Friday and pt is asking for the form to be filled out today  Pt verbalized understanding  As a backup, pt did schedule appt with David Persaud for 04/02/2021

## 2021-03-29 NOTE — PROGRESS NOTES
Pt left two voicemails this morning asking for an update on her forms  I called pt back because we have not received the forms yet that she was to have sent to us this past Friday  I did explain that typically we would expect more notice with this type of paperwork and fax number was confirmed with pt  Pt will have the forms sent to us

## 2021-03-30 ENCOUNTER — APPOINTMENT (OUTPATIENT)
Dept: LAB | Facility: HOSPITAL | Age: 36
End: 2021-03-30
Attending: NEUROLOGICAL SURGERY
Payer: COMMERCIAL

## 2021-03-30 ENCOUNTER — PATIENT OUTREACH (OUTPATIENT)
Dept: CASE MANAGEMENT | Facility: HOSPITAL | Age: 36
End: 2021-03-30

## 2021-03-30 DIAGNOSIS — M54.16 LUMBAR RADICULOPATHY: ICD-10-CM

## 2021-03-30 DIAGNOSIS — M51.26 LUMBAR DISC HERNIATION: ICD-10-CM

## 2021-03-30 DIAGNOSIS — Z01.818 PRE-PROCEDURAL EXAMINATION: ICD-10-CM

## 2021-03-30 LAB
ALBUMIN SERPL BCP-MCNC: 3.7 G/DL (ref 3.5–5)
ALP SERPL-CCNC: 102 U/L (ref 46–116)
ALT SERPL W P-5'-P-CCNC: 34 U/L (ref 12–78)
ANION GAP SERPL CALCULATED.3IONS-SCNC: 10 MMOL/L (ref 4–13)
AST SERPL W P-5'-P-CCNC: 16 U/L (ref 5–45)
B-HCG SERPL-ACNC: <2 MIU/ML
BILIRUB SERPL-MCNC: 0.4 MG/DL (ref 0.2–1)
BUN SERPL-MCNC: 13 MG/DL (ref 5–25)
CALCIUM SERPL-MCNC: 9 MG/DL (ref 8.3–10.1)
CHLORIDE SERPL-SCNC: 103 MMOL/L (ref 100–108)
CO2 SERPL-SCNC: 27 MMOL/L (ref 21–32)
CREAT SERPL-MCNC: 0.68 MG/DL (ref 0.6–1.3)
EST. AVERAGE GLUCOSE BLD GHB EST-MCNC: 126 MG/DL
GFR SERPL CREATININE-BSD FRML MDRD: 113 ML/MIN/1.73SQ M
GLUCOSE P FAST SERPL-MCNC: 121 MG/DL (ref 65–99)
HBA1C MFR BLD: 6 %
POTASSIUM SERPL-SCNC: 3.7 MMOL/L (ref 3.5–5.3)
PROT SERPL-MCNC: 7.5 G/DL (ref 6.4–8.2)
SODIUM SERPL-SCNC: 140 MMOL/L (ref 136–145)

## 2021-03-30 PROCEDURE — 80053 COMPREHEN METABOLIC PANEL: CPT

## 2021-03-30 PROCEDURE — 36415 COLL VENOUS BLD VENIPUNCTURE: CPT

## 2021-03-30 PROCEDURE — 84702 CHORIONIC GONADOTROPIN TEST: CPT

## 2021-03-30 PROCEDURE — 83036 HEMOGLOBIN GLYCOSYLATED A1C: CPT

## 2021-04-02 ENCOUNTER — APPOINTMENT (OUTPATIENT)
Dept: FAMILY MEDICINE CLINIC | Facility: HOME HEALTHCARE | Age: 36
End: 2021-04-02
Payer: COMMERCIAL

## 2021-04-02 VITALS
OXYGEN SATURATION: 98 % | BODY MASS INDEX: 36 KG/M2 | RESPIRATION RATE: 18 BRPM | TEMPERATURE: 98 F | DIASTOLIC BLOOD PRESSURE: 106 MMHG | HEART RATE: 90 BPM | SYSTOLIC BLOOD PRESSURE: 142 MMHG | WEIGHT: 203.2 LBS | HEIGHT: 63 IN

## 2021-04-02 DIAGNOSIS — I10 ESSENTIAL HYPERTENSION: ICD-10-CM

## 2021-04-02 DIAGNOSIS — M51.16 LUMBAR DISC HERNIATION WITH RADICULOPATHY: ICD-10-CM

## 2021-04-02 DIAGNOSIS — M51.26 LUMBAR DISC HERNIATION: ICD-10-CM

## 2021-04-02 DIAGNOSIS — R73.03 PRE-DIABETES: ICD-10-CM

## 2021-04-02 DIAGNOSIS — F32.A ANXIETY AND DEPRESSION: ICD-10-CM

## 2021-04-02 DIAGNOSIS — Z01.818 PREOPERATIVE CLEARANCE: Primary | ICD-10-CM

## 2021-04-02 DIAGNOSIS — E55.9 VITAMIN D DEFICIENCY: ICD-10-CM

## 2021-04-02 DIAGNOSIS — F41.9 ANXIETY AND DEPRESSION: ICD-10-CM

## 2021-04-02 PROCEDURE — 99214 OFFICE O/P EST MOD 30 MIN: CPT | Performed by: FAMILY MEDICINE

## 2021-04-02 PROCEDURE — 3008F BODY MASS INDEX DOCD: CPT | Performed by: NEUROLOGICAL SURGERY

## 2021-04-02 PROCEDURE — T1015 CLINIC SERVICE: HCPCS | Performed by: FAMILY MEDICINE

## 2021-04-02 RX ORDER — LISINOPRIL 10 MG/1
10 TABLET ORAL DAILY
Qty: 30 TABLET | Refills: 2 | Status: SHIPPED | OUTPATIENT
Start: 2021-04-02 | End: 2021-04-22 | Stop reason: SDUPTHER

## 2021-04-02 RX ORDER — ERGOCALCIFEROL 1.25 MG/1
50000 CAPSULE ORAL WEEKLY
Qty: 4 CAPSULE | Refills: 0 | Status: SHIPPED | OUTPATIENT
Start: 2021-04-02 | End: 2021-04-22 | Stop reason: SDUPTHER

## 2021-04-02 NOTE — H&P (VIEW-ONLY)
[address]       NAME: Tracey Ayala is a 38 y o  female  : 1985    MRN: [id number]  DATE: 2021  TIME: 12:07 PM    Assessment and Plan   Diagnoses and all orders for this visit:    Preoperative clearance    Lumbar disc herniation    Lumbar disc herniation with radiculopathy    Vitamin D deficiency  -     ergocalciferol (VITAMIN D2) 50,000 units; Take 1 capsule (50,000 Units total) by mouth once a week    Pre-diabetes    Essential hypertension  -     lisinopril (ZESTRIL) 10 mg tablet; Take 1 tablet (10 mg total) by mouth daily    Anxiety and depression        No problem-specific Assessment & Plan notes found for this encounter  plan    Patient cleared for L5 -S1 minimally invasive microdiskectomy and possible epidural steroid injection  Lifestyle modifications, diet, exercise and weight loss discussed  Patient prediabetic and will recheck A1c in 3 months  Lisinopril once daily added and patient will return in 2 weeks for blood pressure check  Patient will continue to follow with psychiatry monthly for anxiety depression and medication refills  FMLA paperwork completed  Patient instructed to call with any questions or concerns        Chief Complaint     Chief Complaint   Patient presents with    Sciatica     low back pain    FMLA Paperwork         History of Present Illness       HPI   34-year-old female here today for preop clearance for Minimally invasive L5- S1 microdiskectomy scheduled for 2021 to be performed by Neurosurgery  Patient states having significant discomfort with sciatic secondary to lumbar disc herniation with radiculopathy  Patient denies any saddle paresthesia or change in bowel or bladder habits  Patient states that her asthma symptoms are well controlled denies any recent exacerbations or hospitalizations  Follows with pulmonology   blood pressure still elevated after being started on hydrochlorothiaze  Will start lisinopril    Labs reviewed with normal renal function  Review of Systems   Review of Systems   Constitutional: Negative for chills and fever  HENT: Negative  Respiratory: Negative  Cardiovascular: Negative  Gastrointestinal: Negative  Genitourinary: Negative for decreased urine volume  Musculoskeletal: Positive for back pain  Neurological: Negative for headaches  Hematological: Negative for adenopathy  Psychiatric/Behavioral: Negative for confusion  All other systems reviewed and are negative          Current Medications       Current Outpatient Medications:     ARIPiprazole (ABILIFY) 10 mg tablet, Take 10 mg by mouth daily, Disp: , Rfl:     clonazePAM (KlonoPIN) 1 mg tablet, Take 1 mg by mouth 2 (two) times a day, Disp: , Rfl:     cyclobenzaprine (FLEXERIL) 10 mg tablet, Take 1 tablet (10 mg total) by mouth 3 (three) times a day as needed for muscle spasms, Disp: 30 tablet, Rfl: 0    ergocalciferol (VITAMIN D2) 50,000 units, Take 1 capsule (50,000 Units total) by mouth once a week, Disp: 4 capsule, Rfl: 0    gabapentin (NEURONTIN) 300 mg capsule, Take 1 capsule (300 mg total) by mouth 3 (three) times a day, Disp: 90 capsule, Rfl: 1    hydrochlorothiazide (HYDRODIURIL) 12 5 mg tablet, Take 1 tablet (12 5 mg total) by mouth daily, Disp: 30 tablet, Rfl: 0    ondansetron (ZOFRAN) 4 mg tablet, Take 1 tablet (4 mg total) by mouth every 8 (eight) hours as needed for nausea for up to 30 doses, Disp: 30 tablet, Rfl: 0    sertraline (ZOLOFT) 100 mg tablet, Take 200 mg by mouth daily at bedtime, Disp: , Rfl:     lisinopril (ZESTRIL) 10 mg tablet, Take 1 tablet (10 mg total) by mouth daily, Disp: 30 tablet, Rfl: 2    meloxicam (MOBIC) 7 5 mg tablet, Take 1 tablet (7 5 mg total) by mouth daily (Patient not taking: Reported on 4/2/2021), Disp: 30 tablet, Rfl: 0    naproxen (NAPROSYN) 500 mg tablet, Take 1 tablet (500 mg total) by mouth 2 (two) times a day with meals for 10 doses, Disp: 10 tablet, Rfl: 0   oxyCODONE-acetaminophen (PERCOCET) 5-325 mg per tablet, Take 1 tablet by mouth every 4 (four) hours as needed for moderate pain for up to 12 dosesMax Daily Amount: 6 tablets (Patient not taking: Reported on 4/2/2021), Disp: 12 tablet, Rfl: 0    Current Allergies     Allergies as of 04/02/2021 - Reviewed 04/02/2021   Allergen Reaction Noted    Bactrim [sulfamethoxazole-trimethoprim]  06/18/2019    Codeine Hives 06/01/2016            The following portions of the patient's history were reviewed and updated as appropriate: allergies, current medications, past family history, past medical history, past social history, past surgical history and problem list      Past Medical History:   Diagnosis Date    Asthma     Cat-scratch disease     last assessed 10/22/15    Depression     Hypertension     last assessed 11/11/14    Sciatica     Sleep disorder        Past Surgical History:   Procedure Laterality Date    BREAST BIOPSY Left     2016   [facility]      GA BX/REMV,LYMPH NODE,DEEP AXILL Left 1/15/2019    Procedure: LEFT AXILLARY LYMPH NODE BIOPSY;  Surgeon: Tj Marsh MD;  [facility];  Service: General    TUBAL LIGATION         Family History   Problem Relation Age of Onset    Anxiety disorder Mother     Bipolar disorder Mother     Depression Mother     Schizophrenia Mother     Parkinsonism Mother         tremor    Anxiety disorder Father     Bipolar disorder Father     Depression Father     Schizophrenia Father     Stomach cancer Paternal Grandmother          Medications have been verified  Objective   BP (!) 142/106 (BP Location: Right arm, Patient Position: Sitting, Cuff Size: Large)   Pulse 90   Temp 98 °F (36 7 °C) (Temporal)   Resp 18   Ht 5' 3" (1 6 m)   Wt 92 2 kg (203 lb 3 2 oz)   LMP 03/14/2021 (Exact Date)   SpO2 98%   BMI 36 00 kg/m²        Physical Exam     Physical Exam  Vitals signs and nursing note reviewed     Constitutional:       General: She is not in acute distress  Appearance: She is obese  She is not ill-appearing, toxic-appearing or diaphoretic  HENT:      Head: Normocephalic  No Rocha's sign  Nose: Nose normal       Mouth/Throat:      Mouth: Mucous membranes are moist       Pharynx: Oropharynx is clear  Eyes:      General: No scleral icterus  Conjunctiva/sclera: Conjunctivae normal       Pupils: Pupils are equal, round, and reactive to light  Neck:      Musculoskeletal: Neck supple  Cardiovascular:      Rate and Rhythm: Normal rate and regular rhythm  Heart sounds: No murmur  Pulmonary:      Effort: Pulmonary effort is normal  No respiratory distress  Breath sounds: Normal breath sounds  No wheezing, rhonchi or rales  Abdominal:      General: Bowel sounds are normal       Tenderness: There is no abdominal tenderness  Musculoskeletal:      Lumbar back: She exhibits decreased range of motion and tenderness  She exhibits no deformity  Right lower leg: No edema  Left lower leg: No edema  Lymphadenopathy:      Cervical: No cervical adenopathy  Neurological:      Mental Status: She is alert and oriented to person, place, and time     Psychiatric:         Mood and Affect: Mood normal

## 2021-04-02 NOTE — PROGRESS NOTES
[address]       NAME: Sachin Kohli is a 38 y o  female  : 1985    MRN: [id number]  DATE: 2021  TIME: 12:07 PM    Assessment and Plan   Diagnoses and all orders for this visit:    Preoperative clearance    Lumbar disc herniation    Lumbar disc herniation with radiculopathy    Vitamin D deficiency  -     ergocalciferol (VITAMIN D2) 50,000 units; Take 1 capsule (50,000 Units total) by mouth once a week    Pre-diabetes    Essential hypertension  -     lisinopril (ZESTRIL) 10 mg tablet; Take 1 tablet (10 mg total) by mouth daily    Anxiety and depression        No problem-specific Assessment & Plan notes found for this encounter  plan    Patient cleared for L5 -S1 minimally invasive microdiskectomy and possible epidural steroid injection  Lifestyle modifications, diet, exercise and weight loss discussed  Patient prediabetic and will recheck A1c in 3 months  Lisinopril once daily added and patient will return in 2 weeks for blood pressure check  Patient will continue to follow with psychiatry monthly for anxiety depression and medication refills  FMLA paperwork completed  Patient instructed to call with any questions or concerns        Chief Complaint     Chief Complaint   Patient presents with    Sciatica     low back pain    FMLA Paperwork         History of Present Illness       HPI   65-year-old female here today for preop clearance for Minimally invasive L5- S1 microdiskectomy scheduled for 2021 to be performed by Neurosurgery  Patient states having significant discomfort with sciatic secondary to lumbar disc herniation with radiculopathy  Patient denies any saddle paresthesia or change in bowel or bladder habits  Patient states that her asthma symptoms are well controlled denies any recent exacerbations or hospitalizations  Follows with pulmonology   blood pressure still elevated after being started on hydrochlorothiaze  Will start lisinopril    Labs reviewed with normal renal function  Review of Systems   Review of Systems   Constitutional: Negative for chills and fever  HENT: Negative  Respiratory: Negative  Cardiovascular: Negative  Gastrointestinal: Negative  Genitourinary: Negative for decreased urine volume  Musculoskeletal: Positive for back pain  Neurological: Negative for headaches  Hematological: Negative for adenopathy  Psychiatric/Behavioral: Negative for confusion  All other systems reviewed and are negative          Current Medications       Current Outpatient Medications:     ARIPiprazole (ABILIFY) 10 mg tablet, Take 10 mg by mouth daily, Disp: , Rfl:     clonazePAM (KlonoPIN) 1 mg tablet, Take 1 mg by mouth 2 (two) times a day, Disp: , Rfl:     cyclobenzaprine (FLEXERIL) 10 mg tablet, Take 1 tablet (10 mg total) by mouth 3 (three) times a day as needed for muscle spasms, Disp: 30 tablet, Rfl: 0    ergocalciferol (VITAMIN D2) 50,000 units, Take 1 capsule (50,000 Units total) by mouth once a week, Disp: 4 capsule, Rfl: 0    gabapentin (NEURONTIN) 300 mg capsule, Take 1 capsule (300 mg total) by mouth 3 (three) times a day, Disp: 90 capsule, Rfl: 1    hydrochlorothiazide (HYDRODIURIL) 12 5 mg tablet, Take 1 tablet (12 5 mg total) by mouth daily, Disp: 30 tablet, Rfl: 0    ondansetron (ZOFRAN) 4 mg tablet, Take 1 tablet (4 mg total) by mouth every 8 (eight) hours as needed for nausea for up to 30 doses, Disp: 30 tablet, Rfl: 0    sertraline (ZOLOFT) 100 mg tablet, Take 200 mg by mouth daily at bedtime, Disp: , Rfl:     lisinopril (ZESTRIL) 10 mg tablet, Take 1 tablet (10 mg total) by mouth daily, Disp: 30 tablet, Rfl: 2    meloxicam (MOBIC) 7 5 mg tablet, Take 1 tablet (7 5 mg total) by mouth daily (Patient not taking: Reported on 4/2/2021), Disp: 30 tablet, Rfl: 0    naproxen (NAPROSYN) 500 mg tablet, Take 1 tablet (500 mg total) by mouth 2 (two) times a day with meals for 10 doses, Disp: 10 tablet, Rfl: 0   oxyCODONE-acetaminophen (PERCOCET) 5-325 mg per tablet, Take 1 tablet by mouth every 4 (four) hours as needed for moderate pain for up to 12 dosesMax Daily Amount: 6 tablets (Patient not taking: Reported on 4/2/2021), Disp: 12 tablet, Rfl: 0    Current Allergies     Allergies as of 04/02/2021 - Reviewed 04/02/2021   Allergen Reaction Noted    Bactrim [sulfamethoxazole-trimethoprim]  06/18/2019    Codeine Hives 06/01/2016            The following portions of the patient's history were reviewed and updated as appropriate: allergies, current medications, past family history, past medical history, past social history, past surgical history and problem list      Past Medical History:   Diagnosis Date    Asthma     Cat-scratch disease     last assessed 10/22/15    Depression     Hypertension     last assessed 11/11/14    Sciatica     Sleep disorder        Past Surgical History:   Procedure Laterality Date    BREAST BIOPSY Left     2016   [facility]      SC BX/REMV,LYMPH NODE,DEEP AXILL Left 1/15/2019    Procedure: LEFT AXILLARY LYMPH NODE BIOPSY;  Surgeon: Maggie Spatz, MD;  [facility];  Service: General    TUBAL LIGATION         Family History   Problem Relation Age of Onset    Anxiety disorder Mother     Bipolar disorder Mother     Depression Mother     Schizophrenia Mother     Parkinsonism Mother         tremor    Anxiety disorder Father     Bipolar disorder Father     Depression Father     Schizophrenia Father     Stomach cancer Paternal Grandmother          Medications have been verified  Objective   BP (!) 142/106 (BP Location: Right arm, Patient Position: Sitting, Cuff Size: Large)   Pulse 90   Temp 98 °F (36 7 °C) (Temporal)   Resp 18   Ht 5' 3" (1 6 m)   Wt 92 2 kg (203 lb 3 2 oz)   LMP 03/14/2021 (Exact Date)   SpO2 98%   BMI 36 00 kg/m²        Physical Exam     Physical Exam  Vitals signs and nursing note reviewed     Constitutional:       General: She is not in acute distress  Appearance: She is obese  She is not ill-appearing, toxic-appearing or diaphoretic  HENT:      Head: Normocephalic  No Rocha's sign  Nose: Nose normal       Mouth/Throat:      Mouth: Mucous membranes are moist       Pharynx: Oropharynx is clear  Eyes:      General: No scleral icterus  Conjunctiva/sclera: Conjunctivae normal       Pupils: Pupils are equal, round, and reactive to light  Neck:      Musculoskeletal: Neck supple  Cardiovascular:      Rate and Rhythm: Normal rate and regular rhythm  Heart sounds: No murmur  Pulmonary:      Effort: Pulmonary effort is normal  No respiratory distress  Breath sounds: Normal breath sounds  No wheezing, rhonchi or rales  Abdominal:      General: Bowel sounds are normal       Tenderness: There is no abdominal tenderness  Musculoskeletal:      Lumbar back: She exhibits decreased range of motion and tenderness  She exhibits no deformity  Right lower leg: No edema  Left lower leg: No edema  Lymphadenopathy:      Cervical: No cervical adenopathy  Neurological:      Mental Status: She is alert and oriented to person, place, and time     Psychiatric:         Mood and Affect: Mood normal

## 2021-04-06 ENCOUNTER — PATIENT OUTREACH (OUTPATIENT)
Dept: CASE MANAGEMENT | Facility: HOSPITAL | Age: 36
End: 2021-04-06

## 2021-04-06 NOTE — PROGRESS NOTES
Patient has not returned the Social Determinants of Health Questionnaire sent via [id number] on 03/30/21  ED followup episode will be closed at this time

## 2021-04-12 NOTE — PRE-PROCEDURE INSTRUCTIONS
Pre-Surgery Instructions:   Medication Instructions    ARIPiprazole (ABILIFY) 10 mg tablet Instructed patient per Anesthesia Guidelines   clonazePAM (KlonoPIN) 1 mg tablet Instructed patient per Anesthesia Guidelines   cyclobenzaprine (FLEXERIL) 10 mg tablet Instructed patient per Anesthesia Guidelines   ergocalciferol (VITAMIN D2) 50,000 units Patient was instructed by Physician and understands   gabapentin (NEURONTIN) 300 mg capsule Instructed patient per Anesthesia Guidelines   hydrochlorothiazide (HYDRODIURIL) 12 5 mg tablet Instructed patient per Anesthesia Guidelines   lisinopril (ZESTRIL) 10 mg tablet Instructed patient per Anesthesia Guidelines   ondansetron (ZOFRAN) 4 mg tablet Instructed patient per Anesthesia Guidelines   sertraline (ZOLOFT) 100 mg tablet Instructed patient per Anesthesia Guidelines  Pre op and bathing instructions reviewed  Pt has hibiclens and wipes (as per Dr Quinn's protocol)  Pt  Verbalized understanding of current visitor restrictions  Pt  Verbalized an understanding of all instructions reviewed and offers no concerns at this time  Instructed to avoid all ASA/NSAIDs and OTC Vit/Supp from now until after surgery   Tylenol ok prn  Instructed to take per normal schedule except DOS  DOS instructed to take Gabapentin and Klonopin with a few sips of H2O

## 2021-04-13 ENCOUNTER — DOCUMENTATION (OUTPATIENT)
Dept: NEUROSURGERY | Facility: CLINIC | Age: 36
End: 2021-04-13

## 2021-04-14 ENCOUNTER — ANESTHESIA (OUTPATIENT)
Dept: PERIOP | Facility: HOSPITAL | Age: 36
End: 2021-04-14
Payer: COMMERCIAL

## 2021-04-14 ENCOUNTER — APPOINTMENT (OUTPATIENT)
Dept: RADIOLOGY | Facility: HOSPITAL | Age: 36
End: 2021-04-14
Payer: COMMERCIAL

## 2021-04-14 ENCOUNTER — HOSPITAL ENCOUNTER (OUTPATIENT)
Facility: HOSPITAL | Age: 36
Setting detail: OUTPATIENT SURGERY
Discharge: HOME | End: 2021-04-14
Attending: NEUROLOGICAL SURGERY | Admitting: NEUROLOGICAL SURGERY
Payer: COMMERCIAL

## 2021-04-14 ENCOUNTER — ANESTHESIA EVENT (OUTPATIENT)
Dept: PERIOP | Facility: HOSPITAL | Age: 36
End: 2021-04-14
Payer: COMMERCIAL

## 2021-04-14 VITALS
DIASTOLIC BLOOD PRESSURE: 95 MMHG | HEIGHT: 63 IN | WEIGHT: 203 LBS | SYSTOLIC BLOOD PRESSURE: 159 MMHG | TEMPERATURE: 97 F | OXYGEN SATURATION: 96 % | BODY MASS INDEX: 35.97 KG/M2 | RESPIRATION RATE: 20 BRPM | HEART RATE: 72 BPM

## 2021-04-14 DIAGNOSIS — M54.41 ACUTE RIGHT-SIDED LOW BACK PAIN WITH RIGHT-SIDED SCIATICA: Primary | ICD-10-CM

## 2021-04-14 LAB
EXT PREGNANCY TEST URINE: NEGATIVE
EXT. CONTROL: NORMAL

## 2021-04-14 PROCEDURE — 81025 URINE PREGNANCY TEST: CPT | Performed by: NEUROLOGICAL SURGERY

## 2021-04-14 PROCEDURE — 63030 LAMOT DCMPRN NRV RT 1 LMBR: CPT | Performed by: NEUROLOGICAL SURGERY

## 2021-04-14 PROCEDURE — 72100 X-RAY EXAM L-S SPINE 2/3 VWS: CPT

## 2021-04-14 RX ORDER — LIDOCAINE HYDROCHLORIDE AND EPINEPHRINE 10; 10 MG/ML; UG/ML
INJECTION, SOLUTION INFILTRATION; PERINEURAL AS NEEDED
Status: DISCONTINUED | OUTPATIENT
Start: 2021-04-14 | End: 2021-04-14 | Stop reason: HOSPADM

## 2021-04-14 RX ORDER — GABAPENTIN 300 MG/1
300 CAPSULE ORAL ONCE
Status: COMPLETED | OUTPATIENT
Start: 2021-04-14 | End: 2021-04-14

## 2021-04-14 RX ORDER — ACETAMINOPHEN 325 MG/1
975 TABLET ORAL ONCE
Status: COMPLETED | OUTPATIENT
Start: 2021-04-14 | End: 2021-04-14

## 2021-04-14 RX ORDER — SODIUM CHLORIDE, SODIUM LACTATE, POTASSIUM CHLORIDE, CALCIUM CHLORIDE 600; 310; 30; 20 MG/100ML; MG/100ML; MG/100ML; MG/100ML
INJECTION, SOLUTION INTRAVENOUS CONTINUOUS PRN
Status: DISCONTINUED | OUTPATIENT
Start: 2021-04-14 | End: 2021-04-14

## 2021-04-14 RX ORDER — NEOSTIGMINE METHYLSULFATE 1 MG/ML
INJECTION INTRAVENOUS AS NEEDED
Status: DISCONTINUED | OUTPATIENT
Start: 2021-04-14 | End: 2021-04-14

## 2021-04-14 RX ORDER — FENTANYL CITRATE 50 UG/ML
INJECTION, SOLUTION INTRAMUSCULAR; INTRAVENOUS AS NEEDED
Status: DISCONTINUED | OUTPATIENT
Start: 2021-04-14 | End: 2021-04-14

## 2021-04-14 RX ORDER — PROPOFOL 10 MG/ML
INJECTION, EMULSION INTRAVENOUS AS NEEDED
Status: DISCONTINUED | OUTPATIENT
Start: 2021-04-14 | End: 2021-04-14

## 2021-04-14 RX ORDER — OXYCODONE HYDROCHLORIDE AND ACETAMINOPHEN 5; 325 MG/1; MG/1
1 TABLET ORAL EVERY 6 HOURS PRN
Qty: 9 TABLET | Refills: 0 | Status: SHIPPED | OUTPATIENT
Start: 2021-04-14 | End: 2021-04-17

## 2021-04-14 RX ORDER — BUPIVACAINE HYDROCHLORIDE 5 MG/ML
INJECTION, SOLUTION EPIDURAL; INTRACAUDAL AS NEEDED
Status: DISCONTINUED | OUTPATIENT
Start: 2021-04-14 | End: 2021-04-14 | Stop reason: HOSPADM

## 2021-04-14 RX ORDER — MIDAZOLAM HYDROCHLORIDE 2 MG/2ML
INJECTION, SOLUTION INTRAMUSCULAR; INTRAVENOUS AS NEEDED
Status: DISCONTINUED | OUTPATIENT
Start: 2021-04-14 | End: 2021-04-14

## 2021-04-14 RX ORDER — LIDOCAINE HYDROCHLORIDE 10 MG/ML
INJECTION, SOLUTION EPIDURAL; INFILTRATION; INTRACAUDAL; PERINEURAL AS NEEDED
Status: DISCONTINUED | OUTPATIENT
Start: 2021-04-14 | End: 2021-04-14

## 2021-04-14 RX ORDER — ONDANSETRON 2 MG/ML
INJECTION INTRAMUSCULAR; INTRAVENOUS AS NEEDED
Status: DISCONTINUED | OUTPATIENT
Start: 2021-04-14 | End: 2021-04-14

## 2021-04-14 RX ORDER — SODIUM CHLORIDE 9 MG/ML
125 INJECTION, SOLUTION INTRAVENOUS CONTINUOUS
Status: DISCONTINUED | OUTPATIENT
Start: 2021-04-14 | End: 2021-04-14 | Stop reason: HOSPADM

## 2021-04-14 RX ORDER — ROCURONIUM BROMIDE 10 MG/ML
INJECTION, SOLUTION INTRAVENOUS AS NEEDED
Status: DISCONTINUED | OUTPATIENT
Start: 2021-04-14 | End: 2021-04-14

## 2021-04-14 RX ORDER — CHLORHEXIDINE GLUCONATE 0.12 MG/ML
15 RINSE ORAL ONCE
Status: COMPLETED | OUTPATIENT
Start: 2021-04-14 | End: 2021-04-14

## 2021-04-14 RX ORDER — KETAMINE HCL IN NACL, ISO-OSM 100MG/10ML
SYRINGE (ML) INJECTION AS NEEDED
Status: DISCONTINUED | OUTPATIENT
Start: 2021-04-14 | End: 2021-04-14

## 2021-04-14 RX ORDER — METHOCARBAMOL 500 MG/1
500 TABLET, FILM COATED ORAL EVERY 6 HOURS PRN
Status: DISCONTINUED | OUTPATIENT
Start: 2021-04-14 | End: 2021-04-14 | Stop reason: HOSPADM

## 2021-04-14 RX ORDER — OXYCODONE HYDROCHLORIDE AND ACETAMINOPHEN 5; 325 MG/1; MG/1
1 TABLET ORAL EVERY 4 HOURS PRN
Status: DISCONTINUED | OUTPATIENT
Start: 2021-04-14 | End: 2021-04-14 | Stop reason: HOSPADM

## 2021-04-14 RX ORDER — PROMETHAZINE HYDROCHLORIDE 25 MG/ML
12.5 INJECTION, SOLUTION INTRAMUSCULAR; INTRAVENOUS ONCE AS NEEDED
Status: DISCONTINUED | OUTPATIENT
Start: 2021-04-14 | End: 2021-04-14 | Stop reason: HOSPADM

## 2021-04-14 RX ORDER — DEXAMETHASONE SODIUM PHOSPHATE 4 MG/ML
INJECTION, SOLUTION INTRA-ARTICULAR; INTRALESIONAL; INTRAMUSCULAR; INTRAVENOUS; SOFT TISSUE AS NEEDED
Status: DISCONTINUED | OUTPATIENT
Start: 2021-04-14 | End: 2021-04-14

## 2021-04-14 RX ORDER — FENTANYL CITRATE/PF 50 MCG/ML
50 SYRINGE (ML) INJECTION
Status: DISCONTINUED | OUTPATIENT
Start: 2021-04-14 | End: 2021-04-14 | Stop reason: HOSPADM

## 2021-04-14 RX ORDER — MEPERIDINE HYDROCHLORIDE 25 MG/ML
12.5 INJECTION INTRAMUSCULAR; INTRAVENOUS; SUBCUTANEOUS
Status: DISCONTINUED | OUTPATIENT
Start: 2021-04-14 | End: 2021-04-14 | Stop reason: HOSPADM

## 2021-04-14 RX ORDER — CEFAZOLIN SODIUM 2 G/50ML
2000 SOLUTION INTRAVENOUS ONCE
Status: COMPLETED | OUTPATIENT
Start: 2021-04-14 | End: 2021-04-14

## 2021-04-14 RX ORDER — GLYCOPYRROLATE 0.2 MG/ML
INJECTION INTRAMUSCULAR; INTRAVENOUS AS NEEDED
Status: DISCONTINUED | OUTPATIENT
Start: 2021-04-14 | End: 2021-04-14

## 2021-04-14 RX ORDER — ONDANSETRON 2 MG/ML
4 INJECTION INTRAMUSCULAR; INTRAVENOUS ONCE AS NEEDED
Status: DISCONTINUED | OUTPATIENT
Start: 2021-04-14 | End: 2021-04-14 | Stop reason: HOSPADM

## 2021-04-14 RX ORDER — KETOROLAC TROMETHAMINE 30 MG/ML
INJECTION, SOLUTION INTRAMUSCULAR; INTRAVENOUS AS NEEDED
Status: DISCONTINUED | OUTPATIENT
Start: 2021-04-14 | End: 2021-04-14

## 2021-04-14 RX ORDER — IBUPROFEN 400 MG/1
400 TABLET ORAL EVERY 6 HOURS PRN
Status: DISCONTINUED | OUTPATIENT
Start: 2021-04-14 | End: 2021-04-14 | Stop reason: HOSPADM

## 2021-04-14 RX ORDER — ONDANSETRON 2 MG/ML
4 INJECTION INTRAMUSCULAR; INTRAVENOUS EVERY 6 HOURS PRN
Status: DISCONTINUED | OUTPATIENT
Start: 2021-04-14 | End: 2021-04-14 | Stop reason: HOSPADM

## 2021-04-14 RX ORDER — METHYLPREDNISOLONE ACETATE 80 MG/ML
INJECTION, SUSPENSION INTRA-ARTICULAR; INTRALESIONAL; INTRAMUSCULAR; SOFT TISSUE AS NEEDED
Status: DISCONTINUED | OUTPATIENT
Start: 2021-04-14 | End: 2021-04-14 | Stop reason: HOSPADM

## 2021-04-14 RX ADMIN — GABAPENTIN 300 MG: 300 CAPSULE ORAL at 08:30

## 2021-04-14 RX ADMIN — GLYCOPYRROLATE 0.4 MG: 0.2 INJECTION, SOLUTION INTRAMUSCULAR; INTRAVENOUS at 11:05

## 2021-04-14 RX ADMIN — CEFAZOLIN SODIUM 2000 MG: 2 SOLUTION INTRAVENOUS at 09:45

## 2021-04-14 RX ADMIN — FENTANYL CITRATE 50 MCG: 50 INJECTION, SOLUTION INTRAMUSCULAR; INTRAVENOUS at 11:14

## 2021-04-14 RX ADMIN — KETOROLAC TROMETHAMINE 30 MG: 30 INJECTION, SOLUTION INTRAMUSCULAR at 11:05

## 2021-04-14 RX ADMIN — SODIUM CHLORIDE, SODIUM LACTATE, POTASSIUM CHLORIDE, AND CALCIUM CHLORIDE: .6; .31; .03; .02 INJECTION, SOLUTION INTRAVENOUS at 08:38

## 2021-04-14 RX ADMIN — Medication 20 MG: at 10:22

## 2021-04-14 RX ADMIN — NEOSTIGMINE METHYLSULFATE 2 MG: 1 INJECTION INTRAVENOUS at 11:05

## 2021-04-14 RX ADMIN — DEXAMETHASONE SODIUM PHOSPHATE 4 MG: 4 INJECTION INTRA-ARTICULAR; INTRALESIONAL; INTRAMUSCULAR; INTRAVENOUS; SOFT TISSUE at 09:56

## 2021-04-14 RX ADMIN — FENTANYL CITRATE 50 MCG: 50 INJECTION, SOLUTION INTRAMUSCULAR; INTRAVENOUS at 09:54

## 2021-04-14 RX ADMIN — LIDOCAINE HYDROCHLORIDE 50 MG: 10 INJECTION, SOLUTION EPIDURAL; INFILTRATION; INTRACAUDAL at 09:54

## 2021-04-14 RX ADMIN — Medication 30 MG: at 09:54

## 2021-04-14 RX ADMIN — ACETAMINOPHEN 975 MG: 325 TABLET, FILM COATED ORAL at 08:29

## 2021-04-14 RX ADMIN — FENTANYL CITRATE 50 MCG: 50 INJECTION, SOLUTION INTRAMUSCULAR; INTRAVENOUS at 10:21

## 2021-04-14 RX ADMIN — FENTANYL CITRATE 50 MCG: 50 INJECTION, SOLUTION INTRAMUSCULAR; INTRAVENOUS at 10:53

## 2021-04-14 RX ADMIN — ONDANSETRON 4 MG: 2 INJECTION INTRAMUSCULAR; INTRAVENOUS at 09:49

## 2021-04-14 RX ADMIN — ONDANSETRON 4 MG: 2 INJECTION INTRAMUSCULAR; INTRAVENOUS at 12:19

## 2021-04-14 RX ADMIN — MIDAZOLAM HYDROCHLORIDE 2 MG: 1 INJECTION, SOLUTION INTRAMUSCULAR; INTRAVENOUS at 09:49

## 2021-04-14 RX ADMIN — ROCURONIUM BROMIDE 40 MG: 10 SOLUTION INTRAVENOUS at 09:54

## 2021-04-14 RX ADMIN — PROPOFOL 150 MG: 10 INJECTION, EMULSION INTRAVENOUS at 09:54

## 2021-04-14 RX ADMIN — CHLORHEXIDINE GLUCONATE 0.12% ORAL RINSE 15 ML: 1.2 LIQUID ORAL at 08:29

## 2021-04-14 RX ADMIN — FENTANYL CITRATE 50 MCG: 50 INJECTION INTRAMUSCULAR; INTRAVENOUS at 11:58

## 2021-04-14 RX ADMIN — OXYCODONE HYDROCHLORIDE AND ACETAMINOPHEN 1 TABLET: 5; 325 TABLET ORAL at 13:16

## 2021-04-14 RX ADMIN — ONDANSETRON 4 MG: 2 INJECTION INTRAMUSCULAR; INTRAVENOUS at 13:17

## 2021-04-14 NOTE — ANESTHESIA PREPROCEDURE EVALUATION
Procedure:  L5-S1 minimally invasive microdiskectomy and possible epidural steroid injection (Right Spine Lumbar)    Relevant Problems   CARDIO   (+) Essential hypertension      MUSCULOSKELETAL   (+) Acute exacerbation of chronic low back pain   (+) Acute right-sided low back pain with right-sided sciatica   (+) Lumbar back pain      NEURO/PSYCH   (+) Anxiety and depression   (+) Depression   (+) Numbness and tingling of both upper extremities while sleeping      PULMONARY   (+) Asthma        Physical Exam    Airway    Mallampati score: II  TM Distance: >3 FB       Dental   upper dentures and lower dentures,     Cardiovascular  Rhythm: regular,     Pulmonary  Breath sounds clear to auscultation,     Other Findings  Edentulous upper and lower      Anesthesia Plan  ASA Score- 2     Anesthesia Type- general with ASA Monitors  Additional Monitors:   Airway Plan: ETT  Plan Factors-Exercise tolerance (METS): >4 METS  Chart reviewed  EKG reviewed  Existing labs reviewed  Patient summary reviewed  Patient is not a current smoker           Induction-     Postoperative Plan-     Informed Consent-

## 2021-04-14 NOTE — OP NOTE
OPERATIVE REPORT  PATIENT NAME: Coby Bravo    :  1985  MRN: [id number]  Pt Location: AN OR ROOM 01    SURGERY DATE: 2021    Surgeon(s) and Role:     Jorge Jarrell MD - Primary    Preop Diagnosis:  Lumbar disc herniation [M51 26]  Lumbar radiculopathy [M54 16]    Post-Op Diagnosis Codes:     * Lumbar disc herniation [M51 26]     * Lumbar radiculopathy [M54 16]    Procedure:  1  Right L5-S1 minimally invasive microdiskectomy and epidural steroid injection  2  Operating microscope for microdissection  Specimen(s):  * No specimens in log *    Estimated Blood Loss:   Minimal    Drains:  * No LDAs found *    Anesthesia Type:   General    Operative Indications:  Lumbar disc herniation [M51 26]  Lumbar radiculopathy [M54 16]    Operative Findings:  Large right paracentral disc herniation at L5-S1 impinging on the traversing nerve root  Complications:   None    Procedure and Technique:  Clinical Note:    The goals and alternatives to the procedure described above were discussed with patient and family  Surgery is intended to decompress neural structures and hopefully improve radicular pain  Weakness, numbness and back pain are less likely to improve  The risks of surgery were described in detail  1  Risk of general anesthetic, with possible cardiac and respiratory complication  There is risk of infection and bleeding  2  Risk of neurological injury with new pain, weakness or numbness or difficulties with bowel and bladder function  The risk of CSF leak was described  3  Possible need for revision surgery in the future  Once all questions were answered to their satisfaction, they asked to proceed with surgery  Operating Room Note    The patient was brought to the operating room and placed under general anesthetic  Once all appropriate lines were in position, the patient was carefully turned in the prone position onto a Jason frame   Care was taken to ensure that all pressure points were padded and the neck was in a neutral position  AP and lateral fluoroscopy were used to identify midline of the lumbar spine  A mini surgical timeout was undertaken identifying site side and level of surgery  The right the midline was prepped with alcohol swab  A spinal needle was placed under lateral fluoroscopic guidance demonstrated appropriate trajectory to the appropriate lumbar interspace  One percent lidocaine with 100,000 of epinephrine was used to infiltrate the soft tissue as the spinal needle was removed  A 2 cm incision was planned based on the entry point of the needle  The skin was then prepped and draped in usual sterile fashion  A full surgical timeout was undertaken identifying the site, side and level of surgery  The patient received preoperative antibiotics  10 blade was used to incise the skin  Monopolar cautery was used to dissect down and through the muscle fascia  A series of Metrx dilators were placed and a final Metrix retractor was placed with lateral fluoroscopy confirming the L5/S1 level  This was also confirmed radiology  The operating microscope was then brought in for microdissection  Under microscopic magnification, monopolar cautery was used to remove the soft tissue off the lamina  A ball bur was used to drill the lamina and expose the underlying ligamentum flavum  The ligamentum flavum was undermined with a curved curette and removed Kerrison punch  I was able to identify the thecal sac and shoulder of the nerve root  Bipolar cautery and FloSeal were used for hemostasis  With a nerve root retracted behind a suction structure, was able to identify a right-sided disc herniation  Bipolar cautery was used for hemostasis the epidural veins  Fifteen blade was used to incise the annulus  There was immediate egress of disc material under pressure    Upgoing straight and angled pituitary rongeur was used to remove several large disc fragments and there was immediate relaxation of the nerve root and thecal sac  Afterwards I could pass a ball hook along the ventral surface of the thecal sac and along the course of the nerve root through the lateral recess and there is no further compression  Valsalva maneuver demonstrated no egress of CSF  This space  was used to identify any loose fragments which were then removed with pituitary rongeur  Graciela Edman 4 was placed in disc space and lateral fluoroscopy was used to reconfirm the level  This instrument was removed  Surgiflo was used for hemostasis  The surgical site was irrigated with antibiotic irrigation  4 mg of Depo-Medrol were then placed in the epidural space  The Metrx retractor was removed any bleeding from soft tissue was cauterized with monopolar and bipolar cautery  The surgical site was irrigated copiously with antibiotic irrigation  Vicryl suture was used to approximate the fascia and subcutaneous tissue  Monocryl was used to approximate the skin edges  0 5% Marcaine was used to infiltrate the soft tissue  A Miplex was applied  The count was correct at the end of the case and there were no complications  The patient was carefully transferred onto the operating gurney and extubated  The patient was transferred to the PACU where they were noted to be hemodynamically stable and neurologically unchanged  The results of surgery were discussed with patient and family      I was present for the entire procedure    Patient Disposition:  PACU     SIGNATURE: Bindu Kelley MD  DATE: April 14, 2021  TIME: 11:17 AM

## 2021-04-14 NOTE — DISCHARGE INSTRUCTIONS
Spine Day Surgery Discharge Instructions    Activity:    1  Do not lift more than 20 pounds for 2 weeks  Surgical incision care:    1  Keep dressing in place for 3 days  2  Keep incision dry for 3 days  3  May allow clean water to flow over incision after 3 days  4  Do not immerse the incision in water for 4 weeks  5  After 3 days, incision may be left open to air, but should remain clean  6  Do not apply any creams or ointments to the incision, unless otherwise instructed by [facility]  7  Contact office if increasing redness, drainage, pain or swelling around the incision  Postoperative medication:    1  [facility] will provide pain medication for the first 2 weeks after surgery as coordinated with your pain specialist    2  If [facility] is providing pain medication, please contact office for questions regarding dosage and modifications  3  If [facility] is not providing pain medication postoperatively, then contact pain specialist for additional instructions and prescriptions  4  Resume antiplatelet/anticoagulation medication 2 days after surgery, unless you notice new neurological symptoms or bleeding from incision  5  Do not operate heavy machinery or vehicles while taking sedating medications

## 2021-04-14 NOTE — INTERVAL H&P NOTE
H&P reviewed  After examining the patient I find no changes in the patients condition since the H&P had been written  Patient personally seen and examined  Neurological examination unchanged compared to last office/progress note, with the following exceptions:    /94   Pulse 83   Temp 98 4 °F (36 9 °C) (Temporal)   Resp 20   Ht 5' 3" (1 6 m)   Wt 92 1 kg (203 lb)   LMP 03/14/2021   SpO2 97%   BMI 35 96 kg/m²      None  Regular cardiac rate and rhythm  No respiratory distress  Continues have pain radiating down the right leg  Has stopped all antiplatelet/anticoagulation medication as instructed  Post operative instructions and medications have been reviewed with the patient and family  Assessment and Plan:    All questions have been answered to the patient and family satisfaction  Plan to proceed with right L5-S1 minimally invasive microdiskectomy and possible epidural steroid injection  They are in agreement with proceeding

## 2021-04-15 ENCOUNTER — TELEPHONE (OUTPATIENT)
Dept: NEUROSURGERY | Facility: CLINIC | Age: 36
End: 2021-04-15

## 2021-04-15 NOTE — TELEPHONE ENCOUNTER
1st attempt-- reached out to patient and left a message for patient to call back at her convenience to go over postop info  Provided office number

## 2021-04-16 NOTE — TELEPHONE ENCOUNTER
2nd attempt-- reached out to patient to go over postop information, instructed patient to call the office at her convenience, provided office number

## 2021-04-19 NOTE — TELEPHONE ENCOUNTER
Called patient to see how she is doing after surgery  Patient reports she is doing well overall and denies any incisional issues or fevers  Patient is able to ambulate around the house and complete ADLs  Educated the patient about the importance of preventing blood clots and provided measures how to prevent them  Patient has moved her bowels since the surgery  Encouraged patient to take an over the counter stool softener, if she is taking narcotic pain medication  Encouraged fiber intake and fluids  Reviewed incision care with the patient  Advised that at this point she may take a shower and gently wash the surgical site with soap and water  Use clean wash cloth, towels, and clothing  Do not submerge in water until cleared by the surgeon  Do not apply any creams, ointments, or lotions to the site  Patient is aware to call the office if any redness, swelling, drainage, dehiscence of incision, or fever >100 F occurs  Patient is aware to call the office if any concerns or questions may arise  Reminded patient of her upcoming appointments with the date/time/location  Patient was appreciative for the call

## 2021-04-22 ENCOUNTER — CLINICAL SUPPORT (OUTPATIENT)
Dept: FAMILY MEDICINE CLINIC | Facility: HOME HEALTHCARE | Age: 36
End: 2021-04-22

## 2021-04-22 ENCOUNTER — TELEPHONE (OUTPATIENT)
Dept: NEUROSURGERY | Facility: CLINIC | Age: 36
End: 2021-04-22

## 2021-04-22 VITALS — SYSTOLIC BLOOD PRESSURE: 136 MMHG | HEART RATE: 96 BPM | OXYGEN SATURATION: 97 % | DIASTOLIC BLOOD PRESSURE: 96 MMHG

## 2021-04-22 DIAGNOSIS — E55.9 VITAMIN D DEFICIENCY: ICD-10-CM

## 2021-04-22 DIAGNOSIS — I10 ESSENTIAL HYPERTENSION: ICD-10-CM

## 2021-04-22 RX ORDER — LISINOPRIL 10 MG/1
10 TABLET ORAL DAILY
Qty: 30 TABLET | Refills: 2 | Status: SHIPPED | OUTPATIENT
Start: 2021-04-22 | End: 2021-05-13

## 2021-04-22 RX ORDER — HYDROCHLOROTHIAZIDE 12.5 MG/1
12.5 TABLET ORAL DAILY
Qty: 30 TABLET | Refills: 0 | Status: SHIPPED | OUTPATIENT
Start: 2021-04-22 | End: 2021-05-18

## 2021-04-22 RX ORDER — ERGOCALCIFEROL 1.25 MG/1
50000 CAPSULE ORAL WEEKLY
Qty: 4 CAPSULE | Refills: 0 | Status: SHIPPED | OUTPATIENT
Start: 2021-04-22 | End: 2021-05-18

## 2021-04-22 NOTE — TELEPHONE ENCOUNTER
4/22/21 FYI  PT CALLED AND STATES SHE WILL BE HAVING SHORT TERM DISABILITY FORMS FAXED OVER TO BE COMPLETED BY DR MIN

## 2021-04-23 ENCOUNTER — TELEPHONE (OUTPATIENT)
Dept: OTHER | Facility: OTHER | Age: 36
End: 2021-04-23

## 2021-04-23 DIAGNOSIS — Z98.890 S/P LUMBAR MICRODISCECTOMY: Primary | ICD-10-CM

## 2021-04-23 RX ORDER — OXYCODONE HYDROCHLORIDE AND ACETAMINOPHEN 5; 325 MG/1; MG/1
1 TABLET ORAL EVERY 12 HOURS PRN
Qty: 10 TABLET | Refills: 0 | Status: SHIPPED | OUTPATIENT
Start: 2021-04-23 | End: 2021-05-13

## 2021-04-23 NOTE — TELEPHONE ENCOUNTER
Pt called just got out of back surgery and pt says she has had bad leg pain for the past 3 days ans sciiatic  pain

## 2021-04-23 NOTE — PROGRESS NOTES
Patient called in C/O Right leg shooting pain, Had MIS on 4/14/2021  She says she finished 9 pills of percocet she was given  I gave her 10 pills until we see her in the office for incision check in 1 week  Advised to continue GP and Flexerril

## 2021-04-28 ENCOUNTER — TELEMEDICINE (OUTPATIENT)
Dept: NEUROSURGERY | Facility: CLINIC | Age: 36
End: 2021-04-28

## 2021-04-28 DIAGNOSIS — Z98.890 STATUS POST SURGERY: Primary | ICD-10-CM

## 2021-04-28 PROCEDURE — 99024 POSTOP FOLLOW-UP VISIT: CPT

## 2021-04-28 NOTE — PROGRESS NOTES
Virtual Brief Visit    Assessment/Plan:    Problem List Items Addressed This Visit     None      Visit Diagnoses     Status post surgery    -  Primary                Reason for visit is   Chief Complaint   Patient presents with    Virtual Brief Visit        Encounter provider Judit Holley RN    Provider located at [address] [place] [address] [address]  [address] [facility] [id number]    Recent Visits  No visits were found meeting these conditions  Showing recent visits within past 7 days and meeting all other requirements     Today's Visits  Date Type Provider Dept   04/28/21 Telemedicine Judit Holley RN  [facility] [place]   Showing today's visits and meeting all other requirements     Future Appointments  No visits were found meeting these conditions  Showing future appointments within next 150 days and meeting all other requirements        After connecting through telephone, the patient was identified by name and date of birth  Sallie Cullen was informed that this is a telemedicine visit and that the visit is being conducted through telephone  My office door was closed  No one else was in the room  She acknowledged consent and understanding of privacy and security of the platform  The patient has agreed to participate and understands she can discontinue the visit at any time  Patient is aware this is a billable service  Subjective    Sallie Cullen is a 38 y o  female s/p: Right L5-S1 minimally invasive microdiskectomy and epidural steroid injection  History of Present Illness:    Patient presents for 2 week POV for incision check virtually over the telephone for incision check  She was able to submit a photo of her incision for both myself and the surgeon to review  Patient reports she is doing well overall and denies any incisional issues or fevers  She does report having a lot of low back pain but denies incisional pain   She also continues to have right "sciatic type pain" which she had prior to surgery  Patient wanted me to make the surgeon aware that she sometimes still feels the need to "push" her urine out but this was happening prior to the surgery as well  She denies any new weakness since surgery and is currently taking gabapentin and percocet with some relief of pain symptoms  She denies needing any refills at this time            Past Medical History:   Diagnosis Date    Anxiety     Asthma     Cat-scratch disease     last assessed 10/22/15    Depression     Hypertension     last assessed 11/11/14    Sciatica     Sleep disorder        Past Surgical History:   Procedure Laterality Date    BREAST BIOPSY Left     2016   [facility]      IL BX/REMV,LYMPH NODE,DEEP AXILL Left 1/15/2019    Procedure: LEFT AXILLARY LYMPH NODE BIOPSY;  Surgeon: Maurilio Beckwith MD;  [facility]: [facility];  Service: General    IL LAMNOTMY INCL W/DCMPRSN NRV ROOT 1 INTRSPC LUMBR Right 4/14/2021    Procedure: L5-S1 minimally invasive microdiskectomy and  epidural steroid injection;  Surgeon: Celina Alfaro MD;  [facility];  Service: Neurosurgery    TUBAL LIGATION         Current Outpatient Medications   Medication Sig Dispense Refill    ARIPiprazole (ABILIFY) 10 mg tablet Take 10 mg by mouth every morning       clonazePAM (KlonoPIN) 1 mg tablet Take 1 mg by mouth 2 (two) times a day      ergocalciferol (VITAMIN D2) 50,000 units Take 1 capsule (50,000 Units total) by mouth once a week 4 capsule 0    hydrochlorothiazide (HYDRODIURIL) 12 5 mg tablet Take 1 tablet (12 5 mg total) by mouth daily 30 tablet 0    lisinopril (ZESTRIL) 10 mg tablet Take 1 tablet (10 mg total) by mouth daily 30 tablet 2    oxyCODONE-acetaminophen (PERCOCET) 5-325 mg per tablet Take 1 tablet by mouth every 12 (twelve) hours as needed for moderate painMax Daily Amount: 2 tablets 10 tablet 0    sertraline (ZOLOFT) 100 mg tablet Take 200 mg by mouth daily at bedtime      cyclobenzaprine (FLEXERIL) 10 mg tablet Take 1 tablet (10 mg total) by mouth 3 (three) times a day as needed for muscle spasms 30 tablet 0    gabapentin (NEURONTIN) 300 mg capsule Take 1 capsule (300 mg total) by mouth 3 (three) times a day 90 capsule 1    ondansetron (ZOFRAN) 4 mg tablet Take 1 tablet (4 mg total) by mouth every 8 (eight) hours as needed for nausea for up to 30 doses (Patient not taking: Reported on 4/28/2021) 30 tablet 0     No current facility-administered medications for this visit  Allergies   Allergen Reactions    Bactrim [Sulfamethoxazole-Trimethoprim] Shortness Of Breath     Near syncope    Codeine Hives     Tolerated Percocet, Vicodin, etc        Review of Systems      Assessment:  There were no vitals filed for this visit  Wound Exam: Incision well approximated  Moderate erythema surrounding sutures, no edema or drainage present  Location: low back  Complications: None  Incision LEONEL  Discussion/Summary:  Reviewed incision care with patient including daily observation for s/s infection including: increased erythema, edema, drainage, dehiscence of incision or fever >101  Should these be observed, she understands that she is to call and/or return immediately for reassessment  She will continue to monitor incisional erythema and call with any worsening redness or if there is no improvement in the next few days  Advised patient to continue cleansing area with mild soap and water and pat dry  Not to apply any lotions, creams, or ointments, & not to submerge in any water for two more weeks  She is to maintain activity restrictions until cleared by the surgeon  Activity levels were also reviewed with the patient in detail, she is to slowly increase her level of activity and ambulation is encouraged as tolerated   Verified date/time/location of upcoming POV and reminded her to call the office with any further questions or concerns, or if any incisional issues or fevers would arise  VIRTUAL VISIT Allyn Tinoco acknowledges that she has consented to an online visit or consultation  She understands that the online visit is based solely on information provided by her, and that, in the absence of a face-to-face physical evaluation by the physician, the diagnosis she receives is both limited and provisional in terms of accuracy and completeness  This is not intended to replace a full medical face-to-face evaluation by the physician  Sarah Matias understands and accepts these terms

## 2021-05-03 ENCOUNTER — TELEPHONE (OUTPATIENT)
Dept: NEUROSURGERY | Facility: CLINIC | Age: 36
End: 2021-05-03

## 2021-05-03 ENCOUNTER — DOCUMENTATION (OUTPATIENT)
Dept: FAMILY MEDICINE CLINIC | Facility: HOME HEALTHCARE | Age: 36
End: 2021-05-03

## 2021-05-03 DIAGNOSIS — R30.0 DYSURIA: Primary | ICD-10-CM

## 2021-05-03 PROCEDURE — 81001 URINALYSIS AUTO W/SCOPE: CPT

## 2021-05-03 NOTE — PROGRESS NOTES
Patient came into office today stating that she is having urinary retention   Patient is s/p back surgery and per JK he is recommending that patient be treated at the ED for further eval  Patient was informed and verbalized understanding

## 2021-05-03 NOTE — TELEPHONE ENCOUNTER
Patient called nurseline with questions regarding some symptoms she's having  Returned call to patient who states she has been having trouble urinating in the sense that she feels like she needs to push  States this has been going on since before her surgery but is getting worse  Patient states she has full sensation when she wipes  Advised she follow up with her PCP for this  Patient states she is also noticing that she has been "stumbling" the last week or so  Denies numbness/tingling/weakness  States she feels like she is tripping  She states over the last 3 days she has also noticed her hands have been shaking  Advised will route to Dr Van Bautista for his recommendations  Patient states she also needs FMLA filled out and will fax to our office to be filled out by Sirena SALMON  Appreciative of call back

## 2021-05-03 NOTE — TELEPHONE ENCOUNTER
Reached out to patient to update her on Dr Solomon Perkins recommendations  Patient states she just saw her PCP today and declined an appt with NSX  Appreciative of call back

## 2021-05-04 ENCOUNTER — TELEPHONE (OUTPATIENT)
Dept: OTHER | Facility: OTHER | Age: 36
End: 2021-05-04

## 2021-05-04 LAB
BACTERIA UR QL AUTO: ABNORMAL /HPF
BILIRUB UR QL STRIP: NEGATIVE
CLARITY UR: ABNORMAL
COLOR UR: YELLOW
GLUCOSE UR STRIP-MCNC: NEGATIVE MG/DL
HGB UR QL STRIP.AUTO: NEGATIVE
KETONES UR STRIP-MCNC: NEGATIVE MG/DL
LEUKOCYTE ESTERASE UR QL STRIP: ABNORMAL
NITRITE UR QL STRIP: NEGATIVE
NON-SQ EPI CELLS URNS QL MICRO: ABNORMAL /HPF
PH UR STRIP.AUTO: 6 [PH]
PROT UR STRIP-MCNC: NEGATIVE MG/DL
RBC #/AREA URNS AUTO: ABNORMAL /HPF
SP GR UR STRIP.AUTO: 1.02 (ref 1–1.03)
UROBILINOGEN UR QL STRIP.AUTO: 0.2 E.U./DL
WBC #/AREA URNS AUTO: ABNORMAL /HPF

## 2021-05-04 NOTE — TELEPHONE ENCOUNTER
Pt called to discuss her recent abnormal urine results  I was attempting to help her, but our connection was very bad  I called her back again but there was no answer  I LMOM for her to call back the answering service at her doctor's office number  Please follow up with her tomorrow

## 2021-05-05 DIAGNOSIS — N30.00 ACUTE CYSTITIS WITHOUT HEMATURIA: Primary | ICD-10-CM

## 2021-05-05 RX ORDER — CEPHALEXIN 500 MG/1
500 CAPSULE ORAL EVERY 6 HOURS SCHEDULED
Qty: 28 CAPSULE | Refills: 0 | Status: SHIPPED | OUTPATIENT
Start: 2021-05-05 | End: 2021-05-08 | Stop reason: ALTCHOICE

## 2021-05-06 ENCOUNTER — TELEPHONE (OUTPATIENT)
Dept: FAMILY MEDICINE CLINIC | Facility: HOME HEALTHCARE | Age: 36
End: 2021-05-06

## 2021-05-06 NOTE — TELEPHONE ENCOUNTER
Called pt to let her know  Pt states she spoke with Lesvia Beltran today and was told she does not need ABX

## 2021-05-06 NOTE — TELEPHONE ENCOUNTER
----- Message from Donna Chacon PA-C sent at 5/5/2021  2:41 PM EDT -----  Script sent for Keflex x 7 days for UTI   ----- Message -----  From: Morris Vasquez MA  Sent: 5/5/2021   9:27 AM EDT  To: Donna Chacon PA-C    Pt catrachito anguiano called office this morning concerned about her recent UA results which she saw in her mychart this morning  Pt only symptoms are blurred vision and the feeling that she has to force herself to pee (as if pooping, per pt)  Pt did have neurosurgery about a month ago  No virtual visit openings for today and Yen Darden is out of the office until tomorrow  Pt wants to know if she should be concerned about these results or what it could mean  Please advise

## 2021-05-07 DIAGNOSIS — M51.16 LUMBAR DISC DISEASE WITH RADICULOPATHY: ICD-10-CM

## 2021-05-07 DIAGNOSIS — G89.4 CHRONIC PAIN SYNDROME: ICD-10-CM

## 2021-05-07 DIAGNOSIS — M54.16 LUMBAR RADICULOPATHY: ICD-10-CM

## 2021-05-08 ENCOUNTER — HOSPITAL ENCOUNTER (EMERGENCY)
Facility: HOSPITAL | Age: 36
Discharge: HOME | End: 2021-05-09
Attending: EMERGENCY MEDICINE
Payer: COMMERCIAL

## 2021-05-08 DIAGNOSIS — R73.9 HYPERGLYCEMIA: ICD-10-CM

## 2021-05-08 DIAGNOSIS — Z51.89 VISIT FOR WOUND CHECK: Primary | ICD-10-CM

## 2021-05-08 DIAGNOSIS — R39.198 ABNORMAL URINATION: ICD-10-CM

## 2021-05-08 DIAGNOSIS — L76.34 POSTOPERATIVE SEROMA OF SKIN AFTER NON-DERMATOLOGIC PROCEDURE: ICD-10-CM

## 2021-05-08 PROCEDURE — 99284 EMERGENCY DEPT VISIT MOD MDM: CPT | Performed by: EMERGENCY MEDICINE

## 2021-05-08 PROCEDURE — 99283 EMERGENCY DEPT VISIT LOW MDM: CPT

## 2021-05-08 PROCEDURE — 81025 URINE PREGNANCY TEST: CPT | Performed by: EMERGENCY MEDICINE

## 2021-05-08 PROCEDURE — 85025 COMPLETE CBC W/AUTO DIFF WBC: CPT | Performed by: EMERGENCY MEDICINE

## 2021-05-08 PROCEDURE — 36415 COLL VENOUS BLD VENIPUNCTURE: CPT | Performed by: EMERGENCY MEDICINE

## 2021-05-08 PROCEDURE — 80053 COMPREHEN METABOLIC PANEL: CPT | Performed by: EMERGENCY MEDICINE

## 2021-05-08 RX ORDER — GABAPENTIN 100 MG/1
CAPSULE ORAL
Qty: 90 CAPSULE | Refills: 1 | OUTPATIENT
Start: 2021-05-08

## 2021-05-09 ENCOUNTER — TELEPHONE (OUTPATIENT)
Dept: OTHER | Facility: OTHER | Age: 36
End: 2021-05-09

## 2021-05-09 ENCOUNTER — APPOINTMENT (EMERGENCY)
Dept: CT IMAGING | Facility: HOSPITAL | Age: 36
End: 2021-05-09
Payer: COMMERCIAL

## 2021-05-09 VITALS
OXYGEN SATURATION: 95 % | RESPIRATION RATE: 20 BRPM | HEART RATE: 82 BPM | SYSTOLIC BLOOD PRESSURE: 162 MMHG | TEMPERATURE: 97 F | DIASTOLIC BLOOD PRESSURE: 87 MMHG

## 2021-05-09 DIAGNOSIS — Z48.89 AFTERCARE FOLLOWING SURGERY: Primary | ICD-10-CM

## 2021-05-09 LAB
ALBUMIN SERPL BCP-MCNC: 3.9 G/DL (ref 3.5–5)
ALP SERPL-CCNC: 91 U/L (ref 46–116)
ALT SERPL W P-5'-P-CCNC: 21 U/L (ref 12–78)
ANION GAP SERPL CALCULATED.3IONS-SCNC: 11 MMOL/L (ref 4–13)
AST SERPL W P-5'-P-CCNC: 14 U/L (ref 5–45)
BACTERIA UR QL AUTO: ABNORMAL /HPF
BASOPHILS # BLD AUTO: 0.05 THOUSANDS/ΜL (ref 0–0.1)
BASOPHILS NFR BLD AUTO: 1 % (ref 0–1)
BILIRUB SERPL-MCNC: 0.3 MG/DL (ref 0.2–1)
BILIRUB UR QL STRIP: NEGATIVE
BUN SERPL-MCNC: 13 MG/DL (ref 5–25)
CALCIUM SERPL-MCNC: 8.5 MG/DL (ref 8.3–10.1)
CHLORIDE SERPL-SCNC: 104 MMOL/L (ref 100–108)
CLARITY UR: ABNORMAL
CO2 SERPL-SCNC: 26 MMOL/L (ref 21–32)
COLOR UR: ABNORMAL
CREAT SERPL-MCNC: 0.94 MG/DL (ref 0.6–1.3)
EOSINOPHIL # BLD AUTO: 0.4 THOUSAND/ΜL (ref 0–0.61)
EOSINOPHIL NFR BLD AUTO: 5 % (ref 0–6)
ERYTHROCYTE [DISTWIDTH] IN BLOOD BY AUTOMATED COUNT: 13.1 % (ref 11.6–15.1)
EXT PREG TEST URINE: NEGATIVE
EXT. CONTROL ED NAV: NORMAL
GFR SERPL CREATININE-BSD FRML MDRD: 78 ML/MIN/1.73SQ M
GLUCOSE SERPL-MCNC: 146 MG/DL (ref 65–140)
GLUCOSE UR STRIP-MCNC: NEGATIVE MG/DL
HCT VFR BLD AUTO: 40.1 % (ref 34.8–46.1)
HGB BLD-MCNC: 13.3 G/DL (ref 11.5–15.4)
HGB UR QL STRIP.AUTO: ABNORMAL
IMM GRANULOCYTES # BLD AUTO: 0.02 THOUSAND/UL (ref 0–0.2)
IMM GRANULOCYTES NFR BLD AUTO: 0 % (ref 0–2)
KETONES UR STRIP-MCNC: NEGATIVE MG/DL
LEUKOCYTE ESTERASE UR QL STRIP: ABNORMAL
LYMPHOCYTES # BLD AUTO: 1.75 THOUSANDS/ΜL (ref 0.6–4.47)
LYMPHOCYTES NFR BLD AUTO: 20 % (ref 14–44)
MCH RBC QN AUTO: 31.5 PG (ref 26.8–34.3)
MCHC RBC AUTO-ENTMCNC: 33.2 G/DL (ref 31.4–37.4)
MCV RBC AUTO: 95 FL (ref 82–98)
MONOCYTES # BLD AUTO: 0.71 THOUSAND/ΜL (ref 0.17–1.22)
MONOCYTES NFR BLD AUTO: 8 % (ref 4–12)
MUCOUS THREADS UR QL AUTO: ABNORMAL
NEUTROPHILS # BLD AUTO: 5.93 THOUSANDS/ΜL (ref 1.85–7.62)
NEUTS SEG NFR BLD AUTO: 66 % (ref 43–75)
NITRITE UR QL STRIP: NEGATIVE
NON-SQ EPI CELLS URNS QL MICRO: ABNORMAL /HPF
NRBC BLD AUTO-RTO: 0 /100 WBCS
PH UR STRIP.AUTO: 5 [PH]
PLATELET # BLD AUTO: 244 THOUSANDS/UL (ref 149–390)
PMV BLD AUTO: 10.1 FL (ref 8.9–12.7)
POTASSIUM SERPL-SCNC: 3.6 MMOL/L (ref 3.5–5.3)
PROT SERPL-MCNC: 7.2 G/DL (ref 6.4–8.2)
PROT UR STRIP-MCNC: ABNORMAL MG/DL
RBC # BLD AUTO: 4.22 MILLION/UL (ref 3.81–5.12)
RBC #/AREA URNS AUTO: ABNORMAL /HPF
SODIUM SERPL-SCNC: 141 MMOL/L (ref 136–145)
SP GR UR STRIP.AUTO: >=1.03 (ref 1–1.03)
UROBILINOGEN UR QL STRIP.AUTO: 0.2 E.U./DL
WBC # BLD AUTO: 8.86 THOUSAND/UL (ref 4.31–10.16)
WBC #/AREA URNS AUTO: ABNORMAL /HPF

## 2021-05-09 PROCEDURE — 74177 CT ABD & PELVIS W/CONTRAST: CPT

## 2021-05-09 PROCEDURE — 81001 URINALYSIS AUTO W/SCOPE: CPT | Performed by: EMERGENCY MEDICINE

## 2021-05-09 RX ORDER — CEPHALEXIN 500 MG/1
500 CAPSULE ORAL EVERY 6 HOURS SCHEDULED
Qty: 56 CAPSULE | Refills: 0 | Status: SHIPPED | OUTPATIENT
Start: 2021-05-09 | End: 2021-05-23

## 2021-05-09 RX ADMIN — IOHEXOL 100 ML: 350 INJECTION, SOLUTION INTRAVENOUS at 00:23

## 2021-05-09 NOTE — PROGRESS NOTES
Placed orders for keflex for prophylaxis, recommended f/u in clinic if any fevers sweats etc needs to come to ED

## 2021-05-09 NOTE — DISCHARGE INSTRUCTIONS
There is a fluid collection where they did surgery that is consistent with seroma  I need to call your surgeon today  If there is any changes to your neuro status many of increased weakness, changes in walking age return the emergency department  Regarding your urination, I will refer you to Urology

## 2021-05-09 NOTE — ED RE-EVALUATION NOTE
Received patient sign-out from Dr Young Signs  Patient comes in with some swelling to her lumbar surgical site  Patient states that there is some swelling  And that it looks slightly more red  She did not call Neurosurgery before coming in  She is denying any bowel or bladder incontinence saddle anesthesia  She does states that she has had some issues with urinary retention that is been present for months, nothing has changed since the surgery and certainly nothing has changed in the last couple days  At the time of sign-out, CT was pending  CT report shows likely seroma, less likely to be abscess  Patient has had no fevers or chills, she has no white count, and has normal vital signs no history of fevers  I did discuss with patient the results of the CT scan  At this time, there is nothing that neuro surgery will do tonight  I did discuss the patient that I would touch base with neuro surgery later this morning regarding the seroma but also 1 her to contact Neurosurgery as well  She states that she has also been having some issues with urinary retention  She followed up with Neurosurgery regarding this and was told to follow-up with her primary care provider  She did go to her primary care provider who said that she needed to come in for a bladder scan  Patient was able to urinate here, her bladder is collapsed on CT scan  No evidence of urinary retention here  I will give her a referral to Urology  If urology has no answers, I told patient she should pursue follow-up with gynecology  Patient verbalized understanding  Patient will call her surgeon later today, I will contact neuro surgery later this morning as well       Wesley Garcia MD  05/09/21 [id number]

## 2021-05-09 NOTE — ED PROVIDER NOTES
History  Chief Complaint   Patient presents with    Wound Check     Patient states she had sx on her back on 4/14 and she states the incision is getting more red and "swollen"  She was told to come here to have it checked out  Patient is a 48-year-old female presents for evaluation of lumbar surgical wound  Patient had lumbar spine surgery on 04/14  Patient says that starting today she noticed some swelling around the scar area  She also said the scar looks more red than it did before  She denies any drainage from the area  She denies any fevers chills, nausea or vomiting  She says that she did not call the Neurosurgery office to the fact that it was a Saturday  Prior to Admission Medications   Prescriptions Last Dose Informant Patient Reported? Taking?    ARIPiprazole (ABILIFY) 10 mg tablet 5/8/2021 at Unknown time Self Yes Yes   Sig: Take 10 mg by mouth every morning    clonazePAM (KlonoPIN) 1 mg tablet 5/8/2021 at Unknown time Self Yes Yes   Sig: Take 1 mg by mouth 2 (two) times a day   cyclobenzaprine (FLEXERIL) 10 mg tablet Past Week at Unknown time Self No Yes   Sig: Take 1 tablet (10 mg total) by mouth 3 (three) times a day as needed for muscle spasms   ergocalciferol (VITAMIN D2) 50,000 units 5/8/2021 at Unknown time  No Yes   Sig: Take 1 capsule (50,000 Units total) by mouth once a week   gabapentin (NEURONTIN) 300 mg capsule 5/8/2021 at Unknown time Self No Yes   Sig: Take 1 capsule (300 mg total) by mouth 3 (three) times a day   hydrochlorothiazide (HYDRODIURIL) 12 5 mg tablet 5/8/2021 at Unknown time  No Yes   Sig: Take 1 tablet (12 5 mg total) by mouth daily   lisinopril (ZESTRIL) 10 mg tablet 5/8/2021 at Unknown time  No Yes   Sig: Take 1 tablet (10 mg total) by mouth daily   oxyCODONE-acetaminophen (PERCOCET) 5-325 mg per tablet Not Taking at Unknown time  No No   Sig: Take 1 tablet by mouth every 12 (twelve) hours as needed for moderate painMax Daily Amount: 2 tablets   Patient not taking: Reported on 5/8/2021   sertraline (ZOLOFT) 100 mg tablet 5/8/2021 at Unknown time Self Yes Yes   Sig: Take 200 mg by mouth daily at bedtime      Facility-Administered Medications: None       Past Medical History:   Diagnosis Date    Anxiety     Asthma     Cat-scratch disease     last assessed 10/22/15    Depression     Hypertension     last assessed 11/11/14    Sciatica     Sleep disorder        Past Surgical History:   Procedure Laterality Date    BREAST BIOPSY Left     2016   [facility]      FL BX/REMV,LYMPH NODE,DEEP AXILL Left 1/15/2019    Procedure: LEFT AXILLARY LYMPH NODE BIOPSY;  Surgeon: Vipin Neff MD;  Location: [facility];  Service: General    FL LAMNOTMY INCL W/DCMPRSN NRV ROOT 1 INTRSPC LUMBR Right 4/14/2021    Procedure: L5-S1 minimally invasive microdiskectomy and  epidural steroid injection;  Surgeon: Janene Hernandez MD;  Location[facility];  Service: Neurosurgery    TUBAL LIGATION         Family History   Problem Relation Age of Onset    Anxiety disorder Mother     Bipolar disorder Mother     Depression Mother     Schizophrenia Mother     Parkinsonism Mother         tremor    Anxiety disorder Father     Bipolar disorder Father     Depression Father     Schizophrenia Father     Stomach cancer Paternal Grandmother      I have reviewed and agree with the history as documented  E-Cigarette/Vaping    E-Cigarette Use Never User      E-Cigarette/Vaping Substances    Nicotine No     THC No     CBD No     Flavoring No     Other No     Unknown No      Social History     Tobacco Use    Smoking status: Never Smoker    Smokeless tobacco: Never Used   Substance Use Topics    Alcohol use: No    Drug use: No       Review of Systems   Constitutional: Negative for chills, diaphoresis and fever  HENT: Negative for congestion, sinus pressure, sore throat and trouble swallowing  Eyes: Negative for pain, discharge and itching     Respiratory: Negative for cough, chest tightness, shortness of breath and wheezing  Cardiovascular: Negative for chest pain, palpitations and leg swelling  Gastrointestinal: Negative for abdominal distention, abdominal pain, blood in stool, diarrhea, nausea and vomiting  Endocrine: Negative for polyphagia and polyuria  Genitourinary: Negative for difficulty urinating, dysuria, flank pain, hematuria, pelvic pain and vaginal bleeding  Musculoskeletal: Negative for arthralgias and back pain  Skin: Positive for wound  Negative for color change and rash  Neurological: Negative for dizziness, syncope, weakness, light-headedness and headaches  Physical Exam  Physical Exam  Vitals signs and nursing note reviewed  Constitutional:       General: She is not in acute distress  Appearance: She is well-developed  HENT:      Head: Normocephalic and atraumatic  Right Ear: External ear normal       Left Ear: External ear normal       Mouth/Throat:      Pharynx: No oropharyngeal exudate  Eyes:      Conjunctiva/sclera: Conjunctivae normal       Pupils: Pupils are equal, round, and reactive to light  Neck:      Musculoskeletal: Normal range of motion and neck supple  Cardiovascular:      Rate and Rhythm: Normal rate and regular rhythm  Heart sounds: Normal heart sounds  No murmur  No friction rub  No gallop  Pulmonary:      Effort: Pulmonary effort is normal  No respiratory distress  Breath sounds: Normal breath sounds  No wheezing or rales  Abdominal:      General: There is no distension  Palpations: Abdomen is soft  Tenderness: There is no abdominal tenderness  There is no guarding  Musculoskeletal: Normal range of motion  General: No tenderness or deformity  Back:    Lymphadenopathy:      Cervical: No cervical adenopathy  Skin:     General: Skin is warm and dry  Neurological:      Mental Status: She is alert and oriented to person, place, and time        Cranial Nerves: No cranial nerve deficit  Sensory: No sensory deficit  Motor: No abnormal muscle tone           Vital Signs  ED Triage Vitals [05/08/21 2336]   Temperature Pulse Respirations Blood Pressure SpO2   (!) 97 °F (36 1 °C) 99 18 159/92 98 %      Temp Source Heart Rate Source Patient Position - Orthostatic VS BP Location FiO2 (%)   Temporal Monitor Sitting Left arm --      Pain Score       5           Vitals:    05/08/21 2336 05/08/21 2338 05/09/21 0130   BP: 159/92  162/87   Pulse: 99 96 82   Patient Position - Orthostatic VS: Sitting  Lying         Visual Acuity      ED Medications  Medications   iohexol (OMNIPAQUE) 350 MG/ML injection (SINGLE-DOSE) 100 mL (100 mL Intravenous Given 5/9/21 0023)       Diagnostic Studies  Results Reviewed     Procedure Component Value Units Date/Time    Urine Microscopic [[id number]]  (Abnormal) Collected: 05/09/21 0002    Lab Status: Final result Specimen: Urine, Clean Catch Updated: 05/09/21 0112     RBC, UA 20->100 /hpf      WBC, UA 4-10 /hpf      Epithelial Cells Occasional /hpf      Bacteria, UA Occasional /hpf      MUCUS THREADS Occasional    Comprehensive metabolic panel [[id number]]  (Abnormal) Collected: 05/08/21 1979    Lab Status: Final result Specimen: Blood from Arm, Right Updated: 05/09/21 0027     Sodium 141 mmol/L      Potassium 3 6 mmol/L      Chloride 104 mmol/L      CO2 26 mmol/L      ANION GAP 11 mmol/L      BUN 13 mg/dL      Creatinine 0 94 mg/dL      Glucose 146 mg/dL      Calcium 8 5 mg/dL      AST 14 U/L      ALT 21 U/L      Alkaline Phosphatase 91 U/L      Total Protein 7 2 g/dL      Albumin 3 9 g/dL      Total Bilirubin 0 30 mg/dL      eGFR 78 ml/min/1 73sq m     Narrative:      Anderson guidelines for Chronic Kidney Disease (CKD):     Stage 1 with normal or high GFR (GFR > 90 mL/min/1 73 square meters)    Stage 2 Mild CKD (GFR = 60-89 mL/min/1 73 square meters)    Stage 3A Moderate CKD (GFR = 45-59 mL/min/1 73 square meters)    Stage 3B Moderate CKD (GFR = 30-44 mL/min/1 73 square meters)    Stage 4 Severe CKD (GFR = 15-29 mL/min/1 73 square meters)    Stage 5 End Stage CKD (GFR <15 mL/min/1 73 square meters)  Note: GFR calculation is accurate only with a steady state creatinine    POCT pregnancy, urine [[id number]]  (Normal) Resulted: 05/09/21 0014    Lab Status: Final result Updated: 05/09/21 0014     EXT PREG TEST UR (Ref: Negative) negative     Control valid    UA (URINE) with reflex to Scope [[id number]]  (Abnormal) Collected: 05/09/21 0002    Lab Status: Final result Specimen: Urine, Clean Catch Updated: 05/09/21 0014     Color, UA Red     Clarity, UA Slightly Cloudy     Specific Gravity, UA >=1 030     pH, UA 5 0     Leukocytes, UA Trace     Nitrite, UA Negative     Protein, UA 30 (1+) mg/dl      Glucose, UA Negative mg/dl      Ketones, UA Negative mg/dl      Urobilinogen, UA 0 2 E U /dl      Bilirubin, UA Negative     Blood, UA Large    CBC and differential [[id number]] Collected: 05/08/21 2359    Lab Status: Final result Specimen: Blood from Arm, Right Updated: 05/09/21 0013     WBC 8 86 Thousand/uL      RBC 4 22 Million/uL      Hemoglobin 13 3 g/dL      Hematocrit 40 1 %      MCV 95 fL      MCH 31 5 pg      MCHC 33 2 g/dL      RDW 13 1 %      MPV 10 1 fL      Platelets 326 Thousands/uL      nRBC 0 /100 WBCs      Neutrophils Relative 66 %      Immat GRANS % 0 %      Lymphocytes Relative 20 %      Monocytes Relative 8 %      Eosinophils Relative 5 %      Basophils Relative 1 %      Neutrophils Absolute 5 93 Thousands/µL      Immature Grans Absolute 0 02 Thousand/uL      Lymphocytes Absolute 1 75 Thousands/µL      Monocytes Absolute 0 71 Thousand/µL      Eosinophils Absolute 0 40 Thousand/µL      Basophils Absolute 0 05 Thousands/µL                  CT abdomen pelvis with contrast   Final Result by Gricelda Swanson MD (05/09 0141)      4 9 x 4 8 x 6 7 cm well-defined thin-walled subcutaneous fluid collection overlying the right paraspinal musculature which may represent a seroma, less likely abscess  Workstation performed: [id number]                    Procedures  Procedures         ED Course                             SBIRT 20yo+      Most Recent Value   SBIRT (22 yo +)   In order to provide better care to our patients, we are screening all of our patients for alcohol and drug use  Would it be okay to ask you these screening questions? Yes Filed at: 05/09/2021 0005   Initial Alcohol Screen: US AUDIT-C    1  How often do you have a drink containing alcohol?  0 Filed at: 05/09/2021 0005   2  How many drinks containing alcohol do you have on a typical day you are drinking? 0 Filed at: 05/09/2021 0005   3b  FEMALE Any Age, or MALE 65+: How often do you have 4 or more drinks on one occassion? 0 Filed at: 05/09/2021 0005   Audit-C Score  0 Filed at: 05/09/2021 0005   YOAN: How many times in the past year have you    Used an illegal drug or used a prescription medication for non-medical reasons? Never Filed at: 05/09/2021 0005                    MDM  Number of Diagnoses or Management Options  Abnormal urination:   Hyperglycemia:   Postoperative seroma of skin after non-dermatologic procedure:   Visit for wound check:   Diagnosis management comments: 62-year-old female presenting for evaluation of lumbar surgery scar  Had surgery on 04/14  Noticed increased swelling around the scar today and says that the scar looks "more red " Denies any drainage  Denies any fevers or chills  Does have tenderness and swelling in the area of the scar  Bedside ultrasound shows what may be a fluid-filled area beneath the scar  Will obtain basic labs, CT abdomen pelvis with contrast to evaluate for abscess or deeper infection  Ct shows what is most likely seroma  Given lack of fever, normal labs due not believe it is infection  Will discharge home, told to follow up with neurosurgeon         Disposition  Final diagnoses:   Visit for wound check Hyperglycemia   Postoperative seroma of skin after non-dermatologic procedure   Abnormal urination     Time reflects when diagnosis was documented in both MDM as applicable and the Disposition within this note     Time User Action Codes Description Comment    5/9/2021 12:28 AM Stefania Villalta Add [Z51 89] Visit for wound check     5/9/2021 12:28 AM Stefania Villalta Add [R73 9] Hyperglycemia     5/9/2021  2:29 AM Julissa Fernandez Add [L76 34] Postoperative seroma of skin after non-dermatologic procedure     5/9/2021  2:31 AM Julissa Krafts Add [R39 198] Abnormal urination       ED Disposition     ED Disposition Condition Date/Time Comment    Discharge Stable Sun May 9, 2021  2:27 AM Hui Beal discharge to home/self care              Follow-up Information     Follow up With Specialties Details Why Contact Info    Jillian Sullivan PA-C Family Medicine, Physician Assistant Schedule an appointment as soon as possible for a visit  For follow up of elevated blood glucose [address] [id number]      Briana Watts MD Neurosurgery Call today follow up being seen in the emergency department [address]  [phone]            Discharge Medication List as of 5/9/2021  2:32 AM      CONTINUE these medications which have NOT CHANGED    Details   ARIPiprazole (ABILIFY) 10 mg tablet Take 10 mg by mouth every morning , Historical Med      clonazePAM (KlonoPIN) 1 mg tablet Take 1 mg by mouth 2 (two) times a day, Starting u 2/18/2021, Historical Med      cyclobenzaprine (FLEXERIL) 10 mg tablet Take 1 tablet (10 mg total) by mouth 3 (three) times a day as needed for muscle spasms, Starting Mon 3/22/2021, Until Sat 5/8/2021, Normal      ergocalciferol (VITAMIN D2) 50,000 units Take 1 capsule (50,000 Units total) by mouth once a week, Starting Thu 4/22/2021, Normal      gabapentin (NEURONTIN) 300 mg capsule Take 1 capsule (300 mg total) by mouth 3 (three) times a day, Starting Wed 3/17/2021, Until Sat 5/8/2021, Normal      hydrochlorothiazide (HYDRODIURIL) 12 5 mg tablet Take 1 tablet (12 5 mg total) by mouth daily, Starting Thu 4/22/2021, Until Sat 5/22/2021, Normal      lisinopril (ZESTRIL) 10 mg tablet Take 1 tablet (10 mg total) by mouth daily, Starting Thu 4/22/2021, Normal      oxyCODONE-acetaminophen (PERCOCET) 5-325 mg per tablet Take 1 tablet by mouth every 12 (twelve) hours as needed for moderate painMax Daily Amount: 2 tablets, Starting Fri 4/23/2021, Normal      sertraline (ZOLOFT) 100 mg tablet Take 200 mg by mouth daily at bedtime, Historical Med               PDMP Review     None          ED Provider  Electronically Signed by           Tiarra Alfaro DO  05/09/21 1141

## 2021-05-09 NOTE — ED NOTES
Incision to lower back noted  Reddened, minimal swelling  Clean, dry, and intact  C/o some pain        Margaret Huerta RN  05/09/21 0004

## 2021-05-10 ENCOUNTER — CLINICAL SUPPORT (OUTPATIENT)
Dept: NEUROSURGERY | Facility: CLINIC | Age: 36
End: 2021-05-10

## 2021-05-10 VITALS — TEMPERATURE: 97.5 F | DIASTOLIC BLOOD PRESSURE: 92 MMHG | SYSTOLIC BLOOD PRESSURE: 140 MMHG

## 2021-05-10 DIAGNOSIS — Z98.890 POST-OPERATIVE STATE: Primary | ICD-10-CM

## 2021-05-10 PROCEDURE — 99024 POSTOP FOLLOW-UP VISIT: CPT

## 2021-05-10 NOTE — PROGRESS NOTES
Post-Op Visit- Neurosurgery    Adrian Hines 38 y o  female MRN: [id number]    Chief Complaint:  Patient presents post: L5-S1 minimally invasive microdiskectomy and  epidural steroid injection - Right    History of Present Illness:  Patient presents for emergent incision check arrived accompanied by her mother and ambulated well without assistive device  Reports pain is mild and described as soreness and irritation at her surgical site  Presurgical back pain continues  Patient recently seen in the ED and discharged without antibiotics, however after further assessment Dr Bobbi Salas ordered prophylactic Keflex for 14 day course  Patient attempted to pick this up yesterday but the pharmacy told her it was not there  She denies fevers, or drainage and labs completed in the ED do not indicate an infection  CT completed showed fluid collection believed to be seroma rather than an abscess       Current Outpatient Medications:     ARIPiprazole (ABILIFY) 10 mg tablet, Take 10 mg by mouth every morning , Disp: , Rfl:     cephalexin (KEFLEX) 500 mg capsule, Take 1 capsule (500 mg total) by mouth every 6 (six) hours for 14 days, Disp: 56 capsule, Rfl: 0    clonazePAM (KlonoPIN) 1 mg tablet, Take 1 mg by mouth 2 (two) times a day, Disp: , Rfl:     cyclobenzaprine (FLEXERIL) 10 mg tablet, Take 1 tablet (10 mg total) by mouth 3 (three) times a day as needed for muscle spasms, Disp: 30 tablet, Rfl: 0    ergocalciferol (VITAMIN D2) 50,000 units, Take 1 capsule (50,000 Units total) by mouth once a week, Disp: 4 capsule, Rfl: 0    gabapentin (NEURONTIN) 300 mg capsule, Take 1 capsule (300 mg total) by mouth 3 (three) times a day, Disp: 90 capsule, Rfl: 1    hydrochlorothiazide (HYDRODIURIL) 12 5 mg tablet, Take 1 tablet (12 5 mg total) by mouth daily, Disp: 30 tablet, Rfl: 0    lisinopril (ZESTRIL) 10 mg tablet, Take 1 tablet (10 mg total) by mouth daily, Disp: 30 tablet, Rfl: 2    sertraline (ZOLOFT) 100 mg tablet, Take 200 mg by mouth daily at bedtime, Disp: , Rfl:     oxyCODONE-acetaminophen (PERCOCET) 5-325 mg per tablet, Take 1 tablet by mouth every 12 (twelve) hours as needed for moderate painMax Daily Amount: 2 tablets (Patient not taking: Reported on 5/8/2021), Disp: 10 tablet, Rfl: 0     Allergies   Allergen Reactions    Bactrim [Sulfamethoxazole-Trimethoprim] Shortness Of Breath     Near syncope    Codeine Hives     Tolerated Percocet, Vicodin, etc           Assessment:    Vitals:    05/10/21 1012   BP: 140/92   Temp: 97 5 °F (36 4 °C)       Wound Exam: Incision is visibly mildly irritated, but could be related to suturing  Mild swelling at the superior pole of incision, with some tenderness upon palpation, and is soft without fluctuance  See image below:         Procedure:  Surgical site assessment  Complications: None  Discussion/Summary  Reviewed in person picture taken and assessment with BRAXTON MCKENZIE  Who felt the patient should continue on Keflex as ordered and continue to monitor for s/s of infection  Explained that based on her lab and CT results, as well as clinical assessment is likely a seroma, may also have sensitivity to suturing which may be cause increased inflammation at the site  Provided the advise of BRAXTON MCKENZIE and instructed the patient to watch for increased redness, drainage, pain, or fevers and contact the office for return visit if this occurs otherwise can keep her OV as planned 5/28/2021  She will  her Keflex today and begin immediately  Will check in on patient status in 1 week  Verified date/time/location of upcoming POV on 5/28/2021  She is to call the office with any further questions or concerns, or if any incisional issues or fevers would arise

## 2021-05-12 ENCOUNTER — TELEPHONE (OUTPATIENT)
Dept: UROLOGY | Facility: CLINIC | Age: 36
End: 2021-05-12

## 2021-05-12 NOTE — TELEPHONE ENCOUNTER
Scheduled pt in [place] location, due to pt not wanting to wait until [place] for appt in [facility]  Pt scheduled with Lukas Malone 6/1/21 and placed pt on wait list  Mailed appt card and directions

## 2021-05-12 NOTE — TELEPHONE ENCOUNTER
[id number] [facility] 21; [address] [facility]             Hi,     Can you please advise, pt called to schedule for abnormal urination  I am assuming ok to schedule with [facility], but would appreciate if you can advise  Pt wants to be seen in [place] location  Thank you

## 2021-05-13 ENCOUNTER — OFFICE VISIT (OUTPATIENT)
Dept: FAMILY MEDICINE CLINIC | Facility: HOME HEALTHCARE | Age: 36
End: 2021-05-13
Payer: COMMERCIAL

## 2021-05-13 VITALS
OXYGEN SATURATION: 98 % | BODY MASS INDEX: 37.21 KG/M2 | DIASTOLIC BLOOD PRESSURE: 92 MMHG | HEART RATE: 88 BPM | WEIGHT: 202.2 LBS | SYSTOLIC BLOOD PRESSURE: 133 MMHG | TEMPERATURE: 98.6 F | HEIGHT: 62 IN | RESPIRATION RATE: 18 BRPM

## 2021-05-13 DIAGNOSIS — R73.03 PRE-DIABETES: ICD-10-CM

## 2021-05-13 DIAGNOSIS — F32.A ANXIETY AND DEPRESSION: Primary | ICD-10-CM

## 2021-05-13 DIAGNOSIS — I10 ESSENTIAL HYPERTENSION: ICD-10-CM

## 2021-05-13 DIAGNOSIS — F41.9 ANXIETY AND DEPRESSION: Primary | ICD-10-CM

## 2021-05-13 DIAGNOSIS — R25.1 TREMOR OF BOTH HANDS: ICD-10-CM

## 2021-05-13 PROCEDURE — T1015 CLINIC SERVICE: HCPCS | Performed by: FAMILY MEDICINE

## 2021-05-13 PROCEDURE — 99214 OFFICE O/P EST MOD 30 MIN: CPT | Performed by: FAMILY MEDICINE

## 2021-05-13 RX ORDER — LISINOPRIL 10 MG/1
10 TABLET ORAL 2 TIMES DAILY
Qty: 60 TABLET | Refills: 2 | Status: SHIPPED | OUTPATIENT
Start: 2021-05-13 | End: 2021-11-12 | Stop reason: SDUPTHER

## 2021-05-13 RX ORDER — GABAPENTIN 100 MG/1
100 CAPSULE ORAL 3 TIMES DAILY
COMMUNITY
Start: 2021-04-10 | End: 2021-05-13 | Stop reason: SDUPTHER

## 2021-05-13 NOTE — PROGRESS NOTES
[address]       NAME: Sallie Cullen is a 38 y o  female  : 1985    MRN: [id number]  DATE: May 13, 2021  TIME: 9:03 AM    Assessment and Plan   Diagnoses and all orders for this visit:    Anxiety and depression    Essential hypertension  -     lisinopril (ZESTRIL) 10 mg tablet; Take 1 tablet (10 mg total) by mouth 2 (two) times a day    Pre-diabetes    Tremor of both hands    Other orders  -     Discontinue: gabapentin (NEURONTIN) 100 mg capsule; Take 100 mg by mouth 3 (three) times a day        No problem-specific Assessment & Plan notes found for this encounter  Discussion and plan  53-year-old female presents today for routine follow-up visit patient recently seen in emergency department with reported hyperglycemia  Blood glucose of 807 on metabolic panel  Was not fasting  Previous A1c showing pre diabetes 2021  We have discussed lifestyle modifications, diet, exercise little weight loss  Patient did present to the emergency department due to incisional swelling of the back from recent L5/S1 minimally invasive microdiskectomy  While in ED patient had CT of abdomen and pelvis with contrast   Which showed 4 9 x 4 8 time 6 7 cm well-defined thin walled subcutaneous fluid collection overlying the right paraspinal musculature which appear to be a seroma  Patient is following with surgery  Patient has appointment with Urology on    Urination difficulties with past urinary retention  Patient also complaining of progressive tremors of both hands left greater than right  Patient states has been going on for years  Has concerns cuts grandmother has Parkinson's disease in mother has history of tremors as well  Denies any other neurologic symptoms  No frequent headaches, weakness, loss of sensation  Referral for pulmonology placed  Patient's blood pressure still not optimally controlled    Will increase lisinopril to 10 mg b i d  and continue hydrochlorothiazide  Kimani Travis will keep blood pressure diary and inform me on any hypo or hyper tension readings  Chief Complaint     Chief Complaint   Patient presents with   Jose Luis Polio Tremors     pt mother has 'familial tremors' and pt is having increasing difficulty with her tremors   Maternal grandmother had parkinsons    Hyperglycemia     pt states ER recommended PCP follow up regarding high blood sugar    Hypertension     pt would like to discuss her home BP readings to see if they are ok         History of Present Illness       HPI    As per plan and discussion  Review of Systems   Review of Systems    No fevers no chills no headaches noted dizziness no shortness of breath, chest pain, abdominal pain, nausea vomiting diarrhea lower extremity edema   positives per HPI   all other systems negative    Current Medications       Current Outpatient Medications:     ARIPiprazole (ABILIFY) 10 mg tablet, Take 10 mg by mouth every morning , Disp: , Rfl:     cephalexin (KEFLEX) 500 mg capsule, Take 1 capsule (500 mg total) by mouth every 6 (six) hours for 14 days, Disp: 56 capsule, Rfl: 0    clonazePAM (KlonoPIN) 1 mg tablet, Take 1 mg by mouth 2 (two) times a day, Disp: , Rfl:     cyclobenzaprine (FLEXERIL) 10 mg tablet, Take 1 tablet (10 mg total) by mouth 3 (three) times a day as needed for muscle spasms, Disp: 30 tablet, Rfl: 0    ergocalciferol (VITAMIN D2) 50,000 units, Take 1 capsule (50,000 Units total) by mouth once a week, Disp: 4 capsule, Rfl: 0    gabapentin (NEURONTIN) 300 mg capsule, Take 1 capsule (300 mg total) by mouth 3 (three) times a day, Disp: 90 capsule, Rfl: 1    hydrochlorothiazide (HYDRODIURIL) 12 5 mg tablet, Take 1 tablet (12 5 mg total) by mouth daily, Disp: 30 tablet, Rfl: 0    lisinopril (ZESTRIL) 10 mg tablet, Take 1 tablet (10 mg total) by mouth 2 (two) times a day, Disp: 60 tablet, Rfl: 2    sertraline (ZOLOFT) 100 mg tablet, Take 200 mg by mouth daily at bedtime, Disp: , Rfl: Current Allergies     Allergies as of 05/13/2021 - Reviewed 05/13/2021   Allergen Reaction Noted    Bactrim [sulfamethoxazole-trimethoprim] Shortness Of Breath 06/18/2019    Codeine Hives 06/01/2016            The following portions of the patient's history were reviewed and updated as appropriate: allergies, current medications, past family history, past medical history, past social history, past surgical history and problem list      Past Medical History:   Diagnosis Date    Anxiety     Asthma     Cat-scratch disease     last assessed 10/22/15    Depression     Hypertension     last assessed 11/11/14    Sciatica     Sleep disorder        Past Surgical History:   Procedure Laterality Date    BREAST BIOPSY Left     2016   [facility]      AZ BX/REMV,LYMPH NODE,DEEP AXILL Left 1/15/2019    Procedure: LEFT AXILLARY LYMPH NODE BIOPSY;  Surgeon: Vipin Neff MD;  [facility];  Service: General    AZ LAMNOTMY INCL W/DCMPRSN NRV ROOT 1 INTRSPC LUMBR Right 4/14/2021    Procedure: L5-S1 minimally invasive microdiskectomy and  epidural steroid injection;  Surgeon: Janene Hernandez MD;  [facility];  Service: Neurosurgery    TUBAL LIGATION         Family History   Problem Relation Age of Onset    Anxiety disorder Mother     Bipolar disorder Mother     Depression Mother     Tremor Mother     Anxiety disorder Father     Bipolar disorder Father     Depression Father     Schizophrenia Father     Parkinsonism Maternal Grandmother     Stomach cancer Paternal Grandfather          Medications have been verified  Objective   /92   Pulse 88   Temp 98 6 °F (37 °C) (Tympanic)   Resp 18   Ht 5' 2" (1 575 m)   Wt 91 7 kg (202 lb 3 2 oz)   LMP 04/14/2021 (Exact Date)   SpO2 98%   BMI 36 98 kg/m²        Physical Exam     Physical Exam  Vitals signs and nursing note reviewed  Constitutional:       General: She is not in acute distress       Appearance: She is not ill-appearing, toxic-appearing or diaphoretic  HENT:      Head: Normocephalic  Nose: Nose normal       Mouth/Throat:      Mouth: Mucous membranes are moist       Pharynx: Oropharynx is clear  Eyes:      General: No scleral icterus  Conjunctiva/sclera: Conjunctivae normal       Pupils: Pupils are equal, round, and reactive to light  Neck:      Musculoskeletal: Neck supple  Cardiovascular:      Rate and Rhythm: Normal rate and regular rhythm  Pulses: Normal pulses  Heart sounds: Normal heart sounds  No murmur  Pulmonary:      Effort: Pulmonary effort is normal  No respiratory distress  Breath sounds: Normal breath sounds  No wheezing  Abdominal:      General: Bowel sounds are normal       Tenderness: There is no abdominal tenderness  Musculoskeletal:         General: No tenderness  Right lower leg: No edema  Left lower leg: No edema  Lymphadenopathy:      Cervical: No cervical adenopathy  Neurological:      General: No focal deficit present  Mental Status: She is alert and oriented to person, place, and time  GCS: GCS eye subscore is 4  GCS verbal subscore is 5  GCS motor subscore is 6  Cranial Nerves: Cranial nerves are intact  No cranial nerve deficit  Sensory: Sensation is intact  Motor: Motor function is intact  No weakness  Coordination: Coordination is intact  Gait: Gait is intact   Gait normal    Psychiatric:         Mood and Affect: Mood normal

## 2021-05-15 DIAGNOSIS — I10 ESSENTIAL HYPERTENSION: ICD-10-CM

## 2021-05-16 DIAGNOSIS — E55.9 VITAMIN D DEFICIENCY: ICD-10-CM

## 2021-05-18 ENCOUNTER — TELEPHONE (OUTPATIENT)
Dept: NEUROSURGERY | Facility: CLINIC | Age: 36
End: 2021-05-18

## 2021-05-18 ENCOUNTER — TELEPHONE (OUTPATIENT)
Dept: UROLOGY | Facility: CLINIC | Age: 36
End: 2021-05-18

## 2021-05-18 RX ORDER — HYDROCHLOROTHIAZIDE 12.5 MG/1
TABLET ORAL
Qty: 30 TABLET | Refills: 0 | Status: SHIPPED | OUTPATIENT
Start: 2021-05-18 | End: 2021-06-21

## 2021-05-18 RX ORDER — ERGOCALCIFEROL 1.25 MG/1
CAPSULE ORAL
Qty: 4 CAPSULE | Refills: 0 | Status: SHIPPED | OUTPATIENT
Start: 2021-05-18 | End: 2021-11-12 | Stop reason: SDUPTHER

## 2021-05-18 NOTE — TELEPHONE ENCOUNTER
Renay Poag to check in on her status, and ensure she was taking Keflex as ordered  Left message requesting call back

## 2021-05-18 NOTE — TELEPHONE ENCOUNTER
LVM for pt, since pt on wait list to be seen sooner  Called pt notified Dr Chaudhry Fraction has new patient appt on 5/25/21 [facility] location  Also, 5/26/21 at [address] [facility],[address] location

## 2021-05-18 NOTE — TELEPHONE ENCOUNTER
Callback received  Patient states the she was able to get her Rx of Keflex and her incision is improved  She reports that she has noticed increased back pain recently and questions if this is normal  Admits to increasing her activity level recently  Advised that it is possible the increase in her activity may have cause an exacerbation of symptoms  Directed her to rest for the next few days, still ambulating as tolerated but not to overdo it as she is still within the post op timeframe  Instructed her to report to the ED with perineal numbness, loss of b/b control, or leg weakness  She stated an understanding and was appreciative

## 2021-05-19 ENCOUNTER — TELEPHONE (OUTPATIENT)
Dept: NEUROLOGY | Facility: CLINIC | Age: 36
End: 2021-05-19

## 2021-05-19 NOTE — TELEPHONE ENCOUNTER
Best contact number for patient: [phone]     Emergency Contact name and number:  René Garza [phone]     Referring provider and telephone number:    Primary Care Provider Name and if affiliated with [facility]:     Reason for Appointment/Dx: tremors     Have you seen and followed up with a pediatric Neurologist for this disease in the past?  No    No (If yes ok to schedule with Dr Justino Berrios)    Neurology Location patient would like to be seen:    Order received? Yes                                                 Records Received? Yes    Have you ever seen another Neurologist?       No    Insurance Information    Insurance Name:    ID/Policy #:    Secondary Insurance:    ID/Policy#: Workman's Comp/ Accident/ School  Information      Workman's Comp/Accident/School related?        No    If yes name of Insurance company:    Claim #:    Date of Injury:    Type of Injury:     Name and Telephone Number:    Notes:                   Appointment date:  08/2021

## 2021-05-25 ENCOUNTER — TELEPHONE (OUTPATIENT)
Dept: NEUROSURGERY | Facility: CLINIC | Age: 36
End: 2021-05-25

## 2021-05-25 ENCOUNTER — TELEPHONE (OUTPATIENT)
Dept: UROLOGY | Facility: MEDICAL CENTER | Age: 36
End: 2021-05-25

## 2021-05-27 PROBLEM — M54.16 LUMBAR RADICULOPATHY: Status: ACTIVE | Noted: 2021-05-27

## 2021-05-28 ENCOUNTER — OFFICE VISIT (OUTPATIENT)
Dept: NEUROSURGERY | Facility: CLINIC | Age: 36
End: 2021-05-28

## 2021-05-28 VITALS
BODY MASS INDEX: 37.8 KG/M2 | TEMPERATURE: 98.2 F | DIASTOLIC BLOOD PRESSURE: 80 MMHG | WEIGHT: 205.4 LBS | HEART RATE: 91 BPM | RESPIRATION RATE: 16 BRPM | SYSTOLIC BLOOD PRESSURE: 118 MMHG | HEIGHT: 62 IN

## 2021-05-28 DIAGNOSIS — M54.16 LUMBAR RADICULOPATHY: Primary | ICD-10-CM

## 2021-05-28 PROCEDURE — 99024 POSTOP FOLLOW-UP VISIT: CPT | Performed by: NEUROLOGICAL SURGERY

## 2021-05-28 NOTE — PROGRESS NOTES
Neurosurgery Office Note  Odilon Marie 38 y o  female MRN: [id number]      Assessment/Plan     Lumbar radiculopathy  Presents for 6-week POV s/p L5-S1 MIS microdiskectomy  · States symptoms of RLE radiculopathy/sciatica have resolved however continues to endorse significant lumbar back pain with left buttock pain  · Denies numbness, tingling, or bowel or bladder incontinence  · Exam non-focal     Plan:  · Reviewed purpose of surgery in relieving right radicular pain  · Discussed that she may continue to benefit from pain management and physical therapy  Advised to follow up with Dr Savita Bustamante who she was seeing prior to surgery  · Referral placed to physical therapy  · Follow up as needed or sooner if symptoms worsen or with any concerns  · Patient and mother verbalized understanding and are in agreement with plan  Diagnoses and all orders for this visit:    Lumbar radiculopathy  -     Ambulatory referral to Physical Therapy; Future            CHIEF COMPLAINT    Chief Complaint   Patient presents with    Post-op     6 WEEK POST OP- LUMBAR MICRODISCECTOMY       HISTORY    History of Present Illness     38y o  year old female Past medical history of anxiety and depression, prediabetes, hypertension, tremors, chronic lumbar pain who presents for 6 week postoperative visit status post L5-S1 minimally invasive microdiskectomy  She states that her right leg pain and numbness has resolved  She still continuing to endorse significant pain to her bilateral lower back as well as her left buttock  She states that walking and her gait have improved since her surgery  She continues on gabapentin 300 mg 3 times a day but states that this has not helped her symptoms very much  She also takes Flexeril without any relief of her pain  She states she is no longer using a cane to ambulate  She still continues to endorse bilateral feet numbness    She continues to endorse urinary frequency that precedes her surgery  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 [employer]  She has not pursued any physical therapy or exercise regimens  HPI    See Discussion    REVIEW OF SYSTEMS    Review of Systems   Constitutional: Positive for fatigue (tired, due to not sleeping due to pain)  HENT: Negative  Eyes: Negative  Respiratory: Negative  Cardiovascular: Negative  Gastrointestinal: Negative  Endocrine: Negative  Genitourinary: Positive for frequency  Negative for decreased urine volume (improved since surgery)  Musculoskeletal: Positive for back pain (currently across lower back pain increased since surgery, left buttock, right leg improved since surgery), gait problem (improving, no longer using a cane) and myalgias (right leg/calve muscle cramps)  Skin: Negative  Allergic/Immunologic: Negative  Neurological: Negative for dizziness, seizures, syncope, weakness (improved since surgery, right leg), numbness (improved since surgery, bilateral feet) and headaches  Hematological: Negative  Psychiatric/Behavioral: Positive for sleep disturbance (due to pain, lbp)  ROS reviewed and edited as needed      Meds/Allergies     Current Outpatient Medications   Medication Sig Dispense Refill    ARIPiprazole (ABILIFY) 10 mg tablet Take 10 mg by mouth every morning       clonazePAM (KlonoPIN) 1 mg tablet Take 1 mg by mouth 2 (two) times a day      ergocalciferol (VITAMIN D2) 50,000 units take 1 capsule by mouth every week 4 capsule 0    gabapentin (NEURONTIN) 300 mg capsule Take 1 capsule (300 mg total) by mouth 3 (three) times a day 90 capsule 1    hydrochlorothiazide (HYDRODIURIL) 12 5 mg tablet take 1 tablet by mouth once daily 30 tablet 0    lisinopril (ZESTRIL) 10 mg tablet Take 1 tablet (10 mg total) by mouth 2 (two) times a day 60 tablet 2    sertraline (ZOLOFT) 100 mg tablet Take 200 mg by mouth daily at bedtime      cyclobenzaprine (FLEXERIL) 10 mg tablet Take 1 tablet (10 mg total) by mouth 3 (three) times a day as needed for muscle spasms 30 tablet 0     No current facility-administered medications for this visit  Allergies   Allergen Reactions    Bactrim [Sulfamethoxazole-Trimethoprim] Shortness Of Breath     Near syncope    Codeine Hives     Tolerated Percocet, Vicodin, etc        PAST HISTORY    Past Medical History:   Diagnosis Date    Anxiety     Asthma     Cat-scratch disease     last assessed 10/22/15    Depression     Hypertension     last assessed 11/11/14    Sciatica     Sleep disorder        Past Surgical History:   Procedure Laterality Date    BREAST BIOPSY Left     2016   [facility]      HI BX/REMV,LYMPH NODE,DEEP AXILL Left 1/15/2019    Procedure: LEFT AXILLARY LYMPH NODE BIOPSY;  Surgeon: Maggie Spatz, MD;  [facility];  Service: General    HI LAMNOTMY INCL W/DCMPRSN NRV ROOT 1 INTRSPC LUMBR Right 4/14/2021    Procedure: L5-S1 minimally invasive microdiskectomy and  epidural steroid injection;  Surgeon: Lazaro Marcus MD;  [facility];  Service: Neurosurgery    TUBAL LIGATION         Social History     Tobacco Use    Smoking status: Never Smoker    Smokeless tobacco: Never Used   Substance Use Topics    Alcohol use: No    Drug use: No       Family History   Problem Relation Age of Onset    Anxiety disorder Mother     Bipolar disorder Mother     Depression Mother     Tremor Mother     Anxiety disorder Father     Bipolar disorder Father     Depression Father     Schizophrenia Father     Parkinsonism Maternal Grandmother     Stomach cancer Paternal Grandfather          Above history personally reviewed  EXAM    Vitals:Blood pressure 118/80, pulse 91, temperature 98 2 °F (36 8 °C), resp  rate 16, height 5' 2" (1 575 m), weight 93 2 kg (205 lb 6 4 oz), not currently breastfeeding  ,Body mass index is 37 57 kg/m²  Physical Exam  Constitutional:       Appearance: She is well-developed  She is obese  HENT:      Head: Normocephalic and atraumatic  Eyes:      Extraocular Movements: EOM normal       Pupils: Pupils are equal, round, and reactive to light  Neck:      Musculoskeletal: Normal range of motion and neck supple  Cardiovascular:      Rate and Rhythm: Normal rate and regular rhythm  Pulmonary:      Effort: Pulmonary effort is normal       Breath sounds: Normal breath sounds  Abdominal:      Palpations: Abdomen is soft  Musculoskeletal: Normal range of motion  Skin:     General: Skin is warm and dry  Neurological:      Mental Status: She is alert and oriented to person, place, and time  Cranial Nerves: No cranial nerve deficit  Sensory: No sensory deficit  Motor: No weakness  Coordination: Coordination normal  Finger-Nose-Finger Test normal       Gait: Gait normal       Deep Tendon Reflexes: Reflexes normal       Reflex Scores:       Patellar reflexes are 2+ on the right side and 2+ on the left side  Achilles reflexes are 2+ on the right side and 2+ on the left side  Psychiatric:         Speech: Speech normal          Neurologic Exam     Mental Status   Oriented to person, place, and time  Oriented to person  Oriented to place  Oriented to time  Oriented to year, month and date  Registration: recalls 3 of 3 objects  Attention: normal  Concentration: normal    Speech: speech is normal   Level of consciousness: alert  Knowledge: good and consistent with education  Able to name object  Cranial Nerves     CN III, IV, VI   Pupils are equal, round, and reactive to light  Extraocular motions are normal    Right pupil: Size: 3 mm  Shape: regular  Reactivity: brisk  Consensual response: intact  Accommodation: intact  Left pupil: Size: 3 mm  Shape: regular  Reactivity: brisk  Consensual response: intact  Accommodation: intact     Nystagmus: none   Diplopia: none  Conjugate gaze: present    CN V   Right facial sensation deficit: none  Left facial sensation deficit: none    CN VII   Facial expression full, symmetric  CN VIII   Hearing: intact    CN IX, X   Palate: symmetric    CN XI   Right sternocleidomastoid strength: normal  Left sternocleidomastoid strength: normal  Right trapezius strength: normal  Left trapezius strength: normal    CN XII   Tongue: not atrophic  Fasciculations: absent  Tongue deviation: none    Motor Exam   Muscle bulk: normal  Overall muscle tone: normal  Right arm pronator drift: absent  Left arm pronator drift: absent    Strength   Right deltoid: 5/5  Left deltoid: 5/5  Right biceps: 5/5  Left biceps: 5/5  Right triceps: 5/5  Left triceps: 5/5  Right quadriceps: 5/5  Left quadriceps: 5/5  Right hamstrin/5  Left hamstrin/5  Right anterior tibial: 5/5  Left anterior tibial: 5/5  Right posterior tibial: 5/5  Left posterior tibial: 5/5  Right peroneal: 5/5  Left peroneal: 5/5  Right gastroc: 5/5  Left gastroc: 5/5    Sensory Exam   Light touch normal    Proprioception normal      Gait, Coordination, and Reflexes     Coordination   Finger to nose coordination: normal    Tremor   Resting tremor: absent  Intention tremor: absent  Action tremor: absent    Reflexes   Right patellar: 2+  Left patellar: 2+  Right achilles: 2+  Left achilles: 2+  Right Velásquez: absent  Left Velásquez: absent  Right ankle clonus: absent  Left ankle clonus: absent        MEDICAL DECISION MAKING    Imaging Studies:     Ct Abdomen Pelvis With Contrast    Result Date: 2021  Narrative: CT ABDOMEN AND PELVIS WITH IV CONTRAST INDICATION:   Surgical scar to lumbar back with focal swelling, r/o abscess  COMPARISON:  CT of abdomen pelvis on 2020  TECHNIQUE:  CT examination of the abdomen and pelvis was performed  Axial, sagittal, and coronal 2D reformatted images were created from the source data and submitted for interpretation  Radiation dose length product (DLP) for this visit:  693 85 mGy-cm     This examination, like all CT scans performed in the [address] [place] [address]  [facility], was performed utilizing techniques to minimize radiation dose exposure, including the use of iterative  reconstruction and automated exposure control  IV Contrast:  100 mL of iohexol (OMNIPAQUE)  was administered intravenously without immediate adverse reaction  Enteric Contrast:  Enteric contrast was not administered  FINDINGS: ABDOMEN LOWER CHEST:  No clinically significant abnormality identified in the visualized lower chest  LIVER/BILIARY TREE:  Unremarkable  GALLBLADDER:  No calcified gallstones  No pericholecystic inflammatory change  SPLEEN:  Unremarkable  PANCREAS:  Unremarkable  ADRENAL GLANDS:  Unremarkable  KIDNEYS/URETERS:  Unremarkable  No hydronephrosis  STOMACH AND BOWEL:  Unremarkable  APPENDIX:  A normal appendix was visualized  ABDOMINOPELVIC CAVITY:  No ascites  No pneumoperitoneum  No lymphadenopathy  VESSELS:  Unremarkable for patient's age  PELVIS REPRODUCTIVE ORGANS:  Unremarkable for patient's age  URINARY BLADDER:  Collapsed  ABDOMINAL WALL/INGUINAL REGIONS:  Well-defined thin-walled subcutaneous fluid collection measuring 4 9 x 4 8 x 6 7 cm (series 2, image 49) overlying the right paraspinal musculature  OSSEOUS STRUCTURES:  No acute fracture or destructive osseous lesion  Impression: 4 9 x 4 8 x 6 7 cm well-defined thin-walled subcutaneous fluid collection overlying the right paraspinal musculature which may represent a seroma, less likely abscess  Workstation performed: [id number]       I have personally reviewed pertinent reports     and I have personally reviewed pertinent films in PACS

## 2021-06-04 ENCOUNTER — APPOINTMENT (EMERGENCY)
Dept: RADIOLOGY | Facility: HOSPITAL | Age: 36
End: 2021-06-04
Payer: COMMERCIAL

## 2021-06-04 ENCOUNTER — HOSPITAL ENCOUNTER (EMERGENCY)
Facility: HOSPITAL | Age: 36
Discharge: HOME | End: 2021-06-04
Attending: EMERGENCY MEDICINE | Admitting: EMERGENCY MEDICINE
Payer: COMMERCIAL

## 2021-06-04 VITALS
TEMPERATURE: 98 F | RESPIRATION RATE: 18 BRPM | OXYGEN SATURATION: 95 % | HEART RATE: 90 BPM | HEIGHT: 62 IN | BODY MASS INDEX: 39.56 KG/M2 | DIASTOLIC BLOOD PRESSURE: 78 MMHG | WEIGHT: 214.95 LBS | SYSTOLIC BLOOD PRESSURE: 113 MMHG

## 2021-06-04 DIAGNOSIS — M77.8 LEFT SHOULDER TENDONITIS: Primary | ICD-10-CM

## 2021-06-04 LAB
ATRIAL RATE: 82 BPM
P AXIS: 17 DEGREES
PR INTERVAL: 158 MS
QRS AXIS: 23 DEGREES
QRSD INTERVAL: 84 MS
QT INTERVAL: 386 MS
QTC INTERVAL: 450 MS
T WAVE AXIS: 23 DEGREES
VENTRICULAR RATE: 82 BPM

## 2021-06-04 PROCEDURE — 93005 ELECTROCARDIOGRAM TRACING: CPT

## 2021-06-04 PROCEDURE — 93010 ELECTROCARDIOGRAM REPORT: CPT | Performed by: INTERNAL MEDICINE

## 2021-06-04 PROCEDURE — 71045 X-RAY EXAM CHEST 1 VIEW: CPT

## 2021-06-04 PROCEDURE — 99285 EMERGENCY DEPT VISIT HI MDM: CPT | Performed by: EMERGENCY MEDICINE

## 2021-06-04 PROCEDURE — 73030 X-RAY EXAM OF SHOULDER: CPT

## 2021-06-04 PROCEDURE — 99284 EMERGENCY DEPT VISIT MOD MDM: CPT

## 2021-06-04 RX ORDER — METHYLPREDNISOLONE 4 MG/1
TABLET ORAL
Qty: 21 TABLET | Refills: 0 | Status: SHIPPED | OUTPATIENT
Start: 2021-06-04 | End: 2021-06-17 | Stop reason: ALTCHOICE

## 2021-06-04 RX ORDER — ACETAMINOPHEN 325 MG/1
650 TABLET ORAL ONCE
Status: COMPLETED | OUTPATIENT
Start: 2021-06-04 | End: 2021-06-04

## 2021-06-04 RX ORDER — IBUPROFEN 600 MG/1
600 TABLET ORAL ONCE
Status: DISCONTINUED | OUTPATIENT
Start: 2021-06-04 | End: 2021-06-04

## 2021-06-04 RX ORDER — METHOCARBAMOL 500 MG/1
500 TABLET, FILM COATED ORAL 4 TIMES DAILY PRN
Qty: 20 TABLET | Refills: 0 | Status: SHIPPED | OUTPATIENT
Start: 2021-06-04 | End: 2021-12-03 | Stop reason: ALTCHOICE

## 2021-06-04 RX ADMIN — ACETAMINOPHEN 650 MG: 325 TABLET, FILM COATED ORAL at 09:03

## 2021-06-04 NOTE — DISCHARGE INSTRUCTIONS
Famotidine (pepcid) 20mg daily to attack stomach from steroids and Aleve/ibuprofen  Medrol Dose Jayson: take entire row of steriods for a particular day at one sitting  take with food  finish entire course   Aleve 2 tabs twice daily with food OR ibuprofen 200-800mg every 8 hours with food as needed for pain   Tylenol 650mg every 6 hours as needed for pain, fever (max 3000mg in 24 hours)   Methocarbamol for muscle spasm no drinking driving or heavy machinery use with this medication  Sling for comfort do potstirrer exercise and climbing the fingers up the wall as demonstrated to keep the shoulder joint loose  Do not use flexeril and methocarbamol at the same time  Return with fever numbness tingling weakness difficulty breathing  chest painabdominal pain or any new or worsening symptoms

## 2021-06-04 NOTE — ED PROVIDER NOTES
History  Chief Complaint   Patient presents with    Shoulder Pain     left shoulder pain x3 days; denies falls/trauma; patient also has complaints of bilateral hand/feet swelling for the same amount of time      50-year-old right-hand-dominant female presents with left shoulder pain over the last 3 days  It is a sharp pain which extends from the deltoid medially along the clavicle  She woke up with it she denies any trauma or falls  She has no neck pain no upper or lower back pain no arm swelling she does complain of swollen hands and feet over the last 3 days she denies prior history of PE or DVT she is denying any chest pain or shortness of breath no weakness it hurts to move the shoulder she is denying any abdominal pain she has been taking on over-the-counter pain medications for she denies nausea vomiting diarrhea no cough or upper respiratory complaints no rashes no fever or chills no new medications  Prior to Admission Medications   Prescriptions Last Dose Informant Patient Reported? Taking?    ARIPiprazole (ABILIFY) 10 mg tablet  Self Yes No   Sig: Take 10 mg by mouth every morning    clonazePAM (KlonoPIN) 1 mg tablet  Self Yes No   Sig: Take 1 mg by mouth 2 (two) times a day   cyclobenzaprine (FLEXERIL) 10 mg tablet  Self No No   Sig: Take 1 tablet (10 mg total) by mouth 3 (three) times a day as needed for muscle spasms   ergocalciferol (VITAMIN D2) 50,000 units  Self No No   Sig: take 1 capsule by mouth every week   gabapentin (NEURONTIN) 300 mg capsule  Self No No   Sig: Take 1 capsule (300 mg total) by mouth 3 (three) times a day   hydrochlorothiazide (HYDRODIURIL) 12 5 mg tablet  Self No No   Sig: take 1 tablet by mouth once daily   lisinopril (ZESTRIL) 10 mg tablet  Self No No   Sig: Take 1 tablet (10 mg total) by mouth 2 (two) times a day   sertraline (ZOLOFT) 100 mg tablet  Self Yes No   Sig: Take 200 mg by mouth daily at bedtime      Facility-Administered Medications: None       Past Medical History:   Diagnosis Date    Anxiety     Asthma     Cat-scratch disease     last assessed 10/22/15    Depression     Hypertension     last assessed 11/11/14    Sciatica     Sleep disorder        Past Surgical History:   Procedure Laterality Date    BREAST BIOPSY Left     2016   [facility] DENTAL SURGERY      [place] BX/REMV,LYMPH NODE,DEEP AXILL Left 1/15/2019    Procedure: LEFT AXILLARY LYMPH NODE BIOPSY;  Surgeon: Katlin Wood MD;  [facility];  Service: General    [place] LAMNOTMY INCL W/DCMPRSN NRV ROOT 1 INTRSPC LUMBR Right 4/14/2021    Procedure: L5-S1 minimally invasive microdiskectomy and  epidural steroid injection;  Surgeon: Elvia Low MD;  [facility];  Service: Neurosurgery    TUBAL LIGATION         Family History   Problem Relation Age of Onset    Anxiety disorder Mother     Bipolar disorder Mother     Depression Mother     Tremor Mother     Anxiety disorder Father     Bipolar disorder Father     Depression Father     Schizophrenia Father     Parkinsonism Maternal Grandmother     Stomach cancer Paternal Grandfather      I have reviewed and agree with the history as documented  E-Cigarette/Vaping    E-Cigarette Use Never User      E-Cigarette/Vaping Substances    Nicotine No     THC No     CBD No     Flavoring No     Other No     Unknown No      Social History     Tobacco Use    Smoking status: Never Smoker    Smokeless tobacco: Never Used   Substance Use Topics    Alcohol use: No    Drug use: No       Review of Systems   Constitutional: Positive for activity change  Negative for appetite change, chills, diaphoresis and fever  HENT: Negative for congestion, ear pain, rhinorrhea, sneezing and sore throat  Eyes: Negative for discharge  Respiratory: Negative for cough and shortness of breath  Cardiovascular: Negative for chest pain and leg swelling     Gastrointestinal: Negative for abdominal distention, abdominal pain, blood in stool, diarrhea, nausea and vomiting  Endocrine: Negative for polyuria  Genitourinary: Negative for dysuria, frequency and urgency  Musculoskeletal: Positive for arthralgias (left shoulder pain)  Negative for back pain, joint swelling, myalgias, neck pain and neck stiffness  Skin: Negative for rash  Neurological: Negative for dizziness, speech difficulty, weakness, light-headedness, numbness and headaches  Hematological: Negative for adenopathy  Psychiatric/Behavioral: Negative for confusion  All other systems reviewed and are negative  Physical Exam  Physical Exam  Vitals signs and nursing note reviewed  Constitutional:       General: She is not in acute distress  Appearance: She is well-developed  She is not ill-appearing, toxic-appearing or diaphoretic  HENT:      Head: Normocephalic and atraumatic  Right Ear: External ear normal       Left Ear: External ear normal       Nose: Nose normal       Mouth/Throat:      Mouth: Mucous membranes are moist       Pharynx: No oropharyngeal exudate  Eyes:      General:         Right eye: No discharge  Left eye: No discharge  Extraocular Movements: Extraocular movements intact  Conjunctiva/sclera: Conjunctivae normal       Pupils: Pupils are equal, round, and reactive to light  Neck:      Musculoskeletal: Normal range of motion and neck supple  No neck rigidity or muscular tenderness  Comments: No midline or paraspinous tenderness  Cardiovascular:      Rate and Rhythm: Normal rate and regular rhythm  Pulses: Normal pulses  Heart sounds: Normal heart sounds  Pulmonary:      Effort: Pulmonary effort is normal  No respiratory distress  Breath sounds: No wheezing or rales  Chest:      Chest wall: No tenderness  Abdominal:      General: Bowel sounds are normal  There is no distension  Palpations: Abdomen is soft  Tenderness: There is no abdominal tenderness   There is no right CVA tenderness, left CVA tenderness, guarding or rebound  Comments: Back no midline or CVA tenderness   Musculoskeletal:         General: No deformity  Left shoulder: She exhibits decreased range of motion, tenderness, bony tenderness and pain  She exhibits no swelling, no effusion, no crepitus, no deformity, no laceration, no spasm, normal pulse and normal strength  Left elbow: Normal       Left upper arm: Normal         Arms:       Right hand: Decreased sensation is not present in the ulnar distribution, is not present in the medial distribution and is not present in the radial distribution  Normal strength noted  She exhibits no finger abduction, no thumb/finger opposition and no wrist extension trouble  Left hand: Normal sensation noted  Decreased sensation is not present in the ulnar distribution, is not present in the medial redistribution and is not present in the radial distribution  Normal strength noted  She exhibits no finger abduction, no thumb/finger opposition and no wrist extension trouble  Right lower leg: No edema  Left lower leg: No edema  Comments: 2+ radial pulses bilaterally' radial, median and ulnar nerves intact to myotome and dermatome to bilaterally to upper ext  LUE Patient able to abduct to 30 degrees  Flex/ext 45 degress  Externally rotate placing her hand to left ear  interally rotate placing thumb to left flank able to place left hand to right shoulder  Lymphadenopathy:      Cervical: No cervical adenopathy  Skin:     General: Skin is warm and dry  Capillary Refill: Capillary refill takes less than 2 seconds  Neurological:      Mental Status: She is alert and oriented to person, place, and time  Cranial Nerves: No cranial nerve deficit  Sensory: No sensory deficit  Motor: No weakness or abnormal muscle tone        Coordination: Coordination normal       Deep Tendon Reflexes: Reflexes normal       Comments: Gait steady   Psychiatric:         Mood and Affect: Mood normal          Vital Signs  ED Triage Vitals [06/04/21 0808]   Temperature Pulse Respirations Blood Pressure SpO2   98 °F (36 7 °C) 82 18 128/90 99 %      Temp Source Heart Rate Source Patient Position - Orthostatic VS BP Location FiO2 (%)   Temporal Monitor Sitting Right arm --      Pain Score       9           Vitals:    06/04/21 0830 06/04/21 0900 06/04/21 0930 06/04/21 1054   BP: 129/69 118/68 113/78 113/78   Pulse: 80 81 90 90   Patient Position - Orthostatic VS: Sitting Sitting Sitting Sitting         Visual Acuity      ED Medications  Medications   acetaminophen (TYLENOL) tablet 650 mg (650 mg Oral Given 6/4/21 1289)       Diagnostic Studies  Results Reviewed     None                 XR chest 1 view portable   ED Interpretation by Emma Zhou MD (06/04 1003)   Read by me; Radiologist to provide formal interpretation  No acute process      Final Result by Nolvia Ventura MD (06/04 1014)      No acute cardiopulmonary disease  Workstation performed: [id number]         XR shoulder 2+ views LEFT   ED Interpretation by Emma Zhou MD (06/04 1003)   Read by me; Radiologist to provide formal interpretation  No acute process      Final Result by Fransisca Martinez MD (06/04 1016)      No acute osseous abnormality        Workstation performed: [id number]                    Procedures  ECG 12 Lead Documentation Only    Date/Time: 6/4/2021 8:26 AM  Performed by: Emma Zhou MD  Authorized by: Emma Zhou MD     Indications / Diagnosis:  Left shoulder pain  ECG reviewed by me, the ED Provider: yes    Patient location:  ED  Previous ECG:     Previous ECG:  Compared to current    Comparison ECG info:  3/21/21 2204    Similarity:  No change  Rate:     ECG rate:  82    ECG rate assessment: normal    Rhythm:     Rhythm: sinus rhythm    QRS:     QRS axis:  Normal  Comments:      Nonspecific twave changes             ED Course  ED Course as of Jun 04 1745 Fri Jun 04, 2021   1043 reviewed range of motion exercises for the shoulder including POTS to hers as well as walking the fingers up the wall  Reviewed she should use a sling for comfort only and then attempt to get rid of it  She should protect her stomach from steroids an NSAID using famotidine over-the-counter  Not to drink or drive or use heavy machinery or use Flexeril with the methocarbamol follow-up with orthopedic surgery return with any new or worsening symptoms patient was comfortable plan      1050 Left upper extremity sling was placed by RN CMS intact after placement                                SBIRT 22yo+      Most Recent Value   SBIRT (23 yo +)   In order to provide better care to our patients, we are screening all of our patients for alcohol and drug use  Would it be okay to ask you these screening questions? Unable to answer at this time Filed at: 06/04/2021 0816                    [facility]  Number of Diagnoses or Management Options  Left shoulder tendonitis:   Diagnosis management comments: Mdm:  Mechanically reproducible left shoulder discomfort  Will proceed with left shoulder film and chest x-ray  Patient is not demonstrating any pitting edema to the extremities; not consistent from with referred pain from the abdomen will check EKG and chest x-ray  Recommend continued range of motion exercises most consistent with tendinitis will have her follow up with Orthopedics  Disposition  Final diagnoses:   Left shoulder tendonitis     Time reflects when diagnosis was documented in both MDM as applicable and the Disposition within this note     Time User Action Codes Description Comment    6/4/2021 10:43 AM Chary Hammer Add [M77 8] Left shoulder tendonitis       ED Disposition     ED Disposition Condition Date/Time Comment    Discharge Stable Fri Jun 4, 2021 10:43 AM Steve Pina discharge to home/self care              Follow-up Information     Follow up With Specialties Details Why Contact Info Additional [address] [facility] Orthopedic Surgery In 3 days recheck of left shoulder pain [address] [facility] [id number]  [address] [facility] Maryellen Grewal [address] [facility], [facility], [place], [address]          Discharge Medication List as of 6/4/2021 10:49 AM      START taking these medications    Details   methocarbamol (ROBAXIN) 500 mg tablet Take 1 tablet (500 mg total) by mouth 4 (four) times a day as needed for muscle spasms for up to 20 doses No drinking driving or heavy machinery use, Starting Fri 6/4/2021, Normal      methylprednisolone (MEDROL) 4 mg tablet Take as directed with food, Normal         CONTINUE these medications which have NOT CHANGED    Details   ARIPiprazole (ABILIFY) 10 mg tablet Take 10 mg by mouth every morning , Historical Med      clonazePAM (KlonoPIN) 1 mg tablet Take 1 mg by mouth 2 (two) times a day, Starting Thu 2/18/2021, Historical Med      cyclobenzaprine (FLEXERIL) 10 mg tablet Take 1 tablet (10 mg total) by mouth 3 (three) times a day as needed for muscle spasms, Starting Mon 3/22/2021, Until Thu 5/13/2021, Normal      ergocalciferol (VITAMIN D2) 50,000 units take 1 capsule by mouth every week, Normal      gabapentin (NEURONTIN) 300 mg capsule Take 1 capsule (300 mg total) by mouth 3 (three) times a day, Starting Wed 3/17/2021, Until Fri 5/28/2021, Normal      hydrochlorothiazide (HYDRODIURIL) 12 5 mg tablet take 1 tablet by mouth once daily, Normal      lisinopril (ZESTRIL) 10 mg tablet Take 1 tablet (10 mg total) by mouth 2 (two) times a day, Starting Thu 5/13/2021, Normal      sertraline (ZOLOFT) 100 mg tablet Take 200 mg by mouth daily at bedtime, Historical Med           No discharge procedures on file      PDMP Review     None          ED Provider  Electronically Signed by           Cleave Nageotte, MD  06/04/21 5216

## 2021-06-08 ENCOUNTER — CLINICAL SUPPORT (OUTPATIENT)
Dept: FAMILY MEDICINE CLINIC | Facility: HOME HEALTHCARE | Age: 36
End: 2021-06-08

## 2021-06-08 DIAGNOSIS — Z11.1 ENCOUNTER FOR PPD TEST: Primary | ICD-10-CM

## 2021-06-10 ENCOUNTER — PATIENT OUTREACH (OUTPATIENT)
Dept: CASE MANAGEMENT | Facility: HOSPITAL | Age: 36
End: 2021-06-10

## 2021-06-10 NOTE — PROGRESS NOTES
Out Patient Care Management Note:  SDOH questionnaire sent via "[employer] to assess for social needs

## 2021-06-15 DIAGNOSIS — I10 ESSENTIAL HYPERTENSION: ICD-10-CM

## 2021-06-17 ENCOUNTER — OFFICE VISIT (OUTPATIENT)
Dept: FAMILY MEDICINE CLINIC | Facility: CLINIC | Age: 36
End: 2021-06-17
Payer: COMMERCIAL

## 2021-06-17 VITALS
RESPIRATION RATE: 18 BRPM | HEART RATE: 90 BPM | BODY MASS INDEX: 39.05 KG/M2 | OXYGEN SATURATION: 97 % | TEMPERATURE: 97.8 F | WEIGHT: 212.2 LBS | SYSTOLIC BLOOD PRESSURE: 132 MMHG | DIASTOLIC BLOOD PRESSURE: 94 MMHG | HEIGHT: 62 IN

## 2021-06-17 DIAGNOSIS — R53.82 CHRONIC FATIGUE: ICD-10-CM

## 2021-06-17 DIAGNOSIS — Z00.00 ANNUAL PHYSICAL EXAM: Primary | ICD-10-CM

## 2021-06-17 DIAGNOSIS — E55.9 VITAMIN D DEFICIENCY: ICD-10-CM

## 2021-06-17 PROBLEM — A28.1 CAT-SCRATCH DISEASE: Status: RESOLVED | Noted: 2019-02-07 | Resolved: 2021-06-17

## 2021-06-17 PROBLEM — M54.50 ACUTE EXACERBATION OF CHRONIC LOW BACK PAIN: Status: RESOLVED | Noted: 2021-03-22 | Resolved: 2021-06-17

## 2021-06-17 PROBLEM — G89.29 ACUTE EXACERBATION OF CHRONIC LOW BACK PAIN: Status: RESOLVED | Noted: 2021-03-22 | Resolved: 2021-06-17

## 2021-06-17 LAB
25(OH)D3 SERPL-MCNC: 27.8 NG/ML (ref 30–100)
TSH SERPL DL<=0.05 MIU/L-ACNC: 1.27 UIU/ML (ref 0.36–3.74)
VIT B12 SERPL-MCNC: 554 PG/ML (ref 100–900)

## 2021-06-17 PROCEDURE — 84443 ASSAY THYROID STIM HORMONE: CPT | Performed by: PHYSICIAN ASSISTANT

## 2021-06-17 PROCEDURE — 82306 VITAMIN D 25 HYDROXY: CPT | Performed by: PHYSICIAN ASSISTANT

## 2021-06-17 PROCEDURE — T1015 CLINIC SERVICE: HCPCS | Performed by: FAMILY MEDICINE

## 2021-06-17 PROCEDURE — 99395 PREV VISIT EST AGE 18-39: CPT | Performed by: FAMILY MEDICINE

## 2021-06-17 PROCEDURE — 82607 VITAMIN B-12: CPT | Performed by: PHYSICIAN ASSISTANT

## 2021-06-17 NOTE — PROGRESS NOTES
ADULT ANNUAL PHYSICAL  [address]    NAME: Mio Davidson  AGE: 39 y o  SEX: female  : 1985     DATE: 2021     Assessment and Plan:     Problem List Items Addressed This Visit        Other    Vitamin D deficiency      Other Visit Diagnoses     Annual physical exam    -  Primary    Chronic fatigue            - Continue current medications  - Labs drawn in the office today  - Follow up for PPD next week    Immunizations and preventive care screenings were discussed with patient today  Appropriate education was printed on patient's after visit summary  Counseling:  Alcohol/drug use: discussed moderation in alcohol intake, the recommendations for healthy alcohol use, and avoidance of illicit drug use  Dental Health: discussed importance of regular tooth brushing, flossing, and dental visits  Injury prevention: discussed safety/seat belts, safety helmets, smoke detectors, carbon dioxide detectors, and smoking near bedding or upholstery  Sexual health: discussed sexually transmitted diseases, partner selection, use of condoms, avoidance of unintended pregnancy, and contraceptive alternatives  · Exercise: the importance of regular exercise/physical activity was discussed  Recommend exercise 3-5 times per week for at least 30 minutes  Return in about 4 days (around 2021) for PPD  Chief Complaint:     Chief Complaint   Patient presents with    Annual Exam     physical for work/ needs 2nd ppd      History of Present Illness:     Adult Annual Physical   Patient here for a comprehensive physical exam  The patient reports no problems  Diet and Physical Activity  · Diet/Nutrition: well balanced diet, limited junk food, consuming 3-5 servings of fruits/vegetables daily and adequate fiber intake  · Exercise: walking, 5-7 times a week on average and less than 30 minutes on average        Depression Screening  PHQ-9 Depression Screening    PHQ-9:   Frequency of the following problems over the past two weeks:           General Health  · Sleep: sleeps poorly and gets 7-8 hours of sleep on average  · Hearing: normal - bilateral   · Vision: no vision problems  · Dental: Full extraction  /GYN Health  · Last menstrual period: 6/9/2021  · Contraceptive method: s/p tubal ligation  · History of STDs?: no      Review of Systems:     Review of Systems   Constitutional: Negative for chills, diaphoresis and fever  HENT: Negative for congestion, ear pain, postnasal drip, rhinorrhea and sore throat  Eyes: Negative for photophobia, pain, discharge, redness, itching and visual disturbance  Respiratory: Negative for cough, chest tightness, shortness of breath and wheezing  Cardiovascular: Negative for chest pain, palpitations and leg swelling  Gastrointestinal: Negative for abdominal pain, blood in stool, constipation, diarrhea, nausea and vomiting  Genitourinary: Negative for difficulty urinating, dysuria, frequency, hematuria, menstrual problem and urgency  Musculoskeletal: Positive for back pain  Negative for gait problem, joint swelling, neck pain and neck stiffness  Skin: Negative for rash and wound  Neurological: Negative for dizziness, syncope, weakness, light-headedness and headaches  Psychiatric/Behavioral: Positive for sleep disturbance  Negative for decreased concentration, dysphoric mood, self-injury and suicidal ideas  The patient is not nervous/anxious         Past Medical History:     Past Medical History:   Diagnosis Date    Anxiety     Asthma     Cat-scratch disease     last assessed 10/22/15    Depression     Hypertension     last assessed 11/11/14    Sciatica     Sleep disorder       Past Surgical History:     Past Surgical History:   Procedure Laterality Date    BREAST BIOPSY Left     2016   Earna Kassy DENTAL SURGERY      [place] BX/REMV,LYMPH NODE,DEEP AXILL Left 1/15/2019    Procedure: LEFT AXILLARY LYMPH NODE BIOPSY;  Surgeon: Katlin Wood MD;  [facility];  Service: General    [place] LAMNOTMY INCL W/DCMPRSN NRV ROOT 1 INTRSPC LUMBR Right 4/14/2021    Procedure: L5-S1 minimally invasive microdiskectomy and  epidural steroid injection;  Surgeon: Elvia Low MD;  [facility];  Service: Neurosurgery    TUBAL LIGATION        Social History:     Social History     Socioeconomic History    Marital status: /Civil Union     Spouse name: None    Number of children: None    Years of education: None    Highest education level: None   Occupational History    None   Tobacco Use    Smoking status: Never Smoker    Smokeless tobacco: Never Used   Vaping Use    Vaping Use: Never used   Substance and Sexual Activity    Alcohol use: No    Drug use: No    Sexual activity: Yes     Partners: Male     Birth control/protection: None   Other Topics Concern    None   Social History Narrative    Always uses seat belt    Daily caffeine consumption(occasional)    Economic stress    Exercises daily    Lives with     Lives with relatives    Pets: Dog     Social Determinants of Health     Financial Resource Strain:     Difficulty of Paying Living Expenses:    Food Insecurity:     Worried About Running Out of Food in the Last Year:     Ran Out of Food in the Last Year:    Transportation Needs:     Lack of Transportation (Medical):      Lack of Transportation (Non-Medical):    Physical Activity:     Days of Exercise per Week:     Minutes of Exercise per Session:    Stress:     Feeling of Stress :    Social Connections:     Frequency of Communication with Friends and Family:     Frequency of Social Gatherings with Friends and Family:     Attends Rastafarian Services:     Active Member of Clubs or Organizations:     Attends Club or Organization Meetings:     Marital Status:    Intimate Partner Violence:     Fear of Current or Ex-Partner:     Emotionally Abused:     Physically Abused:     Sexually Abused:       Family History:     Family History   Problem Relation Age of Onset    Anxiety disorder Mother     Bipolar disorder Mother     Depression Mother     Tremor Mother     Anxiety disorder Father     Bipolar disorder Father     Depression Father     Schizophrenia Father     Parkinsonism Maternal Grandmother     Stomach cancer Paternal Grandfather       Current Medications:     Current Outpatient Medications   Medication Sig Dispense Refill    ARIPiprazole (ABILIFY) 10 mg tablet Take 10 mg by mouth every morning       clonazePAM (KlonoPIN) 1 mg tablet Take 1 mg by mouth 2 (two) times a day      ergocalciferol (VITAMIN D2) 50,000 units take 1 capsule by mouth every week 4 capsule 0    gabapentin (NEURONTIN) 300 mg capsule Take 1 capsule (300 mg total) by mouth 3 (three) times a day 90 capsule 1    hydrochlorothiazide (HYDRODIURIL) 12 5 mg tablet take 1 tablet by mouth once daily 30 tablet 0    lisinopril (ZESTRIL) 10 mg tablet Take 1 tablet (10 mg total) by mouth 2 (two) times a day 60 tablet 2    methocarbamol (ROBAXIN) 500 mg tablet Take 1 tablet (500 mg total) by mouth 4 (four) times a day as needed for muscle spasms for up to 20 doses No drinking driving or heavy machinery use 20 tablet 0    sertraline (ZOLOFT) 100 mg tablet Take 200 mg by mouth daily at bedtime       No current facility-administered medications for this visit  Allergies: Allergies   Allergen Reactions    Bactrim [Sulfamethoxazole-Trimethoprim] Shortness Of Breath     Near syncope    Codeine Hives     Tolerated Percocet, Vicodin, etc       Physical Exam:     /94 (BP Location: Left arm, Patient Position: Sitting, Cuff Size: Adult)   Pulse 90   Temp 97 8 °F (36 6 °C) (Tympanic)   Resp 18   Ht 5' 2" (1 575 m)   Wt 96 3 kg (212 lb 3 2 oz)   SpO2 97%   BMI 38 81 kg/m²     Physical Exam  Vitals and nursing note reviewed     Constitutional:       General: She is not in acute distress  Appearance: Normal appearance  HENT:      Head: Normocephalic and atraumatic  Right Ear: Tympanic membrane, ear canal and external ear normal       Left Ear: Tympanic membrane, ear canal and external ear normal       Nose: Nose normal       Mouth/Throat:      Mouth: Mucous membranes are moist       Pharynx: Oropharynx is clear  No oropharyngeal exudate  Eyes:      Extraocular Movements: Extraocular movements intact  Conjunctiva/sclera: Conjunctivae normal       Pupils: Pupils are equal, round, and reactive to light  Cardiovascular:      Rate and Rhythm: Normal rate and regular rhythm  Heart sounds: Normal heart sounds  No murmur heard  Pulmonary:      Effort: Pulmonary effort is normal  No respiratory distress  Breath sounds: Normal breath sounds  No wheezing  Musculoskeletal:      Cervical back: Normal range of motion and neck supple  Right lower leg: No edema  Left lower leg: No edema  Lymphadenopathy:      Cervical: No cervical adenopathy  Skin:     General: Skin is warm and dry  Neurological:      General: No focal deficit present  Mental Status: She is alert and oriented to person, place, and time  Cranial Nerves: No cranial nerve deficit  Motor: No weakness     Psychiatric:         Mood and Affect: Mood normal          Behavior: Behavior normal           [facility], [address] [place] [address]

## 2021-06-17 NOTE — PATIENT INSTRUCTIONS

## 2021-06-18 ENCOUNTER — PATIENT OUTREACH (OUTPATIENT)
Dept: CASE MANAGEMENT | Facility: HOSPITAL | Age: 36
End: 2021-06-18

## 2021-06-18 NOTE — PROGRESS NOTES
Out Patient Care Management Note:  Patient has not returned the Social Determinants of Health Questionnaire sent via [id number] on 06/10/21  ED followup episode will be closed at this time

## 2021-06-21 ENCOUNTER — CLINICAL SUPPORT (OUTPATIENT)
Dept: FAMILY MEDICINE CLINIC | Facility: HOME HEALTHCARE | Age: 36
End: 2021-06-21

## 2021-06-21 DIAGNOSIS — Z11.1 SCREENING FOR TUBERCULOSIS: Primary | ICD-10-CM

## 2021-06-21 RX ORDER — HYDROCHLOROTHIAZIDE 12.5 MG/1
TABLET ORAL
Qty: 30 TABLET | Refills: 5 | Status: SHIPPED | OUTPATIENT
Start: 2021-06-21 | End: 2021-11-12 | Stop reason: SDUPTHER

## 2021-06-23 ENCOUNTER — CLINICAL SUPPORT (OUTPATIENT)
Dept: FAMILY MEDICINE CLINIC | Facility: HOME HEALTHCARE | Age: 36
End: 2021-06-23

## 2021-06-23 DIAGNOSIS — Z11.1 SCREENING FOR TUBERCULOSIS: Primary | ICD-10-CM

## 2021-06-23 LAB
INDURATION: 0 MM
INDURATION: 0 MM
TB SKIN TEST: NEGATIVE
TB SKIN TEST: NEGATIVE

## 2021-07-12 ENCOUNTER — HOSPITAL ENCOUNTER (EMERGENCY)
Facility: HOSPITAL | Age: 36
Discharge: HOME | End: 2021-07-12
Attending: EMERGENCY MEDICINE | Admitting: EMERGENCY MEDICINE
Payer: COMMERCIAL

## 2021-07-12 VITALS
DIASTOLIC BLOOD PRESSURE: 81 MMHG | TEMPERATURE: 98.3 F | HEIGHT: 62 IN | OXYGEN SATURATION: 92 % | BODY MASS INDEX: 37.93 KG/M2 | RESPIRATION RATE: 18 BRPM | HEART RATE: 93 BPM | SYSTOLIC BLOOD PRESSURE: 128 MMHG | WEIGHT: 206.13 LBS

## 2021-07-12 DIAGNOSIS — K52.9 GASTROENTERITIS: Primary | ICD-10-CM

## 2021-07-12 LAB
ALBUMIN SERPL BCP-MCNC: 3.8 G/DL (ref 3.5–5)
ALP SERPL-CCNC: 91 U/L (ref 46–116)
ALT SERPL W P-5'-P-CCNC: 18 U/L (ref 12–78)
ANION GAP SERPL CALCULATED.3IONS-SCNC: 9 MMOL/L (ref 4–13)
AST SERPL W P-5'-P-CCNC: 15 U/L (ref 5–45)
BASOPHILS # BLD AUTO: 0.02 THOUSANDS/ΜL (ref 0–0.1)
BASOPHILS NFR BLD AUTO: 0 % (ref 0–1)
BILIRUB SERPL-MCNC: 0.52 MG/DL (ref 0.2–1)
BILIRUB UR QL STRIP: NEGATIVE
BUN SERPL-MCNC: 10 MG/DL (ref 5–25)
CALCIUM SERPL-MCNC: 8.3 MG/DL (ref 8.3–10.1)
CHLORIDE SERPL-SCNC: 104 MMOL/L (ref 100–108)
CLARITY UR: CLEAR
CO2 SERPL-SCNC: 25 MMOL/L (ref 21–32)
COLOR UR: YELLOW
CREAT SERPL-MCNC: 0.78 MG/DL (ref 0.6–1.3)
EOSINOPHIL # BLD AUTO: 0 THOUSAND/ΜL (ref 0–0.61)
EOSINOPHIL NFR BLD AUTO: 0 % (ref 0–6)
ERYTHROCYTE [DISTWIDTH] IN BLOOD BY AUTOMATED COUNT: 12.6 % (ref 11.6–15.1)
EXT PREG TEST URINE: NEGATIVE
EXT. CONTROL ED NAV: NORMAL
GFR SERPL CREATININE-BSD FRML MDRD: 97 ML/MIN/1.73SQ M
GLUCOSE SERPL-MCNC: 130 MG/DL (ref 65–140)
GLUCOSE UR STRIP-MCNC: NEGATIVE MG/DL
HCT VFR BLD AUTO: 45.9 % (ref 34.8–46.1)
HGB BLD-MCNC: 15.1 G/DL (ref 11.5–15.4)
HGB UR QL STRIP.AUTO: NEGATIVE
IMM GRANULOCYTES # BLD AUTO: 0.03 THOUSAND/UL (ref 0–0.2)
IMM GRANULOCYTES NFR BLD AUTO: 0 % (ref 0–2)
KETONES UR STRIP-MCNC: ABNORMAL MG/DL
LEUKOCYTE ESTERASE UR QL STRIP: NEGATIVE
LIPASE SERPL-CCNC: 68 U/L (ref 73–393)
LYMPHOCYTES # BLD AUTO: 0.31 THOUSANDS/ΜL (ref 0.6–4.47)
LYMPHOCYTES NFR BLD AUTO: 3 % (ref 14–44)
MCH RBC QN AUTO: 30.6 PG (ref 26.8–34.3)
MCHC RBC AUTO-ENTMCNC: 32.9 G/DL (ref 31.4–37.4)
MCV RBC AUTO: 93 FL (ref 82–98)
MONOCYTES # BLD AUTO: 0.45 THOUSAND/ΜL (ref 0.17–1.22)
MONOCYTES NFR BLD AUTO: 4 % (ref 4–12)
NEUTROPHILS # BLD AUTO: 10.31 THOUSANDS/ΜL (ref 1.85–7.62)
NEUTS SEG NFR BLD AUTO: 93 % (ref 43–75)
NITRITE UR QL STRIP: NEGATIVE
NRBC BLD AUTO-RTO: 0 /100 WBCS
PH UR STRIP.AUTO: 5 [PH]
PLATELET # BLD AUTO: 249 THOUSANDS/UL (ref 149–390)
PMV BLD AUTO: 9.8 FL (ref 8.9–12.7)
POTASSIUM SERPL-SCNC: 3.8 MMOL/L (ref 3.5–5.3)
PROT SERPL-MCNC: 7.5 G/DL (ref 6.4–8.2)
PROT UR STRIP-MCNC: NEGATIVE MG/DL
RBC # BLD AUTO: 4.94 MILLION/UL (ref 3.81–5.12)
SARS-COV-2 RNA RESP QL NAA+PROBE: NEGATIVE
SODIUM SERPL-SCNC: 138 MMOL/L (ref 136–145)
SP GR UR STRIP.AUTO: >=1.03 (ref 1–1.03)
UROBILINOGEN UR QL STRIP.AUTO: 0.2 E.U./DL
WBC # BLD AUTO: 11.12 THOUSAND/UL (ref 4.31–10.16)

## 2021-07-12 PROCEDURE — U0003 INFECTIOUS AGENT DETECTION BY NUCLEIC ACID (DNA OR RNA); SEVERE ACUTE RESPIRATORY SYNDROME CORONAVIRUS 2 (SARS-COV-2) (CORONAVIRUS DISEASE [COVID-19]), AMPLIFIED PROBE TECHNIQUE, MAKING USE OF HIGH THROUGHPUT TECHNOLOGIES AS DESCRIBED BY CMS-2020-01-R: HCPCS | Performed by: EMERGENCY MEDICINE

## 2021-07-12 PROCEDURE — 93005 ELECTROCARDIOGRAM TRACING: CPT

## 2021-07-12 PROCEDURE — 96361 HYDRATE IV INFUSION ADD-ON: CPT

## 2021-07-12 PROCEDURE — U0005 INFEC AGEN DETEC AMPLI PROBE: HCPCS | Performed by: EMERGENCY MEDICINE

## 2021-07-12 PROCEDURE — 96374 THER/PROPH/DIAG INJ IV PUSH: CPT

## 2021-07-12 PROCEDURE — 96375 TX/PRO/DX INJ NEW DRUG ADDON: CPT

## 2021-07-12 PROCEDURE — 85025 COMPLETE CBC W/AUTO DIFF WBC: CPT | Performed by: EMERGENCY MEDICINE

## 2021-07-12 PROCEDURE — 81025 URINE PREGNANCY TEST: CPT | Performed by: EMERGENCY MEDICINE

## 2021-07-12 PROCEDURE — 81003 URINALYSIS AUTO W/O SCOPE: CPT | Performed by: EMERGENCY MEDICINE

## 2021-07-12 PROCEDURE — 80053 COMPREHEN METABOLIC PANEL: CPT | Performed by: EMERGENCY MEDICINE

## 2021-07-12 PROCEDURE — 83690 ASSAY OF LIPASE: CPT | Performed by: EMERGENCY MEDICINE

## 2021-07-12 PROCEDURE — 99284 EMERGENCY DEPT VISIT MOD MDM: CPT

## 2021-07-12 PROCEDURE — 99284 EMERGENCY DEPT VISIT MOD MDM: CPT | Performed by: EMERGENCY MEDICINE

## 2021-07-12 RX ORDER — ONDANSETRON 2 MG/ML
4 INJECTION INTRAMUSCULAR; INTRAVENOUS ONCE
Status: COMPLETED | OUTPATIENT
Start: 2021-07-12 | End: 2021-07-12

## 2021-07-12 RX ORDER — KETOROLAC TROMETHAMINE 30 MG/ML
30 INJECTION, SOLUTION INTRAMUSCULAR; INTRAVENOUS ONCE
Status: COMPLETED | OUTPATIENT
Start: 2021-07-12 | End: 2021-07-12

## 2021-07-12 RX ORDER — ONDANSETRON 4 MG/1
4 TABLET, ORALLY DISINTEGRATING ORAL EVERY 6 HOURS PRN
Qty: 20 TABLET | Refills: 0 | Status: SHIPPED | OUTPATIENT
Start: 2021-07-12 | End: 2021-11-10

## 2021-07-12 RX ADMIN — ONDANSETRON 4 MG: 2 INJECTION INTRAMUSCULAR; INTRAVENOUS at 19:24

## 2021-07-12 RX ADMIN — SODIUM CHLORIDE 1000 ML: 0.9 INJECTION, SOLUTION INTRAVENOUS at 19:24

## 2021-07-12 RX ADMIN — KETOROLAC TROMETHAMINE 30 MG: 30 INJECTION, SOLUTION INTRAMUSCULAR at 19:24

## 2021-07-12 NOTE — ED PROVIDER NOTES
History  Chief Complaint   Patient presents with    Abdominal Pain     pt c/o severe abdominal pain, n/v/d with headache and body aches starting around 0430 this morning  History provided by:  Patient    26-year-old female with past medical history significant for obesity, hypertension, anxiety depression who presents emergency department today for evaluation of acute onset abdominal discomfort associated with nausea vomiting diarrhea  Patient reports 1 day of symptoms that woke her from sleep today  She denies any symptoms yesterday  She reports no suspicious food intake, sick contacts, recent travel, recent antibiotic use, prior abdominal surgery  She denies fevers  She reports 10 out 10 pain generalized across her bilateral mid abdomen  She denies any laterality to her symptoms  She denies any history of similar symptoms in the past   He reports 5 episodes of diarrhea a had several episodes of vomiting  She reports poor p o  Intake his renal her symptoms  She is taking nothing for symptoms  Nothing seems to make it better or worse  Denies any vaginal bleeding, vaginal discharge, history of ovarian pathology  Denies history of frequent UTIs  Denies flank pain denies urinary complaints  Prior to Admission Medications   Prescriptions Last Dose Informant Patient Reported? Taking?    ARIPiprazole (ABILIFY) 10 mg tablet  Self Yes No   Sig: Take 10 mg by mouth every morning    clonazePAM (KlonoPIN) 1 mg tablet  Self Yes No   Sig: Take 1 mg by mouth 2 (two) times a day   ergocalciferol (VITAMIN D2) 50,000 units  Self No No   Sig: take 1 capsule by mouth every week   gabapentin (NEURONTIN) 300 mg capsule  Self No No   Sig: Take 1 capsule (300 mg total) by mouth 3 (three) times a day   hydrochlorothiazide (HYDRODIURIL) 12 5 mg tablet   No No   Sig: take 1 tablet by mouth once daily   lisinopril (ZESTRIL) 10 mg tablet  Self No No   Sig: Take 1 tablet (10 mg total) by mouth 2 (two) times a day methocarbamol (ROBAXIN) 500 mg tablet   No No   Sig: Take 1 tablet (500 mg total) by mouth 4 (four) times a day as needed for muscle spasms for up to 20 doses No drinking driving or heavy machinery use   sertraline (ZOLOFT) 100 mg tablet  Self Yes No   Sig: Take 200 mg by mouth daily at bedtime      Facility-Administered Medications: None       Past Medical History:   Diagnosis Date    Anxiety     Asthma     Cat-scratch disease     last assessed 10/22/15    Depression     Hypertension     last assessed 11/11/14    Sciatica     Sleep disorder        Past Surgical History:   Procedure Laterality Date    BREAST BIOPSY Left     2016   [facility]      IA BX/REMV,LYMPH NODE,DEEP AXILL Left 1/15/2019    Procedure: LEFT AXILLARY LYMPH NODE BIOPSY;  Surgeon: Yari Murray MD;  [facility];  Service: General    IA LAMNOTMY INCL W/DCMPRSN NRV ROOT 1 INTRSPC LUMBR Right 4/14/2021    Procedure: L5-S1 minimally invasive microdiskectomy and  epidural steroid injection;  Surgeon: Judy Palma MD;  [facility];  Service: Neurosurgery    TUBAL LIGATION         Family History   Problem Relation Age of Onset    Anxiety disorder Mother     Bipolar disorder Mother     Depression Mother     Tremor Mother     Anxiety disorder Father     Bipolar disorder Father     Depression Father     Schizophrenia Father     Parkinsonism Maternal Grandmother     Stomach cancer Paternal Grandfather      I have reviewed and agree with the history as documented  E-Cigarette/Vaping    E-Cigarette Use Never User      E-Cigarette/Vaping Substances    Nicotine No     THC No     CBD No     Flavoring No     Other No     Unknown No      Social History     Tobacco Use    Smoking status: Never Smoker    Smokeless tobacco: Never Used   Vaping Use    Vaping Use: Never used   Substance Use Topics    Alcohol use: No    Drug use: No       Review of Systems   Constitutional: Negative for chills and fever  HENT: Negative for ear pain and sore throat  Eyes: Negative for pain and visual disturbance  Respiratory: Negative for cough and shortness of breath  Cardiovascular: Negative for chest pain and palpitations  Gastrointestinal: Positive for abdominal pain, diarrhea, nausea and vomiting  Negative for blood in stool  Genitourinary: Negative for dysuria and hematuria  Musculoskeletal: Negative for arthralgias and back pain  Skin: Negative for color change and rash  Neurological: Negative for dizziness, seizures, syncope and light-headedness  All other systems reviewed and are negative  Physical Exam  Physical Exam  Vitals and nursing note reviewed  Constitutional:       General: She is not in acute distress  Appearance: She is well-developed  HENT:      Head: Normocephalic and atraumatic  Eyes:      Conjunctiva/sclera: Conjunctivae normal    Cardiovascular:      Rate and Rhythm: Normal rate and regular rhythm  Heart sounds: No murmur heard  Pulmonary:      Effort: Pulmonary effort is normal  No respiratory distress  Breath sounds: Normal breath sounds  Abdominal:      General: Bowel sounds are normal       Palpations: Abdomen is soft  Tenderness: There is abdominal tenderness  Comments: Soft nondistended abdomen  Generalized abdominal tenderness which is mild with deep palpation  No rebound or guarding  No CVA tenderness  No focal tenderness in the right upper quadrant, right lower quadrant  No suprapubic discomfort  Musculoskeletal:      Cervical back: Neck supple  Skin:     General: Skin is warm and dry  Neurological:      Mental Status: She is alert           Vital Signs  ED Triage Vitals   Temperature Pulse Respirations Blood Pressure SpO2   07/12/21 1855 07/12/21 1831 07/12/21 1831 07/12/21 1838 07/12/21 1831   98 3 °F (36 8 °C) (!) 120 16 161/77 98 %      Temp Source Heart Rate Source Patient Position - Orthostatic VS BP Location FiO2 (%) 07/12/21 1855 07/12/21 1831 07/12/21 1831 07/12/21 1831 --   Temporal Monitor Sitting Left arm       Pain Score       07/12/21 1831       Worst Possible Pain           Vitals:    07/12/21 1831 07/12/21 1838 07/12/21 1900 07/12/21 1930   BP:  161/77 134/75 131/72   Pulse: (!) 120  (!) 111 92   Patient Position - Orthostatic VS: Sitting  Lying Lying         Visual Acuity      ED Medications  Medications   sodium chloride 0 9 % bolus 1,000 mL (1,000 mL Intravenous New Bag 7/12/21 1924)   ondansetron (ZOFRAN) injection 4 mg (4 mg Intravenous Given 7/12/21 1924)   ketorolac (TORADOL) injection 30 mg (30 mg Intravenous Given 7/12/21 1924)       Diagnostic Studies  Results Reviewed     Procedure Component Value Units Date/Time    Novel Coronavirus (Covid-19),PCR [facility] - 2 Hour Stat [[id number]]  (Normal) Collected: 07/12/21 1950    Lab Status: Final result Specimen: Nares from Nasopharyngeal Swab Updated: 07/12/21 2116     SARS-CoV-2 Negative    Narrative: The specimen collection materials, transport medium, and/or testing methodology utilized in the production of these test results have been proven to be reliable in a limited validation with an abbreviated program under the Emergency Utilization Authorization provided by the FDA  Testing reported as "Presumptive positive" will be confirmed with secondary testing to ensure result accuracy  Clinical caution and judgement should be used with the interpretation of these results with consideration of the clinical impression and other laboratory testing  Testing reported as "Positive" or "Negative" has been proven to be accurate according to standard laboratory validation requirements  All testing is performed with control materials showing appropriate reactivity at standard intervals        UA w Reflex to Microscopic w Reflex to Culture [[id number]]  (Abnormal) Collected: 07/12/21 1950    Lab Status: Final result Specimen: Urine, Clean Catch Updated: 07/12/21 2004 Color, UA Yellow     Clarity, UA Clear     Specific Gravity, UA >=1 030     pH, UA 5 0     Leukocytes, UA Negative     Nitrite, UA Negative     Protein, UA Negative mg/dl      Glucose, UA Negative mg/dl      Ketones, UA 15 (1+) mg/dl      Urobilinogen, UA 0 2 E U /dl      Bilirubin, UA Negative     Blood, UA Negative    POCT pregnancy, urine [[id number]]  (Normal) Resulted: 07/12/21 1950    Lab Status: Final result Updated: 07/12/21 1950     EXT PREG TEST UR (Ref: Negative) negative     Control valid    CBC and differential [[id number]]  (Abnormal) Collected: 07/12/21 1840    Lab Status: Final result Specimen: Blood from Arm, Right Updated: 07/12/21 1902     WBC 11 12 Thousand/uL      RBC 4 94 Million/uL      Hemoglobin 15 1 g/dL      Hematocrit 45 9 %      MCV 93 fL      MCH 30 6 pg      MCHC 32 9 g/dL      RDW 12 6 %      MPV 9 8 fL      Platelets 765 Thousands/uL      nRBC 0 /100 WBCs      Neutrophils Relative 93 %      Immat GRANS % 0 %      Lymphocytes Relative 3 %      Monocytes Relative 4 %      Eosinophils Relative 0 %      Basophils Relative 0 %      Neutrophils Absolute 10 31 Thousands/µL      Immature Grans Absolute 0 03 Thousand/uL      Lymphocytes Absolute 0 31 Thousands/µL      Monocytes Absolute 0 45 Thousand/µL      Eosinophils Absolute 0 00 Thousand/µL      Basophils Absolute 0 02 Thousands/µL     Narrative: This is an appended report  These results have been appended to a previously verified report      Comprehensive metabolic panel [[id number]] Collected: 07/12/21 1840    Lab Status: Final result Specimen: Blood from Arm, Right Updated: 07/12/21 1902     Sodium 138 mmol/L      Potassium 3 8 mmol/L      Chloride 104 mmol/L      CO2 25 mmol/L      ANION GAP 9 mmol/L      BUN 10 mg/dL      Creatinine 0 78 mg/dL      Glucose 130 mg/dL      Calcium 8 3 mg/dL      AST 15 U/L      ALT 18 U/L      Alkaline Phosphatase 91 U/L      Total Protein 7 5 g/dL      Albumin 3 8 g/dL      Total Bilirubin 0 52 mg/dL      eGFR 97 ml/min/1 73sq m     Narrative:      [place] guidelines for Chronic Kidney Disease (CKD):     Stage 1 with normal or high GFR (GFR > 90 mL/min/1 73 square meters)    Stage 2 Mild CKD (GFR = 60-89 mL/min/1 73 square meters)    Stage 3A Moderate CKD (GFR = 45-59 mL/min/1 73 square meters)    Stage 3B Moderate CKD (GFR = 30-44 mL/min/1 73 square meters)    Stage 4 Severe CKD (GFR = 15-29 mL/min/1 73 square meters)    Stage 5 End Stage CKD (GFR <15 mL/min/1 73 square meters)  Note: GFR calculation is accurate only with a steady state creatinine    Lipase [[id number]]  (Abnormal) Collected: 07/12/21 1840    Lab Status: Final result Specimen: Blood from Arm, Right Updated: 07/12/21 1858     Lipase 68 u/L                  No orders to display              Procedures  Procedures         ED Course  ED Course as of Jul 12 2119   Mon Jul 12, 2021   1921 WBC(!): 11 12 2012 Anion Gap: 9   2012 Tachycardia improved with IV fluids  Pulse: 92   2012 PREGNANCY TEST URINE: negative                                           MDM  Number of Diagnoses or Management Options     Amount and/or Complexity of Data Reviewed  Clinical lab tests: ordered and reviewed    Risk of Complications, Morbidity, and/or Mortality  Presenting problems: low  Diagnostic procedures: low  Management options: low    Patient Progress  Patient progress: stable    58-year-old female presenting for evaluation of abdominal discomfort, nausea,, diarrhea  Suspect a viral syndrome  Clinically has no signs of bowel obstruction, right upper quadrant tenderness, appendicitis, pyelo, in no prior surgical history to suggest a surgical complication  She appears well at this time her vital signs are notable for tachycardia suspect in setting of hypokalemia will administer IV fluids in addition to antinausea patient Zofran  Will screen with labs and treat symptoms    Patient is advised to return emergency department should her symptoms worsen or pain localizes become more prominent  Patient will be given prescription for Zofran for discharge  Disposition  Final diagnoses:   Gastroenteritis     Time reflects when diagnosis was documented in both MDM as applicable and the Disposition within this note     Time User Action Codes Description Comment    7/12/2021  9:15 PM Isidoro Lopez Add [K52 9] Gastroenteritis       ED Disposition     ED Disposition Condition Date/Time Comment    Discharge Stable Mon Jul 12, 2021  9:15 PM Azalia Ballard discharge to home/self care  Follow-up Information     Follow up With Specialties Details Why Victoriano Rene 89, PA-C Family Medicine Schedule an appointment as soon as possible for a visit  As needed, If symptoms worsen [address] [place] [address]  [phone]            Patient's Medications   Discharge Prescriptions    ONDANSETRON (ZOFRAN-ODT) 4 MG DISINTEGRATING TABLET    Take 1 tablet (4 mg total) by mouth every 6 (six) hours as needed for nausea or vomiting       Start Date: 7/12/2021 End Date: --       Order Dose: 4 mg       Quantity: 20 tablet    Refills: 0     No discharge procedures on file      PDMP Review     None          ED Provider  Electronically Signed by           Asim Kohli DO  07/12/21 3728

## 2021-07-12 NOTE — Clinical Note
Amilcar Suárez was seen and treated in our emergency department on 7/12/2021  Diagnosis:     Gayvetteshakir Dimas    She may return on this date: 07/13/2021         If you have any questions or concerns, please don't hesitate to call        Agustina Zuniga DO    ______________________________           _______________          _______________  Hospital Representative                              Date                                Time

## 2021-07-13 LAB
ATRIAL RATE: 113 BPM
P AXIS: 36 DEGREES
PR INTERVAL: 168 MS
QRS AXIS: 25 DEGREES
QRSD INTERVAL: 70 MS
QT INTERVAL: 326 MS
QTC INTERVAL: 447 MS
T WAVE AXIS: -25 DEGREES
VENTRICULAR RATE: 113 BPM

## 2021-07-13 PROCEDURE — 93010 ELECTROCARDIOGRAM REPORT: CPT | Performed by: INTERNAL MEDICINE

## 2021-07-13 NOTE — DISCHARGE INSTRUCTIONS
Thank you for visiting the emergency department today  Your labs were normal   We treated with IV fluids and antinausea medication  I suspect a viral syndrome  Please drink plenty of fluids at home over the next several days and prescription for antinausea medication was sent to her pharmacy  Use as directed  Return if symptoms worsen, severe pain, inability to tolerate anything by mouth blood in stool or vomit or any other acute concerns

## 2021-07-21 NOTE — TELEPHONE ENCOUNTER
Great thank you! Pt LM would like to know if she should do any changes with her diet while on the Metfromin, pt is going to start the medication today  Please review

## 2021-07-26 NOTE — TELEPHONE ENCOUNTER
I called and left message for patient to call back regarding the xray 
Patient called requesting an xray of her lower spine because she has stabbing pain and she stated that it feels like a pinched nerve  Patient has an appointment with you on Monday 1/7/19 for and ED for hypertension 
Pt called stating she has terrible pain in her lymph node radiating down her arm  Stated she was informed they did not find anything at her testing today  Informed her I would speak to you  I tried calling her at the number she gave me [phone]  Called number several times and could not leave a message as mailbox was full  I called number in her chart partner answered and stated she was at hospital getting testing     There was another number in the chart a ([phone]) which could not be connected  stated did I try her number and could not reach her  Finally got in touch with her and    she is going to the ER  I will call Dr Michael Mendiola to set up an appt as per our conversation 
Where in her lower spine is she having pain ??? Sacrum or lumbar spine? ??
Pt has cellphone and glasses at the bedside./with patient

## 2021-11-10 ENCOUNTER — HOSPITAL ENCOUNTER (EMERGENCY)
Facility: HOSPITAL | Age: 36
Discharge: HOME | End: 2021-11-10
Attending: EMERGENCY MEDICINE
Payer: COMMERCIAL

## 2021-11-10 VITALS
HEART RATE: 98 BPM | TEMPERATURE: 98.2 F | RESPIRATION RATE: 18 BRPM | OXYGEN SATURATION: 98 % | WEIGHT: 220 LBS | BODY MASS INDEX: 40.48 KG/M2 | DIASTOLIC BLOOD PRESSURE: 83 MMHG | HEIGHT: 62 IN | SYSTOLIC BLOOD PRESSURE: 144 MMHG

## 2021-11-10 DIAGNOSIS — L02.91 ABSCESS: Primary | ICD-10-CM

## 2021-11-10 PROCEDURE — 99284 EMERGENCY DEPT VISIT MOD MDM: CPT | Performed by: PHYSICIAN ASSISTANT

## 2021-11-10 PROCEDURE — 10061 I&D ABSCESS COMP/MULTIPLE: CPT | Performed by: PHYSICIAN ASSISTANT

## 2021-11-10 PROCEDURE — 99281 EMR DPT VST MAYX REQ PHY/QHP: CPT

## 2021-11-10 RX ORDER — CLINDAMYCIN HYDROCHLORIDE 150 MG/1
150 CAPSULE ORAL ONCE
Status: DISCONTINUED | OUTPATIENT
Start: 2021-11-10 | End: 2021-11-10 | Stop reason: HOSPADM

## 2021-11-10 RX ORDER — CLINDAMYCIN HYDROCHLORIDE 150 MG/1
150 CAPSULE ORAL 3 TIMES DAILY
Qty: 30 CAPSULE | Refills: 0 | Status: SHIPPED | OUTPATIENT
Start: 2021-11-10 | End: 2021-11-20

## 2021-11-10 RX ORDER — CLINDAMYCIN HYDROCHLORIDE 150 MG/1
300 CAPSULE ORAL ONCE
Status: COMPLETED | OUTPATIENT
Start: 2021-11-10 | End: 2021-11-10

## 2021-11-10 RX ORDER — LIDOCAINE HYDROCHLORIDE AND EPINEPHRINE 10; 10 MG/ML; UG/ML
5 INJECTION, SOLUTION INFILTRATION; PERINEURAL ONCE
Status: COMPLETED | OUTPATIENT
Start: 2021-11-10 | End: 2021-11-10

## 2021-11-10 RX ADMIN — LIDOCAINE HYDROCHLORIDE,EPINEPHRINE BITARTRATE 5 ML: 10; .01 INJECTION, SOLUTION INFILTRATION; PERINEURAL at 19:19

## 2021-11-10 RX ADMIN — CLINDAMYCIN HYDROCHLORIDE 300 MG: 150 CAPSULE ORAL at 19:32

## 2021-11-12 ENCOUNTER — OFFICE VISIT (OUTPATIENT)
Dept: FAMILY MEDICINE CLINIC | Facility: HOME HEALTHCARE | Age: 36
End: 2021-11-12
Payer: COMMERCIAL

## 2021-11-12 VITALS
HEART RATE: 101 BPM | DIASTOLIC BLOOD PRESSURE: 90 MMHG | BODY MASS INDEX: 38.83 KG/M2 | WEIGHT: 211 LBS | OXYGEN SATURATION: 98 % | HEIGHT: 62 IN | SYSTOLIC BLOOD PRESSURE: 140 MMHG | TEMPERATURE: 97.8 F | RESPIRATION RATE: 18 BRPM

## 2021-11-12 DIAGNOSIS — Z23 NEED FOR PNEUMOCOCCAL VACCINATION: ICD-10-CM

## 2021-11-12 DIAGNOSIS — F41.9 ANXIETY AND DEPRESSION: ICD-10-CM

## 2021-11-12 DIAGNOSIS — Z23 NEED FOR INFLUENZA VACCINATION: Primary | ICD-10-CM

## 2021-11-12 DIAGNOSIS — L02.91 ABSCESS: ICD-10-CM

## 2021-11-12 DIAGNOSIS — I10 ESSENTIAL HYPERTENSION: ICD-10-CM

## 2021-11-12 DIAGNOSIS — E55.9 VITAMIN D DEFICIENCY: ICD-10-CM

## 2021-11-12 DIAGNOSIS — F43.10 PTSD (POST-TRAUMATIC STRESS DISORDER): ICD-10-CM

## 2021-11-12 DIAGNOSIS — F32.A ANXIETY AND DEPRESSION: ICD-10-CM

## 2021-11-12 PROCEDURE — 99213 OFFICE O/P EST LOW 20 MIN: CPT | Performed by: FAMILY MEDICINE

## 2021-11-12 PROCEDURE — T1015 CLINIC SERVICE: HCPCS | Performed by: FAMILY MEDICINE

## 2021-11-12 RX ORDER — ESCITALOPRAM OXALATE 10 MG/1
10 TABLET ORAL DAILY
Qty: 30 TABLET | Refills: 2 | Status: SHIPPED | OUTPATIENT
Start: 2021-11-12 | End: 2021-12-03 | Stop reason: SDUPTHER

## 2021-11-12 RX ORDER — QUETIAPINE FUMARATE 25 MG/1
25 TABLET, FILM COATED ORAL
Qty: 30 TABLET | Refills: 2 | Status: SHIPPED | OUTPATIENT
Start: 2021-11-12 | End: 2022-02-07

## 2021-11-12 RX ORDER — ERGOCALCIFEROL 1.25 MG/1
50000 CAPSULE ORAL WEEKLY
Qty: 12 CAPSULE | Refills: 1 | Status: SHIPPED | OUTPATIENT
Start: 2021-11-12 | End: 2022-04-27

## 2021-11-12 RX ORDER — LISINOPRIL 10 MG/1
10 TABLET ORAL 2 TIMES DAILY
Qty: 90 TABLET | Refills: 1 | Status: SHIPPED | OUTPATIENT
Start: 2021-11-12 | End: 2022-02-07

## 2021-11-12 RX ORDER — HYDROCHLOROTHIAZIDE 12.5 MG/1
12.5 TABLET ORAL DAILY
Qty: 90 TABLET | Refills: 1 | Status: SHIPPED | OUTPATIENT
Start: 2021-11-12 | End: 2022-05-11

## 2021-11-15 ENCOUNTER — HOSPITAL ENCOUNTER (EMERGENCY)
Facility: HOSPITAL | Age: 36
Discharge: HOME | End: 2021-11-15
Attending: EMERGENCY MEDICINE
Payer: COMMERCIAL

## 2021-11-15 VITALS
TEMPERATURE: 98.2 F | HEART RATE: 75 BPM | SYSTOLIC BLOOD PRESSURE: 115 MMHG | DIASTOLIC BLOOD PRESSURE: 77 MMHG | OXYGEN SATURATION: 95 % | RESPIRATION RATE: 22 BRPM

## 2021-11-15 DIAGNOSIS — R00.0 TACHYCARDIA: ICD-10-CM

## 2021-11-15 DIAGNOSIS — R42 DIZZINESS: Primary | ICD-10-CM

## 2021-11-15 DIAGNOSIS — R42 VERTIGO: ICD-10-CM

## 2021-11-15 DIAGNOSIS — R11.0 NAUSEA: ICD-10-CM

## 2021-11-15 LAB
ANION GAP SERPL CALCULATED.3IONS-SCNC: 6 MMOL/L (ref 4–13)
BASOPHILS # BLD AUTO: 0.05 THOUSANDS/ΜL (ref 0–0.1)
BASOPHILS NFR BLD AUTO: 0 % (ref 0–1)
BILIRUB UR QL STRIP: NEGATIVE
BUN SERPL-MCNC: 11 MG/DL (ref 5–25)
CALCIUM SERPL-MCNC: 9.2 MG/DL (ref 8.3–10.1)
CHLORIDE SERPL-SCNC: 99 MMOL/L (ref 100–108)
CLARITY UR: CLEAR
CO2 SERPL-SCNC: 30 MMOL/L (ref 21–32)
COLOR UR: YELLOW
CREAT SERPL-MCNC: 0.9 MG/DL (ref 0.6–1.3)
EOSINOPHIL # BLD AUTO: 0.1 THOUSAND/ΜL (ref 0–0.61)
EOSINOPHIL NFR BLD AUTO: 1 % (ref 0–6)
ERYTHROCYTE [DISTWIDTH] IN BLOOD BY AUTOMATED COUNT: 13.2 % (ref 11.6–15.1)
EXT PREG TEST URINE: NEGATIVE
EXT. CONTROL ED NAV: NORMAL
GFR SERPL CREATININE-BSD FRML MDRD: 82 ML/MIN/1.73SQ M
GLUCOSE SERPL-MCNC: 136 MG/DL (ref 65–140)
GLUCOSE UR STRIP-MCNC: NEGATIVE MG/DL
HCT VFR BLD AUTO: 45.3 % (ref 34.8–46.1)
HGB BLD-MCNC: 15.1 G/DL (ref 11.5–15.4)
HGB UR QL STRIP.AUTO: NEGATIVE
IMM GRANULOCYTES # BLD AUTO: 0.05 THOUSAND/UL (ref 0–0.2)
IMM GRANULOCYTES NFR BLD AUTO: 0 % (ref 0–2)
KETONES UR STRIP-MCNC: NEGATIVE MG/DL
LEUKOCYTE ESTERASE UR QL STRIP: NEGATIVE
LYMPHOCYTES # BLD AUTO: 1.1 THOUSANDS/ΜL (ref 0.6–4.47)
LYMPHOCYTES NFR BLD AUTO: 9 % (ref 14–44)
MCH RBC QN AUTO: 30.9 PG (ref 26.8–34.3)
MCHC RBC AUTO-ENTMCNC: 33.3 G/DL (ref 31.4–37.4)
MCV RBC AUTO: 93 FL (ref 82–98)
MONOCYTES # BLD AUTO: 0.7 THOUSAND/ΜL (ref 0.17–1.22)
MONOCYTES NFR BLD AUTO: 5 % (ref 4–12)
NEUTROPHILS # BLD AUTO: 10.91 THOUSANDS/ΜL (ref 1.85–7.62)
NEUTS SEG NFR BLD AUTO: 85 % (ref 43–75)
NITRITE UR QL STRIP: NEGATIVE
NRBC BLD AUTO-RTO: 0 /100 WBCS
PH UR STRIP.AUTO: 5 [PH]
PLATELET # BLD AUTO: 299 THOUSANDS/UL (ref 149–390)
PMV BLD AUTO: 10 FL (ref 8.9–12.7)
POTASSIUM SERPL-SCNC: 4.1 MMOL/L (ref 3.5–5.3)
PROT UR STRIP-MCNC: NEGATIVE MG/DL
RBC # BLD AUTO: 4.89 MILLION/UL (ref 3.81–5.12)
SODIUM SERPL-SCNC: 135 MMOL/L (ref 136–145)
SP GR UR STRIP.AUTO: >=1.03 (ref 1–1.03)
UROBILINOGEN UR QL STRIP.AUTO: 0.2 E.U./DL
WBC # BLD AUTO: 12.91 THOUSAND/UL (ref 4.31–10.16)

## 2021-11-15 PROCEDURE — 99284 EMERGENCY DEPT VISIT MOD MDM: CPT

## 2021-11-15 PROCEDURE — 36415 COLL VENOUS BLD VENIPUNCTURE: CPT | Performed by: EMERGENCY MEDICINE

## 2021-11-15 PROCEDURE — 81003 URINALYSIS AUTO W/O SCOPE: CPT | Performed by: EMERGENCY MEDICINE

## 2021-11-15 PROCEDURE — 81025 URINE PREGNANCY TEST: CPT | Performed by: EMERGENCY MEDICINE

## 2021-11-15 PROCEDURE — 99285 EMERGENCY DEPT VISIT HI MDM: CPT | Performed by: EMERGENCY MEDICINE

## 2021-11-15 PROCEDURE — 80048 BASIC METABOLIC PNL TOTAL CA: CPT | Performed by: EMERGENCY MEDICINE

## 2021-11-15 PROCEDURE — 85025 COMPLETE CBC W/AUTO DIFF WBC: CPT | Performed by: EMERGENCY MEDICINE

## 2021-11-15 PROCEDURE — 96365 THER/PROPH/DIAG IV INF INIT: CPT

## 2021-11-15 PROCEDURE — 96366 THER/PROPH/DIAG IV INF ADDON: CPT

## 2021-11-15 PROCEDURE — 93005 ELECTROCARDIOGRAM TRACING: CPT

## 2021-11-15 RX ORDER — ONDANSETRON 4 MG/1
4 TABLET, ORALLY DISINTEGRATING ORAL ONCE
Status: COMPLETED | OUTPATIENT
Start: 2021-11-15 | End: 2021-11-15

## 2021-11-15 RX ORDER — ONDANSETRON 4 MG/1
4 TABLET, ORALLY DISINTEGRATING ORAL EVERY 6 HOURS PRN
Qty: 20 TABLET | Refills: 0 | Status: SHIPPED | OUTPATIENT
Start: 2021-11-15 | End: 2022-05-09 | Stop reason: ALTCHOICE

## 2021-11-15 RX ORDER — MECLIZINE HYDROCHLORIDE 25 MG/1
25 TABLET ORAL 3 TIMES DAILY PRN
Qty: 30 TABLET | Refills: 0 | Status: SHIPPED | OUTPATIENT
Start: 2021-11-15 | End: 2022-06-07 | Stop reason: HOSPADM

## 2021-11-15 RX ORDER — MECLIZINE HCL 12.5 MG/1
25 TABLET ORAL ONCE
Status: COMPLETED | OUTPATIENT
Start: 2021-11-15 | End: 2021-11-15

## 2021-11-15 RX ADMIN — MECLIZINE 25 MG: 12.5 TABLET ORAL at 11:27

## 2021-11-15 RX ADMIN — SODIUM CHLORIDE, SODIUM LACTATE, POTASSIUM CHLORIDE, AND CALCIUM CHLORIDE 1000 ML: .6; .31; .03; .02 INJECTION, SOLUTION INTRAVENOUS at 13:10

## 2021-11-15 RX ADMIN — ONDANSETRON 4 MG: 4 TABLET, ORALLY DISINTEGRATING ORAL at 11:27

## 2021-11-16 LAB
ATRIAL RATE: 93 BPM
P AXIS: 8 DEGREES
PR INTERVAL: 138 MS
QRS AXIS: 50 DEGREES
QRSD INTERVAL: 72 MS
QT INTERVAL: 346 MS
QTC INTERVAL: 430 MS
T WAVE AXIS: -14 DEGREES
VENTRICULAR RATE: 93 BPM

## 2021-11-16 PROCEDURE — 93010 ELECTROCARDIOGRAM REPORT: CPT | Performed by: INTERNAL MEDICINE

## 2021-12-03 ENCOUNTER — OFFICE VISIT (OUTPATIENT)
Dept: FAMILY MEDICINE CLINIC | Facility: HOME HEALTHCARE | Age: 36
End: 2021-12-03
Payer: COMMERCIAL

## 2021-12-03 VITALS
SYSTOLIC BLOOD PRESSURE: 130 MMHG | DIASTOLIC BLOOD PRESSURE: 88 MMHG | RESPIRATION RATE: 18 BRPM | TEMPERATURE: 97 F | WEIGHT: 211.8 LBS | HEIGHT: 62 IN | HEART RATE: 97 BPM | BODY MASS INDEX: 38.98 KG/M2 | OXYGEN SATURATION: 97 %

## 2021-12-03 DIAGNOSIS — I10 ESSENTIAL HYPERTENSION: Primary | ICD-10-CM

## 2021-12-03 DIAGNOSIS — F32.A ANXIETY AND DEPRESSION: ICD-10-CM

## 2021-12-03 DIAGNOSIS — J45.40 MODERATE PERSISTENT ASTHMA WITHOUT COMPLICATION: ICD-10-CM

## 2021-12-03 DIAGNOSIS — F41.9 ANXIETY AND DEPRESSION: ICD-10-CM

## 2021-12-03 PROCEDURE — 99213 OFFICE O/P EST LOW 20 MIN: CPT | Performed by: FAMILY MEDICINE

## 2021-12-03 PROCEDURE — T1015 CLINIC SERVICE: HCPCS | Performed by: FAMILY MEDICINE

## 2021-12-03 RX ORDER — ALBUTEROL SULFATE 90 UG/1
2 AEROSOL, METERED RESPIRATORY (INHALATION) EVERY 4 HOURS PRN
Qty: 18 G | Refills: 5 | Status: SHIPPED | OUTPATIENT
Start: 2021-12-03

## 2021-12-03 RX ORDER — ESCITALOPRAM OXALATE 20 MG/1
10 TABLET ORAL DAILY
Qty: 30 TABLET | Refills: 5 | Status: SHIPPED | OUTPATIENT
Start: 2021-12-03 | End: 2022-02-17

## 2021-12-16 ENCOUNTER — HOSPITAL ENCOUNTER (EMERGENCY)
Facility: HOSPITAL | Age: 36
Discharge: HOME | End: 2021-12-16
Attending: EMERGENCY MEDICINE
Payer: COMMERCIAL

## 2021-12-16 VITALS
BODY MASS INDEX: 39.56 KG/M2 | HEART RATE: 83 BPM | DIASTOLIC BLOOD PRESSURE: 100 MMHG | WEIGHT: 215 LBS | OXYGEN SATURATION: 98 % | HEIGHT: 62 IN | TEMPERATURE: 99.3 F | RESPIRATION RATE: 18 BRPM | SYSTOLIC BLOOD PRESSURE: 176 MMHG

## 2021-12-16 DIAGNOSIS — J06.9 URI (UPPER RESPIRATORY INFECTION): Primary | ICD-10-CM

## 2021-12-16 DIAGNOSIS — J45.901 ASTHMA EXACERBATION: ICD-10-CM

## 2021-12-16 PROCEDURE — 99284 EMERGENCY DEPT VISIT MOD MDM: CPT | Performed by: EMERGENCY MEDICINE

## 2021-12-16 PROCEDURE — U0003 INFECTIOUS AGENT DETECTION BY NUCLEIC ACID (DNA OR RNA); SEVERE ACUTE RESPIRATORY SYNDROME CORONAVIRUS 2 (SARS-COV-2) (CORONAVIRUS DISEASE [COVID-19]), AMPLIFIED PROBE TECHNIQUE, MAKING USE OF HIGH THROUGHPUT TECHNOLOGIES AS DESCRIBED BY CMS-2020-01-R: HCPCS | Performed by: EMERGENCY MEDICINE

## 2021-12-16 PROCEDURE — 99283 EMERGENCY DEPT VISIT LOW MDM: CPT

## 2021-12-16 PROCEDURE — U0005 INFEC AGEN DETEC AMPLI PROBE: HCPCS | Performed by: EMERGENCY MEDICINE

## 2021-12-16 RX ORDER — DEXAMETHASONE 2 MG/1
TABLET ORAL
Qty: 5 TABLET | Refills: 0 | Status: SHIPPED | OUTPATIENT
Start: 2021-12-16 | End: 2022-01-20 | Stop reason: ALTCHOICE

## 2021-12-16 RX ORDER — AZITHROMYCIN 250 MG/1
500 TABLET, FILM COATED ORAL ONCE
Status: COMPLETED | OUTPATIENT
Start: 2021-12-16 | End: 2021-12-16

## 2021-12-16 RX ORDER — AZITHROMYCIN 250 MG/1
250 TABLET, FILM COATED ORAL DAILY
Qty: 4 TABLET | Refills: 0 | Status: SHIPPED | OUTPATIENT
Start: 2021-12-16 | End: 2021-12-20

## 2021-12-16 RX ORDER — ONDANSETRON 4 MG/1
4 TABLET, ORALLY DISINTEGRATING ORAL EVERY 8 HOURS PRN
Qty: 20 TABLET | Refills: 0 | Status: SHIPPED | OUTPATIENT
Start: 2021-12-16 | End: 2022-05-09 | Stop reason: ALTCHOICE

## 2021-12-16 RX ADMIN — DEXAMETHASONE SODIUM PHOSPHATE 10 MG: 10 INJECTION, SOLUTION INTRAMUSCULAR; INTRAVENOUS at 16:03

## 2021-12-16 RX ADMIN — AZITHROMYCIN MONOHYDRATE 500 MG: 250 TABLET ORAL at 16:02

## 2021-12-17 LAB — SARS-COV-2 RNA RESP QL NAA+PROBE: NEGATIVE

## 2021-12-20 ENCOUNTER — VBI (OUTPATIENT)
Dept: ADMINISTRATIVE | Facility: OTHER | Age: 36
End: 2021-12-20

## 2021-12-30 ENCOUNTER — PATIENT OUTREACH (OUTPATIENT)
Dept: CASE MANAGEMENT | Facility: HOSPITAL | Age: 36
End: 2021-12-30

## 2022-01-13 DIAGNOSIS — B34.9 VIRAL INFECTION, UNSPECIFIED: Primary | ICD-10-CM

## 2022-01-13 PROCEDURE — 87636 SARSCOV2 & INF A&B AMP PRB: CPT | Performed by: PHYSICIAN ASSISTANT

## 2022-01-14 LAB
FLUAV RNA RESP QL NAA+PROBE: NEGATIVE
FLUBV RNA RESP QL NAA+PROBE: NEGATIVE
SARS-COV-2 RNA RESP QL NAA+PROBE: NEGATIVE

## 2022-01-15 ENCOUNTER — TELEPHONE (OUTPATIENT)
Dept: OTHER | Facility: OTHER | Age: 37
End: 2022-01-15

## 2022-01-15 NOTE — TELEPHONE ENCOUNTER
Patient calling to set up my chart access for her children and requesting results of covid test for her 2 younger children  Patient is able to see her my chart and her results  My chart access granted for 2 younger children  Patients oldest child will call back to set up his my chart access

## 2022-01-17 ENCOUNTER — TELEPHONE (OUTPATIENT)
Dept: OTHER | Facility: OTHER | Age: 37
End: 2022-01-17

## 2022-01-17 ENCOUNTER — TELEMEDICINE (OUTPATIENT)
Dept: FAMILY MEDICINE CLINIC | Facility: HOME HEALTHCARE | Age: 37
End: 2022-01-17
Payer: COMMERCIAL

## 2022-01-17 DIAGNOSIS — Z20.822 EXPOSURE TO COVID-19 VIRUS: ICD-10-CM

## 2022-01-17 DIAGNOSIS — B34.9 VIRAL INFECTION, UNSPECIFIED: ICD-10-CM

## 2022-01-17 PROCEDURE — 99213 OFFICE O/P EST LOW 20 MIN: CPT | Performed by: FAMILY MEDICINE

## 2022-01-17 NOTE — PROGRESS NOTES
Virtual Regular Visit    Verification of patient location:    Patient is located in the following state in which I hold an active license [place]      Assessment/Plan:    Problem List Items Addressed This Visit     None      Visit Diagnoses     Exposure to COVID-19 virus        Relevant Orders    COVID Only- Collected at [facility]    Viral infection, unspecified        Relevant Orders    COVID Only- Collected at   [facility] or [facility]         positive for COVID  Patient and family members not vaccinated  Family/ patient  Quarantine as per CDC guidelines  instructed to go to the emergency department with any new worsening symptoms  Instructed to call me with any questions or concerns  Reason for visit is   Chief Complaint   Patient presents with    Virtual Brief Visit     covid exposure    Shortness of Breath     wheezing    Asthma     cough    Virtual Regular Visit        Encounter provider Suellen Sidhu PA-C    Provider located at [address] [place] [address]  [phone]      Recent Visits  No visits were found meeting these conditions  Showing recent visits within past 7 days and meeting all other requirements  Today's Visits  Date Type Provider Dept   01/17/22 Daniele Nunez PA-C [facility] today's visits and meeting all other requirements  Future Appointments  No visits were found meeting these conditions  Showing future appointments within next 150 days and meeting all other requirements       The patient was identified by name and date of birth  Julio Baires was informed that this is a telemedicine visit and that the visit is being conducted through [address] [place] [address] Now and patient was informed that this is a secure, HIPAA-compliant platform  She agrees to proceed     My office door was closed  No one else was in the room    She acknowledged consent and understanding of privacy and security of the video platform  The patient has agreed to participate and understands they can discontinue the visit at any time  Patient is aware this is a billable service  Subjective  Antolin Amor is a 39 y o  female    HPI    69-year-old female for a virtual visit today due COVID exposure   tested positive for COVID and is having some dyspnea and mild chest discomfort  I did talk to  and tried to convince him to go to emergency department for further evaluation  Berry President has history of asthma states has been having some mild dyspnea and did have some wheezing yesterday  Denies any fever chills  Denies any chest pain  Has been using her albuterol and Advair  Did test negative on the 13th of January however will retest due to  now being positive  Two children also tested negative as well  Will place orders for children to have repeat COVID testing as well  Past Medical History:   Diagnosis Date    Anxiety     Asthma     Cat-scratch disease     last assessed 10/22/15    Depression     Hypertension     last assessed 11/11/14    Sciatica     Sleep disorder        Past Surgical History:   Procedure Laterality Date    BREAST BIOPSY Left     2016   [facility]      IN BX/REMV,LYMPH NODE,DEEP AXILL Left 1/15/2019    Procedure: LEFT AXILLARY LYMPH NODE BIOPSY;  Surgeon: Nelda Rick MD;  [facility];  Service: General    IN LAMNOTMY INCL W/DCMPRSN NRV ROOT 1 INTRSPC LUMBR Right 4/14/2021    Procedure: L5-S1 minimally invasive microdiskectomy and  epidural steroid injection;  Surgeon: Rizwana Lomax MD;  [facility];  Service: Neurosurgery    TUBAL LIGATION         Current Outpatient Medications   Medication Sig Dispense Refill    albuterol (ProAir HFA) 90 mcg/act inhaler Inhale 2 puffs every 4 (four) hours as needed for wheezing or shortness of breath 18 g 5    dexamethasone (DECADRON) 2 mg tablet Take with food   Take one time dose of 10mg by mouth tomorrow 5 tablet 0    ergocalciferol (VITAMIN D2) 50,000 units Take 1 capsule (50,000 Units total) by mouth once a week 12 capsule 1    escitalopram (Lexapro) 20 mg tablet Take 0 5 tablets (10 mg total) by mouth daily 30 tablet 5    fluticasone-salmeterol (Wixela Inhub) 250-50 mcg/dose inhaler Inhale 1 puff 2 (two) times a day Rinse mouth after use  60 blister 5    hydrochlorothiazide (HYDRODIURIL) 12 5 mg tablet Take 1 tablet (12 5 mg total) by mouth daily 90 tablet 1    lisinopril (ZESTRIL) 10 mg tablet Take 1 tablet (10 mg total) by mouth 2 (two) times a day 90 tablet 1    meclizine (ANTIVERT) 25 mg tablet Take 1 tablet (25 mg total) by mouth 3 (three) times a day as needed for dizziness 30 tablet 0    ondansetron (ZOFRAN-ODT) 4 mg disintegrating tablet Take 1 tablet (4 mg total) by mouth every 8 (eight) hours as needed for nausea or vomiting for up to 20 doses 20 tablet 0    QUEtiapine (SEROquel) 25 mg tablet Take 1 tablet (25 mg total) by mouth daily at bedtime 30 tablet 2    ondansetron (Zofran ODT) 4 mg disintegrating tablet Take 1 tablet (4 mg total) by mouth every 6 (six) hours as needed for nausea or vomiting (Patient not taking: Reported on 12/3/2021 ) 20 tablet 0     No current facility-administered medications for this visit  Allergies   Allergen Reactions    Bactrim [Sulfamethoxazole-Trimethoprim] Shortness Of Breath     Near syncope    Codeine Hives     Tolerated Percocet, Vicodin, etc        Review of Systems    As per HPI   all systems negative  Video Exam    There were no vitals filed for this visit  Physical Exam  Vitals reviewed:  afebrile  Constitutional:       General: She is not in acute distress  Appearance: She is not ill-appearing, toxic-appearing or diaphoretic  Pulmonary:      Effort: Pulmonary effort is normal    Neurological:      Mental Status: She is alert and oriented to person, place, and time     Psychiatric:         Mood and Affect: Mood normal  I spent 15 minutes directly with the patient during this visit    VIRTUAL VISIT 14-74 Arcadio Whipple verbally agrees to participate in [employer]  Pt is aware that [employer] could be limited without vital signs or the ability to perform a full hands-on physical [address] understands she or the provider may request at any time to terminate the video visit and request the patient to seek care or treatment in person

## 2022-01-18 PROCEDURE — U0005 INFEC AGEN DETEC AMPLI PROBE: HCPCS | Performed by: PHYSICIAN ASSISTANT

## 2022-01-18 PROCEDURE — U0003 INFECTIOUS AGENT DETECTION BY NUCLEIC ACID (DNA OR RNA); SEVERE ACUTE RESPIRATORY SYNDROME CORONAVIRUS 2 (SARS-COV-2) (CORONAVIRUS DISEASE [COVID-19]), AMPLIFIED PROBE TECHNIQUE, MAKING USE OF HIGH THROUGHPUT TECHNOLOGIES AS DESCRIBED BY CMS-2020-01-R: HCPCS | Performed by: PHYSICIAN ASSISTANT

## 2022-01-20 ENCOUNTER — HOSPITAL ENCOUNTER (EMERGENCY)
Facility: HOSPITAL | Age: 37
Discharge: HOME | End: 2022-01-20
Attending: EMERGENCY MEDICINE | Admitting: EMERGENCY MEDICINE
Payer: COMMERCIAL

## 2022-01-20 ENCOUNTER — APPOINTMENT (EMERGENCY)
Dept: RADIOLOGY | Facility: HOSPITAL | Age: 37
End: 2022-01-20
Payer: COMMERCIAL

## 2022-01-20 VITALS
HEIGHT: 62 IN | OXYGEN SATURATION: 98 % | DIASTOLIC BLOOD PRESSURE: 104 MMHG | RESPIRATION RATE: 18 BRPM | HEART RATE: 98 BPM | TEMPERATURE: 99.6 F | SYSTOLIC BLOOD PRESSURE: 159 MMHG | BODY MASS INDEX: 39.56 KG/M2 | WEIGHT: 215 LBS

## 2022-01-20 DIAGNOSIS — J40 BRONCHITIS: Primary | ICD-10-CM

## 2022-01-20 LAB
ATRIAL RATE: 92 BPM
FLUAV RNA RESP QL NAA+PROBE: NEGATIVE
FLUBV RNA RESP QL NAA+PROBE: NEGATIVE
P AXIS: 16 DEGREES
PR INTERVAL: 158 MS
QRS AXIS: 35 DEGREES
QRSD INTERVAL: 74 MS
QT INTERVAL: 364 MS
QTC INTERVAL: 450 MS
RSV RNA RESP QL NAA+PROBE: NEGATIVE
SARS-COV-2 RNA RESP QL NAA+PROBE: NEGATIVE
T WAVE AXIS: 7 DEGREES
VENTRICULAR RATE: 92 BPM

## 2022-01-20 PROCEDURE — 93010 ELECTROCARDIOGRAM REPORT: CPT | Performed by: INTERNAL MEDICINE

## 2022-01-20 PROCEDURE — 0241U HB NFCT DS VIR RESP RNA 4 TRGT: CPT | Performed by: EMERGENCY MEDICINE

## 2022-01-20 PROCEDURE — 99285 EMERGENCY DEPT VISIT HI MDM: CPT | Performed by: EMERGENCY MEDICINE

## 2022-01-20 PROCEDURE — 71045 X-RAY EXAM CHEST 1 VIEW: CPT

## 2022-01-20 PROCEDURE — 99285 EMERGENCY DEPT VISIT HI MDM: CPT

## 2022-01-20 PROCEDURE — 93005 ELECTROCARDIOGRAM TRACING: CPT

## 2022-01-20 RX ORDER — PREDNISONE 20 MG/1
40 TABLET ORAL ONCE
Status: COMPLETED | OUTPATIENT
Start: 2022-01-20 | End: 2022-01-20

## 2022-01-20 RX ORDER — PREDNISONE 20 MG/1
40 TABLET ORAL DAILY
Qty: 8 TABLET | Refills: 0 | Status: SHIPPED | OUTPATIENT
Start: 2022-01-20 | End: 2022-01-24

## 2022-01-20 RX ADMIN — PREDNISONE 40 MG: 20 TABLET ORAL at 17:12

## 2022-01-20 NOTE — ED PROVIDER NOTES
History  Chief Complaint   Patient presents with    Shortness of Breath     pt  reports cough, SOB, wheezing, and tightness in the chest beginning about 5 days ago; family was tested for COVID last Thursday and pt 's  was positive but pt  and child came back as negative       History provided by:  Patient  Shortness of Breath  Severity:  Mild  Onset quality:  Gradual  Duration:  5 days  Timing:  Constant  Progression:  Waxing and waning  Chronicity:  New  Context: URI    Context: not occupational exposure, not pollens and not smoke exposure    Relieved by:  Nothing  Worsened by:  Coughing  Ineffective treatments:  None tried  Associated symptoms: cough, fever and wheezing    Associated symptoms: no abdominal pain, no chest pain, no ear pain, no headaches, no hemoptysis, no neck pain, no rash, no sore throat, no sputum production, no syncope, no swollen glands and no vomiting    Risk factors: no family hx of DVT, no hx of PE/DVT, no oral contraceptive use, no recent surgery and no tobacco use        Prior to Admission Medications   Prescriptions Last Dose Informant Patient Reported? Taking?    QUEtiapine (SEROquel) 25 mg tablet   No No   Sig: Take 1 tablet (25 mg total) by mouth daily at bedtime   albuterol (ProAir HFA) 90 mcg/act inhaler   No No   Sig: Inhale 2 puffs every 4 (four) hours as needed for wheezing or shortness of breath   ergocalciferol (VITAMIN D2) 50,000 units   No No   Sig: Take 1 capsule (50,000 Units total) by mouth once a week   escitalopram (Lexapro) 20 mg tablet   No No   Sig: Take 0 5 tablets (10 mg total) by mouth daily   fluticasone-salmeterol (Wixela Inhub) 250-50 mcg/dose inhaler   No No   Sig: Inhale 1 puff 2 (two) times a day Rinse mouth after use    hydrochlorothiazide (HYDRODIURIL) 12 5 mg tablet   No No   Sig: Take 1 tablet (12 5 mg total) by mouth daily   lisinopril (ZESTRIL) 10 mg tablet   No No   Sig: Take 1 tablet (10 mg total) by mouth 2 (two) times a day   meclizine (ANTIVERT) 25 mg tablet   No No   Sig: Take 1 tablet (25 mg total) by mouth 3 (three) times a day as needed for dizziness   ondansetron (ZOFRAN-ODT) 4 mg disintegrating tablet   No No   Sig: Take 1 tablet (4 mg total) by mouth every 8 (eight) hours as needed for nausea or vomiting for up to 20 doses   ondansetron (Zofran ODT) 4 mg disintegrating tablet   No No   Sig: Take 1 tablet (4 mg total) by mouth every 6 (six) hours as needed for nausea or vomiting   Patient not taking: Reported on 12/3/2021       Facility-Administered Medications: None       Past Medical History:   Diagnosis Date    Anxiety     Asthma     Cat-scratch disease     last assessed 10/22/15    Depression     Hypertension     last assessed 11/11/14    Sciatica     Sleep disorder        Past Surgical History:   Procedure Laterality Date    BREAST BIOPSY Left     [facility]      AR BX/REMV,LYMPH NODE,DEEP AXILL Left 1/15/2019    Procedure: LEFT AXILLARY LYMPH NODE BIOPSY;  Surgeon: Bhavna Sorenson MD;  [facility];  Service: General    AR LAMNOTMY INCL W/DCMPRSN NRV ROOT 1 INTRSPC LUMBR Right 4/14/2021    Procedure: L5-S1 minimally invasive microdiskectomy and  epidural steroid injection;  Surgeon: Meryl Charlton MD;  [facility];  Service: Neurosurgery    TUBAL LIGATION         Family History   Problem Relation Age of Onset    Anxiety disorder Mother     Bipolar disorder Mother     Depression Mother     Tremor Mother     Anxiety disorder Father     Bipolar disorder Father     Depression Father     Schizophrenia Father     Parkinsonism Maternal Grandmother     Stomach cancer Paternal Grandfather      I have reviewed and agree with the history as documented      E-Cigarette/Vaping    E-Cigarette Use Never User      E-Cigarette/Vaping Substances    Nicotine No     THC No     CBD No     Flavoring No     Other No     Unknown No      Social History     Tobacco Use    Smoking status: Never Smoker    Smokeless tobacco: Never Used   Vaping Use    Vaping Use: Never used   Substance Use Topics    Alcohol use: No    Drug use: No       Review of Systems   Constitutional: Positive for fever  Negative for chills  HENT: Negative for ear pain and sore throat  Eyes: Negative for pain and visual disturbance  Respiratory: Positive for cough, shortness of breath and wheezing  Negative for hemoptysis and sputum production  Cardiovascular: Negative for chest pain, palpitations and syncope  Gastrointestinal: Negative for abdominal pain and vomiting  Genitourinary: Negative for dysuria and hematuria  Musculoskeletal: Negative for arthralgias, back pain and neck pain  Skin: Negative for color change and rash  Neurological: Negative for seizures, syncope and headaches  All other systems reviewed and are negative  Physical Exam  Physical Exam  Vitals and nursing note reviewed  Constitutional:       General: She is not in acute distress  Appearance: She is well-developed  HENT:      Head: Normocephalic and atraumatic  Mouth/Throat:      Mouth: Mucous membranes are moist       Pharynx: Oropharynx is clear  Eyes:      Extraocular Movements: Extraocular movements intact  Conjunctiva/sclera: Conjunctivae normal       Pupils: Pupils are equal, round, and reactive to light  Cardiovascular:      Rate and Rhythm: Normal rate and regular rhythm  Pulses: Normal pulses  Heart sounds: Normal heart sounds  No murmur heard  Pulmonary:      Effort: Pulmonary effort is normal  No respiratory distress  Breath sounds: Normal breath sounds  No decreased breath sounds, wheezing, rhonchi or rales  Chest:      Chest wall: No tenderness  Abdominal:      Palpations: Abdomen is soft  Tenderness: There is no abdominal tenderness  Musculoskeletal:      Cervical back: Normal range of motion and neck supple  Right lower leg: No tenderness  No edema        Left lower leg: No tenderness  No edema  Lymphadenopathy:      Cervical: No cervical adenopathy  Skin:     General: Skin is warm and dry  Capillary Refill: Capillary refill takes less than 2 seconds  Neurological:      General: No focal deficit present  Mental Status: She is alert  Vital Signs  ED Triage Vitals [01/20/22 1520]   Temperature Pulse Respirations Blood Pressure SpO2   99 6 °F (37 6 °C) 98 18 (!) 159/104 98 %      Temp Source Heart Rate Source Patient Position - Orthostatic VS BP Location FiO2 (%)   Temporal Monitor -- Right arm --      Pain Score       No Pain           Vitals:    01/20/22 1520   BP: (!) 159/104   Pulse: 98         Visual Acuity      ED Medications  Medications   predniSONE tablet 40 mg (has no administration in time range)       Diagnostic Studies  Results Reviewed     Procedure Component Value Units Date/Time    COVID/FLU/RSV - 2 hour TAT [[id number]] Collected: 01/20/22 1622    Lab Status: No result Specimen: Nasopharyngeal Swab                  XR chest 1 view portable   Final Result by Ann Perez MD (01/20 1644)      No acute cardiopulmonary disease  Workstation performed: [id number]                    Procedures  Procedures         ED Course  ED Course as of 01/20/22 1646   u Jan 20, 2022   1618 X-ray reviewed by myself compared to prior study in 2021  Pending official Radiology report  Based on my interpretation I see no acute cardiopulmonary processes                                               MDM  Number of Diagnoses or Management Options  Bronchitis: new and requires workup     Amount and/or Complexity of Data Reviewed  Clinical lab tests: ordered and reviewed  Tests in the radiology section of CPT®: ordered and reviewed  Review and summarize past medical records: yes  Independent visualization of images, tracings, or specimens: yes    Risk of Complications, Morbidity, and/or Mortality  Presenting problems: low  Diagnostic procedures: low  Management options: low    Patient Progress  Patient progress: stable  39-year-old female presenting to the ER for evaluation of URI symptoms primarily coughing congestion denies shortness of breath at rest   Reports subjective wheezing but not wheezing on exam here history of mild persistent asthma  Lung exam is unremarkable she is satting 90% on room air with no respiratory distress she is afebrile she is mildly hypertensive she is not tachycardic  She reports to recent negative COVID test prior to the onset of these new symptoms x5 days  Will evaluate with chest x-ray for the possibility of superimposed bacterial infiltrate or pneumonia will evaluate EKG as well  She is tolerating p o  Well-appearing on exam without evidence of hypoxia or respiratory distress do not think there is utility blood work at this time discussed with patient she agrees  We will evaluate with imaging and EKG likely treat as a bronchitis as outpatient with steroids plus or minus antibiotics  X-ray does not show infiltrate suspect viral syndrome will provide prescription for steroid course  Disposition  Final diagnoses:   Bronchitis     Time reflects when diagnosis was documented in both MDM as applicable and the Disposition within this note     Time User Action Codes Description Comment    1/20/2022  4:13 PM Dirk Watkins [J40] Bronchitis       ED Disposition     ED Disposition Condition Date/Time Comment    Discharge Stable u Jan 20, 2022  4:36 PM Alba Rhodes discharge to home/self care              Follow-up Information     Follow up With Specialties Details Why [address] Karl Whitehead PA-C Physician Assistant, Family Medicine   [facility]  [address]  [address] [place] [address]  [phone]            Patient's Medications   Discharge Prescriptions    PREDNISONE 20 MG TABLET    Take 2 tablets (40 mg total) by mouth daily for 4 days       Start Date: 1/20/2022 End Date: 1/24/2022       Order Dose: 40 mg       Quantity: 8 tablet    Refills: 0       No discharge procedures on file      PDMP Review     None          ED Provider  Electronically Signed by           Andrey Ma DO  01/20/22 7175

## 2022-01-20 NOTE — Clinical Note
Pat Rodrigues was seen and treated in our emergency department on 1/20/2022  Diagnosis:     Elmon Laughter    She may return on this date:     Seen examined on 1/20/22 for symptoms suggestive of upper respiratory illness  COVID/Flu/RSV pending at this time  Can view the results on amprice kami (instructions provided)  No return to work/school x 10 days from symptom onset if covid positive  If covid negative and still symptomatic  Contact employer for return timeframe  If you have any questions or concerns, please don't hesitate to call        Chucky Arroyo, DO    ______________________________           _______________          _______________  Hospital Representative                              Date                                Time

## 2022-01-20 NOTE — DISCHARGE INSTRUCTIONS
Thank you for visiting the Emergency Department today  No signs of pneumonia on X-ray  Oxygen levels normal    EKG normal    COVID/Flu/RSV test pending, results within a few hours  May download the [address] to view results if not available during your ER stay  Prescription for steroid medication sent to the pharmacy  No role for antibioitcs no signs of bacterial illness on exam or imaging  Contact PCP or return if symptoms worsen

## 2022-01-26 ENCOUNTER — PATIENT OUTREACH (OUTPATIENT)
Dept: CASE MANAGEMENT | Facility: HOSPITAL | Age: 37
End: 2022-01-26

## 2022-02-03 ENCOUNTER — PATIENT OUTREACH (OUTPATIENT)
Dept: CASE MANAGEMENT | Facility: HOSPITAL | Age: 37
End: 2022-02-03

## 2022-02-03 NOTE — PROGRESS NOTES
Out Patient Care Management Note:  Patient has not returned the Social Determinants of Health Questionnaire sent via [employer] on 01/26/22  ED followup episode will be closed at this time

## 2022-02-07 ENCOUNTER — HOSPITAL ENCOUNTER (EMERGENCY)
Facility: HOSPITAL | Age: 37
Discharge: HOME | End: 2022-02-07
Attending: EMERGENCY MEDICINE | Admitting: EMERGENCY MEDICINE
Payer: COMMERCIAL

## 2022-02-07 VITALS
SYSTOLIC BLOOD PRESSURE: 162 MMHG | HEIGHT: 62 IN | TEMPERATURE: 99.1 F | RESPIRATION RATE: 18 BRPM | HEART RATE: 102 BPM | DIASTOLIC BLOOD PRESSURE: 93 MMHG | OXYGEN SATURATION: 98 % | BODY MASS INDEX: 39.32 KG/M2

## 2022-02-07 DIAGNOSIS — B34.9 VIRAL SYNDROME: Primary | ICD-10-CM

## 2022-02-07 PROCEDURE — 93005 ELECTROCARDIOGRAM TRACING: CPT

## 2022-02-07 PROCEDURE — 87636 SARSCOV2 & INF A&B AMP PRB: CPT | Performed by: PHYSICIAN ASSISTANT

## 2022-02-07 PROCEDURE — 99284 EMERGENCY DEPT VISIT MOD MDM: CPT | Performed by: PHYSICIAN ASSISTANT

## 2022-02-07 PROCEDURE — 99283 EMERGENCY DEPT VISIT LOW MDM: CPT

## 2022-02-07 RX ORDER — ONDANSETRON 4 MG/1
4 TABLET, FILM COATED ORAL EVERY 8 HOURS PRN
Qty: 20 TABLET | Refills: 0 | Status: SHIPPED | OUTPATIENT
Start: 2022-02-07

## 2022-02-07 NOTE — Clinical Note
Vishnu Hodgson was seen and treated in our emergency department on 2/7/2022  No restrictions            Diagnosis: Viral syndrome, rule out [address]     She may return on this date: 02/10/2022    Patient had a COVID test the afternoon of the 7th of February, she is excused from work until the test results are known typically within 24-48 hours  If you have any questions or concerns, please don't hesitate to call        Alexandra Darby PA-C    ______________________________           _______________          _______________  Hospital Representative                              Date                                Time

## 2022-02-07 NOTE — ED PROVIDER NOTES
History  Chief Complaint   Patient presents with    Generalized Body Aches     Nausea, chills, headache since saturday  Unvaccinated  Patient presents to the emergency department today for evaluation of viral type symptoms that commenced 48 hours ago that include headache nausea cough as well as fevers chills as well as myalgias  Denies chest pain  No vomiting or diarrhea  She is well-appearing at bedside with oxygen saturations of 98% on room air with a heart rate of 92 beats per minute when I walked in the room  Again there is no chest pain however EKG was completed by emergency department staff prior to my arrival room I did review this EKG shows a normal sinus rhythm at 99 beats per minute without evidence of ectopy  There is no ST elevation or depression  There is a solitary inverted T-wave in lead 3 only  Patient is nontoxic appearing at bedside  Co-worker tested positive for COVID a few days ago  She is not vaccinated  Prior to Admission Medications   Prescriptions Last Dose Informant Patient Reported? Taking?    QUEtiapine (SEROquel) 25 mg tablet   No No   Sig: take 1 tablet by mouth at bedtime   albuterol (ProAir HFA) 90 mcg/act inhaler   No No   Sig: Inhale 2 puffs every 4 (four) hours as needed for wheezing or shortness of breath   ergocalciferol (VITAMIN D2) 50,000 units   No No   Sig: Take 1 capsule (50,000 Units total) by mouth once a week   escitalopram (Lexapro) 20 mg tablet   No No   Sig: Take 0 5 tablets (10 mg total) by mouth daily   fluticasone-salmeterol (Wixela Inhub) 250-50 mcg/dose inhaler   No No   Sig: Inhale 1 puff 2 (two) times a day Rinse mouth after use    hydrochlorothiazide (HYDRODIURIL) 12 5 mg tablet   No No   Sig: Take 1 tablet (12 5 mg total) by mouth daily   lisinopril (ZESTRIL) 10 mg tablet   No No   Sig: take 1 tablet by mouth twice a day   meclizine (ANTIVERT) 25 mg tablet   No No   Sig: Take 1 tablet (25 mg total) by mouth 3 (three) times a day as needed for dizziness   ondansetron (ZOFRAN-ODT) 4 mg disintegrating tablet   No No   Sig: Take 1 tablet (4 mg total) by mouth every 8 (eight) hours as needed for nausea or vomiting for up to 20 doses   ondansetron (Zofran ODT) 4 mg disintegrating tablet   No No   Sig: Take 1 tablet (4 mg total) by mouth every 6 (six) hours as needed for nausea or vomiting   Patient not taking: Reported on 12/3/2021       Facility-Administered Medications: None       Past Medical History:   Diagnosis Date    Anxiety     Asthma     Cat-scratch disease     last assessed 10/22/15    Depression     Hypertension     last assessed 11/11/14    Sciatica     Sleep disorder        Past Surgical History:   Procedure Laterality Date    BREAST [facility]SY Left     2016   [facility]      AR BX/REMV,LYMPH NODE,DEEP AXILL Left 1/15/2019    Procedure: LEFT AXILLARY LYMPH NODE BIOPSY;  Surgeon: Estella Branham MD;  [facility];  Service: General    AR LAMNOTMY INCL W/DCMPRSN NRV ROOT 1 INTRSPC LUMBR Right 4/14/2021    Procedure: L5-S1 minimally invasive microdiskectomy and  epidural steroid injection;  Surgeon: Bindu Kelley MD;  [facility];  Service: Neurosurgery    TUBAL LIGATION         Family History   Problem Relation Age of Onset    Anxiety disorder Mother     Bipolar disorder Mother     Depression Mother     Tremor Mother     Anxiety disorder Father     Bipolar disorder Father     Depression Father     Schizophrenia Father     Parkinsonism Maternal Grandmother     Stomach cancer Paternal Grandfather      I have reviewed and agree with the history as documented      E-Cigarette/Vaping    E-Cigarette Use Never User      E-Cigarette/Vaping Substances    Nicotine No     THC No     CBD No     Flavoring No     Other No     Unknown No      Social History     Tobacco Use    Smoking status: Never Smoker    Smokeless tobacco: Never Used   Vaping Use    Vaping Use: Never used   Substance Use Topics    Alcohol use: No    Drug use: No       Review of Systems   Constitutional: Positive for appetite change, chills, fatigue and fever  HENT: Negative for ear pain and sore throat  Eyes: Negative for pain and visual disturbance  Respiratory: Positive for cough  Negative for shortness of breath  Cardiovascular: Negative for chest pain and palpitations  Gastrointestinal: Positive for nausea  Negative for abdominal pain and vomiting  Genitourinary: Negative for dysuria and hematuria  Musculoskeletal: Positive for myalgias  Negative for arthralgias and back pain  Skin: Negative for color change and rash  Neurological: Positive for headaches  Negative for seizures and syncope  All other systems reviewed and are negative  Physical Exam  Physical Exam  Vitals and nursing note reviewed  Constitutional:       General: She is not in acute distress  Appearance: Normal appearance  She is well-developed  She is not ill-appearing, toxic-appearing or diaphoretic  HENT:      Head: Normocephalic and atraumatic  Mouth/Throat:      Pharynx: No oropharyngeal exudate or posterior oropharyngeal erythema  Eyes:      General: No scleral icterus  Right eye: No discharge  Left eye: No discharge  Extraocular Movements: Extraocular movements intact  Conjunctiva/sclera: Conjunctivae normal    Cardiovascular:      Rate and Rhythm: Normal rate and regular rhythm  Heart sounds: No murmur heard  No friction rub  No gallop  Pulmonary:      Effort: Pulmonary effort is normal  No respiratory distress  Breath sounds: Normal breath sounds  No stridor  No wheezing, rhonchi or rales  Chest:      Chest wall: No tenderness  Abdominal:      General: There is no distension  Palpations: Abdomen is soft  There is no mass  Tenderness: There is no abdominal tenderness  There is no right CVA tenderness, left CVA tenderness, guarding or rebound        Hernia: No hernia is present  Musculoskeletal:         General: No tenderness or deformity  Normal range of motion  Cervical back: Neck supple  Skin:     General: Skin is warm and dry  Capillary Refill: Capillary refill takes less than 2 seconds  Neurological:      General: No focal deficit present  Mental Status: She is alert and oriented to person, place, and time  Psychiatric:         Mood and Affect: Mood normal          Behavior: Behavior normal          Thought Content: Thought content normal          Judgment: Judgment normal          Vital Signs  ED Triage Vitals [02/07/22 1254]   Temperature Pulse Respirations Blood Pressure SpO2   99 1 °F (37 3 °C) 102 18 162/93 98 %      Temp Source Heart Rate Source Patient Position - Orthostatic VS BP Location FiO2 (%)   Temporal Monitor Sitting Left arm --      Pain Score       6           Vitals:    02/07/22 1254   BP: 162/93   Pulse: 102   Patient Position - Orthostatic VS: Sitting         Visual Acuity      ED Medications  Medications - No data to display    Diagnostic Studies  Results Reviewed     Procedure Component Value Units Date/Time    COVID/FLU - 24 hour TAT [[id number]]     Lab Status: No result Specimen: Nares from Nose                  No orders to display              Procedures  Procedures         ED Course  ED Course as of 02/07/22 1310   Mon Feb 07, 2022   1257 SpO2: 98 %   1257 Respirations: 18   1257 Pulse: 102   1257 Temperature: 99 1 °F (37 3 °C)   1257 Blood Pressure: 162/93                               SBIRT 22yo+      Most Recent Value   SBIRT (25 yo +)    In order to provide better care to our patients, we are screening all of our patients for alcohol and drug use  Would it be okay to ask you these screening questions? Yes Filed at: 02/07/2022 1257   Initial Alcohol Screen: US AUDIT-C     1  How often do you have a drink containing alcohol? 0 Filed at: 02/07/2022 1257   2   How many drinks containing alcohol do you have on a typical day you are drinking? 0 Filed at: 02/07/2022 1257   3b  FEMALE Any Age, or MALE 65+: How often do you have 4 or more drinks on one occassion? 0 Filed at: 02/07/2022 1257   Audit-C Score 0 Filed at: 02/07/2022 1257   YOAN: How many times in the past year have you    Used an illegal drug or used a prescription medication for non-medical reasons? Never Filed at: 02/07/2022 1257                    MDM    Disposition  Final diagnoses:   Viral syndrome     Time reflects when diagnosis was documented in both MDM as applicable and the Disposition within this note     Time User Action Codes Description Comment    2/7/2022  1:06 PM Christian SKY Add [B34 9] Viral syndrome       ED Disposition     ED Disposition Condition Date/Time Comment    Discharge Stable Mon Feb 7, 2022  1:06 PM Niecy Clemons discharge to home/self care  Follow-up Information     Follow up With Specialties Details Why Contact Info    Gaby Boone PA-C Physician Assistant, Family Medicine Schedule an appointment as soon as possible for a visit  As needed [place] [id number]  [phone]            Patient's Medications   Discharge Prescriptions    ONDANSETRON (ZOFRAN) 4 MG TABLET    Take 1 tablet (4 mg total) by mouth every 8 (eight) hours as needed for nausea       Start Date: 2/7/2022  End Date: --       Order Dose: 4 mg       Quantity: 20 tablet    Refills: 0       No discharge procedures on file      PDMP Review     None          ED Provider  Electronically Signed by           Griffin Mccormick PA-C  02/07/22 5770

## 2022-02-07 NOTE — DISCHARGE INSTRUCTIONS
We will call COVID results are known if they are positive, if you have my chart and there is a negative test you will not get phone call      Utilize the Zofran home for nausea vomiting, utilize Pedialyte stay hydrated, also takes vitamins in the form of a multivitamin as well as vitamin-C and vitamin D3

## 2022-02-08 LAB
ATRIAL RATE: 99 BPM
FLUAV RNA RESP QL NAA+PROBE: NEGATIVE
FLUBV RNA RESP QL NAA+PROBE: NEGATIVE
P AXIS: 30 DEGREES
PR INTERVAL: 158 MS
QRS AXIS: 41 DEGREES
QRSD INTERVAL: 72 MS
QT INTERVAL: 356 MS
QTC INTERVAL: 456 MS
SARS-COV-2 RNA RESP QL NAA+PROBE: NEGATIVE
T WAVE AXIS: -5 DEGREES
VENTRICULAR RATE: 99 BPM

## 2022-02-08 PROCEDURE — 93010 ELECTROCARDIOGRAM REPORT: CPT | Performed by: INTERNAL MEDICINE

## 2022-02-17 ENCOUNTER — OFFICE VISIT (OUTPATIENT)
Dept: PSYCHIATRY | Facility: CLINIC | Age: 37
End: 2022-02-17
Payer: COMMERCIAL

## 2022-02-17 DIAGNOSIS — F32.A ANXIETY AND DEPRESSION: ICD-10-CM

## 2022-02-17 DIAGNOSIS — F32.2 CURRENT SEVERE EPISODE OF MAJOR DEPRESSIVE DISORDER WITHOUT PSYCHOTIC FEATURES, UNSPECIFIED WHETHER RECURRENT (HCC): Primary | ICD-10-CM

## 2022-02-17 DIAGNOSIS — F41.9 ANXIETY AND DEPRESSION: ICD-10-CM

## 2022-02-17 DIAGNOSIS — F41.1 GENERALIZED ANXIETY DISORDER: ICD-10-CM

## 2022-02-17 DIAGNOSIS — F43.10 PTSD (POST-TRAUMATIC STRESS DISORDER): ICD-10-CM

## 2022-02-17 PROCEDURE — 90792 PSYCH DIAG EVAL W/MED SRVCS: CPT

## 2022-02-17 RX ORDER — HYDROXYZINE HYDROCHLORIDE 25 MG/1
25 TABLET, FILM COATED ORAL EVERY 6 HOURS PRN
Qty: 60 TABLET | Refills: 0 | Status: SHIPPED | OUTPATIENT
Start: 2022-02-17

## 2022-02-17 RX ORDER — FLUOXETINE HYDROCHLORIDE 20 MG/1
20 CAPSULE ORAL DAILY
Qty: 30 CAPSULE | Refills: 0 | Status: SHIPPED | OUTPATIENT
Start: 2022-02-17

## 2022-02-17 RX ORDER — QUETIAPINE FUMARATE 50 MG/1
50 TABLET, FILM COATED ORAL
Qty: 30 TABLET | Refills: 0 | Status: SHIPPED | OUTPATIENT
Start: 2022-02-17 | End: 2022-03-14

## 2022-02-17 RX ORDER — ESCITALOPRAM OXALATE 20 MG/1
10 TABLET ORAL DAILY
Qty: 30 TABLET | Refills: 5
Start: 2022-02-17 | End: 2022-06-07 | Stop reason: HOSPADM

## 2022-02-17 NOTE — BH TREATMENT PLAN
TREATMENT PLAN (Medication Management Only)        [facility]    Name and Date of Birth:  Niecy Clemons 41 y o  1985  Date of Treatment Plan: February 17, 2022  Diagnosis/Diagnoses:    1  Current severe episode of major depressive disorder without psychotic features, unspecified whether recurrent ([facility] [address] )    2  Generalized anxiety disorder    3  PTSD (post-traumatic stress disorder)    4  Anxiety and depression      Strengths/Personal Resources for Self-Care: motivation for treatment  Area/Areas of need (in own words): anxiety symptoms, depressive symptoms  1  Long Term Goal: alleviate acceptable anxiety level  Target Date:6 months - 8/17/2022  Person/Persons responsible for completion of goal: Dax Guardado  2  Short Term Objective (s) - How will we reach this goal?:   A  Provider new recommended medication/dosage changes and/or continue medication(s): begin prozac and atarax  B  Attend medication management appointments regularly  C  Take psychiatric medications responsibly  Target Date:6 months - 8/17/2022  Person/Persons Responsible for Completion of Goal: Dax Guardado  Progress Towards Goals: starting treatment  Treatment Modality: medication management every 4 weeks  Review due 180 days from date of this plan: 6 months - 8/17/2022  Expected length of service: maintenance  My Physician/PA/NP and I have developed this plan together and I agree to work on the goals and objectives  I understand the treatment goals that were developed for my treatment
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
## 2022-02-23 ENCOUNTER — TELEPHONE (OUTPATIENT)
Dept: PSYCHIATRY | Facility: CLINIC | Age: 37
End: 2022-02-23

## 2022-02-23 NOTE — TELEPHONE ENCOUNTER
Prior Authorization for Fluoxetine 20 MG capsules faxed to [employer] via [facility]  Decision pending  Will refer to Guillermo Mayer for his information

## 2022-02-23 NOTE — TELEPHONE ENCOUNTER
Fax from [employer] with approval for Fluoxetine 50 MG capsules good 2/23/22-3/23/22  LM on pharmacy VM that medication has been approved  LM on Trixie's VM that medication has been approved  Will refer to Haider Walsh for his information

## 2022-02-28 ENCOUNTER — HOSPITAL ENCOUNTER (EMERGENCY)
Facility: HOSPITAL | Age: 37
Discharge: HOME | End: 2022-02-28
Attending: EMERGENCY MEDICINE | Admitting: EMERGENCY MEDICINE
Payer: COMMERCIAL

## 2022-02-28 ENCOUNTER — APPOINTMENT (EMERGENCY)
Dept: RADIOLOGY | Facility: HOSPITAL | Age: 37
End: 2022-02-28
Payer: COMMERCIAL

## 2022-02-28 VITALS
HEART RATE: 99 BPM | DIASTOLIC BLOOD PRESSURE: 93 MMHG | SYSTOLIC BLOOD PRESSURE: 148 MMHG | TEMPERATURE: 99.5 F | HEIGHT: 62 IN | RESPIRATION RATE: 18 BRPM | OXYGEN SATURATION: 94 % | BODY MASS INDEX: 40.41 KG/M2 | WEIGHT: 219.58 LBS

## 2022-02-28 DIAGNOSIS — J06.9 VIRAL URI WITH COUGH: ICD-10-CM

## 2022-02-28 DIAGNOSIS — B34.9 VIRAL ILLNESS: ICD-10-CM

## 2022-02-28 DIAGNOSIS — R05.9 COUGH: Primary | ICD-10-CM

## 2022-02-28 LAB
EXT PREG TEST URINE: NEGATIVE
EXT. CONTROL ED NAV: NORMAL
FLUAV RNA RESP QL NAA+PROBE: NEGATIVE
FLUBV RNA RESP QL NAA+PROBE: NEGATIVE
RSV RNA RESP QL NAA+PROBE: NEGATIVE
SARS-COV-2 RNA RESP QL NAA+PROBE: NEGATIVE

## 2022-02-28 PROCEDURE — 99284 EMERGENCY DEPT VISIT MOD MDM: CPT | Performed by: EMERGENCY MEDICINE

## 2022-02-28 PROCEDURE — 99283 EMERGENCY DEPT VISIT LOW MDM: CPT

## 2022-02-28 PROCEDURE — 71045 X-RAY EXAM CHEST 1 VIEW: CPT

## 2022-02-28 PROCEDURE — 81025 URINE PREGNANCY TEST: CPT | Performed by: EMERGENCY MEDICINE

## 2022-02-28 PROCEDURE — 0241U HB NFCT DS VIR RESP RNA 4 TRGT: CPT | Performed by: EMERGENCY MEDICINE

## 2022-02-28 RX ORDER — PREDNISONE 20 MG/1
60 TABLET ORAL ONCE
Status: COMPLETED | OUTPATIENT
Start: 2022-02-28 | End: 2022-02-28

## 2022-02-28 RX ORDER — PREDNISONE 20 MG/1
TABLET ORAL
Qty: 25 TABLET | Refills: 1 | Status: SHIPPED | OUTPATIENT
Start: 2022-02-28 | End: 2022-05-09 | Stop reason: ALTCHOICE

## 2022-02-28 RX ADMIN — PREDNISONE 60 MG: 20 TABLET ORAL at 00:56

## 2022-02-28 NOTE — Clinical Note
Analilia Skinner was seen and treated in our emergency department on 2/28/2022  Diagnosis:     Nohelia Murphy    She may return on this date: Off work thru march 5th; If you have any questions or concerns, please don't hesitate to call        Shelbie Veliz MD    ______________________________           _______________          _______________  [facility] Representative                              Date                                Time

## 2022-02-28 NOTE — DISCHARGE INSTRUCTIONS
Stay well hydrated;  Use albuterol inhaler every 4 hours 2 puffs  Take prednisone    [place] 60 mg (3 tablets) in am for 5 days then 40 mg (2 tablets) in am for next 5 days, or as directed by your doctor;     If short of breath not improving with albuterol , call dr office or return to ER , by ems if severe;       Possible flu or covid, even if test result says "negative"  , you should therefore be off work for at least the next week until all symptoms resolved;

## 2022-02-28 NOTE — ED PROVIDER NOTES
History  Chief Complaint   Patient presents with    Cold Like Symptoms     reports fever, generalized body aches, cough past 2 days  Stated from all the coughing her chest hurts  had nyquill this evening  HPI  38 yo female with cc several days of low temp, not feeling well, using inhaler more often, every 2 hours today; aches pains;  Did not get flu prevention shot  Did not get covid vaccines    No chest pain;  Never intubated, doesn't come to ED for asthma, hasnt come for asthma tonight, came for the aches and discomfort and flu symptoms she notes; She notes is  Has more wheezing and symptoms lying flat  Prior to Admission Medications   Prescriptions Last Dose Informant Patient Reported? Taking?    FLUoxetine (PROzac) 20 mg capsule   No No   Sig: Take 1 capsule (20 mg total) by mouth daily   QUEtiapine (SEROquel) 50 mg tablet   No No   Sig: Take 1 tablet (50 mg total) by mouth daily at bedtime   albuterol (ProAir HFA) 90 mcg/act inhaler   No No   Sig: Inhale 2 puffs every 4 (four) hours as needed for wheezing or shortness of breath   ergocalciferol (VITAMIN D2) 50,000 units   No No   Sig: Take 1 capsule (50,000 Units total) by mouth once a week   escitalopram (Lexapro) 20 mg tablet   No No   Sig: Take 0 5 tablets (10 mg total) by mouth daily   fluticasone-salmeterol (Wixela Inhub) 250-50 mcg/dose inhaler   No No   Sig: Inhale 1 puff 2 (two) times a day Rinse mouth after use    hydrOXYzine HCL (ATARAX) 25 mg tablet   No No   Sig: Take 1 tablet (25 mg total) by mouth every 6 (six) hours as needed for anxiety   hydrochlorothiazide (HYDRODIURIL) 12 5 mg tablet   No No   Sig: Take 1 tablet (12 5 mg total) by mouth daily   lisinopril (ZESTRIL) 10 mg tablet   No No   Sig: take 1 tablet by mouth twice a day   meclizine (ANTIVERT) 25 mg tablet   No No   Sig: Take 1 tablet (25 mg total) by mouth 3 (three) times a day as needed for dizziness   ondansetron (ZOFRAN) 4 mg tablet   No No   Sig: Take 1 tablet (4 mg total) by mouth every 8 (eight) hours as needed for nausea   ondansetron (ZOFRAN-ODT) 4 mg disintegrating tablet   No No   Sig: Take 1 tablet (4 mg total) by mouth every 8 (eight) hours as needed for nausea or vomiting for up to 20 doses   ondansetron (Zofran ODT) 4 mg disintegrating tablet   No No   Sig: Take 1 tablet (4 mg total) by mouth every 6 (six) hours as needed for nausea or vomiting   Patient not taking: Reported on 12/3/2021       Facility-Administered Medications: None       Past Medical History:   Diagnosis Date    Anxiety     Asthma     Cat-scratch disease     last assessed 10/22/15    Depression     Hypertension     last assessed 11/11/14    Sciatica     Sleep disorder        Past Surgical History:   Procedure Laterality Date    BREAST BIOPSY Left     2016   Shelli Beny DENTAL SURGERY      MI BX/REMV,LYMPH NODE,DEEP AXILL Left 1/15/2019    Procedure: LEFT AXILLARY LYMPH NODE BIOPSY;  Surgeon: Denis Quan MD;  [facility]: [facility];  Service: General    MI LAMNOTMY INCL W/DCMPRSN NRV ROOT 1 INTRSPC LUMBR Right 4/14/2021    Procedure: L5-S1 minimally invasive microdiskectomy and  epidural steroid injection;  Surgeon: Genia Taylor MD;  [facility];  Service: Neurosurgery    TUBAL LIGATION         Family History   Problem Relation Age of Onset    Anxiety disorder Mother     Bipolar disorder Mother     Depression Mother     Tremor Mother     Anxiety disorder Father     Bipolar disorder Father     Depression Father     Schizophrenia Father     Parkinsonism Maternal Grandmother     Stomach cancer Paternal Grandfather      I have reviewed and agree with the history as documented      E-Cigarette/Vaping    E-Cigarette Use Never User      E-Cigarette/Vaping Substances    Nicotine No     THC No     CBD No     Flavoring No     Other No     Unknown No      Social History     Tobacco Use    Smoking status: Never Smoker    Smokeless tobacco: Never Used   Vaping Use    Vaping Use: Never used Substance Use Topics    Alcohol use: No    Drug use: No       Review of Systems   Constitutional: Positive for activity change, appetite change, chills, fatigue and fever  HENT: Positive for congestion  Negative for ear pain and sore throat  Eyes: Negative for pain and visual disturbance  Respiratory: Positive for cough, shortness of breath and wheezing  Negative for choking and chest tightness  Cardiovascular: Negative for chest pain, palpitations and leg swelling  Gastrointestinal: Negative for abdominal pain, diarrhea and vomiting  Genitourinary: Negative for dysuria and hematuria  Musculoskeletal: Negative for arthralgias and back pain  Skin: Negative for color change and rash  Neurological: Positive for light-headedness  Negative for dizziness, seizures and syncope  All other systems reviewed and are negative  Physical Exam  Physical Exam  Vitals and nursing note reviewed  Constitutional:       General: She is not in acute distress  Appearance: She is well-developed  HENT:      Head: Normocephalic and atraumatic  Eyes:      Conjunctiva/sclera: Conjunctivae normal    Cardiovascular:      Rate and Rhythm: Normal rate and regular rhythm  Heart sounds: Normal heart sounds  No murmur heard  Pulmonary:      Effort: Pulmonary effort is normal  No respiratory distress  Breath sounds: Normal breath sounds  Comments: Lungs clear with no prolonged exp phase; No retractions  Abdominal:      Palpations: Abdomen is soft  Tenderness: There is no abdominal tenderness  Musculoskeletal:         General: Normal range of motion  Cervical back: Neck supple  Skin:     General: Skin is warm and dry  Capillary Refill: Capillary refill takes less than 2 seconds  Neurological:      General: No focal deficit present  Mental Status: She is alert and oriented to person, place, and time     Psychiatric:         Mood and Affect: Mood normal  Behavior: Behavior normal          Thought Content: Thought content normal          Judgment: Judgment normal          Vital Signs  ED Triage Vitals [02/28/22 0009]   Temperature Pulse Respirations BP SpO2   99 5 °F (37 5 °C) 100 18 -- 95 %      Temp Source Heart Rate Source Patient Position - Orthostatic VS BP Location FiO2 (%)   Tympanic Monitor -- -- --      Pain Score       5           Vitals:    02/28/22 0009   Pulse: 100         Visual Acuity      ED Medications  Medications   predniSONE tablet 60 mg (60 mg Oral Given 2/28/22 0056)       Diagnostic Studies  Results Reviewed     Procedure Component Value Units Date/Time    COVID/FLU/RSV [[id number]] Collected: 02/28/22 0035    Lab Status: In process Specimen: Nares from Nose Updated: 02/28/22 0038    POCT pregnancy, urine [[id number]]  (Normal) Resulted: 02/28/22 0031    Lab Status: Final result Updated: 02/28/22 0031     EXT PREG TEST UR (Ref: Negative) negative     Control valid                 XR chest 1 view portable    (Results Pending)              Procedures  Procedures         ED Course               cxr no infiltrate;                 SBIRT 22yo+      Most Recent Value   SBIRT (24 yo +)    In order to provide better care to our patients, we are screening all of our patients for alcohol and drug use  Would it be okay to ask you these screening questions? Yes Filed at: 02/28/2022 0011   Initial Alcohol Screen: US AUDIT-C     1  How often do you have a drink containing alcohol? 0 Filed at: 02/28/2022 0011   2  How many drinks containing alcohol do you have on a typical day you are drinking? 0 Filed at: 02/28/2022 0011   3a  Male UNDER 65: How often do you have five or more drinks on one occasion? 0 Filed at: 02/28/2022 0011   3b  FEMALE Any Age, or MALE 65+: How often do you have 4 or more drinks on one occassion? 0 Filed at: 02/28/2022 0011   Audit-C Score 0 Filed at: 02/28/2022 0011   YOAN: How many times in the past year have you        Used an illegal drug or used a prescription medication for non-medical reasons? Never Filed at: 02/28/2022 0011                    MDM  Well appearing, no wheezing , no intubation hx; flu like syptotoms; flu covid pending;'     Disposition  Final diagnoses:   Cough   Viral URI with cough   Viral illness     Time reflects when diagnosis was documented in both MDM as applicable and the Disposition within this note     Time User Action Codes Description Comment    2/28/2022 12:41 AM Joseph Esquivel Add [R05 9] Cough     2/28/2022 12:41 AM Joseph Esquivel Add [J06 9] Viral URI with cough     2/28/2022 12:42 AM Joseph Esquivel Modify [R05 9] Cough     2/28/2022 12:42 AM Joseph Esquivel Modify [J06 9] Viral URI with cough     2/28/2022 12:42 AM Joseph Esquivel Add [B34 9] Viral illness       ED Disposition     None      Follow-up Information     Follow up With Specialties Details Why Contact Info    Randa Rodgers PA-C Physician Assistant, Family Medicine   [place] [id number]  [address] [facility]  [address] [place] [address]  [phone]            Patient's Medications   Discharge Prescriptions    PREDNISONE 20 MG TABLET      [facility] 60 mg (3 tablets) in am for 5 days then 40 mg (2 tablets) in am for next 5 days, or as directed by your doctor;       Start Date: 2/28/2022 End Date: --       Order Dose: --       Quantity: 25 tablet    Refills: 1       No discharge procedures on file      PDMP Review     None          ED Provider  Electronically Signed by           Shelly Delaney MD  02/28/22 7330

## 2022-03-02 ENCOUNTER — TELEPHONE (OUTPATIENT)
Dept: PSYCHIATRY | Facility: CLINIC | Age: 37
End: 2022-03-02

## 2022-03-02 NOTE — TELEPHONE ENCOUNTER
I spoke to patient to offer appt with [place] and the times availible will not work  pt will remain on wait list at this time

## 2022-03-14 DIAGNOSIS — F32.A ANXIETY AND DEPRESSION: ICD-10-CM

## 2022-03-14 DIAGNOSIS — F41.9 ANXIETY AND DEPRESSION: ICD-10-CM

## 2022-03-14 DIAGNOSIS — F43.10 PTSD (POST-TRAUMATIC STRESS DISORDER): ICD-10-CM

## 2022-03-14 RX ORDER — QUETIAPINE FUMARATE 50 MG/1
TABLET, FILM COATED ORAL
Qty: 30 TABLET | Refills: 0 | Status: SHIPPED | OUTPATIENT
Start: 2022-03-14

## 2022-03-28 ENCOUNTER — OFFICE VISIT (OUTPATIENT)
Dept: PAIN MEDICINE | Facility: CLINIC | Age: 37
End: 2022-03-28
Payer: COMMERCIAL

## 2022-03-28 VITALS
WEIGHT: 217 LBS | BODY MASS INDEX: 39.93 KG/M2 | SYSTOLIC BLOOD PRESSURE: 139 MMHG | DIASTOLIC BLOOD PRESSURE: 93 MMHG | HEIGHT: 62 IN | HEART RATE: 98 BPM

## 2022-03-28 DIAGNOSIS — G89.29 CHRONIC BILATERAL LOW BACK PAIN WITHOUT SCIATICA: ICD-10-CM

## 2022-03-28 DIAGNOSIS — M54.50 CHRONIC BILATERAL LOW BACK PAIN WITHOUT SCIATICA: ICD-10-CM

## 2022-03-28 DIAGNOSIS — Z98.890 HISTORY OF LUMBAR SURGERY: ICD-10-CM

## 2022-03-28 DIAGNOSIS — G89.4 CHRONIC PAIN SYNDROME: Primary | ICD-10-CM

## 2022-03-28 DIAGNOSIS — M46.1 SACROILIITIS (HCC): ICD-10-CM

## 2022-03-28 PROCEDURE — 99214 OFFICE O/P EST MOD 30 MIN: CPT | Performed by: NURSE PRACTITIONER

## 2022-03-28 RX ORDER — METHOCARBAMOL 500 MG/1
500 TABLET, FILM COATED ORAL 3 TIMES DAILY
Qty: 90 TABLET | Refills: 1 | Status: SHIPPED | OUTPATIENT
Start: 2022-03-28

## 2022-03-28 NOTE — PATIENT INSTRUCTIONS
Diclofenac (By mouth)   Diclofenac (dye-KLOE-fen-ak)  Treats pain  Also treats migraines  This medicine is an NSAID  Brand Name(s): Cambia, Francesco, Jie Almanzar, Zorvolex   There may be other brand names for this medicine  When This Medicine Should Not Be Used: This medicine is not right for everyone  Do not use it if you had an allergic reaction to diclofenac, aspirin, another NSAID, or bovine protein  Do not use it right before or after a heart surgery called coronary artery bypass graft (CABG)  How to Use This Medicine:   Capsule, Liquid, Tablet, Coated Tablet, Long Acting Tablet  · Your doctor will tell you how much medicine to use  Do not use more than directed  · This medicine should come with a Medication Guide  Ask your pharmacist for a copy if you do not have one  · Oral solution: Mix the packet contents with 1 to 2 ounces (30 to 60 mL) of water  Do not use any liquid other than water for mixing the medicine  Mix well and drink it immediately on an empty stomach  · Missed dose: Take a dose as soon as you remember  If it is almost time for your next dose, wait until then and take a regular dose  Do not take extra medicine to make up for a missed dose  · Store the medicine in a closed container at room temperature, away from heat, moisture, and direct light     Drugs and Foods to Avoid:   Ask your doctor or pharmacist before using any other medicine, including over-the-counter medicines, vitamins, and herbal products  · Do not use any other NSAID (including aspirin, diflunisal, ibuprofen, naproxen, or salsalate) unless your doctor says it is okay  · Some medicines and foods can affect how diclofenac works  Tell your doctor if you are also using any of the following:  ? Cyclosporine, digoxin, lithium, methotrexate, pemetrexed, rifampin, voriconazole  ? Blood pressure medicine (including ACE inhibitors, ARBs, beta blockers)  ? Blood thinner (including warfarin)  ?  Diuretic (water pill)  ? Medicine to treat depression (including SNRIs, SSRIs)  ? Steroid medicine  · Do not drink alcohol while you are using this medicine  Warnings While Using This Medicine:   · Tell your doctor if you are pregnant or breastfeeding  Do not use this medicine during the later part of a pregnancy, unless your doctor tells you to  · Tell your doctor if you have kidney disease, liver disease, asthma, heart failure, high blood pressure, or heart or blood vessel problems, or a history of stomach ulcers or bleeding problems  Tell your doctor if you have phenylketonuria (PKU)  Also tell your doctor if you drink alcohol  · This medicine may cause the following problems:  ? Increased risk of blood clots, heart attack, stroke, or heart failure  ? Bleeding problems, including stomach or bowel bleeding or ulcer  ? Liver problems  ? High blood pressure  ? Kidney problems  ? Serious skin reactions, including Cunningham-Patricio syndrome, exfoliative dermatitis, toxic epidermal necrolysis, and drug reaction with eosinophilia and systemic symptoms (DRESS)  · This medicine may cause a delay in ovulation for women and may affect their ability to have children  If you plan to have children, talk with your doctor before using this medicine  · Tell any doctor or dentist who treats you that you are using this medicine  · Your headaches may become worse if you use a headache medicine for 10 or more days per month  Write down how often your headaches occur and how often you use this medicine  · Your doctor will do lab tests at regular visits to check on the effects of this medicine  Keep all appointments  · Keep all medicine out of the reach of children  Never share your medicine with anyone    Possible Side Effects While Using This Medicine:   Call your doctor right away if you notice any of these side effects:  · Allergic reaction: Itching or hives, swelling in your face or hands, swelling or tingling in your mouth or throat, chest tightness, trouble breathing  · Blistering, peeling, or red skin rash  · Bloody or black, tarry stools, severe stomach pain, vomiting blood or material that looks like coffee grounds  · Change in how much or how often you urinate  · Chest pain that may spread to your arms, jaw, back, or neck, trouble breathing, unusual sweating, faintness  · Confusion, uneven heartbeat, trouble breathing, numbness or tingling in your hands, feet, or lips  · Dark urine or pale stools, nausea, vomiting, loss of appetite, stomach pain, yellow skin or eyes  · Numbness or weakness in your arm or leg, or on one side of your body, pain in your lower leg, sudden or severe headache, or problems with vision, speech, or walking  · Rapid weight gain, swelling in your hands, ankles, or feet  · Unusual bleeding, bruising, or weakness  If you notice these less serious side effects, talk with your doctor:   · Constipation, diarrhea, stomach upset, passing gas  · Mild headache, dizziness, drowsiness  · Runny or stuffy nose, sneezing  If you notice other side effects that you think are caused by this medicine, tell your doctor  Call your doctor for medical advice about side effects  You may report side effects to FDA at 6-224-FDA-0494    © Copyright [employer] 2022 Information is for End User's use only and may not be sold, redistributed or otherwise used for commercial purposes  The above information is an  only  It is not intended as medical advice for individual conditions or treatments  Talk to your doctor, nurse or pharmacist before following any medical regimen to see if it is safe and effective for you  Methocarbamol (By mouth)   Methocarbamol (meth-oh-TANA-ba-mol)  Treats muscle pain and spasms  Brand Name(s): Robaxin   There may be other brand names for this medicine  When This Medicine Should Not Be Used: This medicine is not right for everyone   Do not use it if you had an allergic reaction to methocarbamol  How to Use This Medicine:   Tablet  · Take your medicine as directed  Your dose may need to be changed several times to find what works best for you  · Missed dose: Take a dose as soon as you remember  If it is almost time for your next dose, wait until then and take a regular dose  Do not take extra medicine to make up for a missed dose  · Store the medicine in a closed container at room temperature, away from heat, moisture, and direct light  Drugs and Foods to Avoid:   Ask your doctor or pharmacist before using any other medicine, including over-the-counter medicines, vitamins, and herbal products  · Some medicines can affect how methocarbamol works  Tell your doctor if you are using pyridostigmine  · Do not drink alcohol while you are using this medicine  · Tell your doctor if you use anything else that makes you sleepy  Some examples are allergy medicine, narcotic pain medicine, and alcohol  Warnings While Using This Medicine:   · Tell your doctor if you are pregnant or breastfeeding, or if you have kidney disease, liver disease, or myasthenia gravis  · This medicine may make you dizzy or drowsy  Do not drive or do anything that could be dangerous until you know how this medicine affects you  · Tell any doctor or dentist who treats you that you are using this medicine  This medicine may affect certain medical test results  · Your doctor will check your progress and the effects of this medicine at regular visits  Keep all appointments  · Keep all medicine out of the reach of children  Never share your medicine with anyone    Possible Side Effects While Using This Medicine:   Call your doctor right away if you notice any of these side effects:  · Allergic reaction: Itching or hives, swelling in your face or hands, swelling or tingling in your mouth or throat, chest tightness, trouble breathing  · Blurred vision, redness, pain, or swelling of the eyes  · Dark urine or pale stools, nausea, vomiting, loss of appetite, stomach pain, yellow skin or eyes  · Fever  · Lightheadedness, dizziness, fainting  · Seizures  · Slow heartbeat  · Unusual bleeding, bruising, or weakness  If you notice these less serious side effects, talk with your doctor:   · Confusion, trouble sleeping  · Headache  · Warmth or redness in your face, neck, arms, or upper chest  If you notice other side effects that you think are caused by this medicine, tell your doctor  Call your doctor for medical advice about side effects  You may report side effects to FDA at 2-991-FDA-9983    © Copyright [employer] 2022 Information is for End User's use only and may not be sold, redistributed or otherwise used for commercial purposes  The above information is an  only  It is not intended as medical advice for individual conditions or treatments  Talk to your doctor, nurse or pharmacist before following any medical regimen to see if it is safe and effective for you

## 2022-03-28 NOTE — PROGRESS NOTES
Assessment:  1  Chronic pain syndrome    2  Chronic bilateral low back pain without sciatica    3  Sacroiliitis ([address] )    4  History of lumbar surgery        Plan:  While the patient was in the office today, I did have a thorough conversation regarding their chronic pain syndrome, medication management, and treatment plan options  Patient is being seen for follow-up visit  She underwent L5-S1 microdiskectomy on 04/14/2021  Overall, she reports that her leg symptoms have resolved  Her low back pain improved, but has been exacerbated for the last 2 weeks  There was no inciting cause  She has physical exam findings consistent with sacroiliitis, right side worse than left side  Will start physical therapy for lumbar core strengthening/stretching  Consider SI joint injections if pain persists  Start diclofenac 50 mg twice daily to address the inflammatory component of her pain  A prescription was sent to her pharmacy  Start Robaxin 500 mg 3 times daily if needed for spasms  A prescription was sent to her pharmacy  The patient will follow-up in 6 weeks for medication prescription refill and reevaluation  The patient was advised to contact the office should their symptoms worsen in the interim  The patient was agreeable and verbalized an understanding  History of Present Illness: The patient is a 39 y o  female who presents for a follow up office visit in regards to Back Pain  The patients current symptoms include complaints of low back pain, nonradiating  Current pain level is an 8/10  Quality pain is described as sharp, pressure-like  Current pain medications includes:  None   I have personally reviewed and/or updated the patient's past medical history, past surgical history, family history, social history, current medications, allergies, and vital signs today  Review of Systems  Review of Systems   Constitutional: Negative for chills and fever     HENT: Negative for ear pain and sore throat  Eyes: Negative for pain and visual disturbance  Respiratory: Negative for cough and shortness of breath  Cardiovascular: Negative for chest pain and palpitations  Gastrointestinal: Negative for abdominal pain and vomiting  Genitourinary: Negative for dysuria and hematuria  Musculoskeletal: Positive for back pain and gait problem  Negative for arthralgias  Decreased of motion     Skin: Negative for color change and rash  Neurological: Negative for seizures and syncope  All other systems reviewed and are negative          Past Medical History:   Diagnosis Date    Anxiety     Asthma     Cat-scratch disease     last assessed 10/22/15    Depression     Hypertension     last assessed 11/11/14    Sciatica     Sleep disorder        Past Surgical History:   Procedure Laterality Date    BREAST BIOPSY Left     [facility]   [facility]      AL BX/REMV,LYMPH NODE,DEEP AXILL Left 1/15/2019    Procedure: LEFT AXILLARY LYMPH NODE BIOPSY;  Surgeon: Denis Quan MD;  [facility];  Service: General    AL LAMNOTMY INCL W/DCMPRSN NRV ROOT 1 INTRSPC LUMBR Right 4/14/2021    Procedure: L5-S1 minimally invasive microdiskectomy and  epidural steroid injection;  Surgeon: Genia Taylor MD;  [facility];  Service: Neurosurgery    TUBAL LIGATION         Family History   Problem Relation Age of Onset    Anxiety disorder Mother     Bipolar disorder Mother     Depression Mother     Tremor Mother     Anxiety disorder Father     Bipolar disorder Father     Depression Father     Schizophrenia Father     Parkinsonism Maternal Grandmother     Stomach cancer Paternal Grandfather        Social History     Occupational History    Not on file   Tobacco Use    Smoking status: Never Smoker    Smokeless tobacco: Never Used   Vaping Use    Vaping Use: Never used   Substance and Sexual Activity    Alcohol use: No    Drug use: No    Sexual activity: Yes     Partners: Male Birth control/protection: None         Current Outpatient Medications:     albuterol (ProAir HFA) 90 mcg/act inhaler, Inhale 2 puffs every 4 (four) hours as needed for wheezing or shortness of breath, Disp: 18 g, Rfl: 5    ergocalciferol (VITAMIN D2) 50,000 units, Take 1 capsule (50,000 Units total) by mouth once a week, Disp: 12 capsule, Rfl: 1    FLUoxetine (PROzac) 20 mg capsule, Take 1 capsule (20 mg total) by mouth daily, Disp: 30 capsule, Rfl: 0    fluticasone-salmeterol (Wixela Inhub) 250-50 mcg/dose inhaler, Inhale 1 puff 2 (two) times a day Rinse mouth after use , Disp: 60 blister, Rfl: 5    hydrochlorothiazide (HYDRODIURIL) 12 5 mg tablet, Take 1 tablet (12 5 mg total) by mouth daily, Disp: 90 tablet, Rfl: 1    hydrOXYzine HCL (ATARAX) 25 mg tablet, Take 1 tablet (25 mg total) by mouth every 6 (six) hours as needed for anxiety, Disp: 60 tablet, Rfl: 0    lisinopril (ZESTRIL) 10 mg tablet, take 1 tablet by mouth twice a day, Disp: 90 tablet, Rfl: 1    ondansetron (ZOFRAN) 4 mg tablet, Take 1 tablet (4 mg total) by mouth every 8 (eight) hours as needed for nausea, Disp: 20 tablet, Rfl: 0    QUEtiapine (SEROquel) 50 mg tablet, take 1 tablet by mouth at bedtime, Disp: 30 tablet, Rfl: 0    diclofenac sodium (VOLTAREN) 50 mg EC tablet, Take 1 tablet (50 mg total) by mouth 2 (two) times a day With food, Disp: 60 tablet, Rfl: 1    escitalopram (Lexapro) 20 mg tablet, Take 0 5 tablets (10 mg total) by mouth daily, Disp: 30 tablet, Rfl: 5    meclizine (ANTIVERT) 25 mg tablet, Take 1 tablet (25 mg total) by mouth 3 (three) times a day as needed for dizziness, Disp: 30 tablet, Rfl: 0    methocarbamol (ROBAXIN) 500 mg tablet, Take 1 tablet (500 mg total) by mouth 3 (three) times a day As needed for spasms, Disp: 90 tablet, Rfl: 1    ondansetron (Zofran ODT) 4 mg disintegrating tablet, Take 1 tablet (4 mg total) by mouth every 6 (six) hours as needed for nausea or vomiting (Patient not taking: Reported on 12/3/2021 ), Disp: 20 tablet, Rfl: 0    ondansetron (ZOFRAN-ODT) 4 mg disintegrating tablet, Take 1 tablet (4 mg total) by mouth every 8 (eight) hours as needed for nausea or vomiting for up to 20 doses, Disp: 20 tablet, Rfl: 0    predniSONE 20 mg tablet,   60 mg (3 tablets) in am for 5 days then 40 mg (2 tablets) in am for next 5 days, or as directed by your doctor;, Disp: 25 tablet, Rfl: 1    Allergies   Allergen Reactions    Bactrim [Sulfamethoxazole-Trimethoprim] Shortness Of Breath     Near syncope    Codeine Hives     Tolerated Percocet, Vicodin, etc        Physical Exam:    /93   Pulse 98   Ht 5' 2" (1 575 m)   Wt 98 4 kg (217 lb)   BMI 39 69 kg/m²     Constitutional:normal, well developed, well nourished, alert, in no distress and non-toxic and no overt pain behavior  Eyes:anicteric  HEENT:grossly intact  Neck:supple, symmetric, trachea midline and no masses   Pulmonary:even and unlabored  Cardiovascular:No edema or pitting edema present  Skin:Normal without rashes or lesions and well hydrated  Psychiatric:Mood and affect appropriate  Neurologic:Cranial Nerves II-XII grossly intact  Musculoskeletal:Range of motion of the lumbar spine is limited in all planes  there is tenderness over bilateral sacroiliac joints, right worse than left  Hector's maneuver and compression test are positive bilaterally      Imaging  No orders to display       Orders Placed This Encounter   Procedures    Ambulatory Referral to Physical Therapy

## 2022-04-24 DIAGNOSIS — E55.9 VITAMIN D DEFICIENCY: ICD-10-CM

## 2022-04-27 RX ORDER — ERGOCALCIFEROL 1.25 MG/1
CAPSULE ORAL
Qty: 12 CAPSULE | Refills: 1 | Status: SHIPPED | OUTPATIENT
Start: 2022-04-27

## 2022-05-08 DIAGNOSIS — I10 ESSENTIAL HYPERTENSION: ICD-10-CM

## 2022-05-09 ENCOUNTER — TELEMEDICINE (OUTPATIENT)
Dept: FAMILY MEDICINE CLINIC | Facility: HOME HEALTHCARE | Age: 37
End: 2022-05-09
Payer: COMMERCIAL

## 2022-05-09 DIAGNOSIS — J45.41 MODERATE PERSISTENT ASTHMA WITH ACUTE EXACERBATION: ICD-10-CM

## 2022-05-09 DIAGNOSIS — B34.9 VIRAL INFECTION, UNSPECIFIED: Primary | ICD-10-CM

## 2022-05-09 PROCEDURE — G0071 COMM SVCS BY RHC/FQHC 5 MIN: HCPCS | Performed by: FAMILY MEDICINE

## 2022-05-09 PROCEDURE — 87636 SARSCOV2 & INF A&B AMP PRB: CPT | Performed by: PHYSICIAN ASSISTANT

## 2022-05-09 RX ORDER — PREDNISONE 20 MG/1
40 TABLET ORAL DAILY
Qty: 10 TABLET | Refills: 0 | Status: SHIPPED | OUTPATIENT
Start: 2022-05-09 | End: 2022-05-14

## 2022-05-09 RX ORDER — LISINOPRIL 10 MG/1
TABLET ORAL
Qty: 90 TABLET | Refills: 1 | Status: SHIPPED | OUTPATIENT
Start: 2022-05-09

## 2022-05-09 NOTE — PROGRESS NOTES
COVID-19 Outpatient Progress Note    Assessment/Plan:    Problem List Items Addressed This Visit        Respiratory    Asthma    Relevant Medications    predniSONE 20 mg tablet      Other Visit Diagnoses     Viral infection, unspecified    -  Primary    Relevant Orders    Covid/Flu- [facility]         Disposition:     Referred patient to centralized site to test for COVID-19/Influenza  With asthma history, will treat with prednisone 40 mg x 5 days  Continue albuterol q4-6 hours PRN  COVID/Flu testing ordered  Will follow up with results  I have spent 10 minutes directly with the patient  Encounter provider Shan Shea PA-C    Provider located at [address] [place] [address]  [phone]    Recent Visits  No visits were found meeting these conditions  Showing recent visits within past 7 days and meeting all other requirements  Today's Visits  Date Type Provider Dept   05/09/22 [facility] today's visits and meeting all other requirements  Future Appointments  No visits were found meeting these conditions  Showing future appointments within next 150 days and meeting all other requirements     This virtual check-in was done via telephone and she agrees to proceed  Patient agrees to participate in a virtual check in via telephone or video visit instead of presenting to the office to address urgent/immediate medical needs  Patient is aware this is a billable service  After connecting through Telephone, the patient was identified by name and date of birth  Marilyn Carrillo was informed that this was a telemedicine visit and that the exam was being conducted confidentially over secure lines  My office door was closed  No one else was in the room  Marilyn Carrillo acknowledged consent and understanding of privacy and security of the telemedicine visit   I informed the patient that I have reviewed her record in Epic and presented the opportunity for her to ask any questions regarding the visit today  The patient agreed to participate  It was my intent to perform this visit via video technology but the patient was not able to do a video connection so the visit was completed via audio telephone only  Verification of patient location:  Patient is located in the following state in which I hold an active license: [place]    Subjective:   Anabella Thorne is a 39 y o  female who is concerned about COVID-19  Patient's symptoms include fatigue, nasal congestion, rhinorrhea, sore throat, cough, shortness of breath and headache  Patient denies fever, chills, anosmia, loss of taste, chest tightness, abdominal pain, nausea, vomiting, diarrhea and myalgias  COVID-19 vaccination status: Not vaccinated    Exposure:   Contact with a person who is under investigation (PUI) for or who is positive for COVID-19 within the last 14 days?: No    Hospitalized recently for fever and/or lower respiratory symptoms?: No      Currently a healthcare worker that is involved in direct patient care?: No      Works in a special setting where the risk of COVID-19 transmission may be high? (this may include long-term care, correctional and senior living facilities; homeless shelters; assisted-living facilities and group homes ): No      Resident in a special setting where the risk of COVID-19 transmission may be high? (this may include long-term care, correctional and senior living facilities; homeless shelters; assisted-living facilities and group homes ): No      Patient reports the above symptoms x2 weeks but feels that they got worse yesterday  Has a history of asthma and has been using her albuterol without relief  Has tried otc allergy and cold and flu medications without relief  Denies COVID contacts  Has not been vaccinated for COVID        Lab Results   Component Value Date    SARSCOV2 Negative 02/28/2022    SARSCOV2 Not Detected 05/18/2020     Past Medical History:   Diagnosis Date    Anxiety     Asthma     Cat-scratch disease     last assessed 10/22/15    Depression     Hypertension     last assessed 11/11/14    Sciatica     Sleep disorder      Past Surgical History:   Procedure Laterality Date    BREAST BIOPSY Left     2016   [facility]      UT BX/REMV,LYMPH NODE,DEEP AXILL Left 1/15/2019    Procedure: LEFT AXILLARY LYMPH NODE BIOPSY;  Surgeon: Bertrand Wang MD;  [facility];  Service: General    UT LAMNOTMY INCL W/DCMPRSN NRV ROOT 1 INTRSPC LUMBR Right 4/14/2021    Procedure: L5-S1 minimally invasive microdiskectomy and  epidural steroid injection;  Surgeon: Randy Ganser, MD;  [facility];  Service: Neurosurgery    TUBAL LIGATION       Current Outpatient Medications   Medication Sig Dispense Refill    albuterol (ProAir HFA) 90 mcg/act inhaler Inhale 2 puffs every 4 (four) hours as needed for wheezing or shortness of breath 18 g 5    diclofenac sodium (VOLTAREN) 50 mg EC tablet Take 1 tablet (50 mg total) by mouth 2 (two) times a day With food 60 tablet 1    ergocalciferol (VITAMIN D2) 50,000 units take 1 capsule by mouth every week 12 capsule 1    FLUoxetine (PROzac) 20 mg capsule Take 1 capsule (20 mg total) by mouth daily 30 capsule 0    fluticasone-salmeterol (Wixela Inhub) 250-50 mcg/dose inhaler Inhale 1 puff 2 (two) times a day Rinse mouth after use   60 blister 5    hydrochlorothiazide (HYDRODIURIL) 12 5 mg tablet Take 1 tablet (12 5 mg total) by mouth daily 90 tablet 1    hydrOXYzine HCL (ATARAX) 25 mg tablet Take 1 tablet (25 mg total) by mouth every 6 (six) hours as needed for anxiety 60 tablet 0    lisinopril (ZESTRIL) 10 mg tablet take 1 tablet by mouth twice a day 90 tablet 1    methocarbamol (ROBAXIN) 500 mg tablet Take 1 tablet (500 mg total) by mouth 3 (three) times a day As needed for spasms 90 tablet 1    ondansetron (ZOFRAN) 4 mg tablet Take 1 tablet (4 mg total) by mouth every 8 (eight) hours as needed for nausea 20 tablet 0    QUEtiapine (SEROquel) 50 mg tablet take 1 tablet by mouth at bedtime 30 tablet 0    escitalopram (Lexapro) 20 mg tablet Take 0 5 tablets (10 mg total) by mouth daily 30 tablet 5    meclizine (ANTIVERT) 25 mg tablet Take 1 tablet (25 mg total) by mouth 3 (three) times a day as needed for dizziness 30 tablet 0    predniSONE 20 mg tablet Take 2 tablets (40 mg total) by mouth daily for 5 days 10 tablet 0     No current facility-administered medications for this visit  Allergies   Allergen Reactions    Bactrim [Sulfamethoxazole-Trimethoprim] Shortness Of Breath     Near syncope    Codeine Hives     Tolerated Percocet, Vicodin, etc        Review of Systems   Constitutional: Positive for fatigue  Negative for chills and fever  HENT: Positive for congestion, ear pain, rhinorrhea and sore throat  Respiratory: Positive for cough, shortness of breath and wheezing  Negative for chest tightness  Gastrointestinal: Negative for abdominal pain, diarrhea, nausea and vomiting  Musculoskeletal: Negative for myalgias  Neurological: Positive for headaches  VIRTUAL VISIT [address] verbally agrees to participate in [employer]  Pt is aware that [employer] could be limited without vital signs or the ability to perform a full hands-on physical [address] understands she or the provider may request at any time to terminate the video visit and request the patient to seek care or treatment in person

## 2022-05-11 DIAGNOSIS — I10 ESSENTIAL HYPERTENSION: ICD-10-CM

## 2022-05-11 RX ORDER — HYDROCHLOROTHIAZIDE 12.5 MG/1
TABLET ORAL
Qty: 90 TABLET | Refills: 1 | Status: SHIPPED | OUTPATIENT
Start: 2022-05-11

## 2022-05-18 ENCOUNTER — OFFICE VISIT (OUTPATIENT)
Dept: URGENT CARE | Facility: MEDICAL CENTER | Age: 37
End: 2022-05-18
Payer: COMMERCIAL

## 2022-05-18 ENCOUNTER — APPOINTMENT (OUTPATIENT)
Dept: RADIOLOGY | Facility: MEDICAL CENTER | Age: 37
End: 2022-05-18
Payer: COMMERCIAL

## 2022-05-18 VITALS
BODY MASS INDEX: 39.2 KG/M2 | OXYGEN SATURATION: 99 % | DIASTOLIC BLOOD PRESSURE: 74 MMHG | WEIGHT: 213 LBS | SYSTOLIC BLOOD PRESSURE: 129 MMHG | HEIGHT: 62 IN | HEART RATE: 92 BPM | TEMPERATURE: 98.6 F | RESPIRATION RATE: 16 BRPM

## 2022-05-18 DIAGNOSIS — M25.551 RIGHT HIP PAIN: ICD-10-CM

## 2022-05-18 DIAGNOSIS — S76.011A HIP STRAIN, RIGHT, INITIAL ENCOUNTER: Primary | ICD-10-CM

## 2022-05-18 PROCEDURE — 73502 X-RAY EXAM HIP UNI 2-3 VIEWS: CPT

## 2022-05-18 PROCEDURE — 99212 OFFICE O/P EST SF 10 MIN: CPT | Performed by: PHYSICIAN ASSISTANT

## 2022-05-18 RX ORDER — METHOCARBAMOL 750 MG/1
750 TABLET, FILM COATED ORAL EVERY 6 HOURS PRN
Qty: 20 TABLET | Refills: 0 | Status: SHIPPED | OUTPATIENT
Start: 2022-05-18

## 2022-05-18 NOTE — PROGRESS NOTES
[facility]        NAME: Marilyn Carrillo is a 39 y o  female  : 1985    MRN: [id number]  DATE: May 18, 2022  TIME: 6:22 PM    Assessment and Plan   Right hip pain [M25 551]  1  Right hip pain  XR hip/pelv 2-3 vws right if performed         Patient Instructions       Follow up with PCP in 3-5 days  Proceed to  ER if symptoms worsen  Chief Complaint     Chief Complaint   Patient presents with    Hip Pain     Right hip pain that started yesterday, no injury noted         History of Present Illness       Patient presents with right hip pain which started yesterday  No known injury  She states the pain is worse with movement of the hip  Review of Systems   Review of Systems   Constitutional: Negative for chills and fever  Musculoskeletal: Positive for arthralgias (Right hip)  Skin: Negative for rash           Current Medications       Current Outpatient Medications:     albuterol (ProAir HFA) 90 mcg/act inhaler, Inhale 2 puffs every 4 (four) hours as needed for wheezing or shortness of breath, Disp: 18 g, Rfl: 5    diclofenac sodium (VOLTAREN) 50 mg EC tablet, Take 1 tablet (50 mg total) by mouth 2 (two) times a day With food, Disp: 60 tablet, Rfl: 1    ergocalciferol (VITAMIN D2) 50,000 units, take 1 capsule by mouth every week, Disp: 12 capsule, Rfl: 1    FLUoxetine (PROzac) 20 mg capsule, Take 1 capsule (20 mg total) by mouth daily, Disp: 30 capsule, Rfl: 0    hydrochlorothiazide (HYDRODIURIL) 12 5 mg tablet, take 1 tablet by mouth once daily, Disp: 90 tablet, Rfl: 1    lisinopril (ZESTRIL) 10 mg tablet, take 1 tablet by mouth twice a day, Disp: 90 tablet, Rfl: 1    methocarbamol (ROBAXIN) 500 mg tablet, Take 1 tablet (500 mg total) by mouth 3 (three) times a day As needed for spasms, Disp: 90 tablet, Rfl: 1    ondansetron (ZOFRAN) 4 mg tablet, Take 1 tablet (4 mg total) by mouth every 8 (eight) hours as needed for nausea, Disp: 20 tablet, Rfl: 0    escitalopram (Lexapro) 20 mg tablet, Take 0 5 tablets (10 mg total) by mouth daily, Disp: 30 tablet, Rfl: 5    fluticasone-salmeterol (Wixela Inhub) 250-50 mcg/dose inhaler, Inhale 1 puff 2 (two) times a day Rinse mouth after use , Disp: 60 blister, Rfl: 5    hydrOXYzine HCL (ATARAX) 25 mg tablet, Take 1 tablet (25 mg total) by mouth every 6 (six) hours as needed for anxiety (Patient not taking: Reported on 5/18/2022), Disp: 60 tablet, Rfl: 0    meclizine (ANTIVERT) 25 mg tablet, Take 1 tablet (25 mg total) by mouth 3 (three) times a day as needed for dizziness, Disp: 30 tablet, Rfl: 0    QUEtiapine (SEROquel) 50 mg tablet, take 1 tablet by mouth at bedtime (Patient not taking: Reported on 5/18/2022), Disp: 30 tablet, Rfl: 0    Current Allergies     Allergies as of 05/18/2022 - Reviewed 05/18/2022   Allergen Reaction Noted    Bactrim [sulfamethoxazole-trimethoprim] Shortness Of Breath 06/18/2019    Codeine Hives 06/01/2016            The following portions of the patient's history were reviewed and updated as appropriate: allergies, current medications, past family history, past medical history, past social history, past surgical history and problem list      Past Medical History:   Diagnosis Date    Anxiety     Asthma     Cat-scratch disease     last assessed 10/22/15    Depression     Hypertension     last assessed 11/11/14    Sciatica     Sleep disorder        Past Surgical History:   Procedure Laterality Date    BREAST BIOPSY Left     2016   [facility]      NV BX/REMV,LYMPH NODE,DEEP AXILL Left 1/15/2019    Procedure: LEFT AXILLARY LYMPH NODE BIOPSY;  Surgeon: Titus Vicente MD;  [facility];  Service: General    NV LAMNOTMY INCL W/DCMPRSN NRV ROOT 1 INTRSPC LUMBR Right 4/14/2021    Procedure: L5-S1 minimally invasive microdiskectomy and  epidural steroid injection;  Surgeon: Edmond Aparicio MD;  [facility];  Service: Neurosurgery    TUBAL LIGATION         Family History   Problem Relation Age of Onset  Anxiety disorder Mother     Bipolar disorder Mother     Depression Mother     Tremor Mother     Anxiety disorder Father     Bipolar disorder Father     Depression Father     Schizophrenia Father     Parkinsonism Maternal Grandmother     Stomach cancer Paternal Grandfather          Medications have been verified  Objective   /74   Pulse 92   Temp 98 6 °F (37 °C)   Resp 16   Ht 5' 2" (1 575 m)   Wt 96 6 kg (213 lb)   SpO2 99%   BMI 38 96 kg/m²   No LMP recorded  Physical Exam     Physical Exam  Vitals and nursing note reviewed  Constitutional:       Appearance: Normal appearance  HENT:      Head: Normocephalic and atraumatic  Cardiovascular:      Rate and Rhythm: Normal rate and regular rhythm  Pulmonary:      Effort: Pulmonary effort is normal    Musculoskeletal:      Comments: Diminished flexion extension internal and external rotation of the right hip secondary to pain  Negative rica test   Exited urgent care suite without limp  Skin:     Coloration: Skin is jaundiced  Neurological:      Mental Status: She is alert  Right hip x-ray - no acute bony abnormality

## 2022-05-21 DIAGNOSIS — M54.50 CHRONIC BILATERAL LOW BACK PAIN WITHOUT SCIATICA: ICD-10-CM

## 2022-05-21 DIAGNOSIS — Z98.890 HISTORY OF LUMBAR SURGERY: ICD-10-CM

## 2022-05-21 DIAGNOSIS — G89.29 CHRONIC BILATERAL LOW BACK PAIN WITHOUT SCIATICA: ICD-10-CM

## 2022-05-21 DIAGNOSIS — M46.1 SACROILIITIS (HCC): ICD-10-CM

## 2022-05-21 DIAGNOSIS — G89.4 CHRONIC PAIN SYNDROME: ICD-10-CM

## 2022-06-01 ENCOUNTER — HOSPITAL ENCOUNTER (EMERGENCY)
Facility: HOSPITAL | Age: 37
Discharge: HOME | End: 2022-06-01
Attending: EMERGENCY MEDICINE
Payer: COMMERCIAL

## 2022-06-01 VITALS
RESPIRATION RATE: 20 BRPM | SYSTOLIC BLOOD PRESSURE: 138 MMHG | HEART RATE: 110 BPM | TEMPERATURE: 101.3 F | WEIGHT: 215 LBS | BODY MASS INDEX: 39.56 KG/M2 | DIASTOLIC BLOOD PRESSURE: 88 MMHG | OXYGEN SATURATION: 98 % | HEIGHT: 62 IN

## 2022-06-01 DIAGNOSIS — B34.9 ACUTE VIRAL SYNDROME: Primary | ICD-10-CM

## 2022-06-01 LAB
FLUAV RNA RESP QL NAA+PROBE: NEGATIVE
FLUBV RNA RESP QL NAA+PROBE: NEGATIVE
RSV RNA RESP QL NAA+PROBE: NEGATIVE
SARS-COV-2 RNA RESP QL NAA+PROBE: POSITIVE

## 2022-06-01 PROCEDURE — 99283 EMERGENCY DEPT VISIT LOW MDM: CPT

## 2022-06-01 PROCEDURE — 0241U HB NFCT DS VIR RESP RNA 4 TRGT: CPT | Performed by: EMERGENCY MEDICINE

## 2022-06-01 PROCEDURE — 99283 EMERGENCY DEPT VISIT LOW MDM: CPT | Performed by: EMERGENCY MEDICINE

## 2022-06-01 RX ADMIN — IBUPROFEN 400 MG: 100 SUSPENSION ORAL at 19:42

## 2022-06-01 NOTE — ED PROVIDER NOTES
History  Chief Complaint   Patient presents with    Headache     States headache since last night,  has a fever     One day of fever, malaise, myalgias, headache, cough  No rash/neck stiffness/ams/photophobia  No cp/sob  No acute wheeze  No abdo pain/urinary/bowel symp  PMH asthma, htn, anxiety, depression  Prior to Admission Medications   Prescriptions Last Dose Informant Patient Reported? Taking?    FLUoxetine (PROzac) 20 mg capsule 6/1/2022 at Unknown time  No Yes   Sig: Take 1 capsule (20 mg total) by mouth daily   QUEtiapine (SEROquel) 50 mg tablet   No No   Sig: take 1 tablet by mouth at bedtime   Patient not taking: Reported on 5/18/2022   albuterol (ProAir HFA) 90 mcg/act inhaler 6/1/2022 at Unknown time  No Yes   Sig: Inhale 2 puffs every 4 (four) hours as needed for wheezing or shortness of breath   diclofenac sodium (VOLTAREN) 50 mg EC tablet Not Taking at Unknown time  No No   Sig: take 1 tablet by mouth twice a day with food   Patient not taking: Reported on 6/1/2022   ergocalciferol (VITAMIN D2) 50,000 units 6/1/2022 at Unknown time  No Yes   Sig: take 1 capsule by mouth every week   escitalopram (Lexapro) 20 mg tablet   No No   Sig: Take 0 5 tablets (10 mg total) by mouth daily   fluticasone-salmeterol (Wixela Inhub) 250-50 mcg/dose inhaler   No No   Sig: Inhale 1 puff 2 (two) times a day Rinse mouth after use    hydrOXYzine HCL (ATARAX) 25 mg tablet   No No   Sig: Take 1 tablet (25 mg total) by mouth every 6 (six) hours as needed for anxiety   Patient not taking: Reported on 5/18/2022   hydrochlorothiazide (HYDRODIURIL) 12 5 mg tablet 6/1/2022 at Unknown time  No Yes   Sig: take 1 tablet by mouth once daily   lisinopril (ZESTRIL) 10 mg tablet 6/1/2022 at Unknown time  No Yes   Sig: take 1 tablet by mouth twice a day   meclizine (ANTIVERT) 25 mg tablet   No No   Sig: Take 1 tablet (25 mg total) by mouth 3 (three) times a day as needed for dizziness   methocarbamol (ROBAXIN) 500 mg tablet 6/1/2022 at Unknown time  No Yes   Sig: Take 1 tablet (500 mg total) by mouth 3 (three) times a day As needed for spasms   methocarbamol (Robaxin-750) 750 mg tablet 6/1/2022 at Unknown time  No Yes   Sig: Take 1 tablet (750 mg total) by mouth every 6 (six) hours as needed for muscle spasms   ondansetron (ZOFRAN) 4 mg tablet 6/1/2022 at Unknown time  No Yes   Sig: Take 1 tablet (4 mg total) by mouth every 8 (eight) hours as needed for nausea      Facility-Administered Medications: None       Past Medical History:   Diagnosis Date    Anxiety     Asthma     Cat-scratch disease     last assessed 10/22/15    Depression     Hypertension     last assessed 11/11/14    Sciatica     Sleep disorder        Past Surgical History:   Procedure Laterality Date    BREAST BIOPSY Left     2016   [facility]      WI BX/REMV,LYMPH NODE,DEEP AXILL Left 1/15/2019    Procedure: LEFT AXILLARY LYMPH NODE BIOPSY;  Surgeon: Debi Hein MD;  [facility];  Service: General    WI LAMNOTMY INCL W/DCMPRSN NRV ROOT 1 INTRSPC LUMBR Right 4/14/2021    Procedure: L5-S1 minimally invasive microdiskectomy and  epidural steroid injection;  Surgeon: Devante Rivera MD;  [facility];  Service: Neurosurgery    TUBAL LIGATION         Family History   Problem Relation Age of Onset    Anxiety disorder Mother     Bipolar disorder Mother     Depression Mother     Tremor Mother     Anxiety disorder Father     Bipolar disorder Father     Depression Father     Schizophrenia Father     Parkinsonism Maternal Grandmother     Stomach cancer Paternal Grandfather      I have reviewed and agree with the history as documented      E-Cigarette/Vaping    E-Cigarette Use Never User      E-Cigarette/Vaping Substances    Nicotine No     THC No     CBD No     Flavoring No     Other No     Unknown No      Social History     Tobacco Use    Smoking status: Never Smoker    Smokeless tobacco: Never Used   Vaping Use    Vaping Use: Never used   Substance Use Topics    Alcohol use: No    Drug use: No       Review of Systems   Constitutional: Positive for appetite change, chills, diaphoresis, fatigue and fever  HENT: Negative for rhinorrhea  Eyes: Negative for visual disturbance  Respiratory: Positive for cough  Negative for shortness of breath  Cardiovascular: Negative for chest pain  Gastrointestinal: Negative for abdominal pain, diarrhea and vomiting  Endocrine: Negative for polydipsia  Genitourinary: Negative for dysuria, frequency and hematuria  Musculoskeletal: Positive for myalgias  Negative for neck stiffness  Skin: Negative for rash  Allergic/Immunologic: Negative for immunocompromised state  Neurological: Positive for headaches  Negative for speech difficulty, weakness and numbness  Psychiatric/Behavioral: Negative for suicidal ideas  Physical Exam  Physical Exam  Constitutional:       General: She is not in acute distress  Appearance: She is diaphoretic  HENT:      Head: Normocephalic and atraumatic  Right Ear: External ear normal       Left Ear: External ear normal       Nose: Nose normal       Mouth/Throat:      Pharynx: Oropharynx is clear  Eyes:      Conjunctiva/sclera: Conjunctivae normal    Cardiovascular:      Rate and Rhythm: Regular rhythm  Tachycardia present  Heart sounds: Normal heart sounds  No murmur heard  Pulmonary:      Effort: Pulmonary effort is normal       Breath sounds: Normal breath sounds  Abdominal:      General: Bowel sounds are normal       Palpations: Abdomen is soft  There is no mass  Tenderness: There is no abdominal tenderness  There is no guarding  Musculoskeletal:         General: No swelling or tenderness  Cervical back: Normal range of motion and neck supple  Right lower leg: No edema  Left lower leg: No edema  Skin:     General: Skin is warm  Capillary Refill: Capillary refill takes less than 2 seconds  Neurological:      General: No focal deficit present  Mental Status: She is alert and oriented to person, place, and time  Psychiatric:         Mood and Affect: Mood normal          Vital Signs  ED Triage Vitals [06/01/22 1911]   Temperature Pulse Respirations Blood Pressure SpO2   (!) 101 3 °F (38 5 °C) (!) 110 20 138/88 98 %      Temp Source Heart Rate Source Patient Position - Orthostatic VS BP Location FiO2 (%)   Tympanic Monitor Sitting Right arm --      Pain Score       4           Vitals:    06/01/22 1911   BP: 138/88   Pulse: (!) 110   Patient Position - Orthostatic VS: Sitting         Visual Acuity      ED Medications  Medications   ibuprofen (MOTRIN) oral suspension 400 mg (has no administration in time range)       Diagnostic Studies  Results Reviewed     Procedure Component Value Units Date/Time    COVID/FLU/RSV - 2 hour TAT [[id number]]     Lab Status: No result Specimen: Nares from Nose                  No orders to display              Procedures  Procedures         ED Course                                             MDM  Number of Diagnoses or Management Options  Acute viral syndrome  Diagnosis management comments: Acute viral syndrome, no indication for hospitalization, likely covid given current prevalence and not immunized  Swabs taken, pt to wait until called with results  Disposition  Final diagnoses:   Acute viral syndrome     Time reflects when diagnosis was documented in both MDM as applicable and the Disposition within this note     Time User Action Codes Description Comment    6/1/2022  7:29 PM Mary Nagy Add [B34 9] Acute viral syndrome       ED Disposition     ED Disposition   Discharge    Condition   Stable    Date/Time   Wed Jun 1, 2022  7:29 PM    Comment   Kimberly Davies discharge to home/self care                 Follow-up Information     Follow up With Specialties Details Why [address] Karl Whitehead PA-C Physician Assistant, Family Medicine Schedule an appointment as soon as possible for a visit in 2 days  [facility] [address]  [facility]  [address] [place] [address]            Patient's Medications   Discharge Prescriptions    No medications on file       No discharge procedures on file      PDMP Review     None          ED Provider  Electronically Signed by           Altagracia Lobato MD  06/01/22 0463

## 2022-06-02 ENCOUNTER — TELEMEDICINE (OUTPATIENT)
Dept: FAMILY MEDICINE CLINIC | Facility: HOME HEALTHCARE | Age: 37
End: 2022-06-02
Payer: COMMERCIAL

## 2022-06-02 DIAGNOSIS — U07.1 COVID-19: Primary | ICD-10-CM

## 2022-06-02 PROCEDURE — G0071 COMM SVCS BY RHC/FQHC 5 MIN: HCPCS | Performed by: FAMILY MEDICINE

## 2022-06-02 NOTE — PROGRESS NOTES
COVID-19 Outpatient Progress Note    Assessment/Plan:    Problem List Items Addressed This Visit    None     Visit Diagnoses     COVID-19    -  Primary         Disposition:     Patient has COVID-19 infection  Based off CDC guidelines, they were recommended to isolate for 5 days from the date of the positive test  If they remain asymptomatic, isolation may be ended followed by 5 days of wearing a mask when around othes to minimize risk of infecting others  If they have a fever, continue to stay home until fever resolves for at least 24 hours  Discussed symptom directed medication options with patient  Discussed vitamin D, vitamin C, and/or zinc supplementation with patient  I have spent 10 minutes directly with the patient  Encounter provider Anna Godinez PA-C    Provider located at [address] [address]  [phone]    Recent Visits  No visits were found meeting these conditions  Showing recent visits within past 7 days and meeting all other requirements  Today's Visits  Date Type Provider Dept   [facility]02/22 [facility] today's visits and meeting all other requirements  Future Appointments  No visits were found meeting these conditions  Showing future appointments within next 150 days and meeting all other requirements     This virtual check-in was done via telephone and she agrees to proceed  Patient agrees to participate in a virtual check in via telephone or video visit instead of presenting to the office to address urgent/immediate medical needs  Patient is aware this is a billable service  After connecting through Telephone, the patient was identified by name and date of birth  Anabella Thorne was informed that this was a telemedicine visit and that the exam was being conducted confidentially over secure lines  My office door was closed  No one else was in the room  Dayne Barnett acknowledged consent and understanding of privacy and security of the telemedicine visit  I informed the patient that I have reviewed her record in Epic and presented the opportunity for her to ask any questions regarding the visit today  The patient agreed to participate  It was my intent to perform this visit via video technology but the patient was not able to do a video connection so the visit was completed via audio telephone only  Verification of patient location:  Patient is located in the following state in which I hold an active license: [place]    Subjective:   Dayne Barnett is a 39 y o  female who has been screened for COVID-19  Symptom change since last report: unchanged  Patient's symptoms include fever, chills, fatigue, nasal congestion, rhinorrhea, sore throat, cough, nausea, myalgias and headache  Patient denies anosmia, loss of taste, shortness of breath, chest tightness, abdominal pain, vomiting and diarrhea  - Date of symptom onset: 6/1/2022  - Date of positive COVID-19 test: 6/1/2022  Type of test: PCR  COVID-19 vaccination status: Not vaccinated    Viral Brown has been staying home and has isolated themselves in her home  She is taking care to not share personal items and is cleaning all surfaces that are touched often, like counters, tabletops, and doorknobs using household cleaning sprays or wipes  She is wearing a mask when she leaves her room  Patient has been taking motrin without relief of her headache  Denies SOB or chest tightness  Tmax 101 5      Lab Results   Component Value Date    SARSCOV2 Positive (A) 06/01/2022    SARSCOV2 Not Detected 05/18/2020     Past Medical History:   Diagnosis Date    Anxiety     Asthma     Cat-scratch disease     last assessed 10/22/15    Depression     Hypertension     last assessed 11/11/14    Sciatica     Sleep disorder      Past Surgical History:   Procedure Laterality Date    BREAST BIOPSY Left     2016   [facility]      MO BX/REMV,LYMPH NODE,DEEP AXILL Left 1/15/2019    Procedure: LEFT AXILLARY LYMPH NODE BIOPSY;  Surgeon: Toro Jimenez MD;  [facility];  Service: General    MO LAMNOTMY INCL W/DCMPRSN NRV ROOT 1 INTRSPC LUMBR Right 4/14/2021    Procedure: L5-S1 minimally invasive microdiskectomy and  epidural steroid injection;  Surgeon: Cherise Lugo MD;  [facility];  Service: Neurosurgery    TUBAL LIGATION       Current Outpatient Medications   Medication Sig Dispense Refill    albuterol (ProAir HFA) 90 mcg/act inhaler Inhale 2 puffs every 4 (four) hours as needed for wheezing or shortness of breath 18 g 5    ergocalciferol (VITAMIN D2) 50,000 units take 1 capsule by mouth every week 12 capsule 1    fluticasone-salmeterol (Wixela Inhub) 250-50 mcg/dose inhaler Inhale 1 puff 2 (two) times a day Rinse mouth after use   60 blister 5    hydrochlorothiazide (HYDRODIURIL) 12 5 mg tablet take 1 tablet by mouth once daily 90 tablet 1    lisinopril (ZESTRIL) 10 mg tablet take 1 tablet by mouth twice a day 90 tablet 1    methocarbamol (ROBAXIN) 500 mg tablet Take 1 tablet (500 mg total) by mouth 3 (three) times a day As needed for spasms 90 tablet 1    methocarbamol (Robaxin-750) 750 mg tablet Take 1 tablet (750 mg total) by mouth every 6 (six) hours as needed for muscle spasms 20 tablet 0    ondansetron (ZOFRAN) 4 mg tablet Take 1 tablet (4 mg total) by mouth every 8 (eight) hours as needed for nausea 20 tablet 0    diclofenac sodium (VOLTAREN) 50 mg EC tablet take 1 tablet by mouth twice a day with food (Patient not taking: No sig reported) 60 tablet 1    escitalopram (Lexapro) 20 mg tablet Take 0 5 tablets (10 mg total) by mouth daily 30 tablet 5    FLUoxetine (PROzac) 20 mg capsule Take 1 capsule (20 mg total) by mouth daily (Patient not taking: Reported on 6/2/2022) 30 capsule 0    hydrOXYzine HCL (ATARAX) 25 mg tablet Take 1 tablet (25 mg total) by mouth every 6 (six) hours as needed for anxiety (Patient not taking: No sig reported) 60 tablet 0    meclizine (ANTIVERT) 25 mg tablet Take 1 tablet (25 mg total) by mouth 3 (three) times a day as needed for dizziness 30 tablet 0    QUEtiapine (SEROquel) 50 mg tablet take 1 tablet by mouth at bedtime (Patient not taking: No sig reported) 30 tablet 0     No current facility-administered medications for this visit  Allergies   Allergen Reactions    Bactrim [Sulfamethoxazole-Trimethoprim] Shortness Of Breath     Near syncope    Codeine Hives     Tolerated Percocet, Vicodin, etc        Review of Systems   Constitutional: Positive for chills, fatigue and fever  HENT: Positive for congestion, rhinorrhea and sore throat  Respiratory: Positive for cough  Negative for chest tightness and shortness of breath  Gastrointestinal: Positive for nausea  Negative for abdominal pain, diarrhea and vomiting  Musculoskeletal: Positive for myalgias  Neurological: Positive for headaches  VIRTUAL VISIT [address] verbally agrees to participate in [employer]  Pt is aware that [employer] could be limited without vital signs or the ability to perform a full hands-on physical [address] understands she or the provider may request at any time to terminate the video visit and request the patient to seek care or treatment in person

## 2022-06-02 NOTE — LETTER
June 2, 2022    Patient: Gaye Brower  YOB: 1985  Date of Last Encounter: 5/11/2022      To whom it may concern:     Gaye Brower has tested positive for COVID-19 (Coronavirus)  Please excuse her from work 6/1-6/6      Sincerely,         Wesley Solomon PA-C

## 2022-06-07 ENCOUNTER — TELEMEDICINE (OUTPATIENT)
Dept: FAMILY MEDICINE CLINIC | Facility: HOME HEALTHCARE | Age: 37
End: 2022-06-07
Payer: COMMERCIAL

## 2022-06-07 DIAGNOSIS — U07.1 COVID-19: Primary | ICD-10-CM

## 2022-06-07 PROCEDURE — G0071 COMM SVCS BY RHC/FQHC 5 MIN: HCPCS | Performed by: FAMILY MEDICINE

## 2022-06-07 RX ORDER — DEXTROMETHORPHAN HYDROBROMIDE AND PROMETHAZINE HYDROCHLORIDE 15; 6.25 MG/5ML; MG/5ML
5 SOLUTION ORAL 4 TIMES DAILY PRN
Qty: 120 ML | Refills: 0 | Status: SHIPPED | OUTPATIENT
Start: 2022-06-07

## 2022-06-07 RX ORDER — FLUTICASONE PROPIONATE 50 MCG
1 SPRAY, SUSPENSION (ML) NASAL DAILY
Qty: 16 G | Refills: 5 | Status: SHIPPED | OUTPATIENT
Start: 2022-06-07

## 2022-06-07 NOTE — PATIENT INSTRUCTIONS
COVID-19 (Coronavirus Disease 2019)   AMBULATORY CARE:   What you need to know about COVID-19:  COVID-19 is the disease caused by a coronavirus first discovered in December 2019  Coronaviruses generally cause upper respiratory (nose, throat, and lung) infections, such as a cold  The 2019 virus spreads quickly and easily  It can be spread starting 2 to 3 days before symptoms even begin  What you need to know about variants: The virus has changed into several new forms (called variants) since it was discovered  The variants may be more contagious (easily spread) than the original form  Some may also cause more severe illness than others  Signs and symptoms of COVID-19  may not develop  Signs and symptoms usually start about 5 days after infection but can take 2 to 14 days  You may feel like you have the flu or a bad cold  Some signs and symptoms go away in a few days  Others can last weeks, months, or possibly years  You may have any of the following:  A cough    Shortness of breath or trouble breathing that may become severe    A fever    Chills that might include shaking    Muscle pain, body aches, or a headache    A sore throat    Sudden changes or loss of your taste or smell    Feeling mentally and physically tired (fatigue)    Congestion (stuffy head and nose), or a runny nose    Diarrhea, nausea, or vomiting    Call your local emergency number (911 in the [facility]) if:   You have trouble breathing or shortness of breath at rest     You have chest pain or pressure that lasts longer than 5 minutes  You become confused or hard to wake  Your lips or face are blue  Seek care immediately if:   You have a fever of 104°F (40°C) or higher  Call your doctor if:   You have symptoms of COVID-19  You have questions or concerns about your condition or care  How COVID-19 is diagnosed:  Testing is offered at many sites  You may need to quarantine until you get your results   Any of the following tests may be used:  A viral PCR test  shows if you have a current infection  A sample is taken from your nose or throat with a swab  You may need to wait 1 or more days to get the test results  An antigen test  shows if you have a protein from the COVID-19 virus  This test is often called a rapid test because the results can be available in 30 minutes or less  An antibody test  shows if you had a recent or past infection  Blood samples are used for this test  Antibodies are made by your immune system to fight the virus that causes COVID-19  Antibodies form 1 to 3 weeks after you are infected  This test is not used to show if you are immune to the virus  A CT, MRI, ultrasound, or x-ray  may be used to check for complications of [id number]  These may include pneumonia, blood clots, or other complications  Treatment:   Mild symptoms  may get better on their own  Some treatments have emergency use authorization (EUA)  Examples include monoclonal antibodies and convalescent plasma  These may be given to help prevent worsening of your symptoms  You may also need any of the following:    Decongestants  help reduce nasal congestion and help you breathe more easily  If you take decongestant pills, they may make you feel restless or cause problems with your sleep  Do not use decongestant sprays for more than a few days  Cough suppressants  help reduce coughing  Ask your healthcare provider which type of cough medicine is best for you  To soothe a sore throat,  gargle with warm salt water, or use throat lozenges or a throat spray  Drink more liquids to thin and loosen mucus and to prevent dehydration  NSAIDs or acetaminophen  can help lower a fever and relieve body aches or a headache  Follow directions  If not taken correctly, NSAIDs can cause kidney damage and acetaminophen can cause liver damage      Severe or life-threatening symptoms  are treated in the hospital  You may need any of the following:     Medicines  may be given to fight the virus or treat inflammation  Blood thinners  help prevent or treat blood clots  If you have a deep vein thrombosis (DVT) or pulmonary embolism (PE), you may need to use blood thinners for at least 3 months  Extra oxygen  may be given if you have respiratory failure  This means your lungs cannot get enough oxygen into your blood and out to your organs  A ventilator  may be used to help you breathe  What you need to know about health problems the virus may cause: You may develop long-term health problems caused by the virus  Your risk is higher if you are 65 or older  A weak immune system, obesity, diabetes, chronic kidney disease, or a heart or lung condition can also increase your risk  Your risk is also higher if you are a current or former cigarette smoker  COVID-19 can lead to any of the following:  Multisymptom inflammatory syndrome in adults (MIS-A) or in children (MIS-C), causing inflammation in the heart, digestive system, skin, or brain    Shortness of breath, serious lower respiratory conditions, such as pneumonia or acute respiratory distress syndrome (ARDS)    Blood clots or blood vessel damage    Organ damage from a lack of oxygen or from blood clots    Sleep problems    Problems thinking clearly, remembering information, or concentrating    Mood changes, depression, or anxiety    Long-term problems tasting or smelling    Loss of appetite and weight loss    Nerve pain    Fatigue (feeling mentally and physically tired)    What you need to know about COVID-19 vaccines:  Healthcare providers recommend a COVID-19 vaccine, even if you have already had COVID-19  You are considered fully vaccinated against COVID-19 two weeks after the final dose of any COVID-19 vaccine  Let your healthcare provider know when you have received the final dose of the vaccine  Make a copy of your vaccination card  Keep the original with you in case you need to show it   Keep the copy in a safe place   COVID-19 vaccines are given as a shot in 1 or 2 doses  Vaccination is recommended for everyone 5 years or older  One 2-dose vaccine is fully approved  for those 16 years or older  This vaccine also has an emergency use authorization (EUA) for children 11to 13years old  No vaccine is currently available for children younger than 5 years  A booster (additional) dose  is given to help the immune system continue to protect against severe COVID-19  A booster is recommended for all adults 18 or older  The booster can be a different brand of the COVID-19 vaccine than you originally received  The timing for the booster depends on the type of vaccine you received:    1-dose vaccine: The booster is given at least 2 months after you received the vaccine  2-dose vaccine: The booster is given at least 5 or 6 months after the second dose  A booster can be given to adolescents 15to 16years old  Only 1 COVID-19 vaccine has this EUA  The booster is given at least 5 months after the second dose of the original vaccine series  A booster is recommended for immunocompromised children 11to 6years old  Only 1 COVID-19 vaccine has this EUA  The booster is given 28 days after the second dose  Continue social distancing and other measures, even after you get the vaccine  Although it is not common, you can become infected after you get the vaccine  You may also be able to pass the virus to others without knowing you are infected  After you get the vaccine, check local, national, and international travel rules  You may need to be tested before you travel  Some countries require proof of a negative test before you travel  You may also need to quarantine after you return  Medicine may be given to prevent infection  The medicine can be given if you are at high risk for infection and cannot get the vaccine  It can also be given if your immune system does not respond well to the vaccine      How the 2019 coronavirus spreads:   Droplets are the main way all coronaviruses spread  The virus travels in droplets that form when a person talks, sings, coughs, or sneezes  The droplets can also float in the air for minutes or hours  Infection happens when you breathe in the droplets or get them in your eyes or nose  Close personal contact with an infected person increases your risk for infection  This means being within 6 feet (2 meters) of the person for at least 15 minutes over 24 hours  Person-to-person contact can spread the virus  For example, a person with the virus on his or her hands can spread it by shaking hands with someone  The virus can stay on objects and surfaces for up to 3 days  You may become infected by touching the object or surface and then touching your eyes or mouth  Help lower the risk for COVID-19:   Wash your hands often throughout the day  Use soap and water  Rub your soapy hands together, lacing your fingers, for at least 20 seconds  Rinse with warm, running water  Dry your hands with a clean towel or paper towel  Use hand  that contains alcohol if soap and water are not available  Teach children how to wash their hands and use hand   Cover sneezes and coughs  Turn your face away and cover your mouth and nose with a tissue  Throw the tissue away  Use the bend of your arm if a tissue is not available  Then wash your hands well with soap and water or use hand   Teach children how to cover a cough or sneeze  Wear a face covering (mask) when needed  Use a cloth covering with at least 2 layers  You can also create layers by putting a cloth covering over a disposable non-medical mask  Cover your mouth and your nose  Follow worldwide, national, and local social distancing guidelines  Keep at least 6 feet (2 meters) between you and others  Try not to touch your face    If you get the virus on your hands, you can transfer it to your eyes, nose, or mouth and become infected  You can also transfer it to objects, surfaces, or people  Clean and disinfect high-touch surfaces and objects often  Use disinfecting wipes, or make a solution of 4 teaspoons of bleach in 1 quart (4 cups) of water  Ask about other vaccines you may need  Get the influenza (flu) vaccine as soon as recommended each year, usually starting in September or October  Get the pneumonia vaccine if recommended  Your healthcare provider can tell you if you should also get other vaccines, and when to get them  Follow social distancing guidelines:  National and local social distancing rules vary  Rules and restrictions may change over time as restrictions are lifted  The following are general guidelines:  Stay home if you are sick or think you may have COVID-19  It is important to stay home if you are waiting for a testing appointment or for test results  Avoid close physical contact with anyone who does not live in your home  Do not shake hands with, hug, or kiss a person as a greeting  If you must use public transportation (such as a bus or subway), try to sit or stand away from others  Wear your face covering  Avoid in-person gatherings and crowds  Attend virtually if possible  Follow up with your doctor as directed:  Write down your questions so you remember to ask them during your visits  For more information:   [facility]  [address] , [address]  Phone: 4- 033 - [phone]  Web Address: [employer] com     © Copyright [employer] 2022 Information is for End User's use only and may not be sold, redistributed or otherwise used for commercial purposes  All illustrations and images included in CareNotes® are the copyrighted property of [employer]  or [employer]  The above information is an  only  It is not intended as medical advice for individual conditions or treatments   Talk to your doctor, nurse or pharmacist before following any medical regimen to see if it is safe and effective for you

## 2022-06-07 NOTE — PROGRESS NOTES
COVID-19 Outpatient Progress Note    Assessment/Plan:    Problem List Items Addressed This Visit    None     Visit Diagnoses     COVID-19    -  Primary    Relevant Medications    fluticasone (FLONASE) 50 mcg/act nasal spray    Promethazine-DM (PHENERGAN-DM) 6 25-15 mg/5 mL oral syrup         Disposition:     Patient has COVID-19 infection  Based off CDC guidelines, they were recommended to isolate for 5 days from the date of the positive test  If they remain asymptomatic, isolation may be ended followed by 5 days of wearing a mask when around othes to minimize risk of infecting others  If they have a fever, continue to stay home until fever resolves for at least 24 hours  Continue tylenol or ibuprofen PRN  Start daily Flonase and phenergan-DM PRN  Follow up if symptoms persist or worsen  I have spent 10 minutes directly with the patient  Encounter provider Shan Shea PA-C    Provider located at [address] [place] [address]  [phone]    Recent Visits  Date Type Provider Dept   [facility] [address] recent visits within past 7 days and meeting all other requirements  Today's Visits  Date Type Provider Dept   [facility]07/22 [facility] [address] today's visits and meeting all other requirements  Future Appointments  No visits were found meeting these conditions  Showing future appointments within next 150 days and meeting all other requirements     This virtual check-in was done via telephone and she agrees to proceed  Patient agrees to participate in a virtual check in via telephone or video visit instead of presenting to the office to address urgent/immediate medical needs  Patient is aware this is a billable service  After connecting through Telephone, the patient was identified by name and date of birth   Marilyn Carrillo was informed that this was a telemedicine visit and that the exam was being conducted confidentially over secure lines  My office door was closed  No one else was in the room  Tiffany Cornejo acknowledged consent and understanding of privacy and security of the telemedicine visit  I informed the patient that I have reviewed her record in Epic and presented the opportunity for her to ask any questions regarding the visit today  The patient agreed to participate  It was my intent to perform this visit via video technology but the patient was not able to do a video connection so the visit was completed via audio telephone only  Verification of patient location:  Patient is located in the following state in which I hold an active license: [place]    Subjective:   Tiffany Cornejo is a 39 y o  female who has been screened for COVID-19  Symptom change since last report: unchanged  Patient's symptoms include fatigue, nasal congestion, rhinorrhea, sore throat, cough, nausea, myalgias and headache  Patient denies fever, chills, anosmia, loss of taste, shortness of breath, chest tightness, abdominal pain, vomiting and diarrhea  - Date of symptom onset: 6/1/2022  - Date of positive COVID-19 test: 6/1/2022  Type of test: PCR  COVID-19 vaccination status: Not vaccinated    Carl Eason has been staying home and has isolated themselves in her home  She is taking care to not share personal items and is cleaning all surfaces that are touched often, like counters, tabletops, and doorknobs using household cleaning sprays or wipes  She is wearing a mask when she leaves her room  Patient has been taking Motrin without relief  Reports body aches and HA improving  Sore throat and bilateral ear pressure have been getting worse      Lab Results   Component Value Date    SARSCOV2 Positive (A) 06/01/2022    SARSCOV2 Not Detected 05/18/2020     Past Medical History:   Diagnosis Date    Anxiety     Asthma     Cat-scratch disease     last assessed 10/22/15    Depression     Hypertension     last assessed 11/11/14    Sciatica     Sleep disorder      Past Surgical History:   Procedure Laterality Date    BREAST BIOPSY Left     2016   Rita Splinter DENTAL SURGERY      HI BX/REMV,LYMPH NODE,DEEP AXILL Left 1/15/2019    Procedure: LEFT AXILLARY LYMPH NODE BIOPSY;  Surgeon: Coty Grewal MD;  [facility];  Service: General    HI LAMNOTMY INCL W/DCMPRSN NRV ROOT 1 INTRSPC LUMBR Right 4/14/2021    Procedure: L5-S1 minimally invasive microdiskectomy and  epidural steroid injection;  Surgeon: Nichole Hernandes MD;  Location[facility];  Service: Neurosurgery    TUBAL LIGATION       Current Outpatient Medications   Medication Sig Dispense Refill    ergocalciferol (VITAMIN D2) 50,000 units take 1 capsule by mouth every week 12 capsule 1    fluticasone (FLONASE) 50 mcg/act nasal spray 1 spray into each nostril daily 16 g 5    hydrochlorothiazide (HYDRODIURIL) 12 5 mg tablet take 1 tablet by mouth once daily 90 tablet 1    lisinopril (ZESTRIL) 10 mg tablet take 1 tablet by mouth twice a day 90 tablet 1    ondansetron (ZOFRAN) 4 mg tablet Take 1 tablet (4 mg total) by mouth every 8 (eight) hours as needed for nausea 20 tablet 0    Promethazine-DM (PHENERGAN-DM) 6 25-15 mg/5 mL oral syrup Take 5 mL by mouth 4 (four) times a day as needed for cough 120 mL 0    albuterol (ProAir HFA) 90 mcg/act inhaler Inhale 2 puffs every 4 (four) hours as needed for wheezing or shortness of breath (Patient not taking: Reported on 6/7/2022) 18 g 5    diclofenac sodium (VOLTAREN) 50 mg EC tablet take 1 tablet by mouth twice a day with food (Patient not taking: No sig reported) 60 tablet 1    FLUoxetine (PROzac) 20 mg capsule Take 1 capsule (20 mg total) by mouth daily (Patient not taking: No sig reported) 30 capsule 0    fluticasone-salmeterol (Wixela Inhub) 250-50 mcg/dose inhaler Inhale 1 puff 2 (two) times a day Rinse mouth after use   (Patient not taking: Reported on 6/7/2022) 60 blister 5    hydrOXYzine HCL (ATARAX) 25 mg tablet Take 1 tablet (25 mg total) by mouth every 6 (six) hours as needed for anxiety (Patient not taking: No sig reported) 60 tablet 0    methocarbamol (ROBAXIN) 500 mg tablet Take 1 tablet (500 mg total) by mouth 3 (three) times a day As needed for spasms (Patient not taking: Reported on 6/7/2022) 90 tablet 1    methocarbamol (Robaxin-750) 750 mg tablet Take 1 tablet (750 mg total) by mouth every 6 (six) hours as needed for muscle spasms (Patient not taking: Reported on 6/7/2022) 20 tablet 0    QUEtiapine (SEROquel) 50 mg tablet take 1 tablet by mouth at bedtime (Patient not taking: No sig reported) 30 tablet 0     No current facility-administered medications for this visit  Allergies   Allergen Reactions    Bactrim [Sulfamethoxazole-Trimethoprim] Shortness Of Breath     Near syncope    Codeine Hives     Tolerated Percocet, Vicodin, etc        Review of Systems   Constitutional: Positive for fatigue  Negative for chills and fever  HENT: Positive for congestion, ear pain, rhinorrhea and sore throat  Respiratory: Positive for cough  Negative for chest tightness and shortness of breath  Gastrointestinal: Positive for nausea  Negative for abdominal pain, diarrhea and vomiting  Musculoskeletal: Positive for myalgias  Neurological: Positive for headaches  VIRTUAL VISIT [address] verbally agrees to participate in [employer]  Pt is aware that [employer] could be limited without vital signs or the ability to perform a full hands-on physical [address] understands she or the provider may request at any time to terminate the video visit and request the patient to seek care or treatment in person

## 2022-06-21 ENCOUNTER — TELEPHONE (OUTPATIENT)
Dept: OTHER | Facility: OTHER | Age: 37
End: 2022-06-21

## 2022-06-21 NOTE — TELEPHONE ENCOUNTER
Patient stated that she needs a copy of her vaccine records from her last TB vaccine  Patient would like to  in office today 6/21/2022  Please call patient back once available

## 2022-08-24 NOTE — TELEPHONE ENCOUNTER
Patient tired to  Vitamin D prescription at pharmacy and they said it wasn't covered by insurance and she just wanted to know why or if there is something that is  Cantharidin Pregnancy And Lactation Text: The use of this medication during pregnancy or lactation is not recommended as there is insufficient data.

## 2022-09-08 ENCOUNTER — TELEPHONE (OUTPATIENT)
Dept: FAMILY MEDICINE CLINIC | Facility: HOME HEALTHCARE | Age: 37
End: 2022-09-08

## 2022-09-09 ENCOUNTER — TELEPHONE (OUTPATIENT)
Dept: BEHAVIORAL/MENTAL HEALTH CLINIC | Facility: HOME HEALTHCARE | Age: 37
End: 2022-09-09

## 2022-09-09 NOTE — TELEPHONE ENCOUNTER
JACOBW spoke with Dawit Dobson to follow up on a phone call about services  CHILO expressed she can be seen in [address] [place] [address] for talk therapy, but we do not provide psychiatric med management  Provided her with [facility] telephone number  She stated she needs medication before she starts therapy and will call this LCSW back when she is open to start

## 2022-09-19 ENCOUNTER — OFFICE VISIT (OUTPATIENT)
Dept: FAMILY MEDICINE CLINIC | Facility: HOME HEALTHCARE | Age: 37
End: 2022-09-19
Payer: COMMERCIAL

## 2022-09-19 VITALS
HEART RATE: 95 BPM | SYSTOLIC BLOOD PRESSURE: 130 MMHG | OXYGEN SATURATION: 98 % | DIASTOLIC BLOOD PRESSURE: 102 MMHG | TEMPERATURE: 97.8 F | BODY MASS INDEX: 40.12 KG/M2 | RESPIRATION RATE: 18 BRPM | HEIGHT: 62 IN | WEIGHT: 218 LBS

## 2022-09-19 DIAGNOSIS — F41.1 GENERALIZED ANXIETY DISORDER: ICD-10-CM

## 2022-09-19 DIAGNOSIS — E55.9 VITAMIN D DEFICIENCY: ICD-10-CM

## 2022-09-19 DIAGNOSIS — F43.10 PTSD (POST-TRAUMATIC STRESS DISORDER): ICD-10-CM

## 2022-09-19 DIAGNOSIS — I10 ESSENTIAL HYPERTENSION: ICD-10-CM

## 2022-09-19 DIAGNOSIS — J45.40 MODERATE PERSISTENT ASTHMA WITHOUT COMPLICATION: ICD-10-CM

## 2022-09-19 DIAGNOSIS — F32.2 CURRENT SEVERE EPISODE OF MAJOR DEPRESSIVE DISORDER WITHOUT PSYCHOTIC FEATURES, UNSPECIFIED WHETHER RECURRENT (HCC): ICD-10-CM

## 2022-09-19 PROCEDURE — T1015 CLINIC SERVICE: HCPCS | Performed by: FAMILY MEDICINE

## 2022-09-19 PROCEDURE — 99213 OFFICE O/P EST LOW 20 MIN: CPT | Performed by: FAMILY MEDICINE

## 2022-09-19 RX ORDER — ALPRAZOLAM 0.5 MG/1
0.5 TABLET ORAL 2 TIMES DAILY PRN
Qty: 20 TABLET | Refills: 0 | Status: SHIPPED | OUTPATIENT
Start: 2022-09-19 | End: 2022-10-19 | Stop reason: SDUPTHER

## 2022-09-19 RX ORDER — FLUTICASONE PROPIONATE AND SALMETEROL 250; 50 UG/1; UG/1
1 POWDER RESPIRATORY (INHALATION) 2 TIMES DAILY
Qty: 60 BLISTER | Refills: 3 | Status: SHIPPED | OUTPATIENT
Start: 2022-09-19

## 2022-09-19 RX ORDER — HYDROCHLOROTHIAZIDE 12.5 MG/1
12.5 TABLET ORAL DAILY
Qty: 90 TABLET | Refills: 1 | Status: SHIPPED | OUTPATIENT
Start: 2022-09-19

## 2022-09-19 RX ORDER — QUETIAPINE FUMARATE 50 MG/1
50 TABLET, FILM COATED ORAL
Qty: 30 TABLET | Refills: 2 | Status: SHIPPED | OUTPATIENT
Start: 2022-09-19

## 2022-09-19 RX ORDER — FLUOXETINE HYDROCHLORIDE 20 MG/1
20 CAPSULE ORAL DAILY
Qty: 30 CAPSULE | Refills: 0 | Status: SHIPPED | OUTPATIENT
Start: 2022-09-19 | End: 2022-10-24

## 2022-09-19 RX ORDER — ERGOCALCIFEROL 1.25 MG/1
50000 CAPSULE ORAL WEEKLY
Qty: 12 CAPSULE | Refills: 1 | Status: SHIPPED | OUTPATIENT
Start: 2022-09-19

## 2022-09-19 RX ORDER — LISINOPRIL 10 MG/1
10 TABLET ORAL 2 TIMES DAILY
Qty: 90 TABLET | Refills: 1 | Status: SHIPPED | OUTPATIENT
Start: 2022-09-19

## 2022-09-19 RX ORDER — ALBUTEROL SULFATE 90 UG/1
2 AEROSOL, METERED RESPIRATORY (INHALATION) EVERY 4 HOURS PRN
Qty: 18 G | Refills: 5 | Status: SHIPPED | OUTPATIENT
Start: 2022-09-19

## 2022-10-06 ENCOUNTER — TELEPHONE (OUTPATIENT)
Dept: PSYCHIATRY | Facility: CLINIC | Age: 37
End: 2022-10-06

## 2022-10-06 NOTE — TELEPHONE ENCOUNTER
contacted patient off tlk therapy wailtist to verify needs of services in attempts to update wailtist spoke w  patient whom stated they are still interested prefers [facility] [place] location,,

## 2022-10-15 ENCOUNTER — HOSPITAL ENCOUNTER (EMERGENCY)
Facility: HOSPITAL | Age: 37
Discharge: HOME | End: 2022-10-15
Attending: EMERGENCY MEDICINE
Payer: COMMERCIAL

## 2022-10-15 VITALS
OXYGEN SATURATION: 95 % | BODY MASS INDEX: 39.88 KG/M2 | SYSTOLIC BLOOD PRESSURE: 136 MMHG | WEIGHT: 218.03 LBS | TEMPERATURE: 98 F | RESPIRATION RATE: 21 BRPM | HEART RATE: 78 BPM | DIASTOLIC BLOOD PRESSURE: 77 MMHG

## 2022-10-15 DIAGNOSIS — R42 LIGHTHEADEDNESS: Primary | ICD-10-CM

## 2022-10-15 LAB
ALBUMIN SERPL BCP-MCNC: 3.9 G/DL (ref 3.5–5)
ALP SERPL-CCNC: 109 U/L (ref 46–116)
ALT SERPL W P-5'-P-CCNC: 26 U/L (ref 12–78)
ANION GAP SERPL CALCULATED.3IONS-SCNC: 10 MMOL/L (ref 4–13)
AST SERPL W P-5'-P-CCNC: 16 U/L (ref 5–45)
ATRIAL RATE: 85 BPM
BACTERIA UR QL AUTO: ABNORMAL /HPF
BASOPHILS # BLD AUTO: 0.07 THOUSANDS/ÂΜL (ref 0–0.1)
BASOPHILS NFR BLD AUTO: 1 % (ref 0–1)
BILIRUB SERPL-MCNC: 0.46 MG/DL (ref 0.2–1)
BILIRUB UR QL STRIP: NEGATIVE
BUN SERPL-MCNC: 12 MG/DL (ref 5–25)
CALCIUM SERPL-MCNC: 9.2 MG/DL (ref 8.3–10.1)
CHLORIDE SERPL-SCNC: 99 MMOL/L (ref 96–108)
CLARITY UR: CLEAR
CO2 SERPL-SCNC: 27 MMOL/L (ref 21–32)
COLOR UR: YELLOW
CREAT SERPL-MCNC: 0.92 MG/DL (ref 0.6–1.3)
EOSINOPHIL # BLD AUTO: 0.28 THOUSAND/ÂΜL (ref 0–0.61)
EOSINOPHIL NFR BLD AUTO: 3 % (ref 0–6)
ERYTHROCYTE [DISTWIDTH] IN BLOOD BY AUTOMATED COUNT: 12.6 % (ref 11.6–15.1)
EXT PREG TEST URINE: NEGATIVE
EXT. CONTROL ED NAV: NORMAL
GFR SERPL CREATININE-BSD FRML MDRD: 79 ML/MIN/1.73SQ M
GLUCOSE SERPL-MCNC: 159 MG/DL (ref 65–140)
GLUCOSE SERPL-MCNC: 176 MG/DL (ref 65–140)
GLUCOSE UR STRIP-MCNC: NEGATIVE MG/DL
HCT VFR BLD AUTO: 43.5 % (ref 34.8–46.1)
HGB BLD-MCNC: 14.5 G/DL (ref 11.5–15.4)
HGB UR QL STRIP.AUTO: ABNORMAL
IMM GRANULOCYTES # BLD AUTO: 0.05 THOUSAND/UL (ref 0–0.2)
IMM GRANULOCYTES NFR BLD AUTO: 1 % (ref 0–2)
KETONES UR STRIP-MCNC: NEGATIVE MG/DL
LEUKOCYTE ESTERASE UR QL STRIP: NEGATIVE
LYMPHOCYTES # BLD AUTO: 1.26 THOUSANDS/ÂΜL (ref 0.6–4.47)
LYMPHOCYTES NFR BLD AUTO: 12 % (ref 14–44)
MCH RBC QN AUTO: 30.8 PG (ref 26.8–34.3)
MCHC RBC AUTO-ENTMCNC: 33.3 G/DL (ref 31.4–37.4)
MCV RBC AUTO: 92 FL (ref 82–98)
MONOCYTES # BLD AUTO: 0.71 THOUSAND/ÂΜL (ref 0.17–1.22)
MONOCYTES NFR BLD AUTO: 7 % (ref 4–12)
NEUTROPHILS # BLD AUTO: 8.33 THOUSANDS/ÂΜL (ref 1.85–7.62)
NEUTS SEG NFR BLD AUTO: 76 % (ref 43–75)
NITRITE UR QL STRIP: NEGATIVE
NON-SQ EPI CELLS URNS QL MICRO: ABNORMAL /HPF
NRBC BLD AUTO-RTO: 0 /100 WBCS
P AXIS: 17 DEGREES
PH UR STRIP.AUTO: 5.5 [PH]
PLATELET # BLD AUTO: 297 THOUSANDS/UL (ref 149–390)
PMV BLD AUTO: 10.2 FL (ref 8.9–12.7)
POTASSIUM SERPL-SCNC: 3.6 MMOL/L (ref 3.5–5.3)
PR INTERVAL: 150 MS
PROT SERPL-MCNC: 7.5 G/DL (ref 6.4–8.4)
PROT UR STRIP-MCNC: NEGATIVE MG/DL
QRS AXIS: 30 DEGREES
QRSD INTERVAL: 78 MS
QT INTERVAL: 360 MS
QTC INTERVAL: 428 MS
RBC # BLD AUTO: 4.71 MILLION/UL (ref 3.81–5.12)
RBC #/AREA URNS AUTO: ABNORMAL /HPF
SODIUM SERPL-SCNC: 136 MMOL/L (ref 135–147)
SP GR UR STRIP.AUTO: >=1.03 (ref 1–1.03)
T WAVE AXIS: -18 DEGREES
UROBILINOGEN UR QL STRIP.AUTO: 0.2 E.U./DL
VENTRICULAR RATE: 85 BPM
WBC # BLD AUTO: 10.7 THOUSAND/UL (ref 4.31–10.16)
WBC #/AREA URNS AUTO: ABNORMAL /HPF

## 2022-10-15 PROCEDURE — 82948 REAGENT STRIP/BLOOD GLUCOSE: CPT

## 2022-10-15 PROCEDURE — 85025 COMPLETE CBC W/AUTO DIFF WBC: CPT | Performed by: EMERGENCY MEDICINE

## 2022-10-15 PROCEDURE — 96360 HYDRATION IV INFUSION INIT: CPT

## 2022-10-15 PROCEDURE — 81001 URINALYSIS AUTO W/SCOPE: CPT | Performed by: EMERGENCY MEDICINE

## 2022-10-15 PROCEDURE — 93005 ELECTROCARDIOGRAM TRACING: CPT

## 2022-10-15 PROCEDURE — 36415 COLL VENOUS BLD VENIPUNCTURE: CPT | Performed by: EMERGENCY MEDICINE

## 2022-10-15 PROCEDURE — 81025 URINE PREGNANCY TEST: CPT | Performed by: EMERGENCY MEDICINE

## 2022-10-15 PROCEDURE — 80053 COMPREHEN METABOLIC PANEL: CPT | Performed by: EMERGENCY MEDICINE

## 2022-10-15 PROCEDURE — 99284 EMERGENCY DEPT VISIT MOD MDM: CPT

## 2022-10-15 PROCEDURE — 99284 EMERGENCY DEPT VISIT MOD MDM: CPT | Performed by: EMERGENCY MEDICINE

## 2022-10-15 RX ADMIN — SODIUM CHLORIDE 1000 ML: 0.9 INJECTION, SOLUTION INTRAVENOUS at 12:52

## 2022-10-15 NOTE — ED PROVIDER NOTES
History  Chief Complaint   Patient presents with   • Dizziness     Pt states for past 2 weeks she has been nauseous dizzy and "seeing spots"  Pt is pre diabetic     To er with about a week of run down sensation, dizziness reported as light headedness only with standing, nasueas  No cp, sob, syncope, HA, focal neuro sx, uti sx, uri sx, abd pain, n/v/d, preg, cp, sob is reported  Prior to Admission Medications   Prescriptions Last Dose Informant Patient Reported? Taking? ALPRAZolam (XANAX) 0 5 mg tablet   No No   Sig: Take 1 tablet (0 5 mg total) by mouth 2 (two) times a day as needed for anxiety   FLUoxetine (PROzac) 20 mg capsule   No No   Sig: Take 1 capsule (20 mg total) by mouth daily   Fluticasone-Salmeterol (Advair) 250-50 mcg/dose inhaler   No No   Sig: Inhale 1 puff 2 (two) times a day Rinse mouth after use     Promethazine-DM (PHENERGAN-DM) 6 25-15 mg/5 mL oral syrup   No No   Sig: Take 5 mL by mouth 4 (four) times a day as needed for cough   Patient not taking: Reported on 2022   QUEtiapine (SEROquel) 50 mg tablet   No No   Sig: Take 1 tablet (50 mg total) by mouth daily at bedtime   albuterol (ProAir HFA) 90 mcg/act inhaler   No No   Sig: Inhale 2 puffs every 4 (four) hours as needed for wheezing or shortness of breath   ergocalciferol (VITAMIN D2) 50,000 units   No No   Sig: Take 1 capsule (50,000 Units total) by mouth once a week   fluticasone (FLONASE) 50 mcg/act nasal spray   No No   Si spray into each nostril daily   Patient not taking: Reported on 2022   hydrochlorothiazide (HYDRODIURIL) 12 5 mg tablet   No No   Sig: Take 1 tablet (12 5 mg total) by mouth daily   lisinopril (ZESTRIL) 10 mg tablet   No No   Sig: Take 1 tablet (10 mg total) by mouth 2 (two) times a day   methocarbamol (ROBAXIN) 500 mg tablet   No No   Sig: Take 1 tablet (500 mg total) by mouth 3 (three) times a day As needed for spasms   Patient not taking: No sig reported   methocarbamol (Robaxin-750) 750 mg tablet   No No   Sig: Take 1 tablet (750 mg total) by mouth every 6 (six) hours as needed for muscle spasms   Patient not taking: No sig reported   ondansetron (ZOFRAN) 4 mg tablet   No No   Sig: Take 1 tablet (4 mg total) by mouth every 8 (eight) hours as needed for nausea   Patient not taking: Reported on 9/19/2022      Facility-Administered Medications: None       Past Medical History:   Diagnosis Date   • Anxiety    • Asthma    • Cat-scratch disease     last assessed 10/22/15   • Depression    • Hypertension     last assessed 11/11/14   • Sciatica    • Sleep disorder        Past Surgical History:   Procedure Laterality Date   • BREAST BIOPSY Left     2016   • DENTAL SURGERY     • HI BX/REMV,LYMPH NODE,DEEP AXILL Left 1/15/2019    Procedure: LEFT AXILLARY LYMPH NODE BIOPSY;  Surgeon: Dandre De La Garza MD;  [facility]: [facility];  Service: General   • HI LAMNOTMY INCL W/DCMPRSN NRV ROOT 1 INTRSPC LUMBR Right 4/14/2021    Procedure: L5-S1 minimally invasive microdiskectomy and  epidural steroid injection;  Surgeon: Amy Feldman MD;  [facility];  Service: Neurosurgery   • TUBAL LIGATION         Family History   Problem Relation Age of Onset   • Anxiety disorder Mother    • Bipolar disorder Mother    • Depression Mother    • Tremor Mother    • Anxiety disorder Father    • Bipolar disorder Father    • Depression Father    • Schizophrenia Father    • Parkinsonism Maternal Grandmother    • Stomach cancer Paternal Grandfather      I have reviewed and agree with the history as documented      E-Cigarette/Vaping   • E-Cigarette Use Never User      E-Cigarette/Vaping Substances   • Nicotine No    • THC No    • CBD No    • Flavoring No    • Other No    • Unknown No      Social History     Tobacco Use   • Smoking status: Never Smoker   • Smokeless tobacco: Never Used   Vaping Use   • Vaping Use: Never used   Substance Use Topics   • Alcohol use: No   • Drug use: No       Review of Systems   All other systems reviewed and are negative  Physical Exam  Physical Exam  Constitutional:       General: She is not in acute distress  Appearance: Normal appearance  She is well-developed  She is not ill-appearing, toxic-appearing or diaphoretic  HENT:      Head: Normocephalic and atraumatic  Right Ear: External ear normal       Left Ear: External ear normal       Nose: Nose normal       Mouth/Throat:      Mouth: Mucous membranes are moist    Eyes:      General:         Right eye: No discharge  Left eye: No discharge  Pupils: Pupils are equal, round, and reactive to light  Neck:      Vascular: No JVD  Cardiovascular:      Rate and Rhythm: Normal rate and regular rhythm  Pulses: Normal pulses  Heart sounds: Normal heart sounds  No murmur heard  No friction rub  No gallop  Pulmonary:      Effort: Pulmonary effort is normal  No respiratory distress  Breath sounds: Normal breath sounds  No stridor  No wheezing, rhonchi or rales  Chest:      Chest wall: No tenderness  Abdominal:      General: Abdomen is flat  Bowel sounds are normal  There is no distension  Palpations: Abdomen is soft  There is no mass  Tenderness: There is no abdominal tenderness  There is no right CVA tenderness, left CVA tenderness, guarding or rebound  Hernia: No hernia is present  Musculoskeletal:         General: No swelling, tenderness, deformity or signs of injury  Normal range of motion  Cervical back: Normal range of motion and neck supple  Right lower leg: No edema  Left lower leg: No edema  Skin:     General: Skin is warm  Capillary Refill: Capillary refill takes less than 2 seconds  Coloration: Skin is not jaundiced or pale  Findings: No bruising, erythema, lesion or rash  Neurological:      General: No focal deficit present  Mental Status: She is alert and oriented to person, place, and time  Mental status is at baseline        Cranial Nerves: No cranial nerve deficit  Sensory: No sensory deficit  Motor: No weakness or abnormal muscle tone        Coordination: Coordination normal       Gait: Gait normal       Deep Tendon Reflexes: Reflexes normal    Psychiatric:         Mood and Affect: Mood normal          Vital Signs  ED Triage Vitals [10/15/22 1232]   Temperature Pulse Respirations Blood Pressure SpO2   98 °F (36 7 °C) (!) 106 18 154/89 97 %      Temp Source Heart Rate Source Patient Position - Orthostatic VS BP Location FiO2 (%)   Temporal Monitor Sitting Right arm --      Pain Score       No Pain           Vitals:    10/15/22 1300 10/15/22 1315 10/15/22 1330 10/15/22 1345   BP: 149/91  136/77    Pulse: 88 87 94 78   Patient Position - Orthostatic VS:             Visual Acuity      ED Medications  Medications   sodium chloride 0 9 % bolus 1,000 mL (0 mL Intravenous Stopped 10/15/22 1349)       Diagnostic Studies  Results Reviewed     Procedure Component Value Units Date/Time    Urine Microscopic [[id number]]  (Abnormal) Collected: 10/15/22 1251    Lab Status: Edited Result - FINAL Specimen: Urine, Clean Catch Updated: 10/15/22 1346     RBC, UA 4-10 /hpf      WBC, UA 1-2 /hpf      Epithelial Cells Moderate /hpf      Bacteria, UA Occasional /hpf     Comprehensive metabolic panel [[id number]]  (Abnormal) Collected: 10/15/22 1251    Lab Status: Final result Specimen: Blood from Arm, Right Updated: 10/15/22 1314     Sodium 136 mmol/L      Potassium 3 6 mmol/L      Chloride 99 mmol/L      CO2 27 mmol/L      ANION GAP 10 mmol/L      BUN 12 mg/dL      Creatinine 0 92 mg/dL      Glucose 159 mg/dL      Calcium 9 2 mg/dL      AST 16 U/L      ALT 26 U/L      Alkaline Phosphatase 109 U/L      Total Protein 7 5 g/dL      Albumin 3 9 g/dL      Total Bilirubin 0 46 mg/dL      eGFR 79 ml/min/1 73sq m     Narrative:      [place] guidelines for Chronic Kidney Disease (CKD):   •  Stage 1 with normal or high GFR (GFR > 90 mL/min/1 73 square meters)  • Stage 2 Mild CKD (GFR = 60-89 mL/min/1 73 square meters)  •  Stage 3A Moderate CKD (GFR = 45-59 mL/min/1 73 square meters)  •  Stage 3B Moderate CKD (GFR = 30-44 mL/min/1 73 square meters)  •  Stage 4 Severe CKD (GFR = 15-29 mL/min/1 73 square meters)  •  Stage 5 End Stage CKD (GFR <15 mL/min/1 73 square meters)  Note: GFR calculation is accurate only with a steady state creatinine    UA w Reflex to Microscopic w Reflex to Culture [[id number]]  (Abnormal) Collected: 10/15/22 1251    Lab Status: Final result Specimen: Urine, Clean Catch Updated: 10/15/22 1304     Color, UA Yellow     Clarity, UA Clear     Specific Gravity, UA >=1 030     pH, UA 5 5     Leukocytes, UA Negative     Nitrite, UA Negative     Protein, UA Negative mg/dl      Glucose, UA Negative mg/dl      Ketones, UA Negative mg/dl      Urobilinogen, UA 0 2 E U /dl      Bilirubin, UA Negative     Occult Blood, UA Moderate    CBC and differential [[id number]]  (Abnormal) Collected: 10/15/22 1251    Lab Status: Final result Specimen: Blood from Arm, Right Updated: 10/15/22 1259     WBC 10 70 Thousand/uL      RBC 4 71 Million/uL      Hemoglobin 14 5 g/dL      Hematocrit 43 5 %      MCV 92 fL      MCH 30 8 pg      MCHC 33 3 g/dL      RDW 12 6 %      MPV 10 2 fL      Platelets 400 Thousands/uL      nRBC 0 /100 WBCs      Neutrophils Relative 76 %      Immat GRANS % 1 %      Lymphocytes Relative 12 %      Monocytes Relative 7 %      Eosinophils Relative 3 %      Basophils Relative 1 %      Neutrophils Absolute 8 33 Thousands/µL      Immature Grans Absolute 0 05 Thousand/uL      Lymphocytes Absolute 1 26 Thousands/µL      Monocytes Absolute 0 71 Thousand/µL      Eosinophils Absolute 0 28 Thousand/µL      Basophils Absolute 0 07 Thousands/µL     POCT pregnancy, urine [[id number]]  (Normal) Resulted: 10/15/22 1251    Lab Status: Final result Updated: 10/15/22 1251     EXT PREG TEST UR (Ref: Negative) NEGATIVE     Control VALID    Fingerstick Glucose (POCT) [[id number]] (Abnormal) Collected: 10/15/22 1236    Lab Status: Final result Updated: 10/15/22 1238     POC Glucose 176 mg/dl                  No orders to display              Procedures  Procedures         ED Course                                             MDM  Number of Diagnoses or Management Options  Diagnosis management comments: To er with light headed sensation when getting up for about two weeks  No syncope, or cp  Also with mild nasueas  In er sp ivf she feels much better, sx resoled  She gets up from supine wo difficulty or sx  She will fu with pcp this week  She will return to er with red flag sx  Disposition  Final diagnoses:   Lightheadedness     Time reflects when diagnosis was documented in both MDM as applicable and the Disposition within this note     Time User Action Codes Description Comment    10/15/2022  1:38 PM Chucky Heller Add [[id number]] [address] [place]       ED Disposition     ED Disposition   Discharge    Condition   Stable    Date/Time   Sat Oct 15, 2022  1:38 PM    [place] discharge to home/self care                 Follow-up Information     Follow up With Specialties Details Why Victoriano Murrieta, [facility] Schedule an appointment as soon as possible for a visit in 3 days  [address]  [phone]            Discharge Medication List as of 10/15/2022  1:49 PM      CONTINUE these medications which have NOT CHANGED    Details   albuterol (ProAir HFA) 90 mcg/act inhaler Inhale 2 puffs every 4 (four) hours as needed for wheezing or shortness of breath, Starting Mon 9/19/2022, Normal      ALPRAZolam (XANAX) 0 5 mg tablet Take 1 tablet (0 5 mg total) by mouth 2 (two) times a day as needed for anxiety, Starting Mon 9/19/2022, Normal      ergocalciferol (VITAMIN D2) 50,000 units Take 1 capsule (50,000 Units total) by mouth once a week, Starting Mon 9/19/2022, Normal      FLUoxetine (PROzac) 20 mg capsule Take 1 capsule (20 mg total) by mouth daily, Starting Mon 9/19/2022, Normal      fluticasone (FLONASE) 50 mcg/act nasal spray 1 spray into each nostril daily, Starting Tue 6/7/2022, Normal      Fluticasone-Salmeterol (Advair) 250-50 mcg/dose inhaler Inhale 1 puff 2 (two) times a day Rinse mouth after use , Starting Mon 9/19/2022, Normal      hydrochlorothiazide (HYDRODIURIL) 12 5 mg tablet Take 1 tablet (12 5 mg total) by mouth daily, Starting Mon 9/19/2022, Normal      lisinopril (ZESTRIL) 10 mg tablet Take 1 tablet (10 mg total) by mouth 2 (two) times a day, Starting Mon 9/19/2022, Normal      !! methocarbamol (ROBAXIN) 500 mg tablet Take 1 tablet (500 mg total) by mouth 3 (three) times a day As needed for spasms, Starting Mon 3/28/2022, Normal      !! methocarbamol (Robaxin-750) 750 mg tablet Take 1 tablet (750 mg total) by mouth every 6 (six) hours as needed for muscle spasms, Starting Wed 5/18/2022, Normal      ondansetron (ZOFRAN) 4 mg tablet Take 1 tablet (4 mg total) by mouth every 8 (eight) hours as needed for nausea, Starting Mon 2/7/2022, Normal      Promethazine-DM (PHENERGAN-DM) 6 25-15 mg/5 mL oral syrup Take 5 mL by mouth 4 (four) times a day as needed for cough, Starting Tue 6/7/2022, Normal      QUEtiapine (SEROquel) 50 mg tablet Take 1 tablet (50 mg total) by mouth daily at bedtime, Starting Mon 9/19/2022, Normal       !! - Potential duplicate medications found  Please discuss with provider  No discharge procedures on file      PDMP Review     None          ED Provider  Electronically Signed by           Ulisses Fall MD  10/15/22 8619

## 2022-10-15 NOTE — DISCHARGE INSTRUCTIONS
Return to er with passing out event, chest pain, shortness of breath, fever, if you feel worse or have new symptoms  See pcp in 2 - 3 days for follow up

## 2022-10-18 LAB
ATRIAL RATE: 85 BPM
P AXIS: 17 DEGREES
PR INTERVAL: 150 MS
QRS AXIS: 30 DEGREES
QRSD INTERVAL: 78 MS
QT INTERVAL: 360 MS
QTC INTERVAL: 428 MS
T WAVE AXIS: -18 DEGREES
VENTRICULAR RATE: 85 BPM

## 2022-10-18 PROCEDURE — 93010 ELECTROCARDIOGRAM REPORT: CPT | Performed by: INTERNAL MEDICINE

## 2022-10-19 ENCOUNTER — OFFICE VISIT (OUTPATIENT)
Dept: FAMILY MEDICINE CLINIC | Facility: HOME HEALTHCARE | Age: 37
End: 2022-10-19
Payer: COMMERCIAL

## 2022-10-19 VITALS
HEART RATE: 96 BPM | OXYGEN SATURATION: 98 % | BODY MASS INDEX: 38.83 KG/M2 | TEMPERATURE: 98 F | RESPIRATION RATE: 18 BRPM | HEIGHT: 62 IN | SYSTOLIC BLOOD PRESSURE: 126 MMHG | DIASTOLIC BLOOD PRESSURE: 82 MMHG | WEIGHT: 211 LBS

## 2022-10-19 DIAGNOSIS — F41.1 GENERALIZED ANXIETY DISORDER: ICD-10-CM

## 2022-10-19 DIAGNOSIS — R73.03 PRE-DIABETES: Primary | ICD-10-CM

## 2022-10-19 DIAGNOSIS — E78.00 ELEVATED LDL CHOLESTEROL LEVEL: ICD-10-CM

## 2022-10-19 DIAGNOSIS — R11.0 NAUSEA: ICD-10-CM

## 2022-10-19 DIAGNOSIS — J30.2 SEASONAL ALLERGIES: ICD-10-CM

## 2022-10-19 DIAGNOSIS — R73.9 HYPERGLYCEMIA: ICD-10-CM

## 2022-10-19 LAB — SL AMB POCT HEMOGLOBIN AIC: 6.3 (ref ?–6.5)

## 2022-10-19 PROCEDURE — T1015 CLINIC SERVICE: HCPCS | Performed by: FAMILY MEDICINE

## 2022-10-19 PROCEDURE — 99213 OFFICE O/P EST LOW 20 MIN: CPT | Performed by: FAMILY MEDICINE

## 2022-10-19 RX ORDER — ONDANSETRON 4 MG/1
4 TABLET, FILM COATED ORAL EVERY 8 HOURS PRN
Qty: 20 TABLET | Refills: 0 | Status: SHIPPED | OUTPATIENT
Start: 2022-10-19

## 2022-10-19 RX ORDER — METFORMIN HYDROCHLORIDE 500 MG/1
500 TABLET, EXTENDED RELEASE ORAL
Qty: 30 TABLET | Refills: 5 | Status: SHIPPED | OUTPATIENT
Start: 2022-10-19

## 2022-10-19 RX ORDER — ALPRAZOLAM 0.5 MG/1
0.5 TABLET ORAL 2 TIMES DAILY PRN
Qty: 30 TABLET | Refills: 0 | Status: SHIPPED | OUTPATIENT
Start: 2022-10-19

## 2022-10-19 RX ORDER — BLOOD-GLUCOSE METER
KIT MISCELLANEOUS
Qty: 1 KIT | Refills: 0 | Status: SHIPPED | OUTPATIENT
Start: 2022-10-19

## 2022-10-19 RX ORDER — LANCETS 33 GAUGE
EACH MISCELLANEOUS
Qty: 300 EACH | Refills: 3 | Status: SHIPPED | OUTPATIENT
Start: 2022-10-19

## 2022-10-19 RX ORDER — CETIRIZINE HYDROCHLORIDE 10 MG/1
10 TABLET ORAL DAILY
Qty: 30 TABLET | Refills: 5 | Status: SHIPPED | OUTPATIENT
Start: 2022-10-19

## 2022-10-19 RX ORDER — BLOOD SUGAR DIAGNOSTIC
STRIP MISCELLANEOUS
Qty: 300 EACH | Refills: 3 | Status: SHIPPED | OUTPATIENT
Start: 2022-10-19

## 2022-10-19 NOTE — PROGRESS NOTES
[address]       NAME: Kostas Ramirez is a 44 y o  female  : 1985    MRN: [id number]  DATE: 2022  TIME: 3:00 PM    Assessment and Plan   Diagnoses and all orders for this visit:    Pre-diabetes  -     POCT hemoglobin A1c  -     metFORMIN (GLUCOPHAGE-XR) 500 mg 24 hr tablet; Take 1 tablet (500 mg total) by mouth daily with dinner  -     Blood Glucose Monitoring Suppl (OneTouch Verio Reflect) w/Device KIT; Check blood sugars three times daily  Please substitute with appropriate alternative as covered by patient's insurance  Dx: E11 65  -     glucose blood (OneTouch Verio) test strip; Check blood sugars three times daily  Please substitute with appropriate alternative as covered by patient's insurance  Dx: E11 65  -     OneTouch Delica Lancets 91Z MISC; Check blood sugars three times daily  Please substitute with appropriate alternative as covered by patient's insurance  Dx: E11 65    Generalized anxiety disorder  -     ALPRAZolam (XANAX) 0 5 mg tablet; Take 1 tablet (0 5 mg total) by mouth 2 (two) times a day as needed for anxiety    Seasonal allergies  -     cetirizine (ZyrTEC) 10 mg tablet; Take 1 tablet (10 mg total) by mouth daily    Hyperglycemia  -     metFORMIN (GLUCOPHAGE-XR) 500 mg 24 hr tablet; Take 1 tablet (500 mg total) by mouth daily with dinner  -     Blood Glucose Monitoring Suppl (OneTouch Verio Reflect) w/Device KIT; Check blood sugars three times daily  Please substitute with appropriate alternative as covered by patient's insurance  Dx: E11 65  -     glucose blood (OneTouch Verio) test strip; Check blood sugars three times daily  Please substitute with appropriate alternative as covered by patient's insurance  Dx: E11 65  -     OneTouch Delica Lancets 40F MISC; Check blood sugars three times daily  Please substitute with appropriate alternative as covered by patient's insurance   Dx: E11 65    Elevated LDL cholesterol level  -     Lipid Panel with Direct LDL reflex; Future    Nausea  -     ondansetron (ZOFRAN) 4 mg tablet; Take 1 tablet (4 mg total) by mouth every 8 (eight) hours as needed for nausea      Patient with several episodes of hyperglycemia  Patient recently seen in the emergency department with fasting blood glucose of 176  A1c today in the office was 6 3  Patient was complaining of issues with headaches, nausea and sweats  Without any fever  States sometimes had some shakiness in her hands /body  Discussed lifestyle modifications, diabetic diet, weight loss and exercise  Patient will check her blood sugars 3 times daily keep log and call me with any concerning values  Started on metformin extended release 500 mg once daily  Did discuss side effects of the medication  Patient will follow-up in approximately 1 month or sooner if needed  Chief Complaint     Chief Complaint   Patient presents with   • Follow-up     Feels nausea and shakiness all the time  Sweats, no fever  Headaches         History of Present Illness       HPI    50-year-old female here today for episodes of nausea, shakiness, sweats and headaches  Patient was recently seen emergency department with elevated fasting blood glucose of 176  Positive family history of type 2 diabetes  Denies any fevers, chills, shortness of breath, abdominal pain, chest pain review of systems otherwise negative  Review of Systems   Review of Systems   as per HPI    Current Medications       Current Outpatient Medications:   •  albuterol (ProAir HFA) 90 mcg/act inhaler, Inhale 2 puffs every 4 (four) hours as needed for wheezing or shortness of breath, Disp: 18 g, Rfl: 5  •  ALPRAZolam (XANAX) 0 5 mg tablet, Take 1 tablet (0 5 mg total) by mouth 2 (two) times a day as needed for anxiety, Disp: 30 tablet, Rfl: 0  •  Blood Glucose Monitoring Suppl (OneTouch Verio Reflect) w/Device KIT, Check blood sugars three times daily  Please substitute with appropriate alternative as covered by patient's insurance   Dx: E11 65, Disp: 1 kit, Rfl: 0  •  cetirizine (ZyrTEC) 10 mg tablet, Take 1 tablet (10 mg total) by mouth daily, Disp: 30 tablet, Rfl: 5  •  ergocalciferol (VITAMIN D2) 50,000 units, Take 1 capsule (50,000 Units total) by mouth once a week, Disp: 12 capsule, Rfl: 1  •  FLUoxetine (PROzac) 20 mg capsule, Take 1 capsule (20 mg total) by mouth daily, Disp: 30 capsule, Rfl: 0  •  Fluticasone-Salmeterol (Advair) 250-50 mcg/dose inhaler, Inhale 1 puff 2 (two) times a day Rinse mouth after use , Disp: 60 blister, Rfl: 3  •  glucose blood (OneTouch Verio) test strip, Check blood sugars three times daily  Please substitute with appropriate alternative as covered by patient's insurance  Dx: E11 65, Disp: 300 each, Rfl: 3  •  hydrochlorothiazide (HYDRODIURIL) 12 5 mg tablet, Take 1 tablet (12 5 mg total) by mouth daily, Disp: 90 tablet, Rfl: 1  •  lisinopril (ZESTRIL) 10 mg tablet, Take 1 tablet (10 mg total) by mouth 2 (two) times a day, Disp: 90 tablet, Rfl: 1  •  metFORMIN (GLUCOPHAGE-XR) 500 mg 24 hr tablet, Take 1 tablet (500 mg total) by mouth daily with dinner, Disp: 30 tablet, Rfl: 5  •  ondansetron (ZOFRAN) 4 mg tablet, Take 1 tablet (4 mg total) by mouth every 8 (eight) hours as needed for nausea, Disp: 20 tablet, Rfl: 0  •  OneTouch Delica Lancets 46Y MISC, Check blood sugars three times daily  Please substitute with appropriate alternative as covered by patient's insurance   Dx: E11 65, Disp: 300 each, Rfl: 3  •  QUEtiapine (SEROquel) 50 mg tablet, Take 1 tablet (50 mg total) by mouth daily at bedtime, Disp: 30 tablet, Rfl: 2  •  fluticasone (FLONASE) 50 mcg/act nasal spray, 1 spray into each nostril daily (Patient not taking: No sig reported), Disp: 16 g, Rfl: 5  •  Promethazine-DM (PHENERGAN-DM) 6 25-15 mg/5 mL oral syrup, Take 5 mL by mouth 4 (four) times a day as needed for cough (Patient not taking: No sig reported), Disp: 120 mL, Rfl: 0    Current Allergies     Allergies as of 10/19/2022 - Reviewed 10/19/2022 Allergen Reaction Noted   • Bactrim [sulfamethoxazole-trimethoprim] Shortness Of Breath 06/18/2019   • Codeine Hives 06/01/2016            The following portions of the patient's history were reviewed and updated as appropriate: allergies, current medications, past family history, past medical history, past social history, past surgical history and problem list      Past Medical History:   Diagnosis Date   • Anxiety    • Asthma    • Cat-scratch disease     last assessed 10/22/15   • Depression    • Hypertension     last assessed 11/11/14   • Sciatica    • Sleep disorder        Past Surgical History:   Procedure Laterality Date   • BREAST BIOPSY Left     2016   • DENTAL SURGERY     • NM BX/REMV,LYMPH NODE,DEEP AXILL Left 1/15/2019    Procedure: LEFT AXILLARY LYMPH NODE BIOPSY;  Surgeon: Nelda Rick MD;  Location[facility];  Service: General   • NM LAMNOTMY INCL W/DCMPRSN NRV ROOT 1 INTRSPC LUMBR Right 4/14/2021    Procedure: L5-S1 minimally invasive microdiskectomy and  epidural steroid injection;  Surgeon: Rizwana Lomax MD;  Location[facility];  Service: Neurosurgery   • TUBAL LIGATION         Family History   Problem Relation Age of Onset   • Anxiety disorder Mother    • Bipolar disorder Mother    • Depression Mother    • Tremor Mother    • Anxiety disorder Father    • Bipolar disorder Father    • Depression Father    • Schizophrenia Father    • Parkinsonism Maternal Grandmother    • Stomach cancer Paternal Grandfather          Medications have been verified  Objective   /82 (BP Location: Left arm, Patient Position: Sitting, Cuff Size: Large)   Pulse 96   Temp 98 °F (36 7 °C) (Temporal)   Resp 18   Ht 5' 2" (1 575 m)   Wt 95 7 kg (211 lb)   LMP 10/03/2022   SpO2 98%   BMI 38 59 kg/m²        Physical Exam     Physical Exam  Vitals and nursing note reviewed  Constitutional:       General: She is not in acute distress       Appearance: She is not ill-appearing, toxic-appearing or diaphoretic  HENT:      Head: Normocephalic  Right Ear: Tympanic membrane normal       Left Ear: Tympanic membrane normal       Mouth/Throat:      Mouth: Mucous membranes are moist    Cardiovascular:      Rate and Rhythm: Normal rate and regular rhythm  Heart sounds: Normal heart sounds  Pulmonary:      Effort: Pulmonary effort is normal       Breath sounds: Normal breath sounds  Abdominal:      General: Bowel sounds are normal       Palpations: Abdomen is soft  Tenderness: There is no abdominal tenderness  Musculoskeletal:      Right lower leg: No edema  Left lower leg: No edema  Skin:     General: Skin is warm and dry  Capillary Refill: Capillary refill takes less than 2 seconds  Neurological:      General: No focal deficit present  Mental Status: She is alert and oriented to person, place, and time     Psychiatric:         Mood and Affect: Mood normal

## 2022-10-23 DIAGNOSIS — F32.2 CURRENT SEVERE EPISODE OF MAJOR DEPRESSIVE DISORDER WITHOUT PSYCHOTIC FEATURES, UNSPECIFIED WHETHER RECURRENT (HCC): ICD-10-CM

## 2022-10-23 DIAGNOSIS — F43.10 PTSD (POST-TRAUMATIC STRESS DISORDER): ICD-10-CM

## 2022-10-23 DIAGNOSIS — F41.1 GENERALIZED ANXIETY DISORDER: ICD-10-CM

## 2022-10-24 DIAGNOSIS — U07.1 COVID-19: ICD-10-CM

## 2022-10-24 RX ORDER — FLUOXETINE HYDROCHLORIDE 20 MG/1
CAPSULE ORAL
Qty: 30 CAPSULE | Refills: 2 | Status: SHIPPED | OUTPATIENT
Start: 2022-10-24

## 2022-10-25 RX ORDER — FLUTICASONE PROPIONATE 50 MCG
1 SPRAY, SUSPENSION (ML) NASAL DAILY
Qty: 16 G | Refills: 5 | Status: SHIPPED | OUTPATIENT
Start: 2022-10-25

## 2022-11-15 ENCOUNTER — TELEPHONE (OUTPATIENT)
Dept: PSYCHIATRY | Facility: CLINIC | Age: 37
End: 2022-11-15

## 2022-11-15 NOTE — TELEPHONE ENCOUNTER
Pt reached out in regards to scheduling services for talk therapy, informed pt about the waitlist  Pt was added the talk therapy waitlist  I am also sending out an additional resource packet in the mail

## 2022-11-17 ENCOUNTER — OFFICE VISIT (OUTPATIENT)
Dept: FAMILY MEDICINE CLINIC | Facility: HOME HEALTHCARE | Age: 37
End: 2022-11-17

## 2022-11-17 VITALS
TEMPERATURE: 97.8 F | OXYGEN SATURATION: 95 % | DIASTOLIC BLOOD PRESSURE: 84 MMHG | WEIGHT: 208 LBS | BODY MASS INDEX: 38.28 KG/M2 | RESPIRATION RATE: 18 BRPM | HEART RATE: 96 BPM | HEIGHT: 62 IN | SYSTOLIC BLOOD PRESSURE: 122 MMHG

## 2022-11-17 DIAGNOSIS — F32.2 CURRENT SEVERE EPISODE OF MAJOR DEPRESSIVE DISORDER WITHOUT PSYCHOTIC FEATURES, UNSPECIFIED WHETHER RECURRENT (HCC): ICD-10-CM

## 2022-11-17 DIAGNOSIS — F43.10 PTSD (POST-TRAUMATIC STRESS DISORDER): ICD-10-CM

## 2022-11-17 DIAGNOSIS — F41.1 GENERALIZED ANXIETY DISORDER: Primary | ICD-10-CM

## 2022-11-17 DIAGNOSIS — Z23 ENCOUNTER FOR IMMUNIZATION: ICD-10-CM

## 2022-11-17 RX ORDER — FLUOXETINE HYDROCHLORIDE 40 MG/1
40 CAPSULE ORAL DAILY
Qty: 30 CAPSULE | Refills: 2 | Status: SHIPPED | OUTPATIENT
Start: 2022-11-17

## 2022-11-17 NOTE — PROGRESS NOTES
[address]       NAME: Torrie Francis is a 44 y o  female  : 1985    MRN: [id number]  DATE: 2022  TIME: 3:13 PM    Assessment and Plan   Diagnoses and all orders for this visit:    Generalized anxiety disorder  -     FLUoxetine (PROzac) 40 MG capsule; Take 1 capsule (40 mg total) by mouth daily    Encounter for immunization  -     influenza vaccine, quadrivalent, recombinant, PF, 0 5 mL, for patients 18 yr+ (FLUBLOK)  -     Pneumococcal Conjugate Vaccine 20-valent (Pcv20)    PTSD (post-traumatic stress disorder)  -     FLUoxetine (PROzac) 40 MG capsule; Take 1 capsule (40 mg total) by mouth daily    Current severe episode of major depressive disorder without psychotic features, unspecified whether recurrent (HCC)  -     FLUoxetine (PROzac) 40 MG capsule; Take 1 capsule (40 mg total) by mouth daily        Prozac increased to 40 mg daily  Patient states does not like to take the Xanax and tries to use sparingly  Denies any SI or HI  Will follow-up in approximately 2 months for repeat A1c  Instructed to call with any questions or concerns  Chief Complaint     Chief Complaint   Patient presents with   • Follow-up         History of Present Illness       HPI    42-year-old female here today for follow-up visit  Patient states still having significant anxiety symptoms as well as depression PTSD  Patient hoping that we can increase her Prozac dose  Denies any SI or HI  Review of Systems   Review of Systems   Constitutional: Negative  Respiratory: Negative  Cardiovascular: Negative  Gastrointestinal: Negative  Neurological: Negative  Psychiatric/Behavioral: Negative for self-injury and suicidal ideas  All other systems reviewed and are negative          Current Medications       Current Outpatient Medications:   •  albuterol (ProAir HFA) 90 mcg/act inhaler, Inhale 2 puffs every 4 (four) hours as needed for wheezing or shortness of breath, Disp: 18 g, Rfl: 5  • ALPRAZolam (XANAX) 0 5 mg tablet, Take 1 tablet (0 5 mg total) by mouth 2 (two) times a day as needed for anxiety, Disp: 30 tablet, Rfl: 0  •  Blood Glucose Monitoring Suppl (OneTouch Verio Reflect) w/Device KIT, Check blood sugars three times daily  Please substitute with appropriate alternative as covered by patient's insurance  Dx: E11 65, Disp: 1 kit, Rfl: 0  •  cetirizine (ZyrTEC) 10 mg tablet, Take 1 tablet (10 mg total) by mouth daily, Disp: 30 tablet, Rfl: 5  •  ergocalciferol (VITAMIN D2) 50,000 units, Take 1 capsule (50,000 Units total) by mouth once a week, Disp: 12 capsule, Rfl: 1  •  FLUoxetine (PROzac) 40 MG capsule, Take 1 capsule (40 mg total) by mouth daily, Disp: 30 capsule, Rfl: 2  •  fluticasone (FLONASE) 50 mcg/act nasal spray, 1 spray into each nostril daily, Disp: 16 g, Rfl: 5  •  Fluticasone-Salmeterol (Advair) 250-50 mcg/dose inhaler, Inhale 1 puff 2 (two) times a day Rinse mouth after use , Disp: 60 blister, Rfl: 3  •  glucose blood (OneTouch Verio) test strip, Check blood sugars three times daily  Please substitute with appropriate alternative as covered by patient's insurance  Dx: E11 65, Disp: 300 each, Rfl: 3  •  hydrochlorothiazide (HYDRODIURIL) 12 5 mg tablet, Take 1 tablet (12 5 mg total) by mouth daily, Disp: 90 tablet, Rfl: 1  •  lisinopril (ZESTRIL) 10 mg tablet, Take 1 tablet (10 mg total) by mouth 2 (two) times a day, Disp: 90 tablet, Rfl: 1  •  metFORMIN (GLUCOPHAGE-XR) 500 mg 24 hr tablet, Take 1 tablet (500 mg total) by mouth daily with dinner, Disp: 30 tablet, Rfl: 5  •  ondansetron (ZOFRAN) 4 mg tablet, Take 1 tablet (4 mg total) by mouth every 8 (eight) hours as needed for nausea, Disp: 20 tablet, Rfl: 0  •  OneTouch Delica Lancets 67J MISC, Check blood sugars three times daily  Please substitute with appropriate alternative as covered by patient's insurance   Dx: E11 65, Disp: 300 each, Rfl: 3  •  QUEtiapine (SEROquel) 50 mg tablet, Take 1 tablet (50 mg total) by mouth daily at bedtime, Disp: 30 tablet, Rfl: 2  •  Promethazine-DM (PHENERGAN-DM) 6 25-15 mg/5 mL oral syrup, Take 5 mL by mouth 4 (four) times a day as needed for cough (Patient not taking: Reported on 9/19/2022), Disp: 120 mL, Rfl: 0    Current Allergies     Allergies as of 11/17/2022 - Reviewed 11/17/2022   Allergen Reaction Noted   • Bactrim [sulfamethoxazole-trimethoprim] Shortness Of Breath 06/18/2019   • Codeine Hives 06/01/2016            The following portions of the patient's history were reviewed and updated as appropriate: allergies, current medications, past family history, past medical history, past social history, past surgical history and problem list      Past Medical History:   Diagnosis Date   • Anxiety    • Asthma    • Cat-scratch disease     last assessed 10/22/15   • Depression    • Hypertension     last assessed 11/11/14   • Sciatica    • Sleep disorder        Past Surgical History:   Procedure Laterality Date   • BREAST BIOPSY Left     2016   • DENTAL SURGERY     • IN BX/REMV,LYMPH NODE,DEEP AXILL Left 1/15/2019    Procedure: LEFT AXILLARY LYMPH NODE BIOPSY;  Surgeon: Kendall Souza MD;  [facility];  Service: General   • IN LAMNOTMY INCL W/DCMPRSN NRV ROOT 1 INTRSPC LUMBR Right 4/14/2021    Procedure: L5-S1 minimally invasive microdiskectomy and  epidural steroid injection;  Surgeon: Jake Gutiérrez MD;  [facility];  Service: Neurosurgery   • TUBAL LIGATION         Family History   Problem Relation Age of Onset   • Anxiety disorder Mother    • Bipolar disorder Mother    • Depression Mother    • Tremor Mother    • Anxiety disorder Father    • Bipolar disorder Father    • Depression Father    • Schizophrenia Father    • Parkinsonism Maternal Grandmother    • Stomach cancer Paternal Grandfather          Medications have been verified          Objective   /84 (BP Location: Left arm, Patient Position: Sitting, Cuff Size: Adult)   Pulse 96   Temp 97 8 °F (36 6 °C) (Temporal) Resp 18   Ht 5' 2" (1 575 m)   Wt 94 3 kg (208 lb)   SpO2 95%   BMI 38 04 kg/m²        Physical Exam     Physical Exam  Vitals reviewed  Constitutional:       General: She is not in acute distress  Appearance: She is not ill-appearing, toxic-appearing or diaphoretic  Cardiovascular:      Rate and Rhythm: Normal rate  Pulmonary:      Effort: Pulmonary effort is normal    Neurological:      Mental Status: She is alert  Psychiatric:         Attention and Perception: Attention normal          Mood and Affect: Mood normal          Speech: Speech normal          Behavior: Behavior normal  Behavior is cooperative  Thought Content: Thought content normal  Thought content is not paranoid or delusional  Thought content does not include homicidal or suicidal ideation  Thought content does not include homicidal or suicidal plan           Cognition and Memory: Cognition normal          Judgment: Judgment normal

## 2022-12-05 DIAGNOSIS — J30.2 SEASONAL ALLERGIES: ICD-10-CM

## 2022-12-05 DIAGNOSIS — E55.9 VITAMIN D DEFICIENCY: ICD-10-CM

## 2022-12-06 RX ORDER — CETIRIZINE HYDROCHLORIDE 10 MG/1
10 TABLET ORAL DAILY
Qty: 30 TABLET | Refills: 0 | Status: SHIPPED | OUTPATIENT
Start: 2022-12-06

## 2022-12-06 RX ORDER — ERGOCALCIFEROL 1.25 MG/1
50000 CAPSULE ORAL WEEKLY
Qty: 12 CAPSULE | Refills: 0 | Status: SHIPPED | OUTPATIENT
Start: 2022-12-06

## 2022-12-28 DIAGNOSIS — I10 ESSENTIAL HYPERTENSION: ICD-10-CM

## 2022-12-28 DIAGNOSIS — F43.10 PTSD (POST-TRAUMATIC STRESS DISORDER): ICD-10-CM

## 2022-12-28 RX ORDER — LISINOPRIL 10 MG/1
TABLET ORAL
Qty: 90 TABLET | Refills: 1 | Status: SHIPPED | OUTPATIENT
Start: 2022-12-28 | End: 2022-12-29 | Stop reason: SDUPTHER

## 2022-12-29 DIAGNOSIS — R73.03 PRE-DIABETES: ICD-10-CM

## 2022-12-29 DIAGNOSIS — J45.40 MODERATE PERSISTENT ASTHMA WITHOUT COMPLICATION: ICD-10-CM

## 2022-12-29 DIAGNOSIS — I10 ESSENTIAL HYPERTENSION: ICD-10-CM

## 2022-12-29 DIAGNOSIS — U07.1 COVID-19: ICD-10-CM

## 2022-12-29 DIAGNOSIS — R11.0 NAUSEA: ICD-10-CM

## 2022-12-29 DIAGNOSIS — F41.1 GENERALIZED ANXIETY DISORDER: ICD-10-CM

## 2022-12-29 DIAGNOSIS — F32.2 CURRENT SEVERE EPISODE OF MAJOR DEPRESSIVE DISORDER WITHOUT PSYCHOTIC FEATURES, UNSPECIFIED WHETHER RECURRENT (HCC): ICD-10-CM

## 2022-12-29 DIAGNOSIS — F43.10 PTSD (POST-TRAUMATIC STRESS DISORDER): ICD-10-CM

## 2022-12-29 DIAGNOSIS — J30.2 SEASONAL ALLERGIES: ICD-10-CM

## 2022-12-29 DIAGNOSIS — E55.9 VITAMIN D DEFICIENCY: ICD-10-CM

## 2022-12-29 DIAGNOSIS — R73.9 HYPERGLYCEMIA: ICD-10-CM

## 2022-12-29 RX ORDER — LISINOPRIL 10 MG/1
10 TABLET ORAL 2 TIMES DAILY
Qty: 90 TABLET | Refills: 1 | Status: SHIPPED | OUTPATIENT
Start: 2022-12-29

## 2022-12-29 RX ORDER — METFORMIN HYDROCHLORIDE 500 MG/1
500 TABLET, EXTENDED RELEASE ORAL
Qty: 30 TABLET | Refills: 5 | Status: SHIPPED | OUTPATIENT
Start: 2022-12-29

## 2022-12-29 RX ORDER — CETIRIZINE HYDROCHLORIDE 10 MG/1
10 TABLET ORAL DAILY
Qty: 30 TABLET | Refills: 5 | Status: SHIPPED | OUTPATIENT
Start: 2022-12-29

## 2022-12-29 RX ORDER — BLOOD SUGAR DIAGNOSTIC
STRIP MISCELLANEOUS
Qty: 300 EACH | Refills: 3 | Status: SHIPPED | OUTPATIENT
Start: 2022-12-29

## 2022-12-29 RX ORDER — FLUTICASONE PROPIONATE AND SALMETEROL 250; 50 UG/1; UG/1
1 POWDER RESPIRATORY (INHALATION) 2 TIMES DAILY
Qty: 60 BLISTER | Refills: 3 | Status: SHIPPED | OUTPATIENT
Start: 2022-12-29

## 2022-12-29 RX ORDER — ONDANSETRON 4 MG/1
4 TABLET, FILM COATED ORAL EVERY 8 HOURS PRN
Qty: 20 TABLET | Refills: 0 | Status: SHIPPED | OUTPATIENT
Start: 2022-12-29 | End: 2023-01-04

## 2022-12-29 RX ORDER — FLUTICASONE PROPIONATE 50 MCG
1 SPRAY, SUSPENSION (ML) NASAL DAILY
Qty: 16 G | Refills: 5 | Status: SHIPPED | OUTPATIENT
Start: 2022-12-29

## 2022-12-29 RX ORDER — ALPRAZOLAM 0.5 MG/1
0.5 TABLET ORAL 2 TIMES DAILY PRN
Qty: 30 TABLET | Refills: 0 | Status: SHIPPED | OUTPATIENT
Start: 2022-12-29

## 2022-12-29 RX ORDER — ALBUTEROL SULFATE 90 UG/1
2 AEROSOL, METERED RESPIRATORY (INHALATION) EVERY 4 HOURS PRN
Qty: 18 G | Refills: 5 | Status: SHIPPED | OUTPATIENT
Start: 2022-12-29

## 2022-12-29 RX ORDER — HYDROCHLOROTHIAZIDE 12.5 MG/1
12.5 TABLET ORAL DAILY
Qty: 90 TABLET | Refills: 1 | Status: SHIPPED | OUTPATIENT
Start: 2022-12-29

## 2022-12-29 RX ORDER — QUETIAPINE FUMARATE 50 MG/1
TABLET, FILM COATED ORAL
Qty: 30 TABLET | Refills: 2 | Status: SHIPPED | OUTPATIENT
Start: 2022-12-29

## 2022-12-29 RX ORDER — FLUOXETINE HYDROCHLORIDE 40 MG/1
40 CAPSULE ORAL DAILY
Qty: 30 CAPSULE | Refills: 2 | Status: SHIPPED | OUTPATIENT
Start: 2022-12-29

## 2022-12-29 RX ORDER — ERGOCALCIFEROL 1.25 MG/1
50000 CAPSULE ORAL WEEKLY
Qty: 12 CAPSULE | Refills: 0 | Status: SHIPPED | OUTPATIENT
Start: 2022-12-29

## 2023-01-04 DIAGNOSIS — R11.0 NAUSEA: ICD-10-CM

## 2023-01-04 RX ORDER — ONDANSETRON 4 MG/1
4 TABLET, FILM COATED ORAL EVERY 8 HOURS PRN
Qty: 20 TABLET | Refills: 0 | Status: SHIPPED | OUTPATIENT
Start: 2023-01-04

## 2023-01-15 ENCOUNTER — HOSPITAL ENCOUNTER (EMERGENCY)
Facility: HOSPITAL | Age: 38
Discharge: HOME | End: 2023-01-15
Attending: EMERGENCY MEDICINE

## 2023-01-15 ENCOUNTER — APPOINTMENT (EMERGENCY)
Dept: RADIOLOGY | Facility: HOSPITAL | Age: 38
End: 2023-01-15

## 2023-01-15 VITALS
HEART RATE: 105 BPM | RESPIRATION RATE: 18 BRPM | OXYGEN SATURATION: 93 % | SYSTOLIC BLOOD PRESSURE: 119 MMHG | DIASTOLIC BLOOD PRESSURE: 73 MMHG | TEMPERATURE: 99.4 F

## 2023-01-15 DIAGNOSIS — J45.40 MODERATE PERSISTENT ASTHMA WITHOUT COMPLICATION: ICD-10-CM

## 2023-01-15 DIAGNOSIS — J06.9 VIRAL URI WITH COUGH: Primary | ICD-10-CM

## 2023-01-15 LAB
FLUAV RNA RESP QL NAA+PROBE: NEGATIVE
FLUBV RNA RESP QL NAA+PROBE: NEGATIVE
RSV RNA RESP QL NAA+PROBE: NEGATIVE
SARS-COV-2 RNA RESP QL NAA+PROBE: NEGATIVE

## 2023-01-15 RX ORDER — GUAIFENESIN/DEXTROMETHORPHAN 100-10MG/5
10 SYRUP ORAL ONCE
Status: COMPLETED | OUTPATIENT
Start: 2023-01-15 | End: 2023-01-15

## 2023-01-15 RX ORDER — ALBUTEROL SULFATE 90 UG/1
2 AEROSOL, METERED RESPIRATORY (INHALATION) EVERY 4 HOURS PRN
Qty: 18 G | Refills: 0 | Status: SHIPPED | OUTPATIENT
Start: 2023-01-15

## 2023-01-15 RX ORDER — DEXAMETHASONE 2 MG/1
10 TABLET ORAL ONCE
Qty: 5 TABLET | Refills: 0 | Status: SHIPPED | OUTPATIENT
Start: 2023-01-17 | End: 2023-01-17

## 2023-01-15 RX ORDER — IPRATROPIUM BROMIDE AND ALBUTEROL SULFATE 2.5; .5 MG/3ML; MG/3ML
3 SOLUTION RESPIRATORY (INHALATION) ONCE
Status: COMPLETED | OUTPATIENT
Start: 2023-01-15 | End: 2023-01-15

## 2023-01-15 RX ADMIN — GUAIFENESIN AND DEXTROMETHORPHAN 10 ML: 100; 10 SYRUP ORAL at 16:36

## 2023-01-15 RX ADMIN — IPRATROPIUM BROMIDE AND ALBUTEROL SULFATE 3 ML: 2.5; .5 SOLUTION RESPIRATORY (INHALATION) at 16:37

## 2023-01-15 RX ADMIN — DEXAMETHASONE SODIUM PHOSPHATE 10 MG: 10 INJECTION, SOLUTION INTRAMUSCULAR; INTRAVENOUS at 16:37

## 2023-01-15 NOTE — DISCHARGE INSTRUCTIONS
The results of the COVID/influenza/RSV testing will be available on [facility]  Any abnormal results will also be called to you  You can continue using over-the-counter medication as needed for cough and congestion  Please take the next steroid dose 2 days from now (January 17)  Continue using the albuterol inhaler as needed  If you are not obviously getting better in about 1 week, you can be rechecked by your regular doctor or in an urgent care

## 2023-01-15 NOTE — ED PROVIDER NOTES
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, has been ill with similar symptoms in the weeks preceding patient's own illness  Her mother did not seek medical care and did not have a specific diagnosis for her symptoms  The patient did receive seasonal influenza vaccination as well as pneumonia vaccination within the past month  She has never been vaccinated for COVID-19  She did have COVID-19 disease in 2022 but did not require hospitalization  Has been treating current symptoms with OTC antitussive agents and antipyretics  Patient notes a feeling of fluid in the left ear although does not have any pain there per se  She has not had anything draining from the ear  She also noted tightness in the musculature of her upper back over the past several days  No fever per se, no vomiting, no diarrhea, no rash, no dysphonia, no stridor, and no chest pain/syncope  Patient does have history of asthma that is mild/intermittent  She has had to use her inhaler more throughout the course of her current illness  A/p: URI type symptoms with recent sick contact with somewhat similar symptoms with wheezing present on exam   Underlying URI trigger obviously  Will check chest x-ray  We will check specific viral testing  Will need treatment with steroid and DuoNeb treatment        History provided by:  Medical records and patient  URI  Presenting symptoms: congestion, cough and fatigue    Presenting symptoms: no ear pain (Feeling of fluid in the left ear) and no sore throat        Prior to Admission Medications   Prescriptions Last Dose Informant Patient Reported? Taking? ALPRAZolam (XANAX) 0 5 mg tablet   No No   Sig: Take 1 tablet (0 5 mg total) by mouth 2 (two) times a day as needed for anxiety   Blood Glucose Monitoring Suppl (OneTouch Verio Reflect) w/Device KIT   No No   Sig: Check blood sugars three times daily  Please substitute with appropriate alternative as covered by patient's insurance  Dx: E11 65   FLUoxetine (PROzac) 40 MG capsule   No No   Sig: Take 1 capsule (40 mg total) by mouth daily   Fluticasone-Salmeterol (Advair) 250-50 mcg/dose inhaler   No No   Sig: Inhale 1 puff 2 (two) times a day Rinse mouth after use  Lancet Devices (Easy Mini Eject Lancing Device) MISC   No No   Sig: Use as directed to check blood sugar 3x daily  OneTouch Delica Lancets 73D MISC   No No   Sig: Check blood sugars three times daily  Please substitute with appropriate alternative as covered by patient's insurance   Dx: E11 65   Promethazine-DM (PHENERGAN-DM) 6 25-15 mg/5 mL oral syrup   No No   Sig: Take 5 mL by mouth 4 (four) times a day as needed for cough   Patient not taking: Reported on 2022   QUEtiapine (SEROquel) 50 mg tablet   No No   Sig: take 1 tablet by mouth at bedtime daily   albuterol (ProAir HFA) 90 mcg/act inhaler   No No   Sig: Inhale 2 puffs every 4 (four) hours as needed for wheezing or shortness of breath   albuterol (ProAir HFA) 90 mcg/act inhaler   No Yes   Sig: Inhale 2 puffs every 4 (four) hours as needed for wheezing or shortness of breath   cetirizine (ZyrTEC) 10 mg tablet   No No   Sig: Take 1 tablet (10 mg total) by mouth daily   ergocalciferol (VITAMIN D2) 50,000 units   No No   Sig: Take 1 capsule (50,000 Units total) by mouth once a week   fluticasone (FLONASE) 50 mcg/act nasal spray   No No   Si spray into each nostril daily   glucose blood (OneTouch Verio) test strip   No No   Sig: Check blood sugars three times daily  Please substitute with appropriate alternative as covered by patient's insurance  Dx: E11 65   hydrochlorothiazide (HYDRODIURIL) 12 5 mg tablet   No No   Sig: Take 1 tablet (12 5 mg total) by mouth daily   lisinopril (ZESTRIL) 10 mg tablet   No No   Sig: Take 1 tablet (10 mg total) by mouth 2 (two) times a day   metFORMIN (GLUCOPHAGE-XR) 500 mg 24 hr tablet   No No   Sig: Take 1 tablet (500 mg total) by mouth daily with dinner   ondansetron (ZOFRAN) 4 mg tablet   No No   Sig: TAKE 1 TABLET (4 MG TOTAL) BY MOUTH EVERY 8 (EIGHT) HOURS AS NEEDED FOR NAUSEA      Facility-Administered Medications: None       Past Medical History:   Diagnosis Date   • Anxiety    • Asthma    • Cat-scratch disease     last assessed 10/22/15   • Depression    • Hypertension     last assessed 11/11/14   • Sciatica    • Sleep disorder        Past Surgical History:   Procedure Laterality Date   • BREAST BIOPSY Left     2016   • DENTAL SURGERY     • CA BX/EXC LYMPH NODE OPEN DEEP AXILLARY NODE Left 1/15/2019    Procedure: LEFT AXILLARY LYMPH NODE BIOPSY;  Surgeon: Hubert Dunn MD;  [facility];  Service: General   • CA LAMNOTMY INCL W/DCMPRSN NRV ROOT 1 INTRSPC LUMBR Right 4/14/2021    Procedure: L5-S1 minimally invasive microdiskectomy and  epidural steroid injection;  Surgeon: Efrain Gage MD;  [facility];  Service: Neurosurgery   • TUBAL LIGATION         Family History   Problem Relation Age of Onset   • Anxiety disorder Mother    • Bipolar disorder Mother    • Depression Mother    • Tremor Mother    • Anxiety disorder Father    • Bipolar disorder Father    • Depression Father    • Schizophrenia Father    • Parkinsonism Maternal Grandmother    • Stomach cancer Paternal Grandfather      I have reviewed and agree with the history as documented      E-Cigarette/Vaping   • E-Cigarette Use Never User      E-Cigarette/Vaping Substances   • Nicotine No    • THC No    • CBD No    • Flavoring No    • Other No • Unknown No      Social History     Tobacco Use   • Smoking status: Never   • Smokeless tobacco: Never   Vaping Use   • Vaping Use: Never used   Substance Use Topics   • Alcohol use: No   • Drug use: No       Review of Systems   Constitutional: Positive for chills, diaphoresis and fatigue  HENT: Positive for congestion and postnasal drip  Negative for ear discharge, ear pain (Feeling of fluid in the left ear), sore throat and trouble swallowing  Respiratory: Positive for cough, chest tightness and shortness of breath  Gastrointestinal: Negative for diarrhea, nausea and vomiting  Skin: Negative  Hematological: Negative  Physical Exam  Physical Exam  Vitals and nursing note reviewed  Constitutional:       General: She is awake  She is not in acute distress  Appearance: Normal appearance  She is well-developed  HENT:      Head: Normocephalic and atraumatic  Right Ear: Hearing, tympanic membrane, ear canal and external ear normal       Left Ear: Hearing, ear canal and external ear normal  A middle ear effusion is present  Tympanic membrane is not erythematous or bulging  Neck:      Thyroid: No thyroid mass or thyroid tenderness  Trachea: Trachea and phonation normal       Comments: No stridor or dysphonia  Cardiovascular:      Rate and Rhythm: Regular rhythm  Tachycardia present  Pulses:           Radial pulses are 2+ on the right side and 2+ on the left side  Dorsalis pedis pulses are 2+ on the right side and 2+ on the left side  Posterior tibial pulses are 2+ on the right side and 2+ on the left side  Heart sounds: Normal heart sounds, S1 normal and S2 normal  No murmur heard  No friction rub  No gallop  Pulmonary:      Effort: Pulmonary effort is normal  No tachypnea or respiratory distress  Breath sounds: No stridor  Wheezing and rhonchi present  No decreased breath sounds or rales        Comments: Wheezes and rhonchi present bilaterally  Abdominal:      General: There is no distension  Palpations: There is no mass  Tenderness: There is no abdominal tenderness  There is no guarding or rebound  Musculoskeletal:        Back:       Comments: There is notable muscular hypertonicity and mild tenderness in the indicated regions without any overlying skin changes  There is no posterior midline tenderness to palpation or step-off of the T-spine  Skin:     General: Skin is warm and dry  Neurological:      Mental Status: She is alert and oriented to person, place, and time  GCS: GCS eye subscore is 4  GCS verbal subscore is 5  GCS motor subscore is 6  Cranial Nerves: No cranial nerve deficit  Sensory: No sensory deficit  Motor: No abnormal muscle tone  Comments: PERRLA; EOMI  Sensation intact to light touch over face in V1-V3 distribution bilaterally  Facial expressions symmetric  Tongue/uvula midline  Shoulder shrug equal bilaterally  Strength 5/5 in UE/LE bilaterally  Sensation intact to light touch in UE/LE bilaterally           Vital Signs  ED Triage Vitals   Temperature Pulse Respirations Blood Pressure SpO2   01/15/23 1607 01/15/23 1604 01/15/23 1602 01/15/23 1604 01/15/23 1604   99 4 °F (37 4 °C) (!) 122 18 159/89 95 %      Temp Source Heart Rate Source Patient Position - Orthostatic VS BP Location FiO2 (%)   01/15/23 1607 01/15/23 1602 01/15/23 1602 01/15/23 1602 --   Temporal Monitor Sitting Right arm       Pain Score       --                  Vitals:    01/15/23 1602 01/15/23 1604 01/15/23 1700   BP:  159/89 119/73   Pulse:  (!) 122 105   Patient Position - Orthostatic VS: Sitting  Sitting         Visual Acuity      ED Medications  Medications   dexamethasone oral liquid 10 mg 1 mL (10 mg Oral Given 1/15/23 1637)   ipratropium-albuterol (DUO-NEB) 0 5-2 5 mg/3 mL inhalation solution 3 mL (3 mL Nebulization Given 1/15/23 1637)   dextromethorphan-guaiFENesin (ROBITUSSIN DM) oral syrup 10 mL (10 mL Oral Given 1/15/23 1636)       Diagnostic Studies  Results Reviewed     Procedure Component Value Units Date/Time    FLU/RSV/COVID - if FLU/RSV clinically relevant [[id number]] Collected: 01/15/23 1636    Lab Status: In process Specimen: Nares from Nose Updated: 01/15/23 1642                 XR chest 1 view portable   ED Interpretation by Wilberto Crane DO (01/15 1648)   Airway is midline  Lungs are clear bilaterally with no evidence of pulmonary vascular congestion/focal infiltrate/pleural effusion/pneumothorax  Cardiac and mediastinal silhouettes are within normal limits  Osseous structures appear normal                    Procedures  Procedures         ED Course  ED Course as of 01/15/23 1736   Sun Ryan 15, 2023   1717 Patient reevaluated; reports mild improvement of symptoms  Repeat examination finds the patient awake and alert  The patient is in no respiratory distress  The patient is phonating in full sentences with no evidence of dysphonia/stridor  Repeat lung examination finds slightly improved aeration bilaterally with bilateral expiratory wheezes with prolonged expiratory phase with no crackles  Few scattered rhonchi are present  Patient did not desire to wait for results of the specific viral testing, and it would not affect treatment at this point (about 6-7d of sx)  She would not be a candidate for any specific antiviral therapies given that she fundamentally does not require hospitalization  Reasonable to discharge with continued symptomatic management as she has been doing  She will continue use of albuterol inhaler  She will take a dose of dexamethasone 2 days from now  She can be rechecked by her primary physician or an urgent care if she is not improving after about 1 week  All questions were answered to patient's satisfaction prior to discharge               MDM    Disposition  Final diagnoses:   Viral URI with cough   Moderate persistent asthma without complication     Time reflects when diagnosis was documented in both MDM as applicable and the Disposition within this note     Time User Action Codes Description Comment    1/15/2023  5:15 PM Worthy See Add [J06 9] Viral URI with cough     1/15/2023  5:16 PM Worthy See Add [J45 40] Moderate persistent asthma without complication       ED Disposition     ED Disposition   Discharge    Condition   Stable    Date/Time   Sun Ryan 15, 2023  5:15 PM    [place] discharge to home/self care  Follow-up Information    None         Discharge Medication List as of 1/15/2023  5:17 PM      START taking these medications    Details   dexamethasone (DECADRON) 2 mg tablet Take 5 tablets (10 mg total) by mouth 1 (one) time for 1 dose Do not start before January 17, 2023 , Starting Tue 1/17/2023, Normal         CONTINUE these medications which have CHANGED    Details   albuterol (ProAir HFA) 90 mcg/act inhaler Inhale 2 puffs every 4 (four) hours as needed for wheezing or shortness of breath, Starting Sun 1/15/2023, Normal         CONTINUE these medications which have NOT CHANGED    Details   ALPRAZolam (XANAX) 0 5 mg tablet Take 1 tablet (0 5 mg total) by mouth 2 (two) times a day as needed for anxiety, Starting Thu 12/29/2022, Normal      Blood Glucose Monitoring Suppl (OneTouch Verio Reflect) w/Device KIT Check blood sugars three times daily  Please substitute with appropriate alternative as covered by patient's insurance   Dx: E11 65, Normal      cetirizine (ZyrTEC) 10 mg tablet Take 1 tablet (10 mg total) by mouth daily, Starting Thu 12/29/2022, Normal      ergocalciferol (VITAMIN D2) 50,000 units Take 1 capsule (50,000 Units total) by mouth once a week, Starting Th 12/29/2022, Normal      FLUoxetine (PROzac) 40 MG capsule Take 1 capsule (40 mg total) by mouth daily, Starting Th 12/29/2022, Normal      fluticasone (FLONASE) 50 mcg/act nasal spray 1 spray into each nostril daily, Starting Th 12/29/2022, Normal Fluticasone-Salmeterol (Advair) 250-50 mcg/dose inhaler Inhale 1 puff 2 (two) times a day Rinse mouth after use , Starting Thu 12/29/2022, Normal      glucose blood (OneTouch Verio) test strip Check blood sugars three times daily  Please substitute with appropriate alternative as covered by patient's insurance  Dx: E11 65, Normal      hydrochlorothiazide (HYDRODIURIL) 12 5 mg tablet Take 1 tablet (12 5 mg total) by mouth daily, Starting Thu 12/29/2022, Normal      Lancet Devices (Easy Mini Eject Lancing Device) MISC Use as directed to check blood sugar 3x daily  , Normal      lisinopril (ZESTRIL) 10 mg tablet Take 1 tablet (10 mg total) by mouth 2 (two) times a day, Starting Thu 12/29/2022, Normal      metFORMIN (GLUCOPHAGE-XR) 500 mg 24 hr tablet Take 1 tablet (500 mg total) by mouth daily with dinner, Starting Thu 12/29/2022, Normal      ondansetron (ZOFRAN) 4 mg tablet TAKE 1 TABLET (4 MG TOTAL) BY MOUTH EVERY 8 (EIGHT) HOURS AS NEEDED FOR NAUSEA, Starting Wed 1/4/2023, Normal      OneTouch Delica Lancets 41J MISC Check blood sugars three times daily  Please substitute with appropriate alternative as covered by patient's insurance  Dx: E11 65, Normal      Promethazine-DM (PHENERGAN-DM) 6 25-15 mg/5 mL oral syrup Take 5 mL by mouth 4 (four) times a day as needed for cough, Starting Tue 6/7/2022, Normal      QUEtiapine (SEROquel) 50 mg tablet take 1 tablet by mouth at bedtime daily, Normal             No discharge procedures on file      PDMP Review       Value Time User    PDMP Reviewed  Yes 10/19/2022  2:31 PM Zaida Mcardle, PA-C          ED Provider  Electronically Signed by           Bryce Ge DO  01/15/23 [id number]

## 2023-01-19 ENCOUNTER — OFFICE VISIT (OUTPATIENT)
Dept: FAMILY MEDICINE CLINIC | Facility: HOME HEALTHCARE | Age: 38
End: 2023-01-19

## 2023-01-19 VITALS
BODY MASS INDEX: 37.65 KG/M2 | SYSTOLIC BLOOD PRESSURE: 140 MMHG | WEIGHT: 204.6 LBS | HEIGHT: 62 IN | OXYGEN SATURATION: 96 % | RESPIRATION RATE: 18 BRPM | HEART RATE: 81 BPM | TEMPERATURE: 98.1 F | DIASTOLIC BLOOD PRESSURE: 88 MMHG

## 2023-01-19 DIAGNOSIS — J45.40 MODERATE PERSISTENT ASTHMA WITHOUT COMPLICATION: ICD-10-CM

## 2023-01-19 DIAGNOSIS — F43.10 PTSD (POST-TRAUMATIC STRESS DISORDER): ICD-10-CM

## 2023-01-19 DIAGNOSIS — F32.A ANXIETY AND DEPRESSION: ICD-10-CM

## 2023-01-19 DIAGNOSIS — Z00.00 ANNUAL PHYSICAL EXAM: Primary | ICD-10-CM

## 2023-01-19 DIAGNOSIS — F41.9 ANXIETY AND DEPRESSION: ICD-10-CM

## 2023-01-19 DIAGNOSIS — R73.03 PRE-DIABETES: ICD-10-CM

## 2023-01-19 LAB — SL AMB POCT HEMOGLOBIN AIC: 5.9 (ref ?–6.5)

## 2023-01-19 RX ORDER — ALBUTEROL SULFATE 2.5 MG/3ML
2.5 SOLUTION RESPIRATORY (INHALATION) EVERY 6 HOURS PRN
Qty: 90 ML | Refills: 5 | Status: SHIPPED | OUTPATIENT
Start: 2023-01-19

## 2023-01-19 RX ORDER — PAROXETINE HYDROCHLORIDE 20 MG/1
20 TABLET, FILM COATED ORAL DAILY
Qty: 30 TABLET | Refills: 2 | Status: SHIPPED | OUTPATIENT
Start: 2023-01-19

## 2023-01-19 RX ORDER — ISOPROPYL ALCOHOL 0.7 ML/ML
SWAB TOPICAL
COMMUNITY
Start: 2022-12-30 | End: 2023-01-23

## 2023-01-19 NOTE — PATIENT INSTRUCTIONS

## 2023-01-19 NOTE — PROGRESS NOTES
ADULT ANNUAL PHYSICAL  [address] [facility]    NAME: uGmaro Wood  AGE: 44 y o  SEX: female  : 1985     DATE: 2023     Assessment and Plan:     Problem List Items Addressed This Visit        Respiratory    Asthma    Relevant Medications    albuterol (2 5 mg/3 mL) 0 083 % nebulizer solution    Other Relevant Orders    Nebulizer Supplies    Nebulizer       Other    Anxiety and depression    Relevant Medications    PARoxetine (PAXIL) 20 mg tablet    Pre-diabetes    Relevant Orders    POCT hemoglobin A1c (Completed)    PTSD (post-traumatic stress disorder)    Relevant Medications    PARoxetine (PAXIL) 20 mg tablet   Other Visit Diagnoses     Annual physical exam    -  Primary        - Trial paxil 20 mg daily for PTSD/anxiety/depression  Follow up in 6 weeks, sooner if needed    Immunizations and preventive care screenings were discussed with patient today  Appropriate education was printed on patient's after visit summary  Counseling:  Alcohol/drug use: discussed moderation in alcohol intake, the recommendations for healthy alcohol use, and avoidance of illicit drug use  Dental Health: discussed importance of regular tooth brushing, flossing, and dental visits  Injury prevention: discussed safety/seat belts, safety helmets, smoke detectors, carbon dioxide detectors, and smoking near bedding or upholstery  Sexual health: discussed sexually transmitted diseases, partner selection, use of condoms, avoidance of unintended pregnancy, and contraceptive alternatives  Exercise: the importance of regular exercise/physical activity was discussed  Recommend exercise 3-5 times per week for at least 30 minutes  BMI Counseling: Body mass index is 37 42 kg/m²   The BMI is above normal  Nutrition recommendations include decreasing portion sizes, encouraging healthy choices of fruits and vegetables, decreasing fast food intake, consuming healthier snacks, limiting drinks that contain sugar, moderation in carbohydrate intake, increasing intake of lean protein, reducing intake of saturated and trans fat and reducing intake of cholesterol  Exercise recommendations include moderate physical activity 150 minutes/week, vigorous physical activity 75 minutes/week, exercising 3-5 times per week, obtaining a gym membership and strength training exercises  No pharmacotherapy was ordered  Rationale for BMI follow-up plan is due to patient being overweight or obese  Return in about 6 weeks (around 3/2/2023) for Next scheduled follow up  Chief Complaint:     Chief Complaint   Patient presents with   • Follow-up   • A1C     Medication Changes      History of Present Illness:     Adult Annual Physical   Patient here for a comprehensive physical exam  The patient reports problems - anxiety  PTSD/anxiety/depression is currently managed with Prozac 40 mg daily  Patient does not feel that this medication is helping her symptoms  She has previously tried Seroquel for sleep but is afraid that this will make it difficult for her to wake up  She has tried multiple other medications in the past including Zoloft, Celexa, Lexapro, and Xanax with minimal relief  Diet and Physical Activity  Diet/Nutrition: well balanced diet, diabetic diet and limited junk food  Exercise: no formal exercise  Depression Screening  PHQ-2/9 Depression Screening         General Health  Sleep: sleeps poorly, gets 4-6 hours of sleep on average and waking up often  Hearing: normal - bilateral   Vision: no vision problems  Dental: s/p total dental extraction  /GYN Health  Last menstrual period: 1/2/23  Contraceptive method: none  History of STDs?: no      Review of Systems:     Review of Systems   Constitutional: Positive for fatigue  Negative for chills, diaphoresis and fever  Eyes: Negative for visual disturbance     Respiratory: Negative for cough, chest tightness, shortness of breath and wheezing  Cardiovascular: Negative for chest pain, palpitations and leg swelling  Gastrointestinal: Negative for abdominal pain, constipation, diarrhea, nausea and vomiting  Skin: Negative for rash and wound  Neurological: Negative for dizziness, syncope, weakness, light-headedness and headaches  Psychiatric/Behavioral: Positive for decreased concentration, dysphoric mood and sleep disturbance  Negative for self-injury and suicidal ideas  The patient is nervous/anxious         Past Medical History:     Past Medical History:   Diagnosis Date   • Anxiety    • Asthma    • Cat-scratch disease     last assessed 10/22/15   • Depression    • Hypertension     last assessed 11/11/14   • Sciatica    • Sleep disorder       Past Surgical History:     Past Surgical History:   Procedure Laterality Date   • BREAST BIOPSY Left     2016   • DENTAL SURGERY     • ME BX/EXC LYMPH NODE OPEN DEEP AXILLARY NODE Left 1/15/2019    Procedure: LEFT AXILLARY LYMPH NODE BIOPSY;  Surgeon: Osvaldo Mccullough MD;  [facility];  Service: General   • ME LAMNOTMY INCL W/DCMPRSN NRV ROOT 1 INTRSPC LUMBR Right 4/14/2021    Procedure: L5-S1 minimally invasive microdiskectomy and  epidural steroid injection;  Surgeon: Joseph Hardy MD;  [facility];  Service: Neurosurgery   • TUBAL LIGATION        Social History:     Social History     Socioeconomic History   • Marital status: /Civil Union     Spouse name: None   • Number of children: None   • Years of education: None   • Highest education level: None   Occupational History   • None   Tobacco Use   • Smoking status: Never   • Smokeless tobacco: Never   Vaping Use   • Vaping Use: Never used   Substance and Sexual Activity   • Alcohol use: No   • Drug use: No   • Sexual activity: Not Currently     Partners: Male     Birth control/protection: None   Other Topics Concern   • None   Social History Narrative    Always uses seat belt    Daily caffeine consumption(occasional)    Economic stress    Exercises daily    Lives with     Lives with relatives    Pets: Dog     Social Determinants of Health     Financial Resource Strain: Not on file   Food Insecurity: Not on file   Transportation Needs: Not on file   Physical Activity: Not on file   Stress: Not on file   Social Connections: Not on file   Intimate Partner Violence: Not on file   Housing Stability: Not on file      Family History:     Family History   Problem Relation Age of Onset   • Anxiety disorder Mother    • Bipolar disorder Mother    • Depression Mother    • Tremor Mother    • Anxiety disorder Father    • Bipolar disorder Father    • Depression Father    • Schizophrenia Father    • Parkinsonism Maternal Grandmother    • Stomach cancer Paternal Grandfather       Current Medications:     Current Outpatient Medications   Medication Sig Dispense Refill   • albuterol (2 5 mg/3 mL) 0 083 % nebulizer solution Take 3 mL (2 5 mg total) by nebulization every 6 (six) hours as needed for wheezing or shortness of breath 90 mL 5   • albuterol (ProAir HFA) 90 mcg/act inhaler Inhale 2 puffs every 4 (four) hours as needed for wheezing or shortness of breath 18 g 0   • Alcohol Swabs (Pharmacist Choice Alcohol) PADS      • Blood Glucose Monitoring Suppl (OneTouch Verio Reflect) w/Device KIT Check blood sugars three times daily  Please substitute with appropriate alternative as covered by patient's insurance  Dx: E11 65 1 kit 0   • cetirizine (ZyrTEC) 10 mg tablet Take 1 tablet (10 mg total) by mouth daily 30 tablet 5   • ergocalciferol (VITAMIN D2) 50,000 units Take 1 capsule (50,000 Units total) by mouth once a week 12 capsule 0   • fluticasone (FLONASE) 50 mcg/act nasal spray 1 spray into each nostril daily 16 g 5   • Fluticasone-Salmeterol (Advair) 250-50 mcg/dose inhaler Inhale 1 puff 2 (two) times a day Rinse mouth after use   60 blister 3   • glucose blood (OneTouch Verio) test strip Check blood sugars three times daily  Please substitute with appropriate alternative as covered by patient's insurance  Dx: E11 65 300 each 3   • hydrochlorothiazide (HYDRODIURIL) 12 5 mg tablet Take 1 tablet (12 5 mg total) by mouth daily 90 tablet 1   • Lancet Devices (Easy Mini Eject Lancing Device) MISC Use as directed to check blood sugar 3x daily  1 each 0   • lisinopril (ZESTRIL) 10 mg tablet Take 1 tablet (10 mg total) by mouth 2 (two) times a day 90 tablet 1   • metFORMIN (GLUCOPHAGE-XR) 500 mg 24 hr tablet Take 1 tablet (500 mg total) by mouth daily with dinner 30 tablet 5   • ondansetron (ZOFRAN) 4 mg tablet TAKE 1 TABLET (4 MG TOTAL) BY MOUTH EVERY 8 (EIGHT) HOURS AS NEEDED FOR NAUSEA 20 tablet 0   • OneTouch Delica Lancets 71C MISC Check blood sugars three times daily  Please substitute with appropriate alternative as covered by patient's insurance  Dx: E11 65 300 each 3   • PARoxetine (PAXIL) 20 mg tablet Take 1 tablet (20 mg total) by mouth daily 30 tablet 2   • ALPRAZolam (XANAX) 0 5 mg tablet Take 1 tablet (0 5 mg total) by mouth 2 (two) times a day as needed for anxiety (Patient not taking: Reported on 1/19/2023) 30 tablet 0     No current facility-administered medications for this visit  Allergies: Allergies   Allergen Reactions   • Bactrim [Sulfamethoxazole-Trimethoprim] Shortness Of Breath     Near syncope   • Codeine Hives     Tolerated Percocet, Vicodin, etc       Physical Exam:     /88 (BP Location: Left arm, Patient Position: Sitting, Cuff Size: Large)   Pulse 81   Temp 98 1 °F (36 7 °C) (Temporal)   Resp 18   Ht 5' 2" (1 575 m)   Wt 92 8 kg (204 lb 9 6 oz)   LMP 01/03/2023 (Exact Date)   SpO2 96%   BMI 37 42 kg/m²     Physical Exam  Vitals and nursing note reviewed  Constitutional:       General: She is not in acute distress  Appearance: Normal appearance  HENT:      Head: Normocephalic and atraumatic        Right Ear: Tympanic membrane, ear canal and external ear normal       Left Ear: Tympanic membrane, ear canal and external ear normal       Nose: Nose normal       Mouth/Throat:      Mouth: Mucous membranes are moist       Pharynx: Oropharynx is clear  No oropharyngeal exudate  Eyes:      Extraocular Movements: Extraocular movements intact  Conjunctiva/sclera: Conjunctivae normal       Pupils: Pupils are equal, round, and reactive to light  Cardiovascular:      Rate and Rhythm: Normal rate and regular rhythm  Heart sounds: Normal heart sounds  No murmur heard  Pulmonary:      Effort: Pulmonary effort is normal  No respiratory distress  Breath sounds: Normal breath sounds  No wheezing  Musculoskeletal:         General: Normal range of motion  Cervical back: Normal range of motion and neck supple  Right lower leg: No edema  Left lower leg: No edema  Skin:     General: Skin is warm and dry  Neurological:      General: No focal deficit present  Mental Status: She is alert and oriented to person, place, and time  Cranial Nerves: No cranial nerve deficit  Motor: No weakness  Psychiatric:         Behavior: Behavior normal          Thought Content:  Thought content normal           [facility], [address] [place] [address]

## 2023-01-20 DIAGNOSIS — I10 ESSENTIAL HYPERTENSION: ICD-10-CM

## 2023-01-20 RX ORDER — HYDROCHLOROTHIAZIDE 12.5 MG/1
12.5 TABLET ORAL DAILY
Qty: 90 TABLET | Refills: 1 | Status: SHIPPED | OUTPATIENT
Start: 2023-01-20

## 2023-01-23 DIAGNOSIS — R11.0 NAUSEA: ICD-10-CM

## 2023-01-23 RX ORDER — ONDANSETRON 4 MG/1
4 TABLET, FILM COATED ORAL EVERY 8 HOURS PRN
Qty: 20 TABLET | Refills: 0 | Status: SHIPPED | OUTPATIENT
Start: 2023-01-23 | End: 2023-01-30

## 2023-01-30 DIAGNOSIS — R11.0 NAUSEA: ICD-10-CM

## 2023-01-30 RX ORDER — ONDANSETRON 4 MG/1
4 TABLET, FILM COATED ORAL EVERY 8 HOURS PRN
Qty: 20 TABLET | Refills: 0 | Status: SHIPPED | OUTPATIENT
Start: 2023-01-30

## 2023-01-31 ENCOUNTER — PATIENT MESSAGE (OUTPATIENT)
Dept: FAMILY MEDICINE CLINIC | Facility: HOME HEALTHCARE | Age: 38
End: 2023-01-31

## 2023-01-31 DIAGNOSIS — Z13.0 SCREENING, ANEMIA, DEFICIENCY, IRON: ICD-10-CM

## 2023-01-31 DIAGNOSIS — Z13.220 SCREENING FOR LIPID DISORDERS: ICD-10-CM

## 2023-01-31 DIAGNOSIS — R53.83 FATIGUE, UNSPECIFIED TYPE: Primary | ICD-10-CM

## 2023-01-31 DIAGNOSIS — E55.9 VITAMIN D DEFICIENCY: ICD-10-CM

## 2023-01-31 DIAGNOSIS — R11.0 NAUSEA: ICD-10-CM

## 2023-02-17 DIAGNOSIS — I10 ESSENTIAL HYPERTENSION: ICD-10-CM

## 2023-02-17 RX ORDER — LISINOPRIL 10 MG/1
10 TABLET ORAL 2 TIMES DAILY
Qty: 90 TABLET | Refills: 1 | Status: SHIPPED | OUTPATIENT
Start: 2023-02-17

## 2023-02-20 DIAGNOSIS — R11.0 NAUSEA: ICD-10-CM

## 2023-02-20 RX ORDER — ONDANSETRON 4 MG/1
4 TABLET, FILM COATED ORAL EVERY 8 HOURS PRN
Qty: 20 TABLET | Refills: 0 | Status: SHIPPED | OUTPATIENT
Start: 2023-02-20 | End: 2023-03-02

## 2023-02-21 ENCOUNTER — APPOINTMENT (OUTPATIENT)
Dept: LAB | Facility: HOSPITAL | Age: 38
End: 2023-02-21

## 2023-02-21 DIAGNOSIS — E55.9 VITAMIN D DEFICIENCY: ICD-10-CM

## 2023-02-21 DIAGNOSIS — R11.0 NAUSEA: ICD-10-CM

## 2023-02-21 DIAGNOSIS — Z13.0 SCREENING, ANEMIA, DEFICIENCY, IRON: ICD-10-CM

## 2023-02-21 DIAGNOSIS — Z13.220 SCREENING FOR LIPID DISORDERS: ICD-10-CM

## 2023-02-21 DIAGNOSIS — R53.83 FATIGUE, UNSPECIFIED TYPE: ICD-10-CM

## 2023-02-21 LAB
25(OH)D3 SERPL-MCNC: 18 NG/ML (ref 30–100)
ALBUMIN SERPL BCP-MCNC: 4.1 G/DL (ref 3.5–5)
ALP SERPL-CCNC: 64 U/L (ref 34–104)
ALT SERPL W P-5'-P-CCNC: 11 U/L (ref 7–52)
ANION GAP SERPL CALCULATED.3IONS-SCNC: 6 MMOL/L (ref 4–13)
AST SERPL W P-5'-P-CCNC: 12 U/L (ref 13–39)
BASOPHILS # BLD AUTO: 0.05 THOUSANDS/ÂΜL (ref 0–0.1)
BASOPHILS NFR BLD AUTO: 1 % (ref 0–1)
BILIRUB SERPL-MCNC: 0.54 MG/DL (ref 0.2–1)
BUN SERPL-MCNC: 12 MG/DL (ref 5–25)
CALCIUM SERPL-MCNC: 9.5 MG/DL (ref 8.4–10.2)
CHLORIDE SERPL-SCNC: 106 MMOL/L (ref 96–108)
CHOLEST SERPL-MCNC: 191 MG/DL
CO2 SERPL-SCNC: 23 MMOL/L (ref 21–32)
CREAT SERPL-MCNC: 0.72 MG/DL (ref 0.6–1.3)
EOSINOPHIL # BLD AUTO: 0.16 THOUSAND/ÂΜL (ref 0–0.61)
EOSINOPHIL NFR BLD AUTO: 2 % (ref 0–6)
ERYTHROCYTE [DISTWIDTH] IN BLOOD BY AUTOMATED COUNT: 13.2 % (ref 11.6–15.1)
FERRITIN SERPL-MCNC: 54 NG/ML (ref 8–388)
GFR SERPL CREATININE-BSD FRML MDRD: 106 ML/MIN/1.73SQ M
GLUCOSE P FAST SERPL-MCNC: 117 MG/DL (ref 65–99)
HCT VFR BLD AUTO: 43.3 % (ref 34.8–46.1)
HDLC SERPL-MCNC: 41 MG/DL
HGB BLD-MCNC: 14.5 G/DL (ref 11.5–15.4)
IMM GRANULOCYTES # BLD AUTO: 0.02 THOUSAND/UL (ref 0–0.2)
IMM GRANULOCYTES NFR BLD AUTO: 0 % (ref 0–2)
IRON SATN MFR SERPL: 34 % (ref 15–50)
IRON SERPL-MCNC: 108 UG/DL (ref 50–170)
LDLC SERPL CALC-MCNC: 128 MG/DL (ref 0–100)
LYMPHOCYTES # BLD AUTO: 1.26 THOUSANDS/ÂΜL (ref 0.6–4.47)
LYMPHOCYTES NFR BLD AUTO: 16 % (ref 14–44)
MCH RBC QN AUTO: 31.3 PG (ref 26.8–34.3)
MCHC RBC AUTO-ENTMCNC: 33.5 G/DL (ref 31.4–37.4)
MCV RBC AUTO: 93 FL (ref 82–98)
MONOCYTES # BLD AUTO: 0.58 THOUSAND/ÂΜL (ref 0.17–1.22)
MONOCYTES NFR BLD AUTO: 7 % (ref 4–12)
NEUTROPHILS # BLD AUTO: 5.96 THOUSANDS/ÂΜL (ref 1.85–7.62)
NEUTS SEG NFR BLD AUTO: 74 % (ref 43–75)
NONHDLC SERPL-MCNC: 150 MG/DL
NRBC BLD AUTO-RTO: 0 /100 WBCS
PLATELET # BLD AUTO: 265 THOUSANDS/UL (ref 149–390)
PMV BLD AUTO: 10.6 FL (ref 8.9–12.7)
POTASSIUM SERPL-SCNC: 4.3 MMOL/L (ref 3.5–5.3)
PROT SERPL-MCNC: 6.9 G/DL (ref 6.4–8.4)
RBC # BLD AUTO: 4.64 MILLION/UL (ref 3.81–5.12)
SODIUM SERPL-SCNC: 135 MMOL/L (ref 135–147)
TIBC SERPL-MCNC: 314 UG/DL (ref 250–450)
TRIGL SERPL-MCNC: 110 MG/DL
WBC # BLD AUTO: 8.03 THOUSAND/UL (ref 4.31–10.16)

## 2023-02-22 LAB — TSH SERPL DL<=0.05 MIU/L-ACNC: 1.72 UIU/ML (ref 0.45–4.5)

## 2023-03-01 DIAGNOSIS — R11.0 NAUSEA: ICD-10-CM

## 2023-03-02 RX ORDER — ONDANSETRON 4 MG/1
TABLET, FILM COATED ORAL
Qty: 20 TABLET | Refills: 0 | Status: SHIPPED | OUTPATIENT
Start: 2023-03-02

## 2023-03-03 ENCOUNTER — OFFICE VISIT (OUTPATIENT)
Dept: FAMILY MEDICINE CLINIC | Facility: HOME HEALTHCARE | Age: 38
End: 2023-03-03

## 2023-03-03 VITALS
BODY MASS INDEX: 37.54 KG/M2 | WEIGHT: 204 LBS | HEIGHT: 62 IN | TEMPERATURE: 96.8 F | SYSTOLIC BLOOD PRESSURE: 116 MMHG | RESPIRATION RATE: 20 BRPM | OXYGEN SATURATION: 98 % | DIASTOLIC BLOOD PRESSURE: 82 MMHG | HEART RATE: 82 BPM

## 2023-03-03 DIAGNOSIS — E66.01 CLASS 2 SEVERE OBESITY DUE TO EXCESS CALORIES WITH SERIOUS COMORBIDITY AND BODY MASS INDEX (BMI) OF 37.0 TO 37.9 IN ADULT (HCC): ICD-10-CM

## 2023-03-03 DIAGNOSIS — F32.A ANXIETY AND DEPRESSION: ICD-10-CM

## 2023-03-03 DIAGNOSIS — F41.9 ANXIETY AND DEPRESSION: ICD-10-CM

## 2023-03-03 DIAGNOSIS — F43.10 PTSD (POST-TRAUMATIC STRESS DISORDER): ICD-10-CM

## 2023-03-03 DIAGNOSIS — F33.2 SEVERE EPISODE OF RECURRENT MAJOR DEPRESSIVE DISORDER, WITHOUT PSYCHOTIC FEATURES (HCC): Primary | ICD-10-CM

## 2023-03-03 RX ORDER — PAROXETINE HYDROCHLORIDE 40 MG/1
40 TABLET, FILM COATED ORAL DAILY
Qty: 30 TABLET | Refills: 2 | Status: SHIPPED | OUTPATIENT
Start: 2023-03-03

## 2023-03-03 RX ORDER — BUSPIRONE HYDROCHLORIDE 7.5 MG/1
7.5 TABLET ORAL 2 TIMES DAILY
Qty: 60 TABLET | Refills: 2 | Status: SHIPPED | OUTPATIENT
Start: 2023-03-03

## 2023-03-03 NOTE — PROGRESS NOTES
Assessment/Plan:     Diagnoses and all orders for this visit:    Severe episode of recurrent major depressive disorder, without psychotic features ([place] [id number] )  -     Ambulatory Referral to Psychiatry; Future  -     PARoxetine (PAXIL) 40 MG tablet; Take 1 tablet (40 mg total) by mouth daily  -     busPIRone (BUSPAR) 7 5 mg tablet; Take 1 tablet (7 5 mg total) by mouth 2 (two) times a day    PTSD (post-traumatic stress disorder)  -     PARoxetine (PAXIL) 40 MG tablet; Take 1 tablet (40 mg total) by mouth daily  -     busPIRone (BUSPAR) 7 5 mg tablet; Take 1 tablet (7 5 mg total) by mouth 2 (two) times a day    Anxiety and depression  -     PARoxetine (PAXIL) 40 MG tablet; Take 1 tablet (40 mg total) by mouth daily  -     busPIRone (BUSPAR) 7 5 mg tablet; Take 1 tablet (7 5 mg total) by mouth 2 (two) times a day    Class 2 severe obesity due to excess calories with serious comorbidity and body mass index (BMI) of 37 0 to 37 9 in adult ([place] [id number] )  -     Semaglutide-Weight Management (WEGOVY) 0 25 MG/0 5ML; Inject 0 5 mL (0 25 mg total) under the skin once a week for 4 weeks      - Increase Paxil to 40 mg daily  Add Buspar 7 5 mg BID  Recommended follow up with psychiatry, referral provided  - Discussed options for weight loss  Will send script for [facility]  Return in about 6 weeks (around 4/14/2023) for Next scheduled follow up  BMI Counseling: Body mass index is 37 31 kg/m²  The BMI is above normal  Nutrition recommendations include decreasing portion sizes, encouraging healthy choices of fruits and vegetables, decreasing fast food intake, consuming healthier snacks, limiting drinks that contain sugar, moderation in carbohydrate intake, increasing intake of lean protein, reducing intake of saturated and trans fat and reducing intake of cholesterol   Exercise recommendations include moderate physical activity 150 minutes/week, vigorous physical activity 75 minutes/week, exercising 3-5 times per week, obtaining a gym membership and strength training exercises  Pharmacotherapy was ordered to help aid in weight loss  Rationale for BMI follow-up plan is due to patient being overweight or obese  Subjective:        Patient ID: Linda Jefferson is a 44 y o  female  Chief Complaint   Patient presents with   • Kinjal Ta is a 44year old female with history of HTN, pre-diabetes, asthma, vit d deficiency, back pain, PTSD/Anxiety/Depression, presenting for follow up  PTSD/anxiety/depression has previously been managed with multiple medications including Prozac, Zoloft, Celexa, Lexapro, and Xanax with minimal relief  She has previously tried Seroquel for sleep but is afraid that this will make it difficult for her to wake up  At last visit, she was started on Paxil 20 mg daily  Today she denies any change in her symptoms since starting Paxil  Denies medication side effects  Continues to endorse fatigue, anxiety, depression, trouble concentrating, and difficulty sleeping  Was previously placed on a wait list for counseling but has not heard anything recently about scheduling  Has previously seen psychiatry but has not had follow up in over a year due to lack of transportation  Patient is also requesting medication for weight loss  She has not been on anything for this in the past   Current BMI 37 31  Reports exercising more often but has not been able to lose weight  Pre-diabetes is currently managed with metformin 500 mg once daily  A1c 1/2023 was 5 9, down from 6 3 in 10/2022        The following portions of the patient's history were reviewed and updated as appropriate: allergies, current medications, past family history, past medical history, past social history, past surgical history and problem list     Patient Active Problem List   Diagnosis   • Depression   • Intention tremor   • Urine ketones   • Numbness and tingling of both upper extremities while sleeping   • Allergic rhinitis   • Mass of left axilla   • Abnormal EKG   • Essential hypertension   • Lymphedema   • Wound dehiscence   • Granulomatous disease (HCC)   • Lung nodule < 6cm on CT   • Eosinophilia   • Asthma   • Neck pain   • Chronic bilateral low back pain without sciatica   • Anxiety and depression   • Acute right-sided low back pain with right-sided sciatica   • Vitamin D deficiency   • Pre-diabetes   • Lumbar radiculopathy   • Chronic pain syndrome   • Sacroiliitis (HCC)   • History of lumbar surgery   • PTSD (post-traumatic stress disorder)       Current Outpatient Medications   Medication Sig Dispense Refill   • albuterol (2 5 mg/3 mL) 0 083 % nebulizer solution Take 3 mL (2 5 mg total) by nebulization every 6 (six) hours as needed for wheezing or shortness of breath 90 mL 5   • albuterol (ProAir HFA) 90 mcg/act inhaler Inhale 2 puffs every 4 (four) hours as needed for wheezing or shortness of breath 18 g 0   • Alcohol Swabs (Pharmacist Choice Alcohol) PADS TEST BLOOD 3 TIMES DAILY 100 each 5   • Blood Glucose Monitoring Suppl (OneTouch Verio Reflect) w/Device KIT Check blood sugars three times daily  Please substitute with appropriate alternative as covered by patient's insurance  Dx: E11 65 1 kit 0   • busPIRone (BUSPAR) 7 5 mg tablet Take 1 tablet (7 5 mg total) by mouth 2 (two) times a day 60 tablet 2   • cetirizine (ZyrTEC) 10 mg tablet Take 1 tablet (10 mg total) by mouth daily 30 tablet 5   • ergocalciferol (VITAMIN D2) 50,000 units Take 1 capsule (50,000 Units total) by mouth once a week 12 capsule 0   • fluticasone (FLONASE) 50 mcg/act nasal spray 1 spray into each nostril daily 16 g 5   • Fluticasone-Salmeterol (Advair) 250-50 mcg/dose inhaler Inhale 1 puff 2 (two) times a day Rinse mouth after use  60 blister 3   • glucose blood (OneTouch Verio) test strip Check blood sugars three times daily  Please substitute with appropriate alternative as covered by patient's insurance   Dx: E11 65 300 each 3   • hydrochlorothiazide (HYDRODIURIL) 12 5 mg tablet TAKE 1 TABLET (12 5 MG TOTAL) BY MOUTH DAILY 90 tablet 1   • Lancet Devices (Easy Mini Eject Lancing Device) MISC Use as directed to check blood sugar 3x daily  1 each 0   • lisinopril (ZESTRIL) 10 mg tablet TAKE 1 TABLET (10 MG TOTAL) BY MOUTH 2 (TWO) TIMES A DAY 90 tablet 1   • metFORMIN (GLUCOPHAGE-XR) 500 mg 24 hr tablet Take 1 tablet (500 mg total) by mouth daily with dinner 30 tablet 5   • ondansetron (ZOFRAN) 4 mg tablet TAKE 1 TABLET (4 MG TOTAL) BY MOUTH EVERY 8 (EIGHT) HOURS AS NEEDED FOR NAUSEA 6 20 tablet 0   • OneTouch Delica Lancets 77T MISC Check blood sugars three times daily  Please substitute with appropriate alternative as covered by patient's insurance  Dx: E11 65 300 each 3   • PARoxetine (PAXIL) 40 MG tablet Take 1 tablet (40 mg total) by mouth daily 30 tablet 2   • Semaglutide-Weight Management (WEGOVY) 0 25 MG/0 5ML Inject 0 5 mL (0 25 mg total) under the skin once a week for 4 weeks 2 mL 0     No current facility-administered medications for this visit          Past Medical History:   Diagnosis Date   • Anxiety    • Asthma    • Cat-scratch disease     last assessed 10/22/15   • Depression    • Hypertension     last assessed 11/11/14   • Sciatica    • Sleep disorder         Past Surgical History:   Procedure Laterality Date   • BREAST BIOPSY Left     2016   • DENTAL SURGERY     • [place] BX/EXC LYMPH NODE OPEN DEEP AXILLARY NODE Left 1/15/2019    Procedure: LEFT AXILLARY LYMPH NODE BIOPSY;  Surgeon: Hubert Dunn MD;  [facility];  Service: General   • [place] LAMNOTMY INCL W/DCMPRSN NRV ROOT 1 INTRSPC LUMBR Right 4/14/2021    Procedure: L5-S1 minimally invasive microdiskectomy and  epidural steroid injection;  Surgeon: Efrain Gage MD;  [facility];  Service: Neurosurgery   • TUBAL LIGATION          Social History     Socioeconomic History   • Marital status: /Civil Union     Spouse name: Not on file   • Number of children: Not on file   • Years of education: Not on file   • Highest education level: Not on file   Occupational History   • Not on file   Tobacco Use   • Smoking status: Never   • Smokeless tobacco: Never   Vaping Use   • Vaping Use: Never used   Substance and Sexual Activity   • Alcohol use: No   • Drug use: No   • Sexual activity: Not Currently     Partners: Male     Birth control/protection: None   Other Topics Concern   • Not on file   Social History Narrative    Always uses seat belt    Daily caffeine consumption(occasional)    Economic stress    Exercises daily    Lives with     Lives with relatives    Pets: Dog     Social Determinants of Health     Financial Resource Strain: Not on file   Food Insecurity: Not on file   Transportation Needs: Not on file   Physical Activity: Not on file   Stress: Not on file   Social Connections: Not on file   Intimate Partner Violence: Not on file   Housing Stability: Not on file        Review of Systems   Constitutional: Positive for fatigue  Negative for chills, diaphoresis and fever  Eyes: Negative for visual disturbance  Respiratory: Negative for cough, chest tightness, shortness of breath and wheezing  Cardiovascular: Negative for chest pain, palpitations and leg swelling  Gastrointestinal: Negative for abdominal pain, constipation, diarrhea, nausea and vomiting  Skin: Negative for rash and wound  Neurological: Negative for dizziness, syncope, weakness, light-headedness and headaches  Psychiatric/Behavioral: Positive for decreased concentration, dysphoric mood and sleep disturbance  Negative for self-injury and suicidal ideas  The patient is nervous/anxious  Objective:      /82 (BP Location: Left arm, Patient Position: Sitting, Cuff Size: Large)   Pulse 82   Temp (!) 96 8 °F (36 °C) (Tympanic)   Resp 20   Ht 5' 2" (1 575 m)   Wt 92 5 kg (204 lb)   SpO2 98%   BMI 37 31 kg/m²          Physical Exam  Vitals and nursing note reviewed     Constitutional: General: She is not in acute distress  Appearance: Normal appearance  She is obese  HENT:      Head: Normocephalic and atraumatic  Eyes:      Extraocular Movements: Extraocular movements intact  Conjunctiva/sclera: Conjunctivae normal       Pupils: Pupils are equal, round, and reactive to light  Cardiovascular:      Rate and Rhythm: Normal rate and regular rhythm  Heart sounds: Normal heart sounds  No murmur heard  Pulmonary:      Effort: Pulmonary effort is normal  No respiratory distress  Breath sounds: Normal breath sounds  No wheezing  Musculoskeletal:      Cervical back: Normal range of motion and neck supple  Right lower leg: No edema  Left lower leg: No edema  Skin:     General: Skin is warm and dry  Neurological:      General: No focal deficit present  Mental Status: She is alert and oriented to person, place, and time  Cranial Nerves: No cranial nerve deficit  Motor: No weakness  Psychiatric:         Mood and Affect: Mood is depressed           Behavior: Behavior normal

## 2023-03-09 ENCOUNTER — DOCUMENTATION (OUTPATIENT)
Dept: FAMILY MEDICINE CLINIC | Facility: CLINIC | Age: 38
End: 2023-03-09

## 2023-03-14 DIAGNOSIS — R11.0 NAUSEA: ICD-10-CM

## 2023-03-14 DIAGNOSIS — E55.9 VITAMIN D DEFICIENCY: ICD-10-CM

## 2023-03-14 RX ORDER — ONDANSETRON 4 MG/1
TABLET, FILM COATED ORAL
Qty: 20 TABLET | Refills: 0 | Status: SHIPPED | OUTPATIENT
Start: 2023-03-14

## 2023-03-14 RX ORDER — ERGOCALCIFEROL 1.25 MG/1
50000 CAPSULE ORAL WEEKLY
Qty: 12 CAPSULE | Refills: 0 | Status: SHIPPED | OUTPATIENT
Start: 2023-03-14

## 2023-03-21 DIAGNOSIS — E66.01 CLASS 2 SEVERE OBESITY DUE TO EXCESS CALORIES WITH SERIOUS COMORBIDITY AND BODY MASS INDEX (BMI) OF 37.0 TO 37.9 IN ADULT: ICD-10-CM

## 2023-03-21 NOTE — TELEPHONE ENCOUNTER
Patient called back saying the insurance needs verification that not any of her medications  Would counter act with Mandy Roblero and that she has no medical condition that would prevent her from taking this medication.

## 2023-03-24 ENCOUNTER — TELEPHONE (OUTPATIENT)
Dept: FAMILY MEDICINE CLINIC | Facility: HOME HEALTHCARE | Age: 38
End: 2023-03-24

## 2023-03-24 NOTE — TELEPHONE ENCOUNTER
Patient left message saying that her insurance said that no one specified that she has no medical condition that will interact with the [facility] and it needs to state that to be approved

## 2023-04-05 ENCOUNTER — OFFICE VISIT (OUTPATIENT)
Dept: FAMILY MEDICINE CLINIC | Facility: HOME HEALTHCARE | Age: 38
End: 2023-04-05

## 2023-04-05 VITALS
BODY MASS INDEX: 37.02 KG/M2 | HEIGHT: 62 IN | DIASTOLIC BLOOD PRESSURE: 92 MMHG | SYSTOLIC BLOOD PRESSURE: 120 MMHG | HEART RATE: 77 BPM | RESPIRATION RATE: 18 BRPM | WEIGHT: 201.2 LBS | OXYGEN SATURATION: 98 % | TEMPERATURE: 97.7 F

## 2023-04-05 DIAGNOSIS — H61.92 LESION OF EXTERNAL EAR CANAL, LEFT: Primary | ICD-10-CM

## 2023-04-05 DIAGNOSIS — E66.01 CLASS 2 SEVERE OBESITY DUE TO EXCESS CALORIES WITH SERIOUS COMORBIDITY AND BODY MASS INDEX (BMI) OF 37.0 TO 37.9 IN ADULT (HCC): ICD-10-CM

## 2023-04-05 DIAGNOSIS — E66.01 CLASS 2 SEVERE OBESITY DUE TO EXCESS CALORIES WITH SERIOUS COMORBIDITY AND BODY MASS INDEX (BMI) OF 36.0 TO 36.9 IN ADULT (HCC): ICD-10-CM

## 2023-04-05 RX ORDER — PRAZOSIN HYDROCHLORIDE 1 MG/1
1 CAPSULE ORAL DAILY
COMMUNITY
Start: 2023-03-21

## 2023-04-05 RX ORDER — BUSPIRONE HYDROCHLORIDE 15 MG/1
15 TABLET ORAL 2 TIMES DAILY
COMMUNITY
Start: 2023-03-21

## 2023-04-05 RX ORDER — ALPRAZOLAM 1 MG/1
1 TABLET ORAL AS NEEDED
COMMUNITY
Start: 2023-03-21

## 2023-04-05 NOTE — PROGRESS NOTES
"Assessment/Plan:     Diagnoses and all orders for this visit:    Lesion of external ear canal, left  -     mupirocin (BACTROBAN) 2 % ointment; Apply topically 3 (three) times a day    Class 2 severe obesity due to excess calories with serious comorbidity and body mass index (BMI) of 36 0 to 36 9 in adult [facility])    Other orders  -     prazosin (MINIPRESS) 1 mg capsule; Take 1 mg by mouth daily  -     busPIRone (BUSPAR) 15 mg tablet; Take 15 mg by mouth 2 (two) times a day  -     ALPRAZolam (XANAX) 1 mg tablet; Take 1 mg by mouth as needed      - Topical mupirocin ointment for left ear canal   Follow up if no improvement  - Continue Wegovy  Follow up in 6 weeks for weight check  - Follow up with [facility] as scheduled    Return in about 6 weeks (around 5/17/2023) for Next scheduled follow up  Subjective:        Patient ID: Liv Degroot is a 44 y o  female  Chief Complaint   Patient presents with   • Follow-up     Left ear feels like draining liquid  Throat scratchy x2 day       Julissa Clements is a 44year old female with history of HTN, pre-diabetes, asthma, vit d deficiency, back pain, PTSD/Anxiety/Depression, presenting for follow up  PTSD/anxiety/depression is currently managed with Buspar 15 mg BID, prazosin 1 mg daily, and xanax 1 mg PRN  Patient recently established with [facility] and had genesight testing to determine the best antidepressant treatment  She has follow up with them in the next week  At last visit, patient was started on Wegovy for weight loss  She has only had one injection thus far but has lost 3 lbs since her last visit  Denies medication side effects  Today patient is concerned for left ear drainage  Reports feeling that her ear is wet and irritated  Denies overt pain  Denies congestion, rhinorrhea, cough, wheezing, SOB, or fever  Notes that her throat is \"scratchy\" but feels that this is due to allergies         The following portions of the patient's history were " reviewed and updated as appropriate: allergies, current medications, past family history, past medical history, past social history, past surgical history and problem list     Patient Active Problem List   Diagnosis   • Depression   • Intention tremor   • Urine ketones   • Numbness and tingling of both upper extremities while sleeping   • Allergic rhinitis   • Mass of left axilla   • Abnormal EKG   • Essential hypertension   • Lymphedema   • Wound dehiscence   • Granulomatous disease ([address] )   • Lung nodule < 6cm on CT   • Eosinophilia   • Asthma   • Neck pain   • Chronic bilateral low back pain without sciatica   • Anxiety and depression   • Acute right-sided low back pain with right-sided sciatica   • Vitamin D deficiency   • Pre-diabetes   • Lumbar radiculopathy   • Chronic pain syndrome   • Sacroiliitis (HCC)   • History of lumbar surgery   • PTSD (post-traumatic stress disorder)       Current Outpatient Medications   Medication Sig Dispense Refill   • albuterol (2 5 mg/3 mL) 0 083 % nebulizer solution Take 3 mL (2 5 mg total) by nebulization every 6 (six) hours as needed for wheezing or shortness of breath 90 mL 5   • albuterol (PROVENTIL HFA,VENTOLIN HFA) 90 mcg/act inhaler INHALE 2 PUFFS EVERY 4 (FOUR) HOURS AS NEEDED FOR WHEEZING OR SHORTNESS OF BREATH 8 5 g 2   • Alcohol Swabs (Pharmacist Choice Alcohol) PADS TEST BLOOD 3 TIMES DAILY 100 each 5   • Blood Glucose Monitoring Suppl (OneTouch Verio Reflect) w/Device KIT Check blood sugars three times daily  Please substitute with appropriate alternative as covered by patient's insurance   Dx: E11 65 1 kit 0   • cetirizine (ZyrTEC) 10 mg tablet Take 1 tablet (10 mg total) by mouth daily 30 tablet 5   • ergocalciferol (VITAMIN D2) 50,000 units TAKE 1 CAPSULE (50,000 UNITS TOTAL) BY MOUTH ONCE A WEEK 12 capsule 0   • fluticasone (FLONASE) 50 mcg/act nasal spray 1 spray into each nostril daily 16 g 5   • Fluticasone-Salmeterol (Advair) 250-50 mcg/dose inhaler INHALE 1 PUFF 2 (TWO) TIMES A DAY RINSE MOUTH AFTER USE  60 blister 2   • glucose blood (OneTouch Verio) test strip Check blood sugars three times daily  Please substitute with appropriate alternative as covered by patient's insurance  Dx: E11 65 300 each 3   • hydrochlorothiazide (HYDRODIURIL) 12 5 mg tablet TAKE 1 TABLET (12 5 MG TOTAL) BY MOUTH DAILY 90 tablet 1   • Lancet Devices (Easy Mini Eject Lancing Device) MISC Use as directed to check blood sugar 3x daily  1 each 0   • lisinopril (ZESTRIL) 10 mg tablet TAKE 1 TABLET (10 MG TOTAL) BY MOUTH 2 (TWO) TIMES A DAY 90 tablet 1   • metFORMIN (GLUCOPHAGE-XR) 500 mg 24 hr tablet Take 1 tablet (500 mg total) by mouth daily with dinner 30 tablet 5   • mupirocin (BACTROBAN) 2 % ointment Apply topically 3 (three) times a day 22 g 0   • ondansetron (ZOFRAN) 4 mg tablet TAKE 1 TABLET (4 MG TOTAL) BY MOUTH EVERY 8 (EIGHT) HOURS AS NEEDED FOR NAUSEA 6 20 tablet 0   • OneTouch Delica Lancets 04L MISC Check blood sugars three times daily  Please substitute with appropriate alternative as covered by patient's insurance  Dx: E11 65 300 each 3   • ALPRAZolam (XANAX) 1 mg tablet Take 1 mg by mouth as needed     • busPIRone (BUSPAR) 15 mg tablet Take 15 mg by mouth 2 (two) times a day     • prazosin (MINIPRESS) 1 mg capsule Take 1 mg by mouth daily       No current facility-administered medications for this visit          Past Medical History:   Diagnosis Date   • Anxiety    • Asthma    • Cat-scratch disease     last assessed 10/22/15   • Depression    • Hypertension     last assessed 11/11/14   • Sciatica    • Sleep disorder         Past Surgical History:   Procedure Laterality Date   • BREAST BIOPSY Left     2016   • DENTAL SURGERY     • [facility] BX/EXC LYMPH NODE OPEN DEEP AXILLARY NODE Left 1/15/2019    Procedure: LEFT AXILLARY LYMPH NODE BIOPSY;  Surgeon: Елена Gambino MD;  [facility];  Service: General   • [facility] LAMNOTMY INCL W/DCMPRSN NRV ROOT 1 INTRSPC LUMBR Right 4/14/2021 Procedure: L5-S1 minimally invasive microdiskectomy and  epidural steroid injection;  Surgeon: Celina Swartz MD;  [facility];  Service: Neurosurgery   • TUBAL LIGATION          Social History     Socioeconomic History   • Marital status: /Civil Union     Spouse name: Not on file   • Number of children: Not on file   • Years of education: Not on file   • Highest education level: Not on file   Occupational History   • Not on file   Tobacco Use   • Smoking status: Never   • Smokeless tobacco: Never   Vaping Use   • Vaping Use: Never used   Substance and Sexual Activity   • Alcohol use: No   • Drug use: No   • Sexual activity: Not Currently     Partners: Male     Birth control/protection: None   Other Topics Concern   • Not on file   Social History Narrative    Always uses seat belt    Daily caffeine consumption(occasional)    Economic stress    Exercises daily    Lives with     Lives with relatives    Pets: Dog     Social Determinants of Health     Financial Resource Strain: Not on file   Food Insecurity: Not on file   Transportation Needs: Not on file   Physical Activity: Not on file   Stress: Not on file   Social Connections: Not on file   Intimate Partner Violence: Not on file   Housing Stability: Not on file        Review of Systems   Constitutional: Negative for chills, diaphoresis and fever  HENT: Positive for ear discharge  Negative for congestion, hearing loss, postnasal drip, rhinorrhea, sore throat and tinnitus  Respiratory: Negative for cough, chest tightness, shortness of breath and wheezing  Cardiovascular: Negative for chest pain, palpitations and leg swelling  Gastrointestinal: Negative for abdominal pain, constipation, diarrhea, nausea and vomiting  Skin: Negative for rash and wound  Neurological: Negative for dizziness, syncope, weakness, light-headedness and headaches           Objective:      /92 (BP Location: Left arm, Patient Position: Sitting, Cuff Size: "Standard)   Pulse 77   Temp 97 7 °F (36 5 °C) (Tympanic)   Resp 18   Ht 5' 2 4\" (1 585 m)   Wt 91 3 kg (201 lb 3 2 oz)   SpO2 98%   BMI 36 33 kg/m²          Physical Exam  Vitals and nursing note reviewed  Constitutional:       General: She is not in acute distress  Appearance: Normal appearance  She is obese  HENT:      Head: Normocephalic and atraumatic  Comments: Raised, erythematous papule in the left posterior ear canal   TM intact without erythema  Right Ear: Tympanic membrane, ear canal and external ear normal  There is no impacted cerumen  Left Ear: Tympanic membrane and external ear normal  There is no impacted cerumen  Nose: Nose normal       Mouth/Throat:      Mouth: Mucous membranes are moist       Pharynx: Oropharynx is clear  No oropharyngeal exudate  Eyes:      Extraocular Movements: Extraocular movements intact  Conjunctiva/sclera: Conjunctivae normal       Pupils: Pupils are equal, round, and reactive to light  Cardiovascular:      Rate and Rhythm: Normal rate and regular rhythm  Heart sounds: Normal heart sounds  No murmur heard  Pulmonary:      Effort: Pulmonary effort is normal  No respiratory distress  Breath sounds: Normal breath sounds  No wheezing  Musculoskeletal:      Cervical back: Normal range of motion and neck supple  Right lower leg: No edema  Left lower leg: No edema  Skin:     General: Skin is warm and dry  Neurological:      General: No focal deficit present  Mental Status: She is alert and oriented to person, place, and time  Cranial Nerves: No cranial nerve deficit  Motor: No weakness     Psychiatric:         Mood and Affect: Mood normal          Behavior: Behavior normal          "

## 2023-04-07 ENCOUNTER — DOCUMENTATION (OUTPATIENT)
Dept: FAMILY MEDICINE CLINIC | Facility: CLINIC | Age: 38
End: 2023-04-07

## 2023-04-25 ENCOUNTER — TELEPHONE (OUTPATIENT)
Dept: FAMILY MEDICINE CLINIC | Facility: HOME HEALTHCARE | Age: 38
End: 2023-04-25

## 2023-04-25 NOTE — TELEPHONE ENCOUNTER
Patient called stating [facility] and they would cover Free style Gonzalez meter and supplies if this can be ordered

## 2023-04-26 NOTE — TELEPHONE ENCOUNTER
I spoke to Dutch regarding the testing supplies  Advised that she only needs to test BS once daily and can test if she is having any hyperglycemic or hypoglycemic symptoms  As she is only taking metformin and is not injecting any insulin, she would not qualify for continuous glucose monitoring at this time  We will recheck A1c at next visit

## 2023-04-28 DIAGNOSIS — E55.9 VITAMIN D DEFICIENCY: ICD-10-CM

## 2023-04-28 RX ORDER — ERGOCALCIFEROL 1.25 MG/1
50000 CAPSULE ORAL WEEKLY
Qty: 12 CAPSULE | Refills: 0 | Status: SHIPPED | OUTPATIENT
Start: 2023-04-28

## 2023-05-02 DIAGNOSIS — R73.03 PRE-DIABETES: ICD-10-CM

## 2023-05-02 DIAGNOSIS — J30.2 SEASONAL ALLERGIES: ICD-10-CM

## 2023-05-02 DIAGNOSIS — R73.9 HYPERGLYCEMIA: ICD-10-CM

## 2023-05-02 RX ORDER — METFORMIN HYDROCHLORIDE 500 MG/1
500 TABLET, EXTENDED RELEASE ORAL
Qty: 30 TABLET | Refills: 5 | Status: SHIPPED | OUTPATIENT
Start: 2023-05-02

## 2023-05-02 RX ORDER — CETIRIZINE HYDROCHLORIDE 10 MG/1
10 TABLET ORAL DAILY
Qty: 30 TABLET | Refills: 5 | Status: SHIPPED | OUTPATIENT
Start: 2023-05-02

## 2023-05-15 DIAGNOSIS — R11.0 NAUSEA: ICD-10-CM

## 2023-05-15 DIAGNOSIS — J45.40 MODERATE PERSISTENT ASTHMA WITHOUT COMPLICATION: ICD-10-CM

## 2023-05-15 RX ORDER — ALBUTEROL SULFATE 90 UG/1
2 AEROSOL, METERED RESPIRATORY (INHALATION) EVERY 4 HOURS PRN
Qty: 8.5 G | Refills: 2 | Status: SHIPPED | OUTPATIENT
Start: 2023-05-15

## 2023-05-16 RX ORDER — ONDANSETRON 4 MG/1
TABLET, FILM COATED ORAL
Qty: 20 TABLET | Refills: 0 | Status: SHIPPED | OUTPATIENT
Start: 2023-05-16

## 2023-05-18 ENCOUNTER — OFFICE VISIT (OUTPATIENT)
Dept: FAMILY MEDICINE CLINIC | Facility: HOME HEALTHCARE | Age: 38
End: 2023-05-18

## 2023-05-18 VITALS
SYSTOLIC BLOOD PRESSURE: 116 MMHG | BODY MASS INDEX: 35.7 KG/M2 | DIASTOLIC BLOOD PRESSURE: 73 MMHG | HEART RATE: 92 BPM | TEMPERATURE: 98.7 F | HEIGHT: 62 IN | RESPIRATION RATE: 18 BRPM | OXYGEN SATURATION: 98 % | WEIGHT: 194 LBS

## 2023-05-18 DIAGNOSIS — E55.9 VITAMIN D DEFICIENCY: ICD-10-CM

## 2023-05-18 DIAGNOSIS — K21.9 GASTROESOPHAGEAL REFLUX DISEASE, UNSPECIFIED WHETHER ESOPHAGITIS PRESENT: ICD-10-CM

## 2023-05-18 DIAGNOSIS — R42 DIZZINESS: ICD-10-CM

## 2023-05-18 DIAGNOSIS — E66.01 CLASS 2 SEVERE OBESITY DUE TO EXCESS CALORIES WITH SERIOUS COMORBIDITY AND BODY MASS INDEX (BMI) OF 35.0 TO 35.9 IN ADULT (HCC): Primary | ICD-10-CM

## 2023-05-18 DIAGNOSIS — R73.03 PRE-DIABETES: ICD-10-CM

## 2023-05-18 PROBLEM — E66.812 CLASS 2 SEVERE OBESITY DUE TO EXCESS CALORIES WITH SERIOUS COMORBIDITY AND BODY MASS INDEX (BMI) OF 35.0 TO 35.9 IN ADULT (HCC): Status: ACTIVE | Noted: 2023-05-18

## 2023-05-18 RX ORDER — PRAZOSIN HYDROCHLORIDE 2 MG/1
2 CAPSULE ORAL DAILY
COMMUNITY
Start: 2023-05-09

## 2023-05-18 RX ORDER — DULOXETIN HYDROCHLORIDE 60 MG/1
60 CAPSULE, DELAYED RELEASE ORAL DAILY
COMMUNITY
Start: 2023-05-09

## 2023-05-18 RX ORDER — MECLIZINE HYDROCHLORIDE 25 MG/1
25 TABLET ORAL 3 TIMES DAILY PRN
Qty: 30 TABLET | Refills: 2 | Status: SHIPPED | OUTPATIENT
Start: 2023-05-18 | End: 2023-05-19 | Stop reason: SDUPTHER

## 2023-05-18 RX ORDER — FAMOTIDINE 20 MG/1
20 TABLET, FILM COATED ORAL 2 TIMES DAILY
Qty: 60 TABLET | Refills: 2 | Status: SHIPPED | OUTPATIENT
Start: 2023-05-18

## 2023-05-18 NOTE — PROGRESS NOTES
Assessment/Plan:     Diagnoses and all orders for this visit:    Class 2 severe obesity due to excess calories with serious comorbidity and body mass index (BMI) of 35 0 to 35 9 in [facility])  -     Semaglutide-Weight Management (WEGOVY) 1 MG/0 5ML; Inject 0 5 mL (1 mg total) under the skin once a week    Gastroesophageal reflux disease, unspecified whether esophagitis present  -     famotidine (PEPCID) 20 mg tablet; Take 1 tablet (20 mg total) by mouth 2 (two) times a day    Dizziness  -     meclizine (ANTIVERT) 25 mg tablet; Take 1 tablet (25 mg total) by mouth 3 (three) times a day as needed for dizziness  -     CBC and differential; Future  -     Comprehensive metabolic panel; Future  -     Iron Panel (Includes Ferritin, Iron Sat%, Iron, and TIBC); Future    Vitamin D deficiency  -     Vitamin D 25 hydroxy; Future    Pre-diabetes  -     HEMOGLOBIN A1C W/ EAG ESTIMATION; Future    Other orders  -     prazosin (MINIPRESS) 2 mg capsule; Take 2 mg by mouth daily  -     DULoxetine (CYMBALTA) 60 mg delayed release capsule; Take 60 mg by mouth daily      Wt Readings from Last 3 Encounters:   05/18/23 88 kg (194 lb)   04/05/23 91 3 kg (201 lb 3 2 oz)   03/03/23 92 5 kg (204 lb)     - Labs as ordered  May be able to discontinue metformin for pre-diabetes as she is now on semaglutide for weight loss  - Will trial Pepcid for persistent nausea and acid reflux  - Script sent for meclizine as this has helped dizziness in the past  - Will increased Wegovy to 1 mg weekly  Advised to contact the office if the above symptoms persist or worsen    Return in about 2 months (around 7/18/2023) for Next scheduled follow up  Subjective:        Patient ID: Evelyn Toney is a 44 y o  female      Chief Complaint   Patient presents with   • Follow-up     dizzy       Clarissa Maldonado is a 44year old female with history of HTN, pre-diabetes, asthma, vit d deficiency, back pain, PTSD/Anxiety/Depression, presenting for follow up      Patient is currently taking Wegovy 0 5 mg weekly for weight loss, titrated from 0 25 mg weekly  She has lost 10 lbs thus far  Endorses some acid reflux, belching, nausea, and dizziness, though is unsure if this is related to the [facility]  She has had dizziness in the past and was prescribed meclizine with improvement, however, does not have a current script at home  Has also been on zofran for nausea, prior to starting wegovy, without much relief  Additionally, she has been seeing psychiatry who has been adjusting her psych medications recently which may be contributing to her symptoms          The following portions of the patient's history were reviewed and updated as appropriate: allergies, current medications, past family history, past medical history, past social history, past surgical history and problem list     Patient Active Problem List   Diagnosis   • Depression   • Intention tremor   • Urine ketones   • Numbness and tingling of both upper extremities while sleeping   • Allergic rhinitis   • Mass of left axilla   • Abnormal EKG   • Essential hypertension   • Lymphedema   • Wound dehiscence   • Granulomatous disease ([facility] [address] )   • Lung nodule < 6cm on CT   • Eosinophilia   • Asthma   • Neck pain   • Chronic bilateral low back pain without sciatica   • Anxiety and depression   • Acute right-sided low back pain with right-sided sciatica   • Vitamin D deficiency   • Pre-diabetes   • Lumbar radiculopathy   • Chronic pain syndrome   • Sacroiliitis (HCC)   • History of lumbar surgery   • PTSD (post-traumatic stress disorder)   • Class 2 severe obesity due to excess calories with serious comorbidity and body mass index (BMI) of 35 0 to 35 9 in adult [facility])       Current Outpatient Medications   Medication Sig Dispense Refill   • albuterol (2 5 mg/3 mL) 0 083 % nebulizer solution Take 3 mL (2 5 mg total) by nebulization every 6 (six) hours as needed for wheezing or shortness of breath 90 mL 5   • albuterol (PROVENTIL HFA,VENTOLIN HFA) 90 mcg/act inhaler INHALE 2 PUFFS EVERY 4 (FOUR) HOURS AS NEEDED FOR WHEEZING OR SHORTNESS OF BREATH 8 5 g 2   • Alcohol Swabs (Pharmacist Choice Alcohol) PADS TEST BLOOD 3 TIMES DAILY 100 each 5   • ALPRAZolam (XANAX) 1 mg tablet Take 1 mg by mouth as needed     • Blood Glucose Monitoring Suppl (OneTouch Verio Reflect) w/Device KIT Check blood sugars three times daily  Please substitute with appropriate alternative as covered by patient's insurance  Dx: E11 65 1 kit 0   • cetirizine (ZyrTEC) 10 mg tablet TAKE 1 TABLET (10 MG TOTAL) BY MOUTH DAILY 30 tablet 5   • ergocalciferol (VITAMIN D2) 50,000 units TAKE 1 CAPSULE (50,000 UNITS TOTAL) BY MOUTH ONCE A WEEK 12 capsule 0   • famotidine (PEPCID) 20 mg tablet Take 1 tablet (20 mg total) by mouth 2 (two) times a day 60 tablet 2   • fluticasone (FLONASE) 50 mcg/act nasal spray 1 spray into each nostril daily 16 g 5   • Fluticasone-Salmeterol (Advair) 250-50 mcg/dose inhaler INHALE 1 PUFF 2 (TWO) TIMES A DAY RINSE MOUTH AFTER USE  60 blister 2   • glucose blood (OneTouch Verio) test strip Check blood sugars three times daily  Please substitute with appropriate alternative as covered by patient's insurance  Dx: E11 65 300 each 3   • hydrochlorothiazide (HYDRODIURIL) 12 5 mg tablet TAKE 1 TABLET (12 5 MG TOTAL) BY MOUTH DAILY 90 tablet 1   • Lancet Devices (Easy Mini Eject Lancing Device) MISC Use as directed to check blood sugar 3x daily   1 each 0   • lisinopril (ZESTRIL) 10 mg tablet TAKE 1 TABLET (10 MG TOTAL) BY MOUTH 2 (TWO) TIMES A DAY 90 tablet 1   • meclizine (ANTIVERT) 25 mg tablet Take 1 tablet (25 mg total) by mouth 3 (three) times a day as needed for dizziness 30 tablet 2   • metFORMIN (GLUCOPHAGE-XR) 500 mg 24 hr tablet TAKE 1 TABLET (500 MG TOTAL) BY MOUTH DAILY WITH DINNER 30 tablet 5   • mupirocin (BACTROBAN) 2 % ointment APPLY TOPICALLY 3 (THREE) TIMES A DAY 22 g 0   • ondansetron (ZOFRAN) 4 mg tablet TAKE 1 TABLET (4 MG TOTAL) BY MOUTH EVERY 8 (EIGHT) HOURS AS NEEDED FOR NAUSEA 20 tablet 0   • OneTouch Delica Lancets 01Q MISC Check blood sugars three times daily  Please substitute with appropriate alternative as covered by patient's insurance  Dx: E11 65 300 each 3   • Semaglutide-Weight Management (WEGOVY) 1 MG/0 5ML Inject 0 5 mL (1 mg total) under the skin once a week 2 mL 0   • DULoxetine (CYMBALTA) 60 mg delayed release capsule Take 60 mg by mouth daily     • prazosin (MINIPRESS) 2 mg capsule Take 2 mg by mouth daily       No current facility-administered medications for this visit          Past Medical History:   Diagnosis Date   • Anxiety    • Asthma    • Cat-scratch disease     last assessed 10/22/15   • Depression    • Hypertension     last assessed 11/11/14   • Sciatica    • Sleep disorder         Past Surgical History:   Procedure Laterality Date   • BREAST BIOPSY Left     2016   • DENTAL SURGERY     • [facility] BX/EXC LYMPH NODE OPEN DEEP AXILLARY NODE Left 1/15/2019    Procedure: LEFT AXILLARY LYMPH NODE BIOPSY;  Surgeon: Dejan Waggoner MD;  [facility];  Service: General   • [facility] LAMNOTMY INCL W/DCMPRSN NRV ROOT 1 INTRSPC LUMBR Right 4/14/2021    Procedure: L5-S1 minimally invasive microdiskectomy and  epidural steroid injection;  Surgeon: Doyle Freeman MD;  [facility];  Service: Neurosurgery   • TUBAL LIGATION          Social History     Socioeconomic History   • Marital status: /Civil Union     Spouse name: Not on file   • Number of children: Not on file   • Years of education: Not on file   • Highest education level: Not on file   Occupational History   • Not on file   Tobacco Use   • Smoking status: Never   • Smokeless tobacco: Never   Vaping Use   • Vaping Use: Never used   Substance and Sexual Activity   • Alcohol use: No   • Drug use: No   • Sexual activity: Not Currently     Partners: Male     Birth control/protection: None   Other Topics Concern   • Not on file   Social History Narrative "Always uses seat belt    Daily caffeine consumption(occasional)    Economic stress    Exercises daily    Lives with     Lives with relatives    Pets: Dog     Social Determinants of Health     Financial Resource Strain: Not on file   Food Insecurity: Not on file   Transportation Needs: Not on file   Physical Activity: Not on file   Stress: Not on file   Social Connections: Not on file   Intimate Partner Violence: Not on file   Housing Stability: Not on file        Review of Systems   Constitutional: Negative for chills, diaphoresis and fever  Respiratory: Negative for cough, chest tightness, shortness of breath and wheezing  Cardiovascular: Negative for chest pain, palpitations and leg swelling  Gastrointestinal: Positive for nausea  Negative for abdominal pain and vomiting  Heart burn   Skin: Negative for rash and wound  Neurological: Positive for dizziness  Negative for syncope, weakness, light-headedness and headaches  Objective:      /73   Pulse 92   Temp 98 7 °F (37 1 °C)   Resp 18   Ht 5' 2\" (1 575 m)   Wt 88 kg (194 lb)   SpO2 98%   BMI 35 48 kg/m²          Physical Exam  Vitals and nursing note reviewed  Constitutional:       General: She is not in acute distress  Appearance: Normal appearance  She is obese  HENT:      Head: Normocephalic and atraumatic  Eyes:      Extraocular Movements: Extraocular movements intact  Conjunctiva/sclera: Conjunctivae normal       Pupils: Pupils are equal, round, and reactive to light  Cardiovascular:      Rate and Rhythm: Normal rate and regular rhythm  Heart sounds: Normal heart sounds  No murmur heard  Pulmonary:      Effort: Pulmonary effort is normal  No respiratory distress  Breath sounds: Normal breath sounds  No wheezing  Musculoskeletal:      Right lower leg: No edema  Left lower leg: No edema  Skin:     General: Skin is warm and dry  Neurological:      General: No focal deficit present   " Mental Status: She is alert and oriented to person, place, and time  Cranial Nerves: No cranial nerve deficit     Psychiatric:         Mood and Affect: Mood normal          Behavior: Behavior normal

## 2023-05-19 DIAGNOSIS — R42 DIZZINESS: ICD-10-CM

## 2023-05-19 RX ORDER — MECLIZINE HYDROCHLORIDE 25 MG/1
25 TABLET ORAL 3 TIMES DAILY PRN
Qty: 30 TABLET | Refills: 2 | Status: SHIPPED | OUTPATIENT
Start: 2023-05-19

## 2023-05-22 ENCOUNTER — APPOINTMENT (OUTPATIENT)
Dept: LAB | Facility: HOSPITAL | Age: 38
End: 2023-05-22

## 2023-05-22 ENCOUNTER — DOCUMENTATION (OUTPATIENT)
Dept: FAMILY MEDICINE CLINIC | Facility: CLINIC | Age: 38
End: 2023-05-22

## 2023-05-22 DIAGNOSIS — R42 DIZZINESS: ICD-10-CM

## 2023-05-22 DIAGNOSIS — E55.9 VITAMIN D DEFICIENCY: ICD-10-CM

## 2023-05-22 DIAGNOSIS — R73.03 PRE-DIABETES: ICD-10-CM

## 2023-05-22 LAB
25(OH)D3 SERPL-MCNC: 22.3 NG/ML (ref 30–100)
ALBUMIN SERPL BCP-MCNC: 4.1 G/DL (ref 3.5–5)
ALP SERPL-CCNC: 65 U/L (ref 34–104)
ALT SERPL W P-5'-P-CCNC: 10 U/L (ref 7–52)
ANION GAP SERPL CALCULATED.3IONS-SCNC: 6 MMOL/L (ref 4–13)
AST SERPL W P-5'-P-CCNC: 12 U/L (ref 13–39)
BASOPHILS # BLD AUTO: 0.05 THOUSANDS/ÂΜL (ref 0–0.1)
BASOPHILS NFR BLD AUTO: 1 % (ref 0–1)
BILIRUB SERPL-MCNC: 0.39 MG/DL (ref 0.2–1)
BUN SERPL-MCNC: 7 MG/DL (ref 5–25)
CALCIUM SERPL-MCNC: 9 MG/DL (ref 8.4–10.2)
CHLORIDE SERPL-SCNC: 105 MMOL/L (ref 96–108)
CO2 SERPL-SCNC: 26 MMOL/L (ref 21–32)
CREAT SERPL-MCNC: 0.74 MG/DL (ref 0.6–1.3)
EOSINOPHIL # BLD AUTO: 0.28 THOUSAND/ÂΜL (ref 0–0.61)
EOSINOPHIL NFR BLD AUTO: 3 % (ref 0–6)
ERYTHROCYTE [DISTWIDTH] IN BLOOD BY AUTOMATED COUNT: 13.1 % (ref 11.6–15.1)
FERRITIN SERPL-MCNC: 59 NG/ML (ref 11–307)
GFR SERPL CREATININE-BSD FRML MDRD: 103 ML/MIN/1.73SQ M
GLUCOSE P FAST SERPL-MCNC: 103 MG/DL (ref 65–99)
HCT VFR BLD AUTO: 40.8 % (ref 34.8–46.1)
HGB BLD-MCNC: 13.4 G/DL (ref 11.5–15.4)
IMM GRANULOCYTES # BLD AUTO: 0.03 THOUSAND/UL (ref 0–0.2)
IMM GRANULOCYTES NFR BLD AUTO: 0 % (ref 0–2)
IRON SATN MFR SERPL: 26 % (ref 15–50)
IRON SERPL-MCNC: 70 UG/DL (ref 50–170)
LYMPHOCYTES # BLD AUTO: 1.52 THOUSANDS/ÂΜL (ref 0.6–4.47)
LYMPHOCYTES NFR BLD AUTO: 19 % (ref 14–44)
MCH RBC QN AUTO: 30.3 PG (ref 26.8–34.3)
MCHC RBC AUTO-ENTMCNC: 32.8 G/DL (ref 31.4–37.4)
MCV RBC AUTO: 92 FL (ref 82–98)
MONOCYTES # BLD AUTO: 0.59 THOUSAND/ÂΜL (ref 0.17–1.22)
MONOCYTES NFR BLD AUTO: 7 % (ref 4–12)
NEUTROPHILS # BLD AUTO: 5.75 THOUSANDS/ÂΜL (ref 1.85–7.62)
NEUTS SEG NFR BLD AUTO: 70 % (ref 43–75)
NRBC BLD AUTO-RTO: 0 /100 WBCS
PLATELET # BLD AUTO: 268 THOUSANDS/UL (ref 149–390)
PMV BLD AUTO: 10.4 FL (ref 8.9–12.7)
POTASSIUM SERPL-SCNC: 3.8 MMOL/L (ref 3.5–5.3)
PROT SERPL-MCNC: 6.6 G/DL (ref 6.4–8.4)
RBC # BLD AUTO: 4.42 MILLION/UL (ref 3.81–5.12)
SODIUM SERPL-SCNC: 137 MMOL/L (ref 135–147)
TIBC SERPL-MCNC: 274 UG/DL (ref 250–450)
WBC # BLD AUTO: 8.22 THOUSAND/UL (ref 4.31–10.16)

## 2023-05-23 LAB
EST. AVERAGE GLUCOSE BLD GHB EST-MCNC: 111 MG/DL
HBA1C MFR BLD: 5.5 %

## 2023-05-25 DIAGNOSIS — M54.2 NECK PAIN: Primary | ICD-10-CM

## 2023-05-30 ENCOUNTER — HOSPITAL ENCOUNTER (OUTPATIENT)
Dept: RADIOLOGY | Facility: HOSPITAL | Age: 38
Discharge: HOME | End: 2023-05-30

## 2023-05-30 DIAGNOSIS — M54.2 NECK PAIN: ICD-10-CM

## 2023-06-02 ENCOUNTER — TELEPHONE (OUTPATIENT)
Dept: FAMILY MEDICINE CLINIC | Facility: CLINIC | Age: 38
End: 2023-06-02

## 2023-06-02 DIAGNOSIS — R73.03 PRE-DIABETES: ICD-10-CM

## 2023-06-02 DIAGNOSIS — H61.92 LESION OF EXTERNAL EAR CANAL, LEFT: ICD-10-CM

## 2023-06-02 DIAGNOSIS — R73.9 HYPERGLYCEMIA: ICD-10-CM

## 2023-06-02 RX ORDER — BLOOD SUGAR DIAGNOSTIC
STRIP MISCELLANEOUS
Qty: 100 EACH | Refills: 1 | Status: SHIPPED | OUTPATIENT
Start: 2023-06-02

## 2023-06-02 RX ORDER — LANCING DEVICE
EACH MISCELLANEOUS
Qty: 100 EACH | Refills: 1 | Status: SHIPPED | OUTPATIENT
Start: 2023-06-02

## 2023-06-02 NOTE — TELEPHONE ENCOUNTER
----- Message from Poppy Hardwick PA-C sent at 6/2/2023  3:39 PM EDT -----  Neck xray revealed some neuroforamina narrowing  This could be causing a nerve to be pinched which may be leading to her current symptoms  Xray was otherwise unremarkable  Can refer to PT/spine & pain if symptoms are persistent

## 2023-06-08 DIAGNOSIS — J45.40 MODERATE PERSISTENT ASTHMA WITHOUT COMPLICATION: ICD-10-CM

## 2023-06-08 RX ORDER — ALBUTEROL SULFATE 90 UG/1
2 AEROSOL, METERED RESPIRATORY (INHALATION) EVERY 4 HOURS PRN
Qty: 8.5 G | Refills: 2 | Status: SHIPPED | OUTPATIENT
Start: 2023-06-08

## 2023-06-12 DIAGNOSIS — E66.01 CLASS 2 SEVERE OBESITY DUE TO EXCESS CALORIES WITH SERIOUS COMORBIDITY AND BODY MASS INDEX (BMI) OF 35.0 TO 35.9 IN ADULT (HCC): ICD-10-CM

## 2023-06-12 RX ORDER — SEMAGLUTIDE 1 MG/.5ML
INJECTION, SOLUTION SUBCUTANEOUS
Qty: 2 ML | Refills: 0 | Status: SHIPPED | OUTPATIENT
Start: 2023-06-12

## 2023-06-14 DIAGNOSIS — E55.9 VITAMIN D DEFICIENCY: ICD-10-CM

## 2023-06-14 RX ORDER — ERGOCALCIFEROL 1.25 MG/1
50000 CAPSULE ORAL WEEKLY
Qty: 12 CAPSULE | Refills: 0 | Status: SHIPPED | OUTPATIENT
Start: 2023-06-14

## 2023-07-06 DIAGNOSIS — J45.40 MODERATE PERSISTENT ASTHMA WITHOUT COMPLICATION: ICD-10-CM

## 2023-07-06 RX ORDER — FLUTICASONE PROPIONATE AND SALMETEROL 250; 50 UG/1; UG/1
1 POWDER RESPIRATORY (INHALATION) 2 TIMES DAILY
Qty: 60 BLISTER | Refills: 0 | Status: SHIPPED | OUTPATIENT
Start: 2023-07-06

## 2023-07-07 DIAGNOSIS — K21.9 GASTROESOPHAGEAL REFLUX DISEASE, UNSPECIFIED WHETHER ESOPHAGITIS PRESENT: ICD-10-CM

## 2023-07-07 RX ORDER — FAMOTIDINE 20 MG/1
20 TABLET, FILM COATED ORAL 2 TIMES DAILY
Qty: 60 TABLET | Refills: 2 | Status: SHIPPED | OUTPATIENT
Start: 2023-07-07

## 2023-07-17 DIAGNOSIS — R42 DIZZINESS: ICD-10-CM

## 2023-07-17 RX ORDER — MECLIZINE HYDROCHLORIDE 25 MG/1
25 TABLET ORAL 3 TIMES DAILY PRN
Qty: 30 TABLET | Refills: 2 | Status: SHIPPED | OUTPATIENT
Start: 2023-07-17

## 2023-07-21 DIAGNOSIS — I10 ESSENTIAL HYPERTENSION: ICD-10-CM

## 2023-07-21 RX ORDER — LISINOPRIL 10 MG/1
10 TABLET ORAL 2 TIMES DAILY
Qty: 90 TABLET | Refills: 1 | Status: SHIPPED | OUTPATIENT
Start: 2023-07-21

## 2023-07-25 DIAGNOSIS — R73.9 HYPERGLYCEMIA: ICD-10-CM

## 2023-07-25 DIAGNOSIS — R73.03 PRE-DIABETES: ICD-10-CM

## 2023-07-25 RX ORDER — LANCING DEVICE
EACH MISCELLANEOUS
Qty: 100 EACH | Refills: 1 | Status: SHIPPED | OUTPATIENT
Start: 2023-07-25

## 2023-07-25 RX ORDER — BLOOD SUGAR DIAGNOSTIC
STRIP MISCELLANEOUS
Qty: 100 EACH | Refills: 1 | Status: SHIPPED | OUTPATIENT
Start: 2023-07-25

## 2023-07-31 ENCOUNTER — OFFICE VISIT (OUTPATIENT)
Dept: FAMILY MEDICINE CLINIC | Facility: HOME HEALTHCARE | Age: 38
End: 2023-07-31
Payer: COMMERCIAL

## 2023-07-31 VITALS
TEMPERATURE: 97.6 F | BODY MASS INDEX: 34.89 KG/M2 | WEIGHT: 189.6 LBS | HEIGHT: 62 IN | HEART RATE: 98 BPM | DIASTOLIC BLOOD PRESSURE: 74 MMHG | RESPIRATION RATE: 20 BRPM | SYSTOLIC BLOOD PRESSURE: 102 MMHG | OXYGEN SATURATION: 100 %

## 2023-07-31 DIAGNOSIS — R73.03 PRE-DIABETES: ICD-10-CM

## 2023-07-31 DIAGNOSIS — J45.40 MODERATE PERSISTENT ASTHMA WITHOUT COMPLICATION: ICD-10-CM

## 2023-07-31 DIAGNOSIS — E66.09 CLASS 1 OBESITY DUE TO EXCESS CALORIES WITH SERIOUS COMORBIDITY AND BODY MASS INDEX (BMI) OF 34.0 TO 34.9 IN ADULT: Primary | ICD-10-CM

## 2023-07-31 PROCEDURE — T1015 CLINIC SERVICE: HCPCS | Performed by: FAMILY MEDICINE

## 2023-07-31 PROCEDURE — 99213 OFFICE O/P EST LOW 20 MIN: CPT | Performed by: PHYSICIAN ASSISTANT

## 2023-07-31 RX ORDER — ATOMOXETINE 60 MG/1
60 CAPSULE ORAL DAILY
COMMUNITY
Start: 2023-07-27

## 2023-07-31 RX ORDER — TRAZODONE HYDROCHLORIDE 50 MG/1
50 TABLET ORAL
COMMUNITY
Start: 2023-07-25

## 2023-07-31 RX ORDER — FLUTICASONE PROPIONATE AND SALMETEROL 250; 50 UG/1; UG/1
1 POWDER RESPIRATORY (INHALATION) 2 TIMES DAILY
Qty: 60 BLISTER | Refills: 0 | Status: SHIPPED | OUTPATIENT
Start: 2023-07-31

## 2023-07-31 NOTE — PROGRESS NOTES
Assessment/Plan:     Diagnoses and all orders for this visit:    Class 1 obesity due to excess calories with serious comorbidity and body mass index (BMI) of 34.0 to 34.9 in adult  -     liraglutide (SAXENDA) injection; Inject 0.1 mL (0.6 mg total) under the skin daily for 7 days, THEN 0.2 mL (1.2 mg total) daily for 7 days, THEN 0.3 mL (1.8 mg total) daily for 7 days, THEN 0.4 mL (2.4 mg total) daily for 7 days, THEN 0.5 mL (3 mg total) daily for 7 days. Pre-diabetes  -     liraglutide (SAXENDA) injection; Inject 0.1 mL (0.6 mg total) under the skin daily for 7 days, THEN 0.2 mL (1.2 mg total) daily for 7 days, THEN 0.3 mL (1.8 mg total) daily for 7 days, THEN 0.4 mL (2.4 mg total) daily for 7 days, THEN 0.5 mL (3 mg total) daily for 7 days. Other orders  -     atoMOXetine (STRATTERA) 60 mg capsule; Take 60 mg by mouth in the morning  -     traZODone (DESYREL) 50 mg tablet; Take 50 mg by mouth daily at bedtime        - Will trial saxenda in place of [facility] for weight loss and diabetic management    Return in about 2 months (around 9/30/2023) for Next scheduled follow up. Subjective:        Patient ID: Malu Fry is a 43 y.o. female. Chief Complaint   Patient presents with   • Follow-up     2 month       Ester Alvarado is a 43year old female with history of HTN, pre-diabetes, asthma, vit d deficiency, back pain, PTSD/Anxiety/Depression, presenting for follow up.     Patient has previously been taking wegovy for weight loss. She has lost 10 pounds in the past 4 months. Unfortunately she has not been able to take this medication consistently due to it being backordered at the pharmacy. She has not had any significant side effects, but is looking for an alternative which may be more readily available. Pre-diabetes is currently managed with metformin 500 mg daily in addition to the [facility]. A1c from 5/22/23 was 5.5.       The following portions of the patient's history were reviewed and updated as appropriate: allergies, current medications, past family history, past medical history, past social history, past surgical history and problem list.    Patient Active Problem List   Diagnosis   • Depression   • Intention tremor   • Urine ketones   • Numbness and tingling of both upper extremities while sleeping   • Allergic rhinitis   • Mass of left axilla   • Abnormal EKG   • Essential hypertension   • Lymphedema   • Wound dehiscence   • Granulomatous disease ([address])   • Lung nodule < 6cm on CT   • Eosinophilia   • Asthma   • Neck pain   • Chronic bilateral low back pain without sciatica   • Anxiety and depression   • Acute right-sided low back pain with right-sided sciatica   • Vitamin D deficiency   • Pre-diabetes   • Lumbar radiculopathy   • Chronic pain syndrome   • Sacroiliitis (HCC)   • History of lumbar surgery   • PTSD (post-traumatic stress disorder)   • Class 2 severe obesity due to excess calories with serious comorbidity and body mass index (BMI) of 35.0 to 35.9 in adult [facility])       Current Outpatient Medications   Medication Sig Dispense Refill   • albuterol (2.5 mg/3 mL) 0.083 % nebulizer solution Take 3 mL (2.5 mg total) by nebulization every 6 (six) hours as needed for wheezing or shortness of breath 90 mL 5   • albuterol (PROVENTIL HFA,VENTOLIN HFA) 90 mcg/act inhaler INHALE 2 PUFFS EVERY 4 (FOUR) HOURS AS NEEDED FOR WHEEZING OR SHORTNESS OF BREATH 8.5 g 2   • Alcohol Swabs (Pharmacist Choice Alcohol) PADS TEST BLOOD 3 TIMES DAILY 100 each 5   • ALPRAZolam (XANAX) 1 mg tablet Take 1 mg by mouth as needed     • atoMOXetine (STRATTERA) 60 mg capsule Take 60 mg by mouth in the morning     • Blood Glucose Monitoring Suppl (OneTouch Verio Reflect) w/Device KIT Check blood sugars three times daily. Please substitute with appropriate alternative as covered by patient's insurance.  Dx: E11.65 1 kit 0   • cetirizine (ZyrTEC) 10 mg tablet TAKE 1 TABLET (10 MG TOTAL) BY MOUTH DAILY 30 tablet 5   • DULoxetine (CYMBALTA) 60 mg delayed release capsule Take 60 mg by mouth daily     • ergocalciferol (VITAMIN D2) 50,000 units TAKE 1 CAPSULE (50,000 UNITS TOTAL) BY MOUTH ONCE A WEEK 12 capsule 0   • famotidine (PEPCID) 20 mg tablet TAKE 1 TABLET (20 MG TOTAL) BY MOUTH 2 (TWO) TIMES A DAY 60 tablet 2   • fluticasone (FLONASE) 50 mcg/act nasal spray 1 spray into each nostril daily 16 g 5   • Fluticasone-Salmeterol (Advair) 250-50 mcg/dose inhaler INHALE 1 PUFF 2 (TWO) TIMES A DAY RINSE MOUTH AFTER USE. 60 blister 0   • hydrochlorothiazide (HYDRODIURIL) 12.5 mg tablet TAKE 1 TABLET (12.5 MG TOTAL) BY MOUTH DAILY 90 tablet 1   • Lancet Devices (Easy Mini Eject Lancing Device) MISC Use as directed to check blood sugar 3x daily. 1 each 0   • liraglutide (SAXENDA) injection Inject 0.1 mL (0.6 mg total) under the skin daily for 7 days, THEN 0.2 mL (1.2 mg total) daily for 7 days, THEN 0.3 mL (1.8 mg total) daily for 7 days, THEN 0.4 mL (2.4 mg total) daily for 7 days, THEN 0.5 mL (3 mg total) daily for 7 days.  15 mL 0   • lisinopril (ZESTRIL) 10 mg tablet TAKE 1 TABLET (10 MG TOTAL) BY MOUTH 2 (TWO) TIMES A DAY 90 tablet 1   • meclizine (ANTIVERT) 25 mg tablet TAKE 1 TABLET (25 MG TOTAL) BY MOUTH 3 (THREE) TIMES A DAY AS NEEDED FOR DIZZINESS 30 tablet 2   • metFORMIN (GLUCOPHAGE-XR) 500 mg 24 hr tablet TAKE 1 TABLET (500 MG TOTAL) BY MOUTH DAILY WITH DINNER 30 tablet 5   • ondansetron (ZOFRAN) 4 mg tablet TAKE 1 TABLET (4 MG TOTAL) BY MOUTH EVERY 8 (EIGHT) HOURS AS NEEDED FOR NAUSEA 20 tablet 0   • OneTouch Verio test strip TEST BLOOD 3 TIMES DAILY 100 each 1   • Pharmacist Choice Lancets MISC TEST BLOOD 3 TIMES DAILY 100 each 1   • prazosin (MINIPRESS) 2 mg capsule Take 2 mg by mouth daily     • traZODone (DESYREL) 50 mg tablet Take 50 mg by mouth daily at bedtime     • mupirocin (BACTROBAN) 2 % ointment APPLY TOPICALLY 3 (THREE) TIMES A DAY (Patient not taking: Reported on 7/31/2023) 22 g 0     No current facility-administered medications for this visit.         Past Medical History:   Diagnosis Date   • ADHD (attention deficit hyperactivity disorder)    • Anxiety    • Asthma    • Cat-scratch disease     last assessed 10/22/15   • Depression    • Hypertension     last assessed 11/11/14   • Sciatica    • Sleep disorder         Past Surgical History:   Procedure Laterality Date   • BREAST BIOPSY Left     2016   • DENTAL SURGERY     • ND BX/EXC LYMPH NODE OPEN DEEP AXILLARY NODE Left 1/15/2019    Procedure: LEFT AXILLARY LYMPH NODE BIOPSY;  Surgeon: Onesimo Hernandez MD;  Location: [facility];  Service: General   • ND LAMNOTMY INCL W/DCMPRSN NRV ROOT 1 INTRSPC LUMBR Right 4/14/2021    Procedure: L5-S1 minimally invasive microdiskectomy and  epidural steroid injection;  Surgeon: Luis Pan MD;  Location: [facility];  Service: Neurosurgery   • TUBAL LIGATION          Social History     Socioeconomic History   • Marital status: /Civil Union     Spouse name: Not on file   • Number of children: Not on file   • Years of education: Not on file   • Highest education level: Not on file   Occupational History   • Not on file   Tobacco Use   • Smoking status: Never   • Smokeless tobacco: Never   Vaping Use   • Vaping Use: Never used   Substance and Sexual Activity   • Alcohol use: No   • Drug use: No   • Sexual activity: Not Currently     Partners: Male     Birth control/protection: None   Other Topics Concern   • Not on file   Social History Narrative    Always uses seat belt    Daily caffeine consumption(occasional)    Economic stress    Exercises daily    Lives with     Lives with relatives    Pets: Dog     Social Determinants of Health     Financial Resource Strain: Not on file   Food Insecurity: Not on file   Transportation Needs: Not on file   Physical Activity: Not on file   Stress: Not on file   Social Connections: Not on file   Intimate Partner Violence: Not on file   Housing Stability: Not on file        Review of Systems Constitutional: Negative for chills, diaphoresis and fever. Respiratory: Negative for cough, chest tightness, shortness of breath and wheezing. Cardiovascular: Negative for chest pain, palpitations and leg swelling. Gastrointestinal: Negative for abdominal pain, constipation, diarrhea, nausea and vomiting. Skin: Negative for rash and wound. Neurological: Negative for dizziness, syncope, weakness, light-headedness and headaches. Objective:      /74 (BP Location: Left arm, Patient Position: Sitting, Cuff Size: Large)   Pulse 98   Temp 97.6 °F (36.4 °C) (Tympanic)   Resp 20   Ht 5' 2" (1.575 m)   Wt 86 kg (189 lb 9.6 oz)   SpO2 100%   BMI 34.68 kg/m²          Physical Exam  Vitals and nursing note reviewed. Constitutional:       General: She is not in acute distress. Appearance: Normal appearance. She is obese. HENT:      Head: Normocephalic and atraumatic. Eyes:      Extraocular Movements: Extraocular movements intact. Conjunctiva/sclera: Conjunctivae normal.      Pupils: Pupils are equal, round, and reactive to light. Cardiovascular:      Rate and Rhythm: Normal rate and regular rhythm. Heart sounds: Normal heart sounds. No murmur heard. Pulmonary:      Effort: Pulmonary effort is normal. No respiratory distress. Breath sounds: Normal breath sounds. No wheezing. Musculoskeletal:      Right lower leg: No edema. Left lower leg: No edema. Skin:     General: Skin is warm and dry. Neurological:      General: No focal deficit present. Mental Status: She is alert and oriented to person, place, and time. Cranial Nerves: No cranial nerve deficit.    Psychiatric:         Mood and Affect: Mood normal.         Behavior: Behavior normal.

## 2023-08-02 ENCOUNTER — TELEPHONE (OUTPATIENT)
Dept: FAMILY MEDICINE CLINIC | Facility: HOME HEALTHCARE | Age: 38
End: 2023-08-02

## 2023-08-11 ENCOUNTER — NURSE TRIAGE (OUTPATIENT)
Dept: OTHER | Facility: OTHER | Age: 38
End: 2023-08-11

## 2023-08-11 ENCOUNTER — HOSPITAL ENCOUNTER (EMERGENCY)
Facility: HOSPITAL | Age: 38
Discharge: HOME | End: 2023-08-11
Attending: EMERGENCY MEDICINE
Payer: COMMERCIAL

## 2023-08-11 ENCOUNTER — APPOINTMENT (EMERGENCY)
Dept: CT IMAGING | Facility: HOSPITAL | Age: 38
End: 2023-08-11
Payer: COMMERCIAL

## 2023-08-11 ENCOUNTER — APPOINTMENT (EMERGENCY)
Dept: RADIOLOGY | Facility: HOSPITAL | Age: 38
End: 2023-08-11
Payer: COMMERCIAL

## 2023-08-11 VITALS
OXYGEN SATURATION: 97 % | DIASTOLIC BLOOD PRESSURE: 67 MMHG | TEMPERATURE: 97.1 F | RESPIRATION RATE: 20 BRPM | HEART RATE: 98 BPM | SYSTOLIC BLOOD PRESSURE: 130 MMHG

## 2023-08-11 DIAGNOSIS — R10.11 RIGHT UPPER QUADRANT ABDOMINAL PAIN: ICD-10-CM

## 2023-08-11 DIAGNOSIS — R07.9 CHEST PAIN: Primary | ICD-10-CM

## 2023-08-11 LAB
ALBUMIN SERPL BCP-MCNC: 4.3 G/DL (ref 3.5–5)
ALP SERPL-CCNC: 61 U/L (ref 34–104)
ALT SERPL W P-5'-P-CCNC: 11 U/L (ref 7–52)
ANION GAP SERPL CALCULATED.3IONS-SCNC: 11 MMOL/L
AST SERPL W P-5'-P-CCNC: 13 U/L (ref 13–39)
ATRIAL RATE: 96 BPM
BASOPHILS # BLD AUTO: 0.06 THOUSANDS/ÂΜL (ref 0–0.1)
BASOPHILS NFR BLD AUTO: 1 % (ref 0–1)
BILIRUB SERPL-MCNC: 0.33 MG/DL (ref 0.2–1)
BILIRUB UR QL STRIP: NEGATIVE
BUN SERPL-MCNC: 13 MG/DL (ref 5–25)
CALCIUM SERPL-MCNC: 9.5 MG/DL (ref 8.4–10.2)
CARDIAC TROPONIN I PNL SERPL HS: <2 NG/L
CHLORIDE SERPL-SCNC: 100 MMOL/L (ref 96–108)
CLARITY UR: CLEAR
CO2 SERPL-SCNC: 28 MMOL/L (ref 21–32)
COLOR UR: YELLOW
CREAT SERPL-MCNC: 0.82 MG/DL (ref 0.6–1.3)
EOSINOPHIL # BLD AUTO: 0.32 THOUSAND/ÂΜL (ref 0–0.61)
EOSINOPHIL NFR BLD AUTO: 3 % (ref 0–6)
ERYTHROCYTE [DISTWIDTH] IN BLOOD BY AUTOMATED COUNT: 12.5 % (ref 11.6–15.1)
EXT PREGNANCY TEST URINE: NEGATIVE
EXT. CONTROL: NORMAL
GFR SERPL CREATININE-BSD FRML MDRD: 90 ML/MIN/1.73SQ M
GLUCOSE SERPL-MCNC: 97 MG/DL (ref 65–140)
GLUCOSE UR STRIP-MCNC: NEGATIVE MG/DL
HCT VFR BLD AUTO: 42.3 % (ref 34.8–46.1)
HGB BLD-MCNC: 13.9 G/DL (ref 11.5–15.4)
HGB UR QL STRIP.AUTO: NEGATIVE
IMM GRANULOCYTES # BLD AUTO: 0.02 THOUSAND/UL (ref 0–0.2)
IMM GRANULOCYTES NFR BLD AUTO: 0 % (ref 0–2)
KETONES UR STRIP-MCNC: ABNORMAL MG/DL
LEUKOCYTE ESTERASE UR QL STRIP: NEGATIVE
LIPASE SERPL-CCNC: 29 U/L (ref 11–82)
LYMPHOCYTES # BLD AUTO: 2.67 THOUSANDS/ÂΜL (ref 0.6–4.47)
LYMPHOCYTES NFR BLD AUTO: 25 % (ref 14–44)
MCH RBC QN AUTO: 30.1 PG (ref 26.8–34.3)
MCHC RBC AUTO-ENTMCNC: 32.9 G/DL (ref 31.4–37.4)
MCV RBC AUTO: 92 FL (ref 82–98)
MONOCYTES # BLD AUTO: 0.85 THOUSAND/ÂΜL (ref 0.17–1.22)
MONOCYTES NFR BLD AUTO: 8 % (ref 4–12)
NEUTROPHILS # BLD AUTO: 6.89 THOUSANDS/ÂΜL (ref 1.85–7.62)
NEUTS SEG NFR BLD AUTO: 63 % (ref 43–75)
NITRITE UR QL STRIP: NEGATIVE
NRBC BLD AUTO-RTO: 0 /100 WBCS
P AXIS: 32 DEGREES
PH UR STRIP.AUTO: 6.5 [PH]
PLATELET # BLD AUTO: 299 THOUSANDS/UL (ref 149–390)
PMV BLD AUTO: 10.6 FL (ref 8.9–12.7)
POTASSIUM SERPL-SCNC: 3.3 MMOL/L (ref 3.5–5.3)
PR INTERVAL: 166 MS
PROT SERPL-MCNC: 7.2 G/DL (ref 6.4–8.4)
PROT UR STRIP-MCNC: NEGATIVE MG/DL
QRS AXIS: 30 DEGREES
QRSD INTERVAL: 80 MS
QT INTERVAL: 382 MS
QTC INTERVAL: 482 MS
RBC # BLD AUTO: 4.62 MILLION/UL (ref 3.81–5.12)
SODIUM SERPL-SCNC: 139 MMOL/L (ref 135–147)
SP GR UR STRIP.AUTO: 1.02 (ref 1–1.03)
T WAVE AXIS: -20 DEGREES
UROBILINOGEN UR QL STRIP.AUTO: 0.2 E.U./DL
VENTRICULAR RATE: 96 BPM
WBC # BLD AUTO: 10.81 THOUSAND/UL (ref 4.31–10.16)

## 2023-08-11 PROCEDURE — 84484 ASSAY OF TROPONIN QUANT: CPT | Performed by: EMERGENCY MEDICINE

## 2023-08-11 PROCEDURE — G1004 CDSM NDSC: HCPCS

## 2023-08-11 PROCEDURE — 81003 URINALYSIS AUTO W/O SCOPE: CPT | Performed by: EMERGENCY MEDICINE

## 2023-08-11 PROCEDURE — 85025 COMPLETE CBC W/AUTO DIFF WBC: CPT | Performed by: EMERGENCY MEDICINE

## 2023-08-11 PROCEDURE — 74177 CT ABD & PELVIS W/CONTRAST: CPT

## 2023-08-11 PROCEDURE — 83690 ASSAY OF LIPASE: CPT | Performed by: EMERGENCY MEDICINE

## 2023-08-11 PROCEDURE — 81025 URINE PREGNANCY TEST: CPT | Performed by: EMERGENCY MEDICINE

## 2023-08-11 PROCEDURE — 71045 X-RAY EXAM CHEST 1 VIEW: CPT

## 2023-08-11 PROCEDURE — 36415 COLL VENOUS BLD VENIPUNCTURE: CPT | Performed by: EMERGENCY MEDICINE

## 2023-08-11 PROCEDURE — 93005 ELECTROCARDIOGRAM TRACING: CPT

## 2023-08-11 PROCEDURE — 93010 ELECTROCARDIOGRAM REPORT: CPT | Performed by: INTERNAL MEDICINE

## 2023-08-11 PROCEDURE — 80053 COMPREHEN METABOLIC PANEL: CPT | Performed by: EMERGENCY MEDICINE

## 2023-08-11 RX ORDER — MAGNESIUM HYDROXIDE/ALUMINUM HYDROXICE/SIMETHICONE 120; 1200; 1200 MG/30ML; MG/30ML; MG/30ML
30 SUSPENSION ORAL ONCE
Status: COMPLETED | OUTPATIENT
Start: 2023-08-11 | End: 2023-08-11

## 2023-08-11 RX ORDER — SUCRALFATE 1 G/1
1 TABLET ORAL ONCE
Status: COMPLETED | OUTPATIENT
Start: 2023-08-11 | End: 2023-08-11

## 2023-08-11 RX ORDER — FAMOTIDINE 10 MG/ML
20 INJECTION, SOLUTION INTRAVENOUS ONCE
Status: COMPLETED | OUTPATIENT
Start: 2023-08-11 | End: 2023-08-11

## 2023-08-11 RX ORDER — HYDROMORPHONE HCL/PF 1 MG/ML
0.5 SYRINGE (ML) INJECTION ONCE
Status: COMPLETED | OUTPATIENT
Start: 2023-08-11 | End: 2023-08-11

## 2023-08-11 RX ORDER — FAMOTIDINE 20 MG/1
20 TABLET, FILM COATED ORAL 2 TIMES DAILY
Qty: 30 TABLET | Refills: 0 | Status: SHIPPED | OUTPATIENT
Start: 2023-08-11 | End: 2023-08-11

## 2023-08-11 RX ORDER — SUCRALFATE 1 G/1
1 TABLET ORAL 4 TIMES DAILY
Qty: 56 TABLET | Refills: 0 | Status: SHIPPED | OUTPATIENT
Start: 2023-08-11 | End: 2023-08-25

## 2023-08-11 RX ADMIN — IOHEXOL 100 ML: 350 INJECTION, SOLUTION INTRAVENOUS at 05:22

## 2023-08-11 RX ADMIN — SUCRALFATE 1 G: 1 TABLET ORAL at 06:12

## 2023-08-11 RX ADMIN — ALUMINUM HYDROXIDE, MAGNESIUM HYDROXIDE, AND DIMETHICONE 30 ML: 200; 20; 200 SUSPENSION ORAL at 06:12

## 2023-08-11 RX ADMIN — HYDROMORPHONE HYDROCHLORIDE 0.5 MG: 1 INJECTION, SOLUTION INTRAMUSCULAR; INTRAVENOUS; SUBCUTANEOUS at 03:38

## 2023-08-11 RX ADMIN — SODIUM CHLORIDE 1000 ML: 0.9 INJECTION, SOLUTION INTRAVENOUS at 03:39

## 2023-08-11 RX ADMIN — FAMOTIDINE 20 MG: 10 INJECTION, SOLUTION INTRAVENOUS at 06:12

## 2023-08-11 NOTE — TELEPHONE ENCOUNTER
Reason for Disposition  • [1] Recent medical visit within 24 hours AND [2] condition/symptoms SAME (unchanged) AND [3] caller has additional questions triager can answer    Answer Assessment - Initial Assessment Questions  1.  MAIN CONCERN OR SYMPTOM:  "What is your main concern right now?" "What question do you have?"      Questions about CT results    Protocols used: RECENT MEDICAL VISIT FOR ILLNESS FOLLOW-UP CALL-ADULT-AH

## 2023-08-11 NOTE — ED PROVIDER NOTES
History  Chief Complaint   Patient presents with   • Abdominal Pain   • Chest Pain     Epigastric pain radiating to chest, back and shoulders. Started 0930 yesterday w/ nausea     43year old F with a past medical history of hypertension, asthma, who presents for abdominal and chest pain. By having pain in multiple places, she describes that most the pain is in the epigastric, right upper quadrant region. She describes it does radiate to the chest, radiates to the back as well. She describes feeling nauseous however did not have vomiting. The pain began at 9:30 AM yesterday morning. She is not describing anything in particular. Shortness of breath, no URI symptoms. No fevers or chills. Denies any diarrhea. Review of systems otherwise negative. Prior to Admission Medications   Prescriptions Last Dose Informant Patient Reported? Taking? ALPRAZolam (XANAX) 1 mg tablet   Yes No   Sig: Take 1 mg by mouth as needed   Alcohol Swabs (Pharmacist Choice Alcohol) PADS   No No   Sig: TEST BLOOD 3 TIMES DAILY   Blood Glucose Monitoring Suppl (OneTouch Verio Reflect) w/Device KIT   No No   Sig: Check blood sugars three times daily. Please substitute with appropriate alternative as covered by patient's insurance. Dx: E11.65   DULoxetine (CYMBALTA) 60 mg delayed release capsule   Yes No   Sig: Take 60 mg by mouth daily   Fluticasone-Salmeterol (Advair) 250-50 mcg/dose inhaler   No No   Sig: INHALE 1 PUFF 2 (TWO) TIMES A DAY RINSE MOUTH AFTER USE. Lancet Devices (Easy Mini Eject Lancing Device) MISC   No No   Sig: Use as directed to check blood sugar 3x daily.    OneTouch Verio test strip   No No   Sig: TEST BLOOD 3 TIMES DAILY   Pharmacist Choice Lancets MISC   No No   Sig: TEST BLOOD 3 TIMES DAILY   albuterol (2.5 mg/3 mL) 0.083 % nebulizer solution   No No   Sig: Take 3 mL (2.5 mg total) by nebulization every 6 (six) hours as needed for wheezing or shortness of breath   albuterol (PROVENTIL HFA,VENTOLIN HFA) 90 mcg/act inhaler   No No   Sig: INHALE 2 PUFFS EVERY 4 (FOUR) HOURS AS NEEDED FOR WHEEZING OR SHORTNESS OF BREATH   atoMOXetine (STRATTERA) 60 mg capsule   Yes No   Sig: Take 60 mg by mouth in the morning   cetirizine (ZyrTEC) 10 mg tablet   No No   Sig: TAKE 1 TABLET (10 MG TOTAL) BY MOUTH DAILY   ergocalciferol (VITAMIN D2) 50,000 units   No No   Sig: TAKE 1 CAPSULE (50,000 UNITS TOTAL) BY MOUTH ONCE A WEEK   famotidine (PEPCID) 20 mg tablet   No No   Sig: TAKE 1 TABLET (20 MG TOTAL) BY MOUTH 2 (TWO) TIMES A DAY   fluticasone (FLONASE) 50 mcg/act nasal spray   No No   Si spray into each nostril daily   hydrochlorothiazide (HYDRODIURIL) 12.5 mg tablet   No No   Sig: TAKE 1 TABLET (12.5 MG TOTAL) BY MOUTH DAILY   liraglutide (SAXENDA) injection   No No   Sig: Inject 0.1 mL (0.6 mg total) under the skin daily for 7 days, THEN 0.2 mL (1.2 mg total) daily for 7 days, THEN 0.3 mL (1.8 mg total) daily for 7 days, THEN 0.4 mL (2.4 mg total) daily for 7 days, THEN 0.5 mL (3 mg total) daily for 7 days.    lisinopril (ZESTRIL) 10 mg tablet   No No   Sig: TAKE 1 TABLET (10 MG TOTAL) BY MOUTH 2 (TWO) TIMES A DAY   meclizine (ANTIVERT) 25 mg tablet   No No   Sig: TAKE 1 TABLET (25 MG TOTAL) BY MOUTH 3 (THREE) TIMES A DAY AS NEEDED FOR DIZZINESS   metFORMIN (GLUCOPHAGE-XR) 500 mg 24 hr tablet   No No   Sig: TAKE 1 TABLET (500 MG TOTAL) BY MOUTH DAILY WITH DINNER   mupirocin (BACTROBAN) 2 % ointment   No No   Sig: APPLY TOPICALLY 3 (THREE) TIMES A DAY   Patient not taking: Reported on 2023   ondansetron (ZOFRAN) 4 mg tablet   No No   Sig: TAKE 1 TABLET (4 MG TOTAL) BY MOUTH EVERY 8 (EIGHT) HOURS AS NEEDED FOR NAUSEA   prazosin (MINIPRESS) 2 mg capsule   Yes No   Sig: Take 2 mg by mouth daily   traZODone (DESYREL) 50 mg tablet   Yes No   Sig: Take 50 mg by mouth daily at bedtime      Facility-Administered Medications: None       Past Medical History:   Diagnosis Date   • ADHD (attention deficit hyperactivity disorder) • Anxiety    • Asthma    • Cat-scratch disease     last assessed 10/22/15   • Depression    • Hypertension     last assessed 11/11/14   • Sciatica    • Sleep disorder        Past Surgical History:   Procedure Laterality Date   • BREAST BIOPSY Left     2016   • DENTAL SURGERY     • TX BX/EXC LYMPH NODE OPEN DEEP AXILLARY NODE Left 1/15/2019    Procedure: LEFT AXILLARY LYMPH NODE BIOPSY;  Surgeon: Meaghan Mcdonough MD;  Location: [facility];  Service: General   • TX LAMNOTMY INCL W/DCMPRSN NRV ROOT 1 INTRSPC LUMBR Right 4/14/2021    Procedure: L5-S1 minimally invasive microdiskectomy and  epidural steroid injection;  Surgeon: Elise Gilford, MD;  Location: [facility];  Service: Neurosurgery   • TUBAL LIGATION         Family History   Problem Relation Age of Onset   • Anxiety disorder Mother    • Bipolar disorder Mother    • Depression Mother    • Tremor Mother    • Anxiety disorder Father    • Bipolar disorder Father    • Depression Father    • Schizophrenia Father    • Parkinsonism Maternal Grandmother    • Stomach cancer Paternal Grandfather      I have reviewed and agree with the history as documented. E-Cigarette/Vaping   • E-Cigarette Use Never User      E-Cigarette/Vaping Substances   • Nicotine No    • THC No    • CBD No    • Flavoring No    • Other No    • Unknown No      Social History     Tobacco Use   • Smoking status: Never   • Smokeless tobacco: Never   Vaping Use   • Vaping Use: Never used   Substance Use Topics   • Alcohol use: No   • Drug use: No       Review of Systems   Constitutional: Negative for chills and fever. HENT: Negative for congestion, rhinorrhea and sore throat. Respiratory: Negative for cough and shortness of breath. Cardiovascular: Positive for chest pain. Negative for palpitations. Gastrointestinal: Positive for abdominal pain and nausea. Negative for constipation, diarrhea and vomiting. Genitourinary: Negative for difficulty urinating and flank pain.    Musculoskeletal: Negative for arthralgias. Neurological: Negative for dizziness, weakness, light-headedness and headaches. Psychiatric/Behavioral: Negative for agitation, behavioral problems and confusion. All other systems reviewed and are negative. Physical Exam  Physical Exam  Constitutional:       Appearance: She is well-developed. HENT:      Head: Normocephalic and atraumatic. Cardiovascular:      Rate and Rhythm: Normal rate and regular rhythm. Heart sounds: Normal heart sounds. No murmur heard. No friction rub. Pulmonary:      Effort: Pulmonary effort is normal. No respiratory distress. Breath sounds: Normal breath sounds. No wheezing or rales. Abdominal:      General: Bowel sounds are normal. There is no distension. Palpations: Abdomen is soft. Tenderness: There is abdominal tenderness in the right upper quadrant and epigastric area. Positive signs include Dunn's sign. Comments: Patient does have significant tenderness of the epigastric region, right upper quadrant region. While performing ultrasound exam at the bedside, she did exhibit sonographic Dunn sign in the right upper quadrant. Musculoskeletal:         General: Normal range of motion. Cervical back: Normal range of motion and neck supple. Skin:     General: Skin is warm. Neurological:      Mental Status: She is alert and oriented to person, place, and time. Coordination: Coordination normal.   Psychiatric:         Behavior: Behavior normal.         Thought Content:  Thought content normal.         Judgment: Judgment normal.         Vital Signs  ED Triage Vitals [08/11/23 0307]   Temperature Pulse Respirations Blood Pressure SpO2   (!) 97.1 °F (36.2 °C) (!) 107 18 140/89 99 %      Temp Source Heart Rate Source Patient Position - Orthostatic VS BP Location FiO2 (%)   Temporal Monitor Lying Right arm --      Pain Score       --           Vitals:    08/11/23 0307 08/11/23 0315 08/11/23 0400 08/11/23 0500   BP: 140/89 141/96 130/85 130/67   Pulse: (!) 107 101 98 98   Patient Position - Orthostatic VS: Lying Lying Lying Lying         Visual Acuity      ED Medications  Medications   sodium chloride 0.9 % bolus 1,000 mL (0 mL Intravenous Stopped 8/11/23 0439)   HYDROmorphone (DILAUDID) injection 0.5 mg (0.5 mg Intravenous Given 8/11/23 0338)   iohexol (OMNIPAQUE) 350 MG/ML injection (SINGLE-DOSE) 100 mL (100 mL Intravenous Given 8/11/23 0522)   aluminum-magnesium hydroxide-simethicone (MAALOX) oral suspension 30 mL (30 mL Oral Given 8/11/23 0612)   sucralfate (CARAFATE) tablet 1 g (1 g Oral Given 8/11/23 0612)   Famotidine (PF) (PEPCID) injection 20 mg (20 mg Intravenous Given 8/11/23 0612)       Diagnostic Studies  Results Reviewed     Procedure Component Value Units Date/Time    UA w Reflex to Microscopic w Reflex to Culture [[id number]]  (Abnormal) Collected: 08/11/23 0442    Lab Status: Final result Specimen: Urine, Clean Catch Updated: 08/11/23 0501     Color, UA Yellow     Clarity, UA Clear     Specific Gravity, UA 1.020     pH, UA 6.5     Leukocytes, UA Negative     Nitrite, UA Negative     Protein, UA Negative mg/dl      Glucose, UA Negative mg/dl      Ketones, UA 15 (1+) mg/dl      Urobilinogen, UA 0.2 E.U./dl      Bilirubin, UA Negative     Occult Blood, UA Negative    POCT pregnancy, urine [[id number]]  (Normal) Resulted: 08/11/23 0442    Lab Status: Final result Updated: 08/11/23 0444     EXT Preg Test, Ur Negative     Control Valid    HS Troponin 0hr (reflex protocol) [[id number]]  (Normal) Collected: 08/11/23 0338    Lab Status: Final result Specimen: Blood from Arm, Right Updated: 08/11/23 0437     hs TnI 0hr <2 ng/L     Comprehensive metabolic panel [[id number]]  (Abnormal) Collected: 08/11/23 0338    Lab Status: Final result Specimen: Blood from Arm, Right Updated: 08/11/23 0434     Sodium 139 mmol/L      Potassium 3.3 mmol/L      Chloride 100 mmol/L      CO2 28 mmol/L      ANION GAP 11 mmol/L      BUN 13 mg/dL      Creatinine 0.82 mg/dL      Glucose 97 mg/dL      Calcium 9.5 mg/dL      AST 13 U/L      ALT 11 U/L      Alkaline Phosphatase 61 U/L      Total Protein 7.2 g/dL      Albumin 4.3 g/dL      Total Bilirubin 0.33 mg/dL      eGFR 90 ml/min/1.73sq m     Narrative:      National Kid[employer] Foundation guidelines for Chronic Kidney Disease (CKD):   •  Stage 1 with normal or high GFR (GFR > 90 mL/min/1.73 square meters)  •  Stage 2 Mild CKD (GFR = 60-89 mL/min/1.73 square meters)  •  Stage 3A Moderate CKD (GFR = 45-59 mL/min/1.73 square meters)  •  Stage 3B Moderate CKD (GFR = 30-44 mL/min/1.73 square meters)  •  Stage 4 Severe CKD (GFR = 15-29 mL/min/1.73 square meters)  •  Stage 5 End Stage CKD (GFR <15 mL/min/1.73 square meters)  Note: GFR calculation is accurate only with a steady state creatinine    Lipase [[id number]]  (Normal) Collected: 08/11/23 0338    Lab Status: Final result Specimen: Blood from Arm, Right Updated: 08/11/23 0434     Lipase 29 u/L     CBC and differential [[id number]]  (Abnormal) Collected: 08/11/23 0338    Lab Status: Final result Specimen: Blood from Arm, Right Updated: 08/11/23 0413     WBC 10.81 Thousand/uL      RBC 4.62 Million/uL      Hemoglobin 13.9 g/dL      Hematocrit 42.3 %      MCV 92 fL      MCH 30.1 pg      MCHC 32.9 g/dL      RDW 12.5 %      MPV 10.6 fL      Platelets 431 Thousands/uL      nRBC 0 /100 WBCs      Neutrophils Relative 63 %      Immat GRANS % 0 %      Lymphocytes Relative 25 %      Monocytes Relative 8 %      Eosinophils Relative 3 %      Basophils Relative 1 %      Neutrophils Absolute 6.89 Thousands/µL      Immature Grans Absolute 0.02 Thousand/uL      Lymphocytes Absolute 2.67 Thousands/µL      Monocytes Absolute 0.85 Thousand/µL      Eosinophils Absolute 0.32 Thousand/µL      Basophils Absolute 0.06 Thousands/µL                  CT abdomen pelvis with contrast   Final Result by Ashely Aponte MD (08/11 3018)      No acute intra-abdominal/pelvic abnormalities noted with ancillary findings detailed above. Workstation performed: [id number]         XR chest 1 view portable   ED Interpretation by Nikky Valverde MD (08/11 0423)   No acute cardiopulmonary disease. Final Result by Odilia Zayas MD (26/30 6862)      No acute cardiopulmonary disease. Workstation performed: [id number]                    Procedures  ECG 12 Lead Documentation Only    Date/Time: 8/12/2023 12:31 AM    Performed by: Nikky Valverde MD  Authorized by: Nikky Valverde MD    Indications / Diagnosis:  CP  ECG reviewed by me, the ED Provider: yes    Patient location:  ED  Previous ECG:     Previous ECG:  Compared to current    Similarity:  No change  Interpretation:     Interpretation: normal    Rate:     ECG rate:  96    ECG rate assessment: normal    Rhythm:     Rhythm: sinus rhythm    Ectopy:     Ectopy: none    QRS:     QRS axis:  Normal    QRS intervals:  Normal  Conduction:     Conduction: normal    ST segments:     ST segments:  Non-specific  T waves:     T waves: non-specific               ED Course  ED Course as of 08/12/23 0033   Fri Aug 11, 2023   0438 hs TnI 0hr: <2  Not consistent with ACS, will not pursue delta given timeline. HEART Risk Score    Flowsheet Row Most Recent Value   Heart Score Risk Calculator    History 0 Filed at: 08/12/2023 0032   ECG 1 Filed at: 08/12/2023 0032   Age 0 Filed at: 08/12/2023 0032   Risk Factors 2 Filed at: 08/12/2023 0032   Troponin 0 Filed at: 08/12/2023 0032   HEART Score 3 Filed at: 08/12/2023 0032                                      Medical Decision Making  I reviewed the patient's medical chart, PMHx, prior encounters, medications. My independent interpretation of ECG demonstrated: Normal sinus rhythm, heart rate of 96, nonspecific ST, T wave abnormalities. My independent interpretation of CXR demonstrated: No acute cardiopulmonary disease.     My DDx includes: Pancreatitis, cholecystitis, gastritis, ACS    Given differential, will obtain GI labs, cardiac labs, will treat pain and re-assess. Patient did have partial improvement in pain on re-assessment. Her CT abdomen pelvis was unremarkable, as was the rest of her laboratory workup. I explained to the patient that I suspect gastritis given largely negative evaluation, and treated with GI cocktail. I recommended close monitoring over the next 24-48 hours and advised she return if symptoms worsen, change, or anything else concerns her. She is agreeable with this. She was discharged and ambulatory upon discharge in no distress. Amount and/or Complexity of Data Reviewed  Labs: ordered. Decision-making details documented in ED Course. Radiology: ordered and independent interpretation performed. Risk  OTC drugs. Prescription drug management. Disposition  Final diagnoses:   Chest pain   Right upper quadrant abdominal pain     Time reflects when diagnosis was documented in both MDM as applicable and the Disposition within this note     Time User Action Codes Description Comment    8/11/2023  6:25 AM Aaron Goodwin Add [R07.9] Chest pain     8/11/2023  6:25 AM Margot West Add [R10.11] Right upper quadrant abdominal pain       ED Disposition     ED Disposition   Discharge    Condition   Stable    Date/Time   Fri Aug 11, 2023  6:25 AM    Comment   James Pinto discharge to home/self care.                Follow-up Information     Follow up With Specialties Details Why [address], PA-C Family Medicine Call  For re-evaluation [address] [place] [address]  [phone]            Discharge Medication List as of 8/11/2023  6:26 AM      START taking these medications    Details   sucralfate (CARAFATE) 1 g tablet Take 1 tablet (1 g total) by mouth 4 (four) times a day for 14 days, Starting Fri 8/11/2023, Until Fri 8/25/2023, Normal         CONTINUE these medications which have NOT CHANGED    Details   albuterol (2.5 mg/3 mL) 0.083 % nebulizer solution Take 3 mL (2.5 mg total) by nebulization every 6 (six) hours as needed for wheezing or shortness of breath, Starting Thu 1/19/2023, Print      albuterol (PROVENTIL HFA,VENTOLIN HFA) 90 mcg/act inhaler INHALE 2 PUFFS EVERY 4 (FOUR) HOURS AS NEEDED FOR WHEEZING OR SHORTNESS OF BREATH, Starting Thu 6/8/2023, Normal      Alcohol Swabs (Pharmacist Choice Alcohol) PADS TEST BLOOD 3 TIMES DAILY, Normal      ALPRAZolam (XANAX) 1 mg tablet Take 1 mg by mouth as needed, Starting Tue 3/21/2023, Historical Med      atoMOXetine (STRATTERA) 60 mg capsule Take 60 mg by mouth in the morning, Starting Thu 7/27/2023, Historical Med      Blood Glucose Monitoring Suppl (OneTouch Verio Reflect) w/Device KIT Check blood sugars three times daily. Please substitute with appropriate alternative as covered by patient's insurance. Dx: E11.65, Normal      cetirizine (ZyrTEC) 10 mg tablet TAKE 1 TABLET (10 MG TOTAL) BY MOUTH DAILY, Starting Tue 5/2/2023, Normal      DULoxetine (CYMBALTA) 60 mg delayed release capsule Take 60 mg by mouth daily, Starting Tue 5/9/2023, Historical Med      ergocalciferol (VITAMIN D2) 50,000 units TAKE 1 CAPSULE (50,000 UNITS TOTAL) BY MOUTH ONCE A WEEK, Starting Wed 6/14/2023, Normal      famotidine (PEPCID) 20 mg tablet TAKE 1 TABLET (20 MG TOTAL) BY MOUTH 2 (TWO) TIMES A DAY, Starting Fri 7/7/2023, Normal      fluticasone (FLONASE) 50 mcg/act nasal spray 1 spray into each nostril daily, Starting Thu 12/29/2022, Normal      Fluticasone-Salmeterol (Advair) 250-50 mcg/dose inhaler INHALE 1 PUFF 2 (TWO) TIMES A DAY RINSE MOUTH AFTER USE., Starting Mon 7/31/2023, Normal      hydrochlorothiazide (HYDRODIURIL) 12.5 mg tablet TAKE 1 TABLET (12.5 MG TOTAL) BY MOUTH DAILY, Starting Fri 1/20/2023, Normal      Lancet Devices (Easy Mini Eject Lancing Device) MISC Use as directed to check blood sugar 3x daily. , Normal liraglutide (SAXENDA) injection Multiple Dosages:Starting Mon 7/31/2023, Until Sun 8/6/2023 at 2359, THEN Starting Mon 8/7/2023, Until Sun 8/13/2023 at 2359, THEN Starting Mon 8/14/2023, Until Sun 8/20/2023 at 2359, THEN Starting Mon 8/21/2023, Until Sun 8/27/2023 at 2359, [address] arting Mon 8/28/2023, Until Sun 9/3/2023 at 2359Inject 0.1 mL (0.6 mg total) under the skin daily for 7 days, THEN 0.2 mL (1.2 mg total) daily for 7 days, THEN 0.3 mL (1.8 mg total) daily for 7 days, THEN 0.4 mL (2.4 mg total) daily for 7 days, THEN 0.5  mL (3 mg total) daily for 7 days. , Normal      lisinopril (ZESTRIL) 10 mg tablet TAKE 1 TABLET (10 MG TOTAL) BY MOUTH 2 (TWO) TIMES A DAY, Starting Fri 7/21/2023, Normal      meclizine (ANTIVERT) 25 mg tablet TAKE 1 TABLET (25 MG TOTAL) BY MOUTH 3 (THREE) TIMES A DAY AS NEEDED FOR DIZZINESS, Starting Mon 7/17/2023, Normal      metFORMIN (GLUCOPHAGE-XR) 500 mg 24 hr tablet TAKE 1 TABLET (500 MG TOTAL) BY MOUTH DAILY WITH DINNER, Starting Tue 5/2/2023, Normal      mupirocin (BACTROBAN) 2 % ointment APPLY TOPICALLY 3 (THREE) TIMES A DAY, Starting Mon 6/5/2023, Normal      ondansetron (ZOFRAN) 4 mg tablet TAKE 1 TABLET (4 MG TOTAL) BY MOUTH EVERY 8 (EIGHT) HOURS AS NEEDED FOR NAUSEA, Normal      OneTouch Verio test strip TEST BLOOD 3 TIMES DAILY, Normal      Pharmacist Choice Lancets MISC TEST BLOOD 3 TIMES DAILY, Normal      prazosin (MINIPRESS) 2 mg capsule Take 2 mg by mouth daily, Starting Tue 5/9/2023, Historical Med      traZODone (DESYREL) 50 mg tablet Take 50 mg by mouth daily at bedtime, Starting Tue 7/25/2023, Historical Med             No discharge procedures on file.     PDMP Review       Value Time User    PDMP Reviewed  Yes 4/5/2023  1:47 PM Evy Roque PA-C          ED Provider  Electronically Signed by           David Baez MD  08/12/23 8666

## 2023-08-11 NOTE — TELEPHONE ENCOUNTER
Regarding: Question and concern regarding cat scan results  ----- Message from Adelita Milan sent at 8/11/2023  5:09 PM EDT -----  '' I have question and concern regarding my cat scan of my chest and stomach.''

## 2023-08-18 ENCOUNTER — APPOINTMENT (EMERGENCY)
Dept: RADIOLOGY | Facility: HOSPITAL | Age: 38
End: 2023-08-18
Payer: COMMERCIAL

## 2023-08-18 ENCOUNTER — HOSPITAL ENCOUNTER (EMERGENCY)
Facility: HOSPITAL | Age: 38
Discharge: HOME | End: 2023-08-19
Attending: EMERGENCY MEDICINE
Payer: COMMERCIAL

## 2023-08-18 DIAGNOSIS — R10.13 EPIGASTRIC PAIN: Primary | ICD-10-CM

## 2023-08-18 DIAGNOSIS — R73.03 PRE-DIABETES: ICD-10-CM

## 2023-08-18 LAB
ALBUMIN SERPL BCP-MCNC: 4.5 G/DL (ref 3.5–5)
ALP SERPL-CCNC: 69 U/L (ref 34–104)
ALT SERPL W P-5'-P-CCNC: 24 U/L (ref 7–52)
ANION GAP SERPL CALCULATED.3IONS-SCNC: 10 MMOL/L
AST SERPL W P-5'-P-CCNC: 16 U/L (ref 13–39)
ATRIAL RATE: 89 BPM
BASOPHILS # BLD AUTO: 0.07 THOUSANDS/ÂΜL (ref 0–0.1)
BASOPHILS NFR BLD AUTO: 1 % (ref 0–1)
BILIRUB SERPL-MCNC: 0.36 MG/DL (ref 0.2–1)
BUN SERPL-MCNC: 16 MG/DL (ref 5–25)
CALCIUM SERPL-MCNC: 9.9 MG/DL (ref 8.4–10.2)
CHLORIDE SERPL-SCNC: 99 MMOL/L (ref 96–108)
CO2 SERPL-SCNC: 29 MMOL/L (ref 21–32)
CREAT SERPL-MCNC: 0.89 MG/DL (ref 0.6–1.3)
EOSINOPHIL # BLD AUTO: 0.15 THOUSAND/ÂΜL (ref 0–0.61)
EOSINOPHIL NFR BLD AUTO: 1 % (ref 0–6)
ERYTHROCYTE [DISTWIDTH] IN BLOOD BY AUTOMATED COUNT: 12.9 % (ref 11.6–15.1)
GFR SERPL CREATININE-BSD FRML MDRD: 81 ML/MIN/1.73SQ M
GLUCOSE SERPL-MCNC: 108 MG/DL (ref 65–140)
HCT VFR BLD AUTO: 45.1 % (ref 34.8–46.1)
HGB BLD-MCNC: 15.1 G/DL (ref 11.5–15.4)
IMM GRANULOCYTES # BLD AUTO: 0.05 THOUSAND/UL (ref 0–0.2)
IMM GRANULOCYTES NFR BLD AUTO: 0 % (ref 0–2)
LIPASE SERPL-CCNC: 26 U/L (ref 11–82)
LYMPHOCYTES # BLD AUTO: 2.15 THOUSANDS/ÂΜL (ref 0.6–4.47)
LYMPHOCYTES NFR BLD AUTO: 16 % (ref 14–44)
MCH RBC QN AUTO: 30.9 PG (ref 26.8–34.3)
MCHC RBC AUTO-ENTMCNC: 33.5 G/DL (ref 31.4–37.4)
MCV RBC AUTO: 92 FL (ref 82–98)
MONOCYTES # BLD AUTO: 1.08 THOUSAND/ÂΜL (ref 0.17–1.22)
MONOCYTES NFR BLD AUTO: 8 % (ref 4–12)
NEUTROPHILS # BLD AUTO: 9.87 THOUSANDS/ÂΜL (ref 1.85–7.62)
NEUTS SEG NFR BLD AUTO: 74 % (ref 43–75)
NRBC BLD AUTO-RTO: 0 /100 WBCS
PLATELET # BLD AUTO: 325 THOUSANDS/UL (ref 149–390)
PMV BLD AUTO: 10.6 FL (ref 8.9–12.7)
POTASSIUM SERPL-SCNC: 3.6 MMOL/L (ref 3.5–5.3)
PR INTERVAL: 162 MS
PROT SERPL-MCNC: 7.5 G/DL (ref 6.4–8.4)
QRS AXIS: 157 DEGREES
QRSD INTERVAL: 82 MS
QT INTERVAL: 366 MS
QTC INTERVAL: 445 MS
RBC # BLD AUTO: 4.89 MILLION/UL (ref 3.81–5.12)
SODIUM SERPL-SCNC: 138 MMOL/L (ref 135–147)
T WAVE AXIS: 182 DEGREES
VENTRICULAR RATE: 89 BPM
WBC # BLD AUTO: 13.37 THOUSAND/UL (ref 4.31–10.16)

## 2023-08-18 PROCEDURE — 93005 ELECTROCARDIOGRAM TRACING: CPT

## 2023-08-18 PROCEDURE — 80053 COMPREHEN METABOLIC PANEL: CPT | Performed by: EMERGENCY MEDICINE

## 2023-08-18 PROCEDURE — 81025 URINE PREGNANCY TEST: CPT | Performed by: EMERGENCY MEDICINE

## 2023-08-18 PROCEDURE — 71046 X-RAY EXAM CHEST 2 VIEWS: CPT

## 2023-08-18 PROCEDURE — 99285 EMERGENCY DEPT VISIT HI MDM: CPT | Performed by: EMERGENCY MEDICINE

## 2023-08-18 PROCEDURE — 83690 ASSAY OF LIPASE: CPT | Performed by: EMERGENCY MEDICINE

## 2023-08-18 PROCEDURE — 36415 COLL VENOUS BLD VENIPUNCTURE: CPT | Performed by: EMERGENCY MEDICINE

## 2023-08-18 PROCEDURE — 85025 COMPLETE CBC W/AUTO DIFF WBC: CPT | Performed by: EMERGENCY MEDICINE

## 2023-08-18 PROCEDURE — 99284 EMERGENCY DEPT VISIT MOD MDM: CPT

## 2023-08-18 RX ORDER — MORPHINE SULFATE 4 MG/ML
4 INJECTION, SOLUTION INTRAMUSCULAR; INTRAVENOUS ONCE
Status: COMPLETED | OUTPATIENT
Start: 2023-08-18 | End: 2023-08-19

## 2023-08-18 RX ORDER — PANTOPRAZOLE SODIUM 40 MG/10ML
40 INJECTION, POWDER, LYOPHILIZED, FOR SOLUTION INTRAVENOUS ONCE
Status: COMPLETED | OUTPATIENT
Start: 2023-08-18 | End: 2023-08-19

## 2023-08-18 RX ORDER — MAGNESIUM HYDROXIDE/ALUMINUM HYDROXICE/SIMETHICONE 120; 1200; 1200 MG/30ML; MG/30ML; MG/30ML
30 SUSPENSION ORAL ONCE
Status: COMPLETED | OUTPATIENT
Start: 2023-08-18 | End: 2023-08-19

## 2023-08-18 RX ORDER — ISOPROPYL ALCOHOL 0.7 ML/ML
SWAB TOPICAL
Qty: 100 EACH | Refills: 5 | Status: SHIPPED | OUTPATIENT
Start: 2023-08-18

## 2023-08-19 VITALS
RESPIRATION RATE: 18 BRPM | WEIGHT: 190 LBS | DIASTOLIC BLOOD PRESSURE: 91 MMHG | BODY MASS INDEX: 34.96 KG/M2 | HEART RATE: 94 BPM | TEMPERATURE: 97.3 F | SYSTOLIC BLOOD PRESSURE: 144 MMHG | HEIGHT: 62 IN | OXYGEN SATURATION: 98 %

## 2023-08-19 LAB
CARDIAC TROPONIN I PNL SERPL HS: 3 NG/L
EXT PREGNANCY TEST URINE: NEGATIVE
EXT. CONTROL: NORMAL

## 2023-08-19 PROCEDURE — C9113 INJ PANTOPRAZOLE SODIUM, VIA: HCPCS | Performed by: EMERGENCY MEDICINE

## 2023-08-19 PROCEDURE — 96361 HYDRATE IV INFUSION ADD-ON: CPT

## 2023-08-19 PROCEDURE — 96375 TX/PRO/DX INJ NEW DRUG ADDON: CPT

## 2023-08-19 PROCEDURE — 96374 THER/PROPH/DIAG INJ IV PUSH: CPT

## 2023-08-19 PROCEDURE — 36415 COLL VENOUS BLD VENIPUNCTURE: CPT | Performed by: EMERGENCY MEDICINE

## 2023-08-19 PROCEDURE — 84484 ASSAY OF TROPONIN QUANT: CPT | Performed by: EMERGENCY MEDICINE

## 2023-08-19 RX ORDER — PANTOPRAZOLE SODIUM 40 MG/1
40 TABLET, DELAYED RELEASE ORAL DAILY
Qty: 14 TABLET | Refills: 0 | Status: SHIPPED | OUTPATIENT
Start: 2023-08-19 | End: 2023-08-27 | Stop reason: SDUPTHER

## 2023-08-19 RX ADMIN — SODIUM CHLORIDE 1000 ML: 0.9 INJECTION, SOLUTION INTRAVENOUS at 00:19

## 2023-08-19 RX ADMIN — MORPHINE SULFATE 4 MG: 4 INJECTION, SOLUTION INTRAMUSCULAR; INTRAVENOUS at 00:21

## 2023-08-19 RX ADMIN — ALUMINUM HYDROXIDE, MAGNESIUM HYDROXIDE, AND DIMETHICONE 30 ML: 200; 20; 200 SUSPENSION ORAL at 00:19

## 2023-08-19 RX ADMIN — PANTOPRAZOLE SODIUM 40 MG: 40 INJECTION, POWDER, FOR SOLUTION INTRAVENOUS at 00:21

## 2023-08-19 NOTE — ED PROVIDER NOTES
History  Chief Complaint   Patient presents with   • Abdominal Pain     Complaint of abdominal pain "for days"; evaluated for same x1 week ago, diagnosed w/ulcer, prescribed carafate "but it's not working"; states pain is getting worse; denies N/V, fever and/or diarrhea; nothing OTC for pain;     26-year-old female, presents with epigastric pain. Patient states this pain has been going on for over a week, was evaluated in ED 1 week ago with similar symptoms. Patient reports pain in epigastric abdomen radiating up to lower center chest.  Associated with nausea, denies any vomiting or fevers. Has been taking medications given to her with no improvement. History provided by:  Patient   used: No    Abdominal Pain  Associated symptoms: no shortness of breath        Prior to Admission Medications   Prescriptions Last Dose Informant Patient Reported? Taking? ALPRAZolam (XANAX) 1 mg tablet   Yes No   Sig: Take 1 mg by mouth as needed   Alcohol Swabs (Pharmacist Choice Alcohol) PADS   No No   Sig: TEST BLOOD 3 TIMES DAILY   Blood Glucose Monitoring Suppl (OneTouch Verio Reflect) w/Device KIT   No No   Sig: Check blood sugars three times daily. Please substitute with appropriate alternative as covered by patient's insurance. Dx: E11.65   DULoxetine (CYMBALTA) 60 mg delayed release capsule   Yes No   Sig: Take 60 mg by mouth daily   Fluticasone-Salmeterol (Advair) 250-50 mcg/dose inhaler   No No   Sig: INHALE 1 PUFF 2 (TWO) TIMES A DAY RINSE MOUTH AFTER USE. Lancet Devices (Easy Mini Eject Lancing Device) MISC   No No   Sig: Use as directed to check blood sugar 3x daily.    OneTouch Verio test strip   No No   Sig: TEST BLOOD 3 TIMES DAILY   Pharmacist Choice Lancets MISC   No No   Sig: TEST BLOOD 3 TIMES DAILY   albuterol (2.5 mg/3 mL) 0.083 % nebulizer solution   No No   Sig: Take 3 mL (2.5 mg total) by nebulization every 6 (six) hours as needed for wheezing or shortness of breath   albuterol (PROVENTIL HFA,VENTOLIN HFA) 90 mcg/act inhaler   No No   Sig: INHALE 2 PUFFS EVERY 4 (FOUR) HOURS AS NEEDED FOR WHEEZING OR SHORTNESS OF BREATH   atoMOXetine (STRATTERA) 60 mg capsule   Yes No   Sig: Take 60 mg by mouth in the morning   cetirizine (ZyrTEC) 10 mg tablet   No No   Sig: TAKE 1 TABLET (10 MG TOTAL) BY MOUTH DAILY   ergocalciferol (VITAMIN D2) 50,000 units   No No   Sig: TAKE 1 CAPSULE (50,000 UNITS TOTAL) BY MOUTH ONCE A WEEK   famotidine (PEPCID) 20 mg tablet   No No   Sig: TAKE 1 TABLET (20 MG TOTAL) BY MOUTH 2 (TWO) TIMES A DAY   fluticasone (FLONASE) 50 mcg/act nasal spray   No No   Si spray into each nostril daily   hydrochlorothiazide (HYDRODIURIL) 12.5 mg tablet   No No   Sig: TAKE 1 TABLET (12.5 MG TOTAL) BY MOUTH DAILY   liraglutide (SAXENDA) injection   No No   Sig: Inject 0.1 mL (0.6 mg total) under the skin daily for 7 days, THEN 0.2 mL (1.2 mg total) daily for 7 days, THEN 0.3 mL (1.8 mg total) daily for 7 days, THEN 0.4 mL (2.4 mg total) daily for 7 days, THEN 0.5 mL (3 mg total) daily for 7 days.    lisinopril (ZESTRIL) 10 mg tablet   No No   Sig: TAKE 1 TABLET (10 MG TOTAL) BY MOUTH 2 (TWO) TIMES A DAY   meclizine (ANTIVERT) 25 mg tablet   No No   Sig: TAKE 1 TABLET (25 MG TOTAL) BY MOUTH 3 (THREE) TIMES A DAY AS NEEDED FOR DIZZINESS   metFORMIN (GLUCOPHAGE-XR) 500 mg 24 hr tablet   No No   Sig: TAKE 1 TABLET (500 MG TOTAL) BY MOUTH DAILY WITH DINNER   mupirocin (BACTROBAN) 2 % ointment   No No   Sig: APPLY TOPICALLY 3 (THREE) TIMES A DAY   Patient not taking: Reported on 2023   ondansetron (ZOFRAN) 4 mg tablet   No No   Sig: TAKE 1 TABLET (4 MG TOTAL) BY MOUTH EVERY 8 (EIGHT) HOURS AS NEEDED FOR NAUSEA   prazosin (MINIPRESS) 2 mg capsule   Yes No   Sig: Take 2 mg by mouth daily   sucralfate (CARAFATE) 1 g tablet   No No   Sig: Take 1 tablet (1 g total) by mouth 4 (four) times a day for 14 days   traZODone (DESYREL) 50 mg tablet   Yes No   Sig: Take 50 mg by mouth daily at bedtime      Facility-Administered Medications: None       Past Medical History:   Diagnosis Date   • ADHD (attention deficit hyperactivity disorder)    • Anxiety    • Asthma    • Cat-scratch disease     last assessed 10/22/15   • Depression    • Hypertension     last assessed 11/11/14   • Sciatica    • Sleep disorder        Past Surgical History:   Procedure Laterality Date   • BREAST BIOPSY Left     2016   • DENTAL SURGERY     • CO BX/EXC LYMPH NODE OPEN DEEP AXILLARY NODE Left 1/15/2019    Procedure: LEFT AXILLARY LYMPH NODE BIOPSY;  Surgeon: Radha Oakley MD;  Location: [facility];  Service: General   • CO LAMNOTMY INCL W/DCMPRSN NRV ROOT 1 INTRSPC LUMBR Right 4/14/2021    Procedure: L5-S1 minimally invasive microdiskectomy and  epidural steroid injection;  Surgeon: Rafiq Lynn MD;  Location: [facility];  Service: Neurosurgery   • TUBAL LIGATION         Family History   Problem Relation Age of Onset   • Anxiety disorder Mother    • Bipolar disorder Mother    • Depression Mother    • Tremor Mother    • Anxiety disorder Father    • Bipolar disorder Father    • Depression Father    • Schizophrenia Father    • Parkinsonism Maternal Grandmother    • Stomach cancer Paternal Grandfather      I have reviewed and agree with the history as documented. E-Cigarette/Vaping   • E-Cigarette Use Never User      E-Cigarette/Vaping Substances   • Nicotine No    • THC No    • CBD No    • Flavoring No    • Other No    • Unknown No      Social History     Tobacco Use   • Smoking status: Never   • Smokeless tobacco: Never   Vaping Use   • Vaping Use: Never used   Substance Use Topics   • Alcohol use: No   • Drug use: No       Review of Systems   Constitutional: Negative. Respiratory: Negative. Negative for shortness of breath. Gastrointestinal: Positive for abdominal pain. Neurological: Negative. Physical Exam  Physical Exam  Vitals and nursing note reviewed.    Constitutional:       General: She is not in acute distress. HENT:      Head: Normocephalic and atraumatic. Eyes:      Extraocular Movements: Extraocular movements intact. Pupils: Pupils are equal, round, and reactive to light. Cardiovascular:      Rate and Rhythm: Regular rhythm. Tachycardia present. Abdominal:      Tenderness: Negative signs include Dunn's sign and McBurney's sign. Comments: Nondistended, soft  Mild epigastric tenderness, no rebound or guarding  No right upper quadrant tenderness, negative Dunn sign  No lower abdominal tenderness   Skin:     General: Skin is warm and dry. Neurological:      General: No focal deficit present. Mental Status: She is alert and oriented to person, place, and time.          Vital Signs  ED Triage Vitals [08/18/23 2304]   Temperature Pulse Respirations Blood Pressure SpO2   (!) 97.3 °F (36.3 °C) (!) 111 18 144/91 98 %      Temp Source Heart Rate Source Patient Position - Orthostatic VS BP Location FiO2 (%)   Tympanic Monitor Lying Right arm --      Pain Score       10 - Worst Possible Pain           Vitals:    08/18/23 2304 08/19/23 0132   BP: 144/91    Pulse: (!) 111 94   Patient Position - Orthostatic VS: Lying          Visual Acuity      ED Medications  Medications   sodium chloride 0.9 % bolus 1,000 mL (1,000 mL Intravenous New Bag 8/19/23 0019)   pantoprazole (PROTONIX) injection 40 mg (40 mg Intravenous Given 8/19/23 0021)   aluminum-magnesium hydroxide-simethicone (MAALOX) oral suspension 30 mL (30 mL Oral Given 8/19/23 0019)   morphine injection 4 mg (4 mg Intravenous Given 8/19/23 0021)       Diagnostic Studies  Results Reviewed     Procedure Component Value Units Date/Time    HS Troponin 0hr (reflex protocol) [[id number]]  (Normal) Collected: 08/19/23 0017    Lab Status: Final result Specimen: Blood from Arm, Right Updated: 08/19/23 0128     hs TnI 0hr 3 ng/L     POCT pregnancy, urine [[id number]]  (Normal) Resulted: 08/19/23 0021    Lab Status: Final result Updated: 08/19/23 0021 EXT Preg Test, Ur Negative     Control Valid    Comprehensive metabolic panel [[id number]] Collected: 08/18/23 2328    Lab Status: Final result Specimen: Blood from Arm, Right Updated: 08/18/23 2353     Sodium 138 mmol/L      Potassium 3.6 mmol/L      Chloride 99 mmol/L      CO2 29 mmol/L      ANION GAP 10 mmol/L      BUN 16 mg/dL      Creatinine 0.89 mg/dL      Glucose 108 mg/dL      Calcium 9.9 mg/dL      AST 16 U/L      ALT 24 U/L      Alkaline Phosphatase 69 U/L      Total Protein 7.5 g/dL      Albumin 4.5 g/dL      Total Bilirubin 0.36 mg/dL      eGFR 81 ml/min/1.73sq m     Narrative:      National Kidney Disease Foundation guidelines for Chronic Kidney Disease (CKD):   •  Stage 1 with normal or high GFR (GFR > 90 mL/min/1.73 square meters)  •  Stage 2 Mild CKD (GFR = 60-89 mL/min/1.73 square meters)  •  Stage 3A Moderate CKD (GFR = 45-59 mL/min/1.73 square meters)  •  Stage 3B Moderate CKD (GFR = 30-44 mL/min/1.73 square meters)  •  Stage 4 Severe CKD (GFR = 15-29 mL/min/1.73 square meters)  •  Stage 5 End Stage CKD (GFR <15 mL/min/1.73 square meters)  Note: GFR calculation is accurate only with a steady state creatinine    Lipase [[id number]]  (Normal) Collected: 08/18/23 2328    Lab Status: Final result Specimen: Blood from Arm, Right Updated: 08/18/23 2353     Lipase 26 u/L     CBC and differential [[id number]]  (Abnormal) Collected: 08/18/23 2328    Lab Status: Final result Specimen: Blood from Arm, Right Updated: 08/18/23 2333     WBC 13.37 Thousand/uL      RBC 4.89 Million/uL      Hemoglobin 15.1 g/dL      Hematocrit 45.1 %      MCV 92 fL      MCH 30.9 pg      MCHC 33.5 g/dL      RDW 12.9 %      MPV 10.6 fL      Platelets 436 Thousands/uL      nRBC 0 /100 WBCs      Neutrophils Relative 74 %      Immat GRANS % 0 %      Lymphocytes Relative 16 %      Monocytes Relative 8 %      Eosinophils Relative 1 %      Basophils Relative 1 %      Neutrophils Absolute 9.87 Thousands/µL      Immature Grans Absolute 0.05 Thousand/uL      Lymphocytes Absolute 2.15 Thousands/µL      Monocytes Absolute 1.08 Thousand/µL      Eosinophils Absolute 0.15 Thousand/µL      Basophils Absolute 0.07 Thousands/µL                  XR chest 2 views    (Results Pending)              Procedures  ECG 12 Lead Documentation Only    Date/Time: 8/19/2023 12:03 AM    Performed by: Lloyd Ocampo MD  Authorized by: Lloyd Ocampo MD    ECG reviewed by me, the ED Provider: yes    Patient location:  ED  Previous ECG:     Previous ECG:  Compared to current  Rate:     ECG rate assessment: normal    Rhythm:     Rhythm: sinus rhythm    Ectopy:     Ectopy: none    QRS:     QRS intervals:  Normal  Conduction:     Conduction: abnormal      Abnormal conduction: LAFB    Comments:      Sinus rhythm at 89, no acute ischemic changes             ED Course  ED Course as of 08/19/23 0138   Sat Aug 19, 2023   0003 Chest x-ray independently reviewed by myself. No free air, no infiltrate or effusion, no acute findings. [id number] Patient reports feeling better after receiving medications. Abdomen soft and nontender on repeat examination. Patient tolerating oral intake in ED. Patient symptoms likely related to gastritis, GERD. Had recent abdominal CT scan, no indication for repeat imaging at this time. We will add PPI medication, prescription sent to pharmacy. Return referral for GI order placed. SBIRT 20yo+    Flowsheet Row Most Recent Value   Initial Alcohol Screen: US AUDIT-C     1. How often do you have a drink containing alcohol? 0 Filed at: 08/18/2023 2306   2. How many drinks containing alcohol do you have on a typical day you are drinking? 0 Filed at: 08/18/2023 2306   3b. FEMALE Any Age, or MALE 65+: How often do you have 4 or more drinks on one occassion? 0 Filed at: 08/18/2023 2306   Audit-C Score 0 Filed at: 08/18/2023 2306   YOAN: How many times in the past year have you. ..     Used an illegal drug or used a prescription medication for non-medical reasons? Never Filed at: 08/18/2023 2307                    Medical Decision Making  25-year-old female, presenting with upper abdominal pain. Differential diagnosis includes gastritis, pancreatitis, acute cholecystitis, perforated ulcer among other diagnoses. Patient appears in no distress. X-ray, EKG, labs ordered, will give medications, continue to monitor and reevaluate. Amount and/or Complexity of Data Reviewed  External Data Reviewed: radiology and notes. Details: Previous ED visit on 8/11 reviewed. Had abdominal CT scan at that time with no acute findings. Labs: ordered. Decision-making details documented in ED Course. Radiology: ordered and independent interpretation performed. Decision-making details documented in ED Course. ECG/medicine tests: ordered and independent interpretation performed. Decision-making details documented in ED Course. Risk  OTC drugs. Prescription drug management. Parenteral controlled substances. I have reviewed test results and diagnosis with patient. Follow-up plan reviewed. Precautions for acute return for re-evaluation are reviewed. Opportunity to ask questions was provided. Patient verbalizes understanding. Disposition  Final diagnoses:   Epigastric pain     Time reflects when diagnosis was documented in both MDM as applicable and the Disposition within this note     Time User Action Codes Description Comment    8/19/2023  1:33 AM Deepti Holley Add [R10.13] Epigastric pain       ED Disposition     ED Disposition   Discharge    Condition   Stable    Date/Time   Sat Aug 19, 2023  1:32 AM    Comment   Sonia Crouch discharge to home/self care.                Follow-up Information     Follow up With Specialties Details Why Contact Info Additional [address] [facility] Schedule an appointment as soon as possible for a visit   [address] [id number]  [address] [facility] [place], [address] [place] [address], [place], [place], [address] [phone]          Patient's Medications   Discharge Prescriptions    PANTOPRAZOLE (PROTONIX) 40 MG TABLET    Take 1 tablet (40 mg total) by mouth daily for 14 days       Start Date: 8/19/2023 End Date: 9/2/2023       Order Dose: 40 mg       Quantity: 14 tablet    Refills: 0           PDMP Review       Value Time User    PDMP Reviewed  Yes 4/5/2023  1:47 PM Shanti Murillo PA-C          ED Provider  Electronically Signed by           Corrinne Artist, MD  08/19/23 5683

## 2023-08-19 NOTE — ED TRIAGE NOTES
Complaint of abdominal pain "for days"; evaluated for same x1 week ago, diagnosed w/ulcer, prescribed carafate "but it's not working"; states pain is getting worse; denies N/V, fever and/or diarrhea; nothing OTC for pain; (TOPROL XL) 100 MG extended release tablet Take 1 tablet by mouth once daily Yes Laxmi Latham MD   atorvastatin (LIPITOR) 80 MG tablet Take 1 tablet by mouth once daily Yes Laxmi Latham MD   naproxen (NAPROSYN) 500 MG tablet Take 1 tablet by mouth 2 times daily (with meals) Yes Laxmi Latham MD   Cetirizine HCl (ZYRTEC ALLERGY PO) Take 1 tablet by mouth daily as needed (seasonal allergies) Yes Historical Provider, MD   aspirin 81 MG tablet Take 1 tablet by mouth daily Yes Historical Provider, MD   Multiple Vitamin (MULTIVITAMINS PO) Take 1 tablet by mouth daily. Yes Historical Provider, MD [place] (Including outside providers/suppliers regularly involved in providing care):   Patient Care Team:  Laxmi Latham MD as PCP - General (Family Medicine)  Laxmi Latham MD as PCP - Empaneled Provider     Reviewed and updated this visit:  Tobacco  Allergies  Meds  Med Hx  Surg Hx  Soc Hx  Fam Hx      I, Francois Andino LPN, 1[birth year], performed the documented evaluation under the direct supervision of the attending physician. Cherry Mac, was evaluated through a synchronous (real-time) audio encounter. The patient (or guardian if applicable) is aware that this is a billable service, which includes applicable co-pays. This Virtual Visit was conducted with patient's (and/or legal guardian's) consent. Patient identification was verified, and a caregiver was present when appropriate. The patient was located at Home: [address] [place] [address]  Provider was located at [facility] ([address] [place] [address]): [address] [place] [address],  [address] [place] [address]    This encounter was performed under my, Agapito Moe, direct supervision, 8/3/2023.   Electronically signed by Laxmi Latham MD on 8/3/2023 at 4:06 PM

## 2023-08-21 ENCOUNTER — CONSULT (OUTPATIENT)
Dept: GASTROENTEROLOGY | Facility: CLINIC | Age: 38
End: 2023-08-21
Payer: COMMERCIAL

## 2023-08-21 ENCOUNTER — TELEPHONE (OUTPATIENT)
Dept: PSYCHIATRY | Facility: CLINIC | Age: 38
End: 2023-08-21

## 2023-08-21 VITALS
BODY MASS INDEX: 34.96 KG/M2 | WEIGHT: 190 LBS | DIASTOLIC BLOOD PRESSURE: 80 MMHG | SYSTOLIC BLOOD PRESSURE: 108 MMHG | OXYGEN SATURATION: 97 % | HEIGHT: 62 IN | TEMPERATURE: 98.2 F | HEART RATE: 103 BPM

## 2023-08-21 DIAGNOSIS — R12 HEARTBURN: ICD-10-CM

## 2023-08-21 DIAGNOSIS — R10.13 EPIGASTRIC PAIN: Primary | ICD-10-CM

## 2023-08-21 DIAGNOSIS — R73.9 HYPERGLYCEMIA: ICD-10-CM

## 2023-08-21 DIAGNOSIS — R07.89 OTHER CHEST PAIN: ICD-10-CM

## 2023-08-21 DIAGNOSIS — R73.03 PRE-DIABETES: ICD-10-CM

## 2023-08-21 LAB
ATRIAL RATE: 89 BPM
PR INTERVAL: 162 MS
QRS AXIS: 157 DEGREES
QRSD INTERVAL: 82 MS
QT INTERVAL: 366 MS
QTC INTERVAL: 445 MS
T WAVE AXIS: 182 DEGREES
VENTRICULAR RATE: 89 BPM

## 2023-08-21 PROCEDURE — 99244 OFF/OP CNSLTJ NEW/EST MOD 40: CPT | Performed by: STUDENT IN AN ORGANIZED HEALTH CARE EDUCATION/TRAINING PROGRAM

## 2023-08-21 PROCEDURE — 93010 ELECTROCARDIOGRAM REPORT: CPT | Performed by: INTERNAL MEDICINE

## 2023-08-21 RX ORDER — BLOOD SUGAR DIAGNOSTIC
STRIP MISCELLANEOUS
Qty: 100 EACH | Refills: 1 | Status: SHIPPED | OUTPATIENT
Start: 2023-08-21

## 2023-08-21 RX ORDER — LANCING DEVICE
EACH MISCELLANEOUS
Qty: 100 EACH | Refills: 1 | Status: SHIPPED | OUTPATIENT
Start: 2023-08-21

## 2023-08-21 NOTE — PATIENT INSTRUCTIONS
We will check a right upper quadrant ultrasound to rule out gallstones  We will order a blood test to rule out celiac disease  We will schedule you for an upper endoscopy (EGD)  Start taking the Protonix when you get it  Take it 30 minutes before dinner

## 2023-08-21 NOTE — PROGRESS NOTES
[facility]  Outpatient Consultation  Encounter: [id number]     PATIENT INFO     Name: Leti Chavez  YOB: 1985   Age: 43 y.o. Sex: female   MRN: [id number]    [address] Silva is a 43 y.o. female with complaint of epigastric pain and chest pain and possible heartburn. The cause for her pain remains unclear. There certainly could be a component of GI cause for her pain including peptic ulcer disease versus reflux versus underlying hiatal hernia versus possible atypical biliary disease such as symptomatic gallstones versus celiac disease versus functional pain versus IBS. Low suspicion for pancreatitis given negative lipase and CT imaging obtained during her ED visits. I have a suspicion that her pain may be more likely musculoskeletal based on her description possibly costochondritis or some other cause. • Check right upper quadrant ultrasound to rule out gallstones  • Schedule for upper endoscopy for further evaluation to rule out possible GI etiology for her pain  • We will plan to obtain gastric and duodenal biopsies  • Continue famotidine 20 mg twice daily  • Patient has been prescribed pantoprazole but has yet to start this; advised to start pantoprazole 40 mg daily  • Instructed to take 30 minutes before largest meal of the day which is dinner  • Check celiac panel to rule out celiac disease although low suspicion for this  • Advised to avoid NSAIDs but can take Tylenol if needed for additional symptom relief    1. Epigastric pain    2. Heartburn    3. Other chest pain      Orders Placed This Encounter   Procedures   • US right upper quadrant   • Celiac Disease Panel   • EGD       FOLLOW-UP: 8 weeks    HISTORY OF Stu Gracia is a 43 y.o. female who presents to GI office for evaluation of epigastric pain. Pain onset was approximately 2 weeks ago.   Pain happens intermittently but appears to occur most often at night shortly after going to bed. Describes the pain as being sharp in nature. Sometimes radiating into the upper right chest area and through to her back. There does not appear to be a clear trigger for her pain although may sometimes be associated with late night eating. Denies any association with bowel habits. Denies any significant dysphagia or odynophagia, nausea or vomiting. Does report some mild improvement with famotidine twice daily. She was prescribed pantoprazole but has yet to start taking this as it was not delivered by pharmacy yet. She states that she did take a acid reflux medication provided to her by her sister and believes it was a PPI which did help her symptoms. Denies frequent NSAID use or significant alcohol consumption. Denies prior history of pancreatitis. Does admit to periodic diarrhea but no other significant change in bowel habits. Denies any hematochezia or melena. Still has gallbladder in place. She presented to the ED a couple of times for her symptoms. Work-up in the ED was unremarkable including normal CT scan as well as lipase levels.      ENDOSCOPIC HISTORY     UPPER ENDOSCOPY: None    COLONOSCOPY: None    REVIEW OF SYSTEMS     CONSTITUTIONAL: Denies any fever, chills, rigors, and weight loss  HEENT: No earache or tinnitus, denies hearing loss or visual disturbances  CARDIOVASCULAR: No chest pain or palpitations  RESPIRATORY: Denies any cough, hemoptysis, shortness of breath or dyspnea on exertion  GASTROINTESTINAL: As noted in the History of Present Illness  GENITOURINARY: No problems with urination, denies any hematuria or dysuria  NEUROLOGIC: No dizziness or vertigo, denies headaches   MUSCULOSKELETAL: +Chronic neck pain, back pain  SKIN: Denies skin rashes or itching  ENDOCRINE: Denies excessive thirst, denies intolerance to heat or cold  PSYCHOSOCIAL: Denies any recent memory loss     Historical Information   Past Medical History:   Diagnosis Date   • ADHD (attention deficit hyperactivity disorder)    • Anxiety    • Asthma    • Cat-scratch disease     last assessed 10/22/15   • Depression    • Hypertension     last assessed 11/11/14   • Sciatica    • Sleep disorder      Past Surgical History:   Procedure Laterality Date   • BREAST BIOPSY Left     2016   • DENTAL SURGERY     • OK BX/EXC LYMPH NODE OPEN DEEP AXILLARY NODE Left 1/15/2019    Procedure: LEFT AXILLARY LYMPH NODE BIOPSY;  Surgeon: Celeste Singh MD;  Location: [facility];  Service: General   • OK LAMNOTMY INCL W/DCMPRSN NRV ROOT 1 INTRSPC LUMBR Right 4/14/2021    Procedure: L5-S1 minimally invasive microdiskectomy and  epidural steroid injection;  Surgeon: Katherine Medley MD;  Location: [facility];  Service: Neurosurgery   • TUBAL LIGATION       Social History   Social History     Substance and Sexual Activity   Alcohol Use No     Social History     Substance and Sexual Activity   Drug Use No     Social History     Tobacco Use   Smoking Status Never   Smokeless Tobacco Never     Family History   Problem Relation Age of Onset   • Anxiety disorder Mother    • Bipolar disorder Mother    • Depression Mother    • Tremor Mother    • Anxiety disorder Father    • Bipolar disorder Father    • Depression Father    • Schizophrenia Father    • Parkinsonism Maternal Grandmother    • Stomach cancer Paternal Grandfather        MEDICATIONS & ALLERGIES     Current Outpatient Medications   Medication Instructions   • albuterol (PROVENTIL HFA,VENTOLIN HFA) 90 mcg/act inhaler 2 puffs, Inhalation, Every 4 hours PRN   • albuterol 2.5 mg, Nebulization, Every 6 hours PRN   • Alcohol Swabs (Pharmacist Choice Alcohol) PADS TEST BLOOD 3 TIMES DAILY   • ALPRAZolam (XANAX) 1 mg, Oral, As needed   • atoMOXetine (STRATTERA) 60 mg, Oral, Daily   • Blood Glucose Monitoring Suppl (OneTouch Verio Reflect) w/Device KIT Check blood sugars three times daily. Please substitute with appropriate alternative as covered by patient's insurance.  Dx: E11.65   • cetirizine (ZYRTEC) 10 mg, Oral, Daily   • DULoxetine (CYMBALTA) 60 mg, Oral, Daily   • ergocalciferol (VITAMIN D2) 50,000 Units, Oral, Weekly   • famotidine (PEPCID) 20 mg, Oral, 2 times daily   • fluticasone (FLONASE) 50 mcg/act nasal spray 1 spray, Nasal, Daily   • Fluticasone-Salmeterol (Advair) 250-50 mcg/dose inhaler 1 puff, Inhalation, 2 times daily, Rinse mouth after use. • hydrochlorothiazide (HYDRODIURIL) 12.5 mg, Oral, Daily   • Lancet Devices (Easy Mini Eject Lancing Device) MISC Use as directed to check blood sugar 3x daily. • liraglutide (SAXENDA) injection Inject 0.1 mL (0.6 mg total) under the skin daily for 7 days, THEN 0.2 mL (1.2 mg total) daily for 7 days, THEN 0.3 mL (1.8 mg total) daily for 7 days, THEN 0.4 mL (2.4 mg total) daily for 7 days, THEN 0.5 mL (3 mg total) daily for 7 days. • lisinopril (ZESTRIL) 10 mg, Oral, 2 times daily   • meclizine (ANTIVERT) 25 mg, Oral, 3 times daily PRN   • metFORMIN (GLUCOPHAGE-XR) 500 mg, Oral, Daily with dinner   • mupirocin (BACTROBAN) 2 % ointment Topical, 3 times daily   • ondansetron (ZOFRAN) 4 mg tablet TAKE 1 TABLET (4 MG TOTAL) BY MOUTH EVERY 8 (EIGHT) HOURS AS NEEDED FOR NAUSEA   • OneTouch Verio test strip TEST BLOOD 3 TIMES DAILY   • pantoprazole (PROTONIX) 40 mg, Oral, Daily   • Pharmacist Choice Lancets MISC TEST BLOOD 3 TIMES DAILY   • prazosin (MINIPRESS) 2 mg, Oral, Daily   • sucralfate (CARAFATE) 1 g, Oral, 4 times daily   • traZODone (DESYREL) 50 mg, Oral, Daily at bedtime     Allergies   Allergen Reactions   • Bactrim [Sulfamethoxazole-Trimethoprim] Shortness Of Breath     Near syncope   • Codeine Hives     Tolerated Percocet, Vicodin, etc.       PHYSICAL EXAM      Objective   Blood pressure 108/80, pulse 103, temperature 98.2 °F (36.8 °C), temperature source Tympanic, height 5' 2" (1.575 m), weight 86.2 kg (190 lb), last menstrual period 08/01/2023, SpO2 97 %, not currently breastfeeding. Body mass index is 34.75 kg/m².     General Appearance:   Alert, cooperative, no distress   HEENT:   Normocephalic, atraumatic, anicteric     Neck:   Supple, symmetrical, trachea midline   Lungs:   Equal chest rise, respirations unlabored; TTP in substernal chest region    Heart:   Regular rhythm, tachycardic   Abdomen:   Soft, TTP in epigastrium, non-distended; normal bowel sounds; no masses, no organomegaly    Rectal:   Deferred    Extremities:   No cyanosis, clubbing or edema    Neuro: Moves all 4 extremities    Skin:   No jaundice, rashes, or lesions       LABORATORY RESULTS     No visits with results within 1 Day(s) from this visit.    Latest known visit with results is:   Admission on 08/18/2023, Discharged on 08/19/2023   Component Date Value   • EXT Preg Test, Ur 08/19/2023 Negative    • Control 08/19/2023 Valid    • WBC 08/18/2023 13.37 (H)    • RBC 08/18/2023 4.89    • Hemoglobin 08/18/2023 15.1    • Hematocrit 08/18/2023 45.1    • MCV 08/18/2023 92    • MCH 08/18/2023 30.9    • MCHC 08/18/2023 33.5    • RDW 08/18/2023 12.9    • MPV 08/18/2023 10.6    • Platelets 63/07/0920 325    • nRBC 08/18/2023 0    • Neutrophils Relative 08/18/2023 74    • Immat GRANS % 08/18/2023 0    • Lymphocytes Relative 08/18/2023 16    • Monocytes Relative 08/18/2023 8    • Eosinophils Relative 08/18/2023 1    • Basophils Relative 08/18/2023 1    • Neutrophils Absolute 08/18/2023 9.87 (H)    • Immature Grans Absolute 08/18/2023 0.05    • Lymphocytes Absolute 08/18/2023 2.15    • Monocytes Absolute 08/18/2023 1.08    • Eosinophils Absolute 08/18/2023 0.15    • Basophils Absolute 08/18/2023 0.07    • Sodium 08/18/2023 138    • Potassium 08/18/2023 3.6    • Chloride 08/18/2023 99    • CO2 08/18/2023 29    • ANION GAP 08/18/2023 10    • BUN 08/18/2023 16    • Creatinine 08/18/2023 0.89    • Glucose 08/18/2023 108    • Calcium 08/18/2023 9.9    • AST 08/18/2023 16    • ALT 08/18/2023 24    • Alkaline Phosphatase 08/18/2023 69    • Total Protein 08/18/2023 7.5    • Albumin 08/18/2023 4.5    • Total Bilirubin 08/18/2023 0.36    • eGFR 08/18/2023 81    • Lipase 08/18/2023 26    • Ventricular Rate 08/18/2023 89    • Atrial Rate 08/18/2023 89    • FL Interval 08/18/2023 162    • QRSD Interval 08/18/2023 82    • QT Interval 08/18/2023 366    • QTC Interval 08/18/2023 445    • QRS Axis 08/18/2023 157    • T Wave Axis 08/18/2023 182    • hs TnI 0hr 08/19/2023 3         IMAGING RESULTS     XR chest 2 views    Result Date: 8/20/2023  Narrative: CHEST INDICATION:   epigastric pain. COMPARISON: 8/11/2023 EXAM PERFORMED/VIEWS:  XR CHEST PA & LATERAL 2 views FINDINGS: Cardiomediastinal silhouette appears unremarkable. The lungs are clear. No pneumothorax or pleural effusion. Osseous structures appear within normal limits for patient age. Impression: No acute cardiopulmonary disease. Findings are stable Workstation performed: [id number]     XR chest 1 view portable    Result Date: 8/11/2023  Narrative: CHEST INDICATION:   epigastric pain radiating to chest. COMPARISON: CXR 1/15/2023, abdomen CT 8/11/2023. EXAM PERFORMED/VIEWS:  XR CHEST PORTABLE. FINDINGS: Cardiomediastinal silhouette normal. Lungs clear. No effusion or pneumothorax. Upper abdomen normal. Bones normal for age. Impression: No acute cardiopulmonary disease. Workstation performed: [id number]     CT abdomen pelvis with contrast    Result Date: 8/11/2023  Narrative: CT ABDOMEN AND PELVIS WITH IV CONTRAST INDICATION:   Epigastric pain RUQ pain, epigastric pain, evaluating for cholecystitis. COMPARISON: 5/9/2021. TECHNIQUE:  CT examination of the abdomen and pelvis was performed. Multiplanar 2D reformatted images were created from the source data. This examination, like all CT scans performed in the [facility], was performed utilizing techniques to minimize radiation dose exposure, including the use of iterative reconstruction and automated exposure control.  Radiation dose length product (DLP) for this visit:  [phone] mGy-cm IV Contrast:  100 mL of iohexol (OMNIPAQUE) Enteric Contrast:  Enteric contrast was not administered. FINDINGS: ABDOMEN LOWER CHEST: Mild bibasilar dependent atelectasis. Otherwise no clinically significant abnormality identified in the visualized lower chest. LIVER/BILIARY TREE:  Unremarkable. GALLBLADDER:  No calcified gallstones. No pericholecystic inflammatory change. SPLEEN:  Unremarkable. PANCREAS:  Unremarkable. ADRENAL GLANDS:  Unremarkable. KIDNEYS/URETERS:  Unremarkable. No hydronephrosis. STOMACH AND BOWEL: Stomach is moderately distended with heterogeneous density debris. No gross intraluminal mass or irregular wall thickening. APPENDIX:  No findings to suggest appendicitis. ABDOMINOPELVIC CAVITY:  No ascites. No pneumoperitoneum. No lymphadenopathy. VESSELS:  Unremarkable for patient's age. PELVIS REPRODUCTIVE ORGANS: Uterus and right adnexa appear grossly unremarkable. 2.3 cm ovoid hypodensity in the left adnexa could be a small ovarian cyst. Punctate pelvic phleboliths are seen. URINARY BLADDER: Contracted limiting evaluation. No gross intraluminal mass or calculi. ABDOMINAL WALL/INGUINAL REGIONS:  Unremarkable. OSSEOUS STRUCTURES:  No acute fracture or destructive osseous lesion. Mild multilevel degenerative changes of the visualized thoracolumbar spine and bilateral hip joints. Impression: No acute intra-abdominal/pelvic abnormalities noted with ancillary findings detailed above. Workstation performed: [id number]     I have personally reviewed pertinent imaging study reports. Clarissa Gaines D.O. [address] [facility]  Division of Gastroenterology & Hepatology  Available on [email or web address]    ** Please Note: This note is constructed using a voice recognition dictation system.  **

## 2023-08-22 ENCOUNTER — TELEPHONE (OUTPATIENT)
Dept: FAMILY MEDICINE CLINIC | Facility: HOME HEALTHCARE | Age: 38
End: 2023-08-22

## 2023-08-22 ENCOUNTER — APPOINTMENT (EMERGENCY)
Dept: RADIOLOGY | Facility: HOSPITAL | Age: 38
End: 2023-08-22
Payer: COMMERCIAL

## 2023-08-22 ENCOUNTER — APPOINTMENT (EMERGENCY)
Dept: ULTRASOUND IMAGING | Facility: HOSPITAL | Age: 38
End: 2023-08-22
Payer: COMMERCIAL

## 2023-08-22 ENCOUNTER — HOSPITAL ENCOUNTER (EMERGENCY)
Facility: HOSPITAL | Age: 38
Discharge: HOME | End: 2023-08-22
Attending: EMERGENCY MEDICINE
Payer: COMMERCIAL

## 2023-08-22 VITALS
BODY MASS INDEX: 34.76 KG/M2 | SYSTOLIC BLOOD PRESSURE: 130 MMHG | OXYGEN SATURATION: 94 % | WEIGHT: 190.04 LBS | DIASTOLIC BLOOD PRESSURE: 76 MMHG | HEART RATE: 82 BPM | TEMPERATURE: 97.8 F | RESPIRATION RATE: 18 BRPM

## 2023-08-22 DIAGNOSIS — K80.20 CHOLELITHIASIS: ICD-10-CM

## 2023-08-22 DIAGNOSIS — R07.89 CHEST WALL PAIN: Primary | ICD-10-CM

## 2023-08-22 LAB
ALBUMIN SERPL BCP-MCNC: 4 G/DL (ref 3.5–5)
ALP SERPL-CCNC: 78 U/L (ref 34–104)
ALT SERPL W P-5'-P-CCNC: 67 U/L (ref 7–52)
ANION GAP SERPL CALCULATED.3IONS-SCNC: 9 MMOL/L
AST SERPL W P-5'-P-CCNC: 34 U/L (ref 13–39)
BASOPHILS # BLD AUTO: 0.05 THOUSANDS/ÂΜL (ref 0–0.1)
BASOPHILS NFR BLD AUTO: 1 % (ref 0–1)
BILIRUB SERPL-MCNC: 0.48 MG/DL (ref 0.2–1)
BUN SERPL-MCNC: 14 MG/DL (ref 5–25)
CALCIUM SERPL-MCNC: 9.1 MG/DL (ref 8.4–10.2)
CARDIAC TROPONIN I PNL SERPL HS: <2 NG/L
CHLORIDE SERPL-SCNC: 102 MMOL/L (ref 96–108)
CO2 SERPL-SCNC: 25 MMOL/L (ref 21–32)
CREAT SERPL-MCNC: 0.79 MG/DL (ref 0.6–1.3)
D DIMER PPP FEU-MCNC: 0.3 UG/ML FEU
EOSINOPHIL # BLD AUTO: 0.27 THOUSAND/ÂΜL (ref 0–0.61)
EOSINOPHIL NFR BLD AUTO: 3 % (ref 0–6)
ERYTHROCYTE [DISTWIDTH] IN BLOOD BY AUTOMATED COUNT: 13 % (ref 11.6–15.1)
EXT PREGNANCY TEST URINE: NEGATIVE
EXT. CONTROL: NORMAL
GFR SERPL CREATININE-BSD FRML MDRD: 94 ML/MIN/1.73SQ M
GLUCOSE SERPL-MCNC: 131 MG/DL (ref 65–140)
HCT VFR BLD AUTO: 40.9 % (ref 34.8–46.1)
HGB BLD-MCNC: 13.5 G/DL (ref 11.5–15.4)
IMM GRANULOCYTES # BLD AUTO: 0.02 THOUSAND/UL (ref 0–0.2)
IMM GRANULOCYTES NFR BLD AUTO: 0 % (ref 0–2)
LIPASE SERPL-CCNC: 26 U/L (ref 11–82)
LYMPHOCYTES # BLD AUTO: 1.22 THOUSANDS/ÂΜL (ref 0.6–4.47)
LYMPHOCYTES NFR BLD AUTO: 14 % (ref 14–44)
MAGNESIUM SERPL-MCNC: 1.8 MG/DL (ref 1.9–2.7)
MCH RBC QN AUTO: 30.5 PG (ref 26.8–34.3)
MCHC RBC AUTO-ENTMCNC: 33 G/DL (ref 31.4–37.4)
MCV RBC AUTO: 92 FL (ref 82–98)
MONOCYTES # BLD AUTO: 0.71 THOUSAND/ÂΜL (ref 0.17–1.22)
MONOCYTES NFR BLD AUTO: 8 % (ref 4–12)
NEUTROPHILS # BLD AUTO: 6.29 THOUSANDS/ÂΜL (ref 1.85–7.62)
NEUTS SEG NFR BLD AUTO: 74 % (ref 43–75)
NRBC BLD AUTO-RTO: 0 /100 WBCS
PLATELET # BLD AUTO: 204 THOUSANDS/UL (ref 149–390)
PMV BLD AUTO: 10.6 FL (ref 8.9–12.7)
POTASSIUM SERPL-SCNC: 3.6 MMOL/L (ref 3.5–5.3)
PROT SERPL-MCNC: 6.5 G/DL (ref 6.4–8.4)
RBC # BLD AUTO: 4.43 MILLION/UL (ref 3.81–5.12)
SODIUM SERPL-SCNC: 136 MMOL/L (ref 135–147)
WBC # BLD AUTO: 8.56 THOUSAND/UL (ref 4.31–10.16)

## 2023-08-22 PROCEDURE — 85379 FIBRIN DEGRADATION QUANT: CPT | Performed by: EMERGENCY MEDICINE

## 2023-08-22 PROCEDURE — 76705 ECHO EXAM OF ABDOMEN: CPT

## 2023-08-22 PROCEDURE — 71046 X-RAY EXAM CHEST 2 VIEWS: CPT

## 2023-08-22 PROCEDURE — 93005 ELECTROCARDIOGRAM TRACING: CPT

## 2023-08-22 PROCEDURE — 99285 EMERGENCY DEPT VISIT HI MDM: CPT

## 2023-08-22 PROCEDURE — 81025 URINE PREGNANCY TEST: CPT | Performed by: EMERGENCY MEDICINE

## 2023-08-22 PROCEDURE — 80053 COMPREHEN METABOLIC PANEL: CPT | Performed by: EMERGENCY MEDICINE

## 2023-08-22 PROCEDURE — 96375 TX/PRO/DX INJ NEW DRUG ADDON: CPT

## 2023-08-22 PROCEDURE — 99284 EMERGENCY DEPT VISIT MOD MDM: CPT | Performed by: EMERGENCY MEDICINE

## 2023-08-22 PROCEDURE — 83690 ASSAY OF LIPASE: CPT | Performed by: EMERGENCY MEDICINE

## 2023-08-22 PROCEDURE — 85025 COMPLETE CBC W/AUTO DIFF WBC: CPT | Performed by: EMERGENCY MEDICINE

## 2023-08-22 PROCEDURE — 96365 THER/PROPH/DIAG IV INF INIT: CPT

## 2023-08-22 PROCEDURE — 36415 COLL VENOUS BLD VENIPUNCTURE: CPT | Performed by: EMERGENCY MEDICINE

## 2023-08-22 PROCEDURE — 84484 ASSAY OF TROPONIN QUANT: CPT | Performed by: EMERGENCY MEDICINE

## 2023-08-22 PROCEDURE — C9113 INJ PANTOPRAZOLE SODIUM, VIA: HCPCS | Performed by: EMERGENCY MEDICINE

## 2023-08-22 PROCEDURE — 83735 ASSAY OF MAGNESIUM: CPT | Performed by: EMERGENCY MEDICINE

## 2023-08-22 PROCEDURE — 96361 HYDRATE IV INFUSION ADD-ON: CPT

## 2023-08-22 RX ORDER — MAGNESIUM SULFATE HEPTAHYDRATE 40 MG/ML
2 INJECTION, SOLUTION INTRAVENOUS ONCE
Status: COMPLETED | OUTPATIENT
Start: 2023-08-22 | End: 2023-08-22

## 2023-08-22 RX ORDER — DIPHENHYDRAMINE HYDROCHLORIDE 50 MG/ML
25 INJECTION INTRAMUSCULAR; INTRAVENOUS ONCE
Status: COMPLETED | OUTPATIENT
Start: 2023-08-22 | End: 2023-08-22

## 2023-08-22 RX ORDER — SUCRALFATE ORAL 1 G/10ML
1 SUSPENSION ORAL ONCE
Status: COMPLETED | OUTPATIENT
Start: 2023-08-22 | End: 2023-08-22

## 2023-08-22 RX ORDER — KETOROLAC TROMETHAMINE 30 MG/ML
15 INJECTION, SOLUTION INTRAMUSCULAR; INTRAVENOUS ONCE
Status: COMPLETED | OUTPATIENT
Start: 2023-08-22 | End: 2023-08-22

## 2023-08-22 RX ORDER — PANTOPRAZOLE SODIUM 40 MG/10ML
40 INJECTION, POWDER, LYOPHILIZED, FOR SOLUTION INTRAVENOUS ONCE
Status: COMPLETED | OUTPATIENT
Start: 2023-08-22 | End: 2023-08-22

## 2023-08-22 RX ADMIN — MAGNESIUM SULFATE HEPTAHYDRATE 2 G: 40 INJECTION, SOLUTION INTRAVENOUS at 13:34

## 2023-08-22 RX ADMIN — SODIUM CHLORIDE 1000 ML: 0.9 INJECTION, SOLUTION INTRAVENOUS at 15:20

## 2023-08-22 RX ADMIN — KETOROLAC TROMETHAMINE 15 MG: 30 INJECTION, SOLUTION INTRAMUSCULAR; INTRAVENOUS at 12:36

## 2023-08-22 RX ADMIN — SUCRALFATE 1 G: 1 SUSPENSION ORAL at 15:20

## 2023-08-22 RX ADMIN — DIPHENHYDRAMINE HYDROCHLORIDE 25 MG: 50 INJECTION, SOLUTION INTRAMUSCULAR; INTRAVENOUS at 15:17

## 2023-08-22 RX ADMIN — PANTOPRAZOLE SODIUM 40 MG: 40 INJECTION, POWDER, FOR SOLUTION INTRAVENOUS at 15:19

## 2023-08-22 NOTE — TELEPHONE ENCOUNTER
Pt called in wanting to know what her test results were. We forwarded the message to Sheryl. She then stated she was having right sided chest pain with pain in her neck but, she always has pain in her neck. I suggested she go to the ER to be evaluated and I did inform Sheryl of the phone call.

## 2023-08-22 NOTE — ED PROVIDER NOTES
History  Chief Complaint   Patient presents with   • Chest Pain     Pt seen here Friday for chest pain had workup. Pain came back on Sunday worse with inspiration     26-year-old female presents for evaluation of right-sided chest wall pain. Patient reports the pain has been ongoing for the last few days however yesterday and today worsened. Pain is sharp in nature with some exacerbations, pain is exacerbated with deep inspiration. Patient notes no positional changes. Patient admits to nausea without vomiting, she is been eating and drinking normally. As an outpatient she is being worked up for possible gastric or duodenal ulcer with GI, she has not yet received right upper quadrant ultrasound or EGD however had a CT of her abdomen pelvis performed that showed no gallstones or biliary changes. Denies fevers or chills, recent URI or GI symptoms. Denies cardiac history in herself, denies history of VTE or risk factors. Prior to Admission Medications   Prescriptions Last Dose Informant Patient Reported? Taking? ALPRAZolam (XANAX) 1 mg tablet   Yes No   Sig: Take 1 mg by mouth as needed   Alcohol Swabs (Pharmacist Choice Alcohol) PADS   No No   Sig: TEST BLOOD 3 TIMES DAILY   Blood Glucose Monitoring Suppl (OneTouch Verio Reflect) w/Device KIT   No No   Sig: Check blood sugars three times daily. Please substitute with appropriate alternative as covered by patient's insurance. Dx: E11.65   DULoxetine (CYMBALTA) 60 mg delayed release capsule   Yes No   Sig: Take 60 mg by mouth daily   Fluticasone-Salmeterol (Advair) 250-50 mcg/dose inhaler   No No   Sig: INHALE 1 PUFF 2 (TWO) TIMES A DAY RINSE MOUTH AFTER USE. Lancet Devices (Easy Mini Eject Lancing Device) MISC   No No   Sig: Use as directed to check blood sugar 3x daily.    OneTouch Verio test strip   No No   Sig: TEST BLOOD 3 TIMES DAILY   Pharmacist Choice Lancets MISC   No No   Sig: TEST BLOOD 3 TIMES DAILY   albuterol (2.5 mg/3 mL) 0.083 % nebulizer solution   No No   Sig: Take 3 mL (2.5 mg total) by nebulization every 6 (six) hours as needed for wheezing or shortness of breath   albuterol (PROVENTIL HFA,VENTOLIN HFA) 90 mcg/act inhaler   No No   Sig: INHALE 2 PUFFS EVERY 4 (FOUR) HOURS AS NEEDED FOR WHEEZING OR SHORTNESS OF BREATH   atoMOXetine (STRATTERA) 60 mg capsule   Yes No   Sig: Take 60 mg by mouth in the morning   cetirizine (ZyrTEC) 10 mg tablet   No No   Sig: TAKE 1 TABLET (10 MG TOTAL) BY MOUTH DAILY   ergocalciferol (VITAMIN D2) 50,000 units   No No   Sig: TAKE 1 CAPSULE (50,000 UNITS TOTAL) BY MOUTH ONCE A WEEK   famotidine (PEPCID) 20 mg tablet   No No   Sig: TAKE 1 TABLET (20 MG TOTAL) BY MOUTH 2 (TWO) TIMES A DAY   fluticasone (FLONASE) 50 mcg/act nasal spray   No No   Si spray into each nostril daily   hydrochlorothiazide (HYDRODIURIL) 12.5 mg tablet   No No   Sig: TAKE 1 TABLET (12.5 MG TOTAL) BY MOUTH DAILY   liraglutide (SAXENDA) injection   No No   Sig: Inject 0.1 mL (0.6 mg total) under the skin daily for 7 days, THEN 0.2 mL (1.2 mg total) daily for 7 days, THEN 0.3 mL (1.8 mg total) daily for 7 days, THEN 0.4 mL (2.4 mg total) daily for 7 days, THEN 0.5 mL (3 mg total) daily for 7 days.    Patient not taking: Reported on 2023   lisinopril (ZESTRIL) 10 mg tablet   No No   Sig: TAKE 1 TABLET (10 MG TOTAL) BY MOUTH 2 (TWO) TIMES A DAY   meclizine (ANTIVERT) 25 mg tablet   No No   Sig: TAKE 1 TABLET (25 MG TOTAL) BY MOUTH 3 (THREE) TIMES A DAY AS NEEDED FOR DIZZINESS   metFORMIN (GLUCOPHAGE-XR) 500 mg 24 hr tablet   No No   Sig: TAKE 1 TABLET (500 MG TOTAL) BY MOUTH DAILY WITH DINNER   mupirocin (BACTROBAN) 2 % ointment   No No   Sig: APPLY TOPICALLY 3 (THREE) TIMES A DAY   Patient not taking: Reported on 2023   ondansetron (ZOFRAN) 4 mg tablet   No No   Sig: TAKE 1 TABLET (4 MG TOTAL) BY MOUTH EVERY 8 (EIGHT) HOURS AS NEEDED FOR NAUSEA   pantoprazole (PROTONIX) 40 mg tablet   No No   Sig: Take 1 tablet (40 mg total) by mouth daily for 14 days   prazosin (MINIPRESS) 2 mg capsule   Yes No   Sig: Take 2 mg by mouth daily   sucralfate (CARAFATE) 1 g tablet   No No   Sig: Take 1 tablet (1 g total) by mouth 4 (four) times a day for 14 days   traZODone (DESYREL) 50 mg tablet   Yes No   Sig: Take 50 mg by mouth daily at bedtime      Facility-Administered Medications: None       Past Medical History:   Diagnosis Date   • ADHD (attention deficit hyperactivity disorder)    • Anxiety    • Asthma    • Cat-scratch disease     last assessed 10/22/15   • Depression    • Hypertension     last assessed 11/11/14   • Sciatica    • Sleep disorder        Past Surgical History:   Procedure Laterality Date   • BREAST BIOPSY Left     2016   • DENTAL SURGERY     • [place] BX/EXC LYMPH NODE OPEN DEEP AXILLARY NODE Left 1/15/2019    Procedure: LEFT AXILLARY LYMPH NODE BIOPSY;  Surgeon: Zaira Greenwood MD;  Location: [facility];  Service: General   • [place] LAMNOTMY INCL W/DCMPRSN NRV ROOT 1 INTRSPC LUMBR Right 4/14/2021    Procedure: L5-S1 minimally invasive microdiskectomy and  epidural steroid injection;  Surgeon: Ruthy Bradley MD;  Location: [facility];  Service: Neurosurgery   • TUBAL LIGATION         Family History   Problem Relation Age of Onset   • Anxiety disorder Mother    • Bipolar disorder Mother    • Depression Mother    • Tremor Mother    • Anxiety disorder Father    • Bipolar disorder Father    • Depression Father    • Schizophrenia Father    • Parkinsonism Maternal Grandmother    • Stomach cancer Paternal Grandfather      I have reviewed and agree with the history as documented.     E-Cigarette/Vaping   • E-Cigarette Use Never User      E-Cigarette/Vaping Substances   • Nicotine No    • THC No    • CBD No    • Flavoring No    • Other No    • Unknown No      Social History     Tobacco Use   • Smoking status: Never   • Smokeless tobacco: Never   Vaping Use   • Vaping Use: Never used   Substance Use Topics   • Alcohol use: No   • Drug use: No       Review of Systems   Constitutional: Negative for appetite change, chills and fever. Respiratory: Negative for shortness of breath. Cardiovascular: Positive for chest pain. Gastrointestinal: Positive for nausea. Negative for abdominal pain, constipation, diarrhea and vomiting. All other systems reviewed and are negative. Physical Exam  Physical Exam  Vitals reviewed. Constitutional:       General: She is not in acute distress. Appearance: She is well-developed. She is not toxic-appearing. HENT:      Head: Normocephalic and atraumatic. Right Ear: External ear normal.      Left Ear: External ear normal.      Nose: Nose normal.   Eyes:      General:         Right eye: No discharge. Left eye: No discharge. Extraocular Movements: Extraocular movements intact. Cardiovascular:      Rate and Rhythm: Normal rate and regular rhythm. Pulmonary:      Effort: Pulmonary effort is normal. No respiratory distress. Chest:      Chest wall: Tenderness (R mid-anterior chest wall) present. Abdominal:      General: There is no distension. Palpations: Abdomen is soft. Tenderness: There is no abdominal tenderness. There is no guarding or rebound. Musculoskeletal:         General: No deformity or signs of injury. Right lower leg: No edema. Left lower leg: No edema. Skin:     General: Skin is warm. Coloration: Skin is not jaundiced or pale. Neurological:      General: No focal deficit present. Mental Status: She is alert.       Gait: Gait normal.         Vital Signs  ED Triage Vitals [08/22/23 1140]   Temperature Pulse Respirations Blood Pressure SpO2   97.8 °F (36.6 °C) 83 18 151/91 97 %      Temp Source Heart Rate Source Patient Position - Orthostatic VS BP Location FiO2 (%)   Tympanic Monitor Sitting Left arm --      Pain Score       5           Vitals:    08/22/23 1140 08/22/23 1400   BP: 151/91 132/75   Pulse: 83 92   Patient Position - Orthostatic VS: Sitting Lying         Visual Acuity      ED Medications  Medications   ketorolac (TORADOL) injection 15 mg (15 mg Intravenous Given 8/22/23 1236)   magnesium sulfate 2 g/50 mL IVPB (premix) 2 g (0 g Intravenous Stopped 8/22/23 1424)   diphenhydrAMINE (BENADRYL) injection 25 mg (25 mg Intravenous Given 8/22/23 1517)   sodium chloride 0.9 % bolus 1,000 mL (1,000 mL Intravenous New Bag 8/22/23 1520)   pantoprazole (PROTONIX) injection 40 mg (40 mg Intravenous Given 8/22/23 1519)   sucralfate (CARAFATE) oral suspension (ESTHER/PEDS) 1 g (1 g Oral Given 8/22/23 1520)       Diagnostic Studies  Results Reviewed     Procedure Component Value Units Date/Time    Comprehensive metabolic panel [[id number]]  (Abnormal) Collected: 08/22/23 1212    Lab Status: Final result Specimen: Blood from Arm, Right Updated: 08/22/23 1241     Sodium 136 mmol/L      Potassium 3.6 mmol/L      Chloride 102 mmol/L      CO2 25 mmol/L      ANION GAP 9 mmol/L      BUN 14 mg/dL      Creatinine 0.79 mg/dL      Glucose 131 mg/dL      Calcium 9.1 mg/dL      AST 34 U/L      ALT 67 U/L      Alkaline Phosphatase 78 U/L      Total Protein 6.5 g/dL      Albumin 4.0 g/dL      Total Bilirubin 0.48 mg/dL      eGFR 94 ml/min/1.73sq m     Narrative:      [facility] guidelines for Chronic Kidney Disease (CKD):   •  Stage 1 with normal or high GFR (GFR > 90 mL/min/1.73 square meters)  •  Stage 2 Mild CKD (GFR = 60-89 mL/min/1.73 square meters)  •  Stage 3A Moderate CKD (GFR = 45-59 mL/min/1.73 square meters)  •  Stage 3B Moderate CKD (GFR = 30-44 mL/min/1.73 square meters)  •  Stage 4 Severe CKD (GFR = 15-29 mL/min/1.73 square meters)  •  Stage 5 End Stage CKD (GFR <15 mL/min/1.73 square meters)  Note: GFR calculation is accurate only with a steady state creatinine    Lipase [[id number]]  (Normal) Collected: 08/22/23 1212    Lab Status: Final result Specimen: Blood from Arm, Right Updated: 08/22/23 1241     Lipase 26 u/L     Magnesium [[id number]] (Abnormal) Collected: 08/22/23 1212    Lab Status: Final result Specimen: Blood from Arm, Right Updated: 08/22/23 1241     Magnesium 1.8 mg/dL     HS Troponin 0hr (reflex protocol) [[id number]]  (Normal) Collected: 08/22/23 1212    Lab Status: Final result Specimen: Blood from Arm, Right Updated: 08/22/23 1238     hs TnI 0hr <2 ng/L     D-Dimer [[id number]]  (Normal) Collected: 08/22/23 1212    Lab Status: Final result Specimen: Blood from Arm, Right Updated: 08/22/23 1232     D-Dimer, Quant 0.30 ug/ml FEU     CBC and differential [[id number]] Collected: 08/22/23 1212    Lab Status: Final result Specimen: Blood from Arm, Right Updated: 08/22/23 1223     WBC 8.56 Thousand/uL      RBC 4.43 Million/uL      Hemoglobin 13.5 g/dL      Hematocrit 40.9 %      MCV 92 fL      MCH 30.5 pg      MCHC 33.0 g/dL      RDW 13.0 %      MPV 10.6 fL      Platelets 764 Thousands/uL      nRBC 0 /100 WBCs      Neutrophils Relative 74 %      Immat GRANS % 0 %      Lymphocytes Relative 14 %      Monocytes Relative 8 %      Eosinophils Relative 3 %      Basophils Relative 1 %      Neutrophils Absolute 6.29 Thousands/µL      Immature Grans Absolute 0.02 Thousand/uL      Lymphocytes Absolute 1.22 Thousands/µL      Monocytes Absolute 0.71 Thousand/µL      Eosinophils Absolute 0.27 Thousand/µL      Basophils Absolute 0.05 Thousands/µL     POCT pregnancy, urine [[id number]]  (Normal) Resulted: 08/22/23 1219    Lab Status: Final result Updated: 08/22/23 1219     EXT Preg Test, Ur Negative     Control Valid                 US right upper quadrant   Final Result by Brayden Yeh DO (08/22 4272)      Uncomplicated cholelithiasis. Workstation performed: [id number]         XR chest 2 views   Final Result by Edward Bartlett MD (08/22 1245)      No acute cardiopulmonary disease.                   Workstation performed: [id number]                    Procedures  Procedures         ED Course  ED Course as of 08/22/23 1643   Tue Aug 22, 2023 1218 Procedure Note: EKG  Date/Time: 08/22/23 12:18 PM   Interpreted by: Jane Jacob  Indications / Diagnosis: CP  ECG reviewed by me, the ED Provider: yes   The EKG demonstrates:  Rhythm: normal sinus  Intervals: normal intervals  Axis: normal axis  QRS/Blocks: normal QRS  ST Changes: No acute ST Changes, no STD/DARLIN. Non-specific flattening to TW, seen most in inferior-lateral regions. Overall, no significant changes from previous. 1236 D-Dimer, Quant: 0.30  negative   1254 Magnesium(!): 1.8  Will replete   1254 hs TnI 0hr: <2  Negative, pain on going for several hours, no juan jose for delta   1301 XR chest 2 views  IMPRESSION:     No acute cardiopulmonary disease.      1330 Discussed results with patient, reports intermittent R shoulder pain, nausea. Was seen previously and had CT, which showed no evidence of cholelith. Will obtain RUQ US to assess for this. Patient reports eating a lot of fatty as well as dairy foods. We discussed the role of the gallbladder and trial of fat reduction from diet as potential source control for symptoms if element of biliary dysfunction. [facility] right upper quadrant  IMPRESSION:     Uncomplicated cholelithiasis.     1523 Call to ED from mother, stating we are not caring for her. Patient and mom report "heart blockage", patient is referring to fascicular block, advised this is not a heart blockage like a heart attack. Patient also reports burning sensation in chest after magnesium and with ultrasound. EKG performed shows sinus tachy to 104bpm. Will provide benadryl, protonix, IVF, to help with sensation. Patient updated to findings on US.   1624 Patient feeling improved, will d/c with general surgery f/u. SBIRT 22yo+    Flowsheet Row Most Recent Value   Initial Alcohol Screen: US AUDIT-C     1. How often do you have a drink containing alcohol? 0 Filed at: 08/22/2023 1411   2.  How many drinks containing alcohol do you have on a typical day you are drinking? 0 Filed at: 08/22/2023 1411   3b. FEMALE Any Age, or MALE 65+: How often do you have 4 or more drinks on one occassion? 0 Filed at: 08/22/2023 1411   Audit-C Score 0 Filed at: 08/22/2023 1411   YOAN: How many times in the past year have you. .. Used an illegal drug or used a prescription medication for non-medical reasons? Never Filed at: 08/22/2023 1411                    Medical Decision Making  65-year-old female presents for evaluation of right-sided chest wall. Patient was seen recently for epigastric pain and is also being evaluated outpatient with GI. Patient had a negative work-up, will additionally screen today for ACS as well as D-dimer. Differential may include ACS, PE, pneumonia, pneumothorax, pleurisy. Doubt biliary colic or intra-abdominal source given point tenderness. Amount and/or Complexity of Data Reviewed  Labs: ordered. Decision-making details documented in ED Course. Radiology: ordered. Decision-making details documented in ED Course. Risk  Prescription drug management. Disposition  Final diagnoses:   Chest wall pain   Cholelithiasis     Time reflects when diagnosis was documented in both MDM as applicable and the Disposition within this note     Time User Action Codes Description Comment    8/22/2023  4:26 PM [place] Se Add [R07.89] Chest wall pain     8/22/2023  4:26 PM [place] Se Add [K80.20] Cholelithiasis       ED Disposition     ED Disposition   Discharge    Condition   Stable    Date/Time   Tue Aug 22, 2023  4:26 PM    [address] [facility] discharge to home/self care.                Follow-up Information     Follow up With Specialties Details Why Contact Rick Arreola, DO General Surgery, Wound Care Schedule an appointment as soon as possible for a visit   [address] [facility] [address] [facility] Family Medicine Schedule an appointment as soon as possible for a visit   [address] [facility] [address]  [phone]            Patient's Medications   Discharge Prescriptions    No medications on file       No discharge procedures on file.     PDMP Review       Value Time User    PDMP Reviewed  Yes 4/5/2023  1:47 PM Bharat Manning PA-C          ED Provider  Electronically Signed by           Omaira Wheat DO  08/22/23 1470

## 2023-08-23 LAB
ATRIAL RATE: 104 BPM
ATRIAL RATE: 82 BPM
P AXIS: 11 DEGREES
P AXIS: 55 DEGREES
PR INTERVAL: 142 MS
PR INTERVAL: 148 MS
QRS AXIS: 35 DEGREES
QRS AXIS: 61 DEGREES
QRSD INTERVAL: 70 MS
QRSD INTERVAL: 80 MS
QT INTERVAL: 326 MS
QT INTERVAL: 380 MS
QTC INTERVAL: 428 MS
QTC INTERVAL: 443 MS
T WAVE AXIS: 2 DEGREES
T WAVE AXIS: 23 DEGREES
VENTRICULAR RATE: 104 BPM
VENTRICULAR RATE: 82 BPM

## 2023-08-23 PROCEDURE — 93010 ELECTROCARDIOGRAM REPORT: CPT | Performed by: INTERNAL MEDICINE

## 2023-08-24 DIAGNOSIS — J45.40 MODERATE PERSISTENT ASTHMA WITHOUT COMPLICATION: ICD-10-CM

## 2023-08-24 RX ORDER — ALBUTEROL SULFATE 90 UG/1
2 AEROSOL, METERED RESPIRATORY (INHALATION) EVERY 4 HOURS PRN
Qty: 8.5 G | Refills: 2 | Status: SHIPPED | OUTPATIENT
Start: 2023-08-24

## 2023-08-25 DIAGNOSIS — J45.40 MODERATE PERSISTENT ASTHMA WITHOUT COMPLICATION: ICD-10-CM

## 2023-08-25 RX ORDER — FLUTICASONE PROPIONATE AND SALMETEROL 250; 50 UG/1; UG/1
1 POWDER RESPIRATORY (INHALATION) 2 TIMES DAILY
Qty: 60 BLISTER | Refills: 0 | Status: SHIPPED | OUTPATIENT
Start: 2023-08-25

## 2023-08-27 ENCOUNTER — HOSPITAL ENCOUNTER (EMERGENCY)
Facility: HOSPITAL | Age: 38
Discharge: HOME | DRG: 263 | End: 2023-08-27
Attending: EMERGENCY MEDICINE
Payer: COMMERCIAL

## 2023-08-27 ENCOUNTER — APPOINTMENT (EMERGENCY)
Dept: CT IMAGING | Facility: HOSPITAL | Age: 38
DRG: 263 | End: 2023-08-27
Payer: COMMERCIAL

## 2023-08-27 VITALS
SYSTOLIC BLOOD PRESSURE: 161 MMHG | DIASTOLIC BLOOD PRESSURE: 88 MMHG | OXYGEN SATURATION: 97 % | RESPIRATION RATE: 20 BRPM | TEMPERATURE: 97.9 F | HEART RATE: 86 BPM

## 2023-08-27 DIAGNOSIS — R10.11 RIGHT UPPER QUADRANT ABDOMINAL PAIN: ICD-10-CM

## 2023-08-27 DIAGNOSIS — K80.20 CHOLELITHIASIS: ICD-10-CM

## 2023-08-27 DIAGNOSIS — R10.13 EPIGASTRIC PAIN: Primary | ICD-10-CM

## 2023-08-27 DIAGNOSIS — K21.9 GASTROESOPHAGEAL REFLUX DISEASE, UNSPECIFIED WHETHER ESOPHAGITIS PRESENT: ICD-10-CM

## 2023-08-27 DIAGNOSIS — R11.0 NAUSEA: ICD-10-CM

## 2023-08-27 LAB
ALBUMIN SERPL BCP-MCNC: 4.5 G/DL (ref 3.5–5)
ALP SERPL-CCNC: 85 U/L (ref 34–104)
ALT SERPL W P-5'-P-CCNC: 75 U/L (ref 7–52)
ANION GAP SERPL CALCULATED.3IONS-SCNC: 9 MMOL/L
AST SERPL W P-5'-P-CCNC: 110 U/L (ref 13–39)
BASOPHILS # BLD AUTO: 0.05 THOUSANDS/ÂΜL (ref 0–0.1)
BASOPHILS NFR BLD AUTO: 1 % (ref 0–1)
BILIRUB SERPL-MCNC: 0.59 MG/DL (ref 0.2–1)
BUN SERPL-MCNC: 13 MG/DL (ref 5–25)
CALCIUM SERPL-MCNC: 9.6 MG/DL (ref 8.4–10.2)
CARDIAC TROPONIN I PNL SERPL HS: 2 NG/L
CHLORIDE SERPL-SCNC: 98 MMOL/L (ref 96–108)
CO2 SERPL-SCNC: 29 MMOL/L (ref 21–32)
CREAT SERPL-MCNC: 0.92 MG/DL (ref 0.6–1.3)
EOSINOPHIL # BLD AUTO: 0.19 THOUSAND/ÂΜL (ref 0–0.61)
EOSINOPHIL NFR BLD AUTO: 2 % (ref 0–6)
ERYTHROCYTE [DISTWIDTH] IN BLOOD BY AUTOMATED COUNT: 12.8 % (ref 11.6–15.1)
GFR SERPL CREATININE-BSD FRML MDRD: 78 ML/MIN/1.73SQ M
GLUCOSE SERPL-MCNC: 111 MG/DL (ref 65–140)
HCT VFR BLD AUTO: 43.7 % (ref 34.8–46.1)
HGB BLD-MCNC: 14.3 G/DL (ref 11.5–15.4)
IMM GRANULOCYTES # BLD AUTO: 0.03 THOUSAND/UL (ref 0–0.2)
IMM GRANULOCYTES NFR BLD AUTO: 0 % (ref 0–2)
LIPASE SERPL-CCNC: 24 U/L (ref 11–82)
LYMPHOCYTES # BLD AUTO: 1.94 THOUSANDS/ÂΜL (ref 0.6–4.47)
LYMPHOCYTES NFR BLD AUTO: 18 % (ref 14–44)
MCH RBC QN AUTO: 30.4 PG (ref 26.8–34.3)
MCHC RBC AUTO-ENTMCNC: 32.7 G/DL (ref 31.4–37.4)
MCV RBC AUTO: 93 FL (ref 82–98)
MONOCYTES # BLD AUTO: 0.83 THOUSAND/ÂΜL (ref 0.17–1.22)
MONOCYTES NFR BLD AUTO: 8 % (ref 4–12)
NEUTROPHILS # BLD AUTO: 7.88 THOUSANDS/ÂΜL (ref 1.85–7.62)
NEUTS SEG NFR BLD AUTO: 71 % (ref 43–75)
NRBC BLD AUTO-RTO: 0 /100 WBCS
PLATELET # BLD AUTO: 269 THOUSANDS/UL (ref 149–390)
PMV BLD AUTO: 10.2 FL (ref 8.9–12.7)
POTASSIUM SERPL-SCNC: 3.4 MMOL/L (ref 3.5–5.3)
PROT SERPL-MCNC: 7.2 G/DL (ref 6.4–8.4)
RBC # BLD AUTO: 4.7 MILLION/UL (ref 3.81–5.12)
SODIUM SERPL-SCNC: 136 MMOL/L (ref 135–147)
WBC # BLD AUTO: 10.92 THOUSAND/UL (ref 4.31–10.16)

## 2023-08-27 PROCEDURE — 84484 ASSAY OF TROPONIN QUANT: CPT | Performed by: EMERGENCY MEDICINE

## 2023-08-27 PROCEDURE — 99284 EMERGENCY DEPT VISIT MOD MDM: CPT

## 2023-08-27 PROCEDURE — G1004 CDSM NDSC: HCPCS

## 2023-08-27 PROCEDURE — 80053 COMPREHEN METABOLIC PANEL: CPT | Performed by: EMERGENCY MEDICINE

## 2023-08-27 PROCEDURE — 36415 COLL VENOUS BLD VENIPUNCTURE: CPT | Performed by: EMERGENCY MEDICINE

## 2023-08-27 PROCEDURE — C9113 INJ PANTOPRAZOLE SODIUM, VIA: HCPCS | Performed by: EMERGENCY MEDICINE

## 2023-08-27 PROCEDURE — 85025 COMPLETE CBC W/AUTO DIFF WBC: CPT | Performed by: EMERGENCY MEDICINE

## 2023-08-27 PROCEDURE — 74177 CT ABD & PELVIS W/CONTRAST: CPT

## 2023-08-27 PROCEDURE — 96375 TX/PRO/DX INJ NEW DRUG ADDON: CPT

## 2023-08-27 PROCEDURE — 93005 ELECTROCARDIOGRAM TRACING: CPT

## 2023-08-27 PROCEDURE — 71275 CT ANGIOGRAPHY CHEST: CPT

## 2023-08-27 PROCEDURE — 99285 EMERGENCY DEPT VISIT HI MDM: CPT | Performed by: EMERGENCY MEDICINE

## 2023-08-27 PROCEDURE — 96365 THER/PROPH/DIAG IV INF INIT: CPT

## 2023-08-27 PROCEDURE — 96372 THER/PROPH/DIAG INJ SC/IM: CPT

## 2023-08-27 PROCEDURE — 83690 ASSAY OF LIPASE: CPT | Performed by: EMERGENCY MEDICINE

## 2023-08-27 RX ORDER — PANTOPRAZOLE SODIUM 40 MG/10ML
40 INJECTION, POWDER, LYOPHILIZED, FOR SOLUTION INTRAVENOUS ONCE
Status: COMPLETED | OUTPATIENT
Start: 2023-08-27 | End: 2023-08-27

## 2023-08-27 RX ORDER — PROMETHAZINE HYDROCHLORIDE 25 MG/1
25 TABLET ORAL EVERY 6 HOURS PRN
Qty: 30 TABLET | Refills: 0 | Status: SHIPPED | OUTPATIENT
Start: 2023-08-27 | End: 2023-08-31

## 2023-08-27 RX ORDER — PANTOPRAZOLE SODIUM 40 MG/1
40 TABLET, DELAYED RELEASE ORAL DAILY
Qty: 14 TABLET | Refills: 0 | Status: SHIPPED | OUTPATIENT
Start: 2023-08-27 | End: 2023-08-31

## 2023-08-27 RX ORDER — SUCRALFATE 1 G/1
1 TABLET ORAL ONCE
Status: COMPLETED | OUTPATIENT
Start: 2023-08-27 | End: 2023-08-27

## 2023-08-27 RX ORDER — FAMOTIDINE 20 MG/1
20 TABLET, FILM COATED ORAL ONCE
Status: COMPLETED | OUTPATIENT
Start: 2023-08-27 | End: 2023-08-27

## 2023-08-27 RX ORDER — ONDANSETRON 2 MG/ML
4 INJECTION INTRAMUSCULAR; INTRAVENOUS ONCE
Status: COMPLETED | OUTPATIENT
Start: 2023-08-27 | End: 2023-08-27

## 2023-08-27 RX ORDER — FAMOTIDINE 20 MG/1
20 TABLET, FILM COATED ORAL 2 TIMES DAILY
Qty: 60 TABLET | Refills: 0 | Status: SHIPPED | OUTPATIENT
Start: 2023-08-27 | End: 2023-08-31

## 2023-08-27 RX ORDER — SUCRALFATE ORAL 1 G/10ML
1 SUSPENSION ORAL ONCE
Status: DISCONTINUED | OUTPATIENT
Start: 2023-08-27 | End: 2023-08-27

## 2023-08-27 RX ORDER — HALOPERIDOL 5 MG/ML
5 INJECTION INTRAMUSCULAR ONCE
Status: COMPLETED | OUTPATIENT
Start: 2023-08-27 | End: 2023-08-27

## 2023-08-27 RX ORDER — SUCRALFATE 1 G/1
1 TABLET ORAL 4 TIMES DAILY
Qty: 56 TABLET | Refills: 0 | Status: SHIPPED | OUTPATIENT
Start: 2023-08-27 | End: 2023-08-31

## 2023-08-27 RX ADMIN — IOHEXOL 100 ML: 350 INJECTION, SOLUTION INTRAVENOUS at 07:57

## 2023-08-27 RX ADMIN — HALOPERIDOL LACTATE 5 MG: 5 INJECTION, SOLUTION INTRAMUSCULAR at 08:05

## 2023-08-27 RX ADMIN — SUCRALFATE 1 G: 1 TABLET ORAL at 08:01

## 2023-08-27 RX ADMIN — PANTOPRAZOLE SODIUM 40 MG: 40 INJECTION, POWDER, FOR SOLUTION INTRAVENOUS at 07:58

## 2023-08-27 RX ADMIN — FAMOTIDINE 20 MG: 20 TABLET, FILM COATED ORAL at 08:01

## 2023-08-27 RX ADMIN — SODIUM CHLORIDE, SODIUM LACTATE, POTASSIUM CHLORIDE, AND CALCIUM CHLORIDE 1000 ML: .6; .31; .03; .02 INJECTION, SOLUTION INTRAVENOUS at 07:58

## 2023-08-27 RX ADMIN — ONDANSETRON 4 MG: 2 INJECTION INTRAMUSCULAR; INTRAVENOUS at 08:01

## 2023-08-27 NOTE — DISCHARGE INSTRUCTIONS
Thank you for visiting the Emergency Department today. No significant acute findings today. Elevation in one of the liver enzymes however this is nonspecific and not a severe change. This may be related to the gallstones which may be the cause of your symptoms. There is no other causes such as ulcers which need to be evaluated. I think it is safe to proceed with outpatient management and follow-up with GI for EGD and follow-up with surgery for gallbladder removal.  Return here for severe symptoms or other concerns. Medications sent to pharmacy.

## 2023-08-27 NOTE — ED PROVIDER NOTES
History  Chief Complaint   Patient presents with   • Pain     Pt was diagnosed with gallstones last week, since then shes been having extreme pain and is nauseous. HPI  This is a 79-year-old female presenting as repeat evaluation of ongoing upper abdominal/chest discomfort. Past medical history significant for hypertension, anxiety, asthma, depression, ADHD, obesity, chronic pain syndrome, cholelithiasis. Patient describes a 2 to 3-week history of ongoing symptoms of upper abdominal discomfort, chest discomfort, nausea. Pain is constant but waxes and wanes as sharp and burning at times. Pain is most localized to the epigastric region. It does not radiate to the lower abdomen or the extremities. There has been no vomiting. No diarrhea or constipation. Symptoms are worse with ambulation improved with rest.  Patient was first evaluated at this emergency department on 8/11/2023 and then again on 8/18/2023 and again on 8/22/2023 for evaluation of the same symptoms. She underwent the lab work EKG D-dimer chest x-ray and CT of the abdomen pelvis. She was noted to have cholelithiasis and there was also discussion of possible peptic ulcer disease. Patient is scheduled for EGD in 5 days. She is scheduled to see general surgery in the office the following month to discuss elective [place] cystectomy. She has been taking Carafate and Protonix for symptoms without relief. She presents here out of frustration of ongoing symptoms which are worsening. Denies any new symptoms from previous presentations. Prior to Admission Medications   Prescriptions Last Dose Informant Patient Reported? Taking? ALPRAZolam (XANAX) 1 mg tablet   Yes No   Sig: Take 1 mg by mouth as needed   Alcohol Swabs (Pharmacist Choice Alcohol) PADS   No No   Sig: TEST BLOOD 3 TIMES DAILY   Blood Glucose Monitoring Suppl (OneTouch Verio Reflect) w/Device KIT   No No   Sig: Check blood sugars three times daily.  Please substitute with appropriate alternative as covered by patient's insurance. Dx: E11.65   DULoxetine (CYMBALTA) 60 mg delayed release capsule   Yes No   Sig: Take 60 mg by mouth daily   Fluticasone-Salmeterol (Advair) 250-50 mcg/dose inhaler   No No   Sig: INHALE 1 PUFF 2 (TWO) TIMES A DAY RINSE MOUTH AFTER USE. Lancet Devices (Easy Mini Eject Lancing Device) MISC   No No   Sig: Use as directed to check blood sugar 3x daily. OneTouch Verio test strip   No No   Sig: TEST BLOOD 3 TIMES DAILY   Pharmacist Choice Lancets MISC   No No   Sig: TEST BLOOD 3 TIMES DAILY   albuterol (2.5 mg/3 mL) 0.083 % nebulizer solution   No No   Sig: Take 3 mL (2.5 mg total) by nebulization every 6 (six) hours as needed for wheezing or shortness of breath   albuterol (PROVENTIL HFA,VENTOLIN HFA) 90 mcg/act inhaler   No No   Sig: INHALE 2 PUFFS EVERY 4 (FOUR) HOURS AS NEEDED FOR WHEEZING OR SHORTNESS OF BREATH   atoMOXetine (STRATTERA) 60 mg capsule   Yes No   Sig: Take 60 mg by mouth in the morning   cetirizine (ZyrTEC) 10 mg tablet   No No   Sig: TAKE 1 TABLET (10 MG TOTAL) BY MOUTH DAILY   ergocalciferol (VITAMIN D2) 50,000 units   No No   Sig: TAKE 1 CAPSULE (50,000 UNITS TOTAL) BY MOUTH ONCE A WEEK   famotidine (PEPCID) 20 mg tablet   No No   Sig: TAKE 1 TABLET (20 MG TOTAL) BY MOUTH 2 (TWO) TIMES A DAY   famotidine (PEPCID) 20 mg tablet   No Yes   Sig: Take 1 tablet (20 mg total) by mouth 2 (two) times a day   fluticasone (FLONASE) 50 mcg/act nasal spray   No No   Si spray into each nostril daily   hydrochlorothiazide (HYDRODIURIL) 12.5 mg tablet   No No   Sig: TAKE 1 TABLET (12.5 MG TOTAL) BY MOUTH DAILY   liraglutide (SAXENDA) injection   No No   Sig: Inject 0.1 mL (0.6 mg total) under the skin daily for 7 days, THEN 0.2 mL (1.2 mg total) daily for 7 days, THEN 0.3 mL (1.8 mg total) daily for 7 days, THEN 0.4 mL (2.4 mg total) daily for 7 days, THEN 0.5 mL (3 mg total) daily for 7 days.    Patient not taking: Reported on 2023 lisinopril (ZESTRIL) 10 mg tablet   No No   Sig: TAKE 1 TABLET (10 MG TOTAL) BY MOUTH 2 (TWO) TIMES A DAY   meclizine (ANTIVERT) 25 mg tablet   No No   Sig: TAKE 1 TABLET (25 MG TOTAL) BY MOUTH 3 (THREE) TIMES A DAY AS NEEDED FOR DIZZINESS   metFORMIN (GLUCOPHAGE-XR) 500 mg 24 hr tablet   No No   Sig: TAKE 1 TABLET (500 MG TOTAL) BY MOUTH DAILY WITH DINNER   mupirocin (BACTROBAN) 2 % ointment   No No   Sig: APPLY TOPICALLY 3 (THREE) TIMES A DAY   Patient not taking: Reported on 7/31/2023   ondansetron (ZOFRAN) 4 mg tablet   No No   Sig: TAKE 1 TABLET (4 MG TOTAL) BY MOUTH EVERY 8 (EIGHT) HOURS AS NEEDED FOR NAUSEA   pantoprazole (PROTONIX) 40 mg tablet   No No   Sig: Take 1 tablet (40 mg total) by mouth daily for 14 days   pantoprazole (PROTONIX) 40 mg tablet   No Yes   Sig: Take 1 tablet (40 mg total) by mouth daily for 14 days   prazosin (MINIPRESS) 2 mg capsule   Yes No   Sig: Take 2 mg by mouth daily   sucralfate (CARAFATE) 1 g tablet   No No   Sig: Take 1 tablet (1 g total) by mouth 4 (four) times a day for 14 days   sucralfate (CARAFATE) 1 g tablet   No Yes   Sig: Take 1 tablet (1 g total) by mouth 4 (four) times a day for 14 days   traZODone (DESYREL) 50 mg tablet   Yes No   Sig: Take 50 mg by mouth daily at bedtime      Facility-Administered Medications: None       Past Medical History:   Diagnosis Date   • ADHD (attention deficit hyperactivity disorder)    • Anxiety    • Asthma    • Cat-scratch disease     last assessed 10/22/15   • Depression    • Hypertension     last assessed 11/11/14   • Sciatica    • Sleep disorder        Past Surgical History:   Procedure Laterality Date   • BREAST BIOPSY Left     2016   • DENTAL SURGERY     • OK BX/EXC LYMPH NODE OPEN DEEP AXILLARY NODE Left 1/15/2019    Procedure: LEFT AXILLARY LYMPH NODE BIOPSY;  Surgeon: Leanne Reno MD;  Location: [facility];  Service: General   • OK LAMNOTMY INCL W/DCMPRSN NRV ROOT 1 INTRSPC LUMBR Right 4/14/2021    Procedure: L5-S1 minimally invasive microdiskectomy and  epidural steroid injection;  Surgeon: Carmencita Koyanagi, MD;  Location: [facility];  Service: Neurosurgery   • TUBAL LIGATION         Family History   Problem Relation Age of Onset   • Anxiety disorder Mother    • Bipolar disorder Mother    • Depression Mother    • Tremor Mother    • Anxiety disorder Father    • Bipolar disorder Father    • Depression Father    • Schizophrenia Father    • Parkinsonism Maternal Grandmother    • Stomach cancer Paternal Grandfather      I have reviewed and agree with the history as documented. E-Cigarette/Vaping   • E-Cigarette Use Never User      E-Cigarette/Vaping Substances   • Nicotine No    • THC No    • CBD No    • Flavoring No    • Other No    • Unknown No      Social History     Tobacco Use   • Smoking status: Never   • Smokeless tobacco: Never   Vaping Use   • Vaping Use: Never used   Substance Use Topics   • Alcohol use: No   • Drug use: No       Review of Systems   Constitutional: Positive for appetite change and chills. Negative for fever. HENT: Negative for ear pain and sore throat. Eyes: Negative for pain and visual disturbance. Respiratory: Negative for cough and shortness of breath. Cardiovascular: Positive for chest pain. Negative for palpitations. Gastrointestinal: Positive for abdominal pain and nausea. Negative for vomiting. Genitourinary: Negative for dysuria, flank pain and hematuria. Musculoskeletal: Negative for arthralgias and back pain. Skin: Negative for color change and rash. Neurological: Negative for dizziness, seizures, syncope, weakness and headaches. All other systems reviewed and are negative. Physical Exam  Physical Exam  Vitals and nursing note reviewed. Exam conducted with a chaperone present. Constitutional:       General: She is not in acute distress. Appearance: Normal appearance. She is well-developed. She is not ill-appearing, toxic-appearing or diaphoretic.    HENT: Head: Normocephalic and atraumatic. Right Ear: External ear normal.      Left Ear: External ear normal.      Nose: Nose normal.      Mouth/Throat:      Mouth: Mucous membranes are moist.      Pharynx: Oropharynx is clear. Eyes:      Conjunctiva/sclera: Conjunctivae normal.   Cardiovascular:      Rate and Rhythm: Regular rhythm. Tachycardia present. Heart sounds: No murmur heard. Pulmonary:      Effort: Pulmonary effort is normal. No respiratory distress. Breath sounds: Normal breath sounds. No wheezing, rhonchi or rales. Abdominal:      Palpations: Abdomen is soft. Tenderness: There is abdominal tenderness. There is no guarding or rebound. Musculoskeletal:         General: No swelling. Cervical back: Neck supple. Right lower leg: No edema. Left lower leg: No edema. Skin:     General: Skin is warm and dry. Capillary Refill: Capillary refill takes less than 2 seconds. Neurological:      General: No focal deficit present. Mental Status: She is alert. Mental status is at baseline.    Psychiatric:         Mood and Affect: Mood normal.         Vital Signs  ED Triage Vitals [08/27/23 0646]   Temperature Pulse Respirations Blood Pressure SpO2   97.9 °F (36.6 °C) (!) 122 20 121/81 97 %      Temp Source Heart Rate Source Patient Position - Orthostatic VS BP Location FiO2 (%)   Temporal Monitor Lying Right arm --      Pain Score       10 - Worst Possible Pain           Vitals:    08/27/23 0646 08/27/23 0805   BP: 121/81 161/88   Pulse: (!) 122 86   Patient Position - Orthostatic VS: Lying Lying         Visual Acuity      ED Medications  Medications   ondansetron (ZOFRAN) injection 4 mg (4 mg Intravenous Given 8/27/23 0801)   haloperidol lactate (HALDOL) injection 5 mg (5 mg Intramuscular Given 8/27/23 0805)   pantoprazole (PROTONIX) injection 40 mg (40 mg Intravenous Given 8/27/23 0758)   famotidine (PEPCID) tablet 20 mg (20 mg Oral Given 8/27/23 0801)   lactated ringers bolus 1,000 mL (1,000 mL Intravenous New Bag 8/27/23 0758)   sucralfate (CARAFATE) tablet 1 g (1 g Oral Given 8/27/23 0801)   iohexol (OMNIPAQUE) 350 MG/ML injection (SINGLE-DOSE) 100 mL (100 mL Intravenous Given 8/27/23 0757)       Diagnostic Studies  Results Reviewed     Procedure Component Value Units Date/Time    HS Troponin 0hr (reflex protocol) [[id number]]  (Normal) Collected: 08/27/23 0716    Lab Status: Final result Specimen: Blood from Arm, Left Updated: 08/27/23 0743     hs TnI 0hr 2 ng/L     Comprehensive metabolic panel [[id number]]  (Abnormal) Collected: 08/27/23 0716    Lab Status: Final result Specimen: Blood from Arm, Left Updated: 08/27/23 0738     Sodium 136 mmol/L      Potassium 3.4 mmol/L      Chloride 98 mmol/L      CO2 29 mmol/L      ANION GAP 9 mmol/L      BUN 13 mg/dL      Creatinine 0.92 mg/dL      Glucose 111 mg/dL      Calcium 9.6 mg/dL       U/L      ALT 75 U/L      Alkaline Phosphatase 85 U/L      Total Protein 7.2 g/dL      Albumin 4.5 g/dL      Total Bilirubin 0.59 mg/dL      eGFR 78 ml/min/1.73sq m     Narrative:      [place] guidelines for Chronic Kidney Disease (CKD):   •  Stage 1 with normal or high GFR (GFR > 90 mL/min/1.73 square meters)  •  Stage 2 Mild CKD (GFR = 60-89 mL/min/1.73 square meters)  •  Stage 3A Moderate CKD (GFR = 45-59 mL/min/1.73 square meters)  •  Stage 3B Moderate CKD (GFR = 30-44 mL/min/1.73 square meters)  •  Stage 4 Severe CKD (GFR = 15-29 mL/min/1.73 square meters)  •  Stage 5 End Stage CKD (GFR <15 mL/min/1.73 square meters)  Note: GFR calculation is accurate only with a steady state creatinine    Lipase [[id number]]  (Normal) Collected: 08/27/23 0716    Lab Status: Final result Specimen: Blood from Arm, Left Updated: 08/27/23 0738     Lipase 24 u/L     CBC and differential [[id number]]  (Abnormal) Collected: 08/27/23 0716    Lab Status: Final result Specimen: Blood from Arm, Left Updated: 08/27/23 0720     WBC 10.92 Thousand/uL      RBC 4.70 Million/uL      Hemoglobin 14.3 g/dL      Hematocrit 43.7 %      MCV 93 fL      MCH 30.4 pg      MCHC 32.7 g/dL      RDW 12.8 %      MPV 10.2 fL      Platelets 211 Thousands/uL      nRBC 0 /100 WBCs      Neutrophils Relative 71 %      Immat GRANS % 0 %      Lymphocytes Relative 18 %      Monocytes Relative 8 %      Eosinophils Relative 2 %      Basophils Relative 1 %      Neutrophils Absolute 7.88 Thousands/µL      Immature Grans Absolute 0.03 Thousand/uL      Lymphocytes Absolute 1.94 Thousands/µL      Monocytes Absolute 0.83 Thousand/µL      Eosinophils Absolute 0.19 Thousand/µL      Basophils Absolute 0.05 Thousands/µL                  PE Study with CT Abdomen and Pelvis with contrast   Final Result by Radha Seymour MD (08/27 3854)      CTA chest:      No pulmonary embolus. No evidence of acute thoracic process. Chronic findings and negatives as above. CT abdomen and pelvis:      No evidence of acute abdominopelvic process. New mild splenomegaly. Chronic findings and negatives as above. Workstation performed: [id number]                    Procedures  Procedures         ED Course  ED Course as of 08/27/23 0908   Sun Aug 27, 2023   0752 hs TnI 0hr: 2   3527 EKG interpreted by myself. EKG dated 8/27/2023 at 0807 demonstrates normal sinus rhythm 89 bpm, normal AL, QRS, QTc intervals, no STEMI.   4120 I counseled patient about the results of her work-up at this time. We did discuss the new elevation in AST and the similar elevation in ALT but no other lab abnormalities which are acute and no findings on CT scan which are acute. No progression on imaging of gallbladder disease. She feels improved with the pain medication regimen in the ER reports pain is 7 out of 10 but this is manageable for her and she would like to go home.   I did discuss that given the suspicion for gallbladder pathology, for recent ER visits and ongoing symptoms it certainly would be reasonable to proceed with admission for symptom control and further inpatient evaluation if patient is agreeable however she states that since her symptoms are improved she does have scheduled outpatient follow-up and she has to take her child to school tomorrow she would like to go home at this time. Return ED precautions discussed. SBIRT 22yo+    Flowsheet Row Most Recent Value   Initial Alcohol Screen: US AUDIT-C     1. How often do you have a drink containing alcohol? 0 Filed at: 08/27/2023 0646   2. How many drinks containing alcohol do you have on a typical day you are drinking? 0 Filed at: 08/27/2023 0646   3a. Male UNDER 65: How often do you have five or more drinks on one occasion? 0 Filed at: 08/27/2023 0646   3b. FEMALE Any Age, or MALE 65+: How often do you have 4 or more drinks on one occassion? 0 Filed at: 08/27/2023 0646   Audit-C Score 0 Filed at: 08/27/2023 1022                    Medical Decision Making  39-year-old female has been dealing with 2 to 3 weeks of upper abdominal pain without clear cause with possible attributions to cholelithiasis and peptic ulcer disease. She has not yet seen GI she has upcoming EGD scheduled. She has upcoming appointment with general surgery to discuss cholecystectomy. She has here with ongoing symptoms without new features. Reviewed results of previous work-up given tachycardia and chest pain think is reasonable to rule out PE in this patient as she has not previously been worked out for that. Repeat scan of the abdomen pelvis as well. There are no new acute findings on imaging. The AST is minimally elevated which is new. Offered admission for ongoing symptoms patient refuses citing improvement with medications in the ER and would like to proceed with outpatient follow-up plan at this time. Answered all questions at this time. Tachycardia improved. Pain improved.   Will discharge. Cholelithiasis: chronic illness or injury  Epigastric pain: chronic illness or injury  Amount and/or Complexity of Data Reviewed  Labs: ordered. Decision-making details documented in ED Course. Radiology: ordered. ECG/medicine tests: ordered and independent interpretation performed. Decision-making details documented in ED Course. Risk  OTC drugs. Prescription drug management. Disposition  Final diagnoses:   Epigastric pain   Cholelithiasis     Time reflects when diagnosis was documented in both MDM as applicable and the Disposition within this note     Time User Action Codes Description Comment    8/27/2023  9:03 AM Maren Paulette Add [R10.13] Epigastric pain     8/27/2023  9:03 AM Maren Paulette Add [K80.20] Cholelithiasis     8/27/2023  9:04 AM Maren Paulette Add [R10.11] Right upper quadrant abdominal pain     8/27/2023  9:04 AM Maren Paulette Add [K21.9] Gastroesophageal reflux disease, unspecified whether esophagitis present     8/27/2023  9:05 AM Maren Paulette Add [R11.0] Nausea       ED Disposition     ED Disposition   Discharge    Condition   Stable    Date/Time   Sun Aug 27, 2023  9:03 AM    Comment   Sarah Calero discharge to home/self care. Follow-up Information    None         Patient's Medications   Discharge Prescriptions    ALUMINUM-MAGNESIUM HYDROXIDE 200-200 MG/5ML SUSPENSION    Take 10 mL by mouth every 6 (six) hours as needed for heartburn       Start Date: 8/27/2023 End Date: --       Order Dose: 10 mL       Quantity: 355 mL    Refills: 0    PROMETHAZINE (PHENERGAN) 25 MG TABLET    Take 1 tablet (25 mg total) by mouth every 6 (six) hours as needed for nausea or vomiting       Start Date: 8/27/2023 End Date: --       Order Dose: 25 mg       Quantity: 30 tablet    Refills: 0       No discharge procedures on file.     PDMP Review       Value Time User    PDMP Reviewed  Yes 4/5/2023  1:47 PM Queen Trey PA-C          ED Provider  Electronically Signed by           Regino Armendariz DO  08/27/23 2015

## 2023-08-28 ENCOUNTER — HOSPITAL ENCOUNTER (INPATIENT)
Facility: HOSPITAL | Age: 38
LOS: 2 days | Discharge: HOME | DRG: 263 | End: 2023-08-31
Attending: EMERGENCY MEDICINE | Admitting: HOSPITALIST
Payer: COMMERCIAL

## 2023-08-28 ENCOUNTER — APPOINTMENT (EMERGENCY)
Dept: CT IMAGING | Facility: HOSPITAL | Age: 38
DRG: 263 | End: 2023-08-28
Payer: COMMERCIAL

## 2023-08-28 ENCOUNTER — APPOINTMENT (EMERGENCY)
Dept: RADIOLOGY | Facility: HOSPITAL | Age: 38
DRG: 263 | End: 2023-08-28
Payer: COMMERCIAL

## 2023-08-28 DIAGNOSIS — G25.2 INTENTION TREMOR: ICD-10-CM

## 2023-08-28 DIAGNOSIS — R25.1 TREMOR: ICD-10-CM

## 2023-08-28 DIAGNOSIS — R73.03 PRE-DIABETES: ICD-10-CM

## 2023-08-28 DIAGNOSIS — R10.9 ABDOMINAL PAIN: Primary | ICD-10-CM

## 2023-08-28 DIAGNOSIS — E66.09 CLASS 1 OBESITY DUE TO EXCESS CALORIES WITH SERIOUS COMORBIDITY AND BODY MASS INDEX (BMI) OF 34.0 TO 34.9 IN ADULT: ICD-10-CM

## 2023-08-28 DIAGNOSIS — K80.20 CHOLELITHIASIS: ICD-10-CM

## 2023-08-28 DIAGNOSIS — R10.13 EPIGASTRIC PAIN: ICD-10-CM

## 2023-08-28 DIAGNOSIS — R10.9 INTRACTABLE ABDOMINAL PAIN: ICD-10-CM

## 2023-08-28 DIAGNOSIS — R12 HEARTBURN: ICD-10-CM

## 2023-08-28 PROBLEM — R74.01 TRANSAMINITIS: Status: ACTIVE | Noted: 2023-08-28

## 2023-08-28 LAB
ALBUMIN SERPL BCP-MCNC: 4.1 G/DL (ref 3.5–5)
ALP SERPL-CCNC: 81 U/L (ref 34–104)
ALT SERPL W P-5'-P-CCNC: 67 U/L (ref 7–52)
AMPHETAMINES SERPL QL SCN: NEGATIVE
ANION GAP SERPL CALCULATED.3IONS-SCNC: 7 MMOL/L
AST SERPL W P-5'-P-CCNC: 35 U/L (ref 13–39)
ATRIAL RATE: 89 BPM
BACTERIA UR QL AUTO: ABNORMAL /HPF
BARBITURATES UR QL: NEGATIVE
BASOPHILS # BLD AUTO: 0.05 THOUSANDS/ÂΜL (ref 0–0.1)
BASOPHILS NFR BLD AUTO: 1 % (ref 0–1)
BENZODIAZ UR QL: POSITIVE
BILIRUB SERPL-MCNC: 0.45 MG/DL (ref 0.2–1)
BILIRUB UR QL STRIP: NEGATIVE
BUN SERPL-MCNC: 12 MG/DL (ref 5–25)
CALCIUM SERPL-MCNC: 9.1 MG/DL (ref 8.4–10.2)
CARDIAC TROPONIN I PNL SERPL HS: <2 NG/L (ref 8–18)
CHLORIDE SERPL-SCNC: 100 MMOL/L (ref 96–108)
CLARITY UR: CLEAR
CO2 SERPL-SCNC: 28 MMOL/L (ref 21–32)
COCAINE UR QL: NEGATIVE
COLOR UR: YELLOW
CREAT SERPL-MCNC: 0.81 MG/DL (ref 0.6–1.3)
EOSINOPHIL # BLD AUTO: 0.17 THOUSAND/ÂΜL (ref 0–0.61)
EOSINOPHIL NFR BLD AUTO: 3 % (ref 0–6)
ERYTHROCYTE [DISTWIDTH] IN BLOOD BY AUTOMATED COUNT: 12.8 % (ref 11.6–15.1)
FLUAV RNA RESP QL NAA+PROBE: NEGATIVE
FLUBV RNA RESP QL NAA+PROBE: NEGATIVE
GFR SERPL CREATININE-BSD FRML MDRD: 91 ML/MIN/1.73SQ M
GLUCOSE SERPL-MCNC: 103 MG/DL (ref 65–140)
GLUCOSE UR STRIP-MCNC: NEGATIVE MG/DL
HCG SERPL QL: NEGATIVE
HCT VFR BLD AUTO: 39.4 % (ref 34.8–46.1)
HGB BLD-MCNC: 13 G/DL (ref 11.5–15.4)
HGB UR QL STRIP.AUTO: ABNORMAL
IMM GRANULOCYTES # BLD AUTO: 0.01 THOUSAND/UL (ref 0–0.2)
IMM GRANULOCYTES NFR BLD AUTO: 0 % (ref 0–2)
KETONES UR STRIP-MCNC: ABNORMAL MG/DL
LACTATE SERPL-SCNC: 0.6 MMOL/L (ref 0.5–2)
LEUKOCYTE ESTERASE UR QL STRIP: NEGATIVE
LIPASE SERPL-CCNC: 12 U/L (ref 11–82)
LYMPHOCYTES # BLD AUTO: 0.89 THOUSANDS/ÂΜL (ref 0.6–4.47)
LYMPHOCYTES NFR BLD AUTO: 13 % (ref 14–44)
MAGNESIUM SERPL-MCNC: 2 MG/DL (ref 1.9–2.7)
MCH RBC QN AUTO: 30.2 PG (ref 26.8–34.3)
MCHC RBC AUTO-ENTMCNC: 33 G/DL (ref 31.4–37.4)
MCV RBC AUTO: 91 FL (ref 82–98)
METHADONE UR QL: NEGATIVE
MONOCYTES # BLD AUTO: 0.52 THOUSAND/ÂΜL (ref 0.17–1.22)
MONOCYTES NFR BLD AUTO: 8 % (ref 4–12)
NEUTROPHILS # BLD AUTO: 5.21 THOUSANDS/ÂΜL (ref 1.85–7.62)
NEUTS SEG NFR BLD AUTO: 75 % (ref 43–75)
NITRITE UR QL STRIP: NEGATIVE
NON-SQ EPI CELLS URNS QL MICRO: ABNORMAL /HPF
NRBC BLD AUTO-RTO: 0 /100 WBCS
OPIATES UR QL SCN: NEGATIVE
OXYCODONE+OXYMORPHONE UR QL SCN: NEGATIVE
P AXIS: 33 DEGREES
PCP UR QL: NEGATIVE
PH UR STRIP.AUTO: 5.5 [PH]
PLATELET # BLD AUTO: 249 THOUSANDS/UL (ref 149–390)
PMV BLD AUTO: 10.3 FL (ref 8.9–12.7)
POTASSIUM SERPL-SCNC: 3.9 MMOL/L (ref 3.5–5.3)
PR INTERVAL: 162 MS
PROT SERPL-MCNC: 6.9 G/DL (ref 6.4–8.4)
PROT UR STRIP-MCNC: NEGATIVE MG/DL
QRS AXIS: 50 DEGREES
QRSD INTERVAL: 86 MS
QT INTERVAL: 388 MS
QTC INTERVAL: 472 MS
RBC # BLD AUTO: 4.31 MILLION/UL (ref 3.81–5.12)
RBC #/AREA URNS AUTO: ABNORMAL /HPF
RSV RNA RESP QL NAA+PROBE: NEGATIVE
SARS-COV-2 RNA RESP QL NAA+PROBE: NEGATIVE
SODIUM SERPL-SCNC: 135 MMOL/L (ref 135–147)
SP GR UR STRIP.AUTO: <=1.005 (ref 1–1.03)
T WAVE AXIS: -2 DEGREES
THC UR QL: NEGATIVE
TSH SERPL DL<=0.05 MIU/L-ACNC: 1.1 UIU/ML (ref 0.45–4.5)
UROBILINOGEN UR QL STRIP.AUTO: 0.2 E.U./DL
VENTRICULAR RATE: 89 BPM
WBC # BLD AUTO: 6.85 THOUSAND/UL (ref 4.31–10.16)
WBC #/AREA URNS AUTO: ABNORMAL /HPF

## 2023-08-28 PROCEDURE — 84703 CHORIONIC GONADOTROPIN ASSAY: CPT | Performed by: PHYSICIAN ASSISTANT

## 2023-08-28 PROCEDURE — 71045 X-RAY EXAM CHEST 1 VIEW: CPT

## 2023-08-28 PROCEDURE — 81001 URINALYSIS AUTO W/SCOPE: CPT

## 2023-08-28 PROCEDURE — 70450 CT HEAD/BRAIN W/O DYE: CPT

## 2023-08-28 PROCEDURE — 83605 ASSAY OF LACTIC ACID: CPT | Performed by: PHYSICIAN ASSISTANT

## 2023-08-28 PROCEDURE — 93005 ELECTROCARDIOGRAM TRACING: CPT

## 2023-08-28 PROCEDURE — 96361 HYDRATE IV INFUSION ADD-ON: CPT

## 2023-08-28 PROCEDURE — G1004 CDSM NDSC: HCPCS

## 2023-08-28 PROCEDURE — 96360 HYDRATION IV INFUSION INIT: CPT

## 2023-08-28 PROCEDURE — 84443 ASSAY THYROID STIM HORMONE: CPT | Performed by: PHYSICIAN ASSISTANT

## 2023-08-28 PROCEDURE — 87086 URINE CULTURE/COLONY COUNT: CPT

## 2023-08-28 PROCEDURE — 99284 EMERGENCY DEPT VISIT MOD MDM: CPT

## 2023-08-28 PROCEDURE — 80307 DRUG TEST PRSMV CHEM ANLYZR: CPT | Performed by: INTERNAL MEDICINE

## 2023-08-28 PROCEDURE — 80053 COMPREHEN METABOLIC PANEL: CPT | Performed by: PHYSICIAN ASSISTANT

## 2023-08-28 PROCEDURE — 99223 1ST HOSP IP/OBS HIGH 75: CPT | Performed by: INTERNAL MEDICINE

## 2023-08-28 PROCEDURE — 0241U HB NFCT DS VIR RESP RNA 4 TRGT: CPT | Performed by: INTERNAL MEDICINE

## 2023-08-28 PROCEDURE — 36415 COLL VENOUS BLD VENIPUNCTURE: CPT | Performed by: PHYSICIAN ASSISTANT

## 2023-08-28 PROCEDURE — 99244 OFF/OP CNSLTJ NEW/EST MOD 40: CPT | Performed by: SURGERY

## 2023-08-28 PROCEDURE — 83690 ASSAY OF LIPASE: CPT | Performed by: PHYSICIAN ASSISTANT

## 2023-08-28 PROCEDURE — 84484 ASSAY OF TROPONIN QUANT: CPT | Performed by: PHYSICIAN ASSISTANT

## 2023-08-28 PROCEDURE — 85025 COMPLETE CBC W/AUTO DIFF WBC: CPT | Performed by: PHYSICIAN ASSISTANT

## 2023-08-28 PROCEDURE — 99285 EMERGENCY DEPT VISIT HI MDM: CPT | Performed by: PHYSICIAN ASSISTANT

## 2023-08-28 PROCEDURE — 83735 ASSAY OF MAGNESIUM: CPT | Performed by: PHYSICIAN ASSISTANT

## 2023-08-28 PROCEDURE — C9113 INJ PANTOPRAZOLE SODIUM, VIA: HCPCS | Performed by: INTERNAL MEDICINE

## 2023-08-28 PROCEDURE — 93010 ELECTROCARDIOGRAM REPORT: CPT | Performed by: INTERNAL MEDICINE

## 2023-08-28 RX ORDER — FAMOTIDINE 20 MG/1
20 TABLET, FILM COATED ORAL 2 TIMES DAILY
Status: DISCONTINUED | OUTPATIENT
Start: 2023-08-28 | End: 2023-08-28

## 2023-08-28 RX ORDER — PROMETHAZINE HYDROCHLORIDE 25 MG/1
25 TABLET ORAL EVERY 6 HOURS PRN
Status: DISCONTINUED | OUTPATIENT
Start: 2023-08-28 | End: 2023-08-28

## 2023-08-28 RX ORDER — ENOXAPARIN SODIUM 100 MG/ML
40 INJECTION SUBCUTANEOUS EVERY 24 HOURS
Status: DISCONTINUED | OUTPATIENT
Start: 2023-08-28 | End: 2023-08-31 | Stop reason: HOSPADM

## 2023-08-28 RX ORDER — TRAZODONE HYDROCHLORIDE 50 MG/1
50 TABLET ORAL
Status: DISCONTINUED | OUTPATIENT
Start: 2023-08-28 | End: 2023-08-31 | Stop reason: HOSPADM

## 2023-08-28 RX ORDER — LORATADINE 10 MG/1
10 TABLET ORAL DAILY
Status: DISCONTINUED | OUTPATIENT
Start: 2023-08-29 | End: 2023-08-31 | Stop reason: HOSPADM

## 2023-08-28 RX ORDER — ATOMOXETINE 60 MG/1
60 CAPSULE ORAL DAILY
Status: DISCONTINUED | OUTPATIENT
Start: 2023-08-29 | End: 2023-08-29

## 2023-08-28 RX ORDER — PRAZOSIN HYDROCHLORIDE 1 MG/1
2 CAPSULE ORAL
Status: DISCONTINUED | OUTPATIENT
Start: 2023-08-28 | End: 2023-08-31 | Stop reason: HOSPADM

## 2023-08-28 RX ORDER — DULOXETIN HYDROCHLORIDE 30 MG/1
60 CAPSULE, DELAYED RELEASE ORAL DAILY
Status: DISCONTINUED | OUTPATIENT
Start: 2023-08-29 | End: 2023-08-31 | Stop reason: HOSPADM

## 2023-08-28 RX ORDER — ALBUTEROL SULFATE 2.5 MG/3ML
2.5 SOLUTION RESPIRATORY (INHALATION) EVERY 6 HOURS PRN
Status: DISCONTINUED | OUTPATIENT
Start: 2023-08-28 | End: 2023-08-31 | Stop reason: HOSPADM

## 2023-08-28 RX ORDER — LIRAGLUTIDE 6 MG/ML
INJECTION, SOLUTION SUBCUTANEOUS
Qty: 15 ML | Refills: 0 | Status: SHIPPED | OUTPATIENT
Start: 2023-08-28 | End: 2023-08-31

## 2023-08-28 RX ORDER — SODIUM CHLORIDE 9 MG/ML
125 INJECTION, SOLUTION INTRAVENOUS CONTINUOUS
Status: DISCONTINUED | OUTPATIENT
Start: 2023-08-28 | End: 2023-08-31 | Stop reason: HOSPADM

## 2023-08-28 RX ORDER — ALBUTEROL SULFATE 90 UG/1
2 AEROSOL, METERED RESPIRATORY (INHALATION) EVERY 4 HOURS PRN
Status: DISCONTINUED | OUTPATIENT
Start: 2023-08-28 | End: 2023-08-31 | Stop reason: HOSPADM

## 2023-08-28 RX ORDER — FLUTICASONE FUROATE AND VILANTEROL 100; 25 UG/1; UG/1
1 POWDER RESPIRATORY (INHALATION)
Status: DISCONTINUED | OUTPATIENT
Start: 2023-08-29 | End: 2023-08-31 | Stop reason: HOSPADM

## 2023-08-28 RX ORDER — PROMETHAZINE HYDROCHLORIDE 25 MG/ML
25 INJECTION, SOLUTION INTRAMUSCULAR; INTRAVENOUS EVERY 6 HOURS PRN
Status: DISCONTINUED | OUTPATIENT
Start: 2023-08-28 | End: 2023-08-31 | Stop reason: HOSPADM

## 2023-08-28 RX ORDER — KETOROLAC TROMETHAMINE 30 MG/ML
15 INJECTION, SOLUTION INTRAMUSCULAR; INTRAVENOUS EVERY 6 HOURS PRN
Status: DISCONTINUED | OUTPATIENT
Start: 2023-08-28 | End: 2023-08-30

## 2023-08-28 RX ORDER — LORAZEPAM 2 MG/ML
1 INJECTION INTRAMUSCULAR ONCE
Status: DISCONTINUED | OUTPATIENT
Start: 2023-08-28 | End: 2023-08-28

## 2023-08-28 RX ORDER — PANTOPRAZOLE SODIUM 40 MG/10ML
40 INJECTION, POWDER, LYOPHILIZED, FOR SOLUTION INTRAVENOUS
Status: DISCONTINUED | OUTPATIENT
Start: 2023-08-28 | End: 2023-08-31 | Stop reason: HOSPADM

## 2023-08-28 RX ORDER — IBUPROFEN 200 MG
200 TABLET ORAL EVERY 6 HOURS PRN
Status: DISCONTINUED | OUTPATIENT
Start: 2023-08-28 | End: 2023-08-31 | Stop reason: HOSPADM

## 2023-08-28 RX ADMIN — TRAZODONE HYDROCHLORIDE 50 MG: 50 TABLET ORAL at 21:42

## 2023-08-28 RX ADMIN — ENOXAPARIN SODIUM 40 MG: 40 INJECTION SUBCUTANEOUS at 19:34

## 2023-08-28 RX ADMIN — SODIUM CHLORIDE 1000 ML: 0.9 INJECTION, SOLUTION INTRAVENOUS at 15:15

## 2023-08-28 RX ADMIN — SODIUM CHLORIDE 125 ML/HR: 0.9 INJECTION, SOLUTION INTRAVENOUS at 19:34

## 2023-08-28 RX ADMIN — PANTOPRAZOLE SODIUM 40 MG: 40 INJECTION, POWDER, FOR SOLUTION INTRAVENOUS at 19:36

## 2023-08-28 RX ADMIN — IBUPROFEN 200 MG: 200 TABLET, FILM COATED ORAL at 21:43

## 2023-08-28 NOTE — H&P
[address]  H&P  Name: Sue Easton 43 y.o. female I MRN: [id number]  Unit/Bed#: 733-85 I Date of Admission: 8/28/2023   Date of Service: 8/28/2023 I Hospital Day: 0      Assessment/Plan   * Intractable abdominal pain  Assessment & Plan  Severe upper abdominal pain x 3 weeks (3 visits to ED in 17 days). RUQ pain, comes and goes, not associated with eating, 10/10, a/w tremor, nausea, decreased appetite. Recent weight loss efforts - taking Ozempic (pharmacy out of stock), now taking liraglutide. Denies marijuana use, or any drug use. Negative bhCG. Bowel movement last night. CT abdomen and pelvis shows - No acute intra-abdominal/pelvic abnormalities   RUQ ultrasound shows - uncomplicated gallstones. Female, 43years old, BMI 32.57. Recently seen by GI, prescribed PPI, scheduled for EGD on September 1. Discussed case with general surgery; try to do EGD tomorrow 8/29 and if unremarkable, consider cholecystectomy on Wednesday.   Plan:  Diet NPO  Reglan PRN for nausea  Referral to GI (possibly rescheduled EGD for 8/29)  - consider HIDA scan      Transaminitis  Assessment & Plan  Elevated LFTs  Follow-up hepatic function tests    Class 2 severe obesity due to excess calories with serious comorbidity and body mass index (BMI) of 35.0 to 35.9 in adult [facility])  Assessment & Plan  BMI 32.57  A/w diabetes  Recent weight loss efforts -previously tried metformin and Ozempic   Now taking liraglutide   Plan:  Nutritional counseling  Currently NPO; low CHO diet when diet resumes    PTSD (post-traumatic stress disorder)  Assessment & Plan  pmhx PTSD  Continue home medications  Prazosin 2 mg capsule for nightmares  Strattera 60 mg     Essential hypertension  Assessment & Plan  pmhx HTN  BP currently stable  Holding home medications in setting of transaminitis;   Hold hydrochlorothiazide 12.5 mg tab, lisinopril 10 mg tab  Monitor BP for 24 hrs    Intention tremor  Assessment & Plan  Tremor present for 24 hours  Started yesterday, comes and goes  Complaining of shaking causing headache  Fhx: Tremor in mother  Plan:  Motrin for headache  Monitor tremors  CT head negative  Addressing the cause of tremor; abdominal pain  If no resolution, consider referral to neurology    Depression  Assessment & Plan  History of depression  Currently stable  Continue home medications; Duloxetine 60mg daily         VTE Pharmacologic Prophylaxis: VTE Score: 1 Low Risk (Score 0-2) - Encourage Ambulation. Code Status: Level 1 - Full Code per patient  Discussion with family: Updated  (sister) at bedside. Anticipated Length of Stay: Patient will be admitted on an inpatient basis with an anticipated length of stay of greater than 2 midnights secondary to possible surgery. Total Time Spent on Date of Encounter in care of patient: 40 minutes This time was spent on one or more of the following: performing physical exam; counseling and coordination of care; obtaining or reviewing history; documenting in the medical record; reviewing/ordering tests, medications or procedures; communicating with other healthcare professionals and discussing with patient's family/caregivers. Chief Complaint: Abdominal pain    History of Present Illness:  Ernie Benitez is a 43 y.o. female with a PMH of anxiety, depression, PTSD, obesity, hypertension who presents with abdominal pain x 3 weeks. Patient has been seen in the emergency room 3 times in 17 days for abdominal pain. Patient states that pain started randomly 3 weeks ago but has been worsening in severity and length. Pain is located right upper quadrant, epigastric. Pain is intermittent but when present is 10 /10. Causing nausea, no vomiting. Decreased appetite. Patient taking medication for weight loss, has tried metformin and Ozempic, and is now taking liraglutide. She has also been seen by GI who increased her PPI and scheduled her for EGD on September 1.   Patient denies alcohol, tobacco, drug use, no marijuana use. Yesterday, patient started a tremor with the pain. Complaining of associated headache. Patient lives at home with her  and 6 kittens. Review of Systems:  Review of Systems   Constitutional: Negative for chills and fever. HENT: Negative for ear pain and sore throat. Eyes: Negative for pain and visual disturbance. Respiratory: Negative for cough and shortness of breath. Cardiovascular: Negative for chest pain and palpitations. Gastrointestinal: Positive for abdominal pain and nausea. Negative for abdominal distention, anal bleeding, constipation, diarrhea and vomiting. Genitourinary: Negative for dysuria and hematuria. Musculoskeletal: Negative for arthralgias and back pain. Skin: Negative for color change and rash. Neurological: Positive for headaches. Negative for seizures and syncope. All other systems reviewed and are negative. Past Medical and Surgical History:   Past Medical History:   Diagnosis Date   • ADHD (attention deficit hyperactivity disorder)    • Anxiety    • Asthma    • Cat-scratch disease     last assessed 10/22/15   • Depression    • Hypertension     last assessed 11/11/14   • Sciatica    • Sleep disorder        Past Surgical History:   Procedure Laterality Date   • BREAST BIOPSY Left     2016   • DENTAL SURGERY     • OR BX/EXC LYMPH NODE OPEN DEEP AXILLARY NODE Left 1/15/2019    Procedure: LEFT AXILLARY LYMPH NODE BIOPSY;  Surgeon: Hildy Peabody, MD;  Location: [facility];  Service: General   • OR LAMNOTMY INCL W/DCMPRSN NRV ROOT 1 INTRSPC LUMBR Right 4/14/2021    Procedure: L5-S1 minimally invasive microdiskectomy and  epidural steroid injection;  Surgeon: Donna Mir MD;  Location: [facility];  Service: Neurosurgery   • TUBAL LIGATION         Meds/Allergies:  Prior to Admission medications    Medication Sig Start Date End Date Taking?  Authorizing Provider   albuterol (2.5 mg/3 mL) 0.083 % nebulizer solution Take 3 mL (2.5 mg total) by nebulization every 6 (six) hours as needed for wheezing or shortness of breath 1/19/23  Yes Nievespedro Noel PA-C   albuterol (PROVENTIL HFA,VENTOLIN HFA) 90 mcg/act inhaler INHALE 2 PUFFS EVERY 4 (FOUR) HOURS AS NEEDED FOR WHEEZING OR SHORTNESS OF BREATH 8/24/23  Yes Nieves SAMINA Noel   Alcohol Swabs (Pharmacist Choice Alcohol) PADS TEST BLOOD 3 TIMES DAILY 8/18/23  Yes Nieves SAMINA Noel   ALPRAZolam Albertina Quill) 1 mg tablet Take 1 mg by mouth as needed 3/21/23  Yes Historical Provider, MD   atoMOXetine (STRATTERA) 60 mg capsule Take 60 mg by mouth in the morning 7/27/23  Yes Historical Provider, MD   Blood Glucose Monitoring Suppl (OneTouch Verio Reflect) w/Device KIT Check blood sugars three times daily. Please substitute with appropriate alternative as covered by patient's insurance. Dx: E11.65 10/19/22  Yes David Adams PA-C   cetirizine (ZyrTEC) 10 mg tablet TAKE 1 TABLET (10 MG TOTAL) BY MOUTH DAILY 5/2/23  Yes Nieves Noel PA-C   DULoxetine (CYMBALTA) 60 mg delayed release capsule Take 60 mg by mouth daily 5/9/23  Yes Historical Provider, MD   ergocalciferol (VITAMIN D2) 50,000 units TAKE 1 CAPSULE (50,000 UNITS TOTAL) BY MOUTH ONCE A WEEK 6/14/23  Yes Jillian Sullivan PA-C   famotidine (PEPCID) 20 mg tablet Take 1 tablet (20 mg total) by mouth 2 (two) times a day 8/27/23  Yes Fantasma Acevedo,    fluticasone (FLONASE) 50 mcg/act nasal spray 1 spray into each nostril daily 12/29/22  Yes Jillian Sullivan PA-C   Fluticasone-Salmeterol (Advair) 250-50 mcg/dose inhaler INHALE 1 PUFF 2 (TWO) TIMES A DAY RINSE MOUTH AFTER USE. 8/25/23  Yes Nieves Noel PA-C   hydrochlorothiazide (HYDRODIURIL) 12.5 mg tablet TAKE 1 TABLET (12.5 MG TOTAL) BY MOUTH DAILY 1/20/23  Yes Jillian E Sullivan, PA-C   Lancet Devices (Easy Mini Eject Lancing Device) MISC Use as directed to check blood sugar 3x daily.  1/3/23  Yes Jillian Sullivan PA-C   lisinopril (ZESTRIL) 10 mg tablet TAKE 1 TABLET (10 MG TOTAL) BY MOUTH 2 (TWO) TIMES A DAY 7/21/23  Yes Jillian Sullivan PA-C   meclizine (ANTIVERT) 25 mg tablet TAKE 1 TABLET (25 MG TOTAL) BY MOUTH 3 (THREE) TIMES A DAY AS NEEDED FOR DIZZINESS 7/17/23  Yes Cathlean Goltz, PA-C   metFORMIN (GLUCOPHAGE-XR) 500 mg 24 hr tablet TAKE 1 TABLET (500 MG TOTAL) BY MOUTH DAILY WITH DINNER 5/2/23  Yes Cathlean Goltz, PA-C   ondansetron (ZOFRAN) 4 mg tablet TAKE 1 TABLET (4 MG TOTAL) BY MOUTH EVERY 8 (EIGHT) HOURS AS NEEDED FOR NAUSEA 5/16/23  Yes Cathlean Goltz, PA-C   OneTouch Verio test strip TEST BLOOD 3 TIMES DAILY 8/21/23  Yes Jillian Sullivan PA-C   Pharmacist Choice Lancets MISC TEST BLOOD 3 TIMES DAILY 8/21/23  Yes iJllian Sullivan PA-C   prazosin (MINIPRESS) 2 mg capsule Take 2 mg by mouth daily at bedtime 5/9/23  Yes Historical Provider, MD   traZODone (DESYREL) 50 mg tablet Take 50 mg by mouth daily at bedtime 7/25/23  Yes Historical Provider, MD   aluminum-magnesium hydroxide 200-200 MG/5ML suspension Take 10 mL by mouth every 6 (six) hours as needed for heartburn 8/27/23   Gayathri Beard DO   mupirocin (BACTROBAN) 2 % ointment APPLY TOPICALLY 3 (THREE) TIMES A DAY  Patient not taking: Reported on 7/31/2023 6/5/23   Cathlean Goltz, PA-C   pantoprazole (PROTONIX) 40 mg tablet Take 1 tablet (40 mg total) by mouth daily for 14 days 8/27/23 9/10/23  Gayathri Beard DO   promethazine (PHENERGAN) 25 mg tablet Take 1 tablet (25 mg total) by mouth every 6 (six) hours as needed for nausea or vomiting 8/27/23   Gayathri Beard DO   Saxenda injection INJECT 0.1 ML (0.6 MG TOTAL) UNDER THE SKIN DAILY FOR 7 DAYS, THEN 0.2 ML (1.2 MG TOTAL) DAILY FOR 7 DAYS, THEN 0.3 ML (1.8 MG TOTAL) DAILY FOR 7 DAYS, THEN 0.4 ML (2.4 MG TOTAL) DAILY FOR 7 DAYS, THEN 0.5 ML (3 MG TOTAL) DAILY FOR 7 DAYS.   Patient not taking: Reported on 8/28/2023 8/28/23   Cathlean Goltz, PA-C   sucralfate (CARAFATE) 1 g tablet Take 1 tablet (1 g total) by mouth 4 (four) times a day for 14 days 8/27/23 9/10/23 Bg Brown, DO   liraglutide (SAXENDA) injection Inject 0.1 mL (0.6 mg total) under the skin daily for 7 days, THEN 0.2 mL (1.2 mg total) daily for 7 days, THEN 0.3 mL (1.8 mg total) daily for 7 days, THEN 0.4 mL (2.4 mg total) daily for 7 days, THEN 0.5 mL (3 mg total) daily for 7 days. Patient not taking: Reported on 8/21/2023 7/31/23 8/28/23  Roverto Harris PA-C     I have reviewed home medications with patient personally. Allergies: Allergies   Allergen Reactions   • Bactrim [Sulfamethoxazole-Trimethoprim] Shortness Of Breath     Near syncope   • Codeine Hives     Tolerated Percocet, Vicodin, etc.   • Magnesium Sulfate Tachycardia       Social History:  Marital Status: /Civil Union   Occupation: Unknown  Patient Pre-hospital Living Situation: Home  Patient Pre-hospital Level of Mobility: walks  Patient Pre-hospital Diet Restrictions: Prediabetic  Substance Use History:   Social History     Substance and Sexual Activity   Alcohol Use No     Social History     Tobacco Use   Smoking Status Never   Smokeless Tobacco Never     Social History     Substance and Sexual Activity   Drug Use No       Family History:  Family History   Problem Relation Age of Onset   • Anxiety disorder Mother    • Bipolar disorder Mother    • Depression Mother    • Tremor Mother    • Anxiety disorder Father    • Bipolar disorder Father    • Depression Father    • Schizophrenia Father    • Parkinsonism Maternal Grandmother    • Stomach cancer Paternal Grandfather        Physical Exam:     Vitals:   Blood Pressure: 124/93 (08/28/23 1741)  Pulse: 97 (08/28/23 1741)  Temperature: 98.5 °F (36.9 °C) (08/28/23 1741)  Temp Source: Temporal (08/28/23 1424)  Respirations: 16 (08/28/23 1741)  Height: 5' 3" (160 cm) (08/28/23 1741)  Weight - Scale: 83.4 kg (183 lb 13.8 oz) (08/28/23 1741)  SpO2: 96 % (08/28/23 1741)    Physical Exam  Vitals and nursing note reviewed.    Constitutional:       General: She is not in acute distress. Appearance: She is well-developed. She is obese. HENT:      Head: Normocephalic and atraumatic. Right Ear: External ear normal.      Left Ear: External ear normal.      Nose: No rhinorrhea. Mouth/Throat:      Mouth: Mucous membranes are moist.   Eyes:      General:         Right eye: No discharge. Left eye: No discharge. Conjunctiva/sclera: Conjunctivae normal.   Cardiovascular:      Rate and Rhythm: Normal rate and regular rhythm. Pulses: Normal pulses. Heart sounds: Normal heart sounds. No murmur heard. Pulmonary:      Effort: Pulmonary effort is normal. No respiratory distress. Breath sounds: Normal breath sounds. Abdominal:      General: Bowel sounds are normal. There is no distension. Palpations: Abdomen is soft. There is no mass. Tenderness: There is no abdominal tenderness. There is no guarding. Hernia: No hernia is present. Musculoskeletal:         General: No swelling. Cervical back: Normal range of motion and neck supple. Right lower leg: No edema. Left lower leg: No edema. Skin:     General: Skin is warm and dry. Capillary Refill: Capillary refill takes less than 2 seconds. Neurological:      General: No focal deficit present. Mental Status: She is alert.       Comments: Head and extremities shaking   Psychiatric:         Mood and Affect: Mood normal.         Behavior: Behavior normal.         Additional Data:     Lab Results:  Results from last 7 days   Lab Units 08/28/23  1515   WBC Thousand/uL 6.85   HEMOGLOBIN g/dL 13.0   HEMATOCRIT % 39.4   PLATELETS Thousands/uL 249   NEUTROS PCT % 75   LYMPHS PCT % 13*   MONOS PCT % 8   EOS PCT % 3     Results from last 7 days   Lab Units 08/28/23  1515   SODIUM mmol/L 135   POTASSIUM mmol/L 3.9   CHLORIDE mmol/L 100   CO2 mmol/L 28   BUN mg/dL 12   CREATININE mg/dL 0.81   ANION GAP mmol/L 7   CALCIUM mg/dL 9.1   ALBUMIN g/dL 4.1   TOTAL BILIRUBIN mg/dL 0.45   ALK PHOS U/L 81   ALT U/L 67*   AST U/L 35   GLUCOSE RANDOM mg/dL 103                 Results from last 7 days   Lab Units 08/28/23  1515   LACTIC ACID mmol/L 0.6       Lines/Drains:  Invasive Devices     Peripheral Intravenous Line  Duration           Peripheral IV 08/28/23 Right Antecubital <1 day                    Imaging: Reviewed radiology reports from this admission including: ultrasound(s)  CT head without contrast   Final Result by Taran Agudelo MD (08/28 0192)      No acute intracranial abnormality. Workstation performed: [id number]         XR chest 1 view portable   ED Interpretation by Abril Evans PA-C (08/28 7702)   Personally interpreted there is no evidence of pneumothorax or acute consolidative process no effusions      Final Result by Niraj Dodd MD (08/28 1628)      No acute cardiopulmonary disease. Workstation performed: [id number]             EKG and Other Studies Reviewed on Admission:   · EKG: NSR. HR Tachycardia. ** Please Note: This note has been constructed using a voice recognition system.  **

## 2023-08-28 NOTE — ASSESSMENT & PLAN NOTE
Tremor present for 24 hours  Started yesterday, comes and goes  Complaining of shaking causing headache  Fhx: Tremor in mother  Plan:  Motrin for headache  Monitor tremors  CT head negative  Addressing the cause of tremor; abdominal pain  If no resolution, consider referral to neurology

## 2023-08-28 NOTE — ASSESSMENT & PLAN NOTE
Severe upper abdominal pain x 3 weeks (3 visits to ED in 17 days). RUQ pain, comes and goes, not associated with eating, 10/10, a/w tremor, nausea, decreased appetite. Recent weight loss efforts - taking Ozempic (pharmacy out of stock), now taking liraglutide. Denies marijuana use, or any drug use. Negative bhCG. Bowel movement last night. CT abdomen and pelvis shows - No acute intra-abdominal/pelvic abnormalities   RUQ ultrasound shows - uncomplicated gallstones. Female, 43years old, BMI 32.57. Recently seen by GI, prescribed PPI, scheduled for EGD on September 1. Discussed case with general surgery; try to do EGD tomorrow 8/29 and if unremarkable, consider cholecystectomy on Wednesday.   Plan:  Diet NPO  Reglan PRN for nausea  Referral to GI (possibly rescheduled EGD for 8/29)  - consider HIDA scan

## 2023-08-28 NOTE — CONSULTS
Consultation - General Surgery   Chyna Moncada 43 y.o. female MRN: [id number]  Unit/Bed#: RM03 Encounter: [id number]    Assessment/Plan     Assessment:  70-year-old female with past medical history of anxiety, depression, asthma, hypertension, sciatica status post L5-S1 discectomy, diabetes mellitus and tubal ligation who presents with abdominal pain, generalized tremors. Ultrasound with finding of cholelithiasis without cholecystitis. General surgery consulted due to concern for symptomatic cholelithiasis. Tachycardia 110s, other vitals normal on room air    WBC 6.85  Hemoglobin 13.0  Creatinine 0.81  AST/ALT 35/67  Alkaline phosphatase 81  Total bilirubin 0.45  Lipase 12  Lactate 0.6    8/22 Right upper quadrant ultrasound: Uncomplicated cholelithiasis, no wall thickening or pericholecystic fluid  8/27 PE study with CT abdomen pelvis with contrast: No PE, no evidence of acute abdominal pelvic process  8/28 chest x-ray: Negative  8/28 CT head without contrast: No acute intracranial abnormality    Plan:  Recommend medical admission for work-up of atypical abdominal pain, tremors and tachycardia  Recommend GI consult for inpatient EGD to rule out other cause of abdominal pain  No evidence of acute cholecystitis on recent imaging, no elevation in WBC today, exam inconsistent with typical acute cholecystitis, despite this could be atypical presentation of symptomatic cholelithiasis, if all other work-up negative will discuss risks and benefits of laparoscopic cholecystectomy with the understanding there is the risk of her symptoms not resolving  No indication for antibiotics from surgical perspective  As needed analgesia and antiemetics  DVT prophylaxis  Rest of care per primary    History of Present Illness     HPI:  Chyna Moncada is a 43 y.o. female who presents with abdominal pain, nausea, and generalized tremors. Patient reports that she has had 4 ED presentations over the last 2 weeks.   These have all been for abdominal pain. She reports the pain is located in her upper abdomen and radiates bilaterally to her back. She reports that it slightly worsens after eating but is not specifically brought on by food. These episodes have been intermittent over the last month but has specifically worsened over the last 2 weeks. She has several episodes of this pain per day and may have been becoming more intense. She has had some nausea but no vomiting. She has associated constipation but no other change in bowel habit. No dark urine or pale stools. Denies fever. Over the last 24 hours she has developed generalized tremors prompting her to come back to the emergency department today. She has undergone extensive work-up up until this point including a CT abdomen pelvis on 8/11 that showed no acute abnormality in the abdomen or pelvis. A right upper quadrant ultrasound on 8/22 that showed uncomplicated cholelithiasis without wall thickening or pericholecystic fluid. And AP PE study with CT abdomen pelvis with contrast on 8/27 that was negative for PE, acute thoracic process or acute abdominal pelvic process. She did have new mild splenomegaly on the scan. CT head and chest x-ray done 8/28 were negative for acute abnormality. General surgery consulted due to concern for atypical presentation of biliary colic. Consults       Review of Systems   Constitutional: Negative. HENT: Negative. Eyes: Negative. Respiratory: Negative. Cardiovascular: Negative. Gastrointestinal: Positive for abdominal pain (Epigastric radiating into lower chest and bilateral back), constipation and nausea. Negative for vomiting. Endocrine: Negative. Genitourinary: Negative. Musculoskeletal: Negative. Skin: Negative. Neurological: Positive for tremors (Generalized). Psychiatric/Behavioral: Negative.         Historical Information   Past Medical History:   Diagnosis Date   • ADHD (attention deficit hyperactivity disorder)    • Anxiety    • Asthma    • Cat-scratch disease     last assessed 10/22/15   • Depression    • Hypertension     last assessed 11/11/14   • Sciatica    • Sleep disorder      Past Surgical History:   Procedure Laterality Date   • BREAST BIOPSY Left     2016   • DENTAL SURGERY     • TX BX/EXC LYMPH NODE OPEN DEEP AXILLARY NODE Left 1/15/2019    Procedure: LEFT AXILLARY LYMPH NODE BIOPSY;  Surgeon: Leanne Reno MD;  Location: [facility];  Service: General   • TX LAMNOTMY INCL W/DCMPRSN NRV ROOT 1 INTRSPC LUMBR Right 4/14/2021    Procedure: L5-S1 minimally invasive microdiskectomy and  epidural steroid injection;  Surgeon: Rommel Chavez MD;  Location: [facility];  Service: Neurosurgery   • TUBAL LIGATION       Social History   Social History     Substance and Sexual Activity   Alcohol Use No     Social History     Substance and Sexual Activity   Drug Use No     E-Cigarette/Vaping   • E-Cigarette Use Never User      E-Cigarette/Vaping Substances   • Nicotine No    • THC No    • CBD No    • Flavoring No    • Other No    • Unknown No      Social History     Tobacco Use   Smoking Status Never   Smokeless Tobacco Never     Family History:   Family History   Problem Relation Age of Onset   • Anxiety disorder Mother    • Bipolar disorder Mother    • Depression Mother    • Tremor Mother    • Anxiety disorder Father    • Bipolar disorder Father    • Depression Father    • Schizophrenia Father    • Parkinsonism Maternal Grandmother    • Stomach cancer Paternal Grandfather        Meds/Allergies   current meds:   No current facility-administered medications for this encounter. and PTA meds:   Prior to Admission Medications   Prescriptions Last Dose Informant Patient Reported? Taking?    ALPRAZolam (XANAX) 1 mg tablet   Yes No   Sig: Take 1 mg by mouth as needed   Alcohol Swabs (Pharmacist Choice Alcohol) PADS   No No   Sig: TEST BLOOD 3 TIMES DAILY   Blood Glucose Monitoring Suppl (OneTouch Verio Reflect) w/Device KIT No No   Sig: Check blood sugars three times daily. Please substitute with appropriate alternative as covered by patient's insurance. Dx: E11.65   DULoxetine (CYMBALTA) 60 mg delayed release capsule   Yes No   Sig: Take 60 mg by mouth daily   Fluticasone-Salmeterol (Advair) 250-50 mcg/dose inhaler   No No   Sig: INHALE 1 PUFF 2 (TWO) TIMES A DAY RINSE MOUTH AFTER USE. Lancet Devices (Easy Mini Eject Lancing Device) MISC   No No   Sig: Use as directed to check blood sugar 3x daily. OneTouch Verio test strip   No No   Sig: TEST BLOOD 3 TIMES DAILY   Pharmacist Choice Lancets MISC   No No   Sig: TEST BLOOD 3 TIMES DAILY   Saxenda injection   No No   Sig: INJECT 0.1 ML (0.6 MG TOTAL) UNDER THE SKIN DAILY FOR 7 DAYS, THEN 0.2 ML (1.2 MG TOTAL) DAILY FOR 7 DAYS, THEN 0.3 ML (1.8 MG TOTAL) DAILY FOR 7 DAYS, THEN 0.4 ML (2.4 MG TOTAL) DAILY FOR 7 DAYS, THEN 0.5 ML (3 MG TOTAL) DAILY FOR 7 DAYS.    albuterol (2.5 mg/3 mL) 0.083 % nebulizer solution   No No   Sig: Take 3 mL (2.5 mg total) by nebulization every 6 (six) hours as needed for wheezing or shortness of breath   albuterol (PROVENTIL HFA,VENTOLIN HFA) 90 mcg/act inhaler   No No   Sig: INHALE 2 PUFFS EVERY 4 (FOUR) HOURS AS NEEDED FOR WHEEZING OR SHORTNESS OF BREATH   aluminum-magnesium hydroxide 200-200 MG/5ML suspension   No No   Sig: Take 10 mL by mouth every 6 (six) hours as needed for heartburn   atoMOXetine (STRATTERA) 60 mg capsule   Yes No   Sig: Take 60 mg by mouth in the morning   cetirizine (ZyrTEC) 10 mg tablet   No No   Sig: TAKE 1 TABLET (10 MG TOTAL) BY MOUTH DAILY   ergocalciferol (VITAMIN D2) 50,000 units   No No   Sig: TAKE 1 CAPSULE (50,000 UNITS TOTAL) BY MOUTH ONCE A WEEK   famotidine (PEPCID) 20 mg tablet   No No   Sig: Take 1 tablet (20 mg total) by mouth 2 (two) times a day   fluticasone (FLONASE) 50 mcg/act nasal spray   No No   Si spray into each nostril daily   hydrochlorothiazide (HYDRODIURIL) 12.5 mg tablet   No No   Sig: TAKE 1 TABLET (12.5 MG TOTAL) BY MOUTH DAILY   lisinopril (ZESTRIL) 10 mg tablet   No No   Sig: TAKE 1 TABLET (10 MG TOTAL) BY MOUTH 2 (TWO) TIMES A DAY   meclizine (ANTIVERT) 25 mg tablet   No No   Sig: TAKE 1 TABLET (25 MG TOTAL) BY MOUTH 3 (THREE) TIMES A DAY AS NEEDED FOR DIZZINESS   metFORMIN (GLUCOPHAGE-XR) 500 mg 24 hr tablet   No No   Sig: TAKE 1 TABLET (500 MG TOTAL) BY MOUTH DAILY WITH DINNER   mupirocin (BACTROBAN) 2 % ointment   No No   Sig: APPLY TOPICALLY 3 (THREE) TIMES A DAY   Patient not taking: Reported on 7/31/2023   ondansetron (ZOFRAN) 4 mg tablet   No No   Sig: TAKE 1 TABLET (4 MG TOTAL) BY MOUTH EVERY 8 (EIGHT) HOURS AS NEEDED FOR NAUSEA   pantoprazole (PROTONIX) 40 mg tablet   No No   Sig: Take 1 tablet (40 mg total) by mouth daily for 14 days   prazosin (MINIPRESS) 2 mg capsule   Yes No   Sig: Take 2 mg by mouth daily   promethazine (PHENERGAN) 25 mg tablet   No No   Sig: Take 1 tablet (25 mg total) by mouth every 6 (six) hours as needed for nausea or vomiting   sucralfate (CARAFATE) 1 g tablet   No No   Sig: Take 1 tablet (1 g total) by mouth 4 (four) times a day for 14 days   traZODone (DESYREL) 50 mg tablet   Yes No   Sig: Take 50 mg by mouth daily at bedtime      Facility-Administered Medications: None     Allergies   Allergen Reactions   • Bactrim [Sulfamethoxazole-Trimethoprim] Shortness Of Breath     Near syncope   • Codeine Hives     Tolerated Percocet, Vicodin, etc.   • Magnesium Sulfate Tachycardia       Objective   First Vitals:   Blood Pressure: 108/66 (08/28/23 1424)  Pulse: (!) 112 (08/28/23 1424)  Temperature: 97.9 °F (36.6 °C) (08/28/23 1424)  Temp Source: Temporal (08/28/23 1424)  Respirations: 18 (08/28/23 1424)  SpO2: 97 % (08/28/23 1424)    Current Vitals:   Blood Pressure: 122/69 (08/28/23 1600)  Pulse: 91 (08/28/23 1600)  Temperature: 97.9 °F (36.6 °C) (08/28/23 1424)  Temp Source: Temporal (08/28/23 1424)  Respirations: 18 (08/28/23 1600)  SpO2: 96 % (08/28/23 1600)    No intake or output data in the 24 hours ending 08/28/23 1647    Invasive Devices     Peripheral Intravenous Line  Duration           Peripheral IV 08/28/23 Right Antecubital <1 day                Physical Exam  Constitutional:       Appearance: Normal appearance. HENT:      Head: Normocephalic and atraumatic. Right Ear: External ear normal.      Left Ear: External ear normal.      Nose: Nose normal.      Mouth/Throat:      Mouth: Mucous membranes are moist.      Pharynx: Oropharynx is clear. Eyes:      Extraocular Movements: Extraocular movements intact. Conjunctiva/sclera: Conjunctivae normal.      Pupils: Pupils are equal, round, and reactive to light. Cardiovascular:      Rate and Rhythm: Regular rhythm. Tachycardia present. Pulses: Normal pulses. Pulmonary:      Effort: Pulmonary effort is normal.   Abdominal:      General: Abdomen is flat. Palpations: Abdomen is soft. Tenderness: There is abdominal tenderness (Mildly tender diffusely, worst in periumbilical area). There is no guarding or rebound. Musculoskeletal:         General: Normal range of motion. Cervical back: Normal range of motion. Skin:     General: Skin is warm and dry. Neurological:      General: No focal deficit present. Mental Status: She is alert and oriented to person, place, and time. Psychiatric:         Mood and Affect: Mood normal.         Behavior: Behavior normal.            Lab Results: I have personally reviewed pertinent lab results. Imaging: I have personally reviewed pertinent reports. EKG, Pathology, and Other Studies: I have personally reviewed pertinent reports.

## 2023-08-28 NOTE — ASSESSMENT & PLAN NOTE
pmhx HTN  BP currently stable  Holding home medications in setting of transaminitis;   Hold hydrochlorothiazide 12.5 mg tab, lisinopril 10 mg tab  Monitor BP for 24 hrs

## 2023-08-28 NOTE — ASSESSMENT & PLAN NOTE
BMI 32.57  A/w diabetes  Recent weight loss efforts -previously tried metformin and Ozempic   Now taking liraglutide   Plan:  Nutritional counseling  Currently NPO; low CHO diet when diet resumes

## 2023-08-28 NOTE — ED PROVIDER NOTES
History  Chief Complaint   Patient presents with   • Medical Problem     Was seen here yesterday for gall bladder R/o. Today around 0930 she starting shaking really bad, started having intermittent shooting pain from her chest to right shoulder. Stomach pain is improved, but when pushing it is tender and has worsening pain. Feels like she is sweating a lot too. This is a 58-year-old female who presents via emergency medical services for evaluation of a multitude of symptoms. Upon review of her chart this is now her fourth emergency department visit in the last 17 days. Her initial visits were for abdominal pain chest pain as well as abdominal pain respectively. Initially had CT scan of the abdomen that was negative, chest pain work-up was negative on second visit as well. She did follow-up with GI in the meantime they were working her up for ulcer disease versus reflux they had recommended upper endoscopy as well as an ultrasound. Before she can get her ultrasound she represented to the emergency department for the third time, she had an ultrasound completed which noted cholelithiasis without any other suspicious findings. On her last visit she had similar complaints she actually had a PE chest and abdomen completed without any novel findings. This was just yesterday. She states last night she began with what she describes as a generalized uncontrolled tremor. She states that initially started in her abdomen but now involves the entire body. It appears to be episodic on physical examination. When palpating the abdomen she states the tremor becomes worse. She has upcoming visits for the upper scope as well as outpatient general surgery evaluation. Along with the above listed complaints she still complains of the abdominal pain as well as today lightheadedness as well as dizziness neck pain.     She is mildly tachycardic upon her initial evaluation per EMS she was initially sinus tach around 140 bpm however at bedside here she is around 110 bpm.  She also complains of episodic chest pain today that she describes as sharp. Prior to Admission Medications   Prescriptions Last Dose Informant Patient Reported? Taking? ALPRAZolam (XANAX) 1 mg tablet   Yes No   Sig: Take 1 mg by mouth as needed   Alcohol Swabs (Pharmacist Choice Alcohol) PADS   No No   Sig: TEST BLOOD 3 TIMES DAILY   Blood Glucose Monitoring Suppl (OneTouch Verio Reflect) w/Device KIT   No No   Sig: Check blood sugars three times daily. Please substitute with appropriate alternative as covered by patient's insurance. Dx: E11.65   DULoxetine (CYMBALTA) 60 mg delayed release capsule   Yes No   Sig: Take 60 mg by mouth daily   Fluticasone-Salmeterol (Advair) 250-50 mcg/dose inhaler   No No   Sig: INHALE 1 PUFF 2 (TWO) TIMES A DAY RINSE MOUTH AFTER USE. Lancet Devices (Easy Mini Eject Lancing Device) MISC   No No   Sig: Use as directed to check blood sugar 3x daily. OneTouch Verio test strip   No No   Sig: TEST BLOOD 3 TIMES DAILY   Pharmacist Choice Lancets MISC   No No   Sig: TEST BLOOD 3 TIMES DAILY   Saxenda injection   No No   Sig: INJECT 0.1 ML (0.6 MG TOTAL) UNDER THE SKIN DAILY FOR 7 DAYS, THEN 0.2 ML (1.2 MG TOTAL) DAILY FOR 7 DAYS, THEN 0.3 ML (1.8 MG TOTAL) DAILY FOR 7 DAYS, THEN 0.4 ML (2.4 MG TOTAL) DAILY FOR 7 DAYS, THEN 0.5 ML (3 MG TOTAL) DAILY FOR 7 DAYS.    albuterol (2.5 mg/3 mL) 0.083 % nebulizer solution   No No   Sig: Take 3 mL (2.5 mg total) by nebulization every 6 (six) hours as needed for wheezing or shortness of breath   albuterol (PROVENTIL HFA,VENTOLIN HFA) 90 mcg/act inhaler   No No   Sig: INHALE 2 PUFFS EVERY 4 (FOUR) HOURS AS NEEDED FOR WHEEZING OR SHORTNESS OF BREATH   aluminum-magnesium hydroxide 200-200 MG/5ML suspension   No No   Sig: Take 10 mL by mouth every 6 (six) hours as needed for heartburn   atoMOXetine (STRATTERA) 60 mg capsule   Yes No   Sig: Take 60 mg by mouth in the morning cetirizine (ZyrTEC) 10 mg tablet   No No   Sig: TAKE 1 TABLET (10 MG TOTAL) BY MOUTH DAILY   ergocalciferol (VITAMIN D2) 50,000 units   No No   Sig: TAKE 1 CAPSULE (50,000 UNITS TOTAL) BY MOUTH ONCE A WEEK   famotidine (PEPCID) 20 mg tablet   No No   Sig: Take 1 tablet (20 mg total) by mouth 2 (two) times a day   fluticasone (FLONASE) 50 mcg/act nasal spray   No No   Si spray into each nostril daily   hydrochlorothiazide (HYDRODIURIL) 12.5 mg tablet   No No   Sig: TAKE 1 TABLET (12.5 MG TOTAL) BY MOUTH DAILY   lisinopril (ZESTRIL) 10 mg tablet   No No   Sig: TAKE 1 TABLET (10 MG TOTAL) BY MOUTH 2 (TWO) TIMES A DAY   meclizine (ANTIVERT) 25 mg tablet   No No   Sig: TAKE 1 TABLET (25 MG TOTAL) BY MOUTH 3 (THREE) TIMES A DAY AS NEEDED FOR DIZZINESS   metFORMIN (GLUCOPHAGE-XR) 500 mg 24 hr tablet   No No   Sig: TAKE 1 TABLET (500 MG TOTAL) BY MOUTH DAILY WITH DINNER   mupirocin (BACTROBAN) 2 % ointment   No No   Sig: APPLY TOPICALLY 3 (THREE) TIMES A DAY   Patient not taking: Reported on 2023   ondansetron (ZOFRAN) 4 mg tablet   No No   Sig: TAKE 1 TABLET (4 MG TOTAL) BY MOUTH EVERY 8 (EIGHT) HOURS AS NEEDED FOR NAUSEA   pantoprazole (PROTONIX) 40 mg tablet   No No   Sig: Take 1 tablet (40 mg total) by mouth daily for 14 days   prazosin (MINIPRESS) 2 mg capsule   Yes No   Sig: Take 2 mg by mouth daily   promethazine (PHENERGAN) 25 mg tablet   No No   Sig: Take 1 tablet (25 mg total) by mouth every 6 (six) hours as needed for nausea or vomiting   sucralfate (CARAFATE) 1 g tablet   No No   Sig: Take 1 tablet (1 g total) by mouth 4 (four) times a day for 14 days   traZODone (DESYREL) 50 mg tablet   Yes No   Sig: Take 50 mg by mouth daily at bedtime      Facility-Administered Medications: None       Past Medical History:   Diagnosis Date   • ADHD (attention deficit hyperactivity disorder)    • Anxiety    • Asthma    • Cat-scratch disease     last assessed 10/22/15   • Depression    • Hypertension     last assessed 11/11/14   • Sciatica    • Sleep disorder        Past Surgical History:   Procedure Laterality Date   • BREAST BIOPSY Left     2016   • DENTAL SURGERY     • FL BX/EXC LYMPH NODE OPEN DEEP AXILLARY NODE Left 1/15/2019    Procedure: LEFT AXILLARY LYMPH NODE BIOPSY;  Surgeon: Dina España MD;  Location: [facility];  Service: General   • FL LAMNOTMY INCL W/DCMPRSN NRV ROOT 1 INTRSPC LUMBR Right 4/14/2021    Procedure: L5-S1 minimally invasive microdiskectomy and  epidural steroid injection;  Surgeon: Carlos Pathak MD;  Location: [facility];  Service: Neurosurgery   • TUBAL LIGATION         Family History   Problem Relation Age of Onset   • Anxiety disorder Mother    • Bipolar disorder Mother    • Depression Mother    • Tremor Mother    • Anxiety disorder Father    • Bipolar disorder Father    • Depression Father    • Schizophrenia Father    • Parkinsonism Maternal Grandmother    • Stomach cancer Paternal Grandfather      I have reviewed and agree with the history as documented. E-Cigarette/Vaping   • E-Cigarette Use Never User      E-Cigarette/Vaping Substances   • Nicotine No    • THC No    • CBD No    • Flavoring No    • Other No    • Unknown No      Social History     Tobacco Use   • Smoking status: Never   • Smokeless tobacco: Never   Vaping Use   • Vaping Use: Never used   Substance Use Topics   • Alcohol use: No   • Drug use: No       Review of Systems   Constitutional: Negative for chills and fever. HENT: Negative for ear pain and sore throat. Eyes: Negative for pain and visual disturbance. Respiratory: Negative for cough and shortness of breath. Cardiovascular: Positive for chest pain. Negative for palpitations. Gastrointestinal: Positive for abdominal pain. Negative for vomiting. Genitourinary: Negative for dysuria and hematuria. Musculoskeletal: Positive for neck pain. Negative for arthralgias and back pain. Skin: Negative for color change and rash.    Neurological: Positive for tremors and headaches. Negative for seizures and syncope. All other systems reviewed and are negative. Physical Exam  Physical Exam  Vitals reviewed. Constitutional:       General: She is not in acute distress. Appearance: She is well-developed. She is not ill-appearing, toxic-appearing or diaphoretic. HENT:      Head: Normocephalic and atraumatic. Right Ear: External ear normal. No swelling. There is no impacted cerumen. Tympanic membrane is not bulging. Left Ear: External ear normal. No swelling. There is no impacted cerumen. Tympanic membrane is not bulging. Nose: Nose normal.      Mouth/Throat:      Pharynx: No oropharyngeal exudate. Eyes:      General: Lids are normal.      Conjunctiva/sclera: Conjunctivae normal.      Pupils: Pupils are equal, round, and reactive to light. Neck:      Thyroid: No thyromegaly. Vascular: No JVD. Trachea: No tracheal deviation. Cardiovascular:      Rate and Rhythm: Normal rate and regular rhythm. Pulses: Normal pulses. Heart sounds: Normal heart sounds. No murmur heard. No friction rub. No gallop. Pulmonary:      Effort: Pulmonary effort is normal. No respiratory distress. Breath sounds: Normal breath sounds. No stridor. No wheezing or rales. Chest:      Chest wall: No tenderness. Abdominal:      General: Bowel sounds are normal. There is no distension. Palpations: Abdomen is soft. There is no mass. Tenderness: There is abdominal tenderness. There is no guarding or rebound. Negative signs include Dunn's sign. Hernia: No hernia is present. Musculoskeletal:         General: No tenderness. Normal range of motion. Cervical back: Normal range of motion and neck supple. No edema. Normal range of motion. Lymphadenopathy:      Cervical: No cervical adenopathy. Skin:     General: Skin is warm and dry. Capillary Refill: Capillary refill takes less than 2 seconds.       Coloration: Skin is not pale. Findings: No erythema or rash. Neurological:      General: No focal deficit present. Mental Status: She is alert and oriented to person, place, and time. GCS: GCS eye subscore is 4. GCS verbal subscore is 5. GCS motor subscore is 6. Cranial Nerves: No cranial nerve deficit. Sensory: No sensory deficit. Deep Tendon Reflexes: Reflexes are normal and symmetric.       Comments: Intermittent tremor, generalized   Psychiatric:         Speech: Speech normal.         Behavior: Behavior normal.         Vital Signs  ED Triage Vitals [08/28/23 1424]   Temperature Pulse Respirations Blood Pressure SpO2   97.9 °F (36.6 °C) (!) 112 18 108/66 97 %      Temp Source Heart Rate Source Patient Position - Orthostatic VS BP Location FiO2 (%)   Temporal Monitor Lying Left arm --      Pain Score       6           Vitals:    08/28/23 1424 08/28/23 1530 08/28/23 1600   BP: 108/66 132/62 122/69   Pulse: (!) 112 98 91   Patient Position - Orthostatic VS: Lying Sitting Lying         Visual Acuity      ED Medications  Medications   sodium chloride 0.9 % bolus 1,000 mL (0 mL Intravenous Stopped 8/28/23 1704)       Diagnostic Studies  Results Reviewed     Procedure Component Value Units Date/Time    Urinalysis with microscopic [[id number]]     Lab Status: No result Specimen: Urine     Urine culture [[id number]]     Lab Status: No result Specimen: Urine, Clean Catch     COVID19, Influ[facility] A/B, [facility] [[id number]]     Lab Status: No result Specimen: Nares from Nasopharyngeal Swab     TSH [[id number]]  (Normal) Collected: 08/28/23 1515    Lab Status: Final result Specimen: Blood from Arm, Right Updated: 08/28/23 1556     TSH 3RD GENERATON 1.105 uIU/mL     hCG, qualitative pregnancy [[id number]]  (Normal) Collected: 08/28/23 1515    Lab Status: Final result Specimen: Blood from Arm, Right Updated: 08/28/23 1556     Preg, Serum Negative    High Sensitivity Troponin I Random [[id number]]  (Abnormal) Collected: 08/28/23 1515    Lab Status: Final result Specimen: Blood from Arm, Right Updated: 08/28/23 1547     HS TnI random <2 ng/L     Comprehensive metabolic panel [[id number]]  (Abnormal) Collected: 08/28/23 1515    Lab Status: Final result Specimen: Blood from Arm, Right Updated: 08/28/23 1541     Sodium 135 mmol/L      Potassium 3.9 mmol/L      Chloride 100 mmol/L      CO2 28 mmol/L      ANION GAP 7 mmol/L      BUN 12 mg/dL      Creatinine 0.81 mg/dL      Glucose 103 mg/dL      Calcium 9.1 mg/dL      AST 35 U/L      ALT 67 U/L      Alkaline Phosphatase 81 U/L      Total Protein 6.9 g/dL      Albumin 4.1 g/dL      Total Bilirubin 0.45 mg/dL      eGFR 91 ml/min/1.73sq m     Narrative:      [facility] guidelines for Chronic Kidney Disease (CKD):   •  Stage 1 with normal or high GFR (GFR > 90 mL/min/1.73 square meters)  •  Stage 2 Mild CKD (GFR = 60-89 mL/min/1.73 square meters)  •  Stage 3A Moderate CKD (GFR = 45-59 mL/min/1.73 square meters)  •  Stage 3B Moderate CKD (GFR = 30-44 mL/min/1.73 square meters)  •  Stage 4 Severe CKD (GFR = 15-29 mL/min/1.73 square meters)  •  Stage 5 End Stage CKD (GFR <15 mL/min/1.73 square meters)  Note: GFR calculation is accurate only with a steady state creatinine    Lipase [[id number]]  (Normal) Collected: 08/28/23 1515    Lab Status: Final result Specimen: Blood from Arm, Right Updated: 08/28/23 1541     Lipase 12 u/L     Magnesium [[id number]]  (Normal) Collected: 08/28/23 1515    Lab Status: Final result Specimen: Blood from Arm, Right Updated: 08/28/23 1541     Magnesium 2.0 mg/dL     Lactic acid, plasma (w/reflex if result > 2.0) [[id number]]  (Normal) Collected: 08/28/23 1515    Lab Status: Final result Specimen: Blood from Arm, Right Updated: 08/28/23 1538     LACTIC ACID 0.6 mmol/L     Narrative:      Result may be elevated if tourniquet was used during collection.     CBC and differential [[id number]]  (Abnormal) Collected: 08/28/23 4870    Lab Status: Final result Specimen: Blood from Arm, Right Updated: 08/28/23 1524     WBC 6.85 Thousand/uL      RBC 4.31 Million/uL      Hemoglobin 13.0 g/dL      Hematocrit 39.4 %      MCV 91 fL      MCH 30.2 pg      MCHC 33.0 g/dL      RDW 12.8 %      MPV 10.3 fL      Platelets 893 Thousands/uL      nRBC 0 /100 WBCs      Neutrophils Relative 75 %      Immat GRANS % 0 %      Lymphocytes Relative 13 %      Monocytes Relative 8 %      Eosinophils Relative 3 %      Basophils Relative 1 %      Neutrophils Absolute 5.21 Thousands/µL      Immature Grans Absolute 0.01 Thousand/uL      Lymphocytes Absolute 0.89 Thousands/µL      Monocytes Absolute 0.52 Thousand/µL      Eosinophils Absolute 0.17 Thousand/µL      Basophils Absolute 0.05 Thousands/µL                  CT head without contrast   Final Result by Mandi Gamez MD (08/28 1543)      No acute intracranial abnormality. Workstation performed: [id number]         XR chest 1 view portable   ED Interpretation by Shady King PA-C (08/28 4049)   Personally interpreted there is no evidence of pneumothorax or acute consolidative process no effusions      Final Result by Sowmya Woodward MD (08/28 1628)      No acute cardiopulmonary disease.                   Workstation performed: [id number]                    Procedures  ECG 12 Lead Documentation Only    Date/Time: 8/28/2023 3:31 PM    Performed by: Shady King PA-C  Authorized by: Shady King PA-C    Indications / Diagnosis:  Chest pain  ECG reviewed by me, the ED Provider: yes    Patient location:  ED  Previous ECG:     Comparison to cardiac monitor: Yes    Interpretation:     Interpretation: normal    Rate:     ECG rate:  99    ECG rate assessment: normal    Rhythm:     Rhythm: sinus rhythm    Ectopy:     Ectopy: none    QRS:     QRS axis:  Normal    QRS intervals:  Normal  Conduction:     Conduction: normal    ST segments:     ST segments:  Normal  T waves:     T waves: normal               ED Course  ED Course as of 08/28/23 1718   Mon Aug 28, 2023   1519 SpO2: 97 %   1519 Respirations: 18   1519 Pulse(!): 112   1519 Temperature: 97.9 °F (36.6 °C)   1519 Blood Pressure: 108/66  Vital signs reviewed mild tachycardia otherwise within normal limits. 1607 FINDINGS:     PARENCHYMA:  No intracranial mass, mass effect or midline shift. No CT signs of acute infarction. No acute parenchymal hemorrhage.     VENTRICLES AND EXTRA-AXIAL SPACES:  Normal for the patient's age.     VISUALIZED ORBITS: Normal visualized orbits.     PARANASAL SINUSES: Normal visualized paranasal sinuses.     CALVARIUM AND EXTRACRANIAL SOFT TISSUES:  Normal.     IMPRESSION:     No acute intracranial abnormality.         1611 Patient was reevaluated again at 1605 hrs. It is of specific note that when I initially opened the door the patient was not shaking however upon entering the generalized body shaking commenced again this appears consistent with prior history of intentional tremor. I do not believe this requires a fast work-up however she is still experiencing abdominal pain she has known gallstones I will send a message to general medicine as well as general surgery to see what their plan would be regarding the abdominal complaints. 1620 TT sent to slim/gen surg             HEART Risk Score    Flowsheet Row Most Recent Value   Heart Score Risk Calculator    History 0 Filed at: 08/28/2023 1531   ECG 0 Filed at: 08/28/2023 1531   Age 0 Filed at: 08/28/2023 1531   Risk Factors 0 Filed at: 08/28/2023 1531   Troponin 0 Filed at: 08/28/2023 1531   HEART Score 0 Filed at: 08/28/2023 1531                        SBIRT 20yo+    Flowsheet Row Most Recent Value   Initial Alcohol Screen: US AUDIT-C     1. How often do you have a drink containing alcohol? 0 Filed at: 08/28/2023 1426   2. How many drinks containing alcohol do you have on a typical day you are drinking? 0 Filed at: 08/28/2023 1426   3a. Male UNDER 65:  How often do you have five or more drinks on one occasion? 0 Filed at: 08/28/2023 1426   3b. FEMALE Any Age, or MALE 65+: How often do you have 4 or more drinks on one occassion? 0 Filed at: 08/28/2023 1426   Audit-C Score 0 Filed at: 08/28/2023 1426   YOAN: How many times in the past year have you. .. Used an illegal drug or used a prescription medication for non-medical reasons? Never Filed at: 08/28/2023 1426                    Medical Decision Making  Evaluation here is fairly benign CT scan of the head is negative43year-old female for evaluation of chest pain abdominal pain lightheadedness dizziness generalized tremor neck pain headache. Please refer to the HPI I did review her last 3 emergency department visits over the last 17 days. She will be worked up for acute pathology including acute intracranial abnormality. She also be worked up for electrolyte disturbance, pregnancy, abnormal thyroid function. She may require admission for general surgical evaluation due to multiple ED visits in the setting of known cholelithiasis and abdominal pain. The tremors appear to be intentional based upon emergency department staff interactions. I did discuss the case with general surgery as well as internal medicine this is her fourth ER visit for abdominal pain with known gallstones it is reasonable to keep her for evaluation of this, she will be admitted under observation. Amount and/or Complexity of Data Reviewed  Labs: ordered. Radiology: ordered and independent interpretation performed. Risk  Prescription drug management. Decision regarding hospitalization.           Disposition  Final diagnoses:   Abdominal pain   Tremor   Cholelithiasis     Time reflects when diagnosis was documented in both MDM as applicable and the Disposition within this note     Time User Action Codes Description Comment    8/28/2023  5:16 PM Chantell Lombard D Add [R10.9] Abdominal pain     8/28/2023  5:18 PM Chantell Lombard D Add [R25.1] Tremor 8/28/2023  5:18 PM Cristino Brown Add [K80.20] Cholelithiasis       ED Disposition     ED Disposition   Admit    Condition   Stable    Date/Time   Mon Aug 28, 2023  5:16 PM    Comment   Case was discussed with Dr. Javier Hendrickson and the patient's admission status was agreed to be Admission Status: observation status to the service of Dr. Marisol Wells . Follow-up Information    None         Patient's Medications   Discharge Prescriptions    No medications on file       No discharge procedures on file.     PDMP Review       Value Time User    PDMP Reviewed  Yes 4/5/2023  1:47 PM Anthony Cruz PA-C          ED Provider  Electronically Signed by           Brigida Hutchinson PA-C  08/28/23 3357

## 2023-08-29 ENCOUNTER — APPOINTMENT (OUTPATIENT)
Dept: PERIOP | Facility: HOSPITAL | Age: 38
DRG: 263 | End: 2023-08-29
Payer: COMMERCIAL

## 2023-08-29 ENCOUNTER — ANESTHESIA EVENT (INPATIENT)
Dept: PERIOP | Facility: HOSPITAL | Age: 38
DRG: 263 | End: 2023-08-29
Payer: COMMERCIAL

## 2023-08-29 ENCOUNTER — ANESTHESIA (INPATIENT)
Dept: PERIOP | Facility: HOSPITAL | Age: 38
DRG: 263 | End: 2023-08-29
Payer: COMMERCIAL

## 2023-08-29 LAB
ALBUMIN SERPL BCP-MCNC: 3.6 G/DL (ref 3.5–5)
ALP SERPL-CCNC: 71 U/L (ref 34–104)
ALT SERPL W P-5'-P-CCNC: 47 U/L (ref 7–52)
ANION GAP SERPL CALCULATED.3IONS-SCNC: 7 MMOL/L
AST SERPL W P-5'-P-CCNC: 21 U/L (ref 13–39)
ATRIAL RATE: 99 BPM
BASOPHILS # BLD AUTO: 0.04 THOUSANDS/ÂΜL (ref 0–0.1)
BASOPHILS NFR BLD AUTO: 1 % (ref 0–1)
BILIRUB SERPL-MCNC: 0.49 MG/DL (ref 0.2–1)
BUN SERPL-MCNC: 9 MG/DL (ref 5–25)
CALCIUM SERPL-MCNC: 8.3 MG/DL (ref 8.4–10.2)
CARDIAC TROPONIN I PNL SERPL HS: <2 NG/L (ref 8–18)
CHLORIDE SERPL-SCNC: 108 MMOL/L (ref 96–108)
CK SERPL-CCNC: 83 U/L (ref 26–192)
CO2 SERPL-SCNC: 25 MMOL/L (ref 21–32)
CREAT SERPL-MCNC: 0.74 MG/DL (ref 0.6–1.3)
EOSINOPHIL # BLD AUTO: 0.22 THOUSAND/ÂΜL (ref 0–0.61)
EOSINOPHIL NFR BLD AUTO: 4 % (ref 0–6)
ERYTHROCYTE [DISTWIDTH] IN BLOOD BY AUTOMATED COUNT: 12.8 % (ref 11.6–15.1)
GFR SERPL CREATININE-BSD FRML MDRD: 102 ML/MIN/1.73SQ M
GLUCOSE SERPL-MCNC: 72 MG/DL (ref 65–140)
GLUCOSE SERPL-MCNC: 79 MG/DL (ref 65–140)
GLUCOSE SERPL-MCNC: 79 MG/DL (ref 65–140)
HAV IGM SER QL: NORMAL
HBV CORE IGM SER QL: NORMAL
HBV SURFACE AG SER QL: NORMAL
HCT VFR BLD AUTO: 37.5 % (ref 34.8–46.1)
HCV AB SER QL: NORMAL
HGB BLD-MCNC: 12.3 G/DL (ref 11.5–15.4)
HIV 1+2 AB+HIV1 P24 AG SERPL QL IA: NORMAL
HIV 2 AB SERPL QL IA: NORMAL
HIV1 AB SERPL QL IA: NORMAL
HIV1 P24 AG SERPL QL IA: NORMAL
IMM GRANULOCYTES # BLD AUTO: 0.01 THOUSAND/UL (ref 0–0.2)
IMM GRANULOCYTES NFR BLD AUTO: 0 % (ref 0–2)
INR PPP: 1.14 (ref 0.84–1.19)
LACTATE SERPL-SCNC: 0.5 MMOL/L (ref 0.5–2)
LYMPHOCYTES # BLD AUTO: 1.57 THOUSANDS/ÂΜL (ref 0.6–4.47)
LYMPHOCYTES NFR BLD AUTO: 30 % (ref 14–44)
MAGNESIUM SERPL-MCNC: 2 MG/DL (ref 1.9–2.7)
MCH RBC QN AUTO: 30.7 PG (ref 26.8–34.3)
MCHC RBC AUTO-ENTMCNC: 32.8 G/DL (ref 31.4–37.4)
MCV RBC AUTO: 94 FL (ref 82–98)
MONOCYTES # BLD AUTO: 0.51 THOUSAND/ÂΜL (ref 0.17–1.22)
MONOCYTES NFR BLD AUTO: 10 % (ref 4–12)
NEUTROPHILS # BLD AUTO: 2.89 THOUSANDS/ÂΜL (ref 1.85–7.62)
NEUTS SEG NFR BLD AUTO: 55 % (ref 43–75)
NRBC BLD AUTO-RTO: 0 /100 WBCS
P AXIS: 32 DEGREES
PHOSPHATE SERPL-MCNC: 2.9 MG/DL (ref 2.7–4.5)
PLATELET # BLD AUTO: 232 THOUSANDS/UL (ref 149–390)
PMV BLD AUTO: 10.2 FL (ref 8.9–12.7)
POTASSIUM SERPL-SCNC: 3.5 MMOL/L (ref 3.5–5.3)
PR INTERVAL: 158 MS
PROCALCITONIN SERPL-MCNC: <0.05 NG/ML
PROT SERPL-MCNC: 5.9 G/DL (ref 6.4–8.4)
PROTHROMBIN TIME: 14.5 SECONDS (ref 11.6–14.5)
QRS AXIS: 39 DEGREES
QRSD INTERVAL: 80 MS
QT INTERVAL: 350 MS
QTC INTERVAL: 449 MS
RBC # BLD AUTO: 4.01 MILLION/UL (ref 3.81–5.12)
SODIUM SERPL-SCNC: 140 MMOL/L (ref 135–147)
T WAVE AXIS: -13 DEGREES
VENTRICULAR RATE: 99 BPM
WBC # BLD AUTO: 5.24 THOUSAND/UL (ref 4.31–10.16)

## 2023-08-29 PROCEDURE — 0DB68ZX EXCISION OF STOMACH, VIA NATURAL OR ARTIFICIAL OPENING ENDOSCOPIC, DIAGNOSTIC: ICD-10-PCS | Performed by: STUDENT IN AN ORGANIZED HEALTH CARE EDUCATION/TRAINING PROGRAM

## 2023-08-29 PROCEDURE — 0DB98ZX EXCISION OF DUODENUM, VIA NATURAL OR ARTIFICIAL OPENING ENDOSCOPIC, DIAGNOSTIC: ICD-10-PCS | Performed by: STUDENT IN AN ORGANIZED HEALTH CARE EDUCATION/TRAINING PROGRAM

## 2023-08-29 PROCEDURE — 93010 ELECTROCARDIOGRAM REPORT: CPT | Performed by: INTERNAL MEDICINE

## 2023-08-29 PROCEDURE — C9113 INJ PANTOPRAZOLE SODIUM, VIA: HCPCS | Performed by: INTERNAL MEDICINE

## 2023-08-29 PROCEDURE — 80053 COMPREHEN METABOLIC PANEL: CPT

## 2023-08-29 PROCEDURE — 84145 PROCALCITONIN (PCT): CPT | Performed by: INTERNAL MEDICINE

## 2023-08-29 PROCEDURE — 84484 ASSAY OF TROPONIN QUANT: CPT | Performed by: INTERNAL MEDICINE

## 2023-08-29 PROCEDURE — 87389 HIV-1 AG W/HIV-1&-2 AB AG IA: CPT | Performed by: INTERNAL MEDICINE

## 2023-08-29 PROCEDURE — 82948 REAGENT STRIP/BLOOD GLUCOSE: CPT

## 2023-08-29 PROCEDURE — 85025 COMPLETE CBC W/AUTO DIFF WBC: CPT

## 2023-08-29 PROCEDURE — 99233 SBSQ HOSP IP/OBS HIGH 50: CPT | Performed by: HOSPITALIST

## 2023-08-29 PROCEDURE — 83605 ASSAY OF LACTIC ACID: CPT | Performed by: INTERNAL MEDICINE

## 2023-08-29 PROCEDURE — 84100 ASSAY OF PHOSPHORUS: CPT

## 2023-08-29 PROCEDURE — 83735 ASSAY OF MAGNESIUM: CPT

## 2023-08-29 PROCEDURE — 99232 SBSQ HOSP IP/OBS MODERATE 35: CPT | Performed by: SURGERY

## 2023-08-29 PROCEDURE — 82550 ASSAY OF CK (CPK): CPT | Performed by: INTERNAL MEDICINE

## 2023-08-29 PROCEDURE — 85610 PROTHROMBIN TIME: CPT | Performed by: INTERNAL MEDICINE

## 2023-08-29 PROCEDURE — 80074 ACUTE HEPATITIS PANEL: CPT | Performed by: INTERNAL MEDICINE

## 2023-08-29 PROCEDURE — 88305 TISSUE EXAM BY PATHOLOGIST: CPT | Performed by: PATHOLOGY

## 2023-08-29 RX ORDER — SODIUM CHLORIDE, SODIUM LACTATE, POTASSIUM CHLORIDE, CALCIUM CHLORIDE 600; 310; 30; 20 MG/100ML; MG/100ML; MG/100ML; MG/100ML
INJECTION, SOLUTION INTRAVENOUS CONTINUOUS PRN
Status: DISCONTINUED | OUTPATIENT
Start: 2023-08-29 | End: 2023-08-29

## 2023-08-29 RX ORDER — LIDOCAINE HYDROCHLORIDE 20 MG/ML
INJECTION, SOLUTION EPIDURAL; INFILTRATION; INTRACAUDAL; PERINEURAL AS NEEDED
Status: DISCONTINUED | OUTPATIENT
Start: 2023-08-29 | End: 2023-08-29

## 2023-08-29 RX ORDER — PROPOFOL 10 MG/ML
INJECTION, EMULSION INTRAVENOUS AS NEEDED
Status: DISCONTINUED | OUTPATIENT
Start: 2023-08-29 | End: 2023-08-29

## 2023-08-29 RX ORDER — ATOMOXETINE 18 MG/1
36 CAPSULE ORAL DAILY
Status: DISCONTINUED | OUTPATIENT
Start: 2023-08-29 | End: 2023-08-31 | Stop reason: HOSPADM

## 2023-08-29 RX ORDER — ALPRAZOLAM 0.5 MG/1
0.5 TABLET ORAL 2 TIMES DAILY PRN
Status: DISCONTINUED | OUTPATIENT
Start: 2023-08-29 | End: 2023-08-31 | Stop reason: HOSPADM

## 2023-08-29 RX ORDER — ATOMOXETINE 25 MG/1
25 CAPSULE ORAL DAILY
Status: DISCONTINUED | OUTPATIENT
Start: 2023-08-29 | End: 2023-08-31 | Stop reason: HOSPADM

## 2023-08-29 RX ADMIN — DULOXETINE HYDROCHLORIDE 60 MG: 30 CAPSULE, DELAYED RELEASE ORAL at 09:40

## 2023-08-29 RX ADMIN — LIDOCAINE HYDROCHLORIDE 100 MG: 20 INJECTION, SOLUTION EPIDURAL; INFILTRATION; INTRACAUDAL; PERINEURAL at 15:30

## 2023-08-29 RX ADMIN — TRAZODONE HYDROCHLORIDE 50 MG: 50 TABLET ORAL at 22:11

## 2023-08-29 RX ADMIN — IBUPROFEN 200 MG: 200 TABLET, FILM COATED ORAL at 09:46

## 2023-08-29 RX ADMIN — PROPOFOL 200 MG: 10 INJECTION, EMULSION INTRAVENOUS at 15:30

## 2023-08-29 RX ADMIN — ATOMOXETINE 25 MG: 25 CAPSULE ORAL at 09:41

## 2023-08-29 RX ADMIN — PRAZOSIN HYDROCHLORIDE 2 MG: 1 CAPSULE ORAL at 22:11

## 2023-08-29 RX ADMIN — ENOXAPARIN SODIUM 40 MG: 40 INJECTION SUBCUTANEOUS at 19:20

## 2023-08-29 RX ADMIN — SODIUM CHLORIDE 125 ML/HR: 0.9 INJECTION, SOLUTION INTRAVENOUS at 07:07

## 2023-08-29 RX ADMIN — PROPOFOL 50 MG: 10 INJECTION, EMULSION INTRAVENOUS at 15:31

## 2023-08-29 RX ADMIN — SODIUM CHLORIDE, SODIUM LACTATE, POTASSIUM CHLORIDE, AND CALCIUM CHLORIDE: .6; .31; .03; .02 INJECTION, SOLUTION INTRAVENOUS at 15:27

## 2023-08-29 RX ADMIN — ATOMOXETINE 36 MG: 18 CAPSULE ORAL at 09:41

## 2023-08-29 RX ADMIN — PROMETHAZINE HYDROCHLORIDE 25 MG: 25 INJECTION INTRAMUSCULAR; INTRAVENOUS at 16:50

## 2023-08-29 RX ADMIN — FLUTICASONE FUROATE AND VILANTEROL TRIFENATATE 1 PUFF: 100; 25 POWDER RESPIRATORY (INHALATION) at 09:41

## 2023-08-29 RX ADMIN — PROMETHAZINE HYDROCHLORIDE 25 MG: 25 INJECTION INTRAMUSCULAR; INTRAVENOUS at 23:08

## 2023-08-29 RX ADMIN — IBUPROFEN 200 MG: 200 TABLET, FILM COATED ORAL at 19:20

## 2023-08-29 RX ADMIN — LORATADINE 10 MG: 10 TABLET ORAL at 09:40

## 2023-08-29 RX ADMIN — KETOROLAC TROMETHAMINE 15 MG: 30 INJECTION INTRAMUSCULAR; INTRAVENOUS at 00:37

## 2023-08-29 RX ADMIN — PANTOPRAZOLE SODIUM 40 MG: 40 INJECTION, POWDER, FOR SOLUTION INTRAVENOUS at 09:40

## 2023-08-29 RX ADMIN — PROPOFOL 50 MG: 10 INJECTION, EMULSION INTRAVENOUS at 15:33

## 2023-08-29 RX ADMIN — PROPOFOL 20 MG: 10 INJECTION, EMULSION INTRAVENOUS at 15:35

## 2023-08-29 RX ADMIN — PROMETHAZINE HYDROCHLORIDE 25 MG: 25 INJECTION INTRAMUSCULAR; INTRAVENOUS at 00:38

## 2023-08-29 RX ADMIN — ALPRAZOLAM 0.5 MG: 0.5 TABLET ORAL at 23:08

## 2023-08-29 RX ADMIN — SODIUM CHLORIDE 125 ML/HR: 0.9 INJECTION, SOLUTION INTRAVENOUS at 16:58

## 2023-08-29 RX ADMIN — ALPRAZOLAM 0.5 MG: 0.5 TABLET ORAL at 01:47

## 2023-08-29 NOTE — ANESTHESIA POSTPROCEDURE EVALUATION
Post-Op Assessment Note    CV Status:  Stable    Pain management: satisfactory to patient     Mental Status:  Awake and sleepy   Hydration Status:  Euvolemic   PONV Controlled:  Controlled   Airway Patency:  Patent      Post Op Vitals Reviewed: Yes      Staff: CRNA         No notable events documented.     BP   111/68   Temp      Pulse  68   Resp   16   SpO2   99

## 2023-08-29 NOTE — ASSESSMENT & PLAN NOTE
Suspected initially due to pain  She takes Xanax at home, which has been restarted -- withdrawal?  Tremors improved

## 2023-08-29 NOTE — CONSULTS
Consultation - [phone]  Nishi Hunt 43 y.o. female MRN: [id number]  Unit/Bed#: 549-55 Encounter: [id number]    Inpatient consult to gastroenterology  Consult performed by: Lizzy Franklin PA-C  Consult ordered by: Ramírez Castillo MD        Reason for Consult / Principal Problem:     " Abdominal pain, cholelithiasis, epigastric pain"    ASSESSMENT AND PLAN:      42 y/o F w/ pmhx sig for asthma, anxiety, ADHD, HTN, depression. Pt was recently evaluated in the OP setting on 08/21/23 for 2 weeks of epigastric pain. Cholelithiasis noted on RUQ US. Symptoms persisted and she developed diffuse tremors at home, thus she presented to ER on 08/27/23. ER evaluation included CTA C/A/P which was negative for any acute process, though commented on mild splenomegaly. Serologies at time of admission on 8/27/2023 demonstrated a modest leukocytosis with a WBC count of 0.92, platelets 353, K 3.4, , ALT 75, ALP 85, albumin 4.5, T. bili 0.59. Pt was started on pantoprazole and pain medications. LFTs improved as of 08/29/23 with AST 21, ALT 47, ALP 71, t bili 0.49. General Surgery evaluated pt for symptomatic cholelithiasis. Epigastric pain  Cholelithiasis     · Patient with waxing and waning upper abdominal pain which at times is worsened by oral intake or occurring in the evenings. · Differential diagnosis includes PUD, gastritis, H. pylori infection, symptomatic cholelithiasis, other etiol. · Continue PPI daily now. · Continue with IV fluid hydration, electrolyte repletion, antiemetics and analgesics as needed. · Maintain NPO status. · We will proceed with EGD to evaluate for intra-luminal pathology now. · Continue to trend LFTs now, we will follow up on results of acute hepatitis panel. · If negative, agree with additional evaluation of cholelithiasis by General Surgery. We will continue to follow closely at this time. ______________________________________________________________________    HPI: Patient is a 43 y.o. female w/ pmhx sig for asthma, anxiety, ADHD, HTN, depression. Patient was recently evaluated in the outpatient setting by GI in 8/21/2023. At that time she was complaining of 2 weeks of epigastric pain. She was prescribed pantoprazole and was recommended to have EGD and abdominal ultrasound. Ultrasound demonstrated cholelithiasis. Unfortunately her symptoms continued and she developed an unusual full body tremor which prompted her to seek medical attention in the ER on 08/27/23. She had a CTA C/A/P which was negative for any acute process, though commented on mild splenomegaly. Serologies at time of admission on 8/27/2023 demonstrated a modest leukocytosis with a WBC count of 0.92, platelets 446, K 3.4, , ALT 75, ALP 85, albumin 4.5, T. bili 0.59. Patient shares that the discomfort is located in the epigastric region and radiates around to her back. Waxes and wanes in severity and seems to be worsened following oral intake and at times in the evening. Notes spicy food tends to make her symptoms worse. She also develops a sensation of tightness in her chest in relation to this symptom. She endorses nausea though no emesis. She does endorse more chronic complaints of dysphagia to solids. She denies NSAID use, regular EtOH use, or cannabis use. Pertaining to her bowels, she is having a bowel movement every other day or so. Denies any BRBPR or melena.     Review of Systems   Otherwise Per HPI    Historical Information   Past Medical History:   Diagnosis Date   • ADHD (attention deficit hyperactivity disorder)    • Anxiety    • Asthma    • Cat-scratch disease     last assessed 10/22/15   • Depression    • Hypertension     last assessed 11/11/14   • Sciatica    • Sleep disorder      Past Surgical History:   Procedure Laterality Date   • BREAST BIOPSY Left     2016   • DENTAL SURGERY     • LA BX/EXC LYMPH NODE OPEN DEEP AXILLARY NODE Left 1/15/2019    Procedure: LEFT AXILLARY LYMPH NODE BIOPSY;  Surgeon: Jadiel Nolasco MD;  Location: [facility];  Service: General   • LA LAMNOTMY INCL W/DCMPRSN NRV ROOT 1 INTRSPC LUMBR Right 4/14/2021    Procedure: L5-S1 minimally invasive microdiskectomy and  epidural steroid injection;  Surgeon: Ayanna Farrell MD;  Location: [facility];  Service: Neurosurgery   • TUBAL LIGATION       Social History   Social History     Substance and Sexual Activity   Alcohol Use Never     Social History     Substance and Sexual Activity   Drug Use No     Social History     Tobacco Use   Smoking Status Never   Smokeless Tobacco Never     Family History   Problem Relation Age of Onset   • Anxiety disorder Mother    • Bipolar disorder Mother    • Depression Mother    • Tremor Mother    • Anxiety disorder Father    • Bipolar disorder Father    • Depression Father    • Schizophrenia Father    • Parkinsonism Maternal Grandmother    • Stomach cancer Paternal Grandfather        Meds/Allergies     Medications Prior to Admission   Medication   • albuterol (2.5 mg/3 mL) 0.083 % nebulizer solution   • albuterol (PROVENTIL HFA,VENTOLIN HFA) 90 mcg/act inhaler   • Alcohol Swabs (Pharmacist Choice Alcohol) PADS   • ALPRAZolam (XANAX) 1 mg tablet   • atoMOXetine (STRATTERA) 60 mg capsule   • Blood Glucose Monitoring Suppl (OneTouch Verio Reflect) w/Device KIT   • cetirizine (ZyrTEC) 10 mg tablet   • DULoxetine (CYMBALTA) 60 mg delayed release capsule   • ergocalciferol (VITAMIN D2) 50,000 units   • famotidine (PEPCID) 20 mg tablet   • fluticasone (FLONASE) 50 mcg/act nasal spray   • Fluticasone-Salmeterol (Advair) 250-50 mcg/dose inhaler   • hydrochlorothiazide (HYDRODIURIL) 12.5 mg tablet   • Lancet Devices (Easy Mini Eject Lancing Device) MISC   • lisinopril (ZESTRIL) 10 mg tablet   • meclizine (ANTIVERT) 25 mg tablet   • metFORMIN (GLUCOPHAGE-XR) 500 mg 24 hr tablet   • ondansetron (ZOFRAN) 4 mg tablet   • OneTouch Verio test strip   • Pharmacist Choice Lancets MISC   • prazosin (MINIPRESS) 2 mg capsule   • traZODone (DESYREL) 50 mg tablet   • aluminum-magnesium hydroxide 200-200 MG/5ML suspension   • mupirocin (BACTROBAN) 2 % ointment   • pantoprazole (PROTONIX) 40 mg tablet   • promethazine (PHENERGAN) 25 mg tablet   • Saxenda injection   • sucralfate (CARAFATE) 1 g tablet     Current Facility-Administered Medications   Medication Dose Route Frequency   • albuterol (PROVENTIL HFA,VENTOLIN HFA) inhaler 2 puff  2 puff Inhalation Q4H PRN   • albuterol inhalation solution 2.5 mg  2.5 mg Nebulization Q6H PRN   • ALPRAZolam (XANAX) tablet 0.5 mg  0.5 mg Oral BID PRN   • atoMOXetine (STRATTERA) capsule 36 mg  36 mg Oral Daily    And   • atoMOXetine (STRATTERA) capsule 25 mg  25 mg Oral Daily   • DULoxetine (CYMBALTA) delayed release capsule 60 mg  60 mg Oral Daily   • enoxaparin (LOVENOX) subcutaneous injection 40 mg  40 mg Subcutaneous Q24H   • Fluticasone Furoate-Vilanterol 100-25 mcg/actuation 1 puff  1 puff Inhalation Daily   • ibuprofen (MOTRIN) tablet 200 mg  200 mg Oral Q6H PRN   • ketorolac (TORADOL) injection 15 mg  15 mg Intravenous Q6H PRN   • loratadine (CLARITIN) tablet 10 mg  10 mg Oral Daily   • pantoprazole (PROTONIX) injection 40 mg  40 mg Intravenous Q24H MICKI   • prazosin (MINIPRESS) capsule 2 mg  2 mg Oral HS   • promethazine (PHENERGAN) injection 25 mg  25 mg Intravenous Q6H PRN   • sodium chloride 0.9 % infusion  125 mL/hr Intravenous Continuous   • traZODone (DESYREL) tablet 50 mg  50 mg Oral HS       Allergies   Allergen Reactions   • Bactrim [Sulfamethoxazole-Trimethoprim] Shortness Of Breath     Near syncope   • Codeine Hives     Tolerated Percocet, Vicodin, etc.   • Magnesium Sulfate Tachycardia     Objective     Blood pressure 121/78, pulse 56, temperature 97.8 °F (36.6 °C), resp.  rate 16, height 5' 3" (1.6 m), weight 83.4 kg (183 lb 13.8 oz), last menstrual period 08/28/2023, SpO2 95 %, not currently breastfeeding. Body mass index is 32.57 kg/m². Intake/Output Summary (Last 24 hours) at 8/29/2023 1009  Last data filed at 8/29/2023 0707  Gross per 24 hour   Intake 2443.75 ml   Output 400 ml   Net 2043.75 ml     Physical Exam  Vitals and nursing note reviewed. Constitutional:       Appearance: Normal appearance. HENT:      Head: Normocephalic and atraumatic. Eyes:      General: No scleral icterus. Conjunctiva/sclera: Conjunctivae normal.   Cardiovascular:      Rate and Rhythm: Normal rate. Pulmonary:      Effort: Pulmonary effort is normal. No respiratory distress. Abdominal:      General: Bowel sounds are normal. There is no distension. Palpations: Abdomen is soft. Tenderness: There is abdominal tenderness. There is no guarding or rebound. Skin:     General: Skin is warm and dry. Neurological:      General: No focal deficit present. Mental Status: She is alert and oriented to person, place, and time.    Psychiatric:         Mood and Affect: Mood normal.         Behavior: Behavior normal.        Lab Results:   Admission on 08/28/2023   Component Date Value   • Ventricular Rate 08/28/2023 99    • Atrial Rate 08/28/2023 99    • GA Interval 08/28/2023 158    • QRSD Interval 08/28/2023 80    • QT Interval 08/28/2023 350    • QTC Interval 08/28/2023 449    • P Axis 08/28/2023 32    • QRS Axis 08/28/2023 39    • T Wave Axis 08/28/2023 -13    • WBC 08/28/2023 6.85    • RBC 08/28/2023 4.31    • Hemoglobin 08/28/2023 13.0    • Hematocrit 08/28/2023 39.4    • MCV 08/28/2023 91    • MCH 08/28/2023 30.2    • MCHC 08/28/2023 33.0    • RDW 08/28/2023 12.8    • MPV 08/28/2023 10.3    • Platelets 45/67/4180 249    • nRBC 08/28/2023 0    • Neutrophils Relative 08/28/2023 75    • Immat GRANS % 08/28/2023 0    • Lymphocytes Relative 08/28/2023 13 (L)    • Monocytes Relative 08/28/2023 8    • Eosinophils Relative 08/28/2023 3    • Basophils Relative 08/28/2023 1    • Neutrophils Absolute 08/28/2023 5.21    • Immature Grans Absolute 08/28/2023 0.01    • Lymphocytes Absolute 08/28/2023 0.89    • Monocytes Absolute 08/28/2023 0.52    • Eosinophils Absolute 08/28/2023 0.17    • Basophils Absolute 08/28/2023 0.05    • Sodium 08/28/2023 135    • Potassium 08/28/2023 3.9    • Chloride 08/28/2023 100    • CO2 08/28/2023 28    • ANION GAP 08/28/2023 7    • BUN 08/28/2023 12    • Creatinine 08/28/2023 0.81    • Glucose 08/28/2023 103    • Calcium 08/28/2023 9.1    • AST 08/28/2023 35    • ALT 08/28/2023 67 (H)    • Alkaline Phosphatase 08/28/2023 81    • Total Protein 08/28/2023 6.9    • Albumin 08/28/2023 4.1    • Total Bilirubin 08/28/2023 0.45    • eGFR 08/28/2023 91    • Lipase 08/28/2023 12    • LACTIC ACID 08/28/2023 0.6    • Magnesium 08/28/2023 2.0    • TSH 3RD GENERATON 08/28/2023 1. 105    • Preg, Serum 08/28/2023 Negative    • HS TnI random 08/28/2023 <2 (L)    • SARS-CoV-2 08/28/2023 Negative    • INFLUENZA A PCR 08/28/2023 Negative    • INFLUENZA B PCR 08/28/2023 Negative    • RSV PCR 08/28/2023 Negative    • Color, UA 08/28/2023 Yellow    • Clarity, UA 08/28/2023 Clear    • Specific Gravity, UA 08/28/2023 <=1.005    • pH, UA 08/28/2023 5.5    • Leukocytes, UA 08/28/2023 Negative    • Nitrite, UA 08/28/2023 Negative    • Protein, UA 08/28/2023 Negative    • Glucose, UA 08/28/2023 Negative    • Ketones, UA 08/28/2023 Trace (A)    • Urobilinogen, UA 08/28/2023 0.2    • Bilirubin, UA 08/28/2023 Negative    • Occult Blood, UA 08/28/2023 Moderate (A)    • RBC, UA 08/28/2023 0-1 (A)    • WBC, UA 08/28/2023 0-1 (A)    • Epithelial Cells 08/28/2023 Occasional    • Bacteria, UA 08/28/2023 Occasional    • Amph/Meth UR 08/28/2023 Negative    • Barbiturate Ur 08/28/2023 Negative    • Benzodiazepine Urine 08/28/2023 Positive (A)    • Cocaine Urine 08/28/2023 Negative    • Methadone Urine 08/28/2023 Negative    • Opiate Urine 08/28/2023 Negative    • PCP Ur 08/28/2023 Negative    • THC Urine 08/28/2023 Negative    • Oxycodone Urine 08/28/2023 Negative    • Sodium 08/29/2023 140    • Potassium 08/29/2023 3.5    • Chloride 08/29/2023 108    • CO2 08/29/2023 25    • ANION GAP 08/29/2023 7    • BUN 08/29/2023 9    • Creatinine 08/29/2023 0.74    • Glucose 08/29/2023 79    • Calcium 08/29/2023 8.3 (L)    • AST 08/29/2023 21    • ALT 08/29/2023 47    • Alkaline Phosphatase 08/29/2023 71    • Total Protein 08/29/2023 5.9 (L)    • Albumin 08/29/2023 3.6    • Total Bilirubin 08/29/2023 0.49    • eGFR 08/29/2023 102    • Magnesium 08/29/2023 2.0    • Phosphorus 08/29/2023 2.9    • WBC 08/29/2023 5.24    • RBC 08/29/2023 4.01    • Hemoglobin 08/29/2023 12.3    • Hematocrit 08/29/2023 37.5    • MCV 08/29/2023 94    • MCH 08/29/2023 30.7    • MCHC 08/29/2023 32.8    • RDW 08/29/2023 12.8    • MPV 08/29/2023 10.2    • Platelets 70/16/4501 232    • nRBC 08/29/2023 0    • Neutrophils Relative 08/29/2023 55    • Immat GRANS % 08/29/2023 0    • Lymphocytes Relative 08/29/2023 30    • Monocytes Relative 08/29/2023 10    • Eosinophils Relative 08/29/2023 4    • Basophils Relative 08/29/2023 1    • Neutrophils Absolute 08/29/2023 2.89    • Immature Grans Absolute 08/29/2023 0.01    • Lymphocytes Absolute 08/29/2023 1.57    • Monocytes Absolute 08/29/2023 0.51    • Eosinophils Absolute 08/29/2023 0.22    • Basophils Absolute 08/29/2023 0.04    • Procalcitonin 08/29/2023 <0.05    • LACTIC ACID 08/29/2023 0.5    • HS TnI random 08/29/2023 <2 (L)    • Total CK 08/29/2023 83    • Protime 08/29/2023 14.5    • INR 08/29/2023 1.14      Imaging Studies: I have personally reviewed pertinent imaging studies. Monica Layton PA-C    **Please note:  Dictation voice to text software may have been used in the creation of this record. Occasional wrong word or “sound alike” substitutions may have occurred due to the inherent limitations of voice recognition software.   Read the chart carefully and recognize, using context, where substitutions have occurred. **

## 2023-08-29 NOTE — ASSESSMENT & PLAN NOTE
pmhx HTN  BP currently stable  Holding home medications in setting of transaminitis;   Hold hydrochlorothiazide 12.5 mg tab, lisinopril 10 mg tab

## 2023-08-29 NOTE — PROGRESS NOTES
[address]  Progress Note  Name: Mary Payan  MRN: [id number]  Unit/Bed#: 988-08 I Date of Admission: 8/28/2023   Date of Service: 8/29/2023 I Hospital Day: 0    Assessment/Plan   * Intractable abdominal pain  Assessment & Plan  Admission: Severe upper abdominal pain x 3 weeks (3 visits to ED in 17 days). RUQ pain, comes and goes, not associated with eating, 10/10, a/w tremor, nausea, decreased appetite. Recent weight loss efforts.  - CT abdomen and pelvis shows - No acute intra-abdominal/pelvic abnormalities   - RUQ ultrasound shows - uncomplicated gallstones. - GI consult, appreciate - plan for EGD today  - pending results, possible OR tomorrow for gallbladder removal by general surgery  - NPO, diet pending EGD results      Transaminitis  Assessment & Plan  resolved    Class 2 severe obesity due to excess calories with serious comorbidity and body mass index (BMI) of 35.0 to 35.9 in adult [facility])  Assessment & Plan  BMI 32.57  A/w diabetes  Recent weight loss efforts -previously tried metformin and Ozempic   Now taking liraglutide   Plan:  Nutritional counseling    PTSD (post-traumatic stress disorder)  Assessment & Plan  pmhx PTSD  Continue home medications  Prazosin 2 mg capsule for nightmares  Strattera 60 mg   Xanax PRN    Essential hypertension  Assessment & Plan  pmhx HTN  BP currently stable  Holding home medications in setting of transaminitis;   Hold hydrochlorothiazide 12.5 mg tab, lisinopril 10 mg tab    Intention tremor  Assessment & Plan  Suspected initially due to pain  She takes Xanax at home, which has been restarted -- withdrawal?  Tremors improved    Depression  Assessment & Plan  History of depression  Currently stable  Continue home medications; Duloxetine 60mg daily               VTE Pharmacologic Prophylaxis: VTE Score: 1 Low Risk (Score 0-2) - Encourage Ambulation. Patient Centered Rounds: I performed bedside rounds with nursing staff today.   Discussions with Specialists or Other Care Team Provider: Surgery    Education and Discussions with Family / Patient: Patient declined call to . Total Time Spent on Date of Encounter in care of patient: 35 minutes This time was spent on one or more of the following: performing physical exam; counseling and coordination of care; obtaining or reviewing history; documenting in the medical record; reviewing/ordering tests, medications or procedures; communicating with other healthcare professionals and discussing with patient's family/caregivers. Current Length of Stay: 0 day(s)  Current Patient Status: Inpatient   Certification Statement: The patient will continue to require additional inpatient hospital stay due to abd pain, testing, posible surgery  Discharge Plan: Anticipate discharge in 48 hrs to home. Code Status: Level 1 - Full Code    Subjective:   Patient states pain improved, but not resolved, similar to prior    Objective:     Vitals:   Temp (24hrs), Av °F (36.7 °C), Min:97.7 °F (36.5 °C), Max:98.5 °F (36.9 °C)    Temp:  [97.7 °F (36.5 °C)-98.5 °F (36.9 °C)] 97.8 °F (36.6 °C)  HR:  [] 56  Resp:  [16-20] 16  BP: (108-132)/(62-93) 121/78  SpO2:  [94 %-97 %] 95 %  Body mass index is 32.57 kg/m². Input and Output Summary (last 24 hours): Intake/Output Summary (Last 24 hours) at 2023 1252  Last data filed at 2023 1240  Gross per 24 hour   Intake 2443.75 ml   Output 400 ml   Net 2043.75 ml       Physical Exam:   Physical Exam  Vitals and nursing note reviewed. Constitutional:       General: She is not in acute distress. Appearance: She is well-developed. HENT:      Head: Normocephalic and atraumatic. Eyes:      Conjunctiva/sclera: Conjunctivae normal.   Cardiovascular:      Rate and Rhythm: Normal rate and regular rhythm. Heart sounds: No murmur heard. Pulmonary:      Effort: Pulmonary effort is normal. No respiratory distress.       Breath sounds: Normal breath sounds. Abdominal:      Palpations: Abdomen is soft. Tenderness: There is no abdominal tenderness. Comments: Pain to palpation epigastric and RUQ   Musculoskeletal:         General: No swelling. Cervical back: Neck supple. Skin:     General: Skin is warm and dry. Capillary Refill: Capillary refill takes less than 2 seconds. Neurological:      Mental Status: She is alert.       Comments: Mild tremor intentional   Psychiatric:         Mood and Affect: Mood normal.          Additional Data:     Labs:  Results from last 7 days   Lab Units 08/29/23  0450   WBC Thousand/uL 5.24   HEMOGLOBIN g/dL 12.3   HEMATOCRIT % 37.5   PLATELETS Thousands/uL 232   NEUTROS PCT % 55   LYMPHS PCT % 30   MONOS PCT % 10   EOS PCT % 4     Results from last 7 days   Lab Units 08/29/23  0450   SODIUM mmol/L 140   POTASSIUM mmol/L 3.5   CHLORIDE mmol/L 108   CO2 mmol/L 25   BUN mg/dL 9   CREATININE mg/dL 0.74   ANION GAP mmol/L 7   CALCIUM mg/dL 8.3*   ALBUMIN g/dL 3.6   TOTAL BILIRUBIN mg/dL 0.49   ALK PHOS U/L 71   ALT U/L 47   AST U/L 21   GLUCOSE RANDOM mg/dL 79     Results from last 7 days   Lab Units 08/29/23  0450   INR  1.14             Results from last 7 days   Lab Units 08/29/23  0529 08/29/23  0450 08/28/23  1515   LACTIC ACID mmol/L  --  0.5 0.6   PROCALCITONIN ng/ml <0.05  --   --        Lines/Drains:  Invasive Devices     Peripheral Intravenous Line  Duration           Peripheral IV 08/28/23 Right Antecubital <1 day                      Imaging: Reviewed radiology reports from this admission including: chest CT scan, abdominal/pelvic CT and CT head    Recent Cultures (last 7 days):         Last 24 Hours Medication List:   Current Facility-Administered Medications   Medication Dose Route Frequency Provider Last Rate   • albuterol  2 puff Inhalation Q4H PRN Storm Hedrick MD     • albuterol  2.5 mg Nebulization Q6H PRN Storm Hedrick MD     • ALPRAZolam  0.5 mg Oral BID PRN Ashia Hernandes MD • atoMOXetine  36 mg Oral Daily Nusrat Christianson MD      And   • atoMOXetine  25 mg Oral Daily Nusrat Christianson MD     • DULoxetine  60 mg Oral Daily Nusrat Christianson MD     • enoxaparin  40 mg Subcutaneous Q24H Corena Millin Ment[place], DO     • Fluticasone Furoate-Vilanterol  1 puff Inhalation Daily Nusrat Christianson MD     • ibuprofen  200 mg Oral Q6H PRN Nusrat Christianson MD     • ketorolac  15 mg Intravenous Q6H PRN Corena Millin [place], DO     • loratadine  10 mg Oral Daily Nusrat Christianson MD     • pantoprazole  40 mg Intravenous Q24H [address] Corena Millin [place], DO     • prazosin  2 mg Oral HS Nusrat Christianson MD     • promethazine  25 mg Intravenous Q6H PRN Corena Millin Yanira, DO     • sodium chloride  125 mL/hr Intravenous Continuous Corena Millin [place]cle,  mL/hr (08/29/23 8813)   • traZODone  50 mg Oral HS Nusrat Christianson MD          Today, Patient Was Seen By: Irish Chairez DO    **Please Note: This note may have been constructed using a voice recognition system. **

## 2023-08-29 NOTE — ASSESSMENT & PLAN NOTE
Admission: Severe upper abdominal pain x 3 weeks (3 visits to ED in 17 days). RUQ pain, comes and goes, not associated with eating, 10/10, a/w tremor, nausea, decreased appetite. Recent weight loss efforts.  - CT abdomen and pelvis shows - No acute intra-abdominal/pelvic abnormalities   - RUQ ultrasound shows - uncomplicated gallstones.   - GI consult, appreciate - plan for EGD today  - pending results, possible OR tomorrow for gallbladder removal by general surgery  - NPO, diet pending EGD results

## 2023-08-29 NOTE — QUICK NOTE
I urgently saw and examined patient due to restlessness and tremors per RN. Patient was comfortably sitting up in bed watching television. She was awake, alert and appropriately oriented, with intermittent limb tremors. She takes PRN Xanax at home which was resumed( ? early benzodiazepine withdrawal). Patient on Strattera, trazodone and Cymbalta; prudent to exclude serotonin syndrome, CK and INR ordered with a.m. labs. Will sign out to rounding hospitalist in a.m.

## 2023-08-29 NOTE — PROGRESS NOTES
Progress Note - General Surgery   Sonia Crouch 43 y.o. female MRN: [id number]  Unit/Bed#: 661-35 Encounter: [id number]    ASSESSMENT:  39-year-old female with past medical history of anxiety, depression, asthma, hypertension, sciatica status post L5-S1 discectomy, diabetes mellitus and tubal ligation who presents with abdominal pain, generalized tremors. Ultrasound with finding of cholelithiasis without cholecystitis. General surgery consulted due to concern for symptomatic cholelithiasis. Abdominal Pain   -Possibly secondary to biliary colic/cholelithiasis  -AF, VSS  -No leukocytosis, no electrolyte abnormalities  -LFTs mildly elevated on 8/27, but have normalized today. Normal bilirubin.  -Based on diagnostics and physical exam, no concern for acute cholecystitis at this time  -Pain slightly improved today, still TTP to the R UQ/epigastric region  -Undergoing EGD today per GI    Imaging studies  8/22 Right upper quadrant ultrasound: Uncomplicated cholelithiasis, no wall thickening or pericholecystic fluid  8/27 PE study with CT abdomen pelvis with contrast: No PE, no evidence of acute abdominal pelvic process  8/28 chest x-ray: Negative  8/28 CT head without contrast: No acute intracranial abnormality      PLAN:  -NPO  -EGD planned for today, will follow-up results. Based on these results we will determine the risk/benefit for performing laparoscopic cholecystectomy. If recommended and the patient is agreeable we could potentially add the case on for tomorrow.  -Analgesia/antiemetics prn  -Serial abdominal exams  -DVT prophylaxis  -GI following, appreciate consultation  -Medical management per primary team      SUBJECTIVE:  Resting comfortably. Patient states pain is somewhat better today. She has not eaten for a while so she thinks this is related. She had about a 2 out of 10 severity during my evaluation. Denies nausea/vomiting, diarrhea/constipation. She is out of bed ambulating to the restroom. Voiding well. Having bowel movements. OBJECTIVE:   Vitals:  Blood pressure 121/78, pulse 56, temperature 97.8 °F (36.6 °C), resp. rate 16, height 5' 3" (1.6 m), weight 83.4 kg (183 lb 13.8 oz), last menstrual period 08/28/2023, SpO2 95 %, not currently breastfeeding. ,Body mass index is 32.57 kg/m². I/Os:    Intake/Output Summary (Last 24 hours) at 8/29/2023 1123  Last data filed at 8/29/2023 2736  Gross per 24 hour   Intake 2443.75 ml   Output 400 ml   Net 2043.75 ml       Lines/Drains:  Invasive Devices     Peripheral Intravenous Line  Duration           Peripheral IV 08/28/23 Right Antecubital <1 day                Physical Exam  Vitals reviewed. Constitutional:       General: She is not in acute distress. Appearance: She is obese. HENT:      Head: Normocephalic and atraumatic. Cardiovascular:      Rate and Rhythm: Normal rate. Pulmonary:      Effort: Pulmonary effort is normal.   Abdominal:      General: Bowel sounds are normal. There is distension. Palpations: Abdomen is soft. Tenderness: There is abdominal tenderness (Epigastric region and right upper quadrant). There is no guarding or rebound. Comments: On inspection abdominal striae  Negative Dunn sign  No peritoneal signs   Musculoskeletal:         General: No tenderness. Cervical back: Neck supple. Right lower leg: No edema. Left lower leg: No edema. Skin:     General: Skin is warm and dry. Neurological:      Mental Status: She is alert. Comments: No tremors noted         Diagnostics:  I have personally reviewed pertinent lab results. XR chest 1 view portable    Result Date: 8/28/2023  Impression: No acute cardiopulmonary disease. Workstation performed: [id number]     CT head without contrast    Result Date: 8/28/2023  Impression: No acute intracranial abnormality.  Workstation performed: [id number]     Recent Results (from the past 36 hour(s))   ECG 12 lead    Collection Time: 08/28/23 2:33 PM   Result Value Ref Range    Ventricular Rate 99 BPM    Atrial Rate 99 BPM    ND Interval 158 ms    QRSD Interval 80 ms    QT Interval 350 ms    QTC Interval 449 ms    P [place] 32 degrees    QRS Axis 39 degrees    T Wave Axis -13 degrees   CBC and differential    Collection Time: 08/28/23  3:15 PM   Result Value Ref Range    WBC 6.85 4.31 - 10.16 Thousand/uL    RBC 4.31 3.81 - 5.12 Million/uL    Hemoglobin 13.0 11.5 - 15.4 g/dL    Hematocrit 39.4 34.8 - 46.1 %    MCV 91 82 - 98 fL    MCH 30.2 26.8 - 34.3 pg    MCHC 33.0 31.4 - 37.4 g/dL    RDW 12.8 11.6 - 15.1 %    MPV 10.3 8.9 - 12.7 fL    Platelets 558 679 - 252 Thousands/uL    nRBC 0 /100 WBCs    Neutrophils Relative 75 43 - 75 %    Immat GRANS % 0 0 - 2 %    Lymphocytes Relative 13 (L) 14 - 44 %    Monocytes Relative 8 4 - 12 %    Eosinophils Relative 3 0 - 6 %    Basophils Relative 1 0 - 1 %    Neutrophils Absolute 5.21 1.85 - 7.62 Thousands/µL    Immature Grans Absolute 0.01 0.00 - 0.20 Thousand/uL    Lymphocytes Absolute 0.89 0.60 - 4.47 Thousands/µL    Monocytes Absolute 0.52 0.17 - 1.22 Thousand/µL    Eosinophils Absolute 0.17 0.00 - 0.61 Thousand/µL    Basophils Absolute 0.05 0.00 - 0.10 Thousands/µL   Comprehensive metabolic panel    Collection Time: 08/28/23  3:15 PM   Result Value Ref Range    Sodium 135 135 - 147 mmol/L    Potassium 3.9 3.5 - 5.3 mmol/L    Chloride 100 96 - 108 mmol/L    CO2 28 21 - 32 mmol/L    ANION GAP 7 mmol/L    BUN 12 5 - 25 mg/dL    Creatinine 0.81 0.60 - 1.30 mg/dL    Glucose 103 65 - 140 mg/dL    Calcium 9.1 8.4 - 10.2 mg/dL    AST 35 13 - 39 U/L    ALT 67 (H) 7 - 52 U/L    Alkaline Phosphatase 81 34 - 104 U/L    Total Protein 6.9 6.4 - 8.4 g/dL    Albumin 4.1 3.5 - 5.0 g/dL    Total Bilirubin 0.45 0.20 - 1.00 mg/dL    eGFR 91 ml/min/1.73sq m   Lipase    Collection Time: 08/28/23  3:15 PM   Result Value Ref Range    Lipase 12 11 - 82 u/L   Lactic acid, plasma (w/reflex if result > 2.0)    Collection Time: 08/28/23  3:15 PM   Result Value Ref Range    LACTIC ACID 0.6 0.5 - 2.0 mmol/L   Magnesium    Collection Time: 08/28/23  3:15 PM   Result Value Ref Range    Magnesium 2.0 1.9 - 2.7 mg/dL   TSH    Collection Time: 08/28/23  3:15 PM   Result Value Ref Range    TSH 3RD GENERATON 1.105 0.450 - 4.500 uIU/mL   hCG, qualitative pregnancy    Collection Time: 08/28/23  3:15 PM   Result Value Ref Range    Preg, Serum Negative Negative   High Sensitivity Troponin I Random    Collection Time: 08/28/23  3:15 PM   Result Value Ref Range    HS TnI random <2 (L) 8 - 18 ng/L   COVID19, Influenza A/B, RSV PCR, [facility]    Collection Time: 08/28/23  5:41 PM    Specimen: Nasopharyngeal Swab; Nares   Result Value Ref Range    SARS-CoV-2 Negative Negative    INFLUENZA A PCR Negative Negative    INFLUENZA B PCR Negative Negative    RSV PCR Negative Negative   Urinalysis with microscopic    Collection Time: 08/28/23  9:26 PM   Result Value Ref Range    Color, UA Yellow     Clarity, UA Clear     Specific Gravity, UA <=1.005 1.003 - 1.030    pH, UA 5.5 4.5, 5.0, 5.5, 6.0, 6.5, 7.0, 7.5, 8.0    Leukocytes, UA Negative Negative    Nitrite, UA Negative Negative    Protein, UA Negative Negative mg/dl    Glucose, UA Negative Negative mg/dl    Ketones, UA Trace (A) Negative mg/dl    Urobilinogen, UA 0.2 0.2, 1.0 E.U./dl E.U./dl    Bilirubin, UA Negative Negative    Occult Blood, UA Moderate (A) Negative    RBC, UA 0-1 (A) None Seen, 2-4 /hpf    WBC, UA 0-1 (A) None Seen, 2-4, 5-60 /hpf    Epithelial Cells Occasional None Seen, Occasional /hpf    Bacteria, UA Occasional None Seen, Occasional /hpf   Rapid drug screen, urine    Collection Time: 08/28/23  9:26 PM   Result Value Ref Range    Amph/Meth UR Negative Negative    Barbiturate Ur Negative Negative    Benzodiazepine Urine Positive (A) Negative    Cocaine Urine Negative Negative    Methadone Urine Negative Negative    Opiate Urine Negative Negative    PCP Ur Negative Negative    THC Urine Negative Negative Oxycodone Urine Negative Negative   Comprehensive metabolic panel    Collection Time: 08/29/23  4:50 AM   Result Value Ref Range    Sodium 140 135 - 147 mmol/L    Potassium 3.5 3.5 - 5.3 mmol/L    Chloride 108 96 - 108 mmol/L    CO2 25 21 - 32 mmol/L    ANION GAP 7 mmol/L    BUN 9 5 - 25 mg/dL    Creatinine 0.74 0.60 - 1.30 mg/dL    Glucose 79 65 - 140 mg/dL    Calcium 8.3 (L) 8.4 - 10.2 mg/dL    AST 21 13 - 39 U/L    ALT 47 7 - 52 U/L    Alkaline Phosphatase 71 34 - 104 U/L    Total Protein 5.9 (L) 6.4 - 8.4 g/dL    Albumin 3.6 3.5 - 5.0 g/dL    Total Bilirubin 0.49 0.20 - 1.00 mg/dL    eGFR 102 ml/min/1.73sq m   Magnesium    Collection Time: 08/29/23  4:50 AM   Result Value Ref Range    Magnesium 2.0 1.9 - 2.7 mg/dL   Phosphorus    Collection Time: 08/29/23  4:50 AM   Result Value Ref Range    Phosphorus 2.9 2.7 - 4.5 mg/dL   CBC and differential    Collection Time: 08/29/23  4:50 AM   Result Value Ref Range    WBC 5.24 4.31 - 10.16 Thousand/uL    RBC 4.01 3.81 - 5.12 Million/uL    Hemoglobin 12.3 11.5 - 15.4 g/dL    Hematocrit 37.5 34.8 - 46.1 %    MCV 94 82 - 98 fL    MCH 30.7 26.8 - 34.3 pg    MCHC 32.8 31.4 - 37.4 g/dL    RDW 12.8 11.6 - 15.1 %    MPV 10.2 8.9 - 12.7 fL    Platelets 144 424 - 050 Thousands/uL    nRBC 0 /100 WBCs    Neutrophils Relative 55 43 - 75 %    Immat GRANS % 0 0 - 2 %    Lymphocytes Relative 30 14 - 44 %    Monocytes Relative 10 4 - 12 %    Eosinophils Relative 4 0 - 6 %    Basophils Relative 1 0 - 1 %    Neutrophils Absolute 2.89 1.85 - 7.62 Thousands/µL    Immature Grans Absolute 0.01 0.00 - 0.20 Thousand/uL    Lymphocytes Absolute 1.57 0.60 - 4.47 Thousands/µL    Monocytes Absolute 0.51 0.17 - 1.22 Thousand/µL    Eosinophils Absolute 0.22 0.00 - 0.61 Thousand/µL    Basophils Absolute 0.04 0.00 - 0.10 Thousands/µL   Lactic acid, plasma (w/reflex if result > 2.0)    Collection Time: 08/29/23  4:50 AM   Result Value Ref Range    LACTIC ACID 0.5 0.5 - 2.0 mmol/L   CK    Collection Time: 08/29/23  4:50 AM   Result Value Ref Range    Total CK 83 26 - 192 U/L   Protime-INR    Collection Time: 08/29/23  4:50 AM   Result Value Ref Range    Protime 14.5 11.6 - 14.5 seconds    INR 1.14 0.84 - 1.19   Procalcitonin    Collection Time: 08/29/23  5:29 AM   Result Value Ref Range    Procalcitonin <0.05 <=0.25 ng/ml   High Sensitivity Troponin I Random    Collection Time: 08/29/23  5:29 AM   Result Value Ref Range    HS TnI random <2 (L) 8 - 18 ng/L       Current Medications:  Scheduled Meds:  Current Facility-Administered Medications   Medication Dose Route Frequency Provider Last Rate   • albuterol  2 puff Inhalation Q4H PRN Fernanda Knight MD     • albuterol  2.5 mg Nebulization Q6H PRN Fernanda Knight MD     • ALPRAZolam  0.5 mg Oral BID PRN Larry Rutherford MD     • atoMOXetine  36 mg Oral Daily Fernanda Knight MD      And   • atoMOXetine  25 mg Oral Daily Fernanda Knight MD     • DULoxetine  60 mg Oral Daily Fernanda Knight MD     • enoxaparin  40 mg Subcutaneous Q24H Karma Doyle, DO     • Fluticasone Furoate-Vilanterol  1 puff Inhalation Daily Frenanda Knight MD     • ibuprofen  200 mg Oral Q6H PRN Fernanda Knight MD     • ketorolac  15 mg Intravenous Q6H PRN Karma Doyle, DO     • loratadine  10 mg Oral Daily Fernanda Knight MD     • pantoprazole  40 mg Intravenous Q24H [address] Karma Doyle, DO     • prazosin  2 mg Oral HS Fernanda Knight MD     • promethazine  25 mg Intravenous Q6H PRN Karma Doyle, DO     • sodium chloride  125 mL/hr Intravenous Continuous Karmaanuradha Doyle  mL/hr (08/29/23 4271)   • traZODone  50 mg Oral HS Fernanda Knight MD       Continuous Infusions:sodium chloride, 125 mL/hr, Last Rate: 125 mL/hr (08/29/23 0707)      PRN Meds:  •  albuterol  •  albuterol  •  ALPRAZolam  •  ibuprofen  •  ketorolac  •  promethazine      SAMINA Lopez  8/29/2023

## 2023-08-29 NOTE — CASE MANAGEMENT
Case Management Assessment & Discharge Planning Note    Patient name Kaitlynn Potts  Location /015-70 MRN [id number]  : 1985 Date 2023       Current Admission Date: 2023  Current Admission Diagnosis:Intractable abdominal pain   Patient Active Problem List    Diagnosis Date Noted   • Intractable abdominal pain 2023   • Transaminitis 2023   • Epigastric pain 2023   • Heartburn 2023   • Class 2 severe obesity due to excess calories with serious comorbidity and body mass index (BMI) of 35.0 to 35.9 in adult [facility]) 2023   • PTSD (post-traumatic stress disorder) 2023   • Chronic pain syndrome 2022   • Sacroiliitis ([address]) 2022   • History of lumbar surgery 2022   • Lumbar radiculopathy 2021   • Pre-diabetes 2021   • Vitamin D deficiency 2021   • Acute right-sided low back pain with right-sided sciatica 2020   • Anxiety and depression 2020   • Neck pain 2019   • Chronic bilateral low back pain without sciatica 2019   • Asthma 2019   • Granulomatous disease ([address]) 2019   • Lung nodule < 6cm on CT 2019   • Eosinophilia 2019   • Wound dehiscence 2019   • Lymphedema 2019   • Other chest pain 2019   • Abnormal EKG 2019   • Essential hypertension 2019   • Mass of left axilla 2018   • Allergic rhinitis 2018   • Numbness and tingling of both upper extremities while sleeping 2018   • Urine ketones 03/15/2018   • Depression 2016   • Intention tremor 2016      LOS (days): 0  Geometric Mean LOS (GMLOS) (days):   Days to GMLOS:     OBJECTIVE:              Current admission status: Inpatient  Referral Reason:  (discharge planning)    Preferred Pharmacy:   [address] [place] [address]  [address]  Phone: [phone] Fax: [phone]    Primary Care Provider: Jeff Means PA-C    Primary Insurance: Genaro Garcia  Secondary Insurance:     ASSESSMENT:    CM met with patient at the bedside,baseline information  was obtained. CM discussed the role of CM in helping the patient develop a discharge plan and assist the patient in carry out their plan. Patient lives with spouse 2 story home       [facility], [address] [place] [address] Representative - Spouse   Primary Phone: [phone] (Mobile)  Home Phone: [phone]               Advance Directives  Does patient have a [address]?: No  Was patient offered paperwork?: Yes (declined)  Does patient currently have a Health Care decision maker?: Yes, please see Health Care Proxy section (gerda)  Does patient have Advance Directives?: No  Was patient offered paperwork?: Yes (declined)  Primary Contact: Charlene Lewis  spouse         Readmission Root Cause  30 Day Readmission: No    Patient Information  Admitted from[de-identified] Home  Mental Status: Alert  During Assessment patient was accompanied by: Not accompanied during assessment  Assessment information provided by[de-identified] Patient  Primary Caregiver: Spouse  Caregiver's Name[de-identified] Charlene Lewis spouse  Caregiver's Relationship to Patient[de-identified] Significant Other  Caregiver's Telephone Number[de-identified] [phone]  Support Systems: Spouse/significant other  [facility] do you live in?: [address] [facility] entry access options. Select all that apply.: Stairs  Number of steps to enter home.: 2  Do the steps have railings?: Yes  Type of Current Residence: [facility]  Upon entering residence, is there a bedroom on the main floor (no further steps)?: No  A bedroom is located on the following floor levels of residence (select all that apply):: 2nd Floor  Upon entering residence, is there a bathroom on the main floor (no further steps)?: No  Indicate which floors of current residence have a bathroom (select all the apply):: 2nd Floor  Number of steps to 2nd floor from main floor:  One Flight  In the last 12 months, was there a time when you were not able to pay the mortgage or rent on time?: No  In the last 12 months, how many places have you lived?: 1  Homeless/housing insecurity resource given?: N/A  Living Arrangements: Lives w/ Spouse/significant other  Is patient a ?: No    Activities of Daily Living Prior to Admission  Functional Status: Independent  Completes ADLs independently?: Yes  Ambulates independently?: Yes  Does patient use assisted devices?: No  Does patient currently own DME?: No  Does patient have a history of Outpatient Therapy (PT/OT)?: No  Does the patient have a history of Short-Term Rehab?: No  Does patient have a history of HHC?: No  Does patient currently have [facility]?: No         Patient Information Continued  Income Source: Employed  Does patient have prescription coverage?: Yes  Within the past 12 months, you worried that your food would run out before you got the money to buy more.: Never true  Within the past 12 months, the food you bought just didn't last and you didn't have money to get more.: Never true  Food insecurity resource given?: N/A  Does patient receive dialysis treatments?: No  Does patient have a history of Mental Health Diagnosis?: No         Means of Transportation  Means of Transport to Appts[de-identified] Family transport  In the past 12 months, has lack of transportation kept you from medical appointments or from getting medications?: No  In the past 12 months, has lack of transportation kept you from meetings, work, or from getting things needed for daily living?: No  Was application for public transport provided?: N/A        DISCHARGE DETAILS:    Discharge planning discussed with[de-identified] patient         Patient has nebulzier at home thru [facility].     patient to have EGD today  Sx following    CM to follow

## 2023-08-29 NOTE — ASSESSMENT & PLAN NOTE
BMI 32.57  A/w diabetes  Recent weight loss efforts -previously tried metformin and Ozempic   Now taking liraglutide   Plan:  Nutritional counseling Impression: Myogenic ptosis of bilateral eyelids: H02.423. Plan: OD > OS. Consider consult with Oculoplastics for further eval after cataract surgery.

## 2023-08-29 NOTE — PLAN OF CARE
Problem: MOBILITY - ADULT  Goal: Maintain or return to baseline ADL function  Description: INTERVENTIONS:  -  Assess patient's ability to carry out ADLs; assess patient's baseline for ADL function and identify physical deficits which impact ability to perform ADLs (bathing, care of mouth/teeth, toileting, grooming, dressing, etc.)  - Assess/evaluate cause of self-care deficits   - Assess range of motion  - Assess patient's mobility; develop plan if impaired  - Assess patient's need for assistive devices and provide as appropriate  - Encourage maximum independence but intervene and supervise when necessary  - Involve family in performance of ADLs  - Assess for home care needs following discharge   - Consider OT consult to assist with ADL evaluation and planning for discharge  - Provide patient education as appropriate  Outcome: Progressing  Goal: Maintains/Returns to pre admission functional level  Description: INTERVENTIONS:  - Perform BMAT or MOVE assessment daily.   - Set and communicate daily mobility goal to care team and patient/family/caregiver. - Collaborate with rehabilitation services on mobility goals if consulted  - Perform Range of Motion 3 times a day. - Reposition patient every 2 hours.   - Dangle patient 3 times a day  - Stand patient 3 times a day  - Ambulate patient 3 times a day  - Out of bed to chair 3 times a day   - Out of bed for meals 3 times a day  - Out of bed for toileting  - Record patient progress and toleration of activity level   Outcome: Progressing     Problem: PAIN - ADULT  Goal: Verbalizes/displays adequate comfort level or baseline comfort level  Description: Interventions:  - Encourage patient to monitor pain and request assistance  - Assess pain using appropriate pain scale  - Administer analgesics based on type and severity of pain and evaluate response  - Implement non-pharmacological measures as appropriate and evaluate response  - Consider cultural and social influences on pain and pain management  - Notify physician/advanced practitioner if interventions unsuccessful or patient reports new pain  Outcome: Progressing     Problem: INFECTION - ADULT  Goal: Absence or prevention of progression during hospitalization  Description: INTERVENTIONS:  - Assess and monitor for signs and symptoms of infection  - Monitor lab/diagnostic results  - Monitor all insertion sites, i.e. indwelling lines, tubes, and drains  - Monitor endotracheal if appropriate and nasal secretions for changes in amount and color  - [place] appropriate cooling/warming therapies per order  - Administer medications as ordered  - Instruct and encourage patient and family to use good hand hygiene technique  - Identify and instruct in appropriate isolation precautions for identified infection/condition  Outcome: Progressing  Goal: Absence of fever/infection during neutropenic period  Description: INTERVENTIONS:  - Monitor WBC    Outcome: Progressing     Problem: SAFETY ADULT  Goal: Maintain or return to baseline ADL function  Description: INTERVENTIONS:  -  Assess patient's ability to carry out ADLs; assess patient's baseline for ADL function and identify physical deficits which impact ability to perform ADLs (bathing, care of mouth/teeth, toileting, grooming, dressing, etc.)  - Assess/evaluate cause of self-care deficits   - Assess range of motion  - Assess patient's mobility; develop plan if impaired  - Assess patient's need for assistive devices and provide as appropriate  - Encourage maximum independence but intervene and supervise when necessary  - Involve family in performance of ADLs  - Assess for home care needs following discharge   - Consider OT consult to assist with ADL evaluation and planning for discharge  - Provide patient education as appropriate  Outcome: Progressing  Goal: Maintains/Returns to pre admission functional level  Description: INTERVENTIONS:  - Perform BMAT or MOVE assessment daily.   - Set and communicate daily mobility goal to care team and patient/family/caregiver. - Collaborate with rehabilitation services on mobility goals if consulted  - Perform Range of Motion 3 times a day. - Reposition patient every 2 hours.   - Dangle patient 3 times a day  - Stand patient 3 times a day  - Ambulate patient 3 times a day  - Out of bed to chair 3 times a day   - Out of bed for meals 3 times a day  - Out of bed for toileting  - Record patient progress and toleration of activity level   Outcome: Progressing  Goal: Patient will remain free of falls  Description: INTERVENTIONS:  - Educate patient/family on patient safety including physical limitations  - Instruct patient to call for assistance with activity   - Consult OT/PT to assist with strengthening/mobility   - Keep Call bell within reach  - Keep bed low and locked with side rails adjusted as appropriate  - Keep care items and personal belongings within reach  - Initiate and maintain comfort rounds  - Make Fall Risk Sign visible to staff  - Offer Toileting every 2 Hours, in advance of need  - Initiate/Maintain fall alarm  - Obtain necessary fall risk management equipment: non-skid footwear  - Apply yellow socks and bracelet for high fall risk patients  - Consider moving patient to room near nurses station  Outcome: Progressing     Problem: DISCHARGE PLANNING  Goal: Discharge to home or other facility with appropriate resources  Description: INTERVENTIONS:  - Identify barriers to discharge w/patient and caregiver  - Arrange for needed discharge resources and transportation as appropriate  - Identify discharge learning needs (meds, wound care, etc.)  - Arrange for interpretive services to assist at discharge as needed  - Refer to Case Management Department for coordinating discharge planning if the patient needs post-hospital services based on physician/advanced practitioner order or complex needs related to functional status, cognitive ability, or social support system  Outcome: Progressing     Problem: Knowledge Deficit  Goal: Patient/family/caregiver demonstrates understanding of disease process, treatment plan, medications, and discharge instructions  Description: Complete learning assessment and assess knowledge base. Interventions:  - Provide teaching at level of understanding  - Provide teaching via preferred learning methods  Outcome: Progressing     Problem: NEUROSENSORY - ADULT  Goal: Achieves stable or improved neurological status  Description: INTERVENTIONS  - Monitor and report changes in neurological status  - Monitor vital signs such as temperature, blood pressure, glucose, and any other labs ordered   - Initiate measures to prevent increased intracranial pressure  - Monitor for seizure activity and implement precautions if appropriate      Outcome: Progressing  Goal: Achieves maximal functionality and self care  Description: INTERVENTIONS  - Monitor swallowing and airway patency with patient fatigue and changes in neurological status  - Encourage and assist patient to increase activity and self care.    - Encourage visually impaired, hearing impaired and aphasic patients to use assistive/communication devices  Outcome: Progressing     Problem: GASTROINTESTINAL - ADULT  Goal: Minimal or absence of nausea and/or vomiting  Description: INTERVENTIONS:  - Administer IV fluids if ordered to ensure adequate hydration  - Maintain NPO status until nausea and vomiting are resolved  - Nasogastric tube if ordered  - Administer ordered antiemetic medications as needed  - Provide nonpharmacologic comfort measures as appropriate  - Advance diet as tolerated, if ordered  - Consider nutrition services referral to assist patient with adequate nutrition and appropriate food choices  Outcome: Progressing  Goal: Maintains or returns to baseline bowel function  Description: INTERVENTIONS:  - Assess bowel function  - Encourage oral fluids to ensure adequate hydration  - Administer IV fluids if ordered to ensure adequate hydration  - Administer ordered medications as needed  - Encourage mobilization and activity  - Consider nutritional services referral to assist patient with adequate nutrition and appropriate food choices  Outcome: Progressing  Goal: Maintains adequate nutritional intake  Description: INTERVENTIONS:  - Monitor percentage of each meal consumed  - Identify factors contributing to decreased intake, treat as appropriate  - Assist with meals as needed  - Monitor I&O, weight, and lab values if indicated  - Obtain nutrition services referral as needed  Outcome: Progressing  Goal: Oral mucous membranes remain intact  Description: INTERVENTIONS  - Assess oral mucosa and hygiene practices  - Implement preventative oral hygiene regimen  - Implement oral medicated treatments as ordered  - Initiate Nutrition services referral as needed  Outcome: Progressing     Problem: METABOLIC, FLUID AND ELECTROLYTES - ADULT  Goal: Electrolytes maintained within normal limits  Description: INTERVENTIONS:  - Monitor labs and assess patient for signs and symptoms of electrolyte imbalances  - Administer electrolyte replacement as ordered  - Monitor response to electrolyte replacements, including repeat lab results as appropriate  - Instruct patient on fluid and nutrition as appropriate  Outcome: Progressing  Goal: Fluid balance maintained  Description: INTERVENTIONS:  - Monitor labs   - Monitor I/O and WT  - Instruct patient on fluid and nutrition as appropriate  - Assess for signs & symptoms of volume excess or deficit  Outcome: Progressing  Goal: Glucose maintained within target range  Description: INTERVENTIONS:  - Monitor Blood Glucose as ordered  - Assess for signs and symptoms of hyperglycemia and hypoglycemia  - Administer ordered medications to maintain glucose within target range  - Assess nutritional intake and initiate nutrition service referral as needed  Outcome: Progressing Problem: Nutrition/Hydration-ADULT  Goal: Nutrient/Hydration intake appropriate for improving, restoring or maintaining nutritional needs  Description: Monitor and assess patient's nutrition/hydration status for malnutrition. Collaborate with interdisciplinary team and initiate plan and interventions as ordered. Monitor patient's weight and dietary intake as ordered or per policy. Utilize nutrition screening tool and intervene as necessary. Determine patient's food preferences and provide high-protein, high-caloric foods as appropriate.      INTERVENTIONS:  - Monitor oral intake, urinary output, labs, and treatment plans  - Assess nutrition and hydration status and recommend course of action  - Evaluate amount of meals eaten  - Assist patient with eating if necessary   - Allow adequate time for meals  - Recommend/ encourage appropriate diets, oral nutritional supplements, and vitamin/mineral supplements  - Order, calculate, and assess calorie counts as needed  - Recommend, monitor, and adjust tube feedings and TPN/PPN based on assessed needs  - Assess need for intravenous fluids  - Provide specific nutrition/hydration education as appropriate  - Include patient/family/caregiver in decisions related to nutrition  Outcome: Progressing

## 2023-08-29 NOTE — ANESTHESIA PREPROCEDURE EVALUATION
Procedure:  EGD    Relevant Problems   CARDIO   (+) Essential hypertension   (+) Other chest pain      MUSCULOSKELETAL   (+) Acute right-sided low back pain with right-sided sciatica   (+) Chronic bilateral low back pain without sciatica   (+) Sacroiliitis (HCC)      NEURO/PSYCH   (+) Anxiety and depression   (+) Chronic bilateral low back pain without sciatica   (+) Chronic pain syndrome   (+) Depression   (+) Numbness and tingling of both upper extremities while sleeping   (+) PTSD (post-traumatic stress disorder)      PULMONARY   (+) Asthma      Other   (+) Class 2 severe obesity due to excess calories with serious comorbidity and body mass index (BMI) of 35.0 to 35.9 in adult [facility])        Physical Exam    Airway    Mallampati score: II  TM Distance: >3 FB  Neck ROM: full     Dental       Cardiovascular  Cardiovascular exam normal    Pulmonary  Pulmonary exam normal     Other Findings        Anesthesia Plan  ASA Score- 2     Anesthesia Type- IV sedation with anesthesia with ASA Monitors. Additional Monitors:   Airway Plan:           Plan Factors-Exercise tolerance (METS): >4 METS. Chart reviewed. EKG reviewed. Imaging results reviewed. Existing labs reviewed. Patient summary reviewed. Induction- intravenous. Postoperative Plan-     Informed Consent- Anesthetic plan and risks discussed with patient. I personally reviewed this patient with the CRNA. Discussed and agreed on the Anesthesia Plan with the CRNA. James Phelps

## 2023-08-29 NOTE — UTILIZATION REVIEW
Initial Clinical Review    Admission: Date/Time/Statement:       OBSERVATIION 8-28-23 CHANGED TO INPATIENT 8-29-23 TO TREAT ABDOMINAL PAIN WITH IV FLUIDS, IV PROTONIX, EGD, POSSIBLE SURGERY.      08/29/23 1246  Inpatient Admission  Once        Transfer Service: Hospitalist    Question Answer   Level of Care Med Surg   Estimated length of stay More than 2 Midnights         08/28/23 1652  Place in Observation  Once        Transfer Service: Hospitalist    Question: Level of Care Answer: Med Surg            ED Arrival Information     Expected   -    Arrival   8/28/2023 14:17    Acuity   Urgent            Means of arrival   Ambulance    Escorted by   [facility]    Service   Hospitalist    Admission type   Emergency            Arrival complaint   Medical Problem           Chief Complaint   Patient presents with   • Medical Problem     Was seen here yesterday for gall bladder R/o. Today around 0930 she starting shaking really bad, started having intermittent shooting pain from her chest to right shoulder. Stomach pain is improved, but when pushing it is tender and has worsening pain. Feels like she is sweating a lot too. Initial Presentation: 43 y.o. female presents to ed from home for evaluation and treatment of abdominal, chest and right shoulder pain. This is the 4th ed visit in 17 days similar complaints. As outpatient she had an [address] showing cholelithiasis. Clinical assessment significant for abdominal tenderness, intermittent generalized intentional  tremor, tachycardia. EKG normal. ALT 67. Initially treated withiv ativan and iv .9% ns bolus. Admit to observation. Date: 8-29-23  Day 2: observation changed to inpatient     GI consult completed. Waxing and waning upper abdominal pain etiologies could be PUD, gastritis, H pylori, cholelithiasis. Continue iv fluids, iv protonix, NPO for EGD.   General surgery will consider intervention for symptomatic cholelithiasis depending on results of EGD- possibly 8-30-23. Date: 8-30-23  Day 3 inpatient      EGD showing mild gastritis. Patient with known cholelithiasis. Suspect at this point gallbladder could be the cause of the patient's pain. We will proceed to the OR for laparoscopic cholecystectomy, possible open. Continue iv .9% ns 125 / hr. Anesthesia Start Date/Time: 08/30/23 1447   Procedure: CHOLECYSTECTOMY LAPAROSCOPIC, poss open (Abdomen)   Anesthesia type: general   Diagnosis: Cholelithiasis [K80.20]   Pre-op diagnosis: Cholelithiasis [K80.20]               ED Triage Vitals [08/28/23 1424]   97.9 °F (36.6 °C) (!) 112 18 108/66 97 %      Temporal Monitor         Pain Score       6          08/28/23 83.4 kg (183 lb 13.8 oz)     Additional Vital Signs:       BP Temp Pulse Resp SpO2   08/29/23 0645 121/78 97.8 °F (36.6 °C) 56 -- 95 %   08/29/23 0034 111/78 -- 76 -- 94 %   08/29/23 0032 111/78 98.1 °F (36.7 °C) 73 -- 95 %   08/28/23 2133 122/74 97.7 °F (36.5 °C) 69 16 95 %   08/28/23 1925 127/75 98.2 °F (36.8 °C) 79 -- 95 %   08/28/23 1741 124/93 98.5 °F (36.9 °C) 97 16 96 %   08/28/23 1600 122/69 -- 91 18 96 %   08/28/23 1530 132/62 -- 98 20 97 %   08/28/23 1424 108/66 97.9 °F (36.6 °C) (!) 112 18 97 %             Pertinent Labs/Diagnostic Test Results:     CT head without contrast   Final  (08/28 1543)      No acute intracranial abnormality. XR chest 1 view portable      Final  (08/28 1628)      No acute cardiopulmonary disease.            Results from last 7 days   Lab Units 08/28/23  1741   SARS-COV-2  Negative     Results from last 7 days   Lab Units 08/29/23  0450 08/28/23  1515 08/27/23  0716 08/22/23  1212   WBC Thousand/uL 5.24 6.85 10.92* 8.56   HEMOGLOBIN g/dL 12.3 13.0 14.3 13.5   HEMATOCRIT % 37.5 39.4 43.7 40.9   PLATELETS Thousands/uL 232 249 269 204   NEUTROS ABS Thousands/µL 2.89 5.21 7.88* 6.29         Results from last 7 days   Lab Units 08/29/23  0450 08/28/23  1515 08/27/23  0716 08/22/23  1212   SODIUM mmol/L 140 135 136 136   POTASSIUM mmol/L 3.5 3.9 3.4* 3.6   CHLORIDE mmol/L 108 100 98 102   CO2 mmol/L 25 28 29 25   ANION GAP mmol/L 7 7 9 9   BUN mg/dL 9 12 13 14   CREATININE mg/dL 0.74 0.81 0.92 0.79   EGFR ml/min/1.73sq m 102 91 78 94   CALCIUM mg/dL 8.3* 9.1 9.6 9.1   MAGNESIUM mg/dL 2.0 2.0  --  1.8*   PHOSPHORUS mg/dL 2.9  --   --   --      Results from last 7 days   Lab Units 08/29/23  0450 08/28/23  1515 08/27/23  0716 08/22/23  1212   AST U/L 21 35 110* 34   ALT U/L 47 67* 75* 67*   ALK PHOS U/L 71 81 85 78   TOTAL PROTEIN g/dL 5.9* 6.9 7.2 6.5   ALBUMIN g/dL 3.6 4.1 4.5 4.0   TOTAL BILIRUBIN mg/dL 0.49 0.45 0.59 0.48         Results from last 7 days   Lab Units 08/29/23  0450 08/28/23  1515 08/27/23  0716 08/22/23  1212   GLUCOSE RANDOM mg/dL 79 103 111 131         Results from last 7 days   Lab Units 08/29/23  0450   CK TOTAL U/L 83     Results from last 7 days   Lab Units 08/27/23  0716 08/22/23  1212   HS TNI 0HR ng/L 2 <2     Results from last 7 days   Lab Units 08/22/23  1212   D-DIMER QUANTITATIVE ug/ml FEU 0.30     Results from last 7 days   Lab Units 08/29/23  0450   PROTIME seconds 14.5   INR  1.14     Results from last 7 days   Lab Units 08/28/23  1515   TSH 3RD GENERATON uIU/mL 1.105     Results from last 7 days   Lab Units 08/29/23  0529   PROCALCITONIN ng/ml <0.05     Results from last 7 days   Lab Units 08/29/23  0450 08/28/23  1515   LACTIC ACID mmol/L 0.5 0.6         Results from last 7 days   Lab Units 08/28/23  1515 08/27/23  0716 08/22/23  1212   LIPASE u/L 12 24 26         Results from last 7 days   Lab Units 08/28/23  2126   CLARITY UA  Clear   COLOR UA  Yellow   SPEC GRAV UA  <=1.005   PH UA  5.5   GLUCOSE UA mg/dl Negative   KETONES UA mg/dl Trace*   BLOOD UA  Moderate*   PROTEIN UA mg/dl Negative   NITRITE UA  Negative   BILIRUBIN UA  Negative   UROBILINOGEN UA E.U./dl 0.2   LEUKOCYTES UA  Negative   WBC UA /hpf 0-1*   RBC UA /hpf 0-1*   BACTERIA UA /hpf Occasional   EPITHELIAL CELLS WET PREP /hpf Occasional     Results from last 7 days   Lab Units 08/28/23  1741   INFLUENZA A PCR  Negative   INFLUENZA B PCR  Negative   RSV PCR  Negative         Results from last 7 days   Lab Units 08/28/23  2126   AMPH/METH  Negative   BARBITURATE UR  Negative   BENZODIAZEPINE UR  Positive*   COCAINE UR  Negative   METHADONE URINE  Negative   OPIATE UR  Negative   PCP UR  Negative   THC UR  Negative         ED Treatment:   Medication Administration from 08/28/2023 1417 to 08/28/2023 1729       Date/Time Order Dose Route Action     08/28/2023 1515 EDT sodium chloride 0.9 % bolus 1,000 mL 1,000 mL Intravenous New Bag        Past Medical History:   Diagnosis   • ADHD (attention deficit hyperactivity disorder)   • Anxiety   • Asthma   • Cat-scratch disease    last assessed 10/22/15   • Depression   • Hypertension    last assessed 11/11/14   • Sciatica   • Sleep disorder     Present on Admission:  • Depression  • Essential hypertension  • PTSD (post-traumatic stress disorder)  • Intention tremor      Admitting Diagnosis:     Cholelithiasis [K80.20]  Tremor [R25.1]  Weakness [R53.1]  Abdominal pain [R10.9]    Age/Sex: 43 y.o. female    Scheduled Medications:  atoMOXetine, 36 mg, Oral, Daily   And  atoMOXetine, 25 mg, Oral, Daily  DULoxetine, 60 mg, Oral, Daily  enoxaparin, 40 mg, Subcutaneous, Q24H  Fluticasone Furoate-Vilanterol, 1 puff, Inhalation, Daily  loratadine, 10 mg, Oral, Daily  pantoprazole, 40 mg, Intravenous, Q24H MICKI  prazosin, 2 mg, Oral, HS  traZODone, 50 mg, Oral, HS      Continuous IV Infusions:  sodium chloride, 125 mL/hr, Intravenous, Continuous      PRN Meds:  albuterol, 2 puff, Inhalation, Q4H PRN  albuterol, 2.5 mg, Nebulization, Q6H PRN  ALPRAZolam, 0.5 mg, Oral, BID PRN  ibuprofen, 200 mg, Oral, Q6H PRN  ketorolac, 15 mg, Intravenous, Q6H PRN  promethazine, 25 mg, Intravenous, Q6H PRN        IP CONSULT TO GASTROENTEROLOGY    Network Utilization Review Department  ATTENTION: Please call with any questions or concerns to [phone] and carefully listen to the prompts so that you are directed to the right person. All voicemails are confidential.  [place] all requests for admission clinical reviews, approved or denied determinations and any other requests to dedicated fax number below belonging to the campus where the patient is receiving treatment.  List of dedicated fax numbers for the Facilities:  [place] DENIALS (Administrative/Medical Necessity) [phone] [address] [facility] (Maternity/NICU/Pediatrics) [phone]   [address] [place] [address] [phone]   [facility] [address] [facility] [phone]   [address] [place] [address] [address] [place] [address] [phone]   [address] [facility] [address] [phone]

## 2023-08-29 NOTE — PLAN OF CARE
Problem: MOBILITY - ADULT  Goal: Maintain or return to baseline ADL function  Description: INTERVENTIONS:  -  Assess patient's ability to carry out ADLs; (independent)  - Assess range of motion  - Assess patient's mobility; (independent)  - Assess patient's need for assistive devices and provide as appropriate  - Encourage maximum independence but intervene and supervise when necessary  - Assess for home care needs following discharge   - Consider OT consult to assist with ADL evaluation and planning for discharge  - Provide patient education as appropriate  Outcome: Progressing  Goal: Maintains/Returns to pre admission functional level  Description: INTERVENTIONS:  - Perform BMAT or MOVE assessment daily.   - Set and communicate daily mobility goal to care team and patient/family/caregiver.    - Collaborate with rehabilitation services on mobility goals if consulted  - Ambulate patient 3-6 times a day  - Out of bed to chair 3 times a day   - Out of bed for meals 3 times a day  - Out of bed for toileting  - Record patient progress and toleration of activity level   Outcome: Progressing     Problem: PAIN - ADULT  Goal: Verbalizes/displays adequate comfort level or baseline comfort level  Description: Interventions:  - Encourage patient to monitor pain and request assistance  - Assess pain using appropriate pain scale (0-10 pain scale)  - Administer analgesics based on type and severity of pain and evaluate response  - Implement non-pharmacological measures as appropriate and evaluate response  - Consider cultural and social influences on pain and pain management  - Notify physician/advanced practitioner if interventions unsuccessful or patient reports new pain  Outcome: Progressing     Problem: INFECTION - ADULT  Goal: Absence or prevention of progression during hospitalization  Description: INTERVENTIONS:  - Assess and monitor for signs and symptoms of infection  - Monitor lab/diagnostic results  - Monitor all insertion sites, i.e. indwelling lines  - Administer medications as ordered  - Instruct and encourage patient and family to use good hand hygiene technique  Outcome: Progressing     Problem: SAFETY ADULT  Goal: Maintain or return to baseline ADL function  Description: INTERVENTIONS:  -  Assess patient's ability to carry out ADLs; (independent)  - Assess range of motion  - Assess patient's mobility; (independent)  - Assess patient's need for assistive devices and provide as appropriate  - Encourage maximum independence but intervene and supervise when necessary  - Assess for home care needs following discharge   - Consider OT consult to assist with ADL evaluation and planning for discharge  - Provide patient education as appropriate  Outcome: Progressing  Goal: Maintains/Returns to pre admission functional level  Description: INTERVENTIONS:  - Perform BMAT or MOVE assessment daily.   - Set and communicate daily mobility goal to care team and patient/family/caregiver.    - Collaborate with rehabilitation services on mobility goals if consulted  - Ambulate patient 3-6 times a day  - Out of bed to chair 3 times a day   - Out of bed for meals 3 times a day  - Out of bed for toileting  - Record patient progress and toleration of activity level   Outcome: Progressing  Goal: Patient will remain free of falls  Description: INTERVENTIONS:  -  Assess patient's ability to carry out ADLs; (independent)   - Assess range of motion  - Assess patient's mobility; (independent)  - Assess patient's need for assistive devices and provide as appropriate  - Encourage maximum independence but intervene and supervise when necessary  - Assess for home care needs following discharge   - Consider OT consult to assist with ADL evaluation and planning for discharge  - Provide patient education as appropriate  Outcome: Progressing     Problem: DISCHARGE PLANNING  Goal: Discharge to home or other facility with appropriate resources  Description: INTERVENTIONS:  - Identify barriers to discharge w/patient and caregiver  - Arrange for needed discharge resources and transportation as appropriate  - Identify discharge learning needs (meds, wound care, etc.)  - Refer to Case Management Department for coordinating discharge planning if the patient needs post-hospital services based on physician/advanced practitioner order or complex needs related to functional status, cognitive ability, or social support system  Outcome: Progressing     Problem: Knowledge Deficit  Goal: Patient/family/caregiver demonstrates understanding of disease process, treatment plan, medications, and discharge instructions  Description: Complete learning assessment and assess knowledge base.   Interventions:  - Provide teaching at level of understanding  - Provide teaching via preferred learning methods  Outcome: Progressing     Problem: GASTROINTESTINAL - ADULT  Goal: Minimal or absence of nausea and/or vomiting  Description: INTERVENTIONS:  - Administer IV fluids if ordered to ensure adequate hydration  - Maintain NPO status until nausea and vomiting are resolved  - Administer ordered antiemetic medications as needed  - Provide nonpharmacologic comfort measures as appropriate  - Advance diet as tolerated, if ordered  - Consider nutrition services referral to assist patient with adequate nutrition and appropriate food choices  Outcome: Progressing  Goal: Maintains or returns to baseline bowel function  Description: INTERVENTIONS:  - Assess bowel function  - Encourage oral fluids to ensure adequate hydration  - Administer IV fluids if ordered to ensure adequate hydration  - Administer ordered medications as needed  - Encourage mobilization and activity  - Consider nutritional services referral to assist patient with adequate nutrition and appropriate food choices  Outcome: Progressing  Goal: Maintains adequate nutritional intake  Description: INTERVENTIONS:  - Monitor percentage of each meal consumed  - Identify factors contributing to decreased intake, treat as appropriate  - Assist with meals as needed  - Monitor I&O, weight, and lab values if indicated  - Obtain nutrition services referral as needed  Outcome: Progressing     Problem: Nutrition/Hydration-ADULT  Goal: Nutrient/Hydration intake appropriate for improving, restoring or maintaining nutritional needs  Description: Monitor and assess patient's nutrition/hydration status for malnutrition. Collaborate with interdisciplinary team and initiate plan and interventions as ordered. Monitor patient's weight and dietary intake as ordered or per policy. Utilize nutrition screening tool and intervene as necessary. Determine patient's food preferences and provide high-protein, high-caloric foods as appropriate.      INTERVENTIONS:  - Monitor oral intake, urinary output, labs, and treatment plans  - Assess nutrition and hydration status and recommend course of action  - Evaluate amount of meals eaten  - Assist patient with eating if necessary   - Allow adequate time for meals  - Recommend/ encourage appropriate diets, oral nutritional supplements, and vitamin/mineral supplements  - Provide specific nutrition/hydration education as appropriate  - Include patient/family/caregiver in decisions related to nutrition  Outcome: Progressing

## 2023-08-29 NOTE — ASSESSMENT & PLAN NOTE
pmhx PTSD  Continue home medications  Prazosin 2 mg capsule for nightmares  Strattera 60 mg   Xanax PRN

## 2023-08-30 ENCOUNTER — ANESTHESIA (INPATIENT)
Dept: PERIOP | Facility: HOSPITAL | Age: 38
DRG: 263 | End: 2023-08-30
Payer: COMMERCIAL

## 2023-08-30 ENCOUNTER — ANESTHESIA EVENT (INPATIENT)
Dept: PERIOP | Facility: HOSPITAL | Age: 38
DRG: 263 | End: 2023-08-30
Payer: COMMERCIAL

## 2023-08-30 PROBLEM — R74.01 TRANSAMINITIS: Status: RESOLVED | Noted: 2023-08-28 | Resolved: 2023-08-30

## 2023-08-30 LAB
ALBUMIN SERPL BCP-MCNC: 3.3 G/DL (ref 3.5–5)
ALP SERPL-CCNC: 56 U/L (ref 34–104)
ALT SERPL W P-5'-P-CCNC: 29 U/L (ref 7–52)
ANION GAP SERPL CALCULATED.3IONS-SCNC: 7 MMOL/L
AST SERPL W P-5'-P-CCNC: 13 U/L (ref 13–39)
BILIRUB SERPL-MCNC: 0.37 MG/DL (ref 0.2–1)
BUN SERPL-MCNC: 9 MG/DL (ref 5–25)
CALCIUM ALBUM COR SERPL-MCNC: 8.6 MG/DL (ref 8.3–10.1)
CALCIUM SERPL-MCNC: 8 MG/DL (ref 8.4–10.2)
CHLORIDE SERPL-SCNC: 112 MMOL/L (ref 96–108)
CO2 SERPL-SCNC: 22 MMOL/L (ref 21–32)
CREAT SERPL-MCNC: 0.68 MG/DL (ref 0.6–1.3)
GFR SERPL CREATININE-BSD FRML MDRD: 110 ML/MIN/1.73SQ M
GLUCOSE SERPL-MCNC: 74 MG/DL (ref 65–140)
GLUCOSE SERPL-MCNC: 89 MG/DL (ref 65–140)
POTASSIUM SERPL-SCNC: 3.6 MMOL/L (ref 3.5–5.3)
PROT SERPL-MCNC: 5.1 G/DL (ref 6.4–8.4)
SODIUM SERPL-SCNC: 141 MMOL/L (ref 135–147)

## 2023-08-30 PROCEDURE — 82948 REAGENT STRIP/BLOOD GLUCOSE: CPT

## 2023-08-30 PROCEDURE — 99233 SBSQ HOSP IP/OBS HIGH 50: CPT | Performed by: HOSPITALIST

## 2023-08-30 PROCEDURE — 47562 LAPAROSCOPIC CHOLECYSTECTOMY: CPT | Performed by: SURGERY

## 2023-08-30 PROCEDURE — 80053 COMPREHEN METABOLIC PANEL: CPT | Performed by: HOSPITALIST

## 2023-08-30 PROCEDURE — 0FT44ZZ RESECTION OF GALLBLADDER, PERCUTANEOUS ENDOSCOPIC APPROACH: ICD-10-PCS | Performed by: SURGERY

## 2023-08-30 PROCEDURE — 88304 TISSUE EXAM BY PATHOLOGIST: CPT | Performed by: PATHOLOGY

## 2023-08-30 PROCEDURE — C9113 INJ PANTOPRAZOLE SODIUM, VIA: HCPCS | Performed by: INTERNAL MEDICINE

## 2023-08-30 PROCEDURE — 99232 SBSQ HOSP IP/OBS MODERATE 35: CPT | Performed by: SURGERY

## 2023-08-30 RX ORDER — DEXAMETHASONE SODIUM PHOSPHATE 10 MG/ML
INJECTION, SOLUTION INTRAMUSCULAR; INTRAVENOUS AS NEEDED
Status: DISCONTINUED | OUTPATIENT
Start: 2023-08-30 | End: 2023-08-30

## 2023-08-30 RX ORDER — ONDANSETRON 2 MG/ML
INJECTION INTRAMUSCULAR; INTRAVENOUS AS NEEDED
Status: DISCONTINUED | OUTPATIENT
Start: 2023-08-30 | End: 2023-08-30

## 2023-08-30 RX ORDER — CEFAZOLIN SODIUM 1 G/3ML
INJECTION, POWDER, FOR SOLUTION INTRAMUSCULAR; INTRAVENOUS AS NEEDED
Status: DISCONTINUED | OUTPATIENT
Start: 2023-08-30 | End: 2023-08-30

## 2023-08-30 RX ORDER — FENTANYL CITRATE 50 UG/ML
INJECTION, SOLUTION INTRAMUSCULAR; INTRAVENOUS AS NEEDED
Status: DISCONTINUED | OUTPATIENT
Start: 2023-08-30 | End: 2023-08-30

## 2023-08-30 RX ORDER — ONDANSETRON 2 MG/ML
4 INJECTION INTRAMUSCULAR; INTRAVENOUS ONCE AS NEEDED
Status: DISCONTINUED | OUTPATIENT
Start: 2023-08-30 | End: 2023-08-30 | Stop reason: HOSPADM

## 2023-08-30 RX ORDER — BUPIVACAINE HYDROCHLORIDE 5 MG/ML
INJECTION, SOLUTION EPIDURAL; INTRACAUDAL AS NEEDED
Status: DISCONTINUED | OUTPATIENT
Start: 2023-08-30 | End: 2023-08-30 | Stop reason: HOSPADM

## 2023-08-30 RX ORDER — GLYCOPYRROLATE 0.2 MG/ML
INJECTION INTRAMUSCULAR; INTRAVENOUS AS NEEDED
Status: DISCONTINUED | OUTPATIENT
Start: 2023-08-30 | End: 2023-08-30

## 2023-08-30 RX ORDER — PROPOFOL 10 MG/ML
INJECTION, EMULSION INTRAVENOUS AS NEEDED
Status: DISCONTINUED | OUTPATIENT
Start: 2023-08-30 | End: 2023-08-30

## 2023-08-30 RX ORDER — FENTANYL CITRATE/PF 50 MCG/ML
50 SYRINGE (ML) INJECTION
Status: DISCONTINUED | OUTPATIENT
Start: 2023-08-30 | End: 2023-08-30 | Stop reason: HOSPADM

## 2023-08-30 RX ORDER — NEOSTIGMINE METHYLSULFATE 1 MG/ML
INJECTION INTRAVENOUS AS NEEDED
Status: DISCONTINUED | OUTPATIENT
Start: 2023-08-30 | End: 2023-08-30

## 2023-08-30 RX ORDER — HYDROMORPHONE HCL/PF 1 MG/ML
SYRINGE (ML) INJECTION AS NEEDED
Status: DISCONTINUED | OUTPATIENT
Start: 2023-08-30 | End: 2023-08-30

## 2023-08-30 RX ORDER — OXYCODONE HYDROCHLORIDE 10 MG/1
10 TABLET ORAL EVERY 6 HOURS PRN
Status: DISCONTINUED | OUTPATIENT
Start: 2023-08-30 | End: 2023-08-31 | Stop reason: HOSPADM

## 2023-08-30 RX ORDER — ROCURONIUM BROMIDE 10 MG/ML
INJECTION, SOLUTION INTRAVENOUS AS NEEDED
Status: DISCONTINUED | OUTPATIENT
Start: 2023-08-30 | End: 2023-08-30

## 2023-08-30 RX ORDER — LORAZEPAM 2 MG/ML
0.5 INJECTION INTRAMUSCULAR ONCE
Status: COMPLETED | OUTPATIENT
Start: 2023-08-30 | End: 2023-08-30

## 2023-08-30 RX ORDER — LIDOCAINE HYDROCHLORIDE 10 MG/ML
INJECTION, SOLUTION EPIDURAL; INFILTRATION; INTRACAUDAL; PERINEURAL AS NEEDED
Status: DISCONTINUED | OUTPATIENT
Start: 2023-08-30 | End: 2023-08-30

## 2023-08-30 RX ORDER — MAGNESIUM HYDROXIDE 1200 MG/15ML
LIQUID ORAL AS NEEDED
Status: DISCONTINUED | OUTPATIENT
Start: 2023-08-30 | End: 2023-08-30 | Stop reason: HOSPADM

## 2023-08-30 RX ORDER — HYDROMORPHONE HCL/PF 1 MG/ML
0.5 SYRINGE (ML) INJECTION
Status: DISCONTINUED | OUTPATIENT
Start: 2023-08-30 | End: 2023-08-30 | Stop reason: HOSPADM

## 2023-08-30 RX ORDER — SODIUM CHLORIDE, SODIUM LACTATE, POTASSIUM CHLORIDE, CALCIUM CHLORIDE 600; 310; 30; 20 MG/100ML; MG/100ML; MG/100ML; MG/100ML
INJECTION, SOLUTION INTRAVENOUS CONTINUOUS PRN
Status: DISCONTINUED | OUTPATIENT
Start: 2023-08-30 | End: 2023-08-30

## 2023-08-30 RX ORDER — MIDAZOLAM HYDROCHLORIDE 2 MG/2ML
INJECTION, SOLUTION INTRAMUSCULAR; INTRAVENOUS AS NEEDED
Status: DISCONTINUED | OUTPATIENT
Start: 2023-08-30 | End: 2023-08-30

## 2023-08-30 RX ADMIN — PROMETHAZINE HYDROCHLORIDE 25 MG: 25 INJECTION INTRAMUSCULAR; INTRAVENOUS at 05:06

## 2023-08-30 RX ADMIN — ATOMOXETINE 25 MG: 25 CAPSULE ORAL at 08:29

## 2023-08-30 RX ADMIN — LORAZEPAM 0.5 MG: 2 INJECTION INTRAMUSCULAR; INTRAVENOUS at 05:50

## 2023-08-30 RX ADMIN — FENTANYL CITRATE 50 MCG: 50 INJECTION, SOLUTION INTRAMUSCULAR; INTRAVENOUS at 15:31

## 2023-08-30 RX ADMIN — ONDANSETRON 4 MG: 2 INJECTION INTRAMUSCULAR; INTRAVENOUS at 15:20

## 2023-08-30 RX ADMIN — PRAZOSIN HYDROCHLORIDE 2 MG: 1 CAPSULE ORAL at 21:57

## 2023-08-30 RX ADMIN — ATOMOXETINE 36 MG: 18 CAPSULE ORAL at 08:31

## 2023-08-30 RX ADMIN — OXYCODONE HYDROCHLORIDE 10 MG: 10 TABLET ORAL at 17:50

## 2023-08-30 RX ADMIN — SODIUM CHLORIDE, SODIUM LACTATE, POTASSIUM CHLORIDE, AND CALCIUM CHLORIDE: .6; .31; .03; .02 INJECTION, SOLUTION INTRAVENOUS at 14:47

## 2023-08-30 RX ADMIN — PROPOFOL 200 MG: 10 INJECTION, EMULSION INTRAVENOUS at 14:51

## 2023-08-30 RX ADMIN — SODIUM CHLORIDE 125 ML/HR: 0.9 INJECTION, SOLUTION INTRAVENOUS at 19:54

## 2023-08-30 RX ADMIN — LIDOCAINE HYDROCHLORIDE 50 MG: 10 INJECTION, SOLUTION EPIDURAL; INFILTRATION; INTRACAUDAL; PERINEURAL at 14:51

## 2023-08-30 RX ADMIN — DULOXETINE HYDROCHLORIDE 60 MG: 30 CAPSULE, DELAYED RELEASE ORAL at 08:29

## 2023-08-30 RX ADMIN — GLYCOPYRROLATE 0.8 MG: 0.2 INJECTION, SOLUTION INTRAMUSCULAR; INTRAVENOUS at 15:45

## 2023-08-30 RX ADMIN — FENTANYL CITRATE 100 MCG: 50 INJECTION, SOLUTION INTRAMUSCULAR; INTRAVENOUS at 14:51

## 2023-08-30 RX ADMIN — TRAZODONE HYDROCHLORIDE 50 MG: 50 TABLET ORAL at 21:52

## 2023-08-30 RX ADMIN — NEOSTIGMINE METHYLSULFATE 5 MG: 1 INJECTION INTRAVENOUS at 15:45

## 2023-08-30 RX ADMIN — ROCURONIUM BROMIDE 50 MG: 10 INJECTION, SOLUTION INTRAVENOUS at 14:51

## 2023-08-30 RX ADMIN — SODIUM CHLORIDE 125 ML/HR: 0.9 INJECTION, SOLUTION INTRAVENOUS at 01:05

## 2023-08-30 RX ADMIN — PROMETHAZINE HYDROCHLORIDE 25 MG: 25 INJECTION INTRAMUSCULAR; INTRAVENOUS at 16:53

## 2023-08-30 RX ADMIN — ENOXAPARIN SODIUM 40 MG: 40 INJECTION SUBCUTANEOUS at 19:52

## 2023-08-30 RX ADMIN — ALPRAZOLAM 0.5 MG: 0.5 TABLET ORAL at 13:32

## 2023-08-30 RX ADMIN — LORATADINE 10 MG: 10 TABLET ORAL at 08:29

## 2023-08-30 RX ADMIN — KETOROLAC TROMETHAMINE 15 MG: 30 INJECTION INTRAMUSCULAR; INTRAVENOUS at 05:04

## 2023-08-30 RX ADMIN — MORPHINE SULFATE 2 MG: 2 INJECTION, SOLUTION INTRAMUSCULAR; INTRAVENOUS at 21:52

## 2023-08-30 RX ADMIN — SODIUM CHLORIDE 125 ML/HR: 0.9 INJECTION, SOLUTION INTRAVENOUS at 08:53

## 2023-08-30 RX ADMIN — CEFAZOLIN 2000 MG: 1 INJECTION, POWDER, FOR SOLUTION INTRAMUSCULAR; INTRAVENOUS at 15:00

## 2023-08-30 RX ADMIN — MIDAZOLAM 2 MG: 1 INJECTION INTRAMUSCULAR; INTRAVENOUS at 14:47

## 2023-08-30 RX ADMIN — DEXAMETHASONE SODIUM PHOSPHATE 10 MG: 10 INJECTION, SOLUTION INTRAMUSCULAR; INTRAVENOUS at 15:00

## 2023-08-30 RX ADMIN — PANTOPRAZOLE SODIUM 40 MG: 40 INJECTION, POWDER, FOR SOLUTION INTRAVENOUS at 09:31

## 2023-08-30 RX ADMIN — HYDROMORPHONE HYDROCHLORIDE 0.5 MG: 1 INJECTION, SOLUTION INTRAMUSCULAR; INTRAVENOUS; SUBCUTANEOUS at 15:27

## 2023-08-30 NOTE — ASSESSMENT & PLAN NOTE
Clinically appears to be physiologic tremor  - Suspected initially due to pain  - Patient also takes Xanax at home, suspected possible withdrawal but she denies frequent use, Xanax restarted  - She is on SNRI, SSRI - serotonin syndrome not clinically evident however both medications can have side effect tremor  - Liraglutide - also side effect of shakiness (rare)  - Albuterol PRN, but not using  - Consider worsened anxiety (increased adrenergic state) in hospital, possible etiology of a worsened physiologic tremor  - continue to follow progression/improvement

## 2023-08-30 NOTE — ASSESSMENT & PLAN NOTE
BMI 32.57  A/w diabetes  Recent weight loss efforts -previously tried metformin and Ozempic   Now taking liraglutide   Plan:  Nutritional counseling

## 2023-08-30 NOTE — OP NOTE
OPERATIVE REPORT  PATIENT NAME: Maryse Mays    :  1985  MRN: [id number]  Pt Location: [facility] OR ROOM 01    SURGERY DATE: 2023    Surgeon(s) and Role: * Cynthia Low DO - Primary     * Lisa Lockwood MD - Assisting    Preop Diagnosis:  Cholelithiasis [K80.20]    Post-Op Diagnosis Codes:     * Cholelithiasis [K80.20]    Procedure(s):  CHOLECYSTECTOMY LAPAROSCOPIC    Specimen(s):  ID Type Source Tests Collected by Time Destination   1 :  Tissue Gallbladder TISSUE EXAM Cynthia Low DO 2023 1542        Estimated Blood Loss:   Minimal    Drains:  * No LDAs found *    Anesthesia Type:   General    Operative Indications:  Cholelithiasis [K80.20]      Operative Findings:  Distended and edematous gallbladder likely with underlying acute on chronic cholecystitis    Complications:   None    Procedure and Technique:  The patient was brought to the operating arena and placed in supine position. All regular monitoring devices were connected. The patient underwent general anesthesia with endotracheal intubation without complication. The patient received perioperative antibiotics. The patient received subcutaneous heparin in addition to bilateral lower extremity sequential compression devices for DVT prophylaxis. A timeout was performed prior to incision to ensure correct patient position, procedure, and site. A vertical supraumbilical incision was made using a 15 blade scalpel. Incision was deepened through the deep dermis and simultaneous fat using electrocautery. Hemostasis was obtained. Using S retractors dissection was completed down to the fascia. The fascia was visualized grasped with Heide was elevated and incised. 2 stay sutures of 0 Vicryl were then placed. A finger was inserted and the peritoneum palpated and using finger fracture technique the peritoneum was incised. The underside of the peritoneum was palpated and there was no evidence of any bowel or adhesions in the vicinity area.  Gumaro Andino trocar was then placed under direct vision. The abdomen was insufflated with carbon dioxide to a pressure of 12-14 mmHg. The patient tolerated insufflation well. A laparoscope was then entered and there was no evidence of any trauma from the initial trocar placement. 3 additional trochars were then placed in the following positions: A 5 mm trocar was placed in the epigastrium, 2 additional 5 mm trochars were placed on the right costal margin. All trochars were inserted under direct vision and there is no evidence of any injury. The abdomen was inspected and there no abnormality. The patient was then placed in reverse trendelenberg and right side up position. An atraumatic grasper was placed through the lateral port and the gallbladder was grasped and retracted over the dome of the liver. Any filmy adhesion between the gallbladder and omentum, and/or other nearby organs were taken down with a combination of blunt and sharp technique. Additional atraumatic grasper was then placed in the infundibulum of the gallbladder was grasped and retracted to the right lower quadrant. The peritoneum was then was incised using Bovie electrocautery. The cystic artery and cystic duct were then circumferentially dissected. The cystic artery cystic duct were then doubly clipped and divided close the gallbladder. The gallbladder was then taken off the liver bed using hook electrocautery. Hemostasis was achieved with electrocautery. The gallbladder was in place an Endo Catch bag. The cystic artery was inspected and there is appeared to be no oozing. Cystic duct was also inspected and the clips were intact and appeared to be no leakage of bile. The upper abdominal trochars were then removed under direct vision there is no bleeding from trocar site. The Rosales cannula was then removed along with the Endo Catch bag containing the gallbladder and the abdomen was allowed to desufflate.  The fascia of the umbillical port was then closed using 0 Vicryl suture. The skin of all trochars were then closed with a 4-0 Monocryl. The patient tolerated procedure well was taken to the post anesthesia care unit in stable condition. All lap, needle, and instrument counts were correct. I was present for the entire procedure., A qualified resident physician was not available. and A physician assistant was required during the procedure for retraction, tissue handling, dissection and suturing.     Patient Disposition:  PACU         SIGNATURE: Alex Colmenares,   DATE: August 30, 2023  TIME: 3:58 PM

## 2023-08-30 NOTE — PROGRESS NOTES
Progress Note - General Surgery   Ernie Benitez 43 y.o. female MRN: [id number]  Unit/Bed#: 053-21 Encounter: [id number]    Assessment:  41yo F w/ PMH of anxiety, depression, HTN, T2DM, asthma presenting with generalized abdominal pain  Cholelithiasis   - Reports pain now localized to epigastric area, associated with nausea, no episodes of emesis  - AVSS on RA  - CMP reviewed   - 2/61 RUQ US "Uncomplicated cholelithiasis. "   - 8/29 EGD - mild erythematous mucosa in the body of the stomach, esophagus normal, duodenum normal,  remainder of stomach normal    Plan:  - OR today for laparoscopic cholecystectomy, poss open  - written informed consent to be obtained prior to procedure   - maintain NPO status in anticipation of OR  - f/u EGD biopsy results   - PRN analgesia/antiemetics   - GI following, appreciate recs   - medical management per primary team     Subjective/Objective   Subjective: Patient reports ongoing epigastric abdominal pain this morning. Pain associated with nausea, no episodes of emesis. States she has not noticed significant improvement in symptoms since time of presentation. Denies fever/chills, chest pain, SOB. Objective:   Blood pressure 116/76, pulse 66, temperature 97.8 °F (36.6 °C), temperature source Oral, resp. rate 17, height 5' 3" (1.6 m), weight 83.4 kg (183 lb 13.8 oz), last menstrual period 08/28/2023, SpO2 96 %, not currently breastfeeding. ,Body mass index is 32.57 kg/m². Intake/Output Summary (Last 24 hours) at 8/30/2023 0744  Last data filed at 8/30/2023 0105  Gross per 24 hour   Intake 2261.25 ml   Output --   Net 2261.25 ml       Invasive Devices     Peripheral Intravenous Line  Duration           Peripheral IV 08/30/23 Distal;Dorsal (posterior); Left Forearm <1 day                Physical Exam  Vitals reviewed. Constitutional:       General: She is not in acute distress. Appearance: She is not toxic-appearing. HENT:      Head: Normocephalic and atraumatic. Cardiovascular:      Rate and Rhythm: Normal rate. Pulmonary:      Effort: No respiratory distress. Breath sounds: Normal breath sounds. Abdominal:      General: Bowel sounds are normal. There is no distension. Palpations: Abdomen is soft. Tenderness: There is abdominal tenderness (epigastric ). There is no guarding or rebound. Skin:     General: Skin is warm and dry. Neurological:      Mental Status: She is alert. Lab, Imaging and other studies:  I have personally reviewed pertinent lab results. , CBC: No results found for: "WBC", "HGB", "HCT", "MCV", "PLT", "ADJUSTEDWBC", "RBC", "MCH", "MCHC", "RDW", "MPV", "NRBC", CMP:   Lab Results   Component Value Date    SODIUM 141 08/30/2023    K 3.6 08/30/2023     (H) 08/30/2023    CO2 22 08/30/2023    BUN 9 08/30/2023    CREATININE 0.68 08/30/2023    CALCIUM 8.0 (L) 08/30/2023    AST 13 08/30/2023    ALT 29 08/30/2023    ALKPHOS 56 08/30/2023    EGFR 110 08/30/2023     VTE Pharmacologic Prophylaxis: Enoxaparin (Lovenox)  VTE Mechanical Prophylaxis: sequential compression device    Jannette Campbell [facility]  08/30/23  **Please Note: Portions of the record may have been created using voice recognition software. Occasional wrong word or "sound a like" substitutions may have occurred due to the inherent limitations of voice recognition software. Read the chart carefully and recognize, using context, where substitutions have occurred. **

## 2023-08-30 NOTE — NURSING NOTE
Pt wanted provider to be aware that she was not taking xanax on a regular basis at home, at most, two per month. This is since tremors are suspected to be due to xanax withdrawal. Pt would tremors looked into further. Provider Jinny Hook notified via Philipp Ortiz, pt told to talk to dayshift provider in morning as well. Later in night, pt tremors become worse, becoming generalized. Gave pt PRN PO xanax, and tremors improved/resolved with xanax. Made provider Gilma aware of this via Philipp Ortiz as well.

## 2023-08-30 NOTE — PROGRESS NOTES
[address]  Progress Note  Name: José Malone  MRN: [id number]  Unit/Bed#: 123-99 I Date of Admission: 8/28/2023   Date of Service: 8/30/2023 I Hospital Day: 1    Assessment/Plan   * Intractable abdominal pain  Assessment & Plan  Admission: Severe upper abdominal pain x 3 weeks (3 visits to ED in 17 days). RUQ pain, comes and goes, not associated with eating, 10/10, a/w tremor, nausea, decreased appetite. Recent weight loss efforts.  - CT abdomen and pelvis shows - No acute intra-abdominal/pelvic abnormalities   - RUQ ultrasound shows - uncomplicated gallstones.   - GI consult, appreciate - EGD generally unremarkable, biopsies gastric/duodenal taken for H.Pylori, Celiac  - Surgery following, to OR today for cholecystectomy  - NPO      Transaminitis-resolved as of 8/30/2023  Assessment & Plan  resolved    Class 2 severe obesity due to excess calories with serious comorbidity and body mass index (BMI) of 35.0 to 35.9 in adult [facility])  Assessment & Plan  BMI 32.57  A/w diabetes  Recent weight loss efforts -previously tried metformin and Ozempic   Now taking liraglutide   Plan:  Nutritional counseling    PTSD (post-traumatic stress disorder)  Assessment & Plan  pmhx PTSD  Continue home medications  Prazosin 2 mg capsule for nightmares  Strattera 60 mg   Xanax PRN    Essential hypertension  Assessment & Plan  pmhx HTN  BP currently stable  Hold hydrochlorothiazide 12.5 mg tab, lisinopril 10 mg tab    Intention tremor  Assessment & Plan  Clinically appears to be physiologic tremor  - Suspected initially due to pain  - Patient also takes Xanax at home, suspected possible withdrawal but she denies frequent use, Xanax restarted  - She is on SNRI, SSRI - serotonin syndrome not clinically evident however both medications can have side effect tremor  - Liraglutide - also side effect of shakiness (rare)  - Albuterol PRN, but not using  - Consider worsened anxiety (increased adrenergic state) in hospital, possible etiology of a worsened physiologic tremor  - continue to follow progression/improvement    Depression  Assessment & Plan  History of depression  Currently stable  Continue home medications; Duloxetine 60mg daily               VTE Pharmacologic Prophylaxis: VTE Score: 1 Low Risk (Score 0-2) - Encourage Ambulation. Patient Centered Rounds: I performed bedside rounds with nursing staff today. Discussions with Specialists or Other Care Team Provider: surgery    Education and Discussions with Family / Patient: Patient declined call to . Total Time Spent on Date of Encounter in care of patient: 35 minutes This time was spent on one or more of the following: performing physical exam; counseling and coordination of care; obtaining or reviewing history; documenting in the medical record; reviewing/ordering tests, medications or procedures; communicating with other healthcare professionals and discussing with patient's family/caregivers. Current Length of Stay: 1 day(s)  Current Patient Status: Inpatient   Certification Statement: The patient will continue to require additional inpatient hospital stay due to abd pain persistent  Discharge Plan: Anticipate discharge tomorrow to home. Code Status: Level 1 - Full Code    Subjective:   Patient has unchanged abdominal pain, persistent, mild/moderate in epigastric region primarily  Concerned about tremors, primarily the etiology of them, overall seemed improved from admission but not resolved    Objective:     Vitals:   Temp (24hrs), Av.7 °F (36.5 °C), Min:97.1 °F (36.2 °C), Max:98 °F (36.7 °C)    Temp:  [97.1 °F (36.2 °C)-98 °F (36.7 °C)] 97.8 °F (36.6 °C)  HR:  [] 66  Resp:  [14-18] 17  BP: ()/(71-88) 116/76  SpO2:  [95 %-97 %] 96 %  Body mass index is 32.57 kg/m². Input and Output Summary (last 24 hours):      Intake/Output Summary (Last 24 hours) at 2023 0945  Last data filed at 2023 0815  Gross per 24 hour Intake 2261.25 ml   Output --   Net 2261.25 ml       Physical Exam:   Physical Exam  Vitals and nursing note reviewed. Constitutional:       General: She is not in acute distress. Appearance: She is well-developed. HENT:      Head: Normocephalic and atraumatic. Eyes:      Conjunctiva/sclera: Conjunctivae normal.   Cardiovascular:      Rate and Rhythm: Normal rate and regular rhythm. Heart sounds: No murmur heard. Pulmonary:      Effort: Pulmonary effort is normal. No respiratory distress. Breath sounds: Normal breath sounds. Abdominal:      Palpations: Abdomen is soft. Tenderness: There is no abdominal tenderness. Comments: Pain to palpation epigastric and RUQ   Musculoskeletal:         General: No swelling. Cervical back: Neck supple. Skin:     General: Skin is warm and dry. Capillary Refill: Capillary refill takes less than 2 seconds. Neurological:      Mental Status: She is alert.       Comments: Mild tremor intentional   Psychiatric:         Mood and Affect: Mood normal.          Additional Data:     Labs:  Results from last 7 days   Lab Units 08/29/23  0450   WBC Thousand/uL 5.24   HEMOGLOBIN g/dL 12.3   HEMATOCRIT % 37.5   PLATELETS Thousands/uL 232   NEUTROS PCT % 55   LYMPHS PCT % 30   MONOS PCT % 10   EOS PCT % 4     Results from last 7 days   Lab Units 08/30/23  0445   SODIUM mmol/L 141   POTASSIUM mmol/L 3.6   CHLORIDE mmol/L 112*   CO2 mmol/L 22   BUN mg/dL 9   CREATININE mg/dL 0.68   ANION GAP mmol/L 7   CALCIUM mg/dL 8.0*   ALBUMIN g/dL 3.3*   TOTAL BILIRUBIN mg/dL 0.37   ALK PHOS U/L 56   ALT U/L 29   AST U/L 13   GLUCOSE RANDOM mg/dL 74     Results from last 7 days   Lab Units 08/29/23  0450   INR  1.14     Results from last 7 days   Lab Units 08/29/23  1726 08/29/23  1407   POC GLUCOSE mg/dl 72 79         Results from last 7 days   Lab Units 08/29/23  0529 08/29/23  0450 08/28/23  1515   LACTIC ACID mmol/L  --  0.5 0.6   PROCALCITONIN ng/ml <0.05  -- --        Lines/Drains:  Invasive Devices     Peripheral Intravenous Line  Duration           Peripheral IV 08/30/23 Distal;Dorsal (posterior); Left Forearm <1 day                      Imaging: Reviewed radiology reports from this admission including: EGD    Recent Cultures (last 7 days):   Results from last 7 days   Lab Units 08/28/23 2126   URINE CULTURE  Culture too young- will reincubate       Last 24 Hours Medication List:   Current Facility-Administered Medications   Medication Dose Route Frequency Provider Last Rate   • albuterol  2 puff Inhalation Q4H PRN Abi Gamez MD     • albuterol  2.5 mg Nebulization Q6H PRN Abi Gamez MD     • ALPRAZolam  0.5 mg Oral BID PRN Anupama Nevarez MD     • atoMOXetine  36 mg Oral Daily Abi Gamez MD      And   • atoMOXetine  25 mg Oral Daily Abi Gamez MD     • DULoxetine  60 mg Oral Daily Abi Gamez MD     • enoxaparin  40 mg Subcutaneous Q24H Ozuna Hamper Haywood, DO     • Fluticasone Furoate-Vilanterol  1 puff Inhalation Daily Abi Gamez MD     • ibuprofen  200 mg Oral Q6H PRN Abi Gamez MD     • ketorolac  15 mg Intravenous Q6H PRN Ozuna Hamper Yanira, DO     • loratadine  10 mg Oral Daily Abi Gamez MD     • pantoprazole  40 mg Intravenous Q24H [address] Ozuna Hamper Haywood, DO     • prazosin  2 mg Oral HS Abi Gamez MD     • promethazine  25 mg Intravenous Q6H PRN Ozuna Hamper Yanira, DO     • sodium chloride  125 mL/hr Intravenous Continuous Ozuna Hamper Haywood,  mL/hr (08/30/23 6290)   • traZODone  50 mg Oral HS Abi Gamez MD          Today, Patient Was Seen By: Ely Chairez DO    **Please Note: This note may have been constructed using a voice recognition system. **

## 2023-08-30 NOTE — ANESTHESIA PREPROCEDURE EVALUATION
Procedure:  CHOLECYSTECTOMY LAPAROSCOPIC, poss open (Abdomen)    Relevant Problems   CARDIO   (+) Essential hypertension   (+) Other chest pain      MUSCULOSKELETAL   (+) Acute right-sided low back pain with right-sided sciatica   (+) Chronic bilateral low back pain without sciatica   (+) Sacroiliitis (HCC)      NEURO/PSYCH   (+) Anxiety and depression   (+) Chronic bilateral low back pain without sciatica   (+) Chronic pain syndrome   (+) Depression   (+) Numbness and tingling of both upper extremities while sleeping   (+) PTSD (post-traumatic stress disorder)      PULMONARY   (+) Asthma      Lab Results   Component Value Date    WBC 5.24 08/29/2023    HGB 12.3 08/29/2023    HCT 37.5 08/29/2023    MCV 94 08/29/2023     08/29/2023     Lab Results   Component Value Date    SODIUM 141 08/30/2023    K 3.6 08/30/2023     (H) 08/30/2023    CO2 22 08/30/2023    BUN 9 08/30/2023    CREATININE 0.68 08/30/2023    GLUC 74 08/30/2023    CALCIUM 8.0 (L) 08/30/2023     Lab Results   Component Value Date    INR 1.14 08/29/2023    INR 0.97 03/21/2021    INR 1.06 10/19/2020    PROTIME 14.5 08/29/2023    PROTIME 12.7 03/21/2021    PROTIME 13.6 10/19/2020     Lab Results   Component Value Date    HGBA1C 5.5 05/22/2023        Patient had EGD yesterday with anesthesia. Denies nausea/vomiting. Pain controlled. Physical Exam    Airway    Mallampati score: II  TM Distance: >3 FB  Neck ROM: full     Dental   Comment: Edentulous  ,     Cardiovascular      Pulmonary      Other Findings        Anesthesia Plan  ASA Score- 3     Anesthesia Type- general with ASA Monitors. Additional Monitors:   Airway Plan: ETT. Plan Factors-Exercise tolerance (METS): >4 METS. Chart reviewed. Patient summary reviewed. Patient is not a current smoker. Obstructive sleep apnea risk education given perioperatively. Induction- intravenous.     Postoperative Plan-     Informed Consent- Anesthetic plan and risks discussed with patient. I personally reviewed this patient with the CRNA. Discussed and agreed on the Anesthesia Plan with the CRNA. James Phelps

## 2023-08-30 NOTE — ANESTHESIA POSTPROCEDURE EVALUATION
Post-Op Assessment Note    CV Status:  Stable    Pain management: satisfactory to patient     Mental Status:  Sleepy   Hydration Status:  Euvolemic   PONV Controlled:  Controlled   Airway Patency:  Patent      Post Op Vitals Reviewed: Yes      Staff: CRNA         No notable events documented.     /82 (08/30/23 1612)    Temp 98.9 °F (37.2 °C) (08/30/23 1612)    Pulse 92 (08/30/23 1612)   Resp 20 (08/30/23 1612)    SpO2 93 % (08/30/23 1612)

## 2023-08-30 NOTE — ASSESSMENT & PLAN NOTE
Admission: Severe upper abdominal pain x 3 weeks (3 visits to ED in 17 days). RUQ pain, comes and goes, not associated with eating, 10/10, a/w tremor, nausea, decreased appetite. Recent weight loss efforts.  - CT abdomen and pelvis shows - No acute intra-abdominal/pelvic abnormalities   - RUQ ultrasound shows - uncomplicated gallstones.   - GI consult, appreciate - EGD generally unremarkable, biopsies gastric/duodenal taken for H.Pylori, Celiac  - Surgery following, to OR today for cholecystectomy  - NPO

## 2023-08-31 VITALS
WEIGHT: 183.86 LBS | RESPIRATION RATE: 19 BRPM | HEIGHT: 63 IN | BODY MASS INDEX: 32.58 KG/M2 | OXYGEN SATURATION: 93 % | SYSTOLIC BLOOD PRESSURE: 137 MMHG | TEMPERATURE: 98.2 F | DIASTOLIC BLOOD PRESSURE: 94 MMHG | HEART RATE: 109 BPM

## 2023-08-31 LAB
ALBUMIN SERPL BCP-MCNC: 3.8 G/DL (ref 3.5–5)
ALP SERPL-CCNC: 81 U/L (ref 34–104)
ALT SERPL W P-5'-P-CCNC: 51 U/L (ref 7–52)
ANION GAP SERPL CALCULATED.3IONS-SCNC: 7 MMOL/L
AST SERPL W P-5'-P-CCNC: 39 U/L (ref 13–39)
BACTERIA UR CULT: NORMAL
BASOPHILS # BLD AUTO: 0.01 THOUSANDS/ÂΜL (ref 0–0.1)
BASOPHILS NFR BLD AUTO: 0 % (ref 0–1)
BILIRUB SERPL-MCNC: 0.34 MG/DL (ref 0.2–1)
BUN SERPL-MCNC: 7 MG/DL (ref 5–25)
CALCIUM SERPL-MCNC: 8.6 MG/DL (ref 8.4–10.2)
CHLORIDE SERPL-SCNC: 109 MMOL/L (ref 96–108)
CO2 SERPL-SCNC: 23 MMOL/L (ref 21–32)
CREAT SERPL-MCNC: 0.72 MG/DL (ref 0.6–1.3)
EOSINOPHIL # BLD AUTO: 0 THOUSAND/ÂΜL (ref 0–0.61)
EOSINOPHIL NFR BLD AUTO: 0 % (ref 0–6)
ERYTHROCYTE [DISTWIDTH] IN BLOOD BY AUTOMATED COUNT: 12.8 % (ref 11.6–15.1)
GFR SERPL CREATININE-BSD FRML MDRD: 105 ML/MIN/1.73SQ M
GLUCOSE SERPL-MCNC: 122 MG/DL (ref 65–140)
HCT VFR BLD AUTO: 38.7 % (ref 34.8–46.1)
HGB BLD-MCNC: 12.4 G/DL (ref 11.5–15.4)
IMM GRANULOCYTES # BLD AUTO: 0.03 THOUSAND/UL (ref 0–0.2)
IMM GRANULOCYTES NFR BLD AUTO: 0 % (ref 0–2)
LYMPHOCYTES # BLD AUTO: 0.61 THOUSANDS/ÂΜL (ref 0.6–4.47)
LYMPHOCYTES NFR BLD AUTO: 6 % (ref 14–44)
MCH RBC QN AUTO: 29.7 PG (ref 26.8–34.3)
MCHC RBC AUTO-ENTMCNC: 32 G/DL (ref 31.4–37.4)
MCV RBC AUTO: 93 FL (ref 82–98)
MONOCYTES # BLD AUTO: 0.64 THOUSAND/ÂΜL (ref 0.17–1.22)
MONOCYTES NFR BLD AUTO: 6 % (ref 4–12)
NEUTROPHILS # BLD AUTO: 9.39 THOUSANDS/ÂΜL (ref 1.85–7.62)
NEUTS SEG NFR BLD AUTO: 88 % (ref 43–75)
NRBC BLD AUTO-RTO: 0 /100 WBCS
PLATELET # BLD AUTO: 254 THOUSANDS/UL (ref 149–390)
PMV BLD AUTO: 10.2 FL (ref 8.9–12.7)
POTASSIUM SERPL-SCNC: 3.9 MMOL/L (ref 3.5–5.3)
PROT SERPL-MCNC: 6.3 G/DL (ref 6.4–8.4)
RBC # BLD AUTO: 4.17 MILLION/UL (ref 3.81–5.12)
SODIUM SERPL-SCNC: 139 MMOL/L (ref 135–147)
WBC # BLD AUTO: 10.68 THOUSAND/UL (ref 4.31–10.16)

## 2023-08-31 PROCEDURE — 99239 HOSP IP/OBS DSCHRG MGMT >30: CPT | Performed by: HOSPITALIST

## 2023-08-31 PROCEDURE — 99024 POSTOP FOLLOW-UP VISIT: CPT | Performed by: SURGERY

## 2023-08-31 PROCEDURE — 80053 COMPREHEN METABOLIC PANEL: CPT

## 2023-08-31 PROCEDURE — C9113 INJ PANTOPRAZOLE SODIUM, VIA: HCPCS | Performed by: SURGERY

## 2023-08-31 PROCEDURE — 85025 COMPLETE CBC W/AUTO DIFF WBC: CPT

## 2023-08-31 RX ORDER — OXYCODONE HYDROCHLORIDE 5 MG/1
5 TABLET ORAL EVERY 6 HOURS PRN
Qty: 20 TABLET | Refills: 0 | Status: SHIPPED | OUTPATIENT
Start: 2023-08-31 | End: 2023-09-05

## 2023-08-31 RX ADMIN — PROMETHAZINE HYDROCHLORIDE 25 MG: 25 INJECTION INTRAMUSCULAR; INTRAVENOUS at 03:08

## 2023-08-31 RX ADMIN — PANTOPRAZOLE SODIUM 40 MG: 40 INJECTION, POWDER, FOR SOLUTION INTRAVENOUS at 08:22

## 2023-08-31 RX ADMIN — ALPRAZOLAM 0.5 MG: 0.5 TABLET ORAL at 03:08

## 2023-08-31 RX ADMIN — ATOMOXETINE 25 MG: 25 CAPSULE ORAL at 08:21

## 2023-08-31 RX ADMIN — LORATADINE 10 MG: 10 TABLET ORAL at 08:22

## 2023-08-31 RX ADMIN — FLUTICASONE FUROATE AND VILANTEROL TRIFENATATE 1 PUFF: 100; 25 POWDER RESPIRATORY (INHALATION) at 08:28

## 2023-08-31 RX ADMIN — MORPHINE SULFATE 2 MG: 2 INJECTION, SOLUTION INTRAMUSCULAR; INTRAVENOUS at 05:52

## 2023-08-31 RX ADMIN — DULOXETINE HYDROCHLORIDE 60 MG: 30 CAPSULE, DELAYED RELEASE ORAL at 08:21

## 2023-08-31 RX ADMIN — SODIUM CHLORIDE 125 ML/HR: 0.9 INJECTION, SOLUTION INTRAVENOUS at 03:42

## 2023-08-31 RX ADMIN — ATOMOXETINE 36 MG: 18 CAPSULE ORAL at 08:21

## 2023-08-31 RX ADMIN — MORPHINE SULFATE 2 MG: 2 INJECTION, SOLUTION INTRAMUSCULAR; INTRAVENOUS at 00:55

## 2023-08-31 NOTE — DISCHARGE INSTR - AVS FIRST PAGE
Laparoscopic Cholecystectomy   WHAT YOU NEED TO KNOW:   Laparoscopic cholecystectomy is surgery to remove your gallbladder. DISCHARGE INSTRUCTIONS:   Call Dr. Frederick Templeton office at [phone] with any questions or concerns    Medicines: You may need any of the following:  Prescription pain medicine - Take as directed    You may also take tylenol as needed    Take your medicine as directed. Contact your healthcare provider if you think your medicine is not helping or if you have side effects. Tell her if you are allergic to any medicine. Keep a list of the medicines, vitamins, and herbs you take. Include the amounts, and when and why you take them. Bring the list or the pill bottles to follow-up visits. Carry your medicine list with you in case of an emergency. Follow up with your healthcare provider 2 weeks after surgery, or as directed:  Write down your questions so you remember to ask them during your visits. Call the office to schedule follow-up appointment with the general surgery office to be seen in 2 weeks at [phone]. Wound care: May remove clear outer dressing tomorrow, leave white steri-strips on unless they fall off. You may shower and pat the incisions dry. Do not soak underwater for 2 weeks     What to eat after surgery: You may eat whatever you feel up to following surgery. You may notice diarrhea with fatty foods. Drink plenty of liquids. When to return to work and other activities:  No lifting, pushing, or pulling greater then 10lb for 2 weeks. Walk as much as possible. YOU may drive when you are comfortable enough to turn and press the brake without pain medication    Contact your healthcare provider if:   You have a fever over 101°F (38°C) or chills. You have pain or nausea that is not relieved by medicine. You have redness and swelling around your incisions, or blood or pus is leaking from your incisions. You are constipated or have diarrhea.      Your skin or eyes are yellow, or your bowel movements are pale. You have questions or concerns about your surgery, condition, or care. Seek care immediately or call 911 if:   You cannot stop vomiting. Your bowel movements are black or bloody. You have pain in your abdomen and it is swollen or hard. Your arm or leg feels warm, tender, and painful. It may look swollen and red. You feel lightheaded, short of breath, and have chest pain. You cough up blood. © 2017 [address] [place] [address] [employer] Information is for End User's use only and may not be sold, redistributed or otherwise used for commercial purposes. All illustrations and images included in CareNotes® are the copyrighted property of [employer] or Aryan Chowdary. The above information is an  only. It is not intended as medical advice for individual conditions or treatments. Talk to your doctor, nurse or pharmacist before following any medical regimen to see if it is safe and effective for you.      Yenny Unger PA-C

## 2023-08-31 NOTE — UTILIZATION REVIEW
Continued Stay Review    Date:  8/31/23                         Current Patient Class: inpatient    Current Level of Care: med surg    HPI:38 y.o. female initially admitted on      Assessment/Plan:   Postop day #1 status post laparoscopic cholecystectomy for biliary colic, cholelithiasis  Anxiety PTSD disorder/depression   Physiological tremor, initially suspected to be secondary to pain and/or Xanax was held for surgery now restarted  Essential hypertension  Asthma  Type 2 diabetes mellitus  Morbid obesity, BMI 32.57 (83.4 kg)     Recent EGD showed mild gastritis. Right upper quadrant ultrasound showed uncomplicated cholelithiasis     A.m. labs reviewed, WBC mildly elevated 10.68 believed to be reactive in nature postoperatively  Hemoglobin stable 12.4  CMP normal electrolytes, LFTs and kidney function.   Total bilirubin 0.34 normal.     Vital Signs:   08/31/23 07:58:46 98.2 °F (36.8 °C) 109 Abnormal  19 137/94 108 93 % -- -- -- -- -- --   08/31/23 0410 -- -- -- -- -- -- 24 -- 1 L/min Nasal cannula -- --   08/31/23 03:05:18 97.9 °F (36.6 °C) 73 18 121/75 90 96 % 28 -- 2 L/min Nasal cannula         Pertinent Labs/Diagnostic Results:   Results from last 7 days   Lab Units 08/28/23  1741   SARS-COV-2  Negative     Results from last 7 days   Lab Units 08/31/23  0822 08/29/23  0450 08/28/23  1515 08/27/23  0716   WBC Thousand/uL 10.68* 5.24 6.85 10.92*   HEMOGLOBIN g/dL 12.4 12.3 13.0 14.3   HEMATOCRIT % 38.7 37.5 39.4 43.7   PLATELETS Thousands/uL 254 232 249 269   NEUTROS ABS Thousands/µL 9.39* 2.89 5.21 7.88*       Results from last 7 days   Lab Units 08/31/23  0822 08/30/23  0445 08/29/23  0450 08/28/23  1515 08/27/23  0716   SODIUM mmol/L 139 141 140 135 136   POTASSIUM mmol/L 3.9 3.6 3.5 3.9 3.4*   CHLORIDE mmol/L 109* 112* 108 100 98   CO2 mmol/L 23 22 25 28 29   ANION GAP mmol/L 7 7 7 7 9   BUN mg/dL 7 9 9 12 13   CREATININE mg/dL 0.72 0.68 0.74 0.81 0.92   EGFR ml/min/1.73sq m 105 110 102 91 78   CALCIUM mg/dL 8.6 8.0* 8.3* 9.1 9.6   MAGNESIUM mg/dL  --   --  2.0 2.0  --    PHOSPHORUS mg/dL  --   --  2.9  --   --      Results from last 7 days   Lab Units 08/31/23  0822 08/30/23  0445 08/29/23  0450 08/28/23  1515 08/27/23  0716   AST U/L 39 13 21 35 110*   ALT U/L 51 29 47 67* 75*   ALK PHOS U/L 81 56 71 81 85   TOTAL PROTEIN g/dL 6.3* 5.1* 5.9* 6.9 7.2   ALBUMIN g/dL 3.8 3.3* 3.6 4.1 4.5   TOTAL BILIRUBIN mg/dL 0.34 0.37 0.49 0.45 0.59     Results from last 7 days   Lab Units 08/30/23  1735 08/29/23  1726 08/29/23  1407   POC GLUCOSE mg/dl 89 72 79     Results from last 7 days   Lab Units 08/31/23  0822 08/30/23  0445 08/29/23  0450 08/28/23  1515 08/27/23  0716   GLUCOSE RANDOM mg/dL 122 74 79 103 111     Results from last 7 days   Lab Units 08/29/23  0450   CK TOTAL U/L 83     Results from last 7 days   Lab Units 08/27/23  0716   HS TNI 0HR ng/L 2     Results from last 7 days   Lab Units 08/29/23  0450   PROTIME seconds 14.5   INR  1.14     Results from last 7 days   Lab Units 08/28/23  1515   TSH 3RD GENERATON uIU/mL 1.105     Results from last 7 days   Lab Units 08/29/23  0529   PROCALCITONIN ng/ml <0.05     Results from last 7 days   Lab Units 08/29/23  0450 08/28/23  1515   LACTIC ACID mmol/L 0.5 0.6     Results from last 7 days   Lab Units 08/29/23  0450   HEP B S AG  Non-reactive   HEP C AB  Non-reactive   HEP B C IGM  Non-reactive     Results from last 7 days   Lab Units 08/28/23  1515 08/27/23  0716   LIPASE u/L 12 24     Results from last 7 days   Lab Units 08/28/23  2126   CLARITY UA  Clear   COLOR UA  Yellow   SPEC GRAV UA  <=1.005   PH UA  5.5   GLUCOSE UA mg/dl Negative   KETONES UA mg/dl Trace*   BLOOD UA  Moderate*   PROTEIN UA mg/dl Negative   NITRITE UA  Negative   BILIRUBIN UA  Negative   UROBILINOGEN UA E.U./dl 0.2   LEUKOCYTES UA  Negative   WBC UA /hpf 0-1*   RBC UA /hpf 0-1*   BACTERIA UA /hpf Occasional   EPITHELIAL CELLS WET PREP /hpf Occasional     Results from last 7 days   Lab Units 08/28/23  1741   INFLUENZA A PCR  Negative   INFLUENZA B PCR  Negative   RSV PCR  Negative     Results from last 7 days   Lab Units 08/28/23 2126   AMPH/METH  Negative   BARBITURATE UR  Negative   BENZODIAZEPINE UR  Positive*   COCAINE UR  Negative   METHADONE URINE  Negative   OPIATE UR  Negative   PCP UR  Negative   THC UR  Negative     Results from last 7 days   Lab Units 08/28/23 2126   URINE CULTURE  20,000-29,000 cfu/ml       Medications:   Scheduled Medications:  atoMOXetine, 36 mg, Oral, Daily   And  atoMOXetine, 25 mg, Oral, Daily  DULoxetine, 60 mg, Oral, Daily  enoxaparin, 40 mg, Subcutaneous, Q24H  Fluticasone Furoate-Vilanterol, 1 puff, Inhalation, Daily  loratadine, 10 mg, Oral, Daily  pantoprazole, 40 mg, Intravenous, Q24H MICKI  prazosin, 2 mg, Oral, HS  traZODone, 50 mg, Oral, HS      Continuous IV Infusions:  sodium chloride, 125 mL/hr, Intravenous, Continuous      PRN Meds:  albuterol, 2 puff, Inhalation, Q4H PRN  albuterol, 2.5 mg, Nebulization, Q6H PRN  ALPRAZolam, 0.5 mg, Oral, BID PRN  ibuprofen, 200 mg, Oral, Q6H PRN  morphine injection, 2 mg, Intravenous, Q3H PRN  oxyCODONE, 10 mg, Oral, Q6H PRN  promethazine, 25 mg, Intravenous, Q6H PRN        Discharge Plan: [facility]    Network Utilization Review Department  ATTENTION: Please call with any questions or concerns to [phone] and carefully listen to the prompts so that you are directed to the right person. All voicemails are confidential.  Carlyle Kocher all requests for admission clinical reviews, approved or denied determinations and any other requests to dedicated fax number below belonging to the campus where the patient is receiving treatment.  List of dedicated fax numbers for the Facilities:  [place] DENIALS (Administrative/Medical Necessity) [phone] [address] [facility] (Maternity/NICU/Pediatrics) [address] [place] [address] [address] [facility] [address] [address] [facility] [address] [phone]

## 2023-08-31 NOTE — ASSESSMENT & PLAN NOTE
pmhx PTSD  Continue home medications  Prazosin 2 mg capsule for nightmares  Strattera 60 mg   Xanax PRN  Outpatient follow-up with neurology for possible medication side effect causing tremors

## 2023-08-31 NOTE — ASSESSMENT & PLAN NOTE
BMI 32.57  A/w diabetes  Recent weight loss efforts -previously tried metformin and Ozempic   Now taking liraglutide   Outpatient referral to nutritional counseling

## 2023-08-31 NOTE — ASSESSMENT & PLAN NOTE
Admission: Severe upper abdominal pain x 3 weeks (3 visits to ED in 17 days). RUQ pain, comes and goes, not associated with eating, 10/10, a/w tremor, nausea, decreased appetite. Recent weight loss efforts.  - CT abdomen and pelvis shows - No acute intra-abdominal/pelvic abnormalities   - RUQ ultrasound shows - uncomplicated gallstones. GI consult, appreciate - EGD generally unremarkable, biopsies gastric/duodenal taken for H.Pylori, Celiac  -Follow-up with GI as outpatient  Surgery consult  - Cholecystectomy performed on 8/30/2023  - Tolerating Diet  - Surgery cleared for discharge, will provide follow-up instructions and pain meds.   - Follow-up with general surgery as outpatient

## 2023-08-31 NOTE — ASSESSMENT & PLAN NOTE
Pmhx HTN  BP meds held during hospitalization; BP remained stable throughout hospital stay  Resume home medications hydrochlorothiazide 12.5 mg tab, lisinopril 10 mg tab  Follow-up with PCP for possible medication adjustment

## 2023-08-31 NOTE — DISCHARGE SUMMARY
[address]  Discharge- Kaitlynn Potts 1985, 43 y.o. female MRN: [id number]  Unit/Bed#: 551-73 Encounter: [id number]  Primary Care Provider: Jeff Means PA-C   Date and time admitted to hospital: 8/28/2023  2:17 PM    * Intractable abdominal pain  Assessment & Plan  Admission: Severe upper abdominal pain x 3 weeks (3 visits to ED in 17 days). RUQ pain, comes and goes, not associated with eating, 10/10, a/w tremor, nausea, decreased appetite. Recent weight loss efforts.  - CT abdomen and pelvis shows - No acute intra-abdominal/pelvic abnormalities   - RUQ ultrasound shows - uncomplicated gallstones. GI consult, appreciate - EGD generally unremarkable, biopsies gastric/duodenal taken for H.Pylori, Celiac  -Follow-up with GI as outpatient  Surgery consult  - Cholecystectomy performed on 8/30/2023  - Tolerating Diet  - Surgery cleared for discharge, will provide follow-up instructions and pain meds.   - Follow-up with general surgery as outpatient      Class 2 severe obesity due to excess calories with serious comorbidity and body mass index (BMI) of 35.0 to 35.9 in adult [facility])  Assessment & Plan  BMI 32.57  A/w diabetes  Recent weight loss efforts -previously tried metformin and Ozempic   Now taking liraglutide   Outpatient referral to nutritional counseling    PTSD (post-traumatic stress disorder)  Assessment & Plan  pmhx PTSD  Continue home medications  Prazosin 2 mg capsule for nightmares  Strattera 60 mg   Xanax PRN  Outpatient follow-up with neurology for possible medication side effect causing tremors    Essential hypertension  Assessment & Plan  Pmhx HTN  BP meds held during hospitalization; BP remained stable throughout hospital stay  Resume home medications hydrochlorothiazide 12.5 mg tab, lisinopril 10 mg tab  Follow-up with PCP for possible medication adjustment    Intention tremor  Assessment & Plan  Clinically appears to be physiologic tremor  - Suspected initially due to pain  - Patient also takes Xanax at home, suspected possible withdrawal but she denies frequent use, Xanax restarted  - She is on SNRI, SSRI - serotonin syndrome not clinically evident however both medications can have side effect tremor  - Liraglutide - also side effect of shakiness (rare)  - Albuterol PRN, but not using  - Consider worsened anxiety (increased adrenergic state) in hospital, possible etiology of a worsened physiologic tremor  - Tremor remains present, follow-up with neurology as outpatient    Depression  Assessment & Plan  History of depression  Currently stable  Continue home medications Duloxetine 60mg daily        Medical Problems     Resolved Problems  Date Reviewed: 8/29/2023          Resolved    Transaminitis 8/30/2023     Resolved by  Virgil Chairez, DO        Discharging Physician / Practitioner: Thomas Villafuerte MD  PCP: Jose Armando Agosto PA-C  Admission Date:   Admission Orders (From admission, onward)     Ordered        08/29/23 1245  Inpatient Admission  Once            08/28/23 1651  Place in Observation  Once                      Discharge Date: 08/31/23    Consultations During Hospital Stay:  · GI, general surgery    Procedures Performed:   · Cholecystectomy, EGD    Significant Findings / Test Results:   Results for orders placed or performed during the hospital encounter of 08/28/23   COVID19, Influenza A/B, RSV PCR, [facility]    Specimen: Nasopharyngeal Swab; Nares   Result Value Ref Range    SARS-CoV-2 Negative Negative    INFLUENZA A PCR Negative Negative    INFLUENZA B PCR Negative Negative    RSV PCR Negative Negative   Urine culture    Specimen: Urine, Clean Catch   Result Value Ref Range    Urine Culture Culture too young- will reincubate    CBC and differential   Result Value Ref Range    WBC 6.85 4.31 - 10.16 Thousand/uL    RBC 4.31 3.81 - 5.12 Million/uL    Hemoglobin 13.0 11.5 - 15.4 g/dL    Hematocrit 39.4 34.8 - 46.1 %    MCV 91 82 - 98 fL    MCH 30.2 26.8 - 34.3 pg MCHC 33.0 31.4 - 37.4 g/dL    RDW 12.8 11.6 - 15.1 %    MPV 10.3 8.9 - 12.7 fL    Platelets 008 197 - 777 Thousands/uL    nRBC 0 /100 WBCs    Neutrophils Relative 75 43 - 75 %    Immat GRANS % 0 0 - 2 %    Lymphocytes Relative 13 (L) 14 - 44 %    Monocytes Relative 8 4 - 12 %    Eosinophils Relative 3 0 - 6 %    Basophils Relative 1 0 - 1 %    Neutrophils Absolute 5.21 1.85 - 7.62 Thousands/µL    Immature Grans Absolute 0.01 0.00 - 0.20 Thousand/uL    Lymphocytes Absolute 0.89 0.60 - 4.47 Thousands/µL    Monocytes Absolute 0.52 0.17 - 1.22 Thousand/µL    Eosinophils Absolute 0.17 0.00 - 0.61 Thousand/µL    Basophils Absolute 0.05 0.00 - 0.10 Thousands/µL   Comprehensive metabolic panel   Result Value Ref Range    Sodium 135 135 - 147 mmol/L    Potassium 3.9 3.5 - 5.3 mmol/L    Chloride 100 96 - 108 mmol/L    CO2 28 21 - 32 mmol/L    ANION GAP 7 mmol/L    BUN 12 5 - 25 mg/dL    Creatinine 0.81 0.60 - 1.30 mg/dL    Glucose 103 65 - 140 mg/dL    Calcium 9.1 8.4 - 10.2 mg/dL    AST 35 13 - 39 U/L    ALT 67 (H) 7 - 52 U/L    Alkaline Phosphatase 81 34 - 104 U/L    Total Protein 6.9 6.4 - 8.4 g/dL    Albumin 4.1 3.5 - 5.0 g/dL    Total Bilirubin 0.45 0.20 - 1.00 mg/dL    eGFR 91 ml/min/1.73sq m   Lipase   Result Value Ref Range    Lipase 12 11 - 82 u/L   Lactic acid, plasma (w/reflex if result > 2.0)   Result Value Ref Range    LACTIC ACID 0.6 0.5 - 2.0 mmol/L   Magnesium   Result Value Ref Range    Magnesium 2.0 1.9 - 2.7 mg/dL   TSH   Result Value Ref Range    TSH 3RD GENERATON 1.105 0.450 - 4.500 uIU/mL   hCG, qualitative pregnancy   Result Value Ref Range    Preg, Serum Negative Negative   High Sensitivity Troponin I Random   Result Value Ref Range    HS TnI random <2 (L) 8 - 18 ng/L   Urinalysis with microscopic   Result Value Ref Range    Color, UA Yellow     Clarity, UA Clear     Specific Gravity, UA <=1.005 1.003 - 1.030    pH, UA 5.5 4.5, 5.0, 5.5, 6.0, 6.5, 7.0, 7.5, 8.0    Leukocytes, UA Negative Negative Nitrite, UA Negative Negative    Protein, UA Negative Negative mg/dl    Glucose, UA Negative Negative mg/dl    Ketones, UA Trace (A) Negative mg/dl    Urobilinogen, UA 0.2 0.2, 1.0 E.U./dl E.U./dl    Bilirubin, UA Negative Negative    Occult Blood, UA Moderate (A) Negative    RBC, UA 0-1 (A) None Seen, 2-4 /hpf    WBC, UA 0-1 (A) None Seen, 2-4, 5-60 /hpf    Epithelial Cells Occasional None Seen, Occasional /hpf    Bacteria, UA Occasional None Seen, Occasional /hpf   Rapid drug screen, urine   Result Value Ref Range    Amph/Meth UR Negative Negative    Barbiturate Ur Negative Negative    Benzodiazepine Urine Positive (A) Negative    Cocaine Urine Negative Negative    Methadone Urine Negative Negative    Opiate Urine Negative Negative    PCP Ur Negative Negative    THC Urine Negative Negative    Oxycodone Urine Negative Negative   Comprehensive metabolic panel   Result Value Ref Range    Sodium 140 135 - 147 mmol/L    Potassium 3.5 3.5 - 5.3 mmol/L    Chloride 108 96 - 108 mmol/L    CO2 25 21 - 32 mmol/L    ANION GAP 7 mmol/L    BUN 9 5 - 25 mg/dL    Creatinine 0.74 0.60 - 1.30 mg/dL    Glucose 79 65 - 140 mg/dL    Calcium 8.3 (L) 8.4 - 10.2 mg/dL    AST 21 13 - 39 U/L    ALT 47 7 - 52 U/L    Alkaline Phosphatase 71 34 - 104 U/L    Total Protein 5.9 (L) 6.4 - 8.4 g/dL    Albumin 3.6 3.5 - 5.0 g/dL    Total Bilirubin 0.49 0.20 - 1.00 mg/dL    eGFR 102 ml/min/1.73sq m   Magnesium   Result Value Ref Range    Magnesium 2.0 1.9 - 2.7 mg/dL   Phosphorus   Result Value Ref Range    Phosphorus 2.9 2.7 - 4.5 mg/dL   CBC and differential   Result Value Ref Range    WBC 5.24 4.31 - 10.16 Thousand/uL    RBC 4.01 3.81 - 5.12 Million/uL    Hemoglobin 12.3 11.5 - 15.4 g/dL    Hematocrit 37.5 34.8 - 46.1 %    MCV 94 82 - 98 fL    MCH 30.7 26.8 - 34.3 pg    MCHC 32.8 31.4 - 37.4 g/dL    RDW 12.8 11.6 - 15.1 %    MPV 10.2 8.9 - 12.7 fL    Platelets 566 917 - 899 Thousands/uL    nRBC 0 /100 WBCs    Neutrophils Relative 55 43 - 75 % Immat GRANS % 0 0 - 2 %    Lymphocytes Relative 30 14 - 44 %    Monocytes Relative 10 4 - 12 %    Eosinophils Relative 4 0 - 6 %    Basophils Relative 1 0 - 1 %    Neutrophils Absolute 2.89 1.85 - 7.62 Thousands/µL    Immature Grans Absolute 0.01 0.00 - 0.20 Thousand/uL    Lymphocytes Absolute 1.57 0.60 - 4.47 Thousands/µL    Monocytes Absolute 0.51 0.17 - 1.22 Thousand/µL    Eosinophils Absolute 0.22 0.00 - 0.61 Thousand/µL    Basophils Absolute 0.04 0.00 - 0.10 Thousands/µL   Procalcitonin   Result Value Ref Range    Procalcitonin <0.05 <=0.25 ng/ml   Lactic acid, plasma (w/reflex if result > 2.0)   Result Value Ref Range    LACTIC ACID 0.5 0.5 - 2.0 mmol/L   High Sensitivity Troponin I Random   Result Value Ref Range    HS TnI random <2 (L) 8 - 18 ng/L   Hepatitis panel, acute   Result Value Ref Range    Hepatitis B Surface Ag Non-reactive Non-Reactive    Hep A IgM Non-reactive Non-Reactive    Hepatitis C Ab Non-reactive Non-Reactive    Hep B C IgM Non-reactive Non-Reactive   HIV 1/2 AG/AB w Reflex [facility] for 2 yr old and above   Result Value Ref Range    HIV-1 p24 Antigen Non-Reactive Non-Reactive    HIV-1 Antibody Non-Reactive Non-Reactive    HIV-2 Antibody Non-Reactive Non-Reactive    HIV Ag-Ab 5th Gen Non-Reactive Non-Reactive   CK   Result Value Ref Range    Total CK 83 26 - 192 U/L   Protime-INR   Result Value Ref Range    Protime 14.5 11.6 - 14.5 seconds    INR 1.14 0.84 - 1.19   Comprehensive metabolic panel   Result Value Ref Range    Sodium 141 135 - 147 mmol/L    Potassium 3.6 3.5 - 5.3 mmol/L    Chloride 112 (H) 96 - 108 mmol/L    CO2 22 21 - 32 mmol/L    ANION GAP 7 mmol/L    BUN 9 5 - 25 mg/dL    Creatinine 0.68 0.60 - 1.30 mg/dL    Glucose 74 65 - 140 mg/dL    Calcium 8.0 (L) 8.4 - 10.2 mg/dL    Corrected Calcium 8.6 8.3 - 10.1 mg/dL    AST 13 13 - 39 U/L    ALT 29 7 - 52 U/L    Alkaline Phosphatase 56 34 - 104 U/L    Total Protein 5.1 (L) 6.4 - 8.4 g/dL    Albumin 3.3 (L) 3.5 - 5.0 g/dL    Total Bilirubin 0.37 0.20 - 1.00 mg/dL    eGFR 110 ml/min/1.73sq m   CBC and differential   Result Value Ref Range    WBC 10.68 (H) 4.31 - 10.16 Thousand/uL    RBC 4.17 3.81 - 5.12 Million/uL    Hemoglobin 12.4 11.5 - 15.4 g/dL    Hematocrit 38.7 34.8 - 46.1 %    MCV 93 82 - 98 fL    MCH 29.7 26.8 - 34.3 pg    MCHC 32.0 31.4 - 37.4 g/dL    RDW 12.8 11.6 - 15.1 %    MPV 10.2 8.9 - 12.7 fL    Platelets 247 023 - 110 Thousands/uL    nRBC 0 /100 WBCs    Neutrophils Relative 88 (H) 43 - 75 %    Immat GRANS % 0 0 - 2 %    Lymphocytes Relative 6 (L) 14 - 44 %    Monocytes Relative 6 4 - 12 %    Eosinophils Relative 0 0 - 6 %    Basophils Relative 0 0 - 1 %    Neutrophils Absolute 9.39 (H) 1.85 - 7.62 Thousands/µL    Immature Grans Absolute 0.03 0.00 - 0.20 Thousand/uL    Lymphocytes Absolute 0.61 0.60 - 4.47 Thousands/µL    Monocytes Absolute 0.64 0.17 - 1.22 Thousand/µL    Eosinophils Absolute 0.00 0.00 - 0.61 Thousand/µL    Basophils Absolute 0.01 0.00 - 0.10 Thousands/µL   Comprehensive metabolic panel   Result Value Ref Range    Sodium 139 135 - 147 mmol/L    Potassium 3.9 3.5 - 5.3 mmol/L    Chloride 109 (H) 96 - 108 mmol/L    CO2 23 21 - 32 mmol/L    ANION GAP 7 mmol/L    BUN 7 5 - 25 mg/dL    Creatinine 0.72 0.60 - 1.30 mg/dL    Glucose 122 65 - 140 mg/dL    Calcium 8.6 8.4 - 10.2 mg/dL    AST 39 13 - 39 U/L    ALT 51 7 - 52 U/L    Alkaline Phosphatase 81 34 - 104 U/L    Total Protein 6.3 (L) 6.4 - 8.4 g/dL    Albumin 3.8 3.5 - 5.0 g/dL    Total Bilirubin 0.34 0.20 - 1.00 mg/dL    eGFR 105 ml/min/1.73sq m   ECG 12 lead   Result Value Ref Range    Ventricular Rate 99 BPM    Atrial Rate 99 BPM    MN Interval 158 ms    QRSD Interval 80 ms    QT Interval 350 ms    QTC Interval 449 ms    P [place] 32 degrees    QRS Axis 39 degrees    T Wave Axis -13 degrees   Fingerstick Glucose (POCT)   Result Value Ref Range    POC Glucose 79 65 - 140 mg/dl   Fingerstick Glucose (POCT)   Result Value Ref Range    POC Glucose 72 65 - 140 mg/dl Fingerstick Glucose (POCT)   Result Value Ref Range    POC Glucose 89 65 - 140 mg/dl   ·     Incidental Findings:   · None    Test Results Pending at Discharge (will require follow up): None  Outpatient Tests Requested:  · None    Complications: None    Reason for Admission: Intractable abdominal pain    Hospital Course:   Jason Pittman is a 43 y.o. female patient who originally presented to the hospital on 8/28/2023 due to severe abdominal pain and new tremors. A/w nausea. CT of abdomen was negative, right upper quadrant ultrasound showed gallstones. Patient seen by GI and general surgery. EGD was unremarkable, biopsies obtained. General surgery performed cholecystectomy, procedure was successful. mild bdominal pain from surgery, however previous abdominal pain has resolved and nausea resolved. Patient cleared by surgery for discharge. Patient still experiencing tremors and is going to follow-up with outpatient neurology. Please see above list of diagnoses and related plan for additional information. Condition at Discharge: good    Discharge Day Visit / Exam:   Subjective: Patient seen at bedside this morning, sitting up in chair comfortably. Patient slept well and ate a full breakfast.  She is complaining of mild abdominal pain, associated with her surgical sutures. The abdominal pain she was experiencing previously has completely resolved. Patient continues to complain of tremor, she is willing to follow-up with neurology as outpatient. Vitals: Blood Pressure: 137/94 (08/31/23 0758)  Pulse: (!) 109 (08/31/23 0758)  Temperature: 98.2 °F (36.8 °C) (08/31/23 0758)  Temp Source: Oral (08/31/23 0305)  Respirations: 19 (08/31/23 0758)  Height: 5' 3" (160 cm) (08/28/23 1741)  Weight - Scale: 83.4 kg (183 lb 13.8 oz) (08/28/23 1741)  SpO2: 93 % (08/31/23 0758)  Exam:   Physical Exam  Vitals and nursing note reviewed. Constitutional:       General: She is not in acute distress.      Appearance: She is well-developed. She is obese. HENT:      Head: Normocephalic and atraumatic. Right Ear: External ear normal.      Left Ear: External ear normal.      Nose: No rhinorrhea. Mouth/Throat:      Mouth: Mucous membranes are moist.   Eyes:      General:         Right eye: No discharge. Left eye: No discharge. Cardiovascular:      Rate and Rhythm: Normal rate and regular rhythm. Heart sounds: No murmur heard. Pulmonary:      Effort: Pulmonary effort is normal. No respiratory distress. Breath sounds: Normal breath sounds. Abdominal:      General: Bowel sounds are normal. There is no distension. Palpations: Abdomen is soft. Tenderness: There is abdominal tenderness. Comments: Tenderness over right abdomen. Incisions with dressing applied;  Per nurses -no erythema, no swelling, no discharge   Musculoskeletal:         General: No swelling. Normal range of motion. Cervical back: Neck supple. Skin:     General: Skin is warm and dry. Findings: No rash. Neurological:      General: No focal deficit present. Mental Status: She is alert. Mental status is at baseline. Psychiatric:         Mood and Affect: Mood normal.         Behavior: Behavior normal.          Discussion with Family: Patient wanted to update family and organize pickup. Discharge instructions/Information to patient and family:   See after visit summary for information provided to patient and family. Provisions for Follow-Up Care:  See after visit summary for information related to follow-up care and any pertinent home health orders. Disposition:   Home    Planned Readmission: none     Discharge Statement:  I spent 35 minutes discharging the patient. This time was spent on the day of discharge. I had direct contact with the patient on the day of discharge.  Greater than 50% of the total time was spent examining patient, answering all patient questions, arranging and discussing plan of care with patient as well as directly providing post-discharge instructions. Additional time then spent on discharge activities. Discharge Medications:  See after visit summary for reconciled discharge medications provided to patient and/or family.       **Please Note: This note may have been constructed using a voice recognition system**

## 2023-08-31 NOTE — ASSESSMENT & PLAN NOTE
Clinically appears to be physiologic tremor  - Suspected initially due to pain  - Patient also takes Xanax at home, suspected possible withdrawal but she denies frequent use, Xanax restarted  - She is on SNRI, SSRI - serotonin syndrome not clinically evident however both medications can have side effect tremor  - Liraglutide - also side effect of shakiness (rare)  - Albuterol PRN, but not using  - Consider worsened anxiety (increased adrenergic state) in hospital, possible etiology of a worsened physiologic tremor  - Tremor remains present, follow-up with neurology as outpatient

## 2023-08-31 NOTE — CASE MANAGEMENT
Case Management Discharge Planning Note    Patient name Ernie Benitez  Location /644-64 MRN [id number]  : 1985 Date 2023       Current Admission Date: 2023  Current Admission Diagnosis:Intractable abdominal pain   Patient Active Problem List    Diagnosis Date Noted   • Intractable abdominal pain 2023   • Epigastric pain 2023   • Heartburn 2023   • Class 2 severe obesity due to excess calories with serious comorbidity and body mass index (BMI) of 35.0 to 35.9 in adult [facility]) 2023   • PTSD (post-traumatic stress disorder) 2023   • Chronic pain syndrome 2022   • Sacroiliitis ([address]) 2022   • History of lumbar surgery 2022   • Lumbar radiculopathy 2021   • Pre-diabetes 2021   • Vitamin D deficiency 2021   • Acute right-sided low back pain with right-sided sciatica 2020   • Anxiety and depression 2020   • Neck pain 2019   • Chronic bilateral low back pain without sciatica 2019   • Asthma 2019   • Granulomatous disease ([address]) 2019   • Lung nodule < 6cm on CT 2019   • Eosinophilia 2019   • Wound dehiscence 2019   • Lymphedema 2019   • Other chest pain 2019   • Abnormal EKG 2019   • Essential hypertension 2019   • Mass of left axilla 2018   • Allergic rhinitis 2018   • Numbness and tingling of both upper extremities while sleeping 2018   • Urine ketones 03/15/2018   • Depression 2016   • Intention tremor 2016      LOS (days): 2  Geometric Mean LOS (GMLOS) (days): 2.30  Days to GMLOS:0.4     OBJECTIVE:  Risk of Unplanned Readmission Score: 12.13         Current admission status: Inpatient   Preferred Pharmacy:   [address] [place] [address]  Phone: [phone] Fax: [phone]    Primary Care Provider: Karla Lazar PA-C    Primary Insurance: [facility]  Secondary Insurance:     DISCHARGE DETAILS:         Patient discussed in huddle stable for discharge today home.     No needs identified    Follow up providers listed on AVS.

## 2023-08-31 NOTE — UTILIZATION REVIEW
NOTIFICATION OF INPATIENT ADMISSION   AUTHORIZATION REQUEST   SERVICING FACILITY:   [address] [place] [address] [address]  [address] [place] [address]  Tax ID:  [id number]  NPI: [id number] ATTENDING PROVIDER:  Attending Name and NPI#: Mariam Chairez Romeo Gujean-pauldanilo [[id number]]  Address: [address] [place] [address]  Phone: [phone]     ADMISSION INFORMATION:  Place of Service: Inpatient [address] [facility]  Place of Service Code: 21  Inpatient Admission Date/Time: 8/29/23 12:46 PM  Discharge Date/Time: No discharge date for patient encounter. Admitting Diagnosis Code/Description:  Cholelithiasis [K80.20]  Tremor [R25.1]  Weakness [R53.1]  Abdominal pain [R10.9]     UTILIZATION REVIEW CONTACT:  Lainey Chou Utilization   Network Utilization Review Department  Phone: [phone]  Fax [phone]  Email: [email or web address]  Contact for approvals/pending authorizations, clinical reviews, and discharge. PHYSICIAN ADVISORY SERVICES:  Medical Necessity Denial & [facility]-[employer]-Feq[employer] Review  Phone: [phone]  Fax: [phone]  Email: [email or web address]

## 2023-08-31 NOTE — PROGRESS NOTES
Progress Note - General Surgery   Uday Cook 43 y.o. female MRN: [id number]  Unit/Bed#: 776-55 Encounter: [id number]    Assessment:  Postop day #1 status post laparoscopic cholecystectomy for biliary colic, cholelithiasis  Anxiety PTSD disorder/depression   Physiological tremor, initially suspected to be secondary to pain and/or Xanax was held for surgery now restarted  Essential hypertension  Asthma  Type 2 diabetes mellitus  Morbid obesity, BMI 32.57 (83.4 kg)    Recent EGD showed mild gastritis. Right upper quadrant ultrasound showed uncomplicated cholelithiasis    A.m. labs reviewed, WBC mildly elevated 10.68 believed to be reactive in nature postoperatively  Hemoglobin stable 12.4  CMP normal electrolytes, LFTs and kidney function. Total bilirubin 0.34 normal.    Vital signs reviewed, afebrile. Plan:  Patient is stable for discharge home from a surgical standpoint  Continue home medications  Outpatient management with neurology for possible side effects causing tremors  Follow-up with general surgery in the office in 2 weeks, call the office to schedule an appointment date and time for wound check on Thursday, September 14, 2023. Diet as tolerated  No heavy lifting until seen in the office  May remove clear outer dressings tomorrow, leave white Steri-Strips in place unless they follow-up. No tub bathing or swimming for 2 weeks. Medical management at the direction of the attending primary service. No narcotic pain medication upon discharge, may use over-the-counter Tylenol. Avoid use of NSAIDs because of mild gastritis. Subjective/Objective      Chief Complaint: Uncontrolled tremors left hand. Abdominal pain with movement. Subjective: 53-year-old female postop day #1 status post laparoscopic cholecystectomy, currently with unintentional tremors in her left hand.   Overnight and this morning she has now complaints of pain more in the right lower quadrant with movement since surgery, initially in the epigastrium upon admission improved. She has a history of anxiety and PTSD on multiple home medications including Xanax, Strattera and Prazosin was admitted because of epigastric pain which she had prior to admission for 3 weeks including 3 visits to the ED in 17 days. Right upper quadrant abdominal pain not associated with eating, described as 10 out of 10 and decreased appetite. CT showed no acute intra-abdominal process, right upper quadrant abdominal ultrasound on admission showed uncomplicated gallstones. EGD was performed showed mild gastritis. Patient underwent laparoscopic cholecystectomy yesterday. She has complaints of incisional pain and asking for pain meds at present. She also notes worsening of tremors mostly in her left hand and also postoperatively in her jaw which she is unable to control. The patient's Xanax was held preoperatively and since restarted. Patient has been tolerating diet. No nausea or vomiting. Vital signs stable without fever. She admits passing flatus, no bowel movement since surgery yesterday.     Scheduled Meds:  Current Facility-Administered Medications   Medication Dose Route Frequency Provider Last Rate   • albuterol  2 puff Inhalation Q4H PRN Ernestene Daunt, DO     • albuterol  2.5 mg Nebulization Q6H PRN Ernestene Daunt, DO     • ALPRAZolam  0.5 mg Oral BID PRN Ernestene Daunt, DO     • atoMOXetine  36 mg Oral Daily Ernestene Daunt, DO      And   • atoMOXetine  25 mg Oral Daily Ernestene Daunt, DO     • DULoxetine  60 mg Oral Daily Ernestene Daunt, DO     • enoxaparin  40 mg Subcutaneous Q24H Ernestene Daunt, DO     • Fluticasone Furoate-Vilanterol  1 puff Inhalation Daily Ernestene Daunt, DO     • ibuprofen  200 mg Oral Q6H PRN Ernestene Daunt, DO     • loratadine  10 mg Oral Daily Ernestene Daunt, DO     • morphine injection  2 mg Intravenous Q3H PRN Ernestene Daunt, DO     • oxyCODONE  10 mg Oral Q6H PRN Ernestene Daunt, DO     • pantoprazole  40 mg Intravenous Q24H [facility] DO Erick     • prazosin  2 mg Oral HS Joselo Saeed,      • promethazine  25 mg Intravenous Q6H PRN Joselo Saeed DO     • sodium chloride  125 mL/hr Intravenous Continuous Joselo Saeed  mL/hr (08/31/23 0342)   • traZODone  50 mg Oral HS Inder DO Erick       Continuous Infusions:sodium chloride, 125 mL/hr, Last Rate: 125 mL/hr (08/31/23 0342)      PRN Meds:.•  albuterol  •  albuterol  •  ALPRAZolam  •  ibuprofen  •  morphine injection  •  oxyCODONE  •  promethazine      Objective:     Blood pressure 137/94, pulse (!) 109, temperature 98.2 °F (36.8 °C), resp. rate 19, height 5' 3" (1.6 m), weight 83.4 kg (183 lb 13.8 oz), last menstrual period 08/28/2023, SpO2 93 %, not currently breastfeeding. ,Body mass index is 32.57 kg/m². I/O       08/29 0701  08/30 0700 08/30 0701  08/31 0700 08/31 0701  09/01 0700    P. O. 0 360 120    I.V. (mL/kg) 3705 (44.4) 700 (8.4)     IV Piggyback       Total Intake(mL/kg) 3705 (44.4) 1060 (12.7) 120 (1.4)    Urine (mL/kg/hr)  1600 (0.8)     Stool  0     Total Output  1600     Net +3705 -540 +120           Unmeasured Urine Occurrence 1 x      Unmeasured Stool Occurrence 0 x 0 x         Invasive Devices     Peripheral Intravenous Line  Duration           Peripheral IV 08/30/23 Distal;Dorsal (posterior); Left Forearm 1 day              Physical Exam:     /94   Pulse (!) 109   Temp 98.2 °F (36.8 °C)   Resp 19   Ht 5' 3" (1.6 m)   Wt 83.4 kg (183 lb 13.8 oz)   LMP 08/28/2023 (Approximate)   SpO2 93%   BMI 32.57 kg/m²     General appearance finds the patient to be quite anxious  Neurological exam tremors left hand noted. Awake, fully oriented x3 spheres. Ambulation not observed. Head normocephalic. Yellow dyed hair. ENT clear. Oral mucosa is pink and moist.  Sclera white OU. Neck no goiter or adenopathy. Trachea midline. Heart regular rate and rhythm. Tachycardic. Lungs clear to auscultation. No rales or rhonchi. Abdomen obese configuration.   + bowel sounds. 4 laparoscopic incisions are all CDI, minimal incision tenderness to touch. Some tenderness in the right lower quadrant, negative Carnett's sign. No abdominal hernias are palpable. Negative heel tap. No guarding or rebound. No bruising or skin erythema. No palpable masses, liver edge is not felt. Extremities without peripheral edema. Calf muscles are soft bilaterally. Skin without jaundice, rash or ecchymosis. Lab, Imaging and other studies:I have personally reviewed pertinent lab results.     VTE Pharmacologic Prophylaxis: Enoxaparin (Lovenox)  VTE Mechanical Prophylaxis: sequential compression device     Genia Giordano PA-C

## 2023-08-31 NOTE — NURSING NOTE
Pt discharged to home. D/c instructions given and reviewed with pt. Pt verbalized understanding. Pt left via wheelchair.

## 2023-09-01 ENCOUNTER — TRANSITIONAL CARE MANAGEMENT (OUTPATIENT)
Dept: FAMILY MEDICINE CLINIC | Facility: HOME HEALTHCARE | Age: 38
End: 2023-09-01

## 2023-09-03 ENCOUNTER — APPOINTMENT (EMERGENCY)
Dept: CT IMAGING | Facility: HOSPITAL | Age: 38
End: 2023-09-03
Payer: COMMERCIAL

## 2023-09-03 ENCOUNTER — APPOINTMENT (EMERGENCY)
Dept: RADIOLOGY | Facility: HOSPITAL | Age: 38
End: 2023-09-03
Payer: COMMERCIAL

## 2023-09-03 ENCOUNTER — NURSE TRIAGE (OUTPATIENT)
Dept: OTHER | Facility: OTHER | Age: 38
End: 2023-09-03

## 2023-09-03 ENCOUNTER — HOSPITAL ENCOUNTER (EMERGENCY)
Facility: HOSPITAL | Age: 38
Discharge: HOME | End: 2023-09-03
Attending: EMERGENCY MEDICINE
Payer: COMMERCIAL

## 2023-09-03 VITALS
SYSTOLIC BLOOD PRESSURE: 133 MMHG | TEMPERATURE: 98.1 F | OXYGEN SATURATION: 95 % | RESPIRATION RATE: 18 BRPM | DIASTOLIC BLOOD PRESSURE: 82 MMHG | WEIGHT: 183.86 LBS | HEART RATE: 78 BPM | BODY MASS INDEX: 32.57 KG/M2

## 2023-09-03 DIAGNOSIS — R25.1 SHAKING: ICD-10-CM

## 2023-09-03 DIAGNOSIS — Z90.49 S/P LAPAROSCOPIC CHOLECYSTECTOMY: Primary | ICD-10-CM

## 2023-09-03 DIAGNOSIS — R11.0 NAUSEA: ICD-10-CM

## 2023-09-03 DIAGNOSIS — R10.9 ABDOMINAL PAIN: ICD-10-CM

## 2023-09-03 LAB
ALBUMIN SERPL BCP-MCNC: 3.9 G/DL (ref 3.5–5)
ALP SERPL-CCNC: 102 U/L (ref 34–104)
ALT SERPL W P-5'-P-CCNC: 119 U/L (ref 7–52)
ANION GAP SERPL CALCULATED.3IONS-SCNC: 9 MMOL/L
AST SERPL W P-5'-P-CCNC: 71 U/L (ref 13–39)
BACTERIA UR QL AUTO: NORMAL /HPF
BASOPHILS # BLD AUTO: 0.04 THOUSANDS/ÂΜL (ref 0–0.1)
BASOPHILS NFR BLD AUTO: 0 % (ref 0–1)
BILIRUB SERPL-MCNC: 0.52 MG/DL (ref 0.2–1)
BILIRUB UR QL STRIP: NEGATIVE
BUN SERPL-MCNC: 7 MG/DL (ref 5–25)
CALCIUM SERPL-MCNC: 9.2 MG/DL (ref 8.4–10.2)
CHLORIDE SERPL-SCNC: 102 MMOL/L (ref 96–108)
CLARITY UR: ABNORMAL
CO2 SERPL-SCNC: 26 MMOL/L (ref 21–32)
COLOR UR: YELLOW
CREAT SERPL-MCNC: 0.74 MG/DL (ref 0.6–1.3)
EOSINOPHIL # BLD AUTO: 0.27 THOUSAND/ÂΜL (ref 0–0.61)
EOSINOPHIL NFR BLD AUTO: 3 % (ref 0–6)
ERYTHROCYTE [DISTWIDTH] IN BLOOD BY AUTOMATED COUNT: 13.3 % (ref 11.6–15.1)
FLUAV RNA RESP QL NAA+PROBE: NEGATIVE
FLUBV RNA RESP QL NAA+PROBE: NEGATIVE
GFR SERPL CREATININE-BSD FRML MDRD: 102 ML/MIN/1.73SQ M
GLUCOSE SERPL-MCNC: 117 MG/DL (ref 65–140)
GLUCOSE UR STRIP-MCNC: NEGATIVE MG/DL
HCT VFR BLD AUTO: 39 % (ref 34.8–46.1)
HGB BLD-MCNC: 12.6 G/DL (ref 11.5–15.4)
HGB UR QL STRIP.AUTO: ABNORMAL
IMM GRANULOCYTES # BLD AUTO: 0.04 THOUSAND/UL (ref 0–0.2)
IMM GRANULOCYTES NFR BLD AUTO: 0 % (ref 0–2)
KETONES UR STRIP-MCNC: NEGATIVE MG/DL
LACTATE SERPL-SCNC: 1.6 MMOL/L (ref 0.5–2)
LEUKOCYTE ESTERASE UR QL STRIP: NEGATIVE
LIPASE SERPL-CCNC: 10 U/L (ref 11–82)
LYMPHOCYTES # BLD AUTO: 1.03 THOUSANDS/ÂΜL (ref 0.6–4.47)
LYMPHOCYTES NFR BLD AUTO: 11 % (ref 14–44)
MCH RBC QN AUTO: 30.5 PG (ref 26.8–34.3)
MCHC RBC AUTO-ENTMCNC: 32.3 G/DL (ref 31.4–37.4)
MCV RBC AUTO: 94 FL (ref 82–98)
MONOCYTES # BLD AUTO: 0.61 THOUSAND/ÂΜL (ref 0.17–1.22)
MONOCYTES NFR BLD AUTO: 7 % (ref 4–12)
NEUTROPHILS # BLD AUTO: 7.44 THOUSANDS/ÂΜL (ref 1.85–7.62)
NEUTS SEG NFR BLD AUTO: 79 % (ref 43–75)
NITRITE UR QL STRIP: NEGATIVE
NON-SQ EPI CELLS URNS QL MICRO: NORMAL /HPF
NRBC BLD AUTO-RTO: 0 /100 WBCS
PH UR STRIP.AUTO: 7.5 [PH]
PLATELET # BLD AUTO: 253 THOUSANDS/UL (ref 149–390)
PMV BLD AUTO: 10.1 FL (ref 8.9–12.7)
POTASSIUM SERPL-SCNC: 4.1 MMOL/L (ref 3.5–5.3)
PROT SERPL-MCNC: 6.6 G/DL (ref 6.4–8.4)
PROT UR STRIP-MCNC: NEGATIVE MG/DL
RBC # BLD AUTO: 4.13 MILLION/UL (ref 3.81–5.12)
RBC #/AREA URNS AUTO: NORMAL /HPF
RSV RNA RESP QL NAA+PROBE: NEGATIVE
SARS-COV-2 RNA RESP QL NAA+PROBE: NEGATIVE
SODIUM SERPL-SCNC: 137 MMOL/L (ref 135–147)
SP GR UR STRIP.AUTO: <=1.005 (ref 1–1.03)
UROBILINOGEN UR QL STRIP.AUTO: 0.2 E.U./DL
WBC # BLD AUTO: 9.43 THOUSAND/UL (ref 4.31–10.16)
WBC #/AREA URNS AUTO: NORMAL /HPF

## 2023-09-03 PROCEDURE — 36415 COLL VENOUS BLD VENIPUNCTURE: CPT | Performed by: EMERGENCY MEDICINE

## 2023-09-03 PROCEDURE — 71045 X-RAY EXAM CHEST 1 VIEW: CPT

## 2023-09-03 PROCEDURE — 83605 ASSAY OF LACTIC ACID: CPT | Performed by: EMERGENCY MEDICINE

## 2023-09-03 PROCEDURE — 96374 THER/PROPH/DIAG INJ IV PUSH: CPT

## 2023-09-03 PROCEDURE — 96375 TX/PRO/DX INJ NEW DRUG ADDON: CPT

## 2023-09-03 PROCEDURE — 83690 ASSAY OF LIPASE: CPT | Performed by: EMERGENCY MEDICINE

## 2023-09-03 PROCEDURE — G1004 CDSM NDSC: HCPCS

## 2023-09-03 PROCEDURE — 80053 COMPREHEN METABOLIC PANEL: CPT | Performed by: EMERGENCY MEDICINE

## 2023-09-03 PROCEDURE — NC001 PR NO CHARGE: Performed by: PHYSICIAN ASSISTANT

## 2023-09-03 PROCEDURE — 87040 BLOOD CULTURE FOR BACTERIA: CPT | Performed by: EMERGENCY MEDICINE

## 2023-09-03 PROCEDURE — 74177 CT ABD & PELVIS W/CONTRAST: CPT

## 2023-09-03 PROCEDURE — 85025 COMPLETE CBC W/AUTO DIFF WBC: CPT | Performed by: EMERGENCY MEDICINE

## 2023-09-03 PROCEDURE — 99285 EMERGENCY DEPT VISIT HI MDM: CPT | Performed by: EMERGENCY MEDICINE

## 2023-09-03 PROCEDURE — 81001 URINALYSIS AUTO W/SCOPE: CPT | Performed by: EMERGENCY MEDICINE

## 2023-09-03 PROCEDURE — 96361 HYDRATE IV INFUSION ADD-ON: CPT

## 2023-09-03 PROCEDURE — 0241U HB NFCT DS VIR RESP RNA 4 TRGT: CPT | Performed by: EMERGENCY MEDICINE

## 2023-09-03 PROCEDURE — 93005 ELECTROCARDIOGRAM TRACING: CPT

## 2023-09-03 PROCEDURE — 99284 EMERGENCY DEPT VISIT MOD MDM: CPT

## 2023-09-03 RX ORDER — KETOROLAC TROMETHAMINE 30 MG/ML
15 INJECTION, SOLUTION INTRAMUSCULAR; INTRAVENOUS ONCE
Status: COMPLETED | OUTPATIENT
Start: 2023-09-03 | End: 2023-09-03

## 2023-09-03 RX ORDER — LORAZEPAM 2 MG/ML
1 INJECTION INTRAMUSCULAR ONCE
Status: COMPLETED | OUTPATIENT
Start: 2023-09-03 | End: 2023-09-03

## 2023-09-03 RX ADMIN — IOHEXOL 100 ML: 350 INJECTION, SOLUTION INTRAVENOUS at 16:48

## 2023-09-03 RX ADMIN — KETOROLAC TROMETHAMINE 15 MG: 30 INJECTION, SOLUTION INTRAMUSCULAR; INTRAVENOUS at 16:10

## 2023-09-03 RX ADMIN — LORAZEPAM 1 MG: 2 INJECTION INTRAMUSCULAR; INTRAVENOUS at 16:16

## 2023-09-03 RX ADMIN — SODIUM CHLORIDE 1000 ML: 0.9 INJECTION, SOLUTION INTRAVENOUS at 16:11

## 2023-09-03 RX ADMIN — SODIUM CHLORIDE 1000 ML: 0.9 INJECTION, SOLUTION INTRAVENOUS at 17:53

## 2023-09-03 NOTE — ED PROVIDER NOTES
I accepted signout from this from the honorable Destiny Savage, patient with recent cholecystectomy, Lucinda Ivory presents for abdominal pain not feeling well and continued tremors, I am familiar with this patient as I had admitted her last week prior to her cholecystectomy. Awaiting laboratory analysis as well as CT imaging. She initially had tachycardia that resolved she is 88 bpm normal sinus at this point. I did update the patient at 1852 hrs. Her evaluation is within reasonable limits CT scan does not show any evidence of infection bleeding abscesses. Laboratory evaluation urine within reasonable limits. She is stable for discharge based upon my bedside examination vital signs normal there is no tremor or shaking at bedside.      Gurpreet Bingham PA-C  09/03/23 3102

## 2023-09-03 NOTE — ED PROVIDER NOTES
History  Chief Complaint   Patient presents with   • Fever     Pt had surgery on 8- to have gallbladder removed no is hot chills ears hurt and tremors     HPI  This is a 62-year-old female with a past medical history significant for tremor, paresthesias, HTN, obesity, chronic pain, recently with multiple ER visits for upper abdominal pain and cholelithiasis ultimately admitted on 8/28/2023 and underwent laparoscopic cholecystectomy by Dr. Khadar Burnette on 8/30/2023, discharged to home who presents here via EMS for evaluation of not feeling well. Patient describes constellation of symptoms including nausea, tremors, fatigue, decreased appetite, decreased bowel movements, and generally feeling not well. She reports ongoing right-sided abdominal pain which she attributes to postoperative state and says this is not significantly worsened or changed with these other symptoms. Denies vomiting denies blood in stool denies any dehiscence of wounds or drainage from the abdomen. Denies any cough or shortness of breath. Notes subjective fevers. Reports taking Tylenol and pain medication without improvement in her symptoms. Reports that yesterday her right ear felt hot to the touch but reports this is improved today. Prior to Admission Medications   Prescriptions Last Dose Informant Patient Reported? Taking? ALPRAZolam (XANAX) 1 mg tablet   Yes No   Sig: Take 1 mg by mouth as needed   Alcohol Swabs (Pharmacist Choice Alcohol) PADS   No No   Sig: TEST BLOOD 3 TIMES DAILY   Blood Glucose Monitoring Suppl (OneTouch Verio Reflect) w/Device KIT   No No   Sig: Check blood sugars three times daily. Please substitute with appropriate alternative as covered by patient's insurance.  Dx: E11.65   DULoxetine (CYMBALTA) 60 mg delayed release capsule   Yes No   Sig: Take 60 mg by mouth daily   Fluticasone-Salmeterol (Advair) 250-50 mcg/dose inhaler   No No   Sig: INHALE 1 PUFF 2 (TWO) TIMES A DAY RINSE MOUTH AFTER USE. Lancet Devices (Easy Mini Eject Lancing Device) MISC   No No   Sig: Use as directed to check blood sugar 3x daily.    OneTouch Verio test strip   No No   Sig: TEST BLOOD 3 TIMES DAILY   Pharmacist Choice Lancets MISC   No No   Sig: TEST BLOOD 3 TIMES DAILY   albuterol (2.5 mg/3 mL) 0.083 % nebulizer solution   No No   Sig: Take 3 mL (2.5 mg total) by nebulization every 6 (six) hours as needed for wheezing or shortness of breath   albuterol (PROVENTIL HFA,VENTOLIN HFA) 90 mcg/act inhaler   No No   Sig: INHALE 2 PUFFS EVERY 4 (FOUR) HOURS AS NEEDED FOR WHEEZING OR SHORTNESS OF BREATH   aluminum-magnesium hydroxide 200-200 MG/5ML suspension   No No   Sig: Take 10 mL by mouth every 6 (six) hours as needed for heartburn   atoMOXetine (STRATTERA) 60 mg capsule   Yes No   Sig: Take 60 mg by mouth in the morning   cetirizine (ZyrTEC) 10 mg tablet   No No   Sig: TAKE 1 TABLET (10 MG TOTAL) BY MOUTH DAILY   ergocalciferol (VITAMIN D2) 50,000 units   No No   Sig: TAKE 1 CAPSULE (50,000 UNITS TOTAL) BY MOUTH ONCE A WEEK   fluticasone (FLONASE) 50 mcg/act nasal spray   No No   Si spray into each nostril daily   hydrochlorothiazide (HYDRODIURIL) 12.5 mg tablet   No No   Sig: TAKE 1 TABLET (12.5 MG TOTAL) BY MOUTH DAILY   lisinopril (ZESTRIL) 10 mg tablet   No No   Sig: TAKE 1 TABLET (10 MG TOTAL) BY MOUTH 2 (TWO) TIMES A DAY   meclizine (ANTIVERT) 25 mg tablet   No No   Sig: TAKE 1 TABLET (25 MG TOTAL) BY MOUTH 3 (THREE) TIMES A DAY AS NEEDED FOR DIZZINESS   metFORMIN (GLUCOPHAGE-XR) 500 mg 24 hr tablet   No No   Sig: TAKE 1 TABLET (500 MG TOTAL) BY MOUTH DAILY WITH DINNER   mupirocin (BACTROBAN) 2 % ointment   No No   Sig: APPLY TOPICALLY 3 (THREE) TIMES A DAY   ondansetron (ZOFRAN) 4 mg tablet   No No   Sig: TAKE 1 TABLET (4 MG TOTAL) BY MOUTH EVERY 8 (EIGHT) HOURS AS NEEDED FOR NAUSEA   oxyCODONE (Roxicodone) 5 immediate release tablet   No No   Sig: Take 1 tablet (5 mg total) by mouth every 6 (six) hours as needed for moderate pain for up to 5 days Max Daily Amount: 20 mg   prazosin (MINIPRESS) 2 mg capsule   Yes No   Sig: Take 2 mg by mouth daily at bedtime   traZODone (DESYREL) 50 mg tablet   Yes No   Sig: Take 50 mg by mouth daily at bedtime      Facility-Administered Medications: None       Past Medical History:   Diagnosis Date   • ADHD (attention deficit hyperactivity disorder)    • Anxiety    • Asthma    • Cat-scratch disease     last assessed 10/22/15   • Depression    • Hypertension     last assessed 11/11/14   • Sciatica    • Sleep disorder        Past Surgical History:   Procedure Laterality Date   • BREAST BIOPSY Left     2016   • CHOLECYSTECTOMY LAPAROSCOPIC N/A 8/30/2023    Procedure: CHOLECYSTECTOMY LAPAROSCOPIC;  Surgeon: Negar Rueda DO;  Location: [facility];  Service: General   • DENTAL SURGERY     • MN BX/EXC LYMPH NODE OPEN DEEP AXILLARY NODE Left 1/15/2019    Procedure: LEFT AXILLARY LYMPH NODE BIOPSY;  Surgeon: Danika Page MD;  Location: [facility];  Service: General   • MN LAMNOTMY INCL W/DCMPRSN NRV ROOT 1 INTRSPC LUMBR Right 4/14/2021    Procedure: L5-S1 minimally invasive microdiskectomy and  epidural steroid injection;  Surgeon: Jasmin Delatorre MD;  Location: [facility];  Service: Neurosurgery   • TUBAL LIGATION         Family History   Problem Relation Age of Onset   • Anxiety disorder Mother    • Bipolar disorder Mother    • Depression Mother    • Tremor Mother    • Anxiety disorder Father    • Bipolar disorder Father    • Depression Father    • Schizophrenia Father    • Parkinsonism Maternal Grandmother    • Stomach cancer Paternal Grandfather      I have reviewed and agree with the history as documented.     E-Cigarette/Vaping   • E-Cigarette Use Never User      E-Cigarette/Vaping Substances   • Nicotine No    • THC No    • CBD No    • Flavoring No    • Other No    • Unknown No      Social History     Tobacco Use   • Smoking status: Never   • Smokeless tobacco: Never   Vaping Use   • Vaping Use: Never used   Substance Use Topics   • Alcohol use: Never   • Drug use: No       Review of Systems   Constitutional: Positive for activity change, appetite change, chills and fever. HENT: Negative for congestion, ear pain and sore throat. Eyes: Negative for pain and visual disturbance. Respiratory: Negative for cough and shortness of breath. Cardiovascular: Negative for chest pain and palpitations. Gastrointestinal: Positive for abdominal pain and nausea. Negative for abdominal distention, constipation, diarrhea and vomiting. Genitourinary: Negative for dysuria, flank pain and hematuria. Musculoskeletal: Negative for arthralgias and back pain. Skin: Negative for color change and rash. Neurological: Positive for tremors. Negative for dizziness, seizures, syncope, facial asymmetry, light-headedness, numbness and headaches. All other systems reviewed and are negative. Physical Exam  Physical Exam  Vitals and nursing note reviewed. Exam conducted with a chaperone present. Constitutional:       General: She is not in acute distress. Appearance: She is well-developed. She is obese. She is not ill-appearing, toxic-appearing or diaphoretic. Comments: Generalized tremor when entering the room   HENT:      Head: Normocephalic and atraumatic. Right Ear: External ear normal.      Left Ear: External ear normal.      Nose: Nose normal.      Mouth/Throat:      Mouth: Mucous membranes are moist.      Pharynx: Oropharynx is clear. Eyes:      General: No scleral icterus. Conjunctiva/sclera: Conjunctivae normal.   Cardiovascular:      Rate and Rhythm: Normal rate and regular rhythm. Heart sounds: No murmur heard. Pulmonary:      Effort: Pulmonary effort is normal. No respiratory distress. Breath sounds: Normal breath sounds. No wheezing, rhonchi or rales. Abdominal:      Palpations: Abdomen is soft. Tenderness: There is no abdominal tenderness.  There is no guarding or rebound. Comments: Abdomen is soft and nondistended. No significant abdominal tenderness. Very mild abdominal tenderness in the right lower quadrant. Laparoscopic incision sites look clean and dry Steri-Strips are still in place. No palpable areas of fluctuance or induration or overlying cellulitic changes. Musculoskeletal:         General: No swelling. Cervical back: Neck supple. Skin:     General: Skin is warm and dry. Capillary Refill: Capillary refill takes less than 2 seconds. Neurological:      Mental Status: She is alert and oriented to person, place, and time. Mental status is at baseline.    Psychiatric:         Mood and Affect: Mood normal.         Vital Signs  ED Triage Vitals   Temperature Pulse Respirations Blood Pressure SpO2   09/03/23 1504 09/03/23 1504 09/03/23 1504 09/03/23 1600 09/03/23 1504   98.1 °F (36.7 °C) (!) 127 20 149/88 97 %      Temp Source Heart Rate Source Patient Position - Orthostatic VS BP Location FiO2 (%)   09/03/23 1504 09/03/23 1504 09/03/23 1504 09/03/23 1504 --   Temporal Monitor Sitting Left arm       Pain Score       09/03/23 1504       10 - Worst Possible Pain           Vitals:    09/03/23 1504 09/03/23 1600   BP:  149/88   Pulse: (!) 127 92   Patient Position - Orthostatic VS: Sitting Lying         Visual Acuity      ED Medications  Medications   sodium chloride 0.9 % bolus 1,000 mL (1,000 mL Intravenous New Bag 9/3/23 1611)     Followed by   sodium chloride 0.9 % bolus 1,000 mL (has no administration in time range)   LORazepam (ATIVAN) injection 1 mg (1 mg Intravenous Given 9/3/23 1616)   ketorolac (TORADOL) injection 15 mg (15 mg Intravenous Given 9/3/23 1610)       Diagnostic Studies  Results Reviewed     Procedure Component Value Units Date/Time    Lipase [[id number]]  (Abnormal) Collected: 09/03/23 1559    Lab Status: Final result Specimen: Blood from Arm, Left Updated: 09/03/23 1627     Lipase 10 u/L     Comprehensive metabolic panel [[id number]]  (Abnormal) Collected: 09/03/23 1559    Lab Status: Final result Specimen: Blood from Arm, Left Updated: 09/03/23 1627     Sodium 137 mmol/L      Potassium 4.1 mmol/L      Chloride 102 mmol/L      CO2 26 mmol/L      ANION GAP 9 mmol/L      BUN 7 mg/dL      Creatinine 0.74 mg/dL      Glucose 117 mg/dL      Calcium 9.2 mg/dL      AST 71 U/L       U/L      Alkaline Phosphatase 102 U/L      Total Protein 6.6 g/dL      Albumin 3.9 g/dL      Total Bilirubin 0.52 mg/dL      eGFR 102 ml/min/1.73sq m     Narrative:      [place] guidelines for Chronic Kidney Disease (CKD):   •  Stage 1 with normal or high GFR (GFR > 90 mL/min/1.73 square meters)  •  Stage 2 Mild CKD (GFR = 60-89 mL/min/1.73 square meters)  •  Stage 3A Moderate CKD (GFR = 45-59 mL/min/1.73 square meters)  •  Stage 3B Moderate CKD (GFR = 30-44 mL/min/1.73 square meters)  •  Stage 4 Severe CKD (GFR = 15-29 mL/min/1.73 square meters)  •  Stage 5 End Stage CKD (GFR <15 mL/min/1.73 square meters)  Note: GFR calculation is accurate only with a steady state creatinine    CBC and differential [[id number]] Updated: 09/03/23 1615    Lab Status: No result Specimen: Blood from Arm, Left     Blood culture #2 [[id number]] Collected: 09/03/23 1608    Lab Status: In process Specimen: Blood from Arm, Right Updated: 09/03/23 1612    Blood culture #1 [[id number]] Collected: 09/03/23 1559    Lab Status: In process Specimen: Blood from Arm, Left Updated: 09/03/23 1605    Lactic acid, plasma (w/reflex if result > 2.0) [[id number]] Collected: 09/03/23 1559    Lab Status: In process Specimen: Blood from Arm, Left Updated: 09/03/23 1604    FLU/RSV/COVID - if FLU/RSV clinically relevant [[id number]] Collected: 09/03/23 1559    Lab Status:  In process Specimen: Nares from Nose Updated: 09/03/23 1604    UA w Reflex to Microscopic w Reflex to Culture [[id number]]     Lab Status: No result Specimen: Urine, Clean Catch                  CT abdomen pelvis with contrast    (Results Pending)   XR chest 1 view portable    (Results Pending)              Procedures  Procedures         ED Course  ED Course as of 09/03/23 1628   Sun Sep 03, 2023   1602 Blood Pressure: 149/88   1602 Pulse(!): 127   1624 EKG interpreted by myself. EKG dated 9/3/2023 at 1621 demonstrates normal sinus rhythm 88 bpm, normal NV, QRS, QTc intervals, no STEMI. No significant change when compared to prior EKG on file from 8/28/2023. SBIRT 22yo+    Flowsheet Row Most Recent Value   Initial Alcohol Screen: US AUDIT-C     1. How often do you have a drink containing alcohol? 0 Filed at: 09/03/2023 1507   Audit-C Score 0 Filed at: 09/03/2023 1507   YOAN: How many times in the past year have you. .. Used an illegal drug or used a prescription medication for non-medical reasons? Never Filed at: 09/03/2023 1507                    Medical Decision Making  26-year-old female presenting with multiple complaints in the setting of a recent cholecystectomy and ongoing abdominal pain issues. Presents with tachycardia, shaking/tremors. Tremor has been previously documented related to being tension tremor may be related to patient's pain. Doubt seizure given generalized shaking without mental status changes and no focal symptoms. Abdomen exhibits some mild tenderness but is otherwise soft and no obvious evidence of incision site changes. Differential diagnosis includes normal postoperative state, anxiety, postoperative complication including bowel obstruction, ileus, seroma, abdominal abscess. We will proceed with screening labs, CT scan, urinalysis x-ray and EKG. We will treat symptoms with IV fluids, Ativan, Toradol. Will reassess for disposition. Abdominal pain: chronic illness or injury  Nausea: acute illness or injury  S/P laparoscopic cholecystectomy:     Details: Patient at risk for postoperative complications including ileus, seroma, abscess.   Shaking: chronic illness or injury     Details: Well-documented during previous admission. Felt to be related to physiologic pain response possibly anxiety possibly drug reaction. Outpatient follow-up with neurology instructed at last admission has not followed up yet. Not consistent with seizure. Amount and/or Complexity of Data Reviewed  Labs: ordered. Radiology: ordered. ECG/medicine tests: ordered and independent interpretation performed. Decision-making details documented in ED Course. Risk  Prescription drug management. Disposition  Final diagnoses:   S/P laparoscopic cholecystectomy   Abdominal pain   Nausea   Shaking     Time reflects when diagnosis was documented in both MDM as applicable and the Disposition within this note     Time User Action Codes Description Comment    9/3/2023  3:41 PM Mita Goadeeld [Z90.49] S/P laparoscopic cholecystectomy     9/3/2023  3:41 PM Jaycob Double Add [R10.9] Abdominal pain     9/3/2023  3:41 PM Mita Gourd [R11.0] Nausea     9/3/2023  3:42 PM Jaycob Double Add [R25.1] Shaking       ED Disposition     None      Follow-up Information    None         Patient's Medications   Discharge Prescriptions    No medications on file       No discharge procedures on file.     PDMP Review       Value Time User    PDMP Reviewed  Yes 8/31/2023 11:11 AM Kash Chairez DO          ED Provider  Electronically Signed by           Fantasma Acevedo DO  09/03/23 2326

## 2023-09-03 NOTE — TELEPHONE ENCOUNTER
Patient calling in postop after having her gall bladder removed on 8/30 with multiple symptoms. Patient stated she has been having headaches 8/10, Belly button pain 10/10, worsening tremors/shakiness, lightheadedness, nausea- no vomiting, intermittent issues with urination, constipation- last BM on 8/28, hot flashes and chills- unable to take temperature and stated her right ear is bright red and feels like it is on fire. On call provider contacted, stated patient should be evaluated in the ED at this time. Patient made aware and stated she would be going to the [address] [place] [address] ED now.

## 2023-09-03 NOTE — TELEPHONE ENCOUNTER
Reason for Disposition  • Patient sounds very sick or weak to the triager    Answer Assessment - Initial Assessment Questions  1. SYMPTOM: "What's the main symptom you're concerned about?" (e.g., pain, fever, vomiting)      Headache      Worsening Tremors/shakiness      Has intermittent issues urinating       Lightheadedness      Nausea       Pain at belly button      Right ear bright red- like on fire     2. ONSET: "When did  s/s start?"      Pain has been ongoing       Yesterday urination issues      New today- tremors worsening and chills, worsening dizziness      3. SURGERY: "What surgery was performed?"      Gall bladder removal     4. DATE of SURGERY: "When was surgery performed?"       8/30/23    5. ANESTHESIA: " What type of anesthesia did you have?" (e.g., general, spinal, epidural, local)      General     6. PAIN: "Is there any pain?" If Yes, ask: "How bad is it?"  (Scale 1-10; or mild, moderate, severe)      Headache- 8/10      Belly button- 10+/10    7. FEVER: "Do you have a fever?" If Yes, ask: "What is your temperature, how was it measured, and when did it start?"      Unable to take temperature but having chills and heat flashes     8. VOMITING: "Is there any vomiting?" If yes, ask: "How many times?"      Denies     9. BLEEDING: "Is there any bleeding?" If Yes, ask: "How much?" and "Where?"      Denies     10.  OTHER SYMPTOMS: "Do you have any other symptoms?" (e.g., drainage from wound, painful urination, constipation)        Constipation- Last BM before surgery, potential on Monday 8/28    Protocols used: POST-OP SYMPTOMS AND QUESTIONS-[facility]

## 2023-09-03 NOTE — TELEPHONE ENCOUNTER
Regarding: Post op - headache, chills  ----- Message from Jennifer Horner sent at 9/3/2023  2:24 PM EDT -----  "I had my gallbladder removed on Wednesday and now I have a headache, lightheadedness and chills. I also have a lot of pain at my belly button. ""

## 2023-09-05 PROCEDURE — 88305 TISSUE EXAM BY PATHOLOGIST: CPT | Performed by: PATHOLOGY

## 2023-09-05 PROCEDURE — 88304 TISSUE EXAM BY PATHOLOGIST: CPT | Performed by: PATHOLOGY

## 2023-09-06 ENCOUNTER — TELEPHONE (OUTPATIENT)
Dept: FAMILY MEDICINE CLINIC | Facility: HOME HEALTHCARE | Age: 38
End: 2023-09-06

## 2023-09-06 LAB
ATRIAL RATE: 88 BPM
P AXIS: 18 DEGREES
PR INTERVAL: 166 MS
QRS AXIS: 27 DEGREES
QRSD INTERVAL: 78 MS
QT INTERVAL: 384 MS
QTC INTERVAL: 464 MS
T WAVE AXIS: 10 DEGREES
VENTRICULAR RATE: 88 BPM

## 2023-09-06 PROCEDURE — 93010 ELECTROCARDIOGRAM REPORT: CPT | Performed by: INTERNAL MEDICINE

## 2023-09-06 NOTE — TELEPHONE ENCOUNTER
Patient called and said she was recently in the hospital for tremors and can't get into any neurologist till 2/14/23 and wanted to know if you could prescribe something for them till that appt

## 2023-09-08 ENCOUNTER — CLINICAL SUPPORT (OUTPATIENT)
Dept: CARDIOLOGY CLINIC | Facility: CLINIC | Age: 38
End: 2023-09-08
Payer: COMMERCIAL

## 2023-09-08 ENCOUNTER — OFFICE VISIT (OUTPATIENT)
Dept: CARDIOLOGY CLINIC | Facility: CLINIC | Age: 38
End: 2023-09-08
Payer: COMMERCIAL

## 2023-09-08 VITALS
HEART RATE: 88 BPM | DIASTOLIC BLOOD PRESSURE: 84 MMHG | BODY MASS INDEX: 32.14 KG/M2 | SYSTOLIC BLOOD PRESSURE: 126 MMHG | WEIGHT: 181.4 LBS | HEIGHT: 63 IN | RESPIRATION RATE: 20 BRPM

## 2023-09-08 DIAGNOSIS — R94.31 ABNORMAL EKG: Primary | ICD-10-CM

## 2023-09-08 DIAGNOSIS — R07.9 CHEST PAIN, UNSPECIFIED TYPE: ICD-10-CM

## 2023-09-08 DIAGNOSIS — R00.2 PALPITATIONS: ICD-10-CM

## 2023-09-08 DIAGNOSIS — R07.89 OTHER CHEST PAIN: ICD-10-CM

## 2023-09-08 DIAGNOSIS — I10 ESSENTIAL HYPERTENSION: ICD-10-CM

## 2023-09-08 PROCEDURE — 99244 OFF/OP CNSLTJ NEW/EST MOD 40: CPT | Performed by: NURSE PRACTITIONER

## 2023-09-08 PROCEDURE — 93246 EXT ECG>7D<15D RECORDING: CPT | Performed by: NURSE PRACTITIONER

## 2023-09-08 NOTE — ASSESSMENT & PLAN NOTE
Report of left posterior fascicular block on EKG, but after my personal review, there appears to be limb lead reversal

## 2023-09-08 NOTE — PROGRESS NOTES
Patient ID: Eva Valdez is a 43 y.o. female. Plan:      Essential hypertension  Blood pressure is well controlled    Other chest pain  Right-sided chest pain  This is likely GI in nature    Abnormal EKG  Report of left posterior fascicular block on EKG, but after my personal review, there appears to be limb lead reversal    Palpitations  Check 2 weeks Zio patch       Follow up Plan/Summary Comments:  Trixie's symptoms of chest pain are likely GI related. She recently had a cholecystectomy and notes improvement. I have recommended a 2-week ambulatory monitor for further evaluation of her palpitations. I have also ordered an echocardiogram for further evaluation. I will notify Abiel Correa of her results by phone and follow-up appointments will be scheduled pending the results of these tests. HPI: Abiel Correa is seen in the office today to establish care for evaluation of chest pain, palpitations, and abnormal EKG. Abiel Correa is a pleasant 48-year-old female without prior cardiac history. According to chart review, she has had several ER encounters for chest pain and epigastric pain and recently (8/30/2023) underwent laparoscopic cholecystectomy. For the last several months, Abiel Correa has been experiencing sharp right-sided chest pain that occurs primarily at rest.  She describes the pain as sharp, stabbing, and spasm-like. Episodes last for an hour and resolve spontaneously. There are no exacerbating or relieving factors. She does note some improvement since her cholecystectomy, but still has recurrences. She has also been noting palpitations and a sensation of her heart racing at night. Symptoms are present often, but not daily. She reports minimal caffeine intake and denies any tobacco, alcohol, or drug use. Abiel Correa states that she was also referred for evaluation of an abnormal EKG. She reports prior evaluation of this in 2019, after a questionable infarct was noted on her EKG.   There was also evidence of a posterior fascicular block. She underwent stress echocardiogram at that time which was unremarkable. I reviewed her most recent EKGs, 8/18/2023 EKG interpretation states "LAFB," however, on personal review, it appears that the limb leads are reversed. All subsequent EKG's are NSR without evidence of hemiblock. Occasional dizziness (spinning) but has vertigo. Father had an MI in his 42's, paternal GF also had an early MI (52's or 63's)    Review of Systems   10  point ROS  was otherwise non pertinent or negative except as per HPI or as below. Gait: Normal      Most recent or relevant cardiac/vascular testing:    Stress echo 4/4/2019  No evidence of stress-induced ischemia    Echo 9/3/2023  Sinus rhythm, low voltage    Objective:     /84 (BP Location: Left arm, Patient Position: Sitting, Cuff Size: Large)   Pulse 88   Resp 20   Ht 5' 3" (1.6 m)   Wt 82.3 kg (181 lb 6.4 oz)   LMP 08/28/2023 (Approximate)   BMI 32.13 kg/m²     PHYSICAL EXAM:    General:  Normal appearance, no acute distress  Eyes:  Anicteric. Oral mucosa:  Moist.  Neck:  No JVD. Carotid upstrokes are brisk without bruits. No masses. Chest:  Clear to auscultation   Cardiac:  No palpable PMI. Normal S1 and S2. No murmur gallop or rub. Abdomen:  Soft and nontender. No palpable organomegaly or aortic enlargement. Extremities:  No peripheral edema. Musculoskeletal:  Symmetric. Vascular:  Pedal pulses are intact. Neuro:  Grossly symmetric. Psych:  Alert and oriented x3.     Allergies   Allergen Reactions   • Bactrim [Sulfamethoxazole-Trimethoprim] Shortness Of Breath     Near syncope   • Codeine Hives     Tolerated Percocet, Vicodin, etc.   • Magnesium Sulfate Tachycardia       Current Outpatient Medications:   •  albuterol (2.5 mg/3 mL) 0.083 % nebulizer solution, Take 3 mL (2.5 mg total) by nebulization every 6 (six) hours as needed for wheezing or shortness of breath, Disp: 90 mL, Rfl: 5  •  albuterol (PROVENTIL HFA,VENTOLIN HFA) 90 mcg/act inhaler, INHALE 2 PUFFS EVERY 4 (FOUR) HOURS AS NEEDED FOR WHEEZING OR SHORTNESS OF BREATH, Disp: 8.5 g, Rfl: 2  •  Alcohol Swabs (Pharmacist Choice Alcohol) PADS, TEST BLOOD 3 TIMES DAILY, Disp: 100 each, Rfl: 5  •  atoMOXetine (STRATTERA) 60 mg capsule, Take 60 mg by mouth in the morning, Disp: , Rfl:   •  Blood Glucose Monitoring Suppl (OneTouch Verio Reflect) w/Device KIT, Check blood sugars three times daily. Please substitute with appropriate alternative as covered by patient's insurance. Dx: E11.65, Disp: 1 kit, Rfl: 0  •  cetirizine (ZyrTEC) 10 mg tablet, TAKE 1 TABLET (10 MG TOTAL) BY MOUTH DAILY, Disp: 30 tablet, Rfl: 5  •  DULoxetine (CYMBALTA) 60 mg delayed release capsule, Take 60 mg by mouth daily, Disp: , Rfl:   •  ergocalciferol (VITAMIN D2) 50,000 units, TAKE 1 CAPSULE (50,000 UNITS TOTAL) BY MOUTH ONCE A WEEK, Disp: 12 capsule, Rfl: 0  •  fluticasone (FLONASE) 50 mcg/act nasal spray, 1 spray into each nostril daily, Disp: 16 g, Rfl: 5  •  Fluticasone-Salmeterol (Advair) 250-50 mcg/dose inhaler, INHALE 1 PUFF 2 (TWO) TIMES A DAY RINSE MOUTH AFTER USE., Disp: 60 blister, Rfl: 0  •  hydrochlorothiazide (HYDRODIURIL) 12.5 mg tablet, TAKE 1 TABLET (12.5 MG TOTAL) BY MOUTH DAILY, Disp: 90 tablet, Rfl: 1  •  Lancet Devices (Easy Mini Eject Lancing Device) MISC, Use as directed to check blood sugar 3x daily. , Disp: 1 each, Rfl: 0  •  lisinopril (ZESTRIL) 10 mg tablet, TAKE 1 TABLET (10 MG TOTAL) BY MOUTH 2 (TWO) TIMES A DAY, Disp: 90 tablet, Rfl: 1  •  meclizine (ANTIVERT) 25 mg tablet, TAKE 1 TABLET (25 MG TOTAL) BY MOUTH 3 (THREE) TIMES A DAY AS NEEDED FOR DIZZINESS, Disp: 30 tablet, Rfl: 2  •  metFORMIN (GLUCOPHAGE-XR) 500 mg 24 hr tablet, TAKE 1 TABLET (500 MG TOTAL) BY MOUTH DAILY WITH DINNER, Disp: 30 tablet, Rfl: 5  •  ondansetron (ZOFRAN) 4 mg tablet, TAKE 1 TABLET (4 MG TOTAL) BY MOUTH EVERY 8 (EIGHT) HOURS AS NEEDED FOR NAUSEA, Disp: 20 tablet, Rfl: 0  • OneTouch Verio test strip, TEST BLOOD 3 TIMES DAILY, Disp: 100 each, Rfl: 1  •  Pharmacist Choice Lancets MISC, TEST BLOOD 3 TIMES DAILY, Disp: 100 each, Rfl: 1  •  prazosin (MINIPRESS) 2 mg capsule, Take 2 mg by mouth daily at bedtime, Disp: , Rfl:   •  traZODone (DESYREL) 50 mg tablet, Take 50 mg by mouth daily at bedtime, Disp: , Rfl:   •  ALPRAZolam (XANAX) 1 mg tablet, Take 1 mg by mouth as needed, Disp: , Rfl:   •  aluminum-magnesium hydroxide 200-200 MG/5ML suspension, Take 10 mL by mouth every 6 (six) hours as needed for heartburn, Disp: 355 mL, Rfl: 0  •  mupirocin (BACTROBAN) 2 % ointment, APPLY TOPICALLY 3 (THREE) TIMES A DAY, Disp: 22 g, Rfl: 0  Past Medical History:   Diagnosis Date   • ADHD (attention deficit hyperactivity disorder)    • Anxiety    • Asthma    • Cat-scratch disease     last assessed 10/22/15   • Depression    • Hypertension     last assessed 11/11/14   • Sciatica    • Sleep disorder      Past Surgical History:   Procedure Laterality Date   • BREAST BIOPSY Left     2016   • CHOLECYSTECTOMY LAPAROSCOPIC N/A 8/30/2023    Procedure: CHOLECYSTECTOMY LAPAROSCOPIC;  Surgeon: Yahir Olsen DO;  Location: [facility];  Service: General   • DENTAL SURGERY     • CT BX/EXC LYMPH NODE OPEN DEEP AXILLARY NODE Left 1/15/2019    Procedure: LEFT AXILLARY LYMPH NODE BIOPSY;  Surgeon: Maira Powell MD;  Location: [facility];  Service: General   • CT LAMNOTMY INCL W/DCMPRSN NRV ROOT 1 INTRSPC LUMBR Right 4/14/2021    Procedure: L5-S1 minimally invasive microdiskectomy and  epidural steroid injection;  Surgeon: Elton Abdi MD;  Location: [facility];  Service: Neurosurgery   • TUBAL LIGATION         CMP:   Lab Results   Component Value Date    K 4.1 09/03/2023     09/03/2023    CO2 26 09/03/2023    BUN 7 09/03/2023    CREATININE 0.74 09/03/2023    EGFR 102 09/03/2023     Lipid Profile:    Lab Results   Component Value Date    TRIG 110 02/21/2023    HDL 41 (L) 02/21/2023         Social History Tobacco Use   Smoking Status Never   Smokeless Tobacco Never

## 2023-09-09 LAB
BACTERIA BLD CULT: NORMAL
BACTERIA BLD CULT: NORMAL

## 2023-09-14 ENCOUNTER — OFFICE VISIT (OUTPATIENT)
Dept: SURGERY | Facility: CLINIC | Age: 38
End: 2023-09-14

## 2023-09-14 VITALS
OXYGEN SATURATION: 98 % | BODY MASS INDEX: 32.25 KG/M2 | TEMPERATURE: 97.7 F | HEIGHT: 63 IN | WEIGHT: 182 LBS | HEART RATE: 84 BPM | DIASTOLIC BLOOD PRESSURE: 80 MMHG | SYSTOLIC BLOOD PRESSURE: 116 MMHG

## 2023-09-14 DIAGNOSIS — K80.00 CALCULUS OF GALLBLADDER WITH ACUTE CHOLECYSTITIS WITHOUT OBSTRUCTION: Primary | ICD-10-CM

## 2023-09-14 PROCEDURE — 99024 POSTOP FOLLOW-UP VISIT: CPT

## 2023-09-14 NOTE — LETTER
September 14, 2023     Patient: Daya Swenson   YOB: 1985   Date of Visit: 9/14/2023           To Whom It May Concern: It is my medical opinion that Keyonna Camejo may return to work on Monday, September 18th, 2023, without restriction. If you have any questions or concerns, please don't hesitate to call.          Sincerely,        Linda Marshall PA-C  For Dr Loreta Kumar, General Surgery  [phone]    CC: No Recipients

## 2023-09-14 NOTE — PROGRESS NOTES
Assessment/Plan:    Calculus of gallbladder with acute cholecystitis without obstruction  69-year-old female seen in the office today for postoperative follow-up. She was admitted to the hospital on August 28. She underwent laparoscopic cholecystectomy for biliary colic and cholelithiasis on August 30 and discharged on the 31st.  Her symptoms have now resolved, denies any postprandial epigastric or upper abdominal pain. No nausea or vomiting. Normal bowel movements. All incisions are clean and dry. Patient was seen in the ED here at [facility] [place] on September 3 with postoperative pain, CT scan abdomen pelvis showed no fluid collection or abscess. She was discharged home that same day. The patient was not using any narcotic pain medication. She has a history of anxiety and posttraumatic stress disorder with essential tremors. She was having exacerbation of her tremors prior to her surgery. She does not consume any alcohol, her Xanax was held prior to surgery and thought initially that this was exacerbating her tremors. The patient was having complaints of palpitations, she was seen by cardiology and had a 2-week cardiac monitor placed in the office on September 8 with the monitor still in place during office visit here today. Overall the patient is very much improved and she denies any abdominal pain at all. She is not using any pain medication. Anxiety PTSD disorder/depression   Physiological tremor, initially suspected to be secondary to pain and/or Xanax was held for surgery now restarted  Essential hypertension  Asthma  Type 2 diabetes mellitus  Morbid obesity, BMI 32.57 (83.4 kg)     Recent EGD showed mild gastritis. Right upper quadrant ultrasound showed uncomplicated cholelithiasis    On exam positive bowel sounds, abdomen nondistended. The abdomen is soft. For trocar incisions are all clean and dry, intact without any wound disruption or erythema.   Heart regular rate and rhythm, no murmur, no tachycardia. Lungs clear to auscultation. Extremities without peripheral edema. Patient is stable for discharge from general surgery care at this point. Follow-up with cardiology and her PCP as scheduled or as needed. 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 Alice Tate PA-C    OPERATIVE REPORT  PATIENT NAME: Maxewll Neves    :  1985  MRN: [id number]  Pt Location: [facility] OR ROOM 01     SURGERY DATE: 2023     Surgeon(s) and Role: * John Burt DO - Primary     * Gerald Frost MD - Assisting     Preop Diagnosis:  Cholelithiasis [K80.20]     Post-Op Diagnosis Codes:     * Cholelithiasis [K80.20]     Procedure(s):  CHOLECYSTECTOMY LAPAROSCOPIC     Specimen(s):  ID Type Source Tests Collected by Time Destination   1 :  Tissue Gallbladder TISSUE EXAM John Burt DO 2023 [id number]           Problem List Items Addressed This Visit        Digestive    Calculus of gallbladder with acute cholecystitis without obstruction - Primary     23-year-old female seen in the office today for postoperative follow-up. She was admitted to the hospital on . She underwent laparoscopic cholecystectomy for biliary colic and cholelithiasis on  and discharged on the .  Her symptoms have now resolved, denies any postprandial epigastric or upper abdominal pain. No nausea or vomiting. Normal bowel movements. All incisions are clean and dry. Patient was seen in the ED here at [facility] on September 3 with postoperative pain, CT scan abdomen pelvis showed no fluid collection or abscess. She was discharged home that same day. The patient was not using any narcotic pain medication. She has a history of anxiety and posttraumatic stress disorder with essential tremors. She was having exacerbation of her tremors prior to her surgery.   She does not consume any alcohol, her Xanax was held prior to surgery and thought initially that this was exacerbating her tremors. The patient was having complaints of palpitations, she was seen by cardiology and had a 2-week cardiac monitor placed in the office on September 8 with the monitor still in place during office visit here today. Overall the patient is very much improved and she denies any abdominal pain at all. She is not using any pain medication. Anxiety PTSD disorder/depression   Physiological tremor, initially suspected to be secondary to pain and/or Xanax was held for surgery now restarted  Essential hypertension  Asthma  Type 2 diabetes mellitus  Morbid obesity, BMI 32.57 (83.4 kg)     Recent EGD showed mild gastritis. Right upper quadrant ultrasound showed uncomplicated cholelithiasis    On exam positive bowel sounds, abdomen nondistended. The abdomen is soft. For trocar incisions are all clean and dry, intact without any wound disruption or erythema. Heart regular rate and rhythm, no murmur, no tachycardia. Lungs clear to auscultation. Extremities without peripheral edema. Patient is stable for discharge from general surgery care at this point. Follow-up with cardiology and her PCP as scheduled or as needed. 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 Philipp Bean PA-C                Subjective: Postoperative follow-up status post laparoscopic cholecystectomy on August 30, 2023. Patient ID: Carlos Islas is a 43 y.o. female. HPI patient offers no complaints relating to her surgery about 2 weeks ago. She underwent laparoscopic cholecystectomy for symptomatic cholelithiasis with biliary colic. Patient was discharged from the hospital on August 31. She presented to the ED here on September 3 with post operative pain, she was not using her narcotic pain medication.   She had had exacerbation of tremors which were present prior to her recent hospital admission. She has a history of posttraumatic stress disorder and anxiety. Patient was complaining of palpitations for which she had been followed by cardiology, initially thought her symptoms were more GI related. The patient currently has a cardiac monitor in place for 2-week surveillance. Her appetite is improved, tolerating all foods. Denies any abdominal pain at all. No diarrhea. The following portions of the patient's history were reviewed and updated as appropriate:   She  has a past medical history of ADHD (attention deficit hyperactivity disorder), Anxiety, Asthma, Cat-scratch disease, Depression, Hypertension, Sciatica, and Sleep disorder.   She   Patient Active Problem List    Diagnosis Date Noted   • Calculus of gallbladder with acute cholecystitis without obstruction 09/14/2023   • Palpitations 09/08/2023   • Intractable abdominal pain 08/28/2023   • Epigastric pain 08/21/2023   • Heartburn 08/21/2023   • Class 2 severe obesity due to excess calories with serious comorbidity and body mass index (BMI) of 35.0 to 35.9 in adult [facility]) 05/18/2023   • PTSD (post-traumatic stress disorder) 01/19/2023   • Chronic pain syndrome 03/28/2022   • Sacroiliitis ([address]) 03/28/2022   • History of lumbar surgery 03/28/2022   • Lumbar radiculopathy 05/27/2021   • Pre-diabetes 05/13/2021   • Vitamin D deficiency 03/17/2021   • Acute right-sided low back pain with right-sided sciatica 12/14/2020   • Anxiety and depression 02/28/2020   • Neck pain 06/18/2019   • Chronic bilateral low back pain without sciatica 06/18/2019   • Asthma 02/18/2019   • Granulomatous disease ([address]) 02/13/2019   • Lung nodule < 6cm on CT 02/13/2019   • Eosinophilia 02/13/2019   • Wound dehiscence 02/09/2019   • Lymphedema 02/05/2019   • Other chest pain 01/21/2019   • Abnormal EKG 01/21/2019   • Essential hypertension 01/21/2019   • Mass of left axilla 12/26/2018 • Allergic rhinitis 11/30/2018   • Numbness and tingling of both upper extremities while sleeping 05/01/2018   • Urine ketones 03/15/2018   • Depression 09/12/2016   • Intention tremor 09/12/2016     She  has a past surgical history that includes Tubal ligation; Breast biopsy (Left); pr bx/exc lymph node open deep axillary node (Left, 1/15/2019); Dental surgery; pr lamnotmy incl w/dcmprsn nrv root 1 intrspc lumbr (Right, 4/14/2021); and CHOLECYSTECTOMY LAPAROSCOPIC (N/A, 8/30/2023). Her family history includes Anxiety disorder in her father and mother; Bipolar disorder in her father and mother; Depression in her father and mother; Heart attack in her father, maternal grandmother, and paternal grandfather; Parkinsonism in her maternal grandmother; Schizophrenia in her father; Stomach cancer in her paternal grandfather; Tremor in her mother. She  reports that she has never smoked. She has never used smokeless tobacco. She reports that she does not drink alcohol and does not use drugs. Current Outpatient Medications   Medication Sig Dispense Refill   • albuterol (2.5 mg/3 mL) 0.083 % nebulizer solution Take 3 mL (2.5 mg total) by nebulization every 6 (six) hours as needed for wheezing or shortness of breath 90 mL 5   • albuterol (PROVENTIL HFA,VENTOLIN HFA) 90 mcg/act inhaler INHALE 2 PUFFS EVERY 4 (FOUR) HOURS AS NEEDED FOR WHEEZING OR SHORTNESS OF BREATH 8.5 g 2   • Alcohol Swabs (Pharmacist Choice Alcohol) PADS TEST BLOOD 3 TIMES DAILY 100 each 5   • ALPRAZolam (XANAX) 1 mg tablet Take 1 mg by mouth as needed     • aluminum-magnesium hydroxide 200-200 MG/5ML suspension Take 10 mL by mouth every 6 (six) hours as needed for heartburn 355 mL 0   • atoMOXetine (STRATTERA) 60 mg capsule Take 60 mg by mouth in the morning     • Blood Glucose Monitoring Suppl (OneTouch Verio Reflect) w/Device KIT Check blood sugars three times daily. Please substitute with appropriate alternative as covered by patient's insurance.  Dx: E11.65 1 kit 0   • cetirizine (ZyrTEC) 10 mg tablet TAKE 1 TABLET (10 MG TOTAL) BY MOUTH DAILY 30 tablet 5   • DULoxetine (CYMBALTA) 60 mg delayed release capsule Take 60 mg by mouth daily     • ergocalciferol (VITAMIN D2) 50,000 units TAKE 1 CAPSULE (50,000 UNITS TOTAL) BY MOUTH ONCE A WEEK 12 capsule 0   • fluticasone (FLONASE) 50 mcg/act nasal spray 1 spray into each nostril daily 16 g 5   • Fluticasone-Salmeterol (Advair) 250-50 mcg/dose inhaler INHALE 1 PUFF 2 (TWO) TIMES A DAY RINSE MOUTH AFTER USE. 60 blister 0   • hydrochlorothiazide (HYDRODIURIL) 12.5 mg tablet TAKE 1 TABLET (12.5 MG TOTAL) BY MOUTH DAILY 90 tablet 1   • Lancet Devices (Easy Mini Eject Lancing Device) MISC Use as directed to check blood sugar 3x daily. 1 each 0   • lisinopril (ZESTRIL) 10 mg tablet TAKE 1 TABLET (10 MG TOTAL) BY MOUTH 2 (TWO) TIMES A DAY 90 tablet 1   • meclizine (ANTIVERT) 25 mg tablet TAKE 1 TABLET (25 MG TOTAL) BY MOUTH 3 (THREE) TIMES A DAY AS NEEDED FOR DIZZINESS 30 tablet 2   • metFORMIN (GLUCOPHAGE-XR) 500 mg 24 hr tablet TAKE 1 TABLET (500 MG TOTAL) BY MOUTH DAILY WITH DINNER 30 tablet 5   • mupirocin (BACTROBAN) 2 % ointment APPLY TOPICALLY 3 (THREE) TIMES A DAY 22 g 0   • ondansetron (ZOFRAN) 4 mg tablet TAKE 1 TABLET (4 MG TOTAL) BY MOUTH EVERY 8 (EIGHT) HOURS AS NEEDED FOR NAUSEA 20 tablet 0   • OneTouch Verio test strip TEST BLOOD 3 TIMES DAILY 100 each 1   • Pharmacist Choice Lancets MISC TEST BLOOD 3 TIMES DAILY 100 each 1   • prazosin (MINIPRESS) 2 mg capsule Take 2 mg by mouth daily at bedtime     • traZODone (DESYREL) 50 mg tablet Take 50 mg by mouth daily at bedtime       No current facility-administered medications for this visit. She is allergic to bactrim [sulfamethoxazole-trimethoprim], codeine, and magnesium sulfate. .    Review of Systems   Constitutional: Negative. Negative for fatigue and unexpected weight change. HENT: Negative. Negative for trouble swallowing. Eyes: Negative. Respiratory: Negative for cough, choking, shortness of breath, wheezing and stridor. Cardiovascular: Positive for palpitations. Negative for chest pain and leg swelling. Gastrointestinal: Negative for abdominal distention, abdominal pain, blood in stool, constipation, diarrhea, nausea and vomiting. Endocrine: Negative. Genitourinary: Negative for decreased urine volume, difficulty urinating, flank pain and menstrual problem. Musculoskeletal: Negative. Negative for back pain. Skin: Positive for wound. Negative for color change, pallor and rash. Neurological: Positive for tremors. Negative for dizziness, seizures, syncope, weakness, light-headedness, numbness and headaches. Hematological: Negative. Does not bruise/bleed easily. Psychiatric/Behavioral: Negative for agitation, behavioral problems and decreased concentration. The patient is nervous/anxious. Objective:    /80 (BP Location: Left arm, Patient Position: Sitting, Cuff Size: Standard)   Pulse 84   Temp 97.7 °F (36.5 °C) (Tympanic)   Ht 5' 3" (1.6 m)   Wt 82.6 kg (182 lb)   LMP 08/28/2023 (Approximate)   SpO2 98%   BMI 32.24 kg/m²      Physical Exam  Vitals reviewed. Constitutional:       Appearance: She is not ill-appearing or diaphoretic. Comments: Her hair is dyed yellow. HENT:      Head: Normocephalic. Nose: Nose normal.      Mouth/Throat:      Mouth: Mucous membranes are moist.   Eyes:      Conjunctiva/sclera: Conjunctivae normal.   Cardiovascular:      Rate and Rhythm: Normal rate and regular rhythm. Heart sounds: Normal heart sounds. No murmur heard. Pulmonary:      Effort: Pulmonary effort is normal.      Breath sounds: No wheezing or rhonchi. Abdominal:      General: Bowel sounds are normal. There is no distension. Palpations: Abdomen is soft. There is no mass. Tenderness: There is no abdominal tenderness.  There is no right CVA tenderness, left CVA tenderness, guarding or rebound. Hernia: No hernia is present. Comments: Four laparoscopic surgical incisions are all clean, dry and intact without any wound disruption or erythema. Nontender abdomen. Musculoskeletal:         General: Normal range of motion. Cervical back: Normal range of motion. Right lower leg: No edema. Left lower leg: No edema. Skin:     General: Skin is warm. Capillary Refill: Capillary refill takes less than 2 seconds. Coloration: Skin is not jaundiced. Findings: No erythema or rash. Neurological:      General: No focal deficit present. Mental Status: She is alert. Motor: No weakness. Coordination: Coordination normal.      Gait: Gait normal.   Psychiatric:         Mood and Affect: Mood normal.         Thought Content:  Thought content normal.         Elizabeth Andujar PA-C

## 2023-09-14 NOTE — ASSESSMENT & PLAN NOTE
69-year-old female seen in the office today for postoperative follow-up. She was admitted to the hospital on August 28. She underwent laparoscopic cholecystectomy for biliary colic and cholelithiasis on August 30 and discharged on the 31st.  Her symptoms have now resolved, denies any postprandial epigastric or upper abdominal pain. No nausea or vomiting. Normal bowel movements. All incisions are clean and dry. Patient was seen in the ED here at [facility] on September 3 with postoperative pain, CT scan abdomen pelvis showed no fluid collection or abscess. She was discharged home that same day. The patient was not using any narcotic pain medication. She has a history of anxiety and posttraumatic stress disorder with essential tremors. She was having exacerbation of her tremors prior to her surgery. She does not consume any alcohol, her Xanax was held prior to surgery and thought initially that this was exacerbating her tremors. The patient was having complaints of palpitations, she was seen by cardiology and had a 2-week cardiac monitor placed in the office on September 8 with the monitor still in place during office visit here today. Overall the patient is very much improved and she denies any abdominal pain at all. She is not using any pain medication. Anxiety PTSD disorder/depression   Physiological tremor, initially suspected to be secondary to pain and/or Xanax was held for surgery now restarted  Essential hypertension  Asthma  Type 2 diabetes mellitus  Morbid obesity, BMI 32.57 (83.4 kg)     Recent EGD showed mild gastritis. Right upper quadrant ultrasound showed uncomplicated cholelithiasis    On exam positive bowel sounds, abdomen nondistended. The abdomen is soft. For trocar incisions are all clean and dry, intact without any wound disruption or erythema. Heart regular rate and rhythm, no murmur, no tachycardia. Lungs clear to auscultation.   Extremities without peripheral edema. Patient is stable for discharge from general surgery care at this point. Follow-up with cardiology and her PCP as scheduled or as needed. 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     Talita Rosales PA-C

## 2023-09-19 ENCOUNTER — OFFICE VISIT (OUTPATIENT)
Dept: FAMILY MEDICINE CLINIC | Facility: HOME HEALTHCARE | Age: 38
End: 2023-09-19
Payer: COMMERCIAL

## 2023-09-19 VITALS
BODY MASS INDEX: 33.06 KG/M2 | DIASTOLIC BLOOD PRESSURE: 72 MMHG | SYSTOLIC BLOOD PRESSURE: 120 MMHG | HEART RATE: 91 BPM | OXYGEN SATURATION: 100 % | RESPIRATION RATE: 18 BRPM | TEMPERATURE: 99.1 F | WEIGHT: 186.6 LBS | HEIGHT: 63 IN

## 2023-09-19 DIAGNOSIS — J45.40 MODERATE PERSISTENT ASTHMA WITHOUT COMPLICATION: ICD-10-CM

## 2023-09-19 DIAGNOSIS — R73.03 PRE-DIABETES: Primary | ICD-10-CM

## 2023-09-19 DIAGNOSIS — R25.1 TREMOR: ICD-10-CM

## 2023-09-19 LAB — SL AMB POCT HEMOGLOBIN AIC: 5.6 (ref ?–6.5)

## 2023-09-19 PROCEDURE — T1015 CLINIC SERVICE: HCPCS | Performed by: FAMILY MEDICINE

## 2023-09-19 PROCEDURE — 83036 HEMOGLOBIN GLYCOSYLATED A1C: CPT | Performed by: FAMILY MEDICINE

## 2023-09-19 PROCEDURE — 99213 OFFICE O/P EST LOW 20 MIN: CPT | Performed by: PHYSICIAN ASSISTANT

## 2023-09-19 RX ORDER — FLUTICASONE PROPIONATE AND SALMETEROL 250; 50 UG/1; UG/1
1 POWDER RESPIRATORY (INHALATION) 2 TIMES DAILY
Qty: 60 BLISTER | Refills: 0 | Status: SHIPPED | OUTPATIENT
Start: 2023-09-19

## 2023-09-19 RX ORDER — PRIMIDONE 50 MG/1
25 TABLET ORAL
Qty: 30 TABLET | Refills: 2 | Status: SHIPPED | OUTPATIENT
Start: 2023-09-19

## 2023-09-19 NOTE — PROGRESS NOTES
Assessment/Plan:     Diagnoses and all orders for this visit:    Pre-diabetes  -     POCT hemoglobin A1c    Tremor  -     primidone (MYSOLINE) 50 mg tablet; Take 0.5 tablets (25 mg total) by mouth daily at bedtime        - A1c remains well controlled, continue metformin as prescribed  - Will trial primidone 25 mg qhs for tremors and titrate for response. Will avoid beta blockers due to asthma history. Follow up with neurology as scheduled    Return in about 3 months (around 12/19/2023) for Next scheduled follow up. Subjective:        Patient ID: Tamia Meza is a 43 y.o. female. Chief Complaint   Patient presents with   • Follow-up     tremors       Yonatan Alvarado is a 43year old female with history of HTN, pre-diabetes, asthma, vit d deficiency, back pain, PTSD/Anxiety/Depression, presenting for follow up. Pre-diabetes is currently managed with metformin 500 mg daily in addition to the [facility]. A1c from 5/22/23 was 5.5, remains 5.6 today. Patient has been evaluated in the ED multiple times over the past month with abdominal pain, ultimately undergoing cholecystectomy 8/30/23. She reports feeling significantly improved since surgery. She was noted to be experiencing tremors during her hospitalizations. Workup has been overall unremarkable and thought to be psychogenic/anixety related. Today she continues to endorse shaking, mainly in her hands, but occasionally in her whole body. She feels that the shakiness is constant but worsens throughout the day. Shaking is worse with intention, such as when checking her BS or trying to use a knife to cut things. She reports a family history of tremors in her mother and grandmother. States that she has had these symptoms for a while but notes that they have been worse over the past 2 months. She is scheduled with neurology but not until 2/2024.       The following portions of the patient's history were reviewed and updated as appropriate: allergies, current medications, past family history, past medical history, past social history, past surgical history and problem list.    Patient Active Problem List   Diagnosis   • Depression   • Intention tremor   • Urine ketones   • Numbness and tingling of both upper extremities while sleeping   • Allergic rhinitis   • Mass of left axilla   • Other chest pain   • Abnormal EKG   • Essential hypertension   • Lymphedema   • Wound dehiscence   • Granulomatous disease ([address])   • Lung nodule < 6cm on CT   • Eosinophilia   • Asthma   • Neck pain   • Chronic bilateral low back pain without sciatica   • Anxiety and depression   • Acute right-sided low back pain with right-sided sciatica   • Vitamin D deficiency   • Pre-diabetes   • Lumbar radiculopathy   • Chronic pain syndrome   • Sacroiliitis (HCC)   • History of lumbar surgery   • PTSD (post-traumatic stress disorder)   • Class 2 severe obesity due to excess calories with serious comorbidity and body mass index (BMI) of 35.0 to 35.9 in [facility])   • Epigastric pain   • Heartburn   • Intractable abdominal pain   • Palpitations   • Calculus of gallbladder with acute cholecystitis without obstruction       Current Outpatient Medications   Medication Sig Dispense Refill   • primidone (MYSOLINE) 50 mg tablet Take 0.5 tablets (25 mg total) by mouth daily at bedtime 30 tablet 2   • albuterol (2.5 mg/3 mL) 0.083 % nebulizer solution Take 3 mL (2.5 mg total) by nebulization every 6 (six) hours as needed for wheezing or shortness of breath 90 mL 5   • albuterol (PROVENTIL HFA,VENTOLIN HFA) 90 mcg/act inhaler INHALE 2 PUFFS EVERY 4 (FOUR) HOURS AS NEEDED FOR WHEEZING OR SHORTNESS OF BREATH 8.5 g 2   • Alcohol Swabs (Pharmacist Choice Alcohol) PADS TEST BLOOD 3 TIMES DAILY 100 each 5   • ALPRAZolam (XANAX) 1 mg tablet Take 1 mg by mouth as needed     • aluminum-magnesium hydroxide 200-200 MG/5ML suspension Take 10 mL by mouth every 6 (six) hours as needed for heartburn 355 mL 0   • atoMOXetine (STRATTERA) 60 mg capsule Take 60 mg by mouth in the morning     • Blood Glucose Monitoring Suppl (OneTouch Verio Reflect) w/Device KIT Check blood sugars three times daily. Please substitute with appropriate alternative as covered by patient's insurance. Dx: E11.65 1 kit 0   • cetirizine (ZyrTEC) 10 mg tablet TAKE 1 TABLET (10 MG TOTAL) BY MOUTH DAILY 30 tablet 5   • DULoxetine (CYMBALTA) 60 mg delayed release capsule Take 60 mg by mouth daily     • ergocalciferol (VITAMIN D2) 50,000 units TAKE 1 CAPSULE (50,000 UNITS TOTAL) BY MOUTH ONCE A WEEK 12 capsule 0   • fluticasone (FLONASE) 50 mcg/act nasal spray 1 spray into each nostril daily 16 g 5   • Fluticasone-Salmeterol (Advair) 250-50 mcg/dose inhaler INHALE 1 PUFF 2 (TWO) TIMES A DAY RINSE MOUTH AFTER USE. 60 blister 0   • hydrochlorothiazide (HYDRODIURIL) 12.5 mg tablet TAKE 1 TABLET (12.5 MG TOTAL) BY MOUTH DAILY 90 tablet 1   • Lancet Devices (Easy Mini Eject Lancing Device) MISC Use as directed to check blood sugar 3x daily. 1 each 0   • lisinopril (ZESTRIL) 10 mg tablet TAKE 1 TABLET (10 MG TOTAL) BY MOUTH 2 (TWO) TIMES A DAY 90 tablet 1   • meclizine (ANTIVERT) 25 mg tablet TAKE 1 TABLET (25 MG TOTAL) BY MOUTH 3 (THREE) TIMES A DAY AS NEEDED FOR DIZZINESS 30 tablet 2   • metFORMIN (GLUCOPHAGE-XR) 500 mg 24 hr tablet TAKE 1 TABLET (500 MG TOTAL) BY MOUTH DAILY WITH DINNER 30 tablet 5   • mupirocin (BACTROBAN) 2 % ointment APPLY TOPICALLY 3 (THREE) TIMES A DAY 22 g 0   • ondansetron (ZOFRAN) 4 mg tablet TAKE 1 TABLET (4 MG TOTAL) BY MOUTH EVERY 8 (EIGHT) HOURS AS NEEDED FOR NAUSEA 20 tablet 0   • OneTouch Verio test strip TEST BLOOD 3 TIMES DAILY 100 each 1   • Pharmacist Choice Lancets MISC TEST BLOOD 3 TIMES DAILY 100 each 1   • prazosin (MINIPRESS) 2 mg capsule Take 2 mg by mouth daily at bedtime     • traZODone (DESYREL) 50 mg tablet Take 50 mg by mouth daily at bedtime       No current facility-administered medications for this visit. Past Medical History:   Diagnosis Date   • ADHD (attention deficit hyperactivity disorder)    • Anxiety    • Asthma    • Cat-scratch disease     last assessed 10/22/15   • Depression    • Hypertension     last assessed 11/11/14   • Sciatica    • Sleep disorder         Past Surgical History:   Procedure Laterality Date   • BREAST BIOPSY Left     2016   • CHOLECYSTECTOMY LAPAROSCOPIC N/A 8/30/2023    Procedure: CHOLECYSTECTOMY LAPAROSCOPIC;  Surgeon: Ivory Galindo DO;  Location: [facility];  Service: General   • DENTAL SURGERY     • [place] BX/EXC LYMPH NODE OPEN DEEP AXILLARY NODE Left 1/15/2019    Procedure: LEFT AXILLARY LYMPH NODE BIOPSY;  Surgeon: Radha Oakley MD;  Location: [facility];  Service: General   • MS LAMNOTMY INCL W/DCMPRSN NRV ROOT 1 INTRSPC LUMBR Right 4/14/2021    Procedure: L5-S1 minimally invasive microdiskectomy and  epidural steroid injection;  Surgeon: Rafiq Lynn MD;  [facility];  Service: Neurosurgery   • TUBAL LIGATION          Social History     Socioeconomic History   • Marital status: /Civil Union     Spouse name: Not on file   • Number of children: Not on file   • Years of education: Not on file   • Highest education level: Not on file   Occupational History   • Not on file   Tobacco Use   • Smoking status: Never   • Smokeless tobacco: Never   Vaping Use   • Vaping Use: Never used   Substance and Sexual Activity   • Alcohol use: Never   • Drug use: No   • Sexual activity: Not Currently     Partners: Male     Birth control/protection: None   Other Topics Concern   • Not on file   Social History Narrative    Always uses seat belt    Daily caffeine consumption(occasional)    Economic stress    Exercises daily    Lives with     Lives with relatives    Pets: Dog     Social Determinants of Health     Financial Resource Strain: Not on file   Food Insecurity: No Food Insecurity (8/29/2023)    Hunger Vital Sign    • Worried About Running Out of Food in the Last Year: Never true    • Ran Out of Food in the Last Year: Never true   Transportation Needs: No Transportation Needs (8/29/2023)    PRAPARE - Transportation    • Lack of Transportation (Medical): No    • Lack of Transportation (Non-Medical): No   Physical Activity: Not on file   Stress: Not on file   Social Connections: Not on file   Intimate Partner Violence: Not on file   Housing Stability: Unknown (8/29/2023)    Housing Stability Vital Sign    • Unable to Pay for Housing in the Last Year: No    • Number of Places Lived in the Last Year: 1    • Unstable Housing in the Last Year: Not on file        Review of Systems   Constitutional: Negative for chills, diaphoresis and fever. Respiratory: Negative for cough, chest tightness, shortness of breath and wheezing. Cardiovascular: Negative for chest pain, palpitations and leg swelling. Gastrointestinal: Negative for abdominal pain, constipation, diarrhea, nausea and vomiting. Skin: Negative for rash and wound. Neurological: Positive for tremors. Negative for dizziness, weakness, light-headedness and headaches. Objective:      /72   Pulse 91   Temp 99.1 °F (37.3 °C)   Resp 18   Ht 5' 3" (1.6 m)   Wt 84.6 kg (186 lb 9.6 oz)   LMP 08/28/2023 (Approximate)   SpO2 100%   BMI 33.05 kg/m²          Physical Exam  Vitals and nursing note reviewed. Constitutional:       General: She is not in acute distress. Appearance: Normal appearance. She is obese. HENT:      Head: Normocephalic and atraumatic. Eyes:      Extraocular Movements: Extraocular movements intact. Conjunctiva/sclera: Conjunctivae normal.      Pupils: Pupils are equal, round, and reactive to light. Cardiovascular:      Rate and Rhythm: Normal rate and regular rhythm. Heart sounds: Normal heart sounds. No murmur heard. Pulmonary:      Effort: Pulmonary effort is normal. No respiratory distress. Breath sounds: Normal breath sounds. No wheezing.    Musculoskeletal: Right lower leg: No edema. Left lower leg: No edema. Skin:     General: Skin is warm and dry. Neurological:      General: No focal deficit present. Mental Status: She is alert and oriented to person, place, and time. Cranial Nerves: No cranial nerve deficit. Motor: Tremor present.    Psychiatric:         Mood and Affect: Mood normal.         Behavior: Behavior normal.

## 2023-09-28 ENCOUNTER — CLINICAL SUPPORT (OUTPATIENT)
Dept: CARDIOLOGY CLINIC | Facility: CLINIC | Age: 38
End: 2023-09-28
Payer: COMMERCIAL

## 2023-09-28 DIAGNOSIS — R00.2 PALPITATIONS: ICD-10-CM

## 2023-09-28 DIAGNOSIS — R94.31 ABNORMAL EKG: ICD-10-CM

## 2023-09-28 PROCEDURE — 93248 EXT ECG>7D<15D REV&INTERPJ: CPT | Performed by: INTERNAL MEDICINE

## 2023-09-30 NOTE — RESULT ENCOUNTER NOTE
Basic mechanism was sinus with rates ranging from 50 - 167 beats per minute while in sinus rhythm. The average heart rate was 90 beats per minute. Atrial ectopy was uncommon including several short atrial runs. Ventricular ectopy was common totaling 1 % of beats but there were no triplets or runs. No pauses were present. No symptoms were reported.       Ginny Snyder MD

## 2023-10-02 ENCOUNTER — HOSPITAL ENCOUNTER (OUTPATIENT)
Dept: NON INVASIVE DIAGNOSTICS | Facility: HOSPITAL | Age: 38
Discharge: HOME | End: 2023-10-02
Payer: COMMERCIAL

## 2023-10-02 VITALS
DIASTOLIC BLOOD PRESSURE: 72 MMHG | WEIGHT: 186.51 LBS | SYSTOLIC BLOOD PRESSURE: 98 MMHG | HEIGHT: 63 IN | BODY MASS INDEX: 33.05 KG/M2 | HEART RATE: 85 BPM

## 2023-10-02 DIAGNOSIS — R07.9 CHEST PAIN, UNSPECIFIED TYPE: ICD-10-CM

## 2023-10-02 DIAGNOSIS — J45.40 MODERATE PERSISTENT ASTHMA WITHOUT COMPLICATION: ICD-10-CM

## 2023-10-02 DIAGNOSIS — R00.2 PALPITATIONS: ICD-10-CM

## 2023-10-02 DIAGNOSIS — R94.31 ABNORMAL EKG: ICD-10-CM

## 2023-10-02 PROCEDURE — 93306 TTE W/DOPPLER COMPLETE: CPT

## 2023-10-02 RX ORDER — ALBUTEROL SULFATE 90 UG/1
2 AEROSOL, METERED RESPIRATORY (INHALATION) EVERY 4 HOURS PRN
Qty: 8.5 G | Refills: 2 | Status: SHIPPED | OUTPATIENT
Start: 2023-10-02

## 2023-10-03 LAB
AORTIC ROOT: 2.9 CM
ASCENDING AORTA: 3.1 CM
E WAVE DECELERATION TIME: 365 MS
FRACTIONAL SHORTENING: 33 % (ref 28–44)
INTERVENTRICULAR SEPTUM IN DIASTOLE (PARASTERNAL SHORT AXIS VIEW): 1 CM
INTERVENTRICULAR SEPTUM: 1 CM (ref 0.6–1.1)
LAAS-AP2: 10.5 CM2
LAAS-AP4: 14.7 CM2
LEFT ATRIUM SIZE: 3.1 CM
LEFT ATRIUM VOLUME (MOD BIPLANE): 34 ML
LEFT INTERNAL DIMENSION IN SYSTOLE: 2.6 CM (ref 2.1–4)
LEFT VENTRICULAR INTERNAL DIMENSION IN DIASTOLE: 3.9 CM (ref 3.5–6)
LEFT VENTRICULAR POSTERIOR WALL IN END DIASTOLE: 1 CM
LEFT VENTRICULAR STROKE VOLUME: 43 ML
LVSV (TEICH): 43 ML
MV E'TISSUE VEL-LAT: 9 CM/S
MV E'TISSUE VEL-SEP: 7 CM/S
MV PEAK A VEL: 0.54 M/S
MV PEAK E VEL: 53 CM/S
MV STENOSIS PRESSURE HALF TIME: 106 MS
MV VALVE AREA P 1/2 METHOD: 2.08 CM2
PULM VEIN S/D RATIO: 1.07
PV PEAK D VEL: 0.15 M/S
PV PEAK S VEL: 0.16 M/S
RIGHT ATRIUM AREA SYSTOLE A4C: 14.8 CM2
RIGHT VENTRICLE ID DIMENSION: 2.1 CM
SL CV LEFT ATRIUM LENGTH A2C: 3.7 CM
SL CV LV EF: 65
SL CV PED ECHO LEFT VENTRICLE DIASTOLIC VOLUME (MOD BIPLANE) 2D: 68 ML
SL CV PED ECHO LEFT VENTRICLE SYSTOLIC VOLUME (MOD BIPLANE) 2D: 25 ML

## 2023-10-03 PROCEDURE — 93306 TTE W/DOPPLER COMPLETE: CPT | Performed by: INTERNAL MEDICINE

## 2023-10-05 ENCOUNTER — OFFICE VISIT (OUTPATIENT)
Dept: FAMILY MEDICINE CLINIC | Facility: HOME HEALTHCARE | Age: 38
End: 2023-10-05
Payer: COMMERCIAL

## 2023-10-05 VITALS
SYSTOLIC BLOOD PRESSURE: 128 MMHG | BODY MASS INDEX: 33.09 KG/M2 | RESPIRATION RATE: 18 BRPM | DIASTOLIC BLOOD PRESSURE: 92 MMHG | TEMPERATURE: 97.7 F | WEIGHT: 179.8 LBS | HEIGHT: 62 IN | OXYGEN SATURATION: 98 % | HEART RATE: 111 BPM

## 2023-10-05 DIAGNOSIS — R35.0 URINARY FREQUENCY: Primary | ICD-10-CM

## 2023-10-05 LAB
SL AMB  POCT GLUCOSE, UA: ABNORMAL
SL AMB LEUKOCYTE ESTERASE,UA: ABNORMAL
SL AMB POCT BILIRUBIN,UA: ABNORMAL
SL AMB POCT BLOOD,UA: ABNORMAL
SL AMB POCT CLARITY,UA: ABNORMAL
SL AMB POCT COLOR,UA: YELLOW
SL AMB POCT KETONES,UA: 5
SL AMB POCT NITRITE,UA: ABNORMAL
SL AMB POCT PH,UA: ABNORMAL
SL AMB POCT SPECIFIC GRAVITY,UA: 1.03
SL AMB POCT URINE PROTEIN: ABNORMAL
SL AMB POCT UROBILINOGEN: 1

## 2023-10-05 PROCEDURE — T1015 CLINIC SERVICE: HCPCS | Performed by: FAMILY MEDICINE

## 2023-10-05 PROCEDURE — 99213 OFFICE O/P EST LOW 20 MIN: CPT | Performed by: PHYSICIAN ASSISTANT

## 2023-10-05 NOTE — PROGRESS NOTES
Assessment/Plan:     Diagnoses and all orders for this visit:    Urinary frequency  -     POCT urine dip  -     Ambulatory Referral to Urology; Future        - POCT urine dip normal.  Will refer to urology for further evaluation    Return if symptoms worsen or fail to improve. Subjective:        Patient ID: Daya Swenson is a 43 y.o. female. Chief Complaint   Patient presents with   • Nocturia     Urine frequency all day and now night       Celestina Mir is a 43year old female with history of HTN, pre-diabetes, asthma, vit d deficiency, back pain, PTSD/Anxiety/Depression, presenting with concern for urinary frequency. Patient endorses urinary frequency during the day for months but is now occurring at night. Does not feel that she is completely emptying her bladder. Denies hematuria, dysuria, flank pain, or fever. Reports waking up every 1-2 hours to use the bathroom.   Has not been seen by urology in the past.      The following portions of the patient's history were reviewed and updated as appropriate: allergies, current medications, past family history, past medical history, past social history, past surgical history and problem list.    Patient Active Problem List   Diagnosis   • Depression   • Intention tremor   • Urine ketones   • Numbness and tingling of both upper extremities while sleeping   • Allergic rhinitis   • Mass of left axilla   • Other chest pain   • Abnormal EKG   • Essential hypertension   • Lymphedema   • Wound dehiscence   • Granulomatous disease ([address])   • Lung nodule < 6cm on CT   • Eosinophilia   • Asthma   • Neck pain   • Chronic bilateral low back pain without sciatica   • Anxiety and depression   • Acute right-sided low back pain with right-sided sciatica   • Vitamin D deficiency   • Pre-diabetes   • Lumbar radiculopathy   • Chronic pain syndrome   • Sacroiliitis (HCC)   • History of lumbar surgery   • PTSD (post-traumatic stress disorder)   • Class 2 severe obesity due to excess calories with serious comorbidity and body mass index (BMI) of 35.0 to 35.9 in adult    • Epigastric pain   • Heartburn   • Intractable abdominal pain   • Palpitations   • Calculus of gallbladder with acute cholecystitis without obstruction       Current Outpatient Medications   Medication Sig Dispense Refill   • albuterol (2.5 mg/3 mL) 0.083 % nebulizer solution Take 3 mL (2.5 mg total) by nebulization every 6 (six) hours as needed for wheezing or shortness of breath 90 mL 5   • albuterol (PROVENTIL HFA,VENTOLIN HFA) 90 mcg/act inhaler INHALE 2 PUFFS EVERY 4 (FOUR) HOURS AS NEEDED FOR WHEEZING OR SHORTNESS OF BREATH 8.5 g 2   • Alcohol Swabs (Pharmacist Choice Alcohol) PADS TEST BLOOD 3 TIMES DAILY 100 each 5   • ALPRAZolam (XANAX) 1 mg tablet Take 1 mg by mouth as needed     • atoMOXetine (STRATTERA) 60 mg capsule Take 60 mg by mouth in the morning     • Blood Glucose Monitoring Suppl (OneTouch Verio Reflect) w/Device KIT Check blood sugars three times daily. Please substitute with appropriate alternative as covered by patient's insurance. Dx: E11.65 1 kit 0   • cetirizine (ZyrTEC) 10 mg tablet TAKE 1 TABLET (10 MG TOTAL) BY MOUTH DAILY 30 tablet 5   • DULoxetine (CYMBALTA) 60 mg delayed release capsule Take 60 mg by mouth daily     • ergocalciferol (VITAMIN D2) 50,000 units TAKE 1 CAPSULE (50,000 UNITS TOTAL) BY MOUTH ONCE A WEEK 12 capsule 0   • fluticasone (FLONASE) 50 mcg/act nasal spray 1 spray into each nostril daily 16 g 5   • Fluticasone-Salmeterol (Advair) 250-50 mcg/dose inhaler INHALE 1 PUFF 2 (TWO) TIMES A DAY RINSE MOUTH AFTER USE. 60 blister 0   • hydrochlorothiazide (HYDRODIURIL) 12.5 mg tablet TAKE 1 TABLET (12.5 MG TOTAL) BY MOUTH DAILY 90 tablet 1   • Lancet Devices (Easy Mini Eject Lancing Device) MISC Use as directed to check blood sugar 3x daily.  1 each 0   • lisinopril (ZESTRIL) 10 mg tablet TAKE 1 TABLET (10 MG TOTAL) BY MOUTH 2 (TWO) TIMES A DAY 90 tablet 1   • meclizine (ANTIVERT) 25 mg tablet TAKE 1 TABLET (25 MG TOTAL) BY MOUTH 3 (THREE) TIMES A DAY AS NEEDED FOR DIZZINESS 30 tablet 2   • metFORMIN (GLUCOPHAGE-XR) 500 mg 24 hr tablet TAKE 1 TABLET (500 MG TOTAL) BY MOUTH DAILY WITH DINNER 30 tablet 5   • ondansetron (ZOFRAN) 4 mg tablet TAKE 1 TABLET (4 MG TOTAL) BY MOUTH EVERY 8 (EIGHT) HOURS AS NEEDED FOR NAUSEA 20 tablet 0   • OneTouch Verio test strip TEST BLOOD 3 TIMES DAILY 100 each 1   • Pharmacist Choice Lancets MISC TEST BLOOD 3 TIMES DAILY 100 each 1   • prazosin (MINIPRESS) 2 mg capsule Take 2 mg by mouth daily at bedtime     • primidone (MYSOLINE) 50 mg tablet Take 0.5 tablets (25 mg total) by mouth daily at bedtime 30 tablet 2   • traZODone (DESYREL) 50 mg tablet Take 50 mg by mouth daily at bedtime     • aluminum-magnesium hydroxide 200-200 MG/5ML suspension Take 10 mL by mouth every 6 (six) hours as needed for heartburn (Patient not taking: Reported on 10/5/2023) 355 mL 0   • mupirocin (BACTROBAN) 2 % ointment APPLY TOPICALLY 3 (THREE) TIMES A DAY 22 g 0     No current facility-administered medications for this visit.         Past Medical History:   Diagnosis Date   • ADHD (attention deficit hyperactivity disorder)    • Anxiety    • Asthma    • Cat-scratch disease     last assessed 10/22/15   • Depression    • Hypertension     last assessed 11/11/14   • Sciatica    • Sleep disorder         Past Surgical History:   Procedure Laterality Date   • BREAST BIOPSY Left     2016   • CHOLECYSTECTOMY LAPAROSCOPIC N/A 8/30/2023    Procedure: CHOLECYSTECTOMY LAPAROSCOPIC;  Surgeon: Kaykay Franco DO;  Location: [facility];  Service: General   • DENTAL SURGERY     • [place] BX/EXC LYMPH NODE OPEN DEEP AXILLARY NODE Left 1/15/2019    Procedure: LEFT AXILLARY LYMPH NODE BIOPSY;  Surgeon: Mame Phillips MD;  Location:  MAIN OR;  Service: General   • AZ LAMNOTMY INCL W/DCMPRSN NRV ROOT 1 INTRSPC LUMBR Right 4/14/2021    Procedure: L5-S1 minimally invasive microdiskectomy and  epidural steroid injection;  Surgeon: Rommel Chavez MD;  [facility];  Service: Neurosurgery   • TUBAL LIGATION          Social History     Socioeconomic History   • Marital status: /Civil Union     Spouse name: Not on file   • Number of children: Not on file   • Years of education: Not on file   • Highest education level: Not on file   Occupational History   • Not on file   Tobacco Use   • Smoking status: Never   • Smokeless tobacco: Never   Vaping Use   • Vaping Use: Never used   Substance and Sexual Activity   • Alcohol use: Never   • Drug use: No   • Sexual activity: Not Currently     Partners: Male     Birth control/protection: None   Other Topics Concern   • Not on file   Social History Narrative    Always uses seat belt    Daily caffeine consumption(occasional)    Economic stress    Exercises daily    Lives with     Lives with relatives    Pets: Dog     Social Determinants of Health     Financial Resource Strain: Not on file   Food Insecurity: No Food Insecurity (8/29/2023)    Hunger Vital Sign    • Worried About Running Out of Food in the Last Year: Never true    • Ran Out of Food in the Last Year: Never true   Transportation Needs: No Transportation Needs (8/29/2023)    PRAPARE - Transportation    • Lack of Transportation (Medical): No    • Lack of Transportation (Non-Medical): No   Physical Activity: Not on file   Stress: Not on file   Social Connections: Not on file   Intimate Partner Violence: Not on file   Housing Stability: Unknown (8/29/2023)    Housing Stability Vital Sign    • Unable to Pay for Housing in the Last Year: No    • Number of Places Lived in the Last Year: 1    • Unstable Housing in the Last Year: Not on file        Review of Systems   Constitutional: Negative for chills, diaphoresis and fever. Respiratory: Negative for cough, chest tightness, shortness of breath and wheezing. Cardiovascular: Negative for chest pain, palpitations and leg swelling.    Gastrointestinal: Negative for abdominal pain, constipation, diarrhea, nausea and vomiting. Genitourinary: Positive for frequency. Negative for difficulty urinating, dysuria and hematuria. Skin: Negative for rash and wound. Objective:      /92 (BP Location: Left arm, Patient Position: Sitting, Cuff Size: Large)   Pulse (!) 111   Temp 97.7 °F (36.5 °C)   Resp 18   Ht 5' 2" (1.575 m)   Wt 81.6 kg (179 lb 12.8 oz)   SpO2 98%   BMI 32.89 kg/m²          Physical Exam  Vitals and nursing note reviewed. Constitutional:       General: She is not in acute distress. Appearance: Normal appearance. She is obese. HENT:      Head: Normocephalic and atraumatic. Eyes:      Extraocular Movements: Extraocular movements intact. Conjunctiva/sclera: Conjunctivae normal.      Pupils: Pupils are equal, round, and reactive to light. Cardiovascular:      Rate and Rhythm: Normal rate and regular rhythm. Heart sounds: Normal heart sounds. No murmur heard. Pulmonary:      Effort: Pulmonary effort is normal. No respiratory distress. Breath sounds: Normal breath sounds. No wheezing. Musculoskeletal:      Right lower leg: No edema. Left lower leg: No edema. Skin:     General: Skin is warm and dry. Neurological:      General: No focal deficit present. Mental Status: She is alert and oriented to person, place, and time. Cranial Nerves: No cranial nerve deficit.    Psychiatric:         Mood and Affect: Mood normal.         Behavior: Behavior normal.

## 2023-10-10 DIAGNOSIS — R73.03 PRE-DIABETES: ICD-10-CM

## 2023-10-10 DIAGNOSIS — R73.9 HYPERGLYCEMIA: ICD-10-CM

## 2023-10-10 RX ORDER — BLOOD-GLUCOSE METER
EACH MISCELLANEOUS
Qty: 100 EACH | Refills: 1 | Status: SHIPPED | OUTPATIENT
Start: 2023-10-10

## 2023-10-12 ENCOUNTER — TELEPHONE (OUTPATIENT)
Dept: FAMILY MEDICINE CLINIC | Facility: HOME HEALTHCARE | Age: 38
End: 2023-10-12

## 2023-10-12 NOTE — TELEPHONE ENCOUNTER
I called and left message asking her about the [address] [facility] and told her to give us call back when she gets a chance.

## 2023-10-16 DIAGNOSIS — J45.40 MODERATE PERSISTENT ASTHMA WITHOUT COMPLICATION: ICD-10-CM

## 2023-10-16 RX ORDER — FLUTICASONE PROPIONATE AND SALMETEROL 250; 50 UG/1; UG/1
1 POWDER RESPIRATORY (INHALATION) 2 TIMES DAILY
Qty: 60 BLISTER | Refills: 0 | Status: SHIPPED | OUTPATIENT
Start: 2023-10-16

## 2023-10-25 DIAGNOSIS — R42 DIZZINESS: ICD-10-CM

## 2023-10-25 DIAGNOSIS — R73.9 HYPERGLYCEMIA: ICD-10-CM

## 2023-10-25 DIAGNOSIS — J30.2 SEASONAL ALLERGIES: ICD-10-CM

## 2023-10-25 DIAGNOSIS — R73.03 PRE-DIABETES: ICD-10-CM

## 2023-10-25 RX ORDER — METFORMIN HYDROCHLORIDE 500 MG/1
500 TABLET, EXTENDED RELEASE ORAL
Qty: 30 TABLET | Refills: 5 | Status: SHIPPED | OUTPATIENT
Start: 2023-10-25

## 2023-10-25 RX ORDER — MECLIZINE HYDROCHLORIDE 25 MG/1
25 TABLET ORAL 3 TIMES DAILY PRN
Qty: 30 TABLET | Refills: 2 | Status: SHIPPED | OUTPATIENT
Start: 2023-10-25

## 2023-10-25 RX ORDER — CETIRIZINE HYDROCHLORIDE 10 MG/1
10 TABLET ORAL DAILY
Qty: 30 TABLET | Refills: 5 | Status: SHIPPED | OUTPATIENT
Start: 2023-10-25

## 2023-11-01 DIAGNOSIS — I10 ESSENTIAL HYPERTENSION: ICD-10-CM

## 2023-11-01 RX ORDER — LISINOPRIL 10 MG/1
10 TABLET ORAL 2 TIMES DAILY
Qty: 90 TABLET | Refills: 1 | Status: SHIPPED | OUTPATIENT
Start: 2023-11-01

## 2023-11-03 ENCOUNTER — TELEMEDICINE (OUTPATIENT)
Dept: FAMILY MEDICINE CLINIC | Facility: HOME HEALTHCARE | Age: 38
End: 2023-11-03
Payer: COMMERCIAL

## 2023-11-03 DIAGNOSIS — R61 HYPERHIDROSIS: Primary | ICD-10-CM

## 2023-11-03 PROCEDURE — 99213 OFFICE O/P EST LOW 20 MIN: CPT | Performed by: PHYSICIAN ASSISTANT

## 2023-11-03 PROCEDURE — 99213 OFFICE O/P EST LOW 20 MIN: CPT | Performed by: FAMILY MEDICINE

## 2023-11-03 PROCEDURE — T1015 CLINIC SERVICE: HCPCS | Performed by: PHYSICIAN ASSISTANT

## 2023-11-03 PROCEDURE — T1015 CLINIC SERVICE: HCPCS | Performed by: FAMILY MEDICINE

## 2023-11-03 RX ORDER — FLUTICASONE PROPIONATE AND SALMETEROL 250; 50 UG/1; UG/1
1 POWDER RESPIRATORY (INHALATION) 2 TIMES DAILY
COMMUNITY
Start: 2023-07-31

## 2023-11-03 NOTE — PROGRESS NOTES
Virtual Regular Visit    Verification of patient location:    Patient is located at Home in the following state in which I hold an active license [place]      Assessment/Plan:    Problem List Items Addressed This Visit    None  Visit Diagnoses     Hyperhidrosis    -  Primary    Relevant Orders    5 HIAA, urine, quantitative, 24 hour    Metanephrines Fractionated, urine, 24 hour    Catecholamines, fractionated, urine, 24 hour    Cortisol, Free, Urine, 24 Hour    Insulin-like growth factor 1 (IGF-1)        - Reviewed recent labs. TSH and A1c have been WNL. Will check additional labs to evaluate for other less-likely conditions including carcinoid syndrome, pheochromocytoma, acromegaly and cushing's syndrome. If these are normal, I suspect that symptoms are medication side effects, specifically from SSRI/SNRI use. Reason for visit is   Chief Complaint   Patient presents with   • Excessive Sweating     Patient states she has excessive sweating all summer long and even now. • Virtual Regular Visit        Encounter provider Stephanie Booth PA-C    Provider located at   [address] [place] [address]  [address]  [phone]      Recent Visits  No visits were found meeting these conditions. Showing recent visits within past 7 days and meeting all other requirements  Today's Visits  Date Type Provider Dept   11/03/23 1650 S SAMINA Villegas today's visits and meeting all other requirements  Future Appointments  No visits were found meeting these conditions. Showing future appointments within next 150 days and meeting all other requirements       The patient was identified by name and date of birth. Sylwia Isaiah was informed that this is a telemedicine visit and that the visit is being conducted through the [facility]. She agrees to proceed. .  My office door was closed. No one else was in the room.   She acknowledged consent and understanding of privacy and security of the video platform. The patient has agreed to participate and understands they can discontinue the visit at any time. Patient is aware this is a billable service. Subjective  Sue Easton is a 43 y.o. female. Nick Otoole is a 43year old female with history of HTN, pre-diabetes, asthma, vit d deficiency, back pain, PTSD/Anxiety/Depression, presenting with concern excessive sweating. Patient endorses several months of excessive, diffuse sweating. She initially felt that this was just due to the summer temperatures, however, she continues to experience these symptoms even in cold temperatures. She is experiencing this daily with minimal exertion.          Past Medical History:   Diagnosis Date   • ADHD (attention deficit hyperactivity disorder)    • Anxiety    • Asthma    • Cat-scratch disease     last assessed 10/22/15   • Depression    • Hypertension     last assessed 11/11/14   • Sciatica    • Sleep disorder        Past Surgical History:   Procedure Laterality Date   • BREAST BIOPSY Left     2016   • CHOLECYSTECTOMY LAPAROSCOPIC N/A 8/30/2023    Procedure: CHOLECYSTECTOMY LAPAROSCOPIC;  Surgeon: Aylin Matthew DO;  [facility];  Service: General   • DENTAL SURGERY     • [place] BX/EXC LYMPH NODE OPEN DEEP AXILLARY NODE Left 1/15/2019    Procedure: LEFT AXILLARY LYMPH NODE BIOPSY;  Surgeon: Rayna Colin MD;  [facility];  Service: General   • FL LAMNOTMY INCL W/DCMPRSN NRV ROOT 1 INTRSPC LUMBR Right 4/14/2021    Procedure: L5-S1 minimally invasive microdiskectomy and  epidural steroid injection;  Surgeon: Ada Camp MD;  Location: [facility];  Service: Neurosurgery   • TUBAL LIGATION         Current Outpatient Medications   Medication Sig Dispense Refill   • Fluticasone-Salmeterol (Advair) 250-50 mcg/dose inhaler Inhale 1 puff 2 (two) times a day     • albuterol (2.5 mg/3 mL) 0.083 % nebulizer solution Take 3 mL (2.5 mg total) by nebulization every 6 (six) hours as needed for wheezing or shortness of breath 90 mL 5   • albuterol (PROVENTIL HFA,VENTOLIN HFA) 90 mcg/act inhaler INHALE 2 PUFFS EVERY 4 (FOUR) HOURS AS NEEDED FOR WHEEZING OR SHORTNESS OF BREATH 8.5 g 2   • Alcohol Swabs (Pharmacist Choice Alcohol) PADS TEST BLOOD 3 TIMES DAILY 100 each 5   • ALPRAZolam (XANAX) 1 mg tablet Take 1 mg by mouth as needed     • atoMOXetine (STRATTERA) 60 mg capsule Take 60 mg by mouth in the morning     • Blood Glucose Monitoring Suppl (OneTouch Verio Reflect) w/Device KIT Check blood sugars three times daily. Please substitute with appropriate alternative as covered by patient's insurance. Dx: E11.65 1 kit 0   • cetirizine (ZyrTEC) 10 mg tablet TAKE 1 TABLET (10 MG TOTAL) BY MOUTH DAILY 30 tablet 5   • DULoxetine (CYMBALTA) 60 mg delayed release capsule Take 60 mg by mouth daily     • ergocalciferol (VITAMIN D2) 50,000 units TAKE 1 CAPSULE (50,000 UNITS TOTAL) BY MOUTH ONCE A WEEK 12 capsule 0   • fluticasone (FLONASE) 50 mcg/act nasal spray 1 spray into each nostril daily 16 g 5   • Fluticasone-Salmeterol (Advair) 250-50 mcg/dose inhaler INHALE 1 PUFF 2 (TWO) TIMES A DAY RINSE MOUTH AFTER USE. 60 blister 0   • hydrochlorothiazide (HYDRODIURIL) 12.5 mg tablet TAKE 1 TABLET (12.5 MG TOTAL) BY MOUTH DAILY 90 tablet 1   • Lancet Devices (Easy Mini Eject Lancing Device) MISC Use as directed to check blood sugar 3x daily.  1 each 0   • lisinopril (ZESTRIL) 10 mg tablet TAKE 1 TABLET (10 MG TOTAL) BY MOUTH 2 (TWO) TIMES A DAY 90 tablet 1   • meclizine (ANTIVERT) 25 mg tablet TAKE 1 TABLET (25 MG TOTAL) BY MOUTH 3 (THREE) TIMES A DAY AS NEEDED FOR DIZZINESS 30 tablet 2   • metFORMIN (GLUCOPHAGE-XR) 500 mg 24 hr tablet TAKE 1 TABLET (500 MG TOTAL) BY MOUTH DAILY WITH DINNER 30 tablet 5   • ondansetron (ZOFRAN) 4 mg tablet TAKE 1 TABLET (4 MG TOTAL) BY MOUTH EVERY 8 (EIGHT) HOURS AS NEEDED FOR NAUSEA 20 tablet 0   • OneTouch Verio test strip TEST BLOOD 3 TIMES DAILY 100 each 1   • Pharmacist Choice Lancets MISC TEST BLOOD 3 TIMES DAILY 100 each 1   • prazosin (MINIPRESS) 2 mg capsule Take 2 mg by mouth daily at bedtime     • primidone (MYSOLINE) 50 mg tablet Take 0.5 tablets (25 mg total) by mouth daily at bedtime 30 tablet 2   • traZODone (DESYREL) 50 mg tablet Take 50 mg by mouth daily at bedtime       No current facility-administered medications for this visit. Allergies   Allergen Reactions   • Bactrim [Sulfamethoxazole-Trimethoprim] Shortness Of Breath     Near syncope   • Codeine Hives     Tolerated Percocet, Vicodin, etc.   • Magnesium Sulfate Tachycardia       Review of Systems   Constitutional:  Positive for diaphoresis. Negative for chills and fever. Respiratory:  Negative for cough, chest tightness, shortness of breath and wheezing. Cardiovascular:  Negative for chest pain, palpitations and leg swelling. Skin:  Negative for rash and wound. Neurological:  Negative for dizziness, syncope, weakness, light-headedness and headaches. Video Exam    There were no vitals filed for this visit. Physical Exam  Constitutional:       General: She is not in acute distress. Pulmonary:      Effort: Pulmonary effort is normal. No respiratory distress. Neurological:      General: No focal deficit present. Mental Status: She is alert and oriented to person, place, and time.    Psychiatric:         Mood and Affect: Mood normal.         Behavior: Behavior normal.          Visit Time  Total Visit Duration: 15

## 2023-11-07 ENCOUNTER — APPOINTMENT (OUTPATIENT)
Dept: LAB | Facility: HOSPITAL | Age: 38
End: 2023-11-07
Payer: COMMERCIAL

## 2023-11-07 DIAGNOSIS — R61 HYPERHIDROSIS: ICD-10-CM

## 2023-11-07 PROCEDURE — 36415 COLL VENOUS BLD VENIPUNCTURE: CPT

## 2023-11-07 PROCEDURE — 84305 ASSAY OF SOMATOMEDIN: CPT

## 2023-11-10 DIAGNOSIS — J45.40 MODERATE PERSISTENT ASTHMA WITHOUT COMPLICATION: ICD-10-CM

## 2023-11-10 LAB — IGF-I SERPL-MCNC: 166 NG/ML (ref 79–259)

## 2023-11-10 RX ORDER — FLUTICASONE PROPIONATE AND SALMETEROL 250; 50 UG/1; UG/1
1 POWDER RESPIRATORY (INHALATION) 2 TIMES DAILY
Qty: 60 BLISTER | Refills: 0 | Status: SHIPPED | OUTPATIENT
Start: 2023-11-10

## 2023-11-16 DIAGNOSIS — R42 DIZZINESS: ICD-10-CM

## 2023-11-16 RX ORDER — MECLIZINE HYDROCHLORIDE 25 MG/1
25 TABLET ORAL 3 TIMES DAILY PRN
Qty: 30 TABLET | Refills: 2 | Status: SHIPPED | OUTPATIENT
Start: 2023-11-16

## 2023-11-24 DIAGNOSIS — U07.1 COVID-19: ICD-10-CM

## 2023-11-24 RX ORDER — FLUTICASONE PROPIONATE 50 MCG
1 SPRAY, SUSPENSION (ML) NASAL DAILY
Qty: 16 G | Refills: 5 | Status: SHIPPED | OUTPATIENT
Start: 2023-11-24

## 2023-12-05 DIAGNOSIS — J45.40 MODERATE PERSISTENT ASTHMA WITHOUT COMPLICATION: ICD-10-CM

## 2023-12-05 RX ORDER — FLUTICASONE PROPIONATE AND SALMETEROL 250; 50 UG/1; UG/1
1 POWDER RESPIRATORY (INHALATION) 2 TIMES DAILY
Qty: 60 BLISTER | Refills: 0 | Status: ON HOLD | OUTPATIENT
Start: 2023-12-05

## 2023-12-05 RX ORDER — ALBUTEROL SULFATE 90 UG/1
2 AEROSOL, METERED RESPIRATORY (INHALATION) EVERY 4 HOURS PRN
Qty: 8.5 G | Refills: 2 | Status: ON HOLD | OUTPATIENT
Start: 2023-12-05

## 2023-12-07 ENCOUNTER — HOSPITAL ENCOUNTER (EMERGENCY)
Facility: HOSPITAL | Age: 38
End: 2023-12-07
Attending: EMERGENCY MEDICINE
Payer: COMMERCIAL

## 2023-12-07 ENCOUNTER — HOSPITAL ENCOUNTER (INPATIENT)
Facility: HOSPITAL | Age: 38
LOS: 4 days | Discharge: HOME | DRG: 751 | End: 2023-12-11
Attending: STUDENT IN AN ORGANIZED HEALTH CARE EDUCATION/TRAINING PROGRAM | Admitting: PSYCHIATRY & NEUROLOGY
Payer: COMMERCIAL

## 2023-12-07 VITALS
RESPIRATION RATE: 16 BRPM | HEART RATE: 101 BPM | TEMPERATURE: 98.7 F | WEIGHT: 170 LBS | SYSTOLIC BLOOD PRESSURE: 119 MMHG | OXYGEN SATURATION: 97 % | BODY MASS INDEX: 31.09 KG/M2 | DIASTOLIC BLOOD PRESSURE: 75 MMHG

## 2023-12-07 DIAGNOSIS — R94.31 ABNORMAL EKG: ICD-10-CM

## 2023-12-07 DIAGNOSIS — M54.16 LUMBAR RADICULOPATHY: ICD-10-CM

## 2023-12-07 DIAGNOSIS — J30.9 ALLERGIC RHINITIS: ICD-10-CM

## 2023-12-07 DIAGNOSIS — R45.851 SUICIDAL IDEATIONS: Primary | ICD-10-CM

## 2023-12-07 DIAGNOSIS — I10 ESSENTIAL HYPERTENSION: ICD-10-CM

## 2023-12-07 DIAGNOSIS — R12 HEARTBURN: ICD-10-CM

## 2023-12-07 DIAGNOSIS — R73.03 PRE-DIABETES: ICD-10-CM

## 2023-12-07 DIAGNOSIS — E55.9 VITAMIN D DEFICIENCY: ICD-10-CM

## 2023-12-07 DIAGNOSIS — G89.4 CHRONIC PAIN SYNDROME: ICD-10-CM

## 2023-12-07 DIAGNOSIS — F33.2 SEVERE EPISODE OF RECURRENT MAJOR DEPRESSIVE DISORDER, WITHOUT PSYCHOTIC FEATURES (HCC): ICD-10-CM

## 2023-12-07 DIAGNOSIS — Z00.8 MEDICAL CLEARANCE FOR PSYCHIATRIC ADMISSION: ICD-10-CM

## 2023-12-07 DIAGNOSIS — F90.9 ATTENTION DEFICIT HYPERACTIVITY DISORDER (ADHD), UNSPECIFIED ADHD TYPE: Primary | ICD-10-CM

## 2023-12-07 LAB
AMPHETAMINES SERPL QL SCN: NEGATIVE
BARBITURATES UR QL: NEGATIVE
BENZODIAZ UR QL: NEGATIVE
COCAINE UR QL: NEGATIVE
ETHANOL EXG-MCNC: 0 MG/DL
EXT PREGNANCY TEST URINE: NEGATIVE
EXT. CONTROL: NORMAL
METHADONE UR QL: NEGATIVE
OPIATES UR QL SCN: NEGATIVE
OXYCODONE+OXYMORPHONE UR QL SCN: NEGATIVE
PCP UR QL: NEGATIVE
THC UR QL: NEGATIVE

## 2023-12-07 PROCEDURE — 80307 DRUG TEST PRSMV CHEM ANLYZR: CPT | Performed by: EMERGENCY MEDICINE

## 2023-12-07 PROCEDURE — 99285 EMERGENCY DEPT VISIT HI MDM: CPT | Performed by: EMERGENCY MEDICINE

## 2023-12-07 PROCEDURE — 99285 EMERGENCY DEPT VISIT HI MDM: CPT

## 2023-12-07 PROCEDURE — NC001 PR NO CHARGE: Performed by: EMERGENCY MEDICINE

## 2023-12-07 PROCEDURE — 81025 URINE PREGNANCY TEST: CPT | Performed by: EMERGENCY MEDICINE

## 2023-12-07 PROCEDURE — 82075 ASSAY OF BREATH ETHANOL: CPT | Performed by: EMERGENCY MEDICINE

## 2023-12-07 RX ORDER — MINERAL OIL AND PETROLATUM 150; 830 MG/G; MG/G
OINTMENT OPHTHALMIC 4 TIMES DAILY PRN
Status: DISCONTINUED | OUTPATIENT
Start: 2023-12-07 | End: 2023-12-11 | Stop reason: HOSPADM

## 2023-12-07 RX ORDER — METFORMIN HYDROCHLORIDE 500 MG/1
500 TABLET, EXTENDED RELEASE ORAL
Status: DISCONTINUED | OUTPATIENT
Start: 2023-12-07 | End: 2023-12-07 | Stop reason: HOSPADM

## 2023-12-07 RX ORDER — ATOMOXETINE 25 MG/1
25 CAPSULE ORAL DAILY
Status: DISCONTINUED | OUTPATIENT
Start: 2023-12-07 | End: 2023-12-07 | Stop reason: HOSPADM

## 2023-12-07 RX ORDER — FLUTICASONE FUROATE AND VILANTEROL 200; 25 UG/1; UG/1
1 POWDER RESPIRATORY (INHALATION)
Status: DISCONTINUED | OUTPATIENT
Start: 2023-12-07 | End: 2023-12-07 | Stop reason: HOSPADM

## 2023-12-07 RX ORDER — OLANZAPINE 10 MG/2ML
10 INJECTION, POWDER, FOR SOLUTION INTRAMUSCULAR
Status: CANCELLED | OUTPATIENT
Start: 2023-12-07

## 2023-12-07 RX ORDER — FAMOTIDINE 20 MG/1
20 TABLET, FILM COATED ORAL 2 TIMES DAILY PRN
Status: CANCELLED | OUTPATIENT
Start: 2023-12-07

## 2023-12-07 RX ORDER — BISACODYL 10 MG
10 SUPPOSITORY, RECTAL RECTAL DAILY PRN
Status: CANCELLED | OUTPATIENT
Start: 2023-12-07

## 2023-12-07 RX ORDER — TRAZODONE HYDROCHLORIDE 50 MG/1
50 TABLET ORAL
Status: CANCELLED | OUTPATIENT
Start: 2023-12-07

## 2023-12-07 RX ORDER — LORAZEPAM 2 MG/ML
1 INJECTION INTRAMUSCULAR EVERY 4 HOURS PRN
Status: CANCELLED | OUTPATIENT
Start: 2023-12-07

## 2023-12-07 RX ORDER — ACETAMINOPHEN 325 MG/1
975 TABLET ORAL EVERY 6 HOURS PRN
Status: DISCONTINUED | OUTPATIENT
Start: 2023-12-07 | End: 2023-12-11 | Stop reason: HOSPADM

## 2023-12-07 RX ORDER — ALBUTEROL SULFATE 2.5 MG/3ML
2.5 SOLUTION RESPIRATORY (INHALATION) EVERY 6 HOURS PRN
Status: CANCELLED | OUTPATIENT
Start: 2023-12-07

## 2023-12-07 RX ORDER — ACETAMINOPHEN 325 MG/1
650 TABLET ORAL EVERY 4 HOURS PRN
Status: DISCONTINUED | OUTPATIENT
Start: 2023-12-07 | End: 2023-12-11 | Stop reason: HOSPADM

## 2023-12-07 RX ORDER — BENZTROPINE MESYLATE 1 MG/1
1 TABLET ORAL 2 TIMES DAILY PRN
Status: CANCELLED | OUTPATIENT
Start: 2023-12-07

## 2023-12-07 RX ORDER — LORAZEPAM 2 MG/ML
1 INJECTION INTRAMUSCULAR EVERY 4 HOURS PRN
Status: DISCONTINUED | OUTPATIENT
Start: 2023-12-07 | End: 2023-12-11 | Stop reason: HOSPADM

## 2023-12-07 RX ORDER — LORATADINE 10 MG/1
10 TABLET ORAL DAILY
Status: DISCONTINUED | OUTPATIENT
Start: 2023-12-07 | End: 2023-12-07 | Stop reason: HOSPADM

## 2023-12-07 RX ORDER — ACETAMINOPHEN 325 MG/1
650 TABLET ORAL EVERY 6 HOURS PRN
Status: DISCONTINUED | OUTPATIENT
Start: 2023-12-07 | End: 2023-12-11 | Stop reason: HOSPADM

## 2023-12-07 RX ORDER — MECLIZINE HCL 12.5 MG/1
25 TABLET ORAL 3 TIMES DAILY PRN
Status: DISCONTINUED | OUTPATIENT
Start: 2023-12-07 | End: 2023-12-11 | Stop reason: HOSPADM

## 2023-12-07 RX ORDER — OLANZAPINE 2.5 MG/1
5 TABLET, FILM COATED ORAL
Status: CANCELLED | OUTPATIENT
Start: 2023-12-07

## 2023-12-07 RX ORDER — LISINOPRIL 5 MG/1
10 TABLET ORAL 2 TIMES DAILY
Status: DISCONTINUED | OUTPATIENT
Start: 2023-12-07 | End: 2023-12-07 | Stop reason: HOSPADM

## 2023-12-07 RX ORDER — OLANZAPINE 10 MG/1
10 TABLET ORAL
Status: CANCELLED | OUTPATIENT
Start: 2023-12-07

## 2023-12-07 RX ORDER — LORAZEPAM 1 MG/1
1 TABLET ORAL
Status: CANCELLED | OUTPATIENT
Start: 2023-12-07

## 2023-12-07 RX ORDER — OLANZAPINE 5 MG/1
5 TABLET ORAL
Status: DISCONTINUED | OUTPATIENT
Start: 2023-12-07 | End: 2023-12-11 | Stop reason: HOSPADM

## 2023-12-07 RX ORDER — BENZTROPINE MESYLATE 1 MG/ML
1 INJECTION INTRAMUSCULAR; INTRAVENOUS 2 TIMES DAILY PRN
Status: CANCELLED | OUTPATIENT
Start: 2023-12-07

## 2023-12-07 RX ORDER — OLANZAPINE 10 MG/2ML
10 INJECTION, POWDER, FOR SOLUTION INTRAMUSCULAR
Status: DISCONTINUED | OUTPATIENT
Start: 2023-12-07 | End: 2023-12-11 | Stop reason: HOSPADM

## 2023-12-07 RX ORDER — TRAZODONE HYDROCHLORIDE 50 MG/1
50 TABLET ORAL
Status: DISCONTINUED | OUTPATIENT
Start: 2023-12-07 | End: 2023-12-07 | Stop reason: HOSPADM

## 2023-12-07 RX ORDER — DULOXETIN HYDROCHLORIDE 30 MG/1
60 CAPSULE, DELAYED RELEASE ORAL DAILY
Status: DISCONTINUED | OUTPATIENT
Start: 2023-12-07 | End: 2023-12-07 | Stop reason: HOSPADM

## 2023-12-07 RX ORDER — ATOMOXETINE 60 MG/1
60 CAPSULE ORAL DAILY
Status: DISCONTINUED | OUTPATIENT
Start: 2023-12-07 | End: 2023-12-07

## 2023-12-07 RX ORDER — LORAZEPAM 1 MG/1
1 TABLET ORAL
Status: DISCONTINUED | OUTPATIENT
Start: 2023-12-07 | End: 2023-12-11 | Stop reason: HOSPADM

## 2023-12-07 RX ORDER — PRIMIDONE 50 MG/1
25 TABLET ORAL
Status: DISCONTINUED | OUTPATIENT
Start: 2023-12-07 | End: 2023-12-07 | Stop reason: HOSPADM

## 2023-12-07 RX ORDER — ALBUTEROL SULFATE 2.5 MG/3ML
2.5 SOLUTION RESPIRATORY (INHALATION) EVERY 6 HOURS PRN
Status: DISCONTINUED | OUTPATIENT
Start: 2023-12-07 | End: 2023-12-11 | Stop reason: HOSPADM

## 2023-12-07 RX ORDER — POLYETHYLENE GLYCOL 3350 17 G/17G
17 POWDER, FOR SOLUTION ORAL DAILY PRN
Status: DISCONTINUED | OUTPATIENT
Start: 2023-12-07 | End: 2023-12-11 | Stop reason: HOSPADM

## 2023-12-07 RX ORDER — OLANZAPINE 2.5 MG/1
2.5 TABLET, FILM COATED ORAL
Status: DISCONTINUED | OUTPATIENT
Start: 2023-12-07 | End: 2023-12-11 | Stop reason: HOSPADM

## 2023-12-07 RX ORDER — MINERAL OIL AND PETROLATUM 150; 830 MG/G; MG/G
OINTMENT OPHTHALMIC 4 TIMES DAILY PRN
Status: CANCELLED | OUTPATIENT
Start: 2023-12-07

## 2023-12-07 RX ORDER — MECLIZINE HCL 12.5 MG/1
25 TABLET ORAL 3 TIMES DAILY PRN
Status: DISCONTINUED | OUTPATIENT
Start: 2023-12-07 | End: 2023-12-07 | Stop reason: HOSPADM

## 2023-12-07 RX ORDER — ACETAMINOPHEN 325 MG/1
650 TABLET ORAL EVERY 4 HOURS PRN
Status: CANCELLED | OUTPATIENT
Start: 2023-12-07

## 2023-12-07 RX ORDER — FAMOTIDINE 20 MG/1
20 TABLET, FILM COATED ORAL 2 TIMES DAILY PRN
Status: DISCONTINUED | OUTPATIENT
Start: 2023-12-07 | End: 2023-12-11 | Stop reason: HOSPADM

## 2023-12-07 RX ORDER — OLANZAPINE 10 MG/2ML
5 INJECTION, POWDER, FOR SOLUTION INTRAMUSCULAR
Status: DISCONTINUED | OUTPATIENT
Start: 2023-12-07 | End: 2023-12-11 | Stop reason: HOSPADM

## 2023-12-07 RX ORDER — ACETAMINOPHEN 325 MG/1
975 TABLET ORAL EVERY 6 HOURS PRN
Status: CANCELLED | OUTPATIENT
Start: 2023-12-07

## 2023-12-07 RX ORDER — ALBUTEROL SULFATE 2.5 MG/3ML
2.5 SOLUTION RESPIRATORY (INHALATION) EVERY 6 HOURS PRN
Status: DISCONTINUED | OUTPATIENT
Start: 2023-12-07 | End: 2023-12-07 | Stop reason: HOSPADM

## 2023-12-07 RX ORDER — AMOXICILLIN 250 MG
1 CAPSULE ORAL DAILY PRN
Status: CANCELLED | OUTPATIENT
Start: 2023-12-07

## 2023-12-07 RX ORDER — ACETAMINOPHEN 325 MG/1
975 TABLET ORAL ONCE
Status: COMPLETED | OUTPATIENT
Start: 2023-12-07 | End: 2023-12-07

## 2023-12-07 RX ORDER — BISACODYL 10 MG
10 SUPPOSITORY, RECTAL RECTAL DAILY PRN
Status: DISCONTINUED | OUTPATIENT
Start: 2023-12-07 | End: 2023-12-11 | Stop reason: HOSPADM

## 2023-12-07 RX ORDER — HYDROXYZINE HYDROCHLORIDE 25 MG/1
25 TABLET, FILM COATED ORAL
Status: DISCONTINUED | OUTPATIENT
Start: 2023-12-07 | End: 2023-12-11 | Stop reason: HOSPADM

## 2023-12-07 RX ORDER — OLANZAPINE 10 MG/1
10 TABLET ORAL
Status: DISCONTINUED | OUTPATIENT
Start: 2023-12-07 | End: 2023-12-11 | Stop reason: HOSPADM

## 2023-12-07 RX ORDER — ACETAMINOPHEN 325 MG/1
650 TABLET ORAL EVERY 6 HOURS PRN
Status: CANCELLED | OUTPATIENT
Start: 2023-12-07

## 2023-12-07 RX ORDER — HYDROXYZINE HYDROCHLORIDE 25 MG/1
50 TABLET, FILM COATED ORAL
Status: CANCELLED | OUTPATIENT
Start: 2023-12-07

## 2023-12-07 RX ORDER — MECLIZINE HCL 12.5 MG/1
25 TABLET ORAL 3 TIMES DAILY PRN
Status: CANCELLED | OUTPATIENT
Start: 2023-12-07

## 2023-12-07 RX ORDER — TRAZODONE HYDROCHLORIDE 50 MG/1
50 TABLET ORAL
Status: DISCONTINUED | OUTPATIENT
Start: 2023-12-07 | End: 2023-12-11 | Stop reason: HOSPADM

## 2023-12-07 RX ORDER — HYDROCHLOROTHIAZIDE 12.5 MG/1
12.5 TABLET ORAL DAILY
Status: DISCONTINUED | OUTPATIENT
Start: 2023-12-07 | End: 2023-12-07 | Stop reason: HOSPADM

## 2023-12-07 RX ORDER — ATOMOXETINE 18 MG/1
36 CAPSULE ORAL DAILY
Status: DISCONTINUED | OUTPATIENT
Start: 2023-12-07 | End: 2023-12-07 | Stop reason: HOSPADM

## 2023-12-07 RX ORDER — BENZTROPINE MESYLATE 1 MG/1
1 TABLET ORAL 2 TIMES DAILY PRN
Status: DISCONTINUED | OUTPATIENT
Start: 2023-12-07 | End: 2023-12-11 | Stop reason: HOSPADM

## 2023-12-07 RX ORDER — HYDROXYZINE HYDROCHLORIDE 25 MG/1
25 TABLET, FILM COATED ORAL
Status: CANCELLED | OUTPATIENT
Start: 2023-12-07

## 2023-12-07 RX ORDER — BENZTROPINE MESYLATE 1 MG/ML
1 INJECTION INTRAMUSCULAR; INTRAVENOUS 2 TIMES DAILY PRN
Status: DISCONTINUED | OUTPATIENT
Start: 2023-12-07 | End: 2023-12-11 | Stop reason: HOSPADM

## 2023-12-07 RX ORDER — HYDROXYZINE 50 MG/1
50 TABLET, FILM COATED ORAL
Status: DISCONTINUED | OUTPATIENT
Start: 2023-12-07 | End: 2023-12-11 | Stop reason: HOSPADM

## 2023-12-07 RX ORDER — POLYETHYLENE GLYCOL 3350 17 G/17G
17 POWDER, FOR SOLUTION ORAL DAILY PRN
Status: CANCELLED | OUTPATIENT
Start: 2023-12-07

## 2023-12-07 RX ORDER — OLANZAPINE 2.5 MG/1
2.5 TABLET, FILM COATED ORAL
Status: CANCELLED | OUTPATIENT
Start: 2023-12-07

## 2023-12-07 RX ORDER — AMOXICILLIN 250 MG
1 CAPSULE ORAL DAILY PRN
Status: DISCONTINUED | OUTPATIENT
Start: 2023-12-07 | End: 2023-12-11 | Stop reason: HOSPADM

## 2023-12-07 RX ORDER — OLANZAPINE 10 MG/2ML
5 INJECTION, POWDER, FOR SOLUTION INTRAMUSCULAR
Status: CANCELLED | OUTPATIENT
Start: 2023-12-07

## 2023-12-07 RX ORDER — PRAZOSIN HYDROCHLORIDE 1 MG/1
2 CAPSULE ORAL
Status: DISCONTINUED | OUTPATIENT
Start: 2023-12-07 | End: 2023-12-07 | Stop reason: HOSPADM

## 2023-12-07 RX ORDER — ALPRAZOLAM 0.5 MG/1
1 TABLET ORAL ONCE AS NEEDED
Status: DISCONTINUED | OUTPATIENT
Start: 2023-12-07 | End: 2023-12-07 | Stop reason: HOSPADM

## 2023-12-07 RX ORDER — LORAZEPAM 2 MG/ML
2 INJECTION INTRAMUSCULAR
Status: DISCONTINUED | OUTPATIENT
Start: 2023-12-07 | End: 2023-12-11 | Stop reason: HOSPADM

## 2023-12-07 RX ORDER — LORAZEPAM 2 MG/ML
2 INJECTION INTRAMUSCULAR
Status: CANCELLED | OUTPATIENT
Start: 2023-12-07

## 2023-12-07 RX ADMIN — ACETAMINOPHEN 975 MG: 325 TABLET, FILM COATED ORAL at 09:02

## 2023-12-07 RX ADMIN — LISINOPRIL 10 MG: 5 TABLET ORAL at 08:39

## 2023-12-07 RX ADMIN — FLUTICASONE FUROATE AND VILANTEROL TRIFENATATE 1 PUFF: 200; 25 POWDER RESPIRATORY (INHALATION) at 08:40

## 2023-12-07 RX ADMIN — ATOMOXETINE 25 MG: 25 CAPSULE ORAL at 08:40

## 2023-12-07 RX ADMIN — ATOMOXETINE 36 MG: 18 CAPSULE ORAL at 08:40

## 2023-12-07 RX ADMIN — LISINOPRIL 10 MG: 5 TABLET ORAL at 19:09

## 2023-12-07 RX ADMIN — DULOXETINE HYDROCHLORIDE 60 MG: 30 CAPSULE, DELAYED RELEASE ORAL at 08:41

## 2023-12-07 RX ADMIN — METFORMIN HYDROCHLORIDE 500 MG: 500 TABLET, EXTENDED RELEASE ORAL at 19:10

## 2023-12-07 RX ADMIN — LORATADINE 10 MG: 10 TABLET ORAL at 08:39

## 2023-12-07 RX ADMIN — HYDROCHLOROTHIAZIDE 12.5 MG: 12.5 TABLET ORAL at 08:41

## 2023-12-07 NOTE — ED PROVIDER NOTES
History  Chief Complaint   Patient presents with    Psychiatric Evaluation     Patient reports feeling depressed and suicidal for the last couple days. Patient reports thought of overdosing on pills. Psychiatric Evaluation      Prior to Admission Medications   Prescriptions Last Dose Informant Patient Reported? Taking? ALPRAZolam (XANAX) 1 mg tablet   Yes No   Sig: Take 1 mg by mouth as needed   Alcohol Swabs (Pharmacist Choice Alcohol) PADS   No No   Sig: TEST BLOOD 3 TIMES DAILY   Blood Glucose Monitoring Suppl (OneTouch Verio Reflect) w/Device KIT   No No   Sig: Check blood sugars three times daily. Please substitute with appropriate alternative as covered by patient's insurance. Dx: E11.65   DULoxetine (CYMBALTA) 60 mg delayed release capsule   Yes No   Sig: Take 60 mg by mouth daily   Fluticasone-Salmeterol (Advair) 250-50 mcg/dose inhaler   Yes No   Sig: Inhale 1 puff 2 (two) times a day   Fluticasone-Salmeterol (Advair) 250-50 mcg/dose inhaler   No No   Sig: INHALE 1 PUFF 2 (TWO) TIMES A DAY RINSE MOUTH AFTER USE. Lancet Devices (Easy Mini Eject Lancing Device) MISC   No No   Sig: Use as directed to check blood sugar 3x daily.    OneTouch Verio test strip   No No   Sig: TEST BLOOD 3 TIMES DAILY   Pharmacist Choice Lancets MISC   No No   Sig: TEST BLOOD 3 TIMES DAILY   albuterol (2.5 mg/3 mL) 0.083 % nebulizer solution   No No   Sig: Take 3 mL (2.5 mg total) by nebulization every 6 (six) hours as needed for wheezing or shortness of breath   albuterol (PROVENTIL HFA,VENTOLIN HFA) 90 mcg/act inhaler   No No   Sig: INHALE 2 PUFFS EVERY 4 (FOUR) HOURS AS NEEDED FOR WHEEZING OR SHORTNESS OF BREATH   atoMOXetine (STRATTERA) 60 mg capsule   Yes No   Sig: Take 60 mg by mouth in the morning   cetirizine (ZyrTEC) 10 mg tablet   No No   Sig: TAKE 1 TABLET (10 MG TOTAL) BY MOUTH DAILY   ergocalciferol (VITAMIN D2) 50,000 units   No No   Sig: TAKE 1 CAPSULE (50,000 UNITS TOTAL) BY MOUTH ONCE A WEEK   fluticasone (FLONASE) 50 mcg/act nasal spray   No No   Si SPRAY INTO EACH NOSTRIL DAILY   hydrochlorothiazide (HYDRODIURIL) 12.5 mg tablet   No No   Sig: TAKE 1 TABLET (12.5 MG TOTAL) BY MOUTH DAILY   lisinopril (ZESTRIL) 10 mg tablet   No No   Sig: TAKE 1 TABLET (10 MG TOTAL) BY MOUTH 2 (TWO) TIMES A DAY   meclizine (ANTIVERT) 25 mg tablet   No No   Sig: TAKE 1 TABLET (25 MG TOTAL) BY MOUTH 3 (THREE) TIMES A DAY AS NEEDED FOR DIZZINESS   metFORMIN (GLUCOPHAGE-XR) 500 mg 24 hr tablet   No No   Sig: TAKE 1 TABLET (500 MG TOTAL) BY MOUTH DAILY WITH DINNER   ondansetron (ZOFRAN) 4 mg tablet   No No   Sig: TAKE 1 TABLET (4 MG TOTAL) BY MOUTH EVERY 8 (EIGHT) HOURS AS NEEDED FOR NAUSEA   prazosin (MINIPRESS) 2 mg capsule   Yes No   Sig: Take 2 mg by mouth daily at bedtime   primidone (MYSOLINE) 50 mg tablet   No No   Sig: Take 0.5 tablets (25 mg total) by mouth daily at bedtime   traZODone (DESYREL) 50 mg tablet   Yes No   Sig: Take 50 mg by mouth daily at bedtime      Facility-Administered Medications: None       Past Medical History:   Diagnosis Date    ADHD (attention deficit hyperactivity disorder)     Anxiety     Asthma     Cat-scratch disease     last assessed 10/22/15    Depression     Hypertension     last assessed 14    Sciatica     Sleep disorder        Past Surgical History:   Procedure Laterality Date    BREAST BIOPSY Left         CHOLECYSTECTOMY LAPAROSCOPIC N/A 2023    Procedure: CHOLECYSTECTOMY LAPAROSCOPIC;  Surgeon: Negar Rueda DO;  [facility];  Service: General    DENTAL SURGERY      [place] BX/EXC LYMPH NODE OPEN DEEP AXILLARY NODE Left 1/15/2019    Procedure: LEFT AXILLARY LYMPH NODE BIOPSY;  Surgeon: Danika Page MD;  [facility];  Service: General    [place] LAMNOTMY INCL W/DCMPRSN NRV ROOT 1 INTRSPC LUMBR Right 2021    Procedure: L5-S1 minimally invasive microdiskectomy and  epidural steroid injection;  Surgeon: Jasmin Delatorre MD;  [facility];  Service: Neurosurgery TUBAL LIGATION         Family History   Problem Relation Age of Onset    Anxiety disorder Mother     Bipolar disorder Mother     Depression Mother     Tremor Mother     Anxiety disorder Father     Bipolar disorder Father     Depression Father     Schizophrenia Father     Heart attack Father     Parkinsonism Maternal Grandmother     Heart attack Maternal Grandmother     Stomach cancer Paternal Grandfather     Heart attack Paternal Grandfather      I have reviewed and agree with the history as documented.     E-Cigarette/Vaping    E-Cigarette Use Never User      E-Cigarette/Vaping Substances    Nicotine No     THC No     CBD No     Flavoring No     Other No     Unknown No      Social History     Tobacco Use    Smoking status: Never    Smokeless tobacco: Never   Vaping Use    Vaping Use: Never used   Substance Use Topics    Alcohol use: Never    Drug use: No       Review of Systems    Physical Exam  Physical Exam    Vital Signs  ED Triage Vitals [12/07/23 0600]   Temperature Pulse Respirations Blood Pressure SpO2   97.8 °F (36.6 °C) 82 20 (!) 173/106 98 %      Temp Source Heart Rate Source Patient Position - Orthostatic VS BP Location FiO2 (%)   Temporal Monitor Sitting Left arm --      Pain Score       No Pain           Vitals:    12/07/23 0600 12/07/23 0838   BP: (!) 173/106 151/82   Pulse: 82 79   Patient Position - Orthostatic VS: Sitting Lying         Visual Acuity      ED Medications  Medications   albuterol inhalation solution 2.5 mg (has no administration in time range)   ALPRAZolam (XANAX) tablet 1 mg (has no administration in time range)   loratadine (CLARITIN) tablet 10 mg (10 mg Oral Given 12/7/23 0839)   DULoxetine (CYMBALTA) delayed release capsule 60 mg (60 mg Oral Given 12/7/23 0841)   fluticasone-vilanterol 200-25 mcg/actuation 1 puff (1 puff Inhalation Given 12/7/23 0840)   hydrochlorothiazide (HYDRODIURIL) tablet 12.5 mg (12.5 mg Oral Given 12/7/23 0841)   lisinopril (ZESTRIL) tablet 10 mg (10 mg Oral Given 12/7/23 0839)   meclizine (ANTIVERT) tablet 25 mg (has no administration in time range)   metFORMIN (GLUCOPHAGE-XR) 24 hr tablet 500 mg (has no administration in time range)   prazosin (MINIPRESS) capsule 2 mg (has no administration in time range)   primidone (MYSOLINE) tablet 25 mg (has no administration in time range)   traZODone (DESYREL) tablet 50 mg (has no administration in time range)   atoMOXetine (STRATTERA) capsule 36 mg (36 mg Oral Given 12/7/23 0840)     And   atoMOXetine (STRATTERA) capsule 25 mg (25 mg Oral Given 12/7/23 0840)   acetaminophen (TYLENOL) tablet 975 mg (975 mg Oral Given 12/7/23 0902)       Diagnostic Studies  Results Reviewed       Procedure Component Value Units Date/Time    Rapid drug screen, urine [[id number]]  (Normal) Collected: 12/07/23 0742    Lab Status: Final result Specimen: Urine, Clean Catch Updated: 12/07/23 0814     Amph/Meth UR Negative     Barbiturate Ur Negative     Benzodiazepine Urine Negative     Cocaine Urine Negative     Methadone Urine Negative     Opiate Urine Negative     PCP Ur Negative     THC Urine Negative     Oxycodone Urine Negative    Narrative:      FOR MEDICAL PURPOSES ONLY. IF CONFIRMATION NEEDED PLEASE CONTACT THE LAB WITHIN 5 DAYS.     Drug Screen Cutoff Levels:  AMPHETAMINE/METHAMPHETAMINES  1000 ng/mL  BARBITURATES     200 ng/mL  BENZODIAZEPINES     200 ng/mL  COCAINE      300 ng/mL  METHADONE      300 ng/mL  OPIATES      300 ng/mL  PHENCYCLIDINE     25 ng/mL  THC       50 ng/mL  OXYCODONE      100 ng/mL    POCT pregnancy, urine [[id number]]  (Normal) Resulted: 12/07/23 0743    Lab Status: Final result Updated: 12/07/23 0743     EXT Preg Test, Ur Negative     Control Valid    POCT alcohol breath test [[id number]]  (Normal) Resulted: 12/07/23 0659    Lab Status: Final result Updated: 12/07/23 0700     EXTBreath Alcohol 0.00                   No orders to display              Procedures  Procedures         ED Course  ED Course as of 12/07/23 0696   Thu Dec 07, 2023   1730 44 y/o woman with SI with plan to overdose on meds  201 completed  Medically stable for inpatient treatment  Daily meds ordered  Transport ETA 1930 2220: Transport ETA was delayed several times. No issues during remainder of shift. Patient departed the ED with EMS in stable condition. SBIRT 22yo+      Flowsheet Row Most Recent Value   Initial Alcohol Screen: US AUDIT-C     1. How often do you have a drink containing alcohol? 0 Filed at: 12/07/2023 0600   2. How many drinks containing alcohol do you have on a typical day you are drinking? 0 Filed at: 12/07/2023 0600   3a. Male UNDER 65: How often do you have five or more drinks on one occasion? 0 Filed at: 12/07/2023 0600   3b. FEMALE Any Age, or MALE 65+: How often do you have 4 or more drinks on one occassion? 0 Filed at: 12/07/2023 0600   Audit-C Score 0 Filed at: 12/07/2023 0600   YOAN: How many times in the past year have you. .. Used an illegal drug or used a prescription medication for non-medical reasons? Never Filed at: 12/07/2023 0600                      Medical Decision Making  Amount and/or Complexity of Data Reviewed  Labs: ordered. Risk  OTC drugs. Prescription drug management. Decision regarding hospitalization.              Disposition  Final diagnoses:   Suicidal ideations     Time reflects when diagnosis was documented in both MDM as applicable and the Disposition within this note       Time User Action Codes Description Comment    12/7/2023  6:22 AM [facility] Add [Q82.217] Suicidal ideations     12/7/2023 12:41 PM Gerald Bolden Add [Z00.8] Medical clearance for psychiatric admission     12/7/2023 12:41 PM Gerald Bolden Add [F95.22] Pre-diabetes     12/7/2023 12:41 PM Timothy Saleh Essential hypertension     12/7/2023 12:41 PM Gerald Bolden Add [E55.9] Vitamin D deficiency     12/7/2023 12:41 PM Gerald Bolden Add [G89.4] Chronic pain syndrome 12/7/2023 12:41 PM Trinna Kid Add [R12] Heartburn     12/7/2023 12:41 PM Trinna Kid Add [M54.16] Lumbar radiculopathy     12/7/2023 12:41 PM Trinna Kid Add [J30.9] Allergic rhinitis     12/7/2023 12:41 PM Trinna Kid Add [R94.31] Abnormal EKG           ED Disposition       ED Disposition   Transfer to [address] [place] [address]   --    Date/Time   Thu Dec 7, 2023 [address] Daria should be transferred out to [facility] and has been medically cleared.                MD Documentation      [facility] Most Recent Value   Patient Condition The patient has been stabilized such that within reasonable medical probability, no material deterioration of the patient condition or the condition of the unborn child(paige) is likely to result from the transfer   Reason for Transfer Level of Care needed not available at this facility   Benefits of Transfer Specialized equipment and/or services available at the receiving facility (Include comment)________________________   Risks of Transfer Potential for delay in receiving treatment, Potential deterioration of medical condition, Increased discomfort during transfer, Possible worsening of condition or death during transfer   Accepting Physician Dr. Jayjay Padilla Name, Francisco Edwards    (Name & Tel number) Samina Brown [phone]   Transported by [facility] and Unit #) Lizeth Shannon   Provider Certification General risk, such as traffic hazards, adverse weather conditions, rough terrain or turbulence, possible failure of equipment (including vehicle or aircraft), or consequences of actions of persons outside the control of the transport personnel          RN Documentation      Flowsheet Row Most [facility] NameFrancisco    (Name & Tel number) Samina Brown [phone]   Report Given to [phone]   Transport Mode Ambulance Transported by [employer] and Unit #) [facility]   Level of Care Basic life support   Patient Belongings Disposition Sent with patient   Transfer Date 12/07/23   Transfer Time 1930          Follow-up Information    None         Patient's Medications   Discharge Prescriptions    No medications on file       No discharge procedures on file.     PDMP Review         Value Time User    PDMP Reviewed  Yes 12/7/2023 12:39 PM Dana Burris PA-C            ED Provider  Electronically Signed by             Duane Hauser, DO  12/08/23 5635

## 2023-12-07 NOTE — ED NOTES
EMTALA completed. TT sent to Charge RN to advise that EMTALA & MED NEC forms should be printed & appropriate signatures by pt & attending obtained. Requested Charge RN advise pt of acceptance and transfer plan.

## 2023-12-07 NOTE — ED NOTES
Patient is accepted at [facility]. Patient is accepted by Dr. Susy Mark per Mary Bryant. Transportation is arranged with [employer]. Transportation is scheduled for 1930. Patient may go to the floor at 1500. Nurse report is to be called to [phone] prior to patient transfer.

## 2023-12-07 NOTE — ED NOTES
Signed 201 faxed to crisis, original 201 placed on patients chart at this time.       Abiodun Browne RN  12/07/23 1037
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
## 2023-12-07 NOTE — ED PROVIDER NOTES
History  Chief Complaint   Patient presents with    Psychiatric Evaluation     Patient reports feeling depressed and suicidal for the last couple days. Patient reports thought of overdosing on pills. 58-year-old female with a past medical history of anxiety, depression, prior SI, who presents now for suicidal thoughts. Patient reports that she has been having this over the past couple of days as she has been stressed out at home, she states that she just left her boyfriend. States that she has had overdosing on pills, although she does not have her intent. She denies any HI. No auditory or visual hallucinations. Denies any alcohol or drug use. She is amenable to 201. ROS otherwise negative. Prior to Admission Medications   Prescriptions Last Dose Informant Patient Reported? Taking? ALPRAZolam (XANAX) 1 mg tablet   Yes No   Sig: Take 1 mg by mouth as needed   Alcohol Swabs (Pharmacist Choice Alcohol) PADS   No No   Sig: TEST BLOOD 3 TIMES DAILY   Blood Glucose Monitoring Suppl (OneTouch Verio Reflect) w/Device KIT   No No   Sig: Check blood sugars three times daily. Please substitute with appropriate alternative as covered by patient's insurance. Dx: E11.65   DULoxetine (CYMBALTA) 60 mg delayed release capsule   Yes No   Sig: Take 60 mg by mouth daily   Fluticasone-Salmeterol (Advair) 250-50 mcg/dose inhaler   Yes No   Sig: Inhale 1 puff 2 (two) times a day   Fluticasone-Salmeterol (Advair) 250-50 mcg/dose inhaler   No No   Sig: INHALE 1 PUFF 2 (TWO) TIMES A DAY RINSE MOUTH AFTER USE. Lancet Devices (Easy Mini Eject Lancing Device) MISC   No No   Sig: Use as directed to check blood sugar 3x daily.    OneTouch Verio test strip   No No   Sig: TEST BLOOD 3 TIMES DAILY   Pharmacist Choice Lancets MISC   No No   Sig: TEST BLOOD 3 TIMES DAILY   albuterol (2.5 mg/3 mL) 0.083 % nebulizer solution   No No   Sig: Take 3 mL (2.5 mg total) by nebulization every 6 (six) hours as needed for wheezing or shortness of breath   albuterol (PROVENTIL HFA,VENTOLIN HFA) 90 mcg/act inhaler   No No   Sig: INHALE 2 PUFFS EVERY 4 (FOUR) HOURS AS NEEDED FOR WHEEZING OR SHORTNESS OF BREATH   atoMOXetine (STRATTERA) 60 mg capsule   Yes No   Sig: Take 60 mg by mouth in the morning   cetirizine (ZyrTEC) 10 mg tablet   No No   Sig: TAKE 1 TABLET (10 MG TOTAL) BY MOUTH DAILY   ergocalciferol (VITAMIN D2) 50,000 units   No No   Sig: TAKE 1 CAPSULE (50,000 UNITS TOTAL) BY MOUTH ONCE A WEEK   fluticasone (FLONASE) 50 mcg/act nasal spray   No No   Si SPRAY INTO EACH NOSTRIL DAILY   hydrochlorothiazide (HYDRODIURIL) 12.5 mg tablet   No No   Sig: TAKE 1 TABLET (12.5 MG TOTAL) BY MOUTH DAILY   lisinopril (ZESTRIL) 10 mg tablet   No No   Sig: TAKE 1 TABLET (10 MG TOTAL) BY MOUTH 2 (TWO) TIMES A DAY   meclizine (ANTIVERT) 25 mg tablet   No No   Sig: TAKE 1 TABLET (25 MG TOTAL) BY MOUTH 3 (THREE) TIMES A DAY AS NEEDED FOR DIZZINESS   metFORMIN (GLUCOPHAGE-XR) 500 mg 24 hr tablet   No No   Sig: TAKE 1 TABLET (500 MG TOTAL) BY MOUTH DAILY WITH DINNER   ondansetron (ZOFRAN) 4 mg tablet   No No   Sig: TAKE 1 TABLET (4 MG TOTAL) BY MOUTH EVERY 8 (EIGHT) HOURS AS NEEDED FOR NAUSEA   prazosin (MINIPRESS) 2 mg capsule   Yes No   Sig: Take 2 mg by mouth daily at bedtime   primidone (MYSOLINE) 50 mg tablet   No No   Sig: Take 0.5 tablets (25 mg total) by mouth daily at bedtime   traZODone (DESYREL) 50 mg tablet   Yes No   Sig: Take 50 mg by mouth daily at bedtime      Facility-Administered Medications: None       Past Medical History:   Diagnosis Date    ADHD (attention deficit hyperactivity disorder)     Anxiety     Asthma     Cat-scratch disease     last assessed 10/22/15    Depression     Hypertension     last assessed 14    Sciatica     Sleep disorder        Past Surgical History:   Procedure Laterality Date    BREAST BIOPSY Left     2016    CHOLECYSTECTOMY LAPAROSCOPIC N/A 2023    Procedure: CHOLECYSTECTOMY LAPAROSCOPIC;  Surgeon: Milagros Kam DO;  Location: [facility];  Service: General    DENTAL SURGERY      NM BX/EXC LYMPH NODE OPEN DEEP AXILLARY NODE Left 1/15/2019    Procedure: LEFT AXILLARY LYMPH NODE BIOPSY;  Surgeon: Liz Dale MD;  Location: [facility];  Service: General    NM LAMNOTMY INCL W/DCMPRSN NRV ROOT 1 INTRSPC LUMBR Right 4/14/2021    Procedure: L5-S1 minimally invasive microdiskectomy and  epidural steroid injection;  Surgeon: Guera Rodriguez MD;  Location: [facility];  Service: Neurosurgery    TUBAL LIGATION         Family History   Problem Relation Age of Onset    Anxiety disorder Mother     Bipolar disorder Mother     Depression Mother     Tremor Mother     Anxiety disorder Father     Bipolar disorder Father     Depression Father     Schizophrenia Father     Heart attack Father     Parkinsonism Maternal Grandmother     Heart attack Maternal Grandmother     Stomach cancer Paternal Grandfather     Heart attack Paternal Grandfather      I have reviewed and agree with the history as documented. E-Cigarette/Vaping    E-Cigarette Use Never User      E-Cigarette/Vaping Substances    Nicotine No     THC No     CBD No     Flavoring No     Other No     Unknown No      Social History     Tobacco Use    Smoking status: Never     Passive exposure: Never    Smokeless tobacco: Never   Vaping Use    Vaping Use: Never used   Substance Use Topics    Alcohol use: Never    Drug use: No       Review of Systems   Constitutional:  Negative for chills and fever. HENT:  Negative for congestion, rhinorrhea and sore throat. Respiratory:  Negative for cough and shortness of breath. Cardiovascular:  Negative for chest pain and palpitations. Gastrointestinal:  Negative for abdominal pain, constipation, diarrhea, nausea and vomiting. Genitourinary:  Negative for difficulty urinating and flank pain. Musculoskeletal:  Negative for arthralgias. Neurological:  Negative for dizziness, weakness, light-headedness and headaches. Psychiatric/Behavioral:  Positive for suicidal ideas. Negative for agitation, behavioral problems and confusion. All other systems reviewed and are negative. Physical Exam  Physical Exam  Constitutional:       Appearance: She is well-developed. HENT:      Head: Normocephalic and atraumatic. Cardiovascular:      Rate and Rhythm: Normal rate and regular rhythm. Heart sounds: Normal heart sounds. No murmur heard. Pulmonary:      Effort: Pulmonary effort is normal. No respiratory distress. Breath sounds: Normal breath sounds. Abdominal:      General: Bowel sounds are normal. There is no distension. Palpations: Abdomen is soft. Tenderness: There is no abdominal tenderness. Musculoskeletal:         General: No deformity. Skin:     General: Skin is warm. Findings: No rash. Neurological:      Mental Status: She is alert and oriented to person, place, and time. Psychiatric:         Behavior: Behavior normal.         Thought Content:  Thought content normal.         Judgment: Judgment normal.         Vital Signs  ED Triage Vitals [12/07/23 0600]   Temperature Pulse Respirations Blood Pressure SpO2   97.8 °F (36.6 °C) 82 20 (!) 173/106 98 %      Temp Source Heart Rate Source Patient Position - Orthostatic VS BP Location FiO2 (%)   Temporal Monitor Sitting Left arm --      Pain Score       No Pain           Vitals:    12/07/23 0600 12/07/23 0838 12/07/23 1901   BP: (!) 173/106 151/82 119/75   Pulse: 82 79 101   Patient Position - Orthostatic VS: Sitting Lying Sitting         Visual Acuity      ED Medications  Medications   acetaminophen (TYLENOL) tablet 975 mg (975 mg Oral Given 12/7/23 0902)       Diagnostic Studies  Results Reviewed       Procedure Component Value Units Date/Time    Rapid drug screen, urine [[id number]]  (Normal) Collected: 12/07/23 0742    Lab Status: Final result Specimen: Urine, Clean Catch Updated: 12/07/23 0814     Amph/Meth UR Negative     Barbiturate Ur Negative     Benzodiazepine Urine Negative     Cocaine Urine Negative     Methadone Urine Negative     Opiate Urine Negative     PCP Ur Negative     THC Urine Negative     Oxycodone Urine Negative    Narrative:      FOR MEDICAL PURPOSES ONLY. IF CONFIRMATION NEEDED PLEASE CONTACT THE LAB WITHIN 5 DAYS. Drug Screen Cutoff Levels:  AMPHETAMINE/METHAMPHETAMINES  1000 ng/mL  BARBITURATES     200 ng/mL  BENZODIAZEPINES     200 ng/mL  COCAINE      300 ng/mL  METHADONE      300 ng/mL  OPIATES      300 ng/mL  PHENCYCLIDINE     25 ng/mL  THC       50 ng/mL  OXYCODONE      100 ng/mL    POCT pregnancy, urine [[id number]]  (Normal) Resulted: 12/07/23 0743    Lab Status: Final result Updated: 12/07/23 0743     EXT Preg Test, Ur Negative     Control Valid    POCT alcohol breath test [[id number]]  (Normal) Resulted: 12/07/23 0659    Lab Status: Final result Updated: 12/07/23 0700     EXTBreath Alcohol 0.00                   No orders to display              Procedures  Procedures         ED Course  ED Course as of 12/09/23 0851   Thu Dec 07, 2023   0744 PREGNANCY TEST URINE: Negative                               SBIRT 20yo+      Flowsheet Row Most Recent Value   Initial Alcohol Screen: US AUDIT-C     1. How often do you have a drink containing alcohol? 0 Filed at: 12/07/2023 0600   2. How many drinks containing alcohol do you have on a typical day you are drinking? 0 Filed at: 12/07/2023 0600   3a. Male UNDER 65: How often do you have five or more drinks on one occasion? 0 Filed at: 12/07/2023 0600   3b. FEMALE Any Age, or MALE 65+: How often do you have 4 or more drinks on one occassion? 0 Filed at: 12/07/2023 0600   Audit-C Score 0 Filed at: 12/07/2023 0600   YOAN: How many times in the past year have you. .. Used an illegal drug or used a prescription medication for non-medical reasons?  Never Filed at: 12/07/2023 0600                      Medical Decision Making  I reviewed the patient's medical chart, PMHx, prior encounters, medications. My DDx includes: SI, thoughts to overdose, at risk for HI, AH, VH    Will obtain POCT preg, UDS, breathalyzer test for medical clearance. Patient is amenable to 201. Will contact crisis once medically cleared. Patient medically clear. Signed out to Dr. Nayan Anne pending disposition, suspect [phone] with ultimate transfer to [address] Amount and/or Complexity of Data Reviewed  Labs: ordered. Decision-making details documented in ED Course. Risk  OTC drugs. Decision regarding hospitalization. Disposition  Final diagnoses:   Suicidal ideations     Time reflects when diagnosis was documented in both MDM as applicable and the Disposition within this note       Time User Action Codes Description Comment    12/7/2023  6:22 AM Chapin Case Add [W83.083] Suicidal ideations     12/7/2023 12:41 PM Murl Ferris Add [Z00.8] Medical clearance for psychiatric admission     12/7/2023 12:41 PM Murl Ferris Add [R73.03] Pre-diabetes     12/7/2023 12:41 PM Murl Ferris Add [I10] Essential hypertension     12/7/2023 12:41 PM Murl Ferris Add [E55.9] Vitamin D deficiency     12/7/2023 12:41 PM Murl Ferris Add [G89.4] Chronic pain syndrome     12/7/2023 12:41 PM Murl Ferris Add [R12] Heartburn     12/7/2023 12:41 PM Murl Ferris Add [M54.16] Lumbar radiculopathy     12/7/2023 12:41 PM Murl Ferris Add [J30.9] Allergic rhinitis     12/7/2023 12:41 PM Murl Ferris Add [R94.31] Abnormal EKG           ED Disposition       ED Disposition   Transfer to [address] [place] [address]   --    Date/Time   Thu Dec 7, 2023 [address] should be transferred out to [address] [facility] and has been medically cleared.                MD Documentation      Denisse Woodward Most Recent Value   Patient Condition The patient has been stabilized such that within reasonable medical probability, no material deterioration of the patient condition or the condition of the unborn child(paige) is likely to result from the transfer   Reason for Transfer Level of Care needed not available at this facility   Benefits of Transfer Specialized equipment and/or services available at the receiving facility (Include comment)________________________   Risks of Transfer Potential for delay in receiving treatment, Potential deterioration of medical condition, Increased discomfort during transfer, Possible worsening of condition or death during transfer   Accepting Physician Dr. Humberto Lepe Name, Tho    (Name & Tel number) Mindi Massey [phone]   Transported by [employer] and Unit #) Trisha Escobar   Sending MD Dr. Emily Alonso   Provider Certification General risk, such as traffic hazards, adverse weather conditions, rough terrain or turbulence, possible failure of equipment (including vehicle or aircraft), or consequences of actions of persons outside the control of the transport personnel          RN Documentation      Flowsheet Row Most [facility] Name Ruth    (Name & Tel number) Mindi Massey [phone]   Report Given to [phone]   Transport Mode Ambulance   [facility] [address] [facility]   Patient Belongings Disposition Sent with patient   Transfer Date 12/07/23   Transfer Time 1930          Follow-up Information    None         Discharge Medication List as of 12/7/2023 10:22 PM        CONTINUE these medications which have NOT CHANGED    Details   albuterol (2.5 mg/3 mL) 0.083 % nebulizer solution Take 3 mL (2.5 mg total) by nebulization every 6 (six) hours as needed for wheezing or shortness of breath, Starting Thu 1/19/2023, Print      albuterol (PROVENTIL HFA,VENTOLIN HFA) 90 mcg/act inhaler INHALE 2 PUFFS EVERY 4 (FOUR) HOURS AS NEEDED FOR WHEEZING OR SHORTNESS OF BREATH, Starting Tue 12/5/2023, Normal      Alcohol Swabs (Pharmacist Choice Alcohol) PADS TEST BLOOD 3 TIMES DAILY, Normal      ALPRAZolam (XANAX) 1 mg tablet Take 1 mg by mouth as needed, Starting Tue 3/21/2023, Historical Med      atoMOXetine (STRATTERA) 60 mg capsule Take 60 mg by mouth in the morning, Starting Thu 7/27/2023, Historical Med      Blood Glucose Monitoring Suppl (OneTouch Verio Reflect) w/Device KIT Check blood sugars three times daily. Please substitute with appropriate alternative as covered by patient's insurance. Dx: E11.65, Normal      cetirizine (ZyrTEC) 10 mg tablet TAKE 1 TABLET (10 MG TOTAL) BY MOUTH DAILY, Starting Wed 10/25/2023, Normal      DULoxetine (CYMBALTA) 60 mg delayed release capsule Take 60 mg by mouth daily, Starting Tue 5/9/2023, Historical Med      ergocalciferol (VITAMIN D2) 50,000 units TAKE 1 CAPSULE (50,000 UNITS TOTAL) BY MOUTH ONCE A WEEK, Starting Wed 6/14/2023, Normal      fluticasone (FLONASE) 50 mcg/act nasal spray 1 SPRAY INTO EACH NOSTRIL DAILY, Starting Fri 11/24/2023, Normal      !! Fluticasone-Salmeterol (Advair) 250-50 mcg/dose inhaler Inhale 1 puff 2 (two) times a day, Starting Mon 7/31/2023, Historical Med      !! Fluticasone-Salmeterol (Advair) 250-50 mcg/dose inhaler INHALE 1 PUFF 2 (TWO) TIMES A DAY RINSE MOUTH AFTER USE., Starting Tue 12/5/2023, Normal      hydrochlorothiazide (HYDRODIURIL) 12.5 mg tablet TAKE 1 TABLET (12.5 MG TOTAL) BY MOUTH DAILY, Starting Fri 1/20/2023, Normal      Lancet Devices (Easy Mini Eject Lancing Device) MISC Use as directed to check blood sugar 3x daily. , Normal      lisinopril (ZESTRIL) 10 mg tablet TAKE 1 TABLET (10 MG TOTAL) BY MOUTH 2 (TWO) TIMES A DAY, Starting Wed 11/1/2023, Normal      meclizine (ANTIVERT) 25 mg tablet TAKE 1 TABLET (25 MG TOTAL) BY MOUTH 3 (THREE) TIMES A DAY AS NEEDED FOR DIZZINESS, Starting Thu 11/16/2023, Normal      metFORMIN (GLUCOPHAGE-XR) 500 mg 24 hr tablet TAKE 1 TABLET (500 MG TOTAL) BY MOUTH DAILY WITH DINNER, Starting Wed 10/25/2023, Normal ondansetron (ZOFRAN) 4 mg tablet TAKE 1 TABLET (4 MG TOTAL) BY MOUTH EVERY 8 (EIGHT) HOURS AS NEEDED FOR NAUSEA, Normal      OneTouch Verio test strip TEST BLOOD 3 TIMES DAILY, Normal      Pharmacist Choice Lancets MISC TEST BLOOD 3 TIMES DAILY, Normal      prazosin (MINIPRESS) 2 mg capsule Take 2 mg by mouth daily at bedtime, Starting Tue 5/9/2023, Historical Med      primidone (MYSOLINE) 50 mg tablet Take 0.5 tablets (25 mg total) by mouth daily at bedtime, Starting Tue 9/19/2023, Normal      traZODone (DESYREL) 50 mg tablet Take 50 mg by mouth daily at bedtime, Starting Tue 7/25/2023, Historical Med       !! - Potential duplicate medications found. Please discuss with provider. No discharge procedures on file.     PDMP Review         Value Time User    PDMP Reviewed  Yes 12/8/2023  9:17 AM Lieutenant Brady DO            ED Provider  Electronically Signed by             Joseph Hernandez MD  12/09/23 5468

## 2023-12-07 NOTE — ED NOTES
Patient rights were reviewed with patient who confirms understanding. Patient signed off on 201.  201 is being sent to intake at this time.

## 2023-12-07 NOTE — ED NOTES
Insurance Authorization for admission:   Phone call placed to [facility]. Phone number: [phone]. Spoke to [place]. 5 days approved. Level of care: [address] Review on 12/11/2023. Authorization # [id number].

## 2023-12-07 NOTE — EMTALA/ACUTE CARE TRANSFER
[address] EMERGENCY DEPARTMENT  [facility] [address]  Dept: [address] CONSENT    NAME Maryse Mays                                         1985                              MRN [id number]    I have been informed of my rights regarding examination, treatment, and transfer   by Dr. Ailyn Delaney DO    Benefits: Specialized equipment and/or services available at the receiving facility (Include comment)________________________    Risks: Potential for delay in receiving treatment, Potential deterioration of medical condition, Increased discomfort during transfer, Possible worsening of condition or death during transfer      Consent for Transfer:  I acknowledge that my medical condition has been evaluated and explained to me by the emergency department physician or other qualified medical person and/or my attending physician, who has recommended that I be transferred to the service of  Accepting Physician: Dr. Susy Mark at [address] [place] [address] [address] Name, [facility] [address] : [facility]. The above potential benefits of such transfer, the potential risks associated with such transfer, and the probable risks of not being transferred have been explained to me, and I fully understand them. The doctor has explained that, in my case, the benefits of transfer outweigh the risks. I agree to be transferred. I authorize the performance of emergency medical procedures and treatments upon me in both transit and upon arrival at the receiving facility. Additionally, I authorize the release of any and all medical records to the receiving facility and request they be transported with me, if possible. I understand that the safest mode of transportation during a medical emergency is an ambulance and that the Hospital advocates the use of this mode of transport.  Risks of traveling to the receiving facility by car, including absence of medical control, life sustaining equipment, such as oxygen, and medical personnel has been explained to me and I fully understand them. (JUAN CORRECT BOX BELOW)  [  ]  I consent to the stated transfer and to be transported by ambulance/helicopter. [  ]  I consent to the stated transfer, but refuse transportation by ambulance and accept full responsibility for my transportation by car. I understand the risks of non-ambulance transfers and I exonerate the Hospital and its staff from any deterioration in my condition that results from this refusal.    X___________________________________________    DATE  23  TIME________  Signature of patient or legally responsible individual signing on patient behalf           RELATIONSHIP TO PATIENT_________________________          Provider Certification    NAME Kaitlynn Potts                                        [facility] 1985                              MRN [id number]    A medical screening exam was performed on the above named patient. Based on the examination:    Condition Necessitating Transfer The primary encounter diagnosis was Suicidal ideations. Diagnoses of Medical clearance for psychiatric admission, Pre-diabetes, Essential hypertension, Vitamin D deficiency, Chronic pain syndrome, Heartburn, Lumbar radiculopathy, Allergic rhinitis, and Abnormal EKG were also pertinent to this visit.     Patient Condition: The patient has been stabilized such that within reasonable medical probability, no material deterioration of the patient condition or the condition of the unborn child(paige) is likely to result from the transfer    Reason for Transfer: Level of Care needed not available at this facility    Transfer Requirements: [address] [place] [address] available and qualified personnel available for treatment as acknowledged by Rosette Yang [phone]  Agreed to accept transfer and to provide appropriate medical treatment as acknowledged by       Dr. Jose Terrazas  Appropriate medical records of the examination and treatment of the patient are provided at the time of transfer   [address] _______  Transfer will be performed by qualified personnel from    and appropriate transfer equipment as required, including the use of necessary and appropriate life support measures. Provider Certification: I have examined the patient and explained the following risks and benefits of being transferred/refusing transfer to the patient/family:  General risk, such as traffic hazards, adverse weather conditions, rough terrain or turbulence, possible failure of equipment (including vehicle or aircraft), or consequences of actions of persons outside the control of the transport personnel      Based on these reasonable risks and benefits to the patient and/or the unborn child(paige), and based upon the information available at the time of the patient’s examination, I certify that the medical benefits reasonably to be expected from the provision of appropriate medical treatments at another medical facility outweigh the increasing risks, if any, to the individual’s medical condition, and in the case of labor to the unborn child, from effecting the transfer.     X____________________________________________ DATE 12/07/23        TIME_______      ORIGINAL - SEND TO MEDICAL RECORDS   COPY - SEND WITH PATIENT DURING TRANSFER

## 2023-12-08 PROBLEM — Z00.8 MEDICAL CLEARANCE FOR PSYCHIATRIC ADMISSION: Status: ACTIVE | Noted: 2018-11-30

## 2023-12-08 PROBLEM — F90.9 ADHD (ATTENTION DEFICIT HYPERACTIVITY DISORDER): Status: ACTIVE | Noted: 2023-12-08

## 2023-12-08 PROBLEM — F33.2 SEVERE EPISODE OF RECURRENT MAJOR DEPRESSIVE DISORDER, WITHOUT PSYCHOTIC FEATURES (HCC): Status: ACTIVE | Noted: 2023-12-08

## 2023-12-08 PROBLEM — E11.9 TYPE 2 DIABETES MELLITUS (HCC): Status: ACTIVE | Noted: 2023-12-08

## 2023-12-08 LAB
25(OH)D3 SERPL-MCNC: 21.5 NG/ML (ref 30–100)
ALBUMIN SERPL BCP-MCNC: 3.9 G/DL (ref 3.5–5)
ALP SERPL-CCNC: 57 U/L (ref 34–104)
ALT SERPL W P-5'-P-CCNC: 12 U/L (ref 7–52)
ANION GAP SERPL CALCULATED.3IONS-SCNC: 7 MMOL/L
AST SERPL W P-5'-P-CCNC: 13 U/L (ref 13–39)
BASOPHILS # BLD AUTO: 0.06 THOUSANDS/ÂΜL (ref 0–0.1)
BASOPHILS NFR BLD AUTO: 1 % (ref 0–1)
BILIRUB SERPL-MCNC: 0.35 MG/DL (ref 0.2–1)
BUN SERPL-MCNC: 11 MG/DL (ref 5–25)
CALCIUM SERPL-MCNC: 9.4 MG/DL (ref 8.4–10.2)
CHLORIDE SERPL-SCNC: 104 MMOL/L (ref 96–108)
CHOLEST SERPL-MCNC: 189 MG/DL
CO2 SERPL-SCNC: 24 MMOL/L (ref 21–32)
CREAT SERPL-MCNC: 0.75 MG/DL (ref 0.6–1.3)
EOSINOPHIL # BLD AUTO: 0.21 THOUSAND/ÂΜL (ref 0–0.61)
EOSINOPHIL NFR BLD AUTO: 2 % (ref 0–6)
ERYTHROCYTE [DISTWIDTH] IN BLOOD BY AUTOMATED COUNT: 13.8 % (ref 11.6–15.1)
EST. AVERAGE GLUCOSE BLD GHB EST-MCNC: 123 MG/DL
GFR SERPL CREATININE-BSD FRML MDRD: 100 ML/MIN/1.73SQ M
GLUCOSE P FAST SERPL-MCNC: 95 MG/DL (ref 65–99)
GLUCOSE SERPL-MCNC: 95 MG/DL (ref 65–140)
HBA1C MFR BLD: 5.9 %
HCG SERPL QL: NEGATIVE
HCT VFR BLD AUTO: 45.5 % (ref 34.8–46.1)
HDLC SERPL-MCNC: 44 MG/DL
HGB BLD-MCNC: 14.5 G/DL (ref 11.5–15.4)
IMM GRANULOCYTES # BLD AUTO: 0.04 THOUSAND/UL (ref 0–0.2)
IMM GRANULOCYTES NFR BLD AUTO: 0 % (ref 0–2)
LDLC SERPL CALC-MCNC: 118 MG/DL (ref 0–100)
LYMPHOCYTES # BLD AUTO: 2.02 THOUSANDS/ÂΜL (ref 0.6–4.47)
LYMPHOCYTES NFR BLD AUTO: 18 % (ref 14–44)
MCH RBC QN AUTO: 30.4 PG (ref 26.8–34.3)
MCHC RBC AUTO-ENTMCNC: 31.9 G/DL (ref 31.4–37.4)
MCV RBC AUTO: 95 FL (ref 82–98)
MONOCYTES # BLD AUTO: 0.87 THOUSAND/ÂΜL (ref 0.17–1.22)
MONOCYTES NFR BLD AUTO: 8 % (ref 4–12)
NEUTROPHILS # BLD AUTO: 8.21 THOUSANDS/ÂΜL (ref 1.85–7.62)
NEUTS SEG NFR BLD AUTO: 71 % (ref 43–75)
NONHDLC SERPL-MCNC: 145 MG/DL
NRBC BLD AUTO-RTO: 0 /100 WBCS
PLATELET # BLD AUTO: 289 THOUSANDS/UL (ref 149–390)
PMV BLD AUTO: 10.7 FL (ref 8.9–12.7)
POTASSIUM SERPL-SCNC: 3.9 MMOL/L (ref 3.5–5.3)
PROT SERPL-MCNC: 6.9 G/DL (ref 6.4–8.4)
RBC # BLD AUTO: 4.77 MILLION/UL (ref 3.81–5.12)
SODIUM SERPL-SCNC: 135 MMOL/L (ref 135–147)
TREPONEMA PALLIDUM IGG+IGM AB [PRESENCE] IN SERUM OR PLASMA BY IMMUNOASSAY: NORMAL
TRIGL SERPL-MCNC: 135 MG/DL
TSH SERPL DL<=0.05 MIU/L-ACNC: 1.18 UIU/ML (ref 0.45–4.5)
WBC # BLD AUTO: 11.41 THOUSAND/UL (ref 4.31–10.16)

## 2023-12-08 PROCEDURE — 84443 ASSAY THYROID STIM HORMONE: CPT | Performed by: PHYSICIAN ASSISTANT

## 2023-12-08 PROCEDURE — 80053 COMPREHEN METABOLIC PANEL: CPT | Performed by: PHYSICIAN ASSISTANT

## 2023-12-08 PROCEDURE — 99253 IP/OBS CNSLTJ NEW/EST LOW 45: CPT | Performed by: PHYSICIAN ASSISTANT

## 2023-12-08 PROCEDURE — 86780 TREPONEMA PALLIDUM: CPT | Performed by: PHYSICIAN ASSISTANT

## 2023-12-08 PROCEDURE — 84703 CHORIONIC GONADOTROPIN ASSAY: CPT | Performed by: PHYSICIAN ASSISTANT

## 2023-12-08 PROCEDURE — 83036 HEMOGLOBIN GLYCOSYLATED A1C: CPT | Performed by: PHYSICIAN ASSISTANT

## 2023-12-08 PROCEDURE — 99223 1ST HOSP IP/OBS HIGH 75: CPT | Performed by: PSYCHIATRY & NEUROLOGY

## 2023-12-08 PROCEDURE — 85025 COMPLETE CBC W/AUTO DIFF WBC: CPT | Performed by: PHYSICIAN ASSISTANT

## 2023-12-08 PROCEDURE — 82306 VITAMIN D 25 HYDROXY: CPT | Performed by: PHYSICIAN ASSISTANT

## 2023-12-08 PROCEDURE — 80061 LIPID PANEL: CPT | Performed by: PHYSICIAN ASSISTANT

## 2023-12-08 RX ORDER — LISINOPRIL 10 MG/1
10 TABLET ORAL 2 TIMES DAILY
Status: DISCONTINUED | OUTPATIENT
Start: 2023-12-08 | End: 2023-12-11 | Stop reason: HOSPADM

## 2023-12-08 RX ORDER — ATOMOXETINE 60 MG/1
60 CAPSULE ORAL DAILY
Status: DISCONTINUED | OUTPATIENT
Start: 2023-12-08 | End: 2023-12-11 | Stop reason: HOSPADM

## 2023-12-08 RX ORDER — FLUTICASONE FUROATE AND VILANTEROL 200; 25 UG/1; UG/1
1 POWDER RESPIRATORY (INHALATION)
Status: DISCONTINUED | OUTPATIENT
Start: 2023-12-08 | End: 2023-12-11 | Stop reason: HOSPADM

## 2023-12-08 RX ORDER — HYDROCHLOROTHIAZIDE 25 MG/1
12.5 TABLET ORAL DAILY
Status: DISCONTINUED | OUTPATIENT
Start: 2023-12-08 | End: 2023-12-11 | Stop reason: HOSPADM

## 2023-12-08 RX ORDER — METFORMIN HYDROCHLORIDE 500 MG/1
500 TABLET, EXTENDED RELEASE ORAL
Status: DISCONTINUED | OUTPATIENT
Start: 2023-12-08 | End: 2023-12-11 | Stop reason: HOSPADM

## 2023-12-08 RX ORDER — MIRTAZAPINE 15 MG/1
7.5 TABLET, FILM COATED ORAL
Status: DISCONTINUED | OUTPATIENT
Start: 2023-12-08 | End: 2023-12-11 | Stop reason: HOSPADM

## 2023-12-08 RX ADMIN — LISINOPRIL 10 MG: 10 TABLET ORAL at 21:44

## 2023-12-08 RX ADMIN — ACETAMINOPHEN 975 MG: 325 TABLET, FILM COATED ORAL at 19:40

## 2023-12-08 RX ADMIN — FLUTICASONE FUROATE AND VILANTEROL TRIFENATATE 1 PUFF: 200; 25 POWDER RESPIRATORY (INHALATION) at 09:29

## 2023-12-08 RX ADMIN — LISINOPRIL 10 MG: 10 TABLET ORAL at 09:27

## 2023-12-08 RX ADMIN — METFORMIN HYDROCHLORIDE 500 MG: 500 TABLET, EXTENDED RELEASE ORAL at 18:05

## 2023-12-08 RX ADMIN — ATOMOXETINE HYDROCHLORIDE 60 MG: 60 CAPSULE ORAL at 09:34

## 2023-12-08 RX ADMIN — LORAZEPAM 1 MG: 1 TABLET ORAL at 19:41

## 2023-12-08 RX ADMIN — HYDROCHLOROTHIAZIDE 12.5 MG: 25 TABLET ORAL at 09:27

## 2023-12-08 RX ADMIN — ACETAMINOPHEN 975 MG: 325 TABLET, FILM COATED ORAL at 09:34

## 2023-12-08 RX ADMIN — MIRTAZAPINE 7.5 MG: 15 TABLET, FILM COATED ORAL at 21:44

## 2023-12-08 NOTE — NURSING NOTE
Pt requested PRN Tylenol for low back pain, 7/10. Received Tylenol 975mg po at 0934. Upon reassessment pt appeared to be asleep in bed.

## 2023-12-08 NOTE — CONSULTS
[address] [facility]  Consult  Name: Sylwia Colon 43 y.o. female I MRN: [id number]  Unit/Bed#: -01 I Date of Admission: 12/7/2023   Date of Service: 12/8/2023 I Hospital Day: 1    Inpatient consult for Medical Clearance for [facility] patient  Consult performed by: Kateryna Persaud PA-C  Consult ordered by: Scott Gonzales PA-C          Assessment/Plan   Medical clearance for psychiatric admission  Assessment & Plan  Vital signs stable at time of assessment  CBC, CMP, TSH WNL aside from mild leukocytosis  Afebrile. Denies any nausea/vomiting, abdominal pain, dysuria/urinary frequency  EKG ordered not yet completed  Patient appears medically stable at this time for inpatient psychiatric treatment    Pre-diabetes  Assessment & Plan  On metformin    Asthma  Assessment & Plan  Does not appear to be in acute exacerbation  Albuterol PRN    Essential hypertension  Assessment & Plan  Blood pressure stable  Continue lisinopril, HCTZ    * Severe episode of recurrent major depressive disorder, without psychotic features [facility])  Assessment & Plan  Presented to the emergency department with suicidal ideation  Further plan per psychiatry         VTE Prophylaxis:   Low Risk (Score 0-2) - Encourage Ambulation. Mobility:      HLM Goal achieved. Continue to encourage appropriate mobility. Recommendations for Discharge: Follow-up with PCP after discharge    Total Time Spent on Date of Encounter in care of patient: 35 mins. This time was spent on one or more of the following: performing physical exam; counseling and coordination of care; obtaining or reviewing history; documenting in the medical record; reviewing/ordering tests, medications or procedures; communicating with other healthcare professionals and discussing with patient's family/caregivers. Collaboration of Care: Were Recommendations Directly Discussed with Primary Treatment Team? No    History of Present Illness:  Sylwia Colon is a 43 y.o. female who is originally admitted to the psychiatry service due to suicidal ideation. We are consulted for medical clearance. Past medical history significant for hypertension, depression/anxiety, asthma, prediabetes. Patient presented to the emergency department with worsening depression, suicidal ideation. Currently denies any chest pain/palpitations, shortness of breath, nausea/vomiting or abdominal pain. Review of Systems:  Review of Systems   Constitutional:  Negative for chills, fatigue, fever and unexpected weight change. HENT:  Negative for congestion, sore throat and trouble swallowing. Eyes:  Negative for photophobia, pain and visual disturbance. Respiratory:  Negative for cough, shortness of breath and wheezing. Cardiovascular:  Negative for chest pain, palpitations and leg swelling. Gastrointestinal:  Negative for abdominal pain, constipation, diarrhea, nausea and vomiting. Endocrine: Negative for polyuria. Genitourinary:  Negative for difficulty urinating, dysuria, flank pain, hematuria and urgency. Musculoskeletal:  Negative for back pain, myalgias, neck pain and neck stiffness. Skin:  Negative for pallor and rash. Neurological:  Negative for dizziness, tremors, syncope, weakness, light-headedness, numbness and headaches. Hematological:  Does not bruise/bleed easily. Psychiatric/Behavioral:  Positive for dysphoric mood and suicidal ideas. Negative for agitation and confusion.         Past Medical and Surgical History:   Past Medical History:   Diagnosis Date    ADHD (attention deficit hyperactivity disorder)     Anxiety     Asthma     Cat-scratch disease     last assessed 10/22/15    Depression     Hypertension     last assessed 11/11/14    Sciatica     Sleep disorder        Past Surgical History:   Procedure Laterality Date    BREAST BIOPSY Left     2016    CHOLECYSTECTOMY LAPAROSCOPIC N/A 8/30/2023    Procedure: CHOLECYSTECTOMY LAPAROSCOPIC;  Surgeon: Yahir Olsen DO;  Location: [facility];  Service: General    DENTAL SURGERY      MA BX/EXC LYMPH NODE OPEN DEEP AXILLARY NODE Left 1/15/2019    Procedure: LEFT AXILLARY LYMPH NODE BIOPSY;  Surgeon: Aiyana Patterson MD;  Location: [facility];  Service: General    MA LAMNOTMY INCL W/DCMPRSN NRV ROOT 1 INTRSPC LUMBR Right 4/14/2021    Procedure: L5-S1 minimally invasive microdiskectomy and  epidural steroid injection;  Surgeon: Gilberto Rodriguez MD;  Location: [facility];  Service: Neurosurgery    TUBAL LIGATION         Meds/Allergies:  all medications and allergies reviewed    Allergies:    Allergies   Allergen Reactions    Bactrim [Sulfamethoxazole-Trimethoprim] Shortness Of Breath     Near syncope    Codeine Hives     Tolerated Percocet, Vicodin, etc.    Magnesium Sulfate Tachycardia       Social History:  Marital Status: /Civil Union  Substance Use History:   Social History     Substance and Sexual Activity   Alcohol Use Never     Social History     Tobacco Use   Smoking Status Never    Passive exposure: Never   Smokeless Tobacco Never     Social History     Substance and Sexual Activity   Drug Use No       Family History:  Family History   Problem Relation Age of Onset    Anxiety disorder Mother     Bipolar disorder Mother     Depression Mother     Tremor Mother     Anxiety disorder Father     Bipolar disorder Father     Depression Father     Schizophrenia Father     Heart attack Father     Parkinsonism Maternal Grandmother     Heart attack Maternal Grandmother     Stomach cancer Paternal Grandfather     Heart attack Paternal Grandfather        Physical Exam:   Vitals:   Blood Pressure: 111/76 (12/08/23 1107)  Pulse: 80 (12/08/23 1107)  Temperature: (!) 97.4 °F (36.3 °C) (12/08/23 1107)  Temp Source: Tympanic (12/08/23 1107)  Respirations: 16 (12/08/23 1107)  Height: 5' 2" (157.5 cm) (12/08/23 0013)  Weight - Scale: 77.2 kg (170 lb 3.2 oz) (12/08/23 0013)  SpO2: 100 % (12/08/23 1107)    Physical Exam  Vitals and nursing note reviewed. Constitutional:       Appearance: Normal appearance. Comments: No acute distress   HENT:      Head: Normocephalic. Eyes:      General: No scleral icterus. Extraocular Movements: Extraocular movements intact. Conjunctiva/sclera: Conjunctivae normal.   Cardiovascular:      Rate and Rhythm: Normal rate and regular rhythm. Heart sounds: S1 normal and S2 normal.   Pulmonary:      Effort: Pulmonary effort is normal.      Breath sounds: Normal breath sounds. No wheezing, rhonchi or rales. Abdominal:      General: Bowel sounds are normal.      Palpations: Abdomen is soft. Tenderness: There is no abdominal tenderness. There is no guarding or rebound. Musculoskeletal:         General: No swelling, tenderness or deformity. Cervical back: Normal range of motion. Comments: Able to move upper/lower extremities bilaterally, no edema   Skin:     General: Skin is warm and dry. Neurological:      Mental Status: She is alert and oriented to person, place, and time.    Psychiatric:         Mood and Affect: Mood normal.         Speech: Speech normal.         Behavior: Behavior normal.          Additional Data:   Lab Results:    Results from last 7 days   Lab Units 12/08/23  0658   WBC Thousand/uL 11.41*   HEMOGLOBIN g/dL 14.5   HEMATOCRIT % 45.5   PLATELETS Thousands/uL 289   NEUTROS PCT % 71   LYMPHS PCT % 18   MONOS PCT % 8   EOS PCT % 2     Results from last 7 days   Lab Units 12/08/23  0658   SODIUM mmol/L 135   POTASSIUM mmol/L 3.9   CHLORIDE mmol/L 104   CO2 mmol/L 24   BUN mg/dL 11   CREATININE mg/dL 0.75   ANION GAP mmol/L 7   CALCIUM mg/dL 9.4   ALBUMIN g/dL 3.9   TOTAL BILIRUBIN mg/dL 0.35   ALK PHOS U/L 57   ALT U/L 12   AST U/L 13   GLUCOSE RANDOM mg/dL 95             Lab Results   Component Value Date/Time    HGBA1C 5.6 09/19/2023 03:13 PM    HGBA1C 5.5 05/22/2023 02:35 PM    HGBA1C 5.9 01/19/2023 03:23 PM    HGBA1C 6.3 10/19/2022 02:46 PM    HGBA1C 6.0 (H) 03/30/2021 08:40 AM               Imaging: No pertinent imaging reviewed. No orders to display       EKG, Pathology, and Other Studies Reviewed on Admission:   EKG: No EKG obtained. Ordered not yet completed    ** Please Note: This note may have been constructed using a voice recognition system.  **

## 2023-12-08 NOTE — ASSESSMENT & PLAN NOTE
Lab Results   Component Value Date    HGBA1C 5.6 09/19/2023       No results for input(s): "POCGLU" in the last 72 hours.     Blood Sugar Average: Last 72 hrs:  Continue metformin

## 2023-12-08 NOTE — CMS CERTIFICATION NOTE
Recertification: Based upon physical, mental and social evaluations, I certify that inpatient psychiatric services continue to be medically necessary for this patient for a duration of 7 midnights for the treatment of  Severe episode of recurrent major depressive disorder, without psychotic features ([address]) Available alternative community resources still do not meet the patient's mental health care needs. I further attest that an established written individualized plan of care has been updated and is outlined in the patient's medical records.
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
## 2023-12-08 NOTE — ASSESSMENT & PLAN NOTE
Vital signs stable at time of assessment  CBC, CMP, TSH WNL aside from mild leukocytosis  Afebrile.   Denies any nausea/vomiting, abdominal pain, dysuria/urinary frequency  EKG ordered not yet completed  Patient appears medically stable at this time for inpatient psychiatric treatment
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
## 2023-12-08 NOTE — PLAN OF CARE
Problem: Anxiety  Goal: Anxiety is at manageable level  Description: Interventions:  - Assess and monitor patient's anxiety level. - Monitor for signs and symptoms (heart palpitations, chest pain, shortness of breath, headaches, nausea, feeling jumpy, restlessness, irritable, apprehensive). - Collaborate with interdisciplinary team and initiate plan and interventions as ordered.   - [place] patient to unit/surroundings  - Explain treatment plan  - Encourage participation in care  - Encourage verbalization of concerns/fears  - Identify coping mechanisms  - Assist in developing anxiety-reducing skills  - Administer/offer alternative therapies  - Limit or eliminate stimulants  Outcome: Progressing

## 2023-12-08 NOTE — NURSING NOTE
Accucheck kit  5 pants (2 w/strings)  5 socks  3 s/s shirts  Hoody  2 headbands  Presealed jewelry  Carrybag  L/s shirt  Brush  5 underpants  Deoderant  2 pens  Lighter  Chapstick  Beaded bracelet

## 2023-12-08 NOTE — TREATMENT PLAN
TREATMENT PLAN REVIEW - [address] [facility] 43 y.o. 1985 female MRN: [id number]    Arlyn Lopes Room / Bed: [address] 201-01 Encounter: [id number]          Admit Date/Time:  12/7/2023 11:22 PM    Treatment Team: Attending Provider: Nikole Hackett DO; Patient Care Assistant: Gray Gardiner; Care Manager: René Agudelo RN; Physician Assistant: Adarsh Allen PA-C; Patient Care Assistant: Payton Contreras; Patient Care Technician: Juan Antonio Hernández; Registered Nurse: Zahra Delong, RN; Charge Nurse: Domitila Abdul, RN; Registered Nurse: Jocelyn Shafer RN    Diagnosis: Principal Problem:    Severe episode of recurrent major depressive disorder, without psychotic features [facility])  Active Problems:    Medical clearance for psychiatric admission    Essential hypertension    Asthma    PTSD (post-traumatic stress disorder)    Type 2 diabetes mellitus ([address] [facility])    ADHD (attention deficit hyperactivity disorder)      Patient Strengths/Assets: ability for insight    Patient Barriers/Limitations: poor past treatment response    Short Term Goals: decrease in depressive symptoms, decrease in anxiety symptoms, decrease in suicidal thoughts    Long Term Goals: improvement in depression, improvement in anxiety, free of suicidal thoughts    Progress Towards Goals: starting psychiatric medications as prescribed, continue psychiatric medications as prescribed, improving gradually    Recommended Treatment: medication management, patient medication education, group therapy, milieu therapy, continued Behavioral Health psychiatric evaluation/assessment process    Treatment Frequency: daily medication monitoring, group and milieu therapy daily, monitoring through interdisciplinary rounds, monitoring through weekly patient care conferences    Expected Discharge Date:  7 days    Discharge Plan: discharge to home    Treatment Plan Created/Updated By: Edison Hamlin, DO

## 2023-12-08 NOTE — NURSING NOTE
Pt remains mostly withdrawn to room, resting. Denies current SI/HI. Reports having elevated depression with fluctuating anxiety. Reviewed scheduled medications with patient, pt states feeling hopeful that with medication adjustment, her depression will improve.  Also reviewed unit routine/schedule with patient and encouraged attendance to groups throughout the day to help develop healthy coping skills

## 2023-12-08 NOTE — TREATMENT PLAN
RN will meet with patient at least twice per day to access for any concerns. Teach about prescribed medications and diagnosis. Pt will be taught and encouraged to utilize healthy coping skills.

## 2023-12-09 DIAGNOSIS — R42 DIZZINESS: ICD-10-CM

## 2023-12-09 PROCEDURE — 99232 SBSQ HOSP IP/OBS MODERATE 35: CPT | Performed by: STUDENT IN AN ORGANIZED HEALTH CARE EDUCATION/TRAINING PROGRAM

## 2023-12-09 RX ORDER — MECLIZINE HYDROCHLORIDE 25 MG/1
25 TABLET ORAL 3 TIMES DAILY PRN
Qty: 30 TABLET | Refills: 2 | Status: SHIPPED | OUTPATIENT
Start: 2023-12-09

## 2023-12-09 RX ADMIN — ATOMOXETINE HYDROCHLORIDE 60 MG: 60 CAPSULE ORAL at 09:15

## 2023-12-09 RX ADMIN — MIRTAZAPINE 7.5 MG: 15 TABLET, FILM COATED ORAL at 21:48

## 2023-12-09 RX ADMIN — LISINOPRIL 10 MG: 10 TABLET ORAL at 21:48

## 2023-12-09 RX ADMIN — FLUTICASONE FUROATE AND VILANTEROL TRIFENATATE 1 PUFF: 200; 25 POWDER RESPIRATORY (INHALATION) at 09:16

## 2023-12-09 RX ADMIN — ACETAMINOPHEN 975 MG: 325 TABLET, FILM COATED ORAL at 06:19

## 2023-12-09 RX ADMIN — METFORMIN HYDROCHLORIDE 500 MG: 500 TABLET, EXTENDED RELEASE ORAL at 16:30

## 2023-12-09 RX ADMIN — LORAZEPAM 1 MG: 1 TABLET ORAL at 06:19

## 2023-12-09 NOTE — NURSING NOTE
Pt did arrive with 2 cell phones and a phone . Items were placed in pt's storage bin. 1 blue cell phone  1 pink cell phone  1 white phone .

## 2023-12-09 NOTE — TREATMENT TEAM
12/09/23 1000   Team Meeting   Meeting Type Daily Rounds   Team Members Present   Team Members Present Physician;Nurse   Physician Team Member Wilver torres; Yan Gonzalez   Nursing Team Member Betito   Patient/Family Present   Patient Present No   Patient's Family Present No     Daily Rounds: Pt received Tylenol for chronic back pain, Ativan x 2 for anxiety. Appeared to sleep well Remeron. Denies SI. Continues to report depression and anxiety. Reports leaning into respite time on unit. Pt signed signed 72 hr notice 12/8/23 at 2100. Plan for d/c on Monday.

## 2023-12-09 NOTE — NURSING NOTE
Pt sitting quietly in room. Pt mostly seclusive to room and reports having social anxiety. Pt reports having increased depression and remains hopeful that hospital stay will give the respite away from caring for mother and  to focus on herself. Pt denies SI/HI/AVH.

## 2023-12-09 NOTE — NURSING NOTE
David Clements was given PRN Tylenol 975 mg for c/o LBP, rated 7/10. Upon follow-up for re-assessment, patient reports that PRN was effective, reports currently having 2/10 pain, declines need for further intervention at this time. David Clements was also given PRN Ativan 1 mg for anxiety, Hidalgo = 25. Patient reports PRN was effective, observed sitting quietly in room, reading. Staff availability reinforced.

## 2023-12-09 NOTE — PROGRESS NOTES
Progress Note - [address] 43 y.o. female MRN: [id number]  [facility] 201[facility] Encounter: [id number]    Assessment:  Principal Problem:    Severe episode of recurrent major depressive disorder, without psychotic features ([address] )  Active Problems:    Medical clearance for psychiatric admission    Essential hypertension    Asthma    Pre-diabetes    PTSD (post-traumatic stress disorder)    ADHD (attention deficit hyperactivity disorder)      Plan:  --Continue with psychiatric hospitalization  --Continue with individual, group, and milieu therapy  --Continue the following medications:  Current Facility-Administered Medications   Medication Dose Route Frequency    acetaminophen (TYLENOL) tablet 650 mg  650 mg Oral Q6H PRN    acetaminophen (TYLENOL) tablet 650 mg  650 mg Oral Q4H PRN    acetaminophen (TYLENOL) tablet 975 mg  975 mg Oral Q6H PRN    albuterol inhalation solution 2.5 mg  2.5 mg Nebulization Q6H PRN    artificial tear (LUBRIFRESH P.M.) ophthalmic ointment   Both Eyes 4x Daily PRN    atoMOXetine (STRATTERA) capsule 60 mg  60 mg Oral Daily    benztropine (COGENTIN) injection 1 mg  1 mg Intramuscular BID PRN    benztropine (COGENTIN) tablet 1 mg  1 mg Oral BID PRN    bisacodyl (DULCOLAX) rectal suppository 10 mg  10 mg Rectal Daily PRN    famotidine (PEPCID) tablet 20 mg  20 mg Oral BID PRN    fluticasone-vilanterol 200-25 mcg/actuation 1 puff  1 puff Inhalation Daily    hydrochlorothiazide (HYDRODIURIL) tablet 12.5 mg  12.5 mg Oral Daily    hydrOXYzine HCL (ATARAX) tablet 25 mg  25 mg Oral Q6H PRN Max 4/day    hydrOXYzine HCL (ATARAX) tablet 50 mg  50 mg Oral Q4H PRN Max 4/day    Or    LORazepam (ATIVAN) injection 1 mg  1 mg Intramuscular Q4H PRN    lisinopril (ZESTRIL) tablet 10 mg  10 mg Oral BID    LORazepam (ATIVAN) tablet 1 mg  1 mg Oral Q4H PRN Max 6/day    Or    LORazepam (ATIVAN) injection 2 mg  2 mg Intramuscular Q6H PRN Max 3/day    meclizine (ANTIVERT) tablet 25 mg  25 mg Oral TID PRN    metFORMIN (GLUCOPHAGE-XR) 24 hr tablet 500 mg  500 mg Oral Daily With Dinner    mirtazapine (REMERON) tablet 7.5 mg  7.5 mg Oral HS    OLANZapine (ZyPREXA) tablet 10 mg  10 mg Oral Q3H PRN Max 3/day    Or    OLANZapine (ZyPREXA) IM injection 10 mg  10 mg Intramuscular Q3H PRN Max 3/day    OLANZapine (ZyPREXA) tablet 5 mg  5 mg Oral Q3H PRN Max 6/day    Or    OLANZapine (ZyPREXA) IM injection 5 mg  5 mg Intramuscular Q3H PRN Max 6/day    OLANZapine (ZyPREXA) tablet 2.5 mg  2.5 mg Oral Q3H PRN Max 8/day    polyethylene glycol (MIRALAX) packet 17 g  17 g Oral Daily PRN    senna-docusate sodium (SENOKOT S) 8.6-50 mg per tablet 1 tablet  1 tablet Oral Daily PRN    traZODone (DESYREL) tablet 50 mg  50 mg Oral HS PRN       Subjective: Patient was seen for continuation of care. Chart was reviewed and discussed with treatment team.     No acute behavioral events over the past 24 hours. Today, patient was seen and examined at bedside for continuation of care. Patient denied adverse effects to their psychiatric medication regimen. Patient denied other new or worsening psychiatric symptoms/complaints at this time. Discussed the importance of continuing to take medications as prescribed, as well as the importance of continuing to attend groups on the unit. Patient endorses improved mood on current regimen when speaking to the patient, she endorses improved mood on current regimen including better sleep. She denies SI, HI, AVH, delusions, abelino at this time. Patient continues to suffer from some residual symptoms of depression and anxiety however. Patient has signed a 72-hour however is considering rescinding it due to her anxiety.      Psychiatric Review of Systems:  Medication adverse effects: none  Sleep: unchanged  Appetite: unchanged  Behavior over the past 24 hours: as per above    Vitals:  Vitals:    12/09/23 0700   BP: 102/70   Pulse: 86   Resp: 16   Temp: 98 °F (36.7 °C)   SpO2: 95%       Laboratory results:    I have personally reviewed all pertinent laboratory/tests results  Recent Results (from the past 48 hour(s))   hCG, serum, qualitative    Collection Time: 12/08/23  6:57 AM   Result Value Ref Range    Preg, Serum Negative Negative   RPR-Syphilis Screening (Total Syphilis IGG/IGM)    Collection Time: 12/08/23  6:57 AM   Result Value Ref Range    Syphilis Total Antibody Non-reactive Non-Reactive   Vitamin D 25 hydroxy    Collection Time: 12/08/23  6:57 AM   Result Value Ref Range    Vit D, 25-Hydroxy 21.5 (L) 30.0 - 100.0 ng/mL   Comprehensive metabolic panel    Collection Time: 12/08/23  6:58 AM   Result Value Ref Range    Sodium 135 135 - 147 mmol/L    Potassium 3.9 3.5 - 5.3 mmol/L    Chloride 104 96 - 108 mmol/L    CO2 24 21 - 32 mmol/L    ANION GAP 7 mmol/L    BUN 11 5 - 25 mg/dL    Creatinine 0.75 0.60 - 1.30 mg/dL    Glucose 95 65 - 140 mg/dL    Glucose, Fasting 95 65 - 99 mg/dL    Calcium 9.4 8.4 - 10.2 mg/dL    AST 13 13 - 39 U/L    ALT 12 7 - 52 U/L    Alkaline Phosphatase 57 34 - 104 U/L    Total Protein 6.9 6.4 - 8.4 g/dL    Albumin 3.9 3.5 - 5.0 g/dL    Total Bilirubin 0.35 0.20 - 1.00 mg/dL    eGFR 100 ml/min/1.73sq m   CBC and differential    Collection Time: 12/08/23  6:58 AM   Result Value Ref Range    WBC 11.41 (H) 4.31 - 10.16 Thousand/uL    RBC 4.77 3.81 - 5.12 Million/uL    Hemoglobin 14.5 11.5 - 15.4 g/dL    Hematocrit 45.5 34.8 - 46.1 %    MCV 95 82 - 98 fL    MCH 30.4 26.8 - 34.3 pg    MCHC 31.9 31.4 - 37.4 g/dL    RDW 13.8 11.6 - 15.1 %    MPV 10.7 8.9 - 12.7 fL    Platelets 573 294 - 022 Thousands/uL    nRBC 0 /100 WBCs    Neutrophils Relative 71 43 - 75 %    Immat GRANS % 0 0 - 2 %    Lymphocytes Relative 18 14 - 44 %    Monocytes Relative 8 4 - 12 %    Eosinophils Relative 2 0 - 6 %    Basophils Relative 1 0 - 1 %    Neutrophils Absolute 8.21 (H) 1.85 - 7.62 Thousands/µL    Immature Grans Absolute 0.04 0.00 - 0.20 Thousand/uL    Lymphocytes Absolute 2.02 0.60 - 4.47 Thousands/µL Monocytes Absolute 0.87 0.17 - 1.22 Thousand/µL    Eosinophils Absolute 0.21 0.00 - 0.61 Thousand/µL    Basophils Absolute 0.06 0.00 - 0.10 Thousands/µL   Lipid panel    Collection Time: 12/08/23  6:58 AM   Result Value Ref Range    Cholesterol 189 See Comment mg/dL    Triglycerides 135 See Comment mg/dL    HDL, Direct 44 (L) >=50 mg/dL    LDL Calculated 118 (H) 0 - 100 mg/dL    Non-HDL-Chol (CHOL-HDL) 145 mg/dl   TSH, 3rd generation with Free T4 reflex    Collection Time: 12/08/23  6:58 AM   Result Value Ref Range    TSH 3RD GENERATON 1.182 0.450 - 4.500 uIU/mL   Hemoglobin A1C    Collection Time: 12/08/23  6:58 AM   Result Value Ref Range    Hemoglobin A1C 5.9 (H) Normal 4.0-5.6%; PreDiabetic 5.7-6.4%; Diabetic >=6.5%; Glycemic control for adults with diabetes <7.0% %     mg/dl        Current Medications:  Current medications as per above. All medications have been reviewed. Risks, benefits, alternatives, and possible side effects of patient's psychiatric medications were discussed with patient. Mental Status Evaluation:  Appearance: casually dressed, consistent with stated age  Motor: +psychomotor retardation, no gait abnormalities  Behavior: cooperative, answers questions appropriately  Speech: soft, normal rhythm  Mood: "Down but better"  Affect: constricted, depressed-appearing  Thought Process: linear and goal-oriented  Thought Content: denies auditory hallucinations, denies visual hallucinations, denies delusions  Risk Potential: denies suicidal ideation, plan, or intent. Denies homicidal ideation  Sensorium: Oriented to person, place, time, and situation  Cognition: cognitive ability appears intact but was not quantitatively tested  Consciousness: alert and awake  Attention: intact, able to focus without difficulty  Insight: fair  Judgement: limited    Progress Toward Goals & Illness Status: Patient is not at goal. They are not yet ready for discharge.  The patient's condition currently requires active psychopharmacological medication management, interdisciplinary coordination with case management, and the utilization of adjunctive milieu and group therapy to augment psychopharmacological efficacy. The patient's risk of morbidity, and progression or decompensation of psychiatric disease, is higher without this current treatment. This note has been constructed using a voice recognition system. There may be translation, syntax, or grammatical errors. If you have any questions, please contact the dictating provider.

## 2023-12-09 NOTE — NURSING NOTE
Pt resting in bed throughout much of the day, withdrawn to self and room. Reports some improvement in anxiety, scant otherwise. Denies SI, HI, AVH.

## 2023-12-09 NOTE — PLAN OF CARE
Problem: Depression  Goal: Verbalize thoughts and feelings  Description: Interventions:  - Assess and re-assess patient's level of risk   - Engage patient in 1:1 interactions, daily, for a minimum of 15 minutes   - Encourage patient to express feelings, fears, frustrations, hopes   Outcome: Progressing  Goal: Refrain from isolation  Description: Interventions:  - Develop a trusting relationship   - Encourage socialization   Outcome: Progressing  Goal: Refrain from self-neglect  Outcome: Progressing  Goal: Complete daily ADLs, including personal hygiene independently, as able  Description: Interventions:  - Observe, teach, and assist patient with ADLS  -  Monitor and promote a balance of rest/activity, with adequate nutrition and elimination   Outcome: Progressing     Problem: Anxiety  Goal: Anxiety is at manageable level  Description: Interventions:  - Assess and monitor patient's anxiety level. - Monitor for signs and symptoms (heart palpitations, chest pain, shortness of breath, headaches, nausea, feeling jumpy, restlessness, irritable, apprehensive). - Collaborate with interdisciplinary team and initiate plan and interventions as ordered.   - [place] patient to unit/surroundings  - Explain treatment plan  - Encourage participation in care  - Encourage verbalization of concerns/fears  - Identify coping mechanisms  - Assist in developing anxiety-reducing skills  - Administer/offer alternative therapies  - Limit or eliminate stimulants  Outcome: Progressing

## 2023-12-10 PROCEDURE — 99232 SBSQ HOSP IP/OBS MODERATE 35: CPT | Performed by: STUDENT IN AN ORGANIZED HEALTH CARE EDUCATION/TRAINING PROGRAM

## 2023-12-10 RX ADMIN — METFORMIN HYDROCHLORIDE 500 MG: 500 TABLET, EXTENDED RELEASE ORAL at 16:12

## 2023-12-10 RX ADMIN — MIRTAZAPINE 7.5 MG: 15 TABLET, FILM COATED ORAL at 22:23

## 2023-12-10 RX ADMIN — FLUTICASONE FUROATE AND VILANTEROL TRIFENATATE 1 PUFF: 200; 25 POWDER RESPIRATORY (INHALATION) at 09:02

## 2023-12-10 RX ADMIN — LISINOPRIL 10 MG: 10 TABLET ORAL at 22:00

## 2023-12-10 RX ADMIN — ATOMOXETINE HYDROCHLORIDE 60 MG: 60 CAPSULE ORAL at 09:02

## 2023-12-10 NOTE — NURSING NOTE
Pt is more visible throughout morning, social with select peers, attended Community Meeting/AM group today. Appears and reports feeling less anxious. Denies SI, HI, AVH. Denies questions regarding medications, overall improved mood. Has 72 hour signed on 12/8 @ 2100 with discharge planned for Monday.

## 2023-12-10 NOTE — PROGRESS NOTES
Progress Note - [address] 43 y.o. female MRN: [id number]  Unit/Bed#: [facility] 201-01 Encounter: [id number]    Assessment:  Principal Problem:    Severe episode of recurrent major depressive disorder, without psychotic features ([address])  Active Problems:    Medical clearance for psychiatric admission    Essential hypertension    Asthma    Pre-diabetes    PTSD (post-traumatic stress disorder)    ADHD (attention deficit hyperactivity disorder)      Plan:  --Continue with psychiatric hospitalization  --Continue with individual, group, and milieu therapy  - Potential discharge tomorrow when 72-hour notice expires  - Several patients have tested positive for COVID, given patient has a sore throat, we will continue monitoring and using appropriate contact precautions  --Continue the following medications:  Current Facility-Administered Medications   Medication Dose Route Frequency    acetaminophen (TYLENOL) tablet 650 mg  650 mg Oral Q6H PRN    acetaminophen (TYLENOL) tablet 650 mg  650 mg Oral Q4H PRN    acetaminophen (TYLENOL) tablet 975 mg  975 mg Oral Q6H PRN    albuterol inhalation solution 2.5 mg  2.5 mg Nebulization Q6H PRN    artificial tear (LUBRIFRESH P.M.) ophthalmic ointment   Both Eyes 4x Daily PRN    atoMOXetine (STRATTERA) capsule 60 mg  60 mg Oral Daily    benztropine (COGENTIN) injection 1 mg  1 mg Intramuscular BID PRN    benztropine (COGENTIN) tablet 1 mg  1 mg Oral BID PRN    bisacodyl (DULCOLAX) rectal suppository 10 mg  10 mg Rectal Daily PRN    famotidine (PEPCID) tablet 20 mg  20 mg Oral BID PRN    fluticasone-vilanterol 200-25 mcg/actuation 1 puff  1 puff Inhalation Daily    hydrochlorothiazide (HYDRODIURIL) tablet 12.5 mg  12.5 mg Oral Daily    hydrOXYzine HCL (ATARAX) tablet 25 mg  25 mg Oral Q6H PRN Max 4/day    hydrOXYzine HCL (ATARAX) tablet 50 mg  50 mg Oral Q4H PRN Max 4/day    Or    LORazepam (ATIVAN) injection 1 mg  1 mg Intramuscular Q4H PRN    lisinopril (ZESTRIL) tablet 10 mg  10 mg Oral BID    LORazepam (ATIVAN) tablet 1 mg  1 mg Oral Q4H PRN Max 6/day    Or    LORazepam (ATIVAN) injection 2 mg  2 mg Intramuscular Q6H PRN Max 3/day    meclizine (ANTIVERT) tablet 25 mg  25 mg Oral TID PRN    metFORMIN (GLUCOPHAGE-XR) 24 hr tablet 500 mg  500 mg Oral Daily With Dinner    mirtazapine (REMERON) tablet 7.5 mg  7.5 mg Oral HS    OLANZapine (ZyPREXA) tablet 10 mg  10 mg Oral Q3H PRN Max 3/day    Or    OLANZapine (ZyPREXA) IM injection 10 mg  10 mg Intramuscular Q3H PRN Max 3/day    OLANZapine (ZyPREXA) tablet 5 mg  5 mg Oral Q3H PRN Max 6/day    Or    OLANZapine (ZyPREXA) IM injection 5 mg  5 mg Intramuscular Q3H PRN Max 6/day    OLANZapine (ZyPREXA) tablet 2.5 mg  2.5 mg Oral Q3H PRN Max 8/day    polyethylene glycol (MIRALAX) packet 17 g  17 g Oral Daily PRN    senna-docusate sodium (SENOKOT S) 8.6-50 mg per tablet 1 tablet  1 tablet Oral Daily PRN    traZODone (DESYREL) tablet 50 mg  50 mg Oral HS PRN       Subjective: Patient was seen for continuation of care. Chart was reviewed and discussed with treatment team.     No acute behavioral events over the past 24 hours. Today, patient was seen and examined at bedside for continuation of care. Patient denied adverse effects to their psychiatric medication regimen. Patient denied other new or worsening psychiatric symptoms/complaints at this time. Discussed the importance of continuing to take medications as prescribed, as well as the importance of continuing to attend groups on the unit. Today, patient endorsed improved mood, decreased anxiety and believed that her medication regimen was working. She denied SI, HI, AVH. Per nursing, patient continued to have a sore throat and had poor sleep last night. She did not require any PRNs for behavior.     Psychiatric Review of Systems:  Medication adverse effects: none  Sleep: Poor  Appetite: unchanged  Behavior over the past 24 hours: as per above    Vitals:  Vitals:    12/10/23 0717 BP: 103/65   Pulse: 79   Resp: 17   Temp: (!) 97.4 °F (36.3 °C)   SpO2: 100%       Laboratory results:    I have personally reviewed all pertinent laboratory/tests results  No results found for this or any previous visit (from the past 48 hour(s)). Current Medications:  Current medications as per above. All medications have been reviewed. Risks, benefits, alternatives, and possible side effects of patient's psychiatric medications were discussed with patient. Mental Status Evaluation:  Appearance: casually dressed, appears consistent with stated age  Motor: no psychomotor disturbances, no gait abnormalities  Behavior: cooperative, interacts with this writer appropriately  Speech: normal rate, rhythm, and volume  Mood: "Better"  Affect: Constricted  Thought Process: organized, linear, and goal-oriented  Thought Content: denies auditory hallucinations, denies visual hallucinations, denies delusions  Risk Potential: denies suicidal ideation, plan, or intent. Denies homicidal ideation  Sensorium: Oriented to person, place, time, and situation  Cognition: cognitive ability appears intact but was not quantitatively tested  Consciousness: alert and awake  Attention: able to focus without difficulty  Insight: improved  Judgement: improved    Progress Toward Goals & Illness Status: Patient is not at goal. They are not yet ready for discharge. The patient's condition currently requires active psychopharmacological medication management, interdisciplinary coordination with case management, and the utilization of adjunctive milieu and group therapy to augment psychopharmacological efficacy. The patient's risk of morbidity, and progression or decompensation of psychiatric disease, is higher without this current treatment. This note has been constructed using a voice recognition system. There may be translation, syntax, or grammatical errors. If you have any questions, please contact the dictating provider.

## 2023-12-10 NOTE — PLAN OF CARE
Problem: DISCHARGE PLANNING  Goal: Discharge to home or other facility with appropriate resources  Description: INTERVENTIONS:  - Identify barriers to discharge w/patient and caregiver  - Arrange for needed discharge resources and transportation as appropriate  - Identify discharge learning needs (meds, wound care, etc.)  - Arrange for interpretive services to assist at discharge as needed  - Refer to Case Management Department for coordinating discharge planning if the patient needs post-hospital services based on physician/advanced practitioner order or complex needs related to functional status, cognitive ability, or social support system  Outcome: Progressing     Problem: Depression  Goal: Verbalize thoughts and feelings  Description: Interventions:  - Assess and re-assess patient's level of risk   - Engage patient in 1:1 interactions, daily, for a minimum of 15 minutes   - Encourage patient to express feelings, fears, frustrations, hopes   Outcome: Progressing  Goal: Refrain from isolation  Description: Interventions:  - Develop a trusting relationship   - Encourage socialization   Outcome: Progressing  Goal: Refrain from self-neglect  Outcome: Progressing  Goal: Complete daily ADLs, including personal hygiene independently, as able  Description: Interventions:  - Observe, teach, and assist patient with ADLS  -  Monitor and promote a balance of rest/activity, with adequate nutrition and elimination   Outcome: Progressing     Problem: Anxiety  Goal: Anxiety is at manageable level  Description: Interventions:  - Assess and monitor patient's anxiety level. - Monitor for signs and symptoms (heart palpitations, chest pain, shortness of breath, headaches, nausea, feeling jumpy, restlessness, irritable, apprehensive). - Collaborate with interdisciplinary team and initiate plan and interventions as ordered.   - [place] patient to unit/surroundings  - Explain treatment plan  - Encourage participation in care  - Encourage verbalization of concerns/fears  - Identify coping mechanisms  - Assist in developing anxiety-reducing skills  - Administer/offer alternative therapies  - Limit or eliminate stimulants  Outcome: Progressing

## 2023-12-10 NOTE — TREATMENT TEAM
12/10/23 0800   Team Meeting   Meeting Type Daily Rounds   Team Members Present   Team Members Present Physician;Nurse   Physician Team Member Chetna Garvin   Nursing Team Member [facility]   Patient/Family Present   Patient Present No   Patient's Family Present No     Daily Rounds: Pt was started on Metformin yesterday. Denies SI. No PRNs since early morning yesterday. C/o sore throat/congestion, afebrile. Seclusive to room. Slept overnight. 72 hr notice 12/8/23 at 2100. Tentative for d/c Monday.

## 2023-12-10 NOTE — TREATMENT TEAM
12/10/23 0504   Mental Status Exam   Sleep Pattern Disturbed/interrupted sleep     Patient had difficulty maintaining sleep on shift. Woke around 0200 and notified nursing staff she had a sore throat. Declined any PRN medication and returned to bed. Observed to be resting in bed with eyes closed, respirations even and unlabored. No s/s of distress. Continuous rounding ongoing for patient safety.

## 2023-12-10 NOTE — NURSING NOTE
Pt remains w/d to room, scant in conversation. She was receptive to education on medications and metabolic syndrome. She reports understanding. Pt denies SI, HI, and A/VH's.

## 2023-12-10 NOTE — PLAN OF CARE
Problem: Depression  Goal: Verbalize thoughts and feelings  Description: Interventions:  - Assess and re-assess patient's level of risk   - Engage patient in 1:1 interactions, daily, for a minimum of 15 minutes   - Encourage patient to express feelings, fears, frustrations, hopes   Outcome: Progressing  Goal: Refrain from isolation  Description: Interventions:  - Develop a trusting relationship   - Encourage socialization   Outcome: Progressing  Goal: Refrain from self-neglect  Outcome: Progressing  Goal: Complete daily ADLs, including personal hygiene independently, as able  Description: Interventions:  - Observe, teach, and assist patient with ADLS  -  Monitor and promote a balance of rest/activity, with adequate nutrition and elimination   Outcome: Progressing     Problem: Anxiety  Goal: Anxiety is at manageable level  Description: Interventions:  - Assess and monitor patient's anxiety level. - Monitor for signs and symptoms (heart palpitations, chest pain, shortness of breath, headaches, nausea, feeling jumpy, restlessness, irritable, apprehensive). - Collaborate with interdisciplinary team and initiate plan and interventions as ordered.   - [place] patient to unit/surroundings  - Explain treatment plan  - Encourage participation in care  - Encourage verbalization of concerns/fears  - Identify coping mechanisms  - Assist in developing anxiety-reducing skills  - Administer/offer alternative therapies  - Limit or eliminate stimulants  Outcome: Progressing

## 2023-12-11 ENCOUNTER — TRANSITIONAL CARE MANAGEMENT (OUTPATIENT)
Dept: FAMILY MEDICINE CLINIC | Facility: HOME HEALTHCARE | Age: 38
End: 2023-12-11

## 2023-12-11 VITALS
TEMPERATURE: 97.1 F | DIASTOLIC BLOOD PRESSURE: 63 MMHG | HEART RATE: 72 BPM | WEIGHT: 168.4 LBS | OXYGEN SATURATION: 100 % | HEIGHT: 62 IN | SYSTOLIC BLOOD PRESSURE: 102 MMHG | BODY MASS INDEX: 30.99 KG/M2 | RESPIRATION RATE: 17 BRPM

## 2023-12-11 PROCEDURE — 99239 HOSP IP/OBS DSCHRG MGMT >30: CPT | Performed by: PSYCHIATRY & NEUROLOGY

## 2023-12-11 RX ORDER — MIRTAZAPINE 7.5 MG/1
7.5 TABLET, FILM COATED ORAL
Qty: 30 TABLET | Refills: 0 | Status: SHIPPED | OUTPATIENT
Start: 2023-12-11 | End: 2024-01-10

## 2023-12-11 RX ORDER — ATOMOXETINE 60 MG/1
60 CAPSULE ORAL DAILY
Qty: 30 CAPSULE | Refills: 0 | Status: SHIPPED | OUTPATIENT
Start: 2023-12-12 | End: 2024-01-11

## 2023-12-11 RX ADMIN — ATOMOXETINE HYDROCHLORIDE 60 MG: 60 CAPSULE ORAL at 08:55

## 2023-12-11 RX ADMIN — FLUTICASONE FUROATE AND VILANTEROL TRIFENATATE 1 PUFF: 200; 25 POWDER RESPIRATORY (INHALATION) at 08:55

## 2023-12-11 NOTE — CASE MANAGEMENT
CM scheduled pt transportation home today to [address] [place] [address] via [employer] at 11am.

## 2023-12-11 NOTE — DISCHARGE INSTR - APPOINTMENTS
You will be discharged to [facility], [address]   You confirmed that your cell phone number is [phone]        Jeyson Wick or Jemima, our [facility], will be calling you after your discharge, on the phone number that you provided. They will be available as an additional support, if needed. If you wish to speak with one of them, you may contact Jeyson Wick at [phone] or Sarah Liu at [phone].

## 2023-12-11 NOTE — CASE MANAGEMENT
CM called [facility] ([phone]) to inform them that pt was admitted to unit and being discharged today. CM asked if pt has any upcoming appts. They reported that she does not but she can be scheduled.      Pt scheduled for an IN PERSON Medication Management appointment on [facility] at 3:45pm with Edwin Uribe.

## 2023-12-11 NOTE — BH TRANSITION RECORD
Contact Information: If you have any questions, concerns, pended studies, tests and/or procedures, or emergencies regarding your inpatient behavioral health visit. Please contact [address] behavioral health unit [phone] and ask to speak to a , nurse or physician. A contact is available 24 hours/ 7 days a week at this number. Summary of Procedures Performed During your Stay:  Below is a list of major procedures performed during your hospital stay and a summary of results:  - No major procedures performed. Pending Studies (From admission, onward)      None          Please follow up on the above pending studies with your PCP and/or referring provider.

## 2023-12-11 NOTE — PLAN OF CARE
Problem: DISCHARGE PLANNING  Goal: Discharge to home or other facility with appropriate resources  Description: INTERVENTIONS:  - Identify barriers to discharge w/patient and caregiver  - Arrange for needed discharge resources and transportation as appropriate  - Identify discharge learning needs (meds, wound care, etc.)  - Arrange for interpretive services to assist at discharge as needed  - Refer to Case Management Department for coordinating discharge planning if the patient needs post-hospital services based on physician/advanced practitioner order or complex needs related to functional status, cognitive ability, or social support system  Outcome: Adequate for Discharge     Problem: Depression  Goal: Verbalize thoughts and feelings  Description: Interventions:  - Assess and re-assess patient's level of risk   - Engage patient in 1:1 interactions, daily, for a minimum of 15 minutes   - Encourage patient to express feelings, fears, frustrations, hopes   Outcome: Adequate for Discharge  Goal: Refrain from isolation  Description: Interventions:  - Develop a trusting relationship   - Encourage socialization   Outcome: Adequate for Discharge  Goal: Refrain from self-neglect  Outcome: Adequate for Discharge  Goal: Complete daily ADLs, including personal hygiene independently, as able  Description: Interventions:  - Observe, teach, and assist patient with ADLS  -  Monitor and promote a balance of rest/activity, with adequate nutrition and elimination   Outcome: Adequate for Discharge     Problem: Anxiety  Goal: Anxiety is at manageable level  Description: Interventions:  - Assess and monitor patient's anxiety level. - Monitor for signs and symptoms (heart palpitations, chest pain, shortness of breath, headaches, nausea, feeling jumpy, restlessness, irritable, apprehensive). - Collaborate with interdisciplinary team and initiate plan and interventions as ordered.   - [place] patient to unit/surroundings  - Explain treatment plan  - Encourage participation in care  - Encourage verbalization of concerns/fears  - Identify coping mechanisms  - Assist in developing anxiety-reducing skills  - Administer/offer alternative therapies  - Limit or eliminate stimulants  Outcome: Adequate for Discharge

## 2023-12-11 NOTE — NURSING NOTE
Pt has been visible this evening watch tv with peers. Continues to report having a good day and feeling ready for discharge Monday. Denies questions.

## 2023-12-11 NOTE — DISCHARGE INSTR - OTHER ORDERS
24/7 [address] [facility], [facility] Isabel Fraire;  Call: [address] [place] Free:  [facility]: [phone]    The [place] is for persons from [place] , [place] and [place]  Phone: [phone] [address] [facility] [place]:  [phone]  *Alcohol Anonymous: 541.124.1474  *[place] [facility] [employer] Alcohol Commission: (782) 728-7620 [address] on [address] [facility] [place]: 454.410.7504/Website: www.josef.popexpert  *Substance Abuse and [address] Administration([facility]) [employer], which is a confidential, free, 24-hour-a-day, 365-day-a-year, information service for individuals and family members facing mental health and/or substance use disorders. This service provides referrals to local treatment facilities, support groups, and community-based organizations. Callers can also order free publications and other information. Call 5-287.985.4867/Website: www.[facility].gov  *[facility] 2-1-1: This is a toll free, confidential, 24-hour-a-day service which connects you to a community  in your area who can help you find services and resources that are available to you locally and provide critical services that can improve and save lives.   Call: 211  /Website: https://geovannyAIShu.net/

## 2023-12-11 NOTE — NURSING NOTE
Pt is awake, pleasant during interactions. Awaiting discharge. Reports feeling ready to go. Denies SI, HI, AVH, mild anxiety related to leaving today. Reports having OP services and plans to follow up as scheduled.

## 2023-12-11 NOTE — PROGRESS NOTES
12/11/23 9418   Activity/Group Checklist   Group Admission/Discharge  (Relapse prevention plan)   Attendance Did not attend  (Patient declined to complete relapse prevention plan upon invitation to session)

## 2023-12-11 NOTE — CASE MANAGEMENT
INTAKE    Readmit score:  Yellow 22   Confirmed Address   [address] [facility]     Resides in the home with/can return?:    Pt lives with her  and three children (Ages 21, 25, 15) and can return. Confirmed Phone Number: [phone]    Commitment Status/Admitted from: [address] [place] [address] [facility]   Presenting C/O:             ED note   "    Psychiatric Evaluation        Patient reports feeling depressed and suicidal for the last couple days. Patient reports thought of overdosing on pills. Outpatient:    -[address]  reported she switches between In Person and Telehealth     When asked about attending groups during hospitalization, Pt reported she has severe social anxiety and reported she does not like to be around a lot of people. Pt also reported she is terrified to drive and has her son who takes her to appointments. 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 Legal/  Probation/[place] Ofc:    Denies   Access to Firearms:    Denies   Referrals Needed: Follow up with [facility] for [id number] [place] [address] OP follow up appt. Transport at Discharge:    Pt will need transportation.     IMM:   [place] Text LUKE: N/A   Emergency Contact:     Karly Hughes (Spouse) [phone]   ROIs obtained:       Custer Kussmaul  OP   Insurance:     [facility]    Audit:        PAWSS:  BAT:  UDS: Negative
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
## 2023-12-11 NOTE — NURSING NOTE
AVS reviewed with pt, questions answered at this time. Reviewed upcoming appts. Scripts sent electronically. Pt verified all belongings when packed by staff. [facility] readiness for discharge. Transport via [employer].

## 2023-12-11 NOTE — PROGRESS NOTES
Diagnosis of  Severe episode of recurrent major depressive disorder, without psychotic features reviewed. Short term goals for decrease in depressive symptoms, decrease in anxiety symptoms, decrease in suicidal thoughts discussed. All parties in agreement and treatment plan signed.     12/08/23 1412   Team Meeting   Meeting Type Tx Team Meeting   Team Members Present   Team Members Present Nurse;Physician;   Physician Team Member Dr. Brad Dumas Team Member [facility] Management Team Member Sharron   Patient/Family Present   Patient Present Yes   Patient's Family Present No

## 2023-12-12 NOTE — PROGRESS NOTES
Pt reported improved mood and improved symptoms. Pt compliant with medications. DC: Today - pt signed 72 hour notice on 12/8/23 @ 2100    12/11/23 0837   Team Meeting   Meeting Type Daily Rounds   Team Members Present   Team Members Present Physician;Nurse;; Other (Discipline and Name)   Physician Team Member Dr. Eda Pizarro / Dr. Darcie Mckinley / Sriram Felix / Gordon Khan Team Member [facility] Management Team Member Jonh Betancourt / Deandra Turner / Tyree Gastelum   Other (Discipline and Name) Sigmund - Group Facilitator   Patient/Family Present   Patient Present No   Patient's Family Present No

## 2023-12-28 DIAGNOSIS — R73.03 PRE-DIABETES: ICD-10-CM

## 2023-12-28 DIAGNOSIS — R73.9 HYPERGLYCEMIA: ICD-10-CM

## 2023-12-28 RX ORDER — BLOOD SUGAR DIAGNOSTIC
STRIP MISCELLANEOUS
Qty: 100 EACH | Refills: 1 | Status: SHIPPED | OUTPATIENT
Start: 2023-12-28

## 2024-01-17 ENCOUNTER — TELEPHONE (OUTPATIENT)
Dept: FAMILY MEDICINE CLINIC | Facility: HOME HEALTHCARE | Age: 39
End: 2024-01-17

## 2024-01-17 DIAGNOSIS — R25.1 TREMOR: Primary | ICD-10-CM

## 2024-01-17 RX ORDER — PRIMIDONE 50 MG/1
25 TABLET ORAL
Qty: 45 TABLET | Refills: 0 | Status: SHIPPED | OUTPATIENT
Start: 2024-01-17

## 2024-02-09 DIAGNOSIS — Z00.6 ENCOUNTER FOR EXAMINATION FOR NORMAL COMPARISON OR CONTROL IN CLINICAL RESEARCH PROGRAM: ICD-10-CM

## 2024-02-17 DIAGNOSIS — R73.9 HYPERGLYCEMIA: ICD-10-CM

## 2024-02-17 DIAGNOSIS — R73.03 PRE-DIABETES: ICD-10-CM

## 2024-02-19 RX ORDER — BLOOD SUGAR DIAGNOSTIC
STRIP MISCELLANEOUS
Qty: 100 EACH | Refills: 1 | Status: SHIPPED | OUTPATIENT
Start: 2024-02-19

## 2024-03-04 ENCOUNTER — OFFICE VISIT (OUTPATIENT)
Dept: FAMILY MEDICINE CLINIC | Facility: HOME HEALTHCARE | Age: 39
End: 2024-03-04
Payer: COMMERCIAL

## 2024-03-04 VITALS
RESPIRATION RATE: 18 BRPM | HEIGHT: 62 IN | OXYGEN SATURATION: 97 % | HEART RATE: 79 BPM | SYSTOLIC BLOOD PRESSURE: 106 MMHG | TEMPERATURE: 98.5 F | DIASTOLIC BLOOD PRESSURE: 70 MMHG | BODY MASS INDEX: 31.8 KG/M2 | WEIGHT: 172.8 LBS

## 2024-03-04 DIAGNOSIS — E11.9 TYPE 2 DIABETES MELLITUS WITHOUT COMPLICATION, WITHOUT LONG-TERM CURRENT USE OF INSULIN (HCC): ICD-10-CM

## 2024-03-04 DIAGNOSIS — E55.9 VITAMIN D DEFICIENCY: ICD-10-CM

## 2024-03-04 DIAGNOSIS — Z00.00 ANNUAL PHYSICAL EXAM: Primary | ICD-10-CM

## 2024-03-04 PROBLEM — Z00.8 MEDICAL CLEARANCE FOR PSYCHIATRIC ADMISSION: Status: RESOLVED | Noted: 2018-11-30 | Resolved: 2024-03-04

## 2024-03-04 PROBLEM — E66.811 CLASS 1 OBESITY DUE TO EXCESS CALORIES WITH SERIOUS COMORBIDITY AND BODY MASS INDEX (BMI) OF 31.0 TO 31.9 IN ADULT: Status: ACTIVE | Noted: 2023-05-18

## 2024-03-04 PROBLEM — E66.09 CLASS 1 OBESITY DUE TO EXCESS CALORIES WITH SERIOUS COMORBIDITY AND BODY MASS INDEX (BMI) OF 31.0 TO 31.9 IN ADULT: Status: ACTIVE | Noted: 2023-05-18

## 2024-03-04 LAB
CREAT UR-MCNC: 207.6 MG/DL
MICROALBUMIN UR-MCNC: 12.5 MG/L
MICROALBUMIN/CREAT 24H UR: 6 MG/G CREATININE (ref 0–30)
SL AMB POCT HEMOGLOBIN AIC: 5.6 (ref ?–6.5)

## 2024-03-04 PROCEDURE — 82570 ASSAY OF URINE CREATININE: CPT | Performed by: PHYSICIAN ASSISTANT

## 2024-03-04 PROCEDURE — T1015 CLINIC SERVICE: HCPCS | Performed by: FAMILY MEDICINE

## 2024-03-04 PROCEDURE — 82043 UR ALBUMIN QUANTITATIVE: CPT | Performed by: PHYSICIAN ASSISTANT

## 2024-03-04 PROCEDURE — 99395 PREV VISIT EST AGE 18-39: CPT | Performed by: PHYSICIAN ASSISTANT

## 2024-03-04 RX ORDER — ERGOCALCIFEROL 1.25 MG/1
50000 CAPSULE ORAL WEEKLY
Qty: 12 CAPSULE | Refills: 1 | Status: SHIPPED | OUTPATIENT
Start: 2024-03-04

## 2024-03-04 RX ORDER — ALBUTEROL SULFATE 90 UG/1
2 AEROSOL, METERED RESPIRATORY (INHALATION) EVERY 6 HOURS PRN
COMMUNITY
Start: 2023-12-26

## 2024-03-04 RX ORDER — ATOMOXETINE 60 MG/1
60 CAPSULE ORAL DAILY
Qty: 30 CAPSULE | Refills: 0 | Status: CANCELLED | OUTPATIENT
Start: 2024-03-04 | End: 2024-04-03

## 2024-03-04 RX ORDER — FLUTICASONE PROPIONATE 50 MCG
2 SPRAY, SUSPENSION (ML) NASAL DAILY
COMMUNITY
Start: 2024-01-18

## 2024-03-04 RX ORDER — CETIRIZINE HYDROCHLORIDE 10 MG/1
10 TABLET ORAL DAILY
COMMUNITY
Start: 2024-02-27

## 2024-03-04 RX ORDER — ALPRAZOLAM 1 MG/1
1 TABLET ORAL 2 TIMES DAILY PRN
COMMUNITY
Start: 2024-02-07

## 2024-03-04 NOTE — PROGRESS NOTES
ADULT ANNUAL PHYSICAL  [facility]    NAME: Trixie Malone  AGE: 38 y.o. SEX: female  : 1985     DATE: 3/4/2024     Assessment and Plan:     Problem List Items Addressed This Visit     Vitamin D deficiency    Relevant Medications    ergocalciferol (VITAMIN D2) 50,000 units    Type 2 diabetes mellitus without complication, without long-term current use of insulin (HCC)    Relevant Orders    Albumin / creatinine urine ratio    POCT hemoglobin A1c (Completed)    Ambulatory Referral to Ophthalmology   Other Visit Diagnoses     Annual physical exam    -  Primary        - Continue current medications as prescribed  - Follow up in 3 months    Immunizations and preventive care screenings were discussed with patient today. Appropriate education was printed on patient's after visit summary.    Counseling:  Alcohol/drug use: discussed moderation in alcohol intake, the recommendations for healthy alcohol use, and avoidance of illicit drug use.  Dental Health: discussed importance of regular tooth brushing, flossing, and dental visits.  Injury prevention: discussed safety/seat belts, safety helmets, smoke detectors, carbon dioxide detectors, and smoking near bedding or upholstery.  Sexual health: discussed sexually transmitted diseases, partner selection, use of condoms, avoidance of unintended pregnancy, and contraceptive alternatives.  Exercise: the importance of regular exercise/physical activity was discussed. Recommend exercise 3-5 times per week for at least 30 minutes.       Depression Screening and Follow-up Plan: Patient's depression screening was positive with a PHQ-9 score of 8. Continue regular follow-up with their mental health provider who is managing their mental health condition(s). Patient with underlying depression and was advised to continue current medications as prescribed.         Return in about 3 months (around 2024) for Next  scheduled follow up.     Chief Complaint:     Chief Complaint   Patient presents with   • Annual Exam      History of Present Illness:     Adult Annual Physical   Patient here for a comprehensive physical exam. The patient reports no problems.    Diet and Physical Activity  Diet/Nutrition: well balanced diet and limited junk food.   Exercise: no formal exercise.      Depression Screening  PHQ-2/9 Depression Screening    Little interest or pleasure in doing things: 0 - not at all  Feeling down, depressed, or hopeless: 1 - several days  Trouble falling or staying asleep, or sleeping too much: 0 - not at all  Feeling tired or having little energy: 1 - several days  Poor appetite or overeatin - not at all  Feeling bad about yourself - or that you are a failure or have let yourself or your family down: 3 - nearly every day  Trouble concentrating on things, such as reading the newspaper or watching television: 3 - nearly every day  Moving or speaking so slowly that other people could have noticed. Or the opposite - being so fidgety or restless that you have been moving around a lot more than usual: 0 - not at all  Thoughts that you would be better off dead, or of hurting yourself in some way: 0 - not at all  PHQ-9 Score: 8  PHQ-9 Interpretation: Mild depression       General Health  Sleep: sleeps well and gets 7-8 hours of sleep on average.   Hearing: normal - bilateral.  Vision: no vision problems.   Dental:  s/p total extraction .       /GYN Health  Follows with gynecology? no   Last menstrual period: 3/1/24  Contraceptive method:  none .  History of STDs?: no.     Advanced Care Planning  Do you have an advanced directive? no  Do you have a durable medical power of ? no  ACP document given to the patient? no      Review of Systems:     Review of Systems   Constitutional:  Negative for chills, diaphoresis and fever.   Respiratory:  Negative for cough, chest tightness, shortness of breath and wheezing.     Cardiovascular:  Negative for chest pain, palpitations and leg swelling.   Gastrointestinal:  Negative for abdominal pain, constipation, diarrhea, nausea and vomiting.   Skin:  Negative for rash and wound.   Neurological:  Negative for dizziness, syncope, weakness, light-headedness and headaches.      Past Medical History:     Past Medical History:   Diagnosis Date   • ADHD (attention deficit hyperactivity disorder)    • Anxiety    • Asthma    • Cat-scratch disease     last assessed 10/22/15   • Depression    • Hypertension     last assessed 11/11/14   • Sciatica    • Sleep disorder       Past Surgical History:     Past Surgical History:   Procedure Laterality Date   • BREAST BIOPSY Left     2016   • CHOLECYSTECTOMY LAPAROSCOPIC N/A 8/30/2023    Procedure: CHOLECYSTECTOMY LAPAROSCOPIC;  Surgeon: Inder Suarez DO;  Location: [facility];  Service: General   • DENTAL SURGERY     • WV BX/EXC LYMPH NODE OPEN DEEP AXILLARY NODE Left 1/15/2019    Procedure: LEFT AXILLARY LYMPH NODE BIOPSY;  Surgeon: Vincent Lambert MD;  Location:  MAIN OR;  Service: General   • W[place] LAMNOTMY INCL W/DCMPRSN NRV ROOT 1 INTRSPC LUMBR Right 4/14/2021    Procedure: L5-S1 minimally invasive microdiskectomy and  epidural steroid injection;  Surgeon: Milad Quinn MD;  Location:  Main OR;  Service: Neurosurgery   • TUBAL LIGATION        Social History:     Social History     Socioeconomic History   • Marital status: /Civil Union     Spouse name: None   • Number of children: None   • Years of education: None   • Highest education level: None   Occupational History   • None   Tobacco Use   • Smoking status: Never     Passive exposure: Never   • Smokeless tobacco: Never   Vaping Use   • Vaping status: Never Used   Substance and Sexual Activity   • Alcohol use: Never   • Drug use: No   • Sexual activity: Not Currently     Partners: Male     Birth control/protection: None   Other Topics Concern   • None   Social History Narrative    Always  uses seat belt    Daily caffeine consumption(occasional)    Economic stress    Exercises daily    Lives with     Lives with relatives    Pets: Dog     Social Determinants of Health     Financial Resource Strain: Not on file   Food Insecurity: No Food Insecurity (8/29/2023)    Hunger Vital Sign    • Worried About Running Out of Food in the Last Year: Never true    • Ran Out of Food in the Last Year: Never true   Transportation Needs: No Transportation Needs (8/29/2023)    PRAPARE - Transportation    • Lack of Transportation (Medical): No    • Lack of Transportation (Non-Medical): No   Physical Activity: Not on file   Stress: Not on file   Social Connections: Not on file   Intimate Partner Violence: Not on file   Housing Stability: Unknown (8/29/2023)    Housing Stability Vital Sign    • Unable to Pay for Housing in the Last Year: No    • Number of Places Lived in the Last Year: 1    • Unstable Housing in the Last Year: Not on file      Family History:     Family History   Problem Relation Age of Onset   • Anxiety disorder Mother    • Bipolar disorder Mother    • Depression Mother    • Tremor Mother    • Anxiety disorder Father    • Bipolar disorder Father    • Depression Father    • Schizophrenia Father    • Heart attack Father    • Parkinsonism Maternal Grandmother    • Heart attack Maternal Grandmother    • Stomach cancer Paternal Grandfather    • Heart attack Paternal Grandfather       Current Medications:     Current Outpatient Medications   Medication Sig Dispense Refill   • albuterol (2.5 mg/3 mL) 0.083 % nebulizer solution Take 3 mL (2.5 mg total) by nebulization every 6 (six) hours as needed for wheezing or shortness of breath 90 mL 5   • albuterol (PROVENTIL HFA,VENTOLIN HFA) 90 mcg/act inhaler Inhale 2 puffs every 6 (six) hours as needed for wheezing or shortness of breath     • Alcohol Swabs (Pharmacist Choice Alcohol) PADS TEST BLOOD 3 TIMES DAILY 100 each 5   • ALPRAZolam (XANAX) 1 mg tablet Take  1 mg by mouth 2 (two) times a day as needed for anxiety     • atoMOXetine (STRATTERA) 60 mg capsule Take 1 capsule (60 mg total) by mouth daily Do not start before December 12, 2023. 30 capsule 0   • Blood Glucose Monitoring Suppl (OneTouch Verio Reflect) w/Device KIT Check blood sugars three times daily. Please substitute with appropriate alternative as covered by patient's insurance. Dx: E11.65 1 kit 0   • cetirizine (ZyrTEC) 10 mg tablet Take 10 mg by mouth daily     • ergocalciferol (VITAMIN D2) 50,000 units Take 1 capsule (50,000 Units total) by mouth once a week 12 capsule 1   • fluticasone (FLONASE) 50 mcg/act nasal spray 2 sprays into each nostril daily     • hydrochlorothiazide (HYDRODIURIL) 12.5 mg tablet TAKE 1 TABLET (12.5 MG TOTAL) BY MOUTH DAILY 90 tablet 1   • Lancet Devices (Easy Mini Eject Lancing Device) MISC Use as directed to check blood sugar 3x daily. 1 each 0   • lisinopril (ZESTRIL) 10 mg tablet TAKE 1 TABLET (10 MG TOTAL) BY MOUTH 2 (TWO) TIMES A DAY 90 tablet 1   • meclizine (ANTIVERT) 25 mg tablet TAKE 1 TABLET (25 MG TOTAL) BY MOUTH 3 (THREE) TIMES A DAY AS NEEDED FOR DIZZINESS 30 tablet 2   • metFORMIN (GLUCOPHAGE-XR) 500 mg 24 hr tablet TAKE 1 TABLET (500 MG TOTAL) BY MOUTH DAILY WITH DINNER 30 tablet 5   • OneTouch Verio test strip TEST BLOOD 3 TIMES DAILY 100 each 1   • Pharmacist Choice Lancets MISC TEST BLOOD 3 TIMES DAILY 100 each 1   • primidone (MYSOLINE) 50 mg tablet Take 0.5 tablets (25 mg total) by mouth daily at bedtime 45 tablet 0   • LaMICtal 100 MG tablet Take 100 mg by mouth 2 (two) times a day       No current facility-administered medications for this visit.      Allergies:     Allergies   Allergen Reactions   • Bactrim [Sulfamethoxazole-Trimethoprim] Shortness Of Breath     Near syncope   • Codeine Hives     Tolerated Percocet, Vicodin, etc.   • Magnesium Sulfate Tachycardia      Physical Exam:     /70 (BP Location: Left arm, Patient Position: Sitting, Cuff Size:  "Standard)   Pulse 79   Temp 98.5 °F (36.9 °C)   Resp 18   Ht 5' 2\" (1.575 m)   Wt 78.4 kg (172 lb 12.8 oz)   SpO2 97%   BMI 31.61 kg/m²     Physical Exam  Vitals and nursing note reviewed.   Constitutional:       General: She is not in acute distress.     Appearance: Normal appearance.   HENT:      Head: Normocephalic and atraumatic.      Right Ear: Tympanic membrane, ear canal and external ear normal.      Left Ear: Tympanic membrane, ear canal and external ear normal.      Nose: Nose normal.      Mouth/Throat:      Mouth: Mucous membranes are moist.      Pharynx: Oropharynx is clear. No oropharyngeal exudate.   Eyes:      Extraocular Movements: Extraocular movements intact.      Conjunctiva/sclera: Conjunctivae normal.      Pupils: Pupils are equal, round, and reactive to light.   Cardiovascular:      Rate and Rhythm: Normal rate and regular rhythm.      Heart sounds: Normal heart sounds. No murmur heard.  Pulmonary:      Effort: Pulmonary effort is normal. No respiratory distress.      Breath sounds: Normal breath sounds. No wheezing.   Musculoskeletal:         General: Normal range of motion.      Cervical back: Normal range of motion and neck supple.      Right lower leg: No edema.      Left lower leg: No edema.   Lymphadenopathy:      Cervical: No cervical adenopathy.   Skin:     General: Skin is warm and dry.   Neurological:      General: No focal deficit present.      Mental Status: She is alert and oriented to person, place, and time.      Cranial Nerves: No cranial nerve deficit.      Motor: No weakness.   Psychiatric:         Mood and Affect: Mood normal.         Behavior: Behavior normal.          Frankie Dinh PA-C   [facility]    "

## 2024-03-04 NOTE — PROGRESS NOTES
Patient's shoes and socks removed.    Right Foot/Ankle   Right Foot Inspection  Skin Exam: skin normal and skin intact. No dry skin, no warmth, no callus, no erythema, no maceration, no abnormal color, no pre-ulcer, no ulcer and no callus.     Toe Exam: ROM and strength within normal limits.     Sensory   Monofilament testing: intact    Vascular  Capillary refills: < 3 seconds  The right DP pulse is 2+. The right PT pulse is 2+.     Left Foot/Ankle  Left Foot Inspection  Skin Exam: skin normal and skin intact. No dry skin, no warmth, no erythema, no maceration, normal color, no pre-ulcer, no ulcer and no callus.     Toe Exam: ROM and strength within normal limits.     Sensory   Monofilament testing: intact    Vascular  Capillary refills: < 3 seconds  The left DP pulse is 2+. The left PT pulse is 2+.     Assign Risk Category  No deformity present  No loss of protective sensation  No weak pulses  Risk: 0

## 2024-03-06 ENCOUNTER — TELEPHONE (OUTPATIENT)
Dept: FAMILY MEDICINE CLINIC | Facility: HOME HEALTHCARE | Age: 39
End: 2024-03-06

## 2024-03-11 ENCOUNTER — TELEPHONE (OUTPATIENT)
Dept: FAMILY MEDICINE CLINIC | Facility: HOME HEALTHCARE | Age: 39
End: 2024-03-11

## 2024-03-11 DIAGNOSIS — Z20.2 SEXUALLY TRANSMITTED DISEASE EXPOSURE: Primary | ICD-10-CM

## 2024-03-11 NOTE — TELEPHONE ENCOUNTER
Patient left a message asking if her wegovy an be substituted for something else as the wegovy is not in stock at the local pharmacies.

## 2024-03-13 DIAGNOSIS — E11.9 TYPE 2 DIABETES MELLITUS WITHOUT COMPLICATION, WITHOUT LONG-TERM CURRENT USE OF INSULIN (HCC): Primary | ICD-10-CM

## 2024-03-14 ENCOUNTER — APPOINTMENT (OUTPATIENT)
Dept: LAB | Facility: HOSPITAL | Age: 39
End: 2024-03-14
Payer: COMMERCIAL

## 2024-03-14 DIAGNOSIS — Z20.2 SEXUALLY TRANSMITTED DISEASE EXPOSURE: ICD-10-CM

## 2024-03-14 DIAGNOSIS — Z00.6 ENCOUNTER FOR EXAMINATION FOR NORMAL COMPARISON OR CONTROL IN CLINICAL RESEARCH PROGRAM: ICD-10-CM

## 2024-03-14 LAB
HBV SURFACE AB SER-ACNC: 42.9 MIU/ML
HCV AB SER QL: NORMAL

## 2024-03-14 PROCEDURE — 36415 COLL VENOUS BLD VENIPUNCTURE: CPT

## 2024-03-14 PROCEDURE — 87389 HIV-1 AG W/HIV-1&-2 AB AG IA: CPT

## 2024-03-14 PROCEDURE — 86706 HEP B SURFACE ANTIBODY: CPT

## 2024-03-14 PROCEDURE — 86695 HERPES SIMPLEX TYPE 1 TEST: CPT

## 2024-03-14 PROCEDURE — 86696 HERPES SIMPLEX TYPE 2 TEST: CPT

## 2024-03-14 PROCEDURE — 86803 HEPATITIS C AB TEST: CPT

## 2024-03-14 PROCEDURE — 86780 TREPONEMA PALLIDUM: CPT

## 2024-03-15 LAB
HIV 1+2 AB+HIV1 P24 AG SERPL QL IA: NORMAL
HIV 2 AB SERPL QL IA: NORMAL
HIV1 AB SERPL QL IA: NORMAL
HIV1 P24 AG SERPL QL IA: NORMAL
TREPONEMA PALLIDUM IGG+IGM AB [PRESENCE] IN SERUM OR PLASMA BY IMMUNOASSAY: NORMAL

## 2024-03-18 LAB — SCAN RESULT: NORMAL

## 2024-03-23 DIAGNOSIS — R73.03 PRE-DIABETES: ICD-10-CM

## 2024-03-23 DIAGNOSIS — R73.9 HYPERGLYCEMIA: ICD-10-CM

## 2024-03-25 DIAGNOSIS — R73.03 PRE-DIABETES: ICD-10-CM

## 2024-03-25 DIAGNOSIS — J30.9 ALLERGIC RHINITIS, UNSPECIFIED SEASONALITY, UNSPECIFIED TRIGGER: Primary | ICD-10-CM

## 2024-03-25 DIAGNOSIS — R73.9 HYPERGLYCEMIA: ICD-10-CM

## 2024-03-25 RX ORDER — METFORMIN HYDROCHLORIDE 500 MG/1
500 TABLET, EXTENDED RELEASE ORAL
Qty: 30 TABLET | Refills: 5 | OUTPATIENT
Start: 2024-03-25

## 2024-03-25 RX ORDER — CETIRIZINE HYDROCHLORIDE 10 MG/1
10 TABLET ORAL DAILY
Qty: 90 TABLET | Refills: 1 | Status: SHIPPED | OUTPATIENT
Start: 2024-03-25

## 2024-03-25 RX ORDER — METFORMIN HYDROCHLORIDE 500 MG/1
500 TABLET, EXTENDED RELEASE ORAL
Qty: 30 TABLET | Refills: 5 | Status: SHIPPED | OUTPATIENT
Start: 2024-03-25

## 2024-03-25 RX ORDER — CETIRIZINE HYDROCHLORIDE 10 MG/1
10 TABLET ORAL DAILY
Qty: 30 TABLET | OUTPATIENT
Start: 2024-03-25

## 2024-04-01 LAB
APOB+LDLR+PCSK9 GENE MUT ANL BLD/T: NOT DETECTED
BRCA1+BRCA2 DEL+DUP + FULL MUT ANL BLD/T: NOT DETECTED
MLH1+MSH2+MSH6+PMS2 GN DEL+DUP+FUL M: NOT DETECTED

## 2024-04-09 DIAGNOSIS — R73.03 PRE-DIABETES: ICD-10-CM

## 2024-04-09 DIAGNOSIS — R73.9 HYPERGLYCEMIA: ICD-10-CM

## 2024-04-09 RX ORDER — BLOOD SUGAR DIAGNOSTIC
STRIP MISCELLANEOUS
Qty: 100 EACH | Refills: 1 | Status: SHIPPED | OUTPATIENT
Start: 2024-04-09

## 2024-05-29 DIAGNOSIS — R73.9 HYPERGLYCEMIA: ICD-10-CM

## 2024-05-29 DIAGNOSIS — R73.03 PRE-DIABETES: ICD-10-CM

## 2024-05-29 RX ORDER — BLOOD SUGAR DIAGNOSTIC
STRIP MISCELLANEOUS
Qty: 100 EACH | Refills: 1 | Status: SHIPPED | OUTPATIENT
Start: 2024-05-29

## 2024-06-14 DIAGNOSIS — E11.9 TYPE 2 DIABETES MELLITUS WITHOUT COMPLICATION, WITHOUT LONG-TERM CURRENT USE OF INSULIN (HCC): ICD-10-CM

## 2024-06-18 RX ORDER — SEMAGLUTIDE 1.34 MG/ML
INJECTION, SOLUTION SUBCUTANEOUS
Qty: 9 ML | Refills: 0 | Status: SHIPPED | OUTPATIENT
Start: 2024-06-18 | End: 2024-06-25

## 2024-06-25 ENCOUNTER — OFFICE VISIT (OUTPATIENT)
Dept: FAMILY MEDICINE CLINIC | Facility: HOME HEALTHCARE | Age: 39
End: 2024-06-25
Payer: COMMERCIAL

## 2024-06-25 VITALS
HEART RATE: 78 BPM | WEIGHT: 167.4 LBS | TEMPERATURE: 98 F | DIASTOLIC BLOOD PRESSURE: 86 MMHG | SYSTOLIC BLOOD PRESSURE: 118 MMHG | BODY MASS INDEX: 30.62 KG/M2 | RESPIRATION RATE: 18 BRPM

## 2024-06-25 DIAGNOSIS — E11.9 TYPE 2 DIABETES MELLITUS WITHOUT COMPLICATION, WITHOUT LONG-TERM CURRENT USE OF INSULIN (HCC): Primary | ICD-10-CM

## 2024-06-25 DIAGNOSIS — Z12.31 ENCOUNTER FOR SCREENING MAMMOGRAM FOR BREAST CANCER: ICD-10-CM

## 2024-06-25 LAB — SL AMB POCT HEMOGLOBIN AIC: 5.3 (ref ?–6.5)

## 2024-06-25 PROCEDURE — 99213 OFFICE O/P EST LOW 20 MIN: CPT | Performed by: PHYSICIAN ASSISTANT

## 2024-06-25 PROCEDURE — T1015 CLINIC SERVICE: HCPCS | Performed by: FAMILY MEDICINE

## 2024-06-25 NOTE — PROGRESS NOTES
Ambulatory Visit  Name: Trixie Malone      : 1985      MRN: [id number]  Encounter Provider: Frankie Dinh PA-C  Encounter Date: 2024   [facility]    Assessment & Plan   1. Type 2 diabetes mellitus without complication, without long-term current use of insulin (HCC)  -     POCT hemoglobin A1c  -     semaglutide, 2 mg/dose, (Ozempic, 2 MG/DOSE,) 8 mg/ mL injection pen; Inject 0.75 mL (2 mg total) under the skin once a week  2. Encounter for screening mammogram for breast cancer  -     Mammo screening bilateral w 3d & cad; Future; Expected date: 2024    - A1c is well controlled.  She continues with weight loss, current BMI 30.62.  Will stop metformin and increase ozempic to 2 mg weekly.  Follow up in 3 months.  - Screening mammogram ordered.  Will schedule PAP.       History of Present Illness   {Disappearing Hyperlinks I Encounters * My Last Note * Since Last Visit * History :03742}    Trixie is a 39 year old female with history of HTN, DM, asthma, vit d deficiency, back pain, PTSD/Anxiety/Depression, presenting for follow up.    DM is currently managed with ozempic 1 mg weekly and metformin 500 mg qd.  Patient admits that she often forgets to take metformin.  She has been tolerating ozempic well.  A1c 5.3 today, down from 5.6 in 3/2023.  She continues to loose weight, down to 167 from 172 at last visit.  Due for diabetic eye exam.        Review of Systems   Constitutional:  Negative for chills, diaphoresis and fever.   Respiratory:  Negative for cough, chest tightness, shortness of breath and wheezing.    Cardiovascular:  Negative for chest pain, palpitations and leg swelling.   Gastrointestinal:  Negative for abdominal pain, constipation, diarrhea, nausea and vomiting.   Skin:  Negative for rash and wound.   Neurological:  Negative for dizziness, syncope, light-headedness and headaches.     Medical History Reviewed by provider this encounter:   Tobacco  Allergies  Meds  Problems  Med Hx  Surg Hx  Fam Hx       Current Outpatient Medications on File Prior to Visit   Medication Sig Dispense Refill   • albuterol (2.5 mg/3 mL) 0.083 % nebulizer solution Take 3 mL (2.5 mg total) by nebulization every 6 (six) hours as needed for wheezing or shortness of breath 90 mL 5   • albuterol (PROVENTIL HFA,VENTOLIN HFA) 90 mcg/act inhaler Inhale 2 puffs every 6 (six) hours as needed for wheezing or shortness of breath     • Alcohol Swabs (Pharmacist Choice Alcohol) PADS TEST BLOOD 3 TIMES DAILY 100 each 5   • Blood Glucose Monitoring Suppl (OneTouch Verio Reflect) w/Device KIT Check blood sugars three times daily. Please substitute with appropriate alternative as covered by patient's insurance. Dx: E11.65 1 kit 0   • cetirizine (ZyrTEC) 10 mg tablet Take 1 tablet (10 mg total) by mouth daily 90 tablet 1   • ergocalciferol (VITAMIN D2) 50,000 units Take 1 capsule (50,000 Units total) by mouth once a week 12 capsule 1   • fluticasone (FLONASE) 50 mcg/act nasal spray 2 sprays into each nostril daily     • glucose blood (OneTouch Verio) test strip USE DAILY TO CHECK BLOOD SUGAR 3 TIMES A  each 1   • hydrochlorothiazide (HYDRODIURIL) 12.5 mg tablet TAKE 1 TABLET (12.5 MG TOTAL) BY MOUTH DAILY 90 tablet 1   • Lancet Devices (Easy Mini Eject Lancing Device) MISC Use as directed to check blood sugar 3x daily. 1 each 0   • lisinopril (ZESTRIL) 10 mg tablet TAKE 1 TABLET (10 MG TOTAL) BY MOUTH 2 (TWO) TIMES A DAY 90 tablet 1   • meclizine (ANTIVERT) 25 mg tablet TAKE 1 TABLET (25 MG TOTAL) BY MOUTH 3 (THREE) TIMES A DAY AS NEEDED FOR DIZZINESS 30 tablet 2   • Pharmacist Choice Lancets MISC TEST BLOOD 3 TIMES DAILY 100 each 1   • [DISCONTINUED] metFORMIN (GLUCOPHAGE-XR) 500 mg 24 hr tablet Take 1 tablet (500 mg total) by mouth daily with dinner 30 tablet 5   • [DISCONTINUED] Ozempic, 1 MG/DOSE, 4 MG/3ML injection pen INJECT 0.75 ML (1 MG TOTAL) UNDER THE SKIN EVERY 7  DAYS 9 mL 0   • [DISCONTINUED] ALPRAZolam (XANAX) 1 mg tablet Take 1 mg by mouth 2 (two) times a day as needed for anxiety     • [DISCONTINUED] atoMOXetine (STRATTERA) 60 mg capsule Take 1 capsule (60 mg total) by mouth daily Do not start before December 12, 2023. 30 capsule 0   • [DISCONTINUED] LaMICtal 100 MG tablet Take 100 mg by mouth 2 (two) times a day     • [DISCONTINUED] primidone (MYSOLINE) 50 mg tablet Take 0.5 tablets (25 mg total) by mouth daily at bedtime 45 tablet 0     No current facility-administered medications on file prior to visit.      Social History     Tobacco Use   • Smoking status: Never     Passive exposure: Never   • Smokeless tobacco: Never   Vaping Use   • Vaping status: Never Used   Substance and Sexual Activity   • Alcohol use: Never   • Drug use: No   • Sexual activity: Not Currently     Partners: Male     Birth control/protection: Condom Female, Female Sterilization     Objective   {Disappearing Hyperlinks   Review Vitals * Enter New Vitals * Results Review * Labs * Imaging * Cardiology * Procedures * Lung Cancer Screening :[id number]}  /86   Pulse 78   Temp 98 °F (36.7 °C)   Resp 18   Wt 75.9 kg (167 lb 6.4 oz)   BMI 30.62 kg/m²     Physical Exam  Vitals and nursing note reviewed.   Constitutional:       General: She is not in acute distress.     Appearance: Normal appearance.   HENT:      Head: Normocephalic and atraumatic.   Eyes:      Extraocular Movements: Extraocular movements intact.      Conjunctiva/sclera: Conjunctivae normal.      Pupils: Pupils are equal, round, and reactive to light.   Cardiovascular:      Rate and Rhythm: Normal rate and regular rhythm.      Heart sounds: Normal heart sounds. No murmur heard.  Pulmonary:      Effort: Pulmonary effort is normal. No respiratory distress.      Breath sounds: Normal breath sounds. No wheezing.   Musculoskeletal:      Cervical back: Normal range of motion and neck supple.      Right lower leg: No edema.      Left lower  leg: No edema.   Skin:     General: Skin is warm and dry.   Neurological:      General: No focal deficit present.      Mental Status: She is alert and oriented to person, place, and time.      Cranial Nerves: No cranial nerve deficit.      Motor: No weakness.   Psychiatric:         Mood and Affect: Mood normal.         Behavior: Behavior normal.       Administrative Statements {Disappearing Hyperlinks I  [id number]}

## 2024-07-18 DIAGNOSIS — R73.9 HYPERGLYCEMIA: ICD-10-CM

## 2024-07-18 DIAGNOSIS — R73.03 PRE-DIABETES: ICD-10-CM

## 2024-07-18 RX ORDER — BLOOD SUGAR DIAGNOSTIC
STRIP MISCELLANEOUS
Qty: 100 EACH | Refills: 1 | Status: SHIPPED | OUTPATIENT
Start: 2024-07-18

## 2024-07-22 DIAGNOSIS — E55.9 VITAMIN D DEFICIENCY: ICD-10-CM

## 2024-07-22 RX ORDER — ERGOCALCIFEROL 1.25 MG/1
50000 CAPSULE ORAL WEEKLY
Qty: 12 CAPSULE | Refills: 1 | Status: SHIPPED | OUTPATIENT
Start: 2024-07-22

## 2024-08-21 ENCOUNTER — OFFICE VISIT (OUTPATIENT)
Dept: FAMILY MEDICINE CLINIC | Facility: HOME HEALTHCARE | Age: 39
End: 2024-08-21
Payer: COMMERCIAL

## 2024-08-21 VITALS
RESPIRATION RATE: 18 BRPM | HEIGHT: 62 IN | OXYGEN SATURATION: 96 % | SYSTOLIC BLOOD PRESSURE: 122 MMHG | HEART RATE: 70 BPM | DIASTOLIC BLOOD PRESSURE: 80 MMHG | WEIGHT: 158.2 LBS | TEMPERATURE: 98.2 F | BODY MASS INDEX: 29.11 KG/M2

## 2024-08-21 DIAGNOSIS — Z11.51 ENCOUNTER FOR SCREENING FOR HUMAN PAPILLOMAVIRUS (HPV): ICD-10-CM

## 2024-08-21 DIAGNOSIS — Z12.4 CERVICAL CANCER SCREENING: Primary | ICD-10-CM

## 2024-08-21 DIAGNOSIS — Z11.51 ENCOUNTER FOR SCREENING FOR HUMAN PAPILLOMAVIRUS (HPV): Primary | ICD-10-CM

## 2024-08-21 PROCEDURE — T1015 CLINIC SERVICE: HCPCS | Performed by: FAMILY MEDICINE

## 2024-08-21 PROCEDURE — 99213 OFFICE O/P EST LOW 20 MIN: CPT

## 2024-08-21 NOTE — PROGRESS NOTES
Ambulatory Visit  Name: Trixie Malone      : 1985      MRN: [id number]  Encounter Provider: Myrna Aguilar DO  Encounter Date: 2024   Encounter department: [facility]    Assessment & Plan   1. Encounter for screening for human papillomavirus (HPV)  Comments:  last pap in 2019, normal.  pap smear with cotesting HPV performed, will f/u if result abnormal  Orders:  -     THINPREP (REFL) HPV  RFX GENOTYPE; Future       History of Present Illness     Trixie Malone is a 40 yo female with PMH of HTN, DM, asthma, vit d deficiency, back pain, PTSD/Anxiety/Depression presents for pap smear. Patient reported she had pap smear done ~ 2019 and result was normal. Patient has no other complaints. She has hx of tubal ligation, no complications after the surgery.     Gynecologic Exam  She reports no vaginal discharge. Pertinent negatives include no abdominal pain, back pain, chills, dysuria, fever, hematuria, rash, sore throat or vomiting.     Review of Systems   Constitutional:  Negative for chills and fever.   HENT:  Negative for sore throat.    Respiratory:  Negative for cough.    Cardiovascular:  Negative for chest pain.   Gastrointestinal:  Negative for abdominal pain and vomiting.   Genitourinary:  Negative for dysuria, hematuria, vaginal bleeding and vaginal discharge.   Musculoskeletal:  Negative for arthralgias and back pain.   Skin:  Negative for color change and rash.   All other systems reviewed and are negative.    Medical History Reviewed by provider this encounter:       Current Outpatient Medications on File Prior to Visit   Medication Sig Dispense Refill    albuterol (2.5 mg/3 mL) 0.083 % nebulizer solution Take 3 mL (2.5 mg total) by nebulization every 6 (six) hours as needed for wheezing or shortness of breath 90 mL 5    albuterol (PROVENTIL HFA,VENTOLIN HFA) 90 mcg/act inhaler Inhale 2 puffs every 6 (six) hours as needed for wheezing or shortness of breath      Alcohol  "Swabs (Pharmacist Choice Alcohol) PADS TEST BLOOD 3 TIMES DAILY 100 each 5    Blood Glucose Monitoring Suppl (OneTouch Verio Reflect) w/Device KIT Check blood sugars three times daily. Please substitute with appropriate alternative as covered by patient's insurance. Dx: E11.65 1 kit 0    cetirizine (ZyrTEC) 10 mg tablet Take 1 tablet (10 mg total) by mouth daily 90 tablet 1    ergocalciferol (VITAMIN D2) 50,000 units TAKE 1 CAPSULE (50,000 UNITS TOTAL) BY MOUTH ONCE A WEEK 12 capsule 1    Lancet Devices (Easy Mini Eject Lancing Device) MISC Use as directed to check blood sugar 3x daily. 1 each 0    OneTouch Verio test strip USE DAILY TO CHECK BLOOD SUGAR 3 TIMES A  each 1    Pharmacist Choice Lancets MISC TEST BLOOD 3 TIMES DAILY 100 each 1    semaglutide, 2 mg/dose, (Ozempic, 2 MG/DOSE,) 8 mg/ mL injection pen Inject 0.75 mL (2 mg total) under the skin once a week 9 mL 1    fluticasone (FLONASE) 50 mcg/act nasal spray 2 sprays into each nostril daily      hydrochlorothiazide (HYDRODIURIL) 12.5 mg tablet TAKE 1 TABLET (12.5 MG TOTAL) BY MOUTH DAILY 90 tablet 1    lisinopril (ZESTRIL) 10 mg tablet TAKE 1 TABLET (10 MG TOTAL) BY MOUTH 2 (TWO) TIMES A DAY 90 tablet 1    meclizine (ANTIVERT) 25 mg tablet TAKE 1 TABLET (25 MG TOTAL) BY MOUTH 3 (THREE) TIMES A DAY AS NEEDED FOR DIZZINESS 30 tablet 2     No current facility-administered medications on file prior to visit.      Social History     Tobacco Use    Smoking status: Never     Passive exposure: Never    Smokeless tobacco: Never   Vaping Use    Vaping status: Never Used   Substance and Sexual Activity    Alcohol use: Never    Drug use: No    Sexual activity: Not Currently     Partners: Male     Birth control/protection: Condom Female, Female Sterilization     Objective     /80 (BP Location: Left arm, Patient Position: Sitting, Cuff Size: Standard)   Pulse 70   Temp 98.2 °F (36.8 °C)   Resp 18   Ht 5' 2\" (1.575 m)   Wt 71.8 kg (158 lb 3.2 oz)   " SpO2 96%   BMI 28.94 kg/m²     Physical Exam  Vitals and nursing note reviewed.   Constitutional:       General: She is not in acute distress.     Appearance: Normal appearance. She is well-developed. She is not ill-appearing.   HENT:      Head: Normocephalic and atraumatic.      Nose: Nose normal. No congestion.   Eyes:      General:         Right eye: No discharge.         Left eye: No discharge.      Extraocular Movements: Extraocular movements intact.      Conjunctiva/sclera: Conjunctivae normal.      Pupils: Pupils are equal, round, and reactive to light.   Cardiovascular:      Rate and Rhythm: Normal rate and regular rhythm.      Pulses: Normal pulses.   Pulmonary:      Effort: Pulmonary effort is normal.      Breath sounds: Normal breath sounds.   Abdominal:      General: Abdomen is flat. Bowel sounds are normal.      Palpations: Abdomen is soft.   Genitourinary:     General: Normal vulva.      Vagina: No vaginal discharge.   Musculoskeletal:         General: Normal range of motion.      Cervical back: Normal range of motion and neck supple.   Skin:     General: Skin is warm and dry.      Capillary Refill: Capillary refill takes less than 2 seconds.      Findings: No rash.   Neurological:      Mental Status: She is alert and oriented to person, place, and time.   Psychiatric:         Mood and Affect: Mood normal.         Administrative Statements   I have spent a total time of 20 minutes in caring for this patient on the day of the visit/encounter including Counseling / Coordination of care, Documenting in the medical record, Reviewing / ordering tests, medicine, procedures  , and Obtaining or reviewing history  .    Ada Aguilar DO ([place]Ling), Family Medicine PGY-2, Date and Time: 08/21/24 3:07 PM

## 2024-08-23 DIAGNOSIS — Z12.4 CERVICAL CANCER SCREENING: ICD-10-CM

## 2024-08-23 DIAGNOSIS — Z11.51 ENCOUNTER FOR SCREENING FOR HUMAN PAPILLOMAVIRUS (HPV): ICD-10-CM

## 2024-08-23 PROCEDURE — G0145 SCR C/V CYTO,THINLAYER,RESCR: HCPCS | Performed by: PHYSICIAN ASSISTANT

## 2024-08-28 LAB
LAB AP GYN PRIMARY INTERPRETATION: NORMAL
Lab: NORMAL
PATH INTERP SPEC-IMP: NORMAL

## 2024-08-30 DIAGNOSIS — B96.89 BACTERIAL VAGINOSIS: Primary | ICD-10-CM

## 2024-08-30 DIAGNOSIS — N76.0 BACTERIAL VAGINOSIS: Primary | ICD-10-CM

## 2024-08-30 RX ORDER — METRONIDAZOLE 500 MG/1
500 TABLET ORAL EVERY 12 HOURS SCHEDULED
Qty: 14 TABLET | Refills: 0 | Status: SHIPPED | OUTPATIENT
Start: 2024-08-30 | End: 2024-09-06

## 2024-09-10 DIAGNOSIS — R73.9 HYPERGLYCEMIA: ICD-10-CM

## 2024-09-10 DIAGNOSIS — R73.03 PRE-DIABETES: ICD-10-CM

## 2024-09-10 RX ORDER — BLOOD SUGAR DIAGNOSTIC
STRIP MISCELLANEOUS
Qty: 100 EACH | Refills: 5 | Status: SHIPPED | OUTPATIENT
Start: 2024-09-10

## 2024-09-25 ENCOUNTER — OFFICE VISIT (OUTPATIENT)
Dept: FAMILY MEDICINE CLINIC | Facility: HOME HEALTHCARE | Age: 39
End: 2024-09-25
Payer: COMMERCIAL

## 2024-09-25 VITALS
OXYGEN SATURATION: 98 % | HEART RATE: 86 BPM | TEMPERATURE: 96.8 F | SYSTOLIC BLOOD PRESSURE: 124 MMHG | DIASTOLIC BLOOD PRESSURE: 90 MMHG | WEIGHT: 154 LBS | BODY MASS INDEX: 28.17 KG/M2 | RESPIRATION RATE: 16 BRPM

## 2024-09-25 DIAGNOSIS — F90.9 ATTENTION DEFICIT HYPERACTIVITY DISORDER (ADHD), UNSPECIFIED ADHD TYPE: ICD-10-CM

## 2024-09-25 DIAGNOSIS — Z98.890 HISTORY OF LUMBAR SURGERY: ICD-10-CM

## 2024-09-25 DIAGNOSIS — E11.9 TYPE 2 DIABETES MELLITUS WITHOUT COMPLICATION, WITHOUT LONG-TERM CURRENT USE OF INSULIN (HCC): Primary | ICD-10-CM

## 2024-09-25 DIAGNOSIS — R11.0 NAUSEA: ICD-10-CM

## 2024-09-25 DIAGNOSIS — M54.16 LUMBAR RADICULOPATHY: ICD-10-CM

## 2024-09-25 DIAGNOSIS — M25.551 RIGHT HIP PAIN: ICD-10-CM

## 2024-09-25 PROBLEM — E66.3 OVERWEIGHT (BMI 25.0-29.9): Status: ACTIVE | Noted: 2023-05-18

## 2024-09-25 PROBLEM — R73.03 PRE-DIABETES: Status: RESOLVED | Noted: 2021-05-13 | Resolved: 2024-09-25

## 2024-09-25 LAB — SL AMB POCT HEMOGLOBIN AIC: 5.2 (ref ?–6.5)

## 2024-09-25 PROCEDURE — 99214 OFFICE O/P EST MOD 30 MIN: CPT | Performed by: PHYSICIAN ASSISTANT

## 2024-09-25 PROCEDURE — T1015 CLINIC SERVICE: HCPCS | Performed by: FAMILY MEDICINE

## 2024-09-25 RX ORDER — DEXTROAMPHETAMINE SACCHARATE, AMPHETAMINE ASPARTATE MONOHYDRATE, DEXTROAMPHETAMINE SULFATE AND AMPHETAMINE SULFATE 2.5; 2.5; 2.5; 2.5 MG/1; MG/1; MG/1; MG/1
10 CAPSULE, EXTENDED RELEASE ORAL EVERY MORNING
Qty: 30 CAPSULE | Refills: 0 | Status: SHIPPED | OUTPATIENT
Start: 2024-09-25

## 2024-09-25 RX ORDER — ONDANSETRON 4 MG/1
4 TABLET, FILM COATED ORAL EVERY 8 HOURS PRN
Qty: 20 TABLET | Refills: 0 | Status: SHIPPED | OUTPATIENT
Start: 2024-09-25

## 2024-09-25 NOTE — ASSESSMENT & PLAN NOTE
A1c well controlled.  Continue Ozempic 2 mg weekly as prescribed.  Diabetic eye exam as previously referred.    Lab Results   Component Value Date    HGBA1C 5.2 09/25/2024       Orders:    POCT hemoglobin A1c

## 2024-09-25 NOTE — PROGRESS NOTES
Ambulatory Visit  Name: Trixie Malone      : 1985      MRN: [id number]  Encounter Provider: Frankie Dinh PA-C  Encounter Date: 2024   Encounter department: [facility]    Assessment & Plan  Type 2 diabetes mellitus without complication, without long-term current use of insulin (HCC)  A1c well controlled.  Continue Ozempic 2 mg weekly as prescribed.  Diabetic eye exam as previously referred.    Lab Results   Component Value Date    HGBA1C 5.2 2024       Orders:    POCT hemoglobin A1c    Nausea    Orders:    ondansetron (ZOFRAN) 4 mg tablet; Take 1 tablet (4 mg total) by mouth every 8 (eight) hours as needed for nausea or vomiting    Lumbar radiculopathy  Will check lumbar spine xray.  May continue tylenol/ibuprofen/heat PRN  Orders:    XR spine lumbar minimum 4 views non injury; Future    History of lumbar surgery    Orders:    XR spine lumbar minimum 4 views non injury; Future    Right hip pain  Will check right hip xray.  May continue tylenol/ibuprofen/heat PRN  Orders:    XR hip/pelv 2-3 vws right if performed; Future    Attention deficit hyperactivity disorder (ADHD), unspecified ADHD type  As she did not have significant improvement with Strattera in the past, will trial Adderall XR 10 mg once daily.  PDMP reviewed.  Follow up in 1 month.    Controlled Substance Review    [place] or [place]  reviewed: No red flags were identified; safe to proceed with prescription.    PDMP Review         Value Time User    PDMP Reviewed  Yes 2024  1:18 PM Frankie Dinh PA-C             Orders:    amphetamine-dextroamphetamine (ADDERALL XR, 10MG,) 10 MG 24 hr capsule; Take 1 capsule (10 mg total) by mouth every morning Max Daily Amount: 10 mg       History of Present Illness     Trixie is a 39 year old female with history of HTN, DM, asthma, vit d deficiency, back pain, PTSD/Anxiety/Depression, ADHD, presenting for follow up.    Type 2 diabetes is currently managed with  Ozempic 2 mg weekly.  A1c well-controlled at 5.2 today.  Due for diabetic eye exam.    Patient has a history of herniated lumbar disc and underwent discectomy in 2021.  She reports that over the past several months she has been having worsening lower back pain which radiates to her right hip.  She is utilizing heat, Tylenol, and ibuprofen without relief.  Denies precipitating injury.    Patient has previously followed with [facility] for psychiatric management but stopped all medications several months ago as she felt that she was medicated.  She was previously treated for ADHD with Strattera but did not feel that this helped.  She feels that her ADHD precipitates her anxiety and depression and would like to try an alternate medication for the symptoms.        History obtained from : patient  Review of Systems   Constitutional:  Negative for chills, diaphoresis and fever.   Respiratory:  Negative for cough, chest tightness, shortness of breath and wheezing.    Cardiovascular:  Negative for chest pain, palpitations and leg swelling.   Gastrointestinal:  Negative for abdominal pain, constipation, diarrhea, nausea and vomiting.   Musculoskeletal:  Positive for arthralgias and back pain.   Skin:  Negative for rash and wound.   Neurological:  Negative for dizziness, syncope, weakness, light-headedness and headaches.   Psychiatric/Behavioral:  Positive for decreased concentration. Negative for self-injury and suicidal ideas.      Medical History Reviewed by provider this encounter:  Tobacco  Allergies  Meds  Problems  Med Hx  Surg Hx  Fam Hx       Current Outpatient Medications on File Prior to Visit   Medication Sig Dispense Refill    albuterol (2.5 mg/3 mL) 0.083 % nebulizer solution Take 3 mL (2.5 mg total) by nebulization every 6 (six) hours as needed for wheezing or shortness of breath 90 mL 5    albuterol (PROVENTIL HFA,VENTOLIN HFA) 90 mcg/act inhaler Inhale 2 puffs every 6 (six) hours as needed for wheezing or  shortness of breath      Alcohol Swabs (Pharmacist Choice Alcohol) PADS TEST BLOOD 3 TIMES DAILY 100 each 5    Blood Glucose Monitoring Suppl (OneTouch Verio Reflect) w/Device KIT Check blood sugars three times daily. Please substitute with appropriate alternative as covered by patient's insurance. Dx: E11.65 1 kit 0    cetirizine (ZyrTEC) 10 mg tablet Take 1 tablet (10 mg total) by mouth daily 90 tablet 1    ergocalciferol (VITAMIN D2) 50,000 units TAKE 1 CAPSULE (50,000 UNITS TOTAL) BY MOUTH ONCE A WEEK 12 capsule 1    glucose blood (OneTouch Verio) test strip USE DAILY TO CHECK BLOOD SUGAR 3 TIMES A  each 5    Lancet Devices (Easy Mini Eject Lancing Device) MISC Use as directed to check blood sugar 3x daily. 1 each 0    Pharmacist Choice Lancets MISC TEST BLOOD 3 TIMES DAILY 100 each 1    semaglutide, 2 mg/dose, (Ozempic, 2 MG/DOSE,) 8 mg/ mL injection pen Inject 0.75 mL (2 mg total) under the skin once a week 9 mL 1    [DISCONTINUED] fluticasone (FLONASE) 50 mcg/act nasal spray 2 sprays into each nostril daily      [DISCONTINUED] hydrochlorothiazide (HYDRODIURIL) 12.5 mg tablet TAKE 1 TABLET (12.5 MG TOTAL) BY MOUTH DAILY 90 tablet 1    [DISCONTINUED] lisinopril (ZESTRIL) 10 mg tablet TAKE 1 TABLET (10 MG TOTAL) BY MOUTH 2 (TWO) TIMES A DAY 90 tablet 1    [DISCONTINUED] meclizine (ANTIVERT) 25 mg tablet TAKE 1 TABLET (25 MG TOTAL) BY MOUTH 3 (THREE) TIMES A DAY AS NEEDED FOR DIZZINESS 30 tablet 2     No current facility-administered medications on file prior to visit.      Social History     Tobacco Use    Smoking status: Never     Passive exposure: Never    Smokeless tobacco: Never   Vaping Use    Vaping status: Never Used   Substance and Sexual Activity    Alcohol use: No    Drug use: No    Sexual activity: Yes     Partners: Male     Birth control/protection: Other, None     Comment: Tubal         Objective     /90   Pulse 86   Temp (!) 96.8 °F (36 °C)   Resp 16   Wt 69.9 kg (154 lb)   SpO2  98%   BMI 28.17 kg/m²     Physical Exam  Vitals and nursing note reviewed.   Constitutional:       General: She is not in acute distress.     Appearance: Normal appearance.   HENT:      Head: Normocephalic and atraumatic.   Eyes:      Extraocular Movements: Extraocular movements intact.      Conjunctiva/sclera: Conjunctivae normal.      Pupils: Pupils are equal, round, and reactive to light.   Cardiovascular:      Rate and Rhythm: Normal rate and regular rhythm.      Heart sounds: Normal heart sounds. No murmur heard.  Pulmonary:      Effort: Pulmonary effort is normal. No respiratory distress.      Breath sounds: Normal breath sounds. No wheezing.   Musculoskeletal:      Lumbar back: No bony tenderness.      Right lower leg: No edema.      Left lower leg: No edema.   Skin:     General: Skin is warm and dry.   Neurological:      General: No focal deficit present.      Mental Status: She is alert and oriented to person, place, and time.      Cranial Nerves: No cranial nerve deficit.      Motor: No weakness.   Psychiatric:         Mood and Affect: Mood normal.         Behavior: Behavior normal.

## 2024-09-25 NOTE — ASSESSMENT & PLAN NOTE
Will check lumbar spine xray.  May continue tylenol/ibuprofen/heat PRN  Orders:    XR spine lumbar minimum 4 views non injury; Future

## 2024-09-25 NOTE — ASSESSMENT & PLAN NOTE
As she did not have significant improvement with Strattera in the past, will trial Adderall XR 10 mg once daily.  PDMP reviewed.  Follow up in 1 month.    Controlled Sub[place] Review    [place] or [place]  reviewed: No red flags were identified; safe to proceed with prescription.    PDMP Review         Value Time User    PDMP Reviewed  Yes 9/25/2024  1:18 PM Frankie Dinh PA-C             Orders:    amphetamine-dextroamphetamine (ADDERALL XR, 10MG,) 10 MG 24 hr capsule; Take 1 capsule (10 mg total) by mouth every morning Max Daily Amount: 10 mg

## 2024-10-03 ENCOUNTER — TELEPHONE (OUTPATIENT)
Dept: FAMILY MEDICINE CLINIC | Facility: CLINIC | Age: 39
End: 2024-10-03

## 2024-10-03 NOTE — TELEPHONE ENCOUNTER
Patient adderall denied with insurance. Can you review denial scanned in chart yesterday and addend your note to reflect was it needed so I can resubmit thanks!

## 2024-10-07 ENCOUNTER — TELEPHONE (OUTPATIENT)
Dept: FAMILY MEDICINE CLINIC | Facility: HOME HEALTHCARE | Age: 39
End: 2024-10-07

## 2024-10-07 ENCOUNTER — CLINICAL SUPPORT (OUTPATIENT)
Dept: FAMILY MEDICINE CLINIC | Facility: HOME HEALTHCARE | Age: 39
End: 2024-10-07

## 2024-10-07 DIAGNOSIS — Z11.1 ENCOUNTER FOR PPD TEST: Primary | ICD-10-CM

## 2024-10-07 NOTE — PROGRESS NOTES
PPD Placement note  Trixie Malone, 39 y.o. female is here today for placement of PPD test  Reason for PPD test: Work  Pt taken PPD test before: yes  Verified in allergy area and with patient that they are not allergic to the products PPD is made of (Phenol or Tween). No:   Is patient taking any oral or IV steroid medication now or have they taken it in the last month? no  Has the patient ever received the BCG vaccine?: no  Has the patient been in recent contact with anyone known or suspected of having active TB disease?: no       Date of exposure (if applicable): na       Name of person they were exposed to (if applicable): na  Patient's Country of origin?: [place]  O: Alert and oriented in NAD.  P:  PPD placed on 10/7/2024.  Patient advised to return for reading within 48-72 hours. Patient is to return to office on Wednesday after 10:10am ot Thursday before 10:10am

## 2024-10-09 LAB
INDURATION: 0 MM
TB SKIN TEST: NEGATIVE

## 2024-10-10 ENCOUNTER — TELEPHONE (OUTPATIENT)
Dept: FAMILY MEDICINE CLINIC | Facility: HOME HEALTHCARE | Age: 39
End: 2024-10-10

## 2024-10-16 ENCOUNTER — CLINICAL SUPPORT (OUTPATIENT)
Dept: FAMILY MEDICINE CLINIC | Facility: HOME HEALTHCARE | Age: 39
End: 2024-10-16

## 2024-10-16 DIAGNOSIS — Z11.1 ENCOUNTER FOR PPD TEST: Primary | ICD-10-CM

## 2024-10-16 NOTE — PROGRESS NOTES
PPD Placement note  Trixie Malone, 39 y.o. female is here today for placement of PPD test  Reason for PPD test: work 2 step  Pt taken PPD test before: yes  Verified in allergy area and with patient that they are not allergic to the products PPD is made of (Phenol or Tween). No:   Is patient taking any oral or IV steroid medication now or have they taken it in the last month? no  Has the patient ever received the BCG vaccine?: no  Has the patient been in recent contact with anyone known or suspected of having active TB disease?: no       Date of exposure (if applicable):        Name of person they were exposed to (if applicable):   Patient's Country of origin?: [place]  O: Alert and oriented in NAD.  P:  PPD placed on 10/16/2024.  Patient advised to return for reading within 48-72 hours.

## 2024-10-18 LAB
INDURATION: 0 MM
TB SKIN TEST: NEGATIVE

## 2024-10-21 DIAGNOSIS — J30.9 ALLERGIC RHINITIS, UNSPECIFIED SEASONALITY, UNSPECIFIED TRIGGER: ICD-10-CM

## 2024-10-21 RX ORDER — FLUTICASONE PROPIONATE 50 MCG
1 SPRAY, SUSPENSION (ML) NASAL DAILY
Qty: 16 G | Refills: 5 | Status: SHIPPED | OUTPATIENT
Start: 2024-10-21

## 2024-11-06 DIAGNOSIS — M54.2 NECK PAIN: Primary | ICD-10-CM

## 2024-11-07 ENCOUNTER — HOSPITAL ENCOUNTER (OUTPATIENT)
Dept: RADIOLOGY | Facility: HOSPITAL | Age: 39
Discharge: HOME | End: 2024-11-07
Payer: COMMERCIAL

## 2024-11-07 DIAGNOSIS — M54.2 NECK PAIN: ICD-10-CM

## 2024-11-07 DIAGNOSIS — Z98.890 HISTORY OF LUMBAR SURGERY: ICD-10-CM

## 2024-11-07 DIAGNOSIS — M54.16 LUMBAR RADICULOPATHY: ICD-10-CM

## 2024-11-07 DIAGNOSIS — M25.551 RIGHT HIP PAIN: ICD-10-CM

## 2024-11-07 PROCEDURE — 72050 X-RAY EXAM NECK SPINE 4/5VWS: CPT

## 2024-11-07 PROCEDURE — 73502 X-RAY EXAM HIP UNI 2-3 VIEWS: CPT

## 2024-11-07 PROCEDURE — 72110 X-RAY EXAM L-2 SPINE 4/>VWS: CPT

## 2024-11-08 DIAGNOSIS — F90.9 ATTENTION DEFICIT HYPERACTIVITY DISORDER (ADHD), UNSPECIFIED ADHD TYPE: ICD-10-CM

## 2024-11-11 RX ORDER — DEXTROAMPHETAMINE SACCHARATE, AMPHETAMINE ASPARTATE MONOHYDRATE, DEXTROAMPHETAMINE SULFATE AND AMPHETAMINE SULFATE 2.5; 2.5; 2.5; 2.5 MG/1; MG/1; MG/1; MG/1
10 CAPSULE, EXTENDED RELEASE ORAL EVERY MORNING
Qty: 30 CAPSULE | Refills: 0 | Status: SHIPPED | OUTPATIENT
Start: 2024-11-11

## 2024-11-15 ENCOUNTER — TELEPHONE (OUTPATIENT)
Dept: FAMILY MEDICINE CLINIC | Facility: CLINIC | Age: 39
End: 2024-11-15

## 2024-11-15 ENCOUNTER — DOCUMENTATION (OUTPATIENT)
Dept: PAIN MEDICINE | Facility: CLINIC | Age: 39
End: 2024-11-15

## 2024-11-15 DIAGNOSIS — M54.2 NECK PAIN: Primary | ICD-10-CM

## 2024-11-15 RX ORDER — NAPROXEN 500 MG/1
500 TABLET ORAL 2 TIMES DAILY WITH MEALS
Qty: 60 TABLET | Refills: 0 | Status: SHIPPED | OUTPATIENT
Start: 2024-11-15

## 2024-11-15 NOTE — PROGRESS NOTES
Patient is scheduled for an appt on Monday Nov 18, 2024 in a follow up slot. Appt note reads that patient is coming in for a new problem, appt will need to be changed to OVL for 30 minutes  I attempted to call patient, VM is not set up.  I sent patient a [employer] In[employer] message.

## 2024-11-15 NOTE — TELEPHONE ENCOUNTER
Patient called asking if something ca be called in her her neck/shoulder pain as she can't get in with pain management until December.

## 2024-12-11 DIAGNOSIS — F90.9 ATTENTION DEFICIT HYPERACTIVITY DISORDER (ADHD), UNSPECIFIED ADHD TYPE: ICD-10-CM

## 2024-12-11 RX ORDER — DEXTROAMPHETAMINE SACCHARATE, AMPHETAMINE ASPARTATE MONOHYDRATE, DEXTROAMPHETAMINE SULFATE AND AMPHETAMINE SULFATE 2.5; 2.5; 2.5; 2.5 MG/1; MG/1; MG/1; MG/1
10 CAPSULE, EXTENDED RELEASE ORAL EVERY MORNING
Qty: 30 CAPSULE | Refills: 0 | Status: SHIPPED | OUTPATIENT
Start: 2024-12-11 | Stop reason: DRUGHIGH

## 2024-12-11 NOTE — TELEPHONE ENCOUNTER
My name is Trixie Malone. My number is [phone]. I ran out of my Adderall today and the pharmacy needs a new script and also for my inhaler they need new scripts. If you could send it over Thank you. Bye.

## 2024-12-12 DIAGNOSIS — E55.9 VITAMIN D DEFICIENCY: ICD-10-CM

## 2024-12-12 DIAGNOSIS — J45.40 MODERATE PERSISTENT ASTHMA WITHOUT COMPLICATION: Primary | ICD-10-CM

## 2024-12-12 DIAGNOSIS — M54.2 NECK PAIN: ICD-10-CM

## 2024-12-12 RX ORDER — ERGOCALCIFEROL 1.25 MG/1
50000 CAPSULE, LIQUID FILLED ORAL WEEKLY
Qty: 12 CAPSULE | Refills: 1 | Status: SHIPPED | OUTPATIENT
Start: 2024-12-12

## 2024-12-12 RX ORDER — ALBUTEROL SULFATE 90 UG/1
2 INHALANT RESPIRATORY (INHALATION) EVERY 6 HOURS PRN
Qty: 18 G | Refills: 2 | Status: SHIPPED | OUTPATIENT
Start: 2024-12-12

## 2024-12-12 RX ORDER — NAPROXEN 500 MG/1
500 TABLET ORAL 2 TIMES DAILY WITH MEALS
Qty: 60 TABLET | Refills: 0 | Status: SHIPPED | OUTPATIENT
Start: 2024-12-12

## 2024-12-14 ENCOUNTER — HOSPITAL ENCOUNTER (EMERGENCY)
Facility: HOSPITAL | Age: 39
Discharge: HOME | End: 2024-12-14
Attending: EMERGENCY MEDICINE
Payer: COMMERCIAL

## 2024-12-14 VITALS
SYSTOLIC BLOOD PRESSURE: 111 MMHG | DIASTOLIC BLOOD PRESSURE: 69 MMHG | WEIGHT: 159.17 LBS | RESPIRATION RATE: 16 BRPM | BODY MASS INDEX: 28.2 KG/M2 | HEIGHT: 63 IN | TEMPERATURE: 98.6 F | OXYGEN SATURATION: 95 % | HEART RATE: 68 BPM

## 2024-12-14 DIAGNOSIS — F10.929 ALCOHOL INTOXICATION (HCC): Primary | ICD-10-CM

## 2024-12-14 LAB
ALBUMIN SERPL BCG-MCNC: 4.4 G/DL (ref 3.5–5)
ALP SERPL-CCNC: 57 U/L (ref 34–104)
ALT SERPL W P-5'-P-CCNC: 7 U/L (ref 7–52)
ANION GAP SERPL CALCULATED.3IONS-SCNC: 9 MMOL/L (ref 4–13)
APAP SERPL-MCNC: <2 UG/ML (ref 10–20)
AST SERPL W P-5'-P-CCNC: 11 U/L (ref 13–39)
ATRIAL RATE: 78 BPM
BILIRUB SERPL-MCNC: 0.5 MG/DL (ref 0.2–1)
BUN SERPL-MCNC: 11 MG/DL (ref 5–25)
CALCIUM SERPL-MCNC: 9 MG/DL (ref 8.4–10.2)
CHLORIDE SERPL-SCNC: 104 MMOL/L (ref 96–108)
CO2 SERPL-SCNC: 24 MMOL/L (ref 21–32)
CREAT SERPL-MCNC: 0.77 MG/DL (ref 0.6–1.3)
ETHANOL SERPL-MCNC: 198 MG/DL
GFR SERPL CREATININE-BSD FRML MDRD: 96 ML/MIN/1.73SQ M
GLUCOSE SERPL-MCNC: 124 MG/DL (ref 65–140)
POTASSIUM SERPL-SCNC: 4.6 MMOL/L (ref 3.5–5.3)
PROT SERPL-MCNC: 6.5 G/DL (ref 6.4–8.4)
QRS AXIS: 53 DEGREES
QRSD INTERVAL: 74 MS
QT INTERVAL: 408 MS
QTC INTERVAL: 452 MS
SALICYLATES SERPL-MCNC: <5 MG/DL (ref 3–20)
SODIUM SERPL-SCNC: 137 MMOL/L (ref 135–147)
T WAVE AXIS: 37 DEGREES
VENTRICULAR RATE: 74 BPM

## 2024-12-14 PROCEDURE — 36415 COLL VENOUS BLD VENIPUNCTURE: CPT | Performed by: EMERGENCY MEDICINE

## 2024-12-14 PROCEDURE — 93005 ELECTROCARDIOGRAM TRACING: CPT

## 2024-12-14 PROCEDURE — 96374 THER/PROPH/DIAG INJ IV PUSH: CPT

## 2024-12-14 PROCEDURE — 80179 DRUG ASSAY SALICYLATE: CPT | Performed by: EMERGENCY MEDICINE

## 2024-12-14 PROCEDURE — 96361 HYDRATE IV INFUSION ADD-ON: CPT

## 2024-12-14 PROCEDURE — 99284 EMERGENCY DEPT VISIT MOD MDM: CPT

## 2024-12-14 PROCEDURE — 80053 COMPREHEN METABOLIC PANEL: CPT | Performed by: EMERGENCY MEDICINE

## 2024-12-14 PROCEDURE — 99284 EMERGENCY DEPT VISIT MOD MDM: CPT | Performed by: EMERGENCY MEDICINE

## 2024-12-14 PROCEDURE — 80143 DRUG ASSAY ACETAMINOPHEN: CPT | Performed by: EMERGENCY MEDICINE

## 2024-12-14 PROCEDURE — 82077 ASSAY SPEC XCP UR&BREATH IA: CPT | Performed by: EMERGENCY MEDICINE

## 2024-12-14 RX ORDER — ONDANSETRON 2 MG/ML
4 INJECTION INTRAMUSCULAR; INTRAVENOUS ONCE
Status: COMPLETED | OUTPATIENT
Start: 2024-12-14 | End: 2024-12-14

## 2024-12-14 RX ADMIN — SODIUM CHLORIDE 1000 ML: 0.9 INJECTION, SOLUTION INTRAVENOUS at 01:47

## 2024-12-14 RX ADMIN — ONDANSETRON 4 MG: 2 INJECTION INTRAMUSCULAR; INTRAVENOUS at 01:47

## 2024-12-14 NOTE — ED PROVIDER NOTES
Time reflects when diagnosis was documented in both MDM as applicable and the Disposition within this note       Time User Action Codes Description Comment    12/14/2024  3:17 AM Al West Add [F10.929] Alcohol intoxication (HCC)           ED Disposition       ED Disposition   Discharge    Condition   Stable    Date/Time   Sat Dec 14, 2024  3:17 AM    Comment   rTixie Malone discharge to home/self care.                   Assessment & Plan       Medical Decision Making  I reviewed the patient's medical chart, PMHx, prior encounters, medications.    My DDx includes: Suspect alcohol intoxication, however patient does also take Xanax although we are unsure if she took this tonight, did have 2 Motrin, doubt that this is related to her current symptomatology    At this time, we will hydrate with fluids, will give Zofran.  Will check CMP, coma panel.  Will reassess mental status.    Coma panel was negative except for alcohol intoxication which was already suspected.    Patient has significant improvement in the ED, was ambulatory, reported improvement in symptoms.  She has family who can watch her and take her home.  She was discharged strict turn precautions    Amount and/or Complexity of Data Reviewed  Labs: ordered. Decision-making details documented in ED Course.    Risk  Prescription drug management.        ED Course as of 12/14/24 2148   Sat Dec 14, 2024   0211 ETHANOL(!): 198  Consistent with alcohol intoxication.   0319 Patient is up, talking. Family at bedside comfortable with taking her home.   0325 Pt walked to bathroom.       Medications   sodium chloride 0.9 % bolus 1,000 mL (0 mL Intravenous Stopped 12/14/24 0308)   ondansetron (ZOFRAN) injection 4 mg (4 mg Intravenous Given 12/14/24 0147)       ED Risk Strat Scores                                              History of Present Illness       Chief Complaint   Patient presents with    Alcohol Intoxication     Pt coming in with family, pt was  drinking tequila, beer and liquor and drank some ibuprofen. Pt concerned as she is vomiting        Past Medical History:   Diagnosis Date    ADHD (attention deficit hyperactivity disorder)     Allergic     Anxiety     Asthma     Cat-scratch disease     last assessed 10/22/15    Depression     Diabetes mellitus (HCC)     Hypertension     last assessed 11/11/14    Obesity     Sciatica     Sleep disorder       Past Surgical History:   Procedure Laterality Date    BREAST BIOPSY Left     2016    CHOLECYSTECTOMY      CHOLECYSTECTOMY LAPAROSCOPIC N/A 08/30/2023    Procedure: CHOLECYSTECTOMY LAPAROSCOPIC;  Surgeon: Inder Suarez DO;  Location: [facility];  Service: General    DENTAL SURGERY      HIP SURGERY      LYMPH NODE BIOPSY      NH BX/EXC LYMPH NODE OPEN DEEP AXILLARY NODE Left 01/15/2019    Procedure: LEFT AXILLARY LYMPH NODE BIOPSY;  Surgeon: Vincent Lambert MD;  Location: [facility];  Service: General    NH LAMNOTMY INCL W/DCMPRSN NRV ROOT 1 INTRSPC LUMBR Right 04/14/2021    Procedure: L5-S1 minimally invasive microdiskectomy and  epidural steroid injection;  Surgeon: Milad Quinn MD;  Location: [facility];  Service: Neurosurgery    TUBAL LIGATION        Family History   Problem Relation Age of Onset    Anxiety disorder Mother     Bipolar disorder Mother     Depression Mother     Tremor Mother     Arthritis Mother     Asthma Mother     Dementia Mother     Mental illness Mother     Anxiety disorder Father     Bipolar disorder Father     Depression Father     Schizophrenia Father     Heart attack Father     Mental illness Father     Suicide Attempts Father     Parkinsonism Maternal Grandmother     Heart attack Maternal Grandmother     Stomach cancer Paternal Grandfather     Heart attack Paternal Grandfather     Cancer Maternal Grandfather     Asthma Sister       Social History     Tobacco Use    Smoking status: Never     Passive exposure: Never    Smokeless tobacco: Never   Vaping Use    Vaping status: Never Used    Substance Use Topics    Alcohol use: No    Drug use: No      E-Cigarette/Vaping    E-Cigarette Use Never User       E-Cigarette/Vaping Substances    Nicotine No     THC No     CBD No     Flavoring No     Other No     Unknown No       I have reviewed and agree with the history as documented.     39-year-old female who presents for suspected alcohol intoxication.  Per family who accompany her, she had multiple alcoholic drinks.  She did incidentally take 2 naproxen although really describe 2 of these, they do not suspect any other ingestions.  She has had multiple episodes of nausea vomiting.  No other history.  ROS otherwise negative        Review of Systems   Unable to perform ROS: Mental status change           Objective       ED Triage Vitals [12/14/24 0121]   Temperature Pulse Blood Pressure Respirations SpO2 Patient Position - Orthostatic VS   (!) 96.4 °F (35.8 °C) 71 129/92 14 98 % Sitting      Temp Source Heart Rate Source BP Location FiO2 (%) Pain Score    Temporal -- Right arm -- No Pain      Vitals      Date and Time Temp Pulse SpO2 Resp BP Pain Score FACES Pain Rating User   12/14/24 0333 -- -- -- -- -- No Pain --    12/14/24 0315 98.6 °F (37 °C) 68 95 % 16 111/69 -- --    12/14/24 0307 -- 70 96 % 16 112/73 -- --    12/14/24 0230 -- 71 98 % 18 107/68 -- --    12/14/24 0200 -- 85 97 % 17 118/58 -- --    12/14/24 0130 -- 84 97 % -- 130/92 -- --    12/14/24 0121 96.4 °F (35.8 °C) 71 98 % 14 129/92 No Pain --             Physical Exam  Constitutional:       Appearance: She is well-developed.   HENT:      Head: Normocephalic and atraumatic.   Cardiovascular:      Rate and Rhythm: Normal rate and regular rhythm.      Heart sounds: Normal heart sounds. No murmur heard.     No friction rub.   Pulmonary:      Effort: Pulmonary effort is normal. No respiratory distress.      Breath sounds: Normal breath sounds. No wheezing or rales.   Abdominal:      General: Bowel sounds are normal. There is no  distension.      Palpations: Abdomen is soft.      Tenderness: There is no abdominal tenderness.   Musculoskeletal:         General: Normal range of motion.      Cervical back: Normal range of motion and neck supple.   Skin:     General: Skin is warm.   Neurological:      Mental Status: She is alert and oriented to person, place, and time.      Coordination: Coordination normal.   Psychiatric:         Behavior: Behavior normal.         Thought Content: Thought content normal.         Judgment: Judgment normal.         Results Reviewed       Procedure Component Value Units Date/Time    Ethanol [[id number]]  (Abnormal) Collected: 12/14/24 0138    Lab Status: Final result Specimen: Blood from Arm, Right Updated: 12/14/24 0203     Ethanol Lvl 198 mg/dL     Comprehensive metabolic panel [[id number]]  (Abnormal) Collected: 12/14/24 0138    Lab Status: Final result Specimen: Blood from Arm, Right Updated: 12/14/24 0202     Sodium 137 mmol/L      Potassium 4.6 mmol/L      Chloride 104 mmol/L      CO2 24 mmol/L      ANION GAP 9 mmol/L      BUN 11 mg/dL      Creatinine 0.77 mg/dL      Glucose 124 mg/dL      Calcium 9.0 mg/dL      AST 11 U/L      ALT 7 U/L      Alkaline Phosphatase 57 U/L      Total Protein 6.5 g/dL      Albumin 4.4 g/dL      Total Bilirubin 0.50 mg/dL      eGFR 96 ml/min/1.73sq m     Narrative:      National Kidney Disease Foundation guidelines for Chronic Kidney Disease (CKD):     Stage 1 with normal or high GFR (GFR > 90 mL/min/1.73 square meters)    Stage 2 Mild CKD (GFR = 60-89 mL/min/1.73 square meters)    Stage 3A Moderate CKD (GFR = 45-59 mL/min/1.73 square meters)    Stage 3B Moderate CKD (GFR = 30-44 mL/min/1.73 square meters)    Stage 4 Severe CKD (GFR = 15-29 mL/min/1.73 square meters)    Stage 5 End Stage CKD (GFR <15 mL/min/1.73 square meters)  Note: GFR calculation is accurate only with a steady state creatinine    Salicylate level [[id number]]  (Normal) Collected: 12/14/24 0138    Lab Status:  Final result Specimen: Blood from Arm, Right Updated: 24     Salicylate Lvl <5 mg/dL     Acetaminophen level-If concentration is detectable, please discuss with medical  on call. [[id number]]  (Abnormal) Collected: 248    Lab Status: Final result Specimen: Blood from Arm, Right Updated: 24     Acetaminophen Level <2 ug/mL             No orders to display       Procedures    ED Medication and Procedure Management   Prior to Admission Medications   Prescriptions Last Dose Informant Patient Reported? Taking?   Alcohol Swabs (Pharmacist Choice Alcohol) PADS   No No   Sig: TEST BLOOD 3 TIMES DAILY   Blood Glucose Monitoring Suppl (OneTouch Verio Reflect) w/Device KIT   No No   Sig: Check blood sugars three times daily. Please substitute with appropriate alternative as covered by patient's insurance. Dx: E11.65   Lancet Devices (Easy Mini Eject Lancing Device) MISC   No No   Sig: Use as directed to check blood sugar 3x daily.   Pharmacist Choice Lancets MISC   No No   Sig: TEST BLOOD 3 TIMES DAILY   albuterol (2.5 mg/3 mL) 0.083 % nebulizer solution   No No   Sig: Take 3 mL (2.5 mg total) by nebulization every 6 (six) hours as needed for wheezing or shortness of breath   albuterol (PROVENTIL HFA,VENTOLIN HFA) 90 mcg/act inhaler   No No   Sig: Inhale 2 puffs every 6 (six) hours as needed for wheezing or shortness of breath   amphetamine-dextroamphetamine (ADDERALL XR, 10MG,) 10 MG 24 hr capsule   No No   Sig: Take 1 capsule (10 mg total) by mouth every morning Max Daily Amount: 10 mg   cetirizine (ZyrTEC) 10 mg tablet   No No   Sig: Take 1 tablet (10 mg total) by mouth daily   ergocalciferol (VITAMIN D2) 50,000 units   No No   Sig: TAKE 1 CAPSULE (50,000 UNITS TOTAL) BY MOUTH ONCE A WEEK   fluticasone (FLONASE) 50 mcg/act nasal spray   No No   Si SPRAY INTO EACH NOSTRIL DAILY   naproxen (NAPROSYN) 500 mg tablet   No No   Sig: TAKE 1 TABLET (500 MG TOTAL) BY MOUTH 2 (TWO) TIMES  A DAY WITH MEALS   ondansetron (ZOFRAN) 4 mg tablet   No No   Sig: Take 1 tablet (4 mg total) by mouth every 8 (eight) hours as needed for nausea or vomiting      Facility-Administered Medications: None     Discharge Medication List as of 12/14/2024  3:18 AM        CONTINUE these medications which have NOT CHANGED    Details   albuterol (2.5 mg/3 mL) 0.083 % nebulizer solution Take 3 mL (2.5 mg total) by nebulization every 6 (six) hours as needed for wheezing or shortness of breath, Starting Thu 1/19/2023, Print      albuterol (PROVENTIL HFA,VENTOLIN HFA) 90 mcg/act inhaler Inhale 2 puffs every 6 (six) hours as needed for wheezing or shortness of breath, Starting Thu 12/12/2024, Normal      Alcohol Swabs (Pharmacist Choice Alcohol) PADS TEST BLOOD 3 TIMES DAILY, Normal      amphetamine-dextroamphetamine (ADDERALL XR, 10MG,) 10 MG 24 hr capsule Take 1 capsule (10 mg total) by mouth every morning Max Daily Amount: 10 mg, Starting Wed 12/11/2024, Normal      Blood Glucose Monitoring Suppl (OneTouch Verio Reflect) w/Device KIT Check blood sugars three times daily. Please substitute with appropriate alternative as covered by patient's insurance. Dx: E11.65, Normal      cetirizine (ZyrTEC) 10 mg tablet Take 1 tablet (10 mg total) by mouth daily, Starting Mon 3/25/2024, Normal      ergocalciferol (VITAMIN D2) 50,000 units TAKE 1 CAPSULE (50,000 UNITS TOTAL) BY MOUTH ONCE A WEEK, Starting Thu 12/12/2024, Normal      fluticasone (FLONASE) 50 mcg/act nasal spray 1 SPRAY INTO EACH NOSTRIL DAILY, Starting Mon 10/21/2024, Normal      Lancet Devices (Easy Mini Eject Lancing Device) MISC Use as directed to check blood sugar 3x daily., Normal      naproxen (NAPROSYN) 500 mg tablet TAKE 1 TABLET (500 MG TOTAL) BY MOUTH 2 (TWO) TIMES A DAY WITH MEALS, Starting Thu 12/12/2024, Normal      ondansetron (ZOFRAN) 4 mg tablet Take 1 tablet (4 mg total) by mouth every 8 (eight) hours as needed for nausea or vomiting, Starting Wed 9/25/2024,  Normal      Pharmacist Choice Lancets MISC TEST BLOOD 3 TIMES DAILY, Normal           No discharge procedures on file.  ED SEPSIS DOCUMENTATION   Time reflects when diagnosis was documented in both MDM as applicable and the Disposition within this note       Time User Action Codes Description Comment    12/14/2024  3:17 AM Al West Add [F10.929] Alcohol intoxication (HCC)                  Al West MD  12/14/24 5198

## 2024-12-16 ENCOUNTER — OFFICE VISIT (OUTPATIENT)
Dept: PAIN MEDICINE | Facility: CLINIC | Age: 39
End: 2024-12-16
Payer: COMMERCIAL

## 2024-12-16 VITALS
DIASTOLIC BLOOD PRESSURE: 87 MMHG | BODY MASS INDEX: 27.89 KG/M2 | SYSTOLIC BLOOD PRESSURE: 124 MMHG | HEART RATE: 89 BPM | WEIGHT: 157.4 LBS | TEMPERATURE: 99 F | RESPIRATION RATE: 16 BRPM | OXYGEN SATURATION: 99 % | HEIGHT: 63 IN

## 2024-12-16 DIAGNOSIS — M54.2 NECK PAIN: Primary | ICD-10-CM

## 2024-12-16 DIAGNOSIS — G89.4 CHRONIC PAIN SYNDROME: ICD-10-CM

## 2024-12-16 DIAGNOSIS — M54.12 CERVICAL RADICULOPATHY: ICD-10-CM

## 2024-12-16 LAB
ATRIAL RATE: 78 BPM
QRS AXIS: 53 DEGREES
QRSD INTERVAL: 74 MS
QT INTERVAL: 408 MS
QTC INTERVAL: 452 MS
T WAVE AXIS: 37 DEGREES
VENTRICULAR RATE: 74 BPM

## 2024-12-16 PROCEDURE — 93010 ELECTROCARDIOGRAM REPORT: CPT | Performed by: INTERNAL MEDICINE

## 2024-12-16 PROCEDURE — 99214 OFFICE O/P EST MOD 30 MIN: CPT | Performed by: ANESTHESIOLOGY

## 2024-12-16 RX ORDER — DULOXETIN HYDROCHLORIDE 30 MG/1
30 CAPSULE, DELAYED RELEASE ORAL DAILY
Qty: 30 CAPSULE | Refills: 1 | Status: SHIPPED | OUTPATIENT
Start: 2024-12-16 | End: 2025-01-15

## 2024-12-16 NOTE — PATIENT INSTRUCTIONS
Patient Education     Neck Pain Exercises   About this topic   The neck or cervical spine has 7 spinal bones that run from the base of your skull to the upper back. These spinal bones have discs in between them. Discs act as shock absorbers. Ligaments are strong bands of tissue that hold the bones together. Many muscles surround and attach on these bones. Nerves come off of the spinal cord and exit out of small spaces in between the spinal bones. You can have neck pain if any of these are injured or damaged. Exercises may help to make this problem better.  General   Before starting with a program, ask your doctor if you are healthy enough to do these exercises. Your doctor may have you work with a  or physical therapist to make a safe exercise program to meet your needs. You should not do the exercises if they cause sharp pains, if you feel dizzy, or if you have vision changes.  Stretching Exercises   Stretching exercises keep your muscles flexible. They also stop them from getting tight. Start by doing each of these stretches 2 to 3 times. In order for your body to make changes, you will need to hold these stretches for 20 to 30 seconds. Try to do the stretches 2 to 3 times each day. Do all exercises slowly.  Passive neck stretches:  Put your left hand on top of your head. Your other arm can be at your side or behind your back. Pull your head toward your left shoulder until you feel a gentle stretch on the right side of your neck. Repeat on the other side using your other hand.  Also, try this stretch by pulling in a diagonal direction. With your left hand on top of your head, pull your head down towards the direction of your left knee. You should feel this stretch toward the back on the right side of your neck. Repeat on the other side.  Active neck stretches:  Neck front-to-back motion ? Look down to the floor until you feel a stretch in the back of your neck. Hold. Next, look up to the ceiling until you  feel a stretch in the front of your neck. Hold.  Neck side-to-side motion ? Tilt your head to the side and bring your ear to your shoulder until you feel a stretch on the other side of your neck. Hold. Next, tilt your head to the other side until you feel a stretch. Hold.  Neck turning ? Turn only your head and look over your left shoulder until you feel stretching in the right side of your neck. Hold. Now turn only your head and look over your right shoulder until you feel a stretch in the left side of your neck. Hold.  Scalene stretches ? Grasp your head with the hand opposite the side you want to stretch. Pull your head to the side until you feel a stretch. Now, slowly turn your head so your chin is pointed upwards.  Chin tucks ? Stand straight or lie down on your back. Tuck your chin in and lengthen the back of your neck. Return to the starting position and repeat. It may help to stand up against a wall during this exercise. Try gently pushing your chin with two fingers while trying to flatten your neck against the wall. If you do this exercise lying down, try using a small rolled up washcloth under your neck. Push down into the washcloth when tucking in your chin.  Strengthening Exercises   Strengthening exercises keep your muscles firm and strong. Start by repeating each exercise 2 to 3 times. Work up to doing each exercise 10 times. Try to do the exercises 2 to 3 times each day. Hold each exercise for 3 to 5 seconds. Do all exercises slowly.  Shoulder blade squeezes ? Pinch your shoulder blades together on your upper back and hold 3 to 5 seconds. Relax.             What will the results be?   Less pain and stiffness  Better range of motion  Increased strength  Help you heal faster after an injury or surgery  Increase blood flow to a body part  Help you feel better and more relaxed  Give you more energy  More toned looking muscles  Better posture  Easier to do daily activities  Helpful tips   Stay active and  work out to keep your muscles strong and flexible.  Be sure you do not hold your breath when exercising. This can raise your blood pressure. If you tend to hold your breath, try counting out loud when exercising. If any exercise bothers you, stop right away.  Try swinging your arms at an easy pace for a few minutes to warm up your muscles. Do this again after exercising.  Doing exercises before a meal may be a good way to get into a routine.  Exercise may be slightly uncomfortable, but you should not have sharp pains. If you do get sharp pains, stop what you are doing. If the sharp pains continue, call your doctor.  Last Reviewed Date   2020-03-10  Consumer Information Use and Disclaimer   This generalized information is a limited summary of diagnosis, treatment, and/or medication information. It is not meant to be comprehensive and should be used as a tool to help the user understand and/or assess potential diagnostic and treatment options. It does NOT include all information about conditions, treatments, medications, side effects, or risks that may apply to a specific patient. It is not intended to be medical advice or a substitute for the medical advice, diagnosis, or treatment of a health care provider based on the health care provider's examination and assessment of a patient’s specific and unique circumstances. Patients must speak with a health care provider for complete information about their health, medical questions, and treatment options, including any risks or benefits regarding use of medications. This information does not endorse any treatments or medications as safe, effective, or approved for treating a specific patient. [employer] and its affiliates disclaim any warranty or liability relating to this information or the use thereof. The use of this information is governed by the Terms of Use, available at https://www.[employer].com/en/know/clinical-effectiveness-terms   Copyright   Copyright ©  2024 [employer] and its affiliates and/or licensors. All rights reserved.

## 2024-12-16 NOTE — PROGRESS NOTES
"Name: Trixie Malone      : 1985      MRN: [id number]  Encounter Provider: Akanksha Robledo MD  Encounter Date: 2024   [facility]  :  Assessment & Plan        History of Present Illness {?Quick Links Encounters * My Last Note * Last Note in Specialty * Snapshot * Since Last Visit * History :76274}  HPI  Trixie Malone is a 39 y.o. female who presents ***  {History obtained from(Optional):64608}    Review of Systems  {Select to insert medical history sections (Optional):64754}     Objective {?Quick Links Trend Vitals * Enter New Vitals * Results Review * Timeline (Adult) * Labs * Imaging * Cardiology * Procedures * Lung Cancer Screening * Surgical eConsent :32295}  /87 (Patient Position: Sitting, Cuff Size: Large)   Pulse 89   Temp 99 °F (37.2 °C) (Temporal)   Resp 16   Ht 5' 3\" (1.6 m)   Wt 71.4 kg (157 lb 6.4 oz)   SpO2 99%   BMI 27.88 kg/m²      Physical Exam    {Administrative / Billing Section (Optional):09198}  "

## 2024-12-16 NOTE — PROGRESS NOTES
Assessment:  1. Neck pain    2. Cervical radiculopathy    3. Chronic pain syndrome        Plan:  Patient is a 39-year-old female complains of neck pain and right arm pain with chronic patient secondary cervical radiculopathy, cervical spondylosis presents office for follow-up visit.  Patient has not been seen since 2022.  X-ray of cervical spine shows a grade 1 retrolisthesis C5 and C6 with mild straightening of normal cervical lordosis, mild left neuroforaminal narrowing at C3-C4 C4-C5 and C5-C6, mild left foraminal narrowing at C4-C5 and C5-C6, C6-C7.  1.  Provide patient physical therapy cervical spine strengthening exercises  2.  We will trial Cymbalta 30 mg p.o. nightly for cervical radicular symptoms  3.  We will follow-up 6 weeks after physical therapy then we will order an MRI of the lumbar spine        History of Present Illness:  The patient is a 39 y.o. female who presents for a follow up office visit in regards to Neck Pain.   The patient’s current symptoms include 8 out of 10 constant sharp, shooting, pins-and-needles without feeling 5 providers    Current pain medications includes: None.      I have personally reviewed and/or updated the patient's past medical history, past surgical history, family history, social history, current medications, allergies, and vital signs today.         Review of Systems  Review of Systems   Musculoskeletal:         Decreased ROM. Joint stiffness and muscle weakness. Also pain in hands , arms and neck and shoulder area           Past Medical History:   Diagnosis Date    ADHD (attention deficit hyperactivity disorder)     Allergic     Anxiety     Asthma     Cat-scratch disease     last assessed 10/22/15    Depression     Diabetes mellitus (HCC)     Hypertension     last assessed 11/11/14    Obesity     Sciatica     Sleep disorder        Past Surgical History:   Procedure Laterality Date    BREAST BIOPSY Left     2016    CHOLECYSTECTOMY      CHOLECYSTECTOMY LAPAROSCOPIC  N/A 08/30/2023    Procedure: CHOLECYSTECTOMY LAPAROSCOPIC;  Surgeon: Inder Suarez DO;  Location: [facility];  Service: General    DENTAL SURGERY      HIP SURGERY      LYMPH NODE BIOPSY      LA BX/EXC LYMPH NODE OPEN DEEP AXILLARY NODE Left 01/15/2019    Procedure: LEFT AXILLARY LYMPH NODE BIOPSY;  Surgeon: Vincent Lambert MD;  [facility]: [facility];  Service: General    LA LAMNOTMY INCL W/DCMPRSN NRV ROOT 1 INTRSPC LUMBR Right 04/14/2021    Procedure: L5-S1 minimally invasive microdiskectomy and  epidural steroid injection;  Surgeon: Milad Quinn MD;  [facility];  Service: Neurosurgery    TUBAL LIGATION         Family History   Problem Relation Age of Onset    Anxiety disorder Mother     Bipolar disorder Mother     Depression Mother     Tremor Mother     Arthritis Mother     Asthma Mother     Dementia Mother     Mental illness Mother     Anxiety disorder Father     Bipolar disorder Father     Depression Father     Schizophrenia Father     Heart attack Father     Mental illness Father     Suicide Attempts Father     Parkinsonism Maternal Grandmother     Heart attack Maternal Grandmother     Stomach cancer Paternal Grandfather     Heart attack Paternal Grandfather     Cancer Maternal Grandfather     Asthma Sister        Social History     Occupational History    Not on file   Tobacco Use    Smoking status: Never     Passive exposure: Never    Smokeless tobacco: Never   Vaping Use    Vaping status: Never Used   Substance and Sexual Activity    Alcohol use: No    Drug use: No    Sexual activity: Yes     Partners: Male     Birth control/protection: Other, None     Comment: Tubal         Current Outpatient Medications:     albuterol (2.5 mg/3 mL) 0.083 % nebulizer solution, Take 3 mL (2.5 mg total) by nebulization every 6 (six) hours as needed for wheezing or shortness of breath, Disp: 90 mL, Rfl: 5    albuterol (PROVENTIL HFA,VENTOLIN HFA) 90 mcg/act inhaler, Inhale 2 puffs every 6 (six) hours as needed for  "wheezing or shortness of breath, Disp: 18 g, Rfl: 2    Alcohol Swabs (Pharmacist Choice Alcohol) PADS, TEST BLOOD 3 TIMES DAILY, Disp: 100 each, Rfl: 5    amphetamine-dextroamphetamine (ADDERALL XR, 10MG,) 10 MG 24 hr capsule, Take 1 capsule (10 mg total) by mouth every morning Max Daily Amount: 10 mg, Disp: 30 capsule, Rfl: 0    Blood Glucose Monitoring Suppl (OneTouch Verio Reflect) w/Device KIT, Check blood sugars three times daily. Please substitute with appropriate alternative as covered by patient's insurance. Dx: E11.65, Disp: 1 kit, Rfl: 0    cetirizine (ZyrTEC) 10 mg tablet, Take 1 tablet (10 mg total) by mouth daily, Disp: 90 tablet, Rfl: 1    ergocalciferol (VITAMIN D2) 50,000 units, TAKE 1 CAPSULE (50,000 UNITS TOTAL) BY MOUTH ONCE A WEEK, Disp: 12 capsule, Rfl: 1    fluticasone (FLONASE) 50 mcg/act nasal spray, 1 SPRAY INTO EACH NOSTRIL DAILY, Disp: 16 g, Rfl: 5    Lancet Devices (Easy Mini Eject Lancing Device) MISC, Use as directed to check blood sugar 3x daily., Disp: 1 each, Rfl: 0    naproxen (NAPROSYN) 500 mg tablet, TAKE 1 TABLET (500 MG TOTAL) BY MOUTH 2 (TWO) TIMES A DAY WITH MEALS, Disp: 60 tablet, Rfl: 0    ondansetron (ZOFRAN) 4 mg tablet, Take 1 tablet (4 mg total) by mouth every 8 (eight) hours as needed for nausea or vomiting, Disp: 20 tablet, Rfl: 0    Pharmacist Choice Lancets MISC, TEST BLOOD 3 TIMES DAILY, Disp: 100 each, Rfl: 1    Allergies   Allergen Reactions    Bactrim [Sulfamethoxazole-Trimethoprim] Shortness Of Breath     Near syncope    Codeine Hives     Tolerated Percocet, Vicodin, etc.    Magnesium Sulfate Tachycardia       Physical Exam:    /87 (Patient Position: Sitting, Cuff Size: Large)   Pulse 89   Temp 99 °F (37.2 °C) (Temporal)   Resp 16   Ht 5' 3\" (1.6 m)   Wt 71.4 kg (157 lb 6.4 oz)   SpO2 99%   BMI 27.88 kg/m²     Constitutional:normal, well developed, well nourished, alert, in no distress and non-toxic and no overt pain " behavior.  Eyes:anicteric  HEENT:grossly intact  Neck:supple, symmetric, trachea midline and no masses   Pulmonary:even and unlabored  Cardiovascular:No edema or pitting edema present  Skin:Normal without rashes or lesions and well hydrated  Psychiatric:Mood and affect appropriate  Neurologic:Cranial Nerves II-XII grossly intact  Musculoskeletal:normal    Cervical Spine examination demonstrates. Decreased ROM secondary to pain with lateral rotation to the left/right and bending to the left/right, in addition to neck flexion. 5/5 upper extremity strength in all muscle groups bilaterally. Negative Spurling's maneuver to the b/l Ue, sensitivity to light touch intact b/l Ue.    Imaging  No orders to display       No orders of the defined types were placed in this encounter.       Study Result    Narrative & Impression   XR SPINE CERVICAL COMPLETE 4 OR 5 VW NON INJURY     INDICATION: M54.2: Cervicalgia.     COMPARISON: Cervical spine x-ray dated May 30, 2023.     FINDINGS:     No fracture.     There is minimal grade 1 retrolisthesis of C5 on C6. There is mild straightening of the normal cervical lordosis.     There is mild intervertebral disc space narrowing at C5/C6.     There is mild left neuroforaminal narrowing at C3/C4, C4/C5 and C5/C6. Mild left neuroforaminal narrowing is present at C4/C5, C5/C6 and C6-C7.     Normal prevertebral soft tissues.     Clear lung apices.     IMPRESSION:     No acute osseous abnormality.     Degenerative changes as described.        Workstation performed: [id number]

## 2024-12-16 NOTE — PROGRESS NOTES
Assessment:  No diagnosis found.    Plan:  ***  {Oral Swab Statement:39438}    {Opioid Statement:65922}    {UDS Statement:18849}    {PDMP Statement:08986}    {Pain Management Procedure Statement:96465}      History of Present Illness:    The patient is a 39 y.o. female who presents for consultation in regards to No chief complaint on file..  Symptoms have been present for ***. Symptoms began {Inciting Event:1513068128}. Pain is reported to be {#:05507} on the numeric rating scale.  Symptoms are felt {Pain Duration:7185556364} and worst in the { Pain time of day:3964220659}.  Symptoms are characterized as {Pain Characterization:6713943243}.  Symptoms are associated with {Pain Strength:2192954065}.  Aggravating factors include {Pain Factors:6314357303}.  Relieving factors include {Pain Factors:5819732110}.  No change in symptoms with {Pain Factors:2999967888}.  Treatments that have been helpful include {Pain Treatments:2394723825}. {Pain Treatments:0008705042} have provided no relief.  Medications to relieve symptoms include ***.    Review of Systems:    Review of Systems   Constitutional: Negative.    HENT: Negative.     Eyes: Negative.    Respiratory: Negative.     Cardiovascular: Negative.    Gastrointestinal: Negative.    Endocrine: Negative.    Genitourinary: Negative.    Skin: Negative.    Allergic/Immunologic: Negative.    Neurological: Negative.    Hematological: Negative.    Psychiatric/Behavioral: Negative.             Past Medical History:   Diagnosis Date    ADHD (attention deficit hyperactivity disorder)     Allergic     Anxiety     Asthma     Cat-scratch disease     last assessed 10/22/15    Depression     Diabetes mellitus (HCC)     Hypertension     last assessed 11/11/14    Obesity     Sciatica     Sleep disorder        Past Surgical History:   Procedure Laterality Date    BREAST BIOPSY Left     2016    CHOLECYSTECTOMY      CHOLECYSTECTOMY LAPAROSCOPIC N/A 08/30/2023    Procedure: CHOLECYSTECTOMY  LAPAROSCOPIC;  Surgeon: Inder Suarez DO;  [facility]: [facility];  Service: General    DENTAL SURGERY      HIP SURGERY      LYMPH NODE BIOPSY      OR BX/EXC LYMPH NODE OPEN DEEP AXILLARY NODE Left 01/15/2019    Procedure: LEFT AXILLARY LYMPH NODE BIOPSY;  Surgeon: Vincent Lambert MD;  [facility];  Service: General    OR LAMNOTMY INCL W/DCMPRSN NRV ROOT 1 INTRSPC LUMBR Right 04/14/2021    Procedure: L5-S1 minimally invasive microdiskectomy and  epidural steroid injection;  Surgeon: Milad Quinn MD;  [facility];  Service: Neurosurgery    TUBAL LIGATION         Family History   Problem Relation Age of Onset    Anxiety disorder Mother     Bipolar disorder Mother     Depression Mother     Tremor Mother     Arthritis Mother     Asthma Mother     Dementia Mother     Mental illness Mother     Anxiety disorder Father     Bipolar disorder Father     Depression Father     Schizophrenia Father     Heart attack Father     Mental illness Father     Suicide Attempts Father     Parkinsonism Maternal Grandmother     Heart attack Maternal Grandmother     Stomach cancer Paternal Grandfather     Heart attack Paternal Grandfather     Cancer Maternal Grandfather     Asthma Sister        Social History     Occupational History    Not on file   Tobacco Use    Smoking status: Never     Passive exposure: Never    Smokeless tobacco: Never   Vaping Use    Vaping status: Never Used   Substance and Sexual Activity    Alcohol use: No    Drug use: No    Sexual activity: Yes     Partners: Male     Birth control/protection: Other, None     Comment: Tubal         Current Outpatient Medications:     albuterol (2.5 mg/3 mL) 0.083 % nebulizer solution, Take 3 mL (2.5 mg total) by nebulization every 6 (six) hours as needed for wheezing or shortness of breath, Disp: 90 mL, Rfl: 5    albuterol (PROVENTIL HFA,VENTOLIN HFA) 90 mcg/act inhaler, Inhale 2 puffs every 6 (six) hours as needed for wheezing or shortness of breath, Disp: 18 g,  "Rfl: 2    Alcohol Swabs (Pharmacist Choice Alcohol) PADS, TEST BLOOD 3 TIMES DAILY, Disp: 100 each, Rfl: 5    amphetamine-dextroamphetamine (ADDERALL XR, 10MG,) 10 MG 24 hr capsule, Take 1 capsule (10 mg total) by mouth every morning Max Daily Amount: 10 mg, Disp: 30 capsule, Rfl: 0    Blood Glucose Monitoring Suppl (OneTouch Verio Reflect) w/Device KIT, Check blood sugars three times daily. Please substitute with appropriate alternative as covered by patient's insurance. Dx: E11.65, Disp: 1 kit, Rfl: 0    cetirizine (ZyrTEC) 10 mg tablet, Take 1 tablet (10 mg total) by mouth daily, Disp: 90 tablet, Rfl: 1    ergocalciferol (VITAMIN D2) 50,000 units, TAKE 1 CAPSULE (50,000 UNITS TOTAL) BY MOUTH ONCE A WEEK, Disp: 12 capsule, Rfl: 1    fluticasone (FLONASE) 50 mcg/act nasal spray, 1 SPRAY INTO EACH NOSTRIL DAILY, Disp: 16 g, Rfl: 5    Lancet Devices (Easy Mini Eject Lancing Device) MISC, Use as directed to check blood sugar 3x daily., Disp: 1 each, Rfl: 0    naproxen (NAPROSYN) 500 mg tablet, TAKE 1 TABLET (500 MG TOTAL) BY MOUTH 2 (TWO) TIMES A DAY WITH MEALS, Disp: 60 tablet, Rfl: 0    ondansetron (ZOFRAN) 4 mg tablet, Take 1 tablet (4 mg total) by mouth every 8 (eight) hours as needed for nausea or vomiting, Disp: 20 tablet, Rfl: 0    Pharmacist Choice Lancets MISC, TEST BLOOD 3 TIMES DAILY, Disp: 100 each, Rfl: 1    Allergies   Allergen Reactions    Bactrim [Sulfamethoxazole-Trimethoprim] Shortness Of Breath     Near syncope    Codeine Hives     Tolerated Percocet, Vicodin, etc.    Magnesium Sulfate Tachycardia       Physical Exam:    Resp 16   Ht 5' 3\" (1.6 m)   BMI 28.20 kg/m²     Constitutional: {General Appearance:61508::\"normal, well developed, well nourished, alert, in no distress and non-toxic and no overt pain behavior.\"}  Eyes: {Sclera:90282::\"anicteric\"}  HEENT: {Hearin::\"grossly intact\"}  Neck: {Neck:98047::\"supple, symmetric, trachea midline and no masses \"}  Pulmonary:{Respiratory " "effort:16831::\"even and unlabored\"}  Cardiovascular:{Examination of Extremities:45235::\"No edema or pitting edema present\"}  Skin:{Skin and Subcutaneous tissues:01597::\"Normal without rashes or lesions and well hydrated\"}  Psychiatric:{Mood and Affect:81492::\"Mood and affect appropriate\"}  Neurologic:{Cranial Nerves:11483::\"Cranial Nerves II-XII grossly intact\"}  Musculoskeletal:{Gait and Station:89134::\"normal\"}    Imaging  No orders to display       No orders of the defined types were placed in this encounter.    Show images for XR spine cervical complete 4 or 5 vw non injury  Study Result    Narrative & Impression   XR SPINE CERVICAL COMPLETE 4 OR 5 VW NON INJURY     INDICATION: M54.2: Cervicalgia.     COMPARISON: Cervical spine x-ray dated May 30, 2023.     FINDINGS:     No fracture.     There is minimal grade 1 retrolisthesis of C5 on C6. There is mild straightening of the normal cervical lordosis.     There is mild intervertebral disc space narrowing at C5/C6.     There is mild left neuroforaminal narrowing at C3/C4, C4/C5 and C5/C6. Mild left neuroforaminal narrowing is present at C4/C5, C5/C6 and C6-C7.     Normal prevertebral soft tissues.     Clear lung apices.     IMPRESSION:     No acute osseous abnormality.     Degenerative changes as described.        Workstation performed: [id number]           "

## 2024-12-20 ENCOUNTER — OFFICE VISIT (OUTPATIENT)
Dept: FAMILY MEDICINE CLINIC | Facility: HOME HEALTHCARE | Age: 39
End: 2024-12-20
Payer: COMMERCIAL

## 2024-12-20 VITALS
SYSTOLIC BLOOD PRESSURE: 138 MMHG | RESPIRATION RATE: 18 BRPM | BODY MASS INDEX: 27.99 KG/M2 | DIASTOLIC BLOOD PRESSURE: 84 MMHG | TEMPERATURE: 98 F | OXYGEN SATURATION: 98 % | HEART RATE: 88 BPM | WEIGHT: 158 LBS

## 2024-12-20 DIAGNOSIS — Z23 ENCOUNTER FOR IMMUNIZATION: ICD-10-CM

## 2024-12-20 DIAGNOSIS — E55.9 VITAMIN D DEFICIENCY: ICD-10-CM

## 2024-12-20 DIAGNOSIS — Z13.29 SCREENING FOR ENDOCRINE, METABOLIC AND IMMUNITY DISORDER: ICD-10-CM

## 2024-12-20 DIAGNOSIS — Z13.0 SCREENING FOR ENDOCRINE, METABOLIC AND IMMUNITY DISORDER: ICD-10-CM

## 2024-12-20 DIAGNOSIS — Z13.228 SCREENING FOR ENDOCRINE, METABOLIC AND IMMUNITY DISORDER: ICD-10-CM

## 2024-12-20 DIAGNOSIS — Z12.31 ENCOUNTER FOR SCREENING MAMMOGRAM FOR BREAST CANCER: ICD-10-CM

## 2024-12-20 DIAGNOSIS — F90.9 ATTENTION DEFICIT HYPERACTIVITY DISORDER (ADHD), UNSPECIFIED ADHD TYPE: Primary | ICD-10-CM

## 2024-12-20 PROCEDURE — 90686 IIV4 VACC NO PRSV 0.5 ML IM: CPT | Performed by: FAMILY MEDICINE

## 2024-12-20 PROCEDURE — 99213 OFFICE O/P EST LOW 20 MIN: CPT | Performed by: PHYSICIAN ASSISTANT

## 2024-12-20 PROCEDURE — 90656 IIV3 VACC NO PRSV 0.5 ML IM: CPT

## 2024-12-20 PROCEDURE — T1015 CLINIC SERVICE: HCPCS | Performed by: FAMILY MEDICINE

## 2024-12-20 RX ORDER — BLOOD SUGAR DIAGNOSTIC
1 STRIP MISCELLANEOUS DAILY PRN
COMMUNITY
Start: 2024-12-20

## 2024-12-20 RX ORDER — DEXTROAMPHETAMINE SACCHARATE, AMPHETAMINE ASPARTATE MONOHYDRATE, DEXTROAMPHETAMINE SULFATE AND AMPHETAMINE SULFATE 5; 5; 5; 5 MG/1; MG/1; MG/1; MG/1
20 CAPSULE, EXTENDED RELEASE ORAL EVERY MORNING
Qty: 30 CAPSULE | Refills: 0 | Status: SHIPPED | OUTPATIENT
Start: 2024-12-20

## 2024-12-20 NOTE — ASSESSMENT & PLAN NOTE
Increase adderall to 20 mg daily.  Follow up in 3 months, sooner if needed.    Cont[place] Sub[place] Review    [place]  reviewed: No red flags were identified; safe to proceed with prescription..     PDMP Review       Value Time User    PDMP Reviewed  Yes 12/20/2024 12:17 PM Frankie Dinh PA-C         Orders:  •  amphetamine-dextroamphetamine (ADDERALL XR, 20MG,) 20 MG 24 hr capsule; Take 1 capsule (20 mg total) by mouth every morning Max Daily Amount: 20 mg

## 2024-12-20 NOTE — PROGRESS NOTES
Name: Trixie Malone      : 1985      MRN: [id number]  Encounter Provider: Frankie Dinh PA-C  Encounter Date: 2024   Encounter department: [facility]  :  Assessment & Plan  Attention deficit hyperactivity disorder (ADHD), unspecified ADHD type  Increase adderall to 20 mg daily.  Follow up in 3 months, sooner if needed.    Controlled Sub[place] Review    [place]  reviewed: No red flags were identified; safe to proceed with prescription..     PDMP Review       Value Time User    PDMP Reviewed  Yes 2024 12:17 PM Frankie Dinh PA-C         Orders:  •  amphetamine-dextroamphetamine (ADDERALL XR, 20MG,) 20 MG 24 hr capsule; Take 1 capsule (20 mg total) by mouth every morning Max Daily Amount: 20 mg    Vitamin D deficiency    Orders:  •  Vitamin D 25 hydroxy; Future    Encounter for screening mammogram for breast cancer    Orders:  •  Mammo screening bilateral w 3d and cad; Future    Encounter for immunization    Orders:  •  influenza vaccine preservative-free 0.5 mL IM (Fluzone, Afluria, Fluarix, Flulaval)    Screening for endocrine, metabolic and immunity disorder    Orders:  •  CBC and differential; Future  •  Lipid panel; Future  •  TSH, 3rd generation with Free T4 reflex; Future           History of Present Illness     Trixie is a 39 year old female with history of HTN, DM, asthma, vit d deficiency, back pain, PTSD/Anxiety/Depression, ADHD, presenting for follow up.    Patient has previously followed with [facility] for psychiatric management but stopped all medications several months ago as she felt that she was over medicated.  She was previously treated for ADHD with Strattera but did not feel that this helped.  At last visit she was started on Adderall 10 mg XR daily.  She reports some improvement in her symptoms initially but feels that that has since wore off.  She continues to endorse anxiety associated with difficulty concentrating and forgetfulness.   She denies medication side effects.      Review of Systems   Constitutional:  Negative for chills, diaphoresis and fever.   Respiratory:  Negative for cough, chest tightness, shortness of breath and wheezing.    Cardiovascular:  Negative for chest pain, palpitations and leg swelling.   Gastrointestinal:  Negative for abdominal pain, constipation, diarrhea, nausea and vomiting.   Skin:  Negative for rash and wound.   Neurological:  Negative for dizziness, syncope, weakness, light-headedness and headaches.   Psychiatric/Behavioral:  Positive for decreased concentration. Negative for self-injury and suicidal ideas. The patient is nervous/anxious.        Objective   /84 (BP Location: Right arm, Patient Position: Sitting, Cuff Size: Adult)   Pulse 88   Temp 98 °F (36.7 °C) (Tympanic)   Resp 18   Wt 71.7 kg (158 lb)   SpO2 98%   BMI 27.99 kg/m²      Physical Exam  Vitals and nursing note reviewed.   Constitutional:       General: She is not in acute distress.     Appearance: Normal appearance.   HENT:      Head: Normocephalic and atraumatic.   Eyes:      Extraocular Movements: Extraocular movements intact.      Conjunctiva/sclera: Conjunctivae normal.      Pupils: Pupils are equal, round, and reactive to light.   Cardiovascular:      Rate and Rhythm: Normal rate and regular rhythm.      Heart sounds: Normal heart sounds. No murmur heard.  Pulmonary:      Effort: Pulmonary effort is normal. No respiratory distress.      Breath sounds: Normal breath sounds. No wheezing.   Musculoskeletal:      Lumbar back: No bony tenderness.      Right lower leg: No edema.      Left lower leg: No edema.   Skin:     General: Skin is warm and dry.   Neurological:      General: No focal deficit present.      Mental Status: She is alert and oriented to person, place, and time.      Cranial Nerves: No cranial nerve deficit.      Motor: No weakness.   Psychiatric:         Mood and Affect: Mood normal.         Behavior: Behavior  normal.

## 2025-01-14 DIAGNOSIS — J45.40 MODERATE PERSISTENT ASTHMA WITHOUT COMPLICATION: Primary | ICD-10-CM

## 2025-01-14 DIAGNOSIS — M54.2 NECK PAIN: ICD-10-CM

## 2025-01-14 RX ORDER — LEVALBUTEROL INHALATION SOLUTION 1.25 MG/3ML
1.25 SOLUTION RESPIRATORY (INHALATION) EVERY 6 HOURS PRN
Qty: 270 ML | Refills: 0 | Status: SHIPPED | OUTPATIENT
Start: 2025-01-14

## 2025-01-15 DIAGNOSIS — M54.2 NECK PAIN: ICD-10-CM

## 2025-01-15 RX ORDER — NAPROXEN 500 MG/1
500 TABLET ORAL 2 TIMES DAILY WITH MEALS
Qty: 60 TABLET | Refills: 0 | OUTPATIENT
Start: 2025-01-15

## 2025-01-15 RX ORDER — NAPROXEN 500 MG/1
500 TABLET ORAL 2 TIMES DAILY WITH MEALS
Qty: 60 TABLET | Refills: 0 | Status: SHIPPED | OUTPATIENT
Start: 2025-01-15

## 2025-01-16 ENCOUNTER — DOCUMENTATION (OUTPATIENT)
Dept: FAMILY MEDICINE CLINIC | Facility: CLINIC | Age: 40
End: 2025-01-16

## 2025-01-16 NOTE — PROGRESS NOTES
Levalbuterol auth submitted for nebulizer via cover my meds. Might come back denied as patient on levalbuterol inhaler- duplicate of therapy.

## 2025-01-17 LAB
DME PARACHUTE DELIVERY DATE ACTUAL: NORMAL
DME PARACHUTE DELIVERY DATE REQUESTED: NORMAL
DME PARACHUTE ITEM DESCRIPTION: NORMAL
DME PARACHUTE ORDER STATUS: NORMAL
DME PARACHUTE SUPPLIER NAME: NORMAL
DME PARACHUTE SUPPLIER PHONE: NORMAL

## 2025-01-29 ENCOUNTER — APPOINTMENT (OUTPATIENT)
Dept: LAB | Facility: HOSPITAL | Age: 40
End: 2025-01-29
Payer: COMMERCIAL

## 2025-01-29 DIAGNOSIS — Z13.228 SCREENING FOR ENDOCRINE, METABOLIC AND IMMUNITY DISORDER: ICD-10-CM

## 2025-01-29 DIAGNOSIS — Z13.29 SCREENING FOR ENDOCRINE, METABOLIC AND IMMUNITY DISORDER: ICD-10-CM

## 2025-01-29 DIAGNOSIS — E55.9 VITAMIN D DEFICIENCY: ICD-10-CM

## 2025-01-29 DIAGNOSIS — Z13.0 SCREENING FOR ENDOCRINE, METABOLIC AND IMMUNITY DISORDER: ICD-10-CM

## 2025-01-29 LAB
25(OH)D3 SERPL-MCNC: 37.4 NG/ML (ref 30–100)
BASOPHILS # BLD AUTO: 0.07 THOUSANDS/ΜL (ref 0–0.1)
BASOPHILS NFR BLD AUTO: 1 % (ref 0–1)
CHOLEST SERPL-MCNC: 171 MG/DL (ref ?–200)
EOSINOPHIL # BLD AUTO: 0.25 THOUSAND/ΜL (ref 0–0.61)
EOSINOPHIL NFR BLD AUTO: 4 % (ref 0–6)
ERYTHROCYTE [DISTWIDTH] IN BLOOD BY AUTOMATED COUNT: 12.5 % (ref 11.6–15.1)
HCT VFR BLD AUTO: 43.7 % (ref 34.8–46.1)
HDLC SERPL-MCNC: 57 MG/DL
HGB BLD-MCNC: 14.5 G/DL (ref 11.5–15.4)
IMM GRANULOCYTES # BLD AUTO: 0.02 THOUSAND/UL (ref 0–0.2)
IMM GRANULOCYTES NFR BLD AUTO: 0 % (ref 0–2)
LDLC SERPL CALC-MCNC: 94 MG/DL (ref 0–100)
LYMPHOCYTES # BLD AUTO: 1.33 THOUSANDS/ΜL (ref 0.6–4.47)
LYMPHOCYTES NFR BLD AUTO: 19 % (ref 14–44)
MCH RBC QN AUTO: 31.5 PG (ref 26.8–34.3)
MCHC RBC AUTO-ENTMCNC: 33.2 G/DL (ref 31.4–37.4)
MCV RBC AUTO: 95 FL (ref 82–98)
MONOCYTES # BLD AUTO: 0.54 THOUSAND/ΜL (ref 0.17–1.22)
MONOCYTES NFR BLD AUTO: 8 % (ref 4–12)
NEUTROPHILS # BLD AUTO: 4.95 THOUSANDS/ΜL (ref 1.85–7.62)
NEUTS SEG NFR BLD AUTO: 68 % (ref 43–75)
NONHDLC SERPL-MCNC: 114 MG/DL
NRBC BLD AUTO-RTO: 0 /100 WBCS
PLATELET # BLD AUTO: 286 THOUSANDS/UL (ref 149–390)
PMV BLD AUTO: 10.2 FL (ref 8.9–12.7)
RBC # BLD AUTO: 4.6 MILLION/UL (ref 3.81–5.12)
TRIGL SERPL-MCNC: 98 MG/DL (ref ?–150)
TSH SERPL DL<=0.05 MIU/L-ACNC: 1.62 UIU/ML (ref 0.45–4.5)
WBC # BLD AUTO: 7.16 THOUSAND/UL (ref 4.31–10.16)

## 2025-01-29 PROCEDURE — 80061 LIPID PANEL: CPT

## 2025-01-29 PROCEDURE — 84443 ASSAY THYROID STIM HORMONE: CPT

## 2025-01-29 PROCEDURE — 36415 COLL VENOUS BLD VENIPUNCTURE: CPT

## 2025-01-29 PROCEDURE — 85025 COMPLETE CBC W/AUTO DIFF WBC: CPT

## 2025-01-29 PROCEDURE — 82306 VITAMIN D 25 HYDROXY: CPT

## 2025-01-30 ENCOUNTER — OFFICE VISIT (OUTPATIENT)
Dept: FAMILY MEDICINE CLINIC | Facility: HOME HEALTHCARE | Age: 40
End: 2025-01-30
Payer: COMMERCIAL

## 2025-01-30 VITALS
TEMPERATURE: 98.7 F | OXYGEN SATURATION: 98 % | DIASTOLIC BLOOD PRESSURE: 79 MMHG | BODY MASS INDEX: 27.2 KG/M2 | HEART RATE: 98 BPM | HEIGHT: 62 IN | RESPIRATION RATE: 18 BRPM | WEIGHT: 147.8 LBS | SYSTOLIC BLOOD PRESSURE: 108 MMHG

## 2025-01-30 DIAGNOSIS — F90.9 ATTENTION DEFICIT HYPERACTIVITY DISORDER (ADHD), UNSPECIFIED ADHD TYPE: Primary | ICD-10-CM

## 2025-01-30 DIAGNOSIS — R73.03 PRE-DIABETES: ICD-10-CM

## 2025-01-30 DIAGNOSIS — M25.531 RIGHT WRIST PAIN: ICD-10-CM

## 2025-01-30 LAB — SL AMB POCT HEMOGLOBIN AIC: 5.3 (ref ?–6.5)

## 2025-01-30 PROCEDURE — T1015 CLINIC SERVICE: HCPCS | Performed by: FAMILY MEDICINE

## 2025-01-30 PROCEDURE — 83036 HEMOGLOBIN GLYCOSYLATED A1C: CPT | Performed by: FAMILY MEDICINE

## 2025-01-30 PROCEDURE — 99214 OFFICE O/P EST MOD 30 MIN: CPT | Performed by: PHYSICIAN ASSISTANT

## 2025-01-30 RX ORDER — DEXTROAMPHETAMINE SACCHARATE, AMPHETAMINE ASPARTATE MONOHYDRATE, DEXTROAMPHETAMINE SULFATE AND AMPHETAMINE SULFATE 5; 5; 5; 5 MG/1; MG/1; MG/1; MG/1
20 CAPSULE, EXTENDED RELEASE ORAL EVERY MORNING
Qty: 30 CAPSULE | Refills: 0 | Status: CANCELLED | OUTPATIENT
Start: 2025-01-30

## 2025-01-30 RX ORDER — DEXTROAMPHETAMINE SACCHARATE, AMPHETAMINE ASPARTATE MONOHYDRATE, DEXTROAMPHETAMINE SULFATE AND AMPHETAMINE SULFATE 7.5; 7.5; 7.5; 7.5 MG/1; MG/1; MG/1; MG/1
30 CAPSULE, EXTENDED RELEASE ORAL EVERY MORNING
Qty: 30 CAPSULE | Refills: 0 | Status: SHIPPED | OUTPATIENT
Start: 2025-01-30

## 2025-01-30 NOTE — PROGRESS NOTES
Name: Trixie Malone      : 1985      MRN: [id number]  Encounter Provider: Frankie Dinh PA-C  Encounter Date: 2025   Encounter department: [facility]  :  Assessment & Plan  Attention deficit hyperactivity disorder (ADHD), unspecified ADHD type  Will increase Adderall to 30 mg daily.  Follow up in 1 month.  Consider switching to methylphenidate if still no improvement.    Controlled Sub[place] Review    [place]  reviewed: No red flags were identified; safe to proceed with prescription.    PDMP Review       Value Time User    PDMP Reviewed  Yes 2025  3:07 PM Frankie Dinh PA-C         Orders:  •  amphetamine-dextroamphetamine (ADDERALL XR, 30MG,) 30 MG 24 hr capsule; Take 1 capsule (30 mg total) by mouth every morning Max Daily Amount: 30 mg    Pre-diabetes  A1c remains well controlled.  Continue healthy diet.  Lab Results   Component Value Date    HGBA1C 5.3 2025     Orders:  •  POCT hemoglobin A1c    Right wrist pain  Will check right wrist xray.  May apply ice PRN.  Orders:  •  XR wrist 3+ vw right; Future          Depression Screening and Follow-up Plan: Patient's depression screening was positive with a PHQ-9 score of 16.   Patient assessed for underlying major depression. Brief counseling provided and recommend additional follow-up/re-evaluation next office visit. Patient advised to follow-up with PCP for further management.     History of Present Illness   Trixie is a 39 year old female with history of HTN, DM, asthma, vit d deficiency, back pain, PTSD/Anxiety/Depression, ADHD, presenting for follow up.    ADHD is currently managed with Adderall 20 mg XR daily.  She reports little to no improvement since increasing this from 10 mg at last visit.  Denies medication side effects.  She was previously treated for ADHD with Strattera but did not feel that this helped.  She continues to endorse anxiety associated with difficulty concentrating and  "forgetfulness.    Today, patient reports she fell down her attic steps (13 steps) 4 days ago.  She landed on her right side, injuring her hip and right forearm.  She continues to have pain in her right wrist.  Denies erythema, bruising, or swelling.      Review of Systems   Constitutional:  Negative for chills, diaphoresis and fever.   Respiratory:  Negative for cough, chest tightness, shortness of breath and wheezing.    Cardiovascular:  Negative for chest pain, palpitations and leg swelling.   Gastrointestinal:  Negative for abdominal pain, constipation, diarrhea, nausea and vomiting.   Musculoskeletal:  Positive for arthralgias.   Skin:  Negative for rash and wound.   Neurological:  Negative for dizziness, syncope, weakness, light-headedness and headaches.   Psychiatric/Behavioral:  Positive for decreased concentration. Negative for self-injury and suicidal ideas. The patient is nervous/anxious.        Objective   /79 (BP Location: Left arm, Patient Position: Sitting, Cuff Size: Standard)   Pulse 98   Temp 98.7 °F (37.1 °C) (Tympanic)   Resp 18   Ht 5' 2\" (1.575 m)   Wt 67 kg (147 lb 12.8 oz)   SpO2 98%   BMI 27.03 kg/m²      Physical Exam  Vitals and nursing note reviewed.   Constitutional:       General: She is not in acute distress.     Appearance: Normal appearance.   HENT:      Head: Normocephalic and atraumatic.   Eyes:      Extraocular Movements: Extraocular movements intact.      Conjunctiva/sclera: Conjunctivae normal.      Pupils: Pupils are equal, round, and reactive to light.   Cardiovascular:      Rate and Rhythm: Normal rate and regular rhythm.      Heart sounds: Normal heart sounds. No murmur heard.  Pulmonary:      Effort: Pulmonary effort is normal. No respiratory distress.      Breath sounds: Normal breath sounds. No wheezing.   Musculoskeletal:      Right wrist: Tenderness present. No swelling or effusion.      Left wrist: Normal.      Lumbar back: No bony tenderness.      Right " lower leg: No edema.      Left lower leg: No edema.      Comments: TTP along the right medial wrist   Skin:     General: Skin is warm and dry.   Neurological:      General: No focal deficit present.      Mental Status: She is alert and oriented to person, place, and time.      Cranial Nerves: No cranial nerve deficit.      Motor: No weakness.   Psychiatric:         Mood and Affect: Mood normal.         Behavior: Behavior normal.

## 2025-01-30 NOTE — ASSESSMENT & PLAN NOTE
A1c remains well controlled.  Continue healthy diet.  Lab Results   Component Value Date    HGBA1C 5.3 01/30/2025     Orders:  •  POCT hemoglobin A1c

## 2025-01-30 NOTE — ASSESSMENT & PLAN NOTE
Will increase Adderall to 30 mg daily.  Follow up in 1 month.  Consider switching to methylphenidate if still no improvement.    Cont[place]    [place]: No red flags were identified; safe to proceed with prescription.    PDMP Review       Value Time User    PDMP Reviewed  Yes 1/30/2025  3:07 PM Frankie Dinh PA-C         Orders:  •  amphetamine-dextroamphetamine (ADDERALL XR, 30MG,) 30 MG 24 hr capsule; Take 1 capsule (30 mg total) by mouth every morning Max Daily Amount: 30 mg

## 2025-02-12 DIAGNOSIS — J45.40 MODERATE PERSISTENT ASTHMA WITHOUT COMPLICATION: ICD-10-CM

## 2025-02-12 DIAGNOSIS — M54.2 NECK PAIN: ICD-10-CM

## 2025-02-12 RX ORDER — NAPROXEN 500 MG/1
500 TABLET ORAL 2 TIMES DAILY WITH MEALS
Qty: 60 TABLET | Refills: 0 | Status: SHIPPED | OUTPATIENT
Start: 2025-02-12

## 2025-02-12 RX ORDER — LEVALBUTEROL INHALATION SOLUTION 1.25 MG/3ML
1.25 SOLUTION RESPIRATORY (INHALATION) EVERY 6 HOURS PRN
Qty: 270 ML | Refills: 0 | Status: SHIPPED | OUTPATIENT
Start: 2025-02-12

## 2025-02-18 DIAGNOSIS — R73.03 PRE-DIABETES: Primary | ICD-10-CM

## 2025-02-18 RX ORDER — BLOOD SUGAR DIAGNOSTIC
STRIP MISCELLANEOUS
Qty: 100 EACH | Refills: 3 | Status: SHIPPED | OUTPATIENT
Start: 2025-02-18

## 2025-02-28 ENCOUNTER — OFFICE VISIT (OUTPATIENT)
Dept: FAMILY MEDICINE CLINIC | Facility: HOME HEALTHCARE | Age: 40
End: 2025-02-28
Payer: COMMERCIAL

## 2025-02-28 VITALS
TEMPERATURE: 98 F | BODY MASS INDEX: 27.42 KG/M2 | HEART RATE: 101 BPM | DIASTOLIC BLOOD PRESSURE: 84 MMHG | HEIGHT: 62 IN | WEIGHT: 149 LBS | SYSTOLIC BLOOD PRESSURE: 134 MMHG | RESPIRATION RATE: 18 BRPM | OXYGEN SATURATION: 98 %

## 2025-02-28 DIAGNOSIS — F41.9 ANXIETY AND DEPRESSION: Primary | ICD-10-CM

## 2025-02-28 DIAGNOSIS — R11.0 NAUSEA: ICD-10-CM

## 2025-02-28 DIAGNOSIS — F32.A ANXIETY AND DEPRESSION: Primary | ICD-10-CM

## 2025-02-28 PROCEDURE — 99213 OFFICE O/P EST LOW 20 MIN: CPT | Performed by: PHYSICIAN ASSISTANT

## 2025-02-28 PROCEDURE — T1015 CLINIC SERVICE: HCPCS | Performed by: FAMILY MEDICINE

## 2025-02-28 RX ORDER — ONDANSETRON 4 MG/1
4 TABLET, FILM COATED ORAL EVERY 8 HOURS PRN
Qty: 20 TABLET | Refills: 0 | Status: SHIPPED | OUTPATIENT
Start: 2025-02-28

## 2025-02-28 RX ORDER — ALPRAZOLAM 0.5 MG
0.5 TABLET ORAL 2 TIMES DAILY PRN
Qty: 30 TABLET | Refills: 0 | Status: SHIPPED | OUTPATIENT
Start: 2025-02-28

## 2025-02-28 NOTE — PROGRESS NOTES
Name: Trixie Malone      : 1985      MRN: [id number]  Encounter Provider: Frankie Dinh PA-C  Encounter Date: 2025   Encounter department: [facility]  :  Assessment & Plan  Anxiety and depression    May utilize xanax 0.5 mg PRN for acute anxiety/panic attacks.  Referral provided to psych for counseling/med management.  Local resources provided.  Also provided info for the [facility] [place] to utilize if symptoms worsen in the meantime.  Follow up in 1 month.    [place]: No red flags were identified; safe to proceed with prescription.    PDMP Review       Value Time User    PDMP Reviewed  Yes 2025  2:39 PM Frankie Dinh PA-C         Orders:  •  Ambulatory referral to Psych Services; Future  •  ALPRAZolam (XANAX) 0.5 mg tablet; Take 1 tablet (0.5 mg total) by mouth 2 (two) times a day as needed for anxiety    Nausea    Orders:  •  ondansetron (ZOFRAN) 4 mg tablet; Take 1 tablet (4 mg total) by mouth every 8 (eight) hours as needed for nausea or vomiting           History of Present Illness     Trixie is a 39 year old female with history of HTN, DM, asthma, vit d deficiency, back pain, PTSD/Anxiety/Depression, ADHD, presenting for follow up.    Patient reports significantly worsening anxiety over the past few weeks due to family stress.  Patient states that she is going through a divorce and having to move which have prompted frequent panic attacks.  She endorses associated nausea, stomachache, headache, sweating, and rapid heart rate during these attacks.  She has previously been on Xanax for anxiety which she feels helps her.  She has not been following with psych for the past year but feels that she may need to restart counseling due to her current situation.      Review of Systems   Constitutional:  Negative for chills, diaphoresis and fever.   Respiratory:  Negative for cough, chest tightness, shortness of  "breath and wheezing.    Cardiovascular:  Negative for chest pain, palpitations and leg swelling.   Gastrointestinal:  Positive for nausea. Negative for abdominal pain, constipation, diarrhea and vomiting.   Skin:  Negative for rash and wound.   Neurological:  Positive for headaches. Negative for dizziness, syncope, weakness and light-headedness.       Objective   /84 (BP Location: Left arm, Patient Position: Sitting, Cuff Size: Adult)   Pulse 101   Temp 98 °F (36.7 °C) (Tympanic)   Resp 18   Ht 5' 2\" (1.575 m)   Wt 67.6 kg (149 lb)   SpO2 98%   BMI 27.25 kg/m²      Physical Exam  Vitals and nursing note reviewed.   Constitutional:       General: She is not in acute distress.     Appearance: Normal appearance.   HENT:      Head: Normocephalic and atraumatic.   Eyes:      Extraocular Movements: Extraocular movements intact.      Conjunctiva/sclera: Conjunctivae normal.      Pupils: Pupils are equal, round, and reactive to light.   Cardiovascular:      Rate and Rhythm: Normal rate and regular rhythm.      Heart sounds: Normal heart sounds. No murmur heard.  Pulmonary:      Effort: Pulmonary effort is normal. No respiratory distress.      Breath sounds: Normal breath sounds. No wheezing.   Musculoskeletal:      Cervical back: Normal range of motion and neck supple.      Right lower leg: No edema.      Left lower leg: No edema.   Skin:     General: Skin is warm and dry.   Neurological:      General: No focal deficit present.      Mental Status: She is alert and oriented to person, place, and time.      Cranial Nerves: No cranial nerve deficit.      Motor: No weakness.   Psychiatric:         Mood and Affect: Mood is depressed. Affect is tearful.         Behavior: Behavior normal.         "

## 2025-02-28 NOTE — ASSESSMENT & PLAN NOTE
May utilize xanax 0.5 mg PRN for acute anxiety/panic attacks.  Referral provided to psych for counseling/med management.  Local resources provided.  Also provided info for the [facility] [place] to utilize if symptoms worsen in the meantime.  Follow up in 1 month.    [place]: No red flags were identified; safe to proceed with prescription.    PDMP Review       Value Time User    PDMP Reviewed  Yes 2/28/2025  2:39 PM Frankie Dinh PA-C         Orders:  •  Ambulatory referral to Psych Services; Future  •  ALPRAZolam (XANAX) 0.5 mg tablet; Take 1 tablet (0.5 mg total) by mouth 2 (two) times a day as needed for anxiety

## 2025-03-05 DIAGNOSIS — J45.40 MODERATE PERSISTENT ASTHMA WITHOUT COMPLICATION: ICD-10-CM

## 2025-03-05 RX ORDER — LEVALBUTEROL TARTRATE 45 UG/1
1-2 AEROSOL, METERED ORAL EVERY 4 HOURS PRN
Qty: 15 G | Refills: 2 | Status: SHIPPED | OUTPATIENT
Start: 2025-03-05

## 2025-03-10 DIAGNOSIS — F90.9 ATTENTION DEFICIT HYPERACTIVITY DISORDER (ADHD), UNSPECIFIED ADHD TYPE: ICD-10-CM

## 2025-03-10 RX ORDER — DEXTROAMPHETAMINE SACCHARATE, AMPHETAMINE ASPARTATE MONOHYDRATE, DEXTROAMPHETAMINE SULFATE AND AMPHETAMINE SULFATE 7.5; 7.5; 7.5; 7.5 MG/1; MG/1; MG/1; MG/1
30 CAPSULE, EXTENDED RELEASE ORAL EVERY MORNING
Qty: 30 CAPSULE | Refills: 0 | Status: SHIPPED | OUTPATIENT
Start: 2025-03-10

## 2025-03-17 ENCOUNTER — TELEPHONE (OUTPATIENT)
Age: 40
End: 2025-03-17

## 2025-03-17 NOTE — TELEPHONE ENCOUNTER
Contacted patient in regards to Routine Referral in attempts to verify patient's needs of services and add patient to proper wait list. LVM for patient to contact intake dept  in regards to referral.     2nd attempt, referral closed.     Upon call back, verify information and receive preferences to add pt to [facility]

## 2025-03-28 ENCOUNTER — OFFICE VISIT (OUTPATIENT)
Dept: FAMILY MEDICINE CLINIC | Facility: HOME HEALTHCARE | Age: 40
End: 2025-03-28
Payer: COMMERCIAL

## 2025-03-28 VITALS
WEIGHT: 154.2 LBS | HEIGHT: 62 IN | HEART RATE: 60 BPM | SYSTOLIC BLOOD PRESSURE: 142 MMHG | DIASTOLIC BLOOD PRESSURE: 88 MMHG | TEMPERATURE: 98.6 F | OXYGEN SATURATION: 99 % | RESPIRATION RATE: 18 BRPM | BODY MASS INDEX: 28.37 KG/M2

## 2025-03-28 DIAGNOSIS — F41.9 ANXIETY: ICD-10-CM

## 2025-03-28 DIAGNOSIS — F33.2 SEVERE EPISODE OF RECURRENT MAJOR DEPRESSIVE DISORDER, WITHOUT PSYCHOTIC FEATURES (HCC): Primary | ICD-10-CM

## 2025-03-28 PROCEDURE — 99213 OFFICE O/P EST LOW 20 MIN: CPT | Performed by: PHYSICIAN ASSISTANT

## 2025-03-28 PROCEDURE — T1015 CLINIC SERVICE: HCPCS | Performed by: FAMILY MEDICINE

## 2025-03-28 RX ORDER — ALPRAZOLAM 1 MG/1
1 TABLET ORAL 2 TIMES DAILY PRN
Qty: 60 TABLET | Refills: 0 | Status: SHIPPED | OUTPATIENT
Start: 2025-03-28

## 2025-03-28 RX ORDER — OLANZAPINE 5 MG/1
5 TABLET ORAL
Qty: 30 TABLET | Refills: 1 | Status: SHIPPED | OUTPATIENT
Start: 2025-03-28

## 2025-03-28 NOTE — PROGRESS NOTES
Name: Trixie Malone      : 1985      MRN: [id number]  Encounter Provider: Frankie Dinh PA-C  Encounter Date: 3/28/2025   Encounter department: [facility]  :  Assessment & Plan  Anxiety  Increase xanax to 1 mg BID PRN.    [place] No red flags were identified; safe to proceed with prescription.    PDMP Review       Value Time User    PDMP Reviewed  Yes 3/28/2025  2:09 PM Frankie Dinh PA-C         Orders:  •  ALPRAZolam (XANAX) 1 mg tablet; Take 1 tablet (1 mg total) by mouth 2 (two) times a day as needed for anxiety    Severe episode of recurrent major depressive disorder, without psychotic features (HCC)  Zyprexa given previously while in-patient, however, she does not believe this was continued outpatient.  Will trial zyprexa 5 mg qhs for depression/mood stability.  Follow up in 1 month.  Orders:  •  OLANZapine (ZyPREXA) 5 mg tablet; Take 1 tablet (5 mg total) by mouth daily at bedtime           History of Present Illness   Trixie is a 39 year old female with history of HTN, DM, asthma, vit d deficiency, back pain, PTSD/Anxiety/Depression, ADHD, presenting for follow up.    Patient has a singificant psychiatric history requiring inpatient stays and has tried several medications in the past.  Prior meds include Strattera, adderall, xanax, klonopin, buspar, ativan, celexa, lexapro, trazodone, paxil, prozac, and seroquel.  Patient has recently had worsening symptoms of anxiety and depression due to family stress including divorce and moving.  She does not feel that the adderall has helped her ADHD symptoms at all.  Xanax 0.5 is currently providing some minimal improvement in anxiety.  Feels that her mood is very unstable.  Denies SI/HI.      Review of Systems   Constitutional:  Negative for chills, diaphoresis and fever.   Respiratory:  Negative for cough, chest tightness, shortness of breath and wheezing.   "  Cardiovascular:  Negative for chest pain, palpitations and leg swelling.   Gastrointestinal:  Positive for nausea. Negative for abdominal pain, constipation, diarrhea and vomiting.   Skin:  Negative for rash and wound.   Neurological:  Positive for headaches. Negative for dizziness, syncope, weakness and light-headedness.   Psychiatric/Behavioral:  Positive for decreased concentration, dysphoric mood and sleep disturbance. Negative for self-injury and suicidal ideas. The patient is nervous/anxious.        Objective   /88 (BP Location: Left arm, Patient Position: Sitting, Cuff Size: Standard)   Pulse 60   Temp 98.6 °F (37 °C) (Tympanic)   Resp 18   Ht 5' 2\" (1.575 m)   Wt 69.9 kg (154 lb 3.2 oz)   SpO2 99%   BMI 28.20 kg/m²      Physical Exam  Vitals and nursing note reviewed.   Constitutional:       General: She is not in acute distress.     Appearance: Normal appearance.   HENT:      Head: Normocephalic and atraumatic.   Eyes:      Extraocular Movements: Extraocular movements intact.      Conjunctiva/sclera: Conjunctivae normal.      Pupils: Pupils are equal, round, and reactive to light.   Cardiovascular:      Rate and Rhythm: Normal rate and regular rhythm.      Heart sounds: Normal heart sounds. No murmur heard.  Pulmonary:      Effort: Pulmonary effort is normal. No respiratory distress.      Breath sounds: Normal breath sounds. No wheezing.   Musculoskeletal:      Cervical back: Normal range of motion and neck supple.      Right lower leg: No edema.      Left lower leg: No edema.   Skin:     General: Skin is warm and dry.   Neurological:      General: No focal deficit present.      Mental Status: She is alert and oriented to person, place, and time.      Cranial Nerves: No cranial nerve deficit.      Motor: No weakness.   Psychiatric:         Mood and Affect: Mood is depressed. Affect is tearful.         Behavior: Behavior normal.         "

## 2025-03-28 NOTE — ASSESSMENT & PLAN NOTE
Zyprexa given previously while in-patient, however, she does not believe this was continued outpatient.  Will trial zyprexa 5 mg qhs for depression/mood stability.  Follow up in 1 month.  Orders:  •  OLANZapine (ZyPREXA) 5 mg tablet; Take 1 tablet (5 mg total) by mouth daily at bedtime

## 2025-03-28 NOTE — ASSESSMENT & PLAN NOTE
Increase xanax to 1 mg BID PRN.    [place]: No red flags were identified; safe to proceed with prescription.    PDMP Review       Value Time User    PDMP Reviewed  Yes 3/28/2025  2:09 PM Frankie Dinh PA-C         Orders:  •  ALPRAZolam (XANAX) 1 mg tablet; Take 1 tablet (1 mg total) by mouth 2 (two) times a day as needed for anxiety

## 2025-04-08 NOTE — TELEPHONE ENCOUNTER
[facility] is already working on prior auth   We appreciate you trusting us with your medical care. We hope you feel better soon. We will be happy to take care of you for all of your future medical needs.     You must understand that you've received Virtual treatment only and that you may be released before all your medical problems are known or treated. You, the patient, will arrange for follow up care as instructed.     Follow up with your PCP or specialty clinic as directed in the next 1-2 weeks if not improved or as needed. You can call [phone] to schedule an appointment with the appropriate provider.     If your condition worsens we recommend that you receive another evaluation in person, with your primary care provider, urgent care or at the emergency room immediately or contact your primary medical clinics after hours call service to discuss your concerns.

## 2025-04-28 ENCOUNTER — OFFICE VISIT (OUTPATIENT)
Dept: FAMILY MEDICINE CLINIC | Facility: HOME HEALTHCARE | Age: 40
End: 2025-04-28
Payer: COMMERCIAL

## 2025-04-28 VITALS
DIASTOLIC BLOOD PRESSURE: 86 MMHG | HEIGHT: 62 IN | HEART RATE: 60 BPM | WEIGHT: 157.2 LBS | BODY MASS INDEX: 28.93 KG/M2 | SYSTOLIC BLOOD PRESSURE: 122 MMHG | RESPIRATION RATE: 18 BRPM | OXYGEN SATURATION: 98 %

## 2025-04-28 DIAGNOSIS — F33.2 SEVERE EPISODE OF RECURRENT MAJOR DEPRESSIVE DISORDER, WITHOUT PSYCHOTIC FEATURES (HCC): Primary | ICD-10-CM

## 2025-04-28 DIAGNOSIS — F90.9 ATTENTION DEFICIT HYPERACTIVITY DISORDER (ADHD), UNSPECIFIED ADHD TYPE: ICD-10-CM

## 2025-04-28 PROCEDURE — 99213 OFFICE O/P EST LOW 20 MIN: CPT | Performed by: PHYSICIAN ASSISTANT

## 2025-04-28 PROCEDURE — T1015 CLINIC SERVICE: HCPCS | Performed by: FAMILY MEDICINE

## 2025-04-28 RX ORDER — METHYLPHENIDATE HYDROCHLORIDE 18 MG/1
18 TABLET ORAL DAILY
Qty: 30 TABLET | Refills: 0 | Status: SHIPPED | OUTPATIENT
Start: 2025-04-28

## 2025-04-28 NOTE — ASSESSMENT & PLAN NOTE
Has tried strattera and adderall in the past without improvement.  Will trial Concerta 18 mg daily.  Discussed potential side effects with this medication.  [place]: No red flags were identified; safe to proceed with prescription.    PDMP Review       Value Time User    PDMP Reviewed  Yes 4/28/2025  4:43 PM Frankie Dinh PA-C         Orders:  •  methylphenidate (CONCERTA) 18 mg ER tablet; Take 1 tablet (18 mg total) by mouth daily Max Daily Amount: 18 mg

## 2025-04-28 NOTE — PROGRESS NOTES
Name: Trixie Malone      : 1985      MRN: [id number]  Encounter Provider: Frankie Dinh PA-C  Encounter Date: 2025   Encounter department: [facility]  :  Assessment & Plan  Severe episode of recurrent major depressive disorder, without psychotic features (HCC)  Continue zyprexa 5 mg daily as prescribed.       Attention deficit hyperactivity disorder (ADHD), unspecified ADHD type  Has tried strattera and adderall in the past without improvement.  Will trial Concerta 18 mg daily.  Discussed potential side effects with this medication.  [place]    [place] No red flags were identified; safe to proceed with prescription.    PDMP Review       Value Time User    PDMP Reviewed  Yes 2025  4:43 PM Frankie Dinh PA-C         Orders:  •  methylphenidate (CONCERTA) 18 mg ER tablet; Take 1 tablet (18 mg total) by mouth daily Max Daily Amount: 18 mg           History of Present Illness   Trixie is a 39 year old female with history of HTN, DM, asthma, vit d deficiency, back pain, PTSD/Anxiety/Depression, ADHD, presenting for follow up.    At last visit, patient was started on zyprexa 5 mg daily for depression.  She reports some improvement since starting this medication and her mood has been more stable.  Denies side effects.  Continues with xanax 0.5 mg PRN for anxiety with relief.  She continues to endorse difficulty with concentration and focus.  She has tried Adderall and Strattera in the past without improvement.  She has been referred to psych but is not yet established.    Patient has a singificant psychiatric history requiring inpatient stays and has tried several medications in the past.  Prior meds include Strattera, adderall, xanax, klonopin, buspar, ativan, celexa, lexapro, trazodone, paxil, prozac, and seroquel.      Review of Systems   Constitutional:  Negative for chills, diaphoresis and fever.   Respiratory:  Negative for  "cough, chest tightness, shortness of breath and wheezing.    Cardiovascular:  Negative for chest pain, palpitations and leg swelling.   Gastrointestinal:  Negative for abdominal pain, constipation, diarrhea, nausea and vomiting.   Skin:  Negative for rash and wound.   Neurological:  Positive for headaches. Negative for dizziness, syncope, weakness and light-headedness.   Psychiatric/Behavioral:  Positive for decreased concentration, dysphoric mood and sleep disturbance. Negative for self-injury and suicidal ideas. The patient is nervous/anxious.        Objective   /86 (BP Location: Left arm, Patient Position: Sitting, Cuff Size: Large)   Pulse 60   Resp 18   Ht 5' 2\" (1.575 m)   Wt 71.3 kg (157 lb 3.2 oz)   LMP  (LMP Unknown)   SpO2 98%   BMI 28.75 kg/m²      Physical Exam  Vitals and nursing note reviewed.   Constitutional:       General: She is not in acute distress.     Appearance: Normal appearance.   HENT:      Head: Normocephalic and atraumatic.   Eyes:      Extraocular Movements: Extraocular movements intact.      Conjunctiva/sclera: Conjunctivae normal.      Pupils: Pupils are equal, round, and reactive to light.   Cardiovascular:      Rate and Rhythm: Normal rate and regular rhythm.      Heart sounds: Normal heart sounds. No murmur heard.  Pulmonary:      Effort: Pulmonary effort is normal. No respiratory distress.      Breath sounds: Normal breath sounds. No wheezing.   Musculoskeletal:      Cervical back: Normal range of motion and neck supple.      Right lower leg: No edema.      Left lower leg: No edema.   Skin:     General: Skin is warm and dry.   Neurological:      General: No focal deficit present.      Mental Status: She is alert and oriented to person, place, and time.      Cranial Nerves: No cranial nerve deficit.      Motor: No weakness.   Psychiatric:         Mood and Affect: Mood is depressed.         Behavior: Behavior normal.         " Motor: No weakness.   Psychiatric:         Mood and Affect: Mood is depressed.         Behavior: Behavior normal.

## 2025-04-30 DIAGNOSIS — F90.9 ATTENTION DEFICIT HYPERACTIVITY DISORDER (ADHD), UNSPECIFIED ADHD TYPE: ICD-10-CM

## 2025-04-30 RX ORDER — METHYLPHENIDATE HYDROCHLORIDE 18 MG/1
18 TABLET ORAL DAILY
Qty: 30 TABLET | Refills: 0 | OUTPATIENT
Start: 2025-04-30

## 2025-05-01 ENCOUNTER — DOCUMENTATION (OUTPATIENT)
Dept: FAMILY MEDICINE CLINIC | Facility: CLINIC | Age: 40
End: 2025-05-01

## 2025-06-02 DIAGNOSIS — F41.9 ANXIETY: ICD-10-CM

## 2025-06-02 DIAGNOSIS — E55.9 VITAMIN D DEFICIENCY: ICD-10-CM

## 2025-06-02 RX ORDER — ERGOCALCIFEROL 1.25 MG/1
50000 CAPSULE, LIQUID FILLED ORAL WEEKLY
Qty: 12 CAPSULE | Refills: 1 | Status: SHIPPED | OUTPATIENT
Start: 2025-06-02

## 2025-06-02 RX ORDER — ALPRAZOLAM 1 MG/1
1 TABLET ORAL 2 TIMES DAILY PRN
Qty: 60 TABLET | Refills: 0 | Status: SHIPPED | OUTPATIENT
Start: 2025-06-02

## 2025-06-04 ENCOUNTER — OFFICE VISIT (OUTPATIENT)
Dept: FAMILY MEDICINE CLINIC | Facility: HOME HEALTHCARE | Age: 40
End: 2025-06-04
Payer: COMMERCIAL

## 2025-06-04 VITALS
HEART RATE: 92 BPM | TEMPERATURE: 98.1 F | OXYGEN SATURATION: 98 % | BODY MASS INDEX: 27.6 KG/M2 | RESPIRATION RATE: 18 BRPM | SYSTOLIC BLOOD PRESSURE: 110 MMHG | DIASTOLIC BLOOD PRESSURE: 72 MMHG | HEIGHT: 62 IN | WEIGHT: 150 LBS

## 2025-06-04 DIAGNOSIS — R35.0 URINARY FREQUENCY: Primary | ICD-10-CM

## 2025-06-04 DIAGNOSIS — R35.0 URINARY FREQUENCY: ICD-10-CM

## 2025-06-04 DIAGNOSIS — F90.9 ATTENTION DEFICIT HYPERACTIVITY DISORDER (ADHD), UNSPECIFIED ADHD TYPE: ICD-10-CM

## 2025-06-04 DIAGNOSIS — F41.9 ANXIETY: ICD-10-CM

## 2025-06-04 DIAGNOSIS — F33.2 SEVERE EPISODE OF RECURRENT MAJOR DEPRESSIVE DISORDER, WITHOUT PSYCHOTIC FEATURES (HCC): ICD-10-CM

## 2025-06-04 LAB
BILIRUB UR QL STRIP: NEGATIVE
CLARITY UR: NORMAL
COLOR UR: NORMAL
GLUCOSE UR STRIP-MCNC: NEGATIVE MG/DL
HGB UR QL STRIP.AUTO: NEGATIVE
KETONES UR STRIP-MCNC: NEGATIVE MG/DL
LEUKOCYTE ESTERASE UR QL STRIP: NEGATIVE
NITRITE UR QL STRIP: NEGATIVE
PH UR STRIP.AUTO: 5.5 [PH]
PROT UR STRIP-MCNC: NEGATIVE MG/DL
SL AMB  POCT GLUCOSE, UA: NORMAL
SL AMB LEUKOCYTE ESTERASE,UA: NORMAL
SL AMB POCT BILIRUBIN,UA: NORMAL
SL AMB POCT BLOOD,UA: NORMAL
SL AMB POCT CLARITY,UA: NORMAL
SL AMB POCT COLOR,UA: NORMAL
SL AMB POCT KETONES,UA: NORMAL
SL AMB POCT NITRITE,UA: NORMAL
SL AMB POCT PH,UA: 5
SL AMB POCT SPECIFIC GRAVITY,UA: 1.01
SL AMB POCT URINE PROTEIN: NORMAL
SL AMB POCT UROBILINOGEN: 0.2
SP GR UR STRIP.AUTO: 1.02 (ref 1–1.03)
UROBILINOGEN UR STRIP-ACNC: <2 MG/DL

## 2025-06-04 PROCEDURE — T1015 CLINIC SERVICE: HCPCS | Performed by: FAMILY MEDICINE

## 2025-06-04 PROCEDURE — 81003 URINALYSIS AUTO W/O SCOPE: CPT | Performed by: PHYSICIAN ASSISTANT

## 2025-06-04 PROCEDURE — 81002 URINALYSIS NONAUTO W/O SCOPE: CPT | Performed by: FAMILY MEDICINE

## 2025-06-04 PROCEDURE — 99213 OFFICE O/P EST LOW 20 MIN: CPT | Performed by: PHYSICIAN ASSISTANT

## 2025-06-04 NOTE — PROGRESS NOTES
Name: Trixie Malone      : 1985      MRN: [id number]  Encounter Provider: Frankie Dinh PA-C  Encounter Date: 2025   Encounter department: [facility]  :  Assessment & Plan  Urinary frequency  POCT urine dip without sign of infection.  Will send for UA/culture.  Orders:  •  UA w Reflex to Microscopic w Reflex to Culture; Future  •  POCT urine dip    Attention deficit hyperactivity disorder (ADHD), unspecified ADHD type  Continue concerta 18 mg daily.  Follow up in 1 month.       Severe episode of recurrent major depressive disorder, without psychotic features (HCC)  Continue zyprexa 5 mg qhs.       Anxiety  Continue xanax 0.5 mg PRN.              History of Present Illness   Trixie is a 39 year old female with history of HTN, DM, asthma, vit d deficiency, back pain, PTSD/Anxiety/Depression, ADHD, presenting for follow up.    Depression is currently managed with zyprexa 5 mg qhs.  She reports good control with this medication and denies medication side effects.  She is taking xanax 0.5 mg PRN with improvement.  At last visit, she was started on concerta 18 mg daily for ADHD symptoms as she had tried Adderall and Strattera in the past without improvement.  She has been taking this for about 3 weeks and is starting to notice some improvement from this standpoint.  Denies medication side effects.    Today patient endorses urinary frequency and urgency x 1 week.  Denies dysuria, hematuria, fever, or flank pain.  No hx of recurrent UTIs.      Review of Systems   Constitutional:  Negative for chills, diaphoresis and fever.   Respiratory:  Negative for cough, chest tightness, shortness of breath and wheezing.    Cardiovascular:  Negative for chest pain, palpitations and leg swelling.   Gastrointestinal:  Negative for abdominal pain, constipation, diarrhea, nausea and vomiting.   Genitourinary:  Positive for frequency and urgency. Negative for dysuria, flank pain and hematuria.  "  Skin:  Negative for rash and wound.   Neurological:  Negative for dizziness, syncope, weakness, light-headedness and headaches.   Psychiatric/Behavioral:  Positive for decreased concentration, dysphoric mood and sleep disturbance. Negative for self-injury and suicidal ideas. The patient is nervous/anxious.        Objective   /72 (BP Location: Left arm, Patient Position: Sitting, Cuff Size: Large)   Pulse 92   Temp 98.1 °F (36.7 °C) (Tympanic)   Resp 18   Ht 5' 2\" (1.575 m)   Wt 68 kg (150 lb)   SpO2 98%   BMI 27.44 kg/m²      Physical Exam  Vitals and nursing note reviewed.   Constitutional:       General: She is not in acute distress.     Appearance: Normal appearance.   HENT:      Head: Normocephalic and atraumatic.     Eyes:      Extraocular Movements: Extraocular movements intact.      Conjunctiva/sclera: Conjunctivae normal.      Pupils: Pupils are equal, round, and reactive to light.       Cardiovascular:      Rate and Rhythm: Normal rate and regular rhythm.      Heart sounds: Normal heart sounds. No murmur heard.  Pulmonary:      Effort: Pulmonary effort is normal. No respiratory distress.      Breath sounds: Normal breath sounds. No wheezing.     Musculoskeletal:      Cervical back: Normal range of motion and neck supple.      Right lower leg: No edema.      Left lower leg: No edema.     Skin:     General: Skin is warm and dry.     Neurological:      General: No focal deficit present.      Mental Status: She is alert and oriented to person, place, and time.      Cranial Nerves: No cranial nerve deficit.      Motor: No weakness.     Psychiatric:         Mood and Affect: Mood is depressed.         Behavior: Behavior normal.         "

## 2025-06-05 ENCOUNTER — RESULTS FOLLOW-UP (OUTPATIENT)
Dept: FAMILY MEDICINE CLINIC | Facility: HOME HEALTHCARE | Age: 40
End: 2025-06-05

## 2025-06-24 ENCOUNTER — HOSPITAL ENCOUNTER (OUTPATIENT)
Dept: MAMMOGRAPHY | Facility: HOSPITAL | Age: 40
Discharge: HOME | End: 2025-06-24
Payer: COMMERCIAL

## 2025-06-24 VITALS — WEIGHT: 150 LBS | HEIGHT: 62 IN | BODY MASS INDEX: 27.6 KG/M2

## 2025-06-24 DIAGNOSIS — Z12.31 ENCOUNTER FOR SCREENING MAMMOGRAM FOR BREAST CANCER: ICD-10-CM

## 2025-06-24 PROCEDURE — 77063 BREAST TOMOSYNTHESIS BI: CPT

## 2025-06-24 PROCEDURE — 77067 SCR MAMMO BI INCL CAD: CPT

## 2025-07-07 ENCOUNTER — OFFICE VISIT (OUTPATIENT)
Dept: FAMILY MEDICINE CLINIC | Facility: HOME HEALTHCARE | Age: 40
End: 2025-07-07
Payer: COMMERCIAL

## 2025-07-07 VITALS
HEIGHT: 62 IN | DIASTOLIC BLOOD PRESSURE: 84 MMHG | RESPIRATION RATE: 18 BRPM | OXYGEN SATURATION: 98 % | HEART RATE: 72 BPM | SYSTOLIC BLOOD PRESSURE: 118 MMHG | BODY MASS INDEX: 30.25 KG/M2 | WEIGHT: 164.4 LBS

## 2025-07-07 DIAGNOSIS — F90.9 ATTENTION DEFICIT HYPERACTIVITY DISORDER (ADHD), UNSPECIFIED ADHD TYPE: ICD-10-CM

## 2025-07-07 PROCEDURE — 99213 OFFICE O/P EST LOW 20 MIN: CPT | Performed by: PHYSICIAN ASSISTANT

## 2025-07-07 PROCEDURE — T1015 CLINIC SERVICE: HCPCS | Performed by: FAMILY MEDICINE

## 2025-07-07 NOTE — PROGRESS NOTES
Name: Trixie Malone      : 1985      MRN: [id number]  Encounter Provider: Frankie Dinh PA-C  Encounter Date: 2025   Encounter department: [facility]  :  Assessment & Plan  Attention deficit hyperactivity disorder (ADHD), unspecified ADHD type  Will trial Adderall 10 mg daily as she reports feeling improved with this in the past and did not  feel well with the Concerta.  Advised to establish with psychiatry as previously referred.    Cont[place]    [place]: No red flags were identified; safe to proceed with prescription.    PDMP Review       Value Time User    PDMP Reviewed  Yes 2025  4:16 PM Frankie Dinh PA-C         Orders:  •  amphetamine-dextroamphetamine (ADDERALL XR, 10MG,) 10 MG 24 hr capsule; Take 1 capsule (10 mg total) by mouth every morning Max Daily Amount: 10 mg           History of Present Illness   Trixie is a 40 year old female with history of HTN, DM, asthma, vit d deficiency, back pain, PTSD/Anxiety/Depression, ADHD, presenting for follow up.    Patient has previously been prescribed Zyprexa for depression but reports stopping this medication due to concern for weight gain.  She is currently taking Xanax as needed with adequate control of her anxiety.  Patient has also previously been tried on Strattera, Concerta, and Adderall for ADHD symptoms.  She self discontinued Concerta recently due to concern that this was causing more fatigue.  She would like to go back on Adderall as she felt that this was helping her more.  She was previously referred to psychiatry but has not yet established.    Patient is also interested in medication to assist with weight loss.  She has gained approximately 14 pounds since our last visit, though she contributes this largely to being on the Zyprexa which she has since discontinued.  She has been on injectable medications for weight in the past and is not interested in any injections  "at this time.      Review of Systems   Constitutional:  Positive for fatigue and unexpected weight change. Negative for chills, diaphoresis and fever.   Respiratory:  Negative for cough, chest tightness, shortness of breath and wheezing.    Cardiovascular:  Negative for chest pain, palpitations and leg swelling.   Gastrointestinal:  Negative for abdominal pain, constipation, diarrhea, nausea and vomiting.   Skin:  Negative for rash and wound.   Neurological:  Negative for dizziness, syncope, weakness, light-headedness and headaches.   Psychiatric/Behavioral:  Positive for decreased concentration, dysphoric mood and sleep disturbance. Negative for self-injury and suicidal ideas. The patient is nervous/anxious.        Objective   /84 (BP Location: Left arm, Patient Position: Sitting, Cuff Size: Large)   Pulse 72   Resp 18   Ht 5' 2\" (1.575 m)   Wt 74.6 kg (164 lb 6.4 oz)   LMP  (LMP Unknown)   SpO2 98%   BMI 30.07 kg/m²      Physical Exam  Vitals and nursing note reviewed.   Constitutional:       General: She is not in acute distress.     Appearance: Normal appearance.   HENT:      Head: Normocephalic and atraumatic.     Eyes:      Extraocular Movements: Extraocular movements intact.      Conjunctiva/sclera: Conjunctivae normal.      Pupils: Pupils are equal, round, and reactive to light.       Cardiovascular:      Rate and Rhythm: Normal rate and regular rhythm.      Heart sounds: Normal heart sounds. No murmur heard.  Pulmonary:      Effort: Pulmonary effort is normal. No respiratory distress.      Breath sounds: Normal breath sounds. No wheezing.     Musculoskeletal:      Cervical back: Normal range of motion and neck supple.      Right lower leg: No edema.      Left lower leg: No edema.     Skin:     General: Skin is warm and dry.     Neurological:      General: No focal deficit present.      Mental Status: She is alert and oriented to person, place, and time.      Cranial Nerves: No cranial nerve " deficit.      Motor: No weakness.     Psychiatric:         Mood and Affect: Mood is depressed.         Behavior: Behavior normal.

## 2025-07-07 NOTE — ASSESSMENT & PLAN NOTE
Will trial Adderall 10 mg daily as she reports feeling improved with this in the past and did not  feel well with the Concerta.  Advised to establish with psychiatry as previously referred.    Cont[place]    [place]: No red flags were identified; safe to proceed with prescription.    PDMP Review       Value Time User    PDMP Reviewed  Yes 7/9/2025  4:16 PM Frankie Dinh PA-C         Orders:  •  amphetamine-dextroamphetamine (ADDERALL XR, 10MG,) 10 MG 24 hr capsule; Take 1 capsule (10 mg total) by mouth every morning Max Daily Amount: 10 mg

## 2025-07-09 DIAGNOSIS — F41.9 ANXIETY: ICD-10-CM

## 2025-07-09 RX ORDER — DEXTROAMPHETAMINE SACCHARATE, AMPHETAMINE ASPARTATE MONOHYDRATE, DEXTROAMPHETAMINE SULFATE AND AMPHETAMINE SULFATE 2.5; 2.5; 2.5; 2.5 MG/1; MG/1; MG/1; MG/1
10 CAPSULE, EXTENDED RELEASE ORAL EVERY MORNING
Qty: 30 CAPSULE | Refills: 0 | Status: SHIPPED | OUTPATIENT
Start: 2025-07-09

## 2025-07-09 RX ORDER — ALPRAZOLAM 1 MG/1
1 TABLET ORAL 2 TIMES DAILY PRN
Qty: 60 TABLET | Refills: 1 | Status: SHIPPED | OUTPATIENT
Start: 2025-07-09

## 2025-08-08 ENCOUNTER — TELEPHONE (OUTPATIENT)
Age: 40
End: 2025-08-08

## 2025-08-09 ENCOUNTER — PATIENT MESSAGE (OUTPATIENT)
Dept: FAMILY MEDICINE CLINIC | Facility: HOME HEALTHCARE | Age: 40
End: 2025-08-09

## 2025-08-11 ENCOUNTER — TELEPHONE (OUTPATIENT)
Age: 40
End: 2025-08-11

## 2025-08-21 DIAGNOSIS — R11.0 NAUSEA: ICD-10-CM

## 2025-08-21 DIAGNOSIS — J45.40 MODERATE PERSISTENT ASTHMA WITHOUT COMPLICATION: ICD-10-CM

## 2025-08-21 RX ORDER — LEVALBUTEROL INHALATION SOLUTION 1.25 MG/3ML
1.25 SOLUTION RESPIRATORY (INHALATION) EVERY 6 HOURS PRN
Qty: 270 ML | Refills: 0 | Status: SHIPPED | OUTPATIENT
Start: 2025-08-21

## 2025-08-21 RX ORDER — ONDANSETRON 4 MG/1
4 TABLET, FILM COATED ORAL EVERY 8 HOURS PRN
Qty: 20 TABLET | Refills: 0 | Status: SHIPPED | OUTPATIENT
Start: 2025-08-21

## 2025-08-21 RX ORDER — LEVALBUTEROL TARTRATE 45 UG/1
1-2 AEROSOL, METERED ORAL EVERY 4 HOURS PRN
Qty: 15 G | Refills: 0 | Status: SHIPPED | OUTPATIENT
Start: 2025-08-21

## (undated) DEVICE — SUT MONOCRYL 4-0 PS-2 18 IN Y496G

## (undated) DEVICE — ADHESIVE SKN CLSR HISTOACRYL FLEX 0.5ML LF

## (undated) DEVICE — ANTIBACTERIAL VIOLET BRAIDED (POLYGLACTIN 910), SYNTHETIC ABSORBABLE SUTURE: Brand: COATED VICRYL

## (undated) DEVICE — GLOVE INDICATOR PI UNDERGLOVE SZ 8 BLUE

## (undated) DEVICE — SUT VICRYL 2-0 SH 27 IN UNDYED J417H

## (undated) DEVICE — MINOR PROCEDURE DRAPE: Brand: CONVERTORS

## (undated) DEVICE — GLOVE SRG BIOGEL 8

## (undated) DEVICE — GAUZE SPONGES,8 PLY: Brand: CURITY

## (undated) DEVICE — TOOL 15BA50 LEGEND 15CM 5MM BA: Brand: MIDAS REX™

## (undated) DEVICE — Device

## (undated) DEVICE — PLUMEPEN PRO 10FT

## (undated) DEVICE — ANTI-FOG SOLUTION WITH FOAM PAD: Brand: DEVON

## (undated) DEVICE — SPECIMEN CONTAINER STERILE PEEL PACK

## (undated) DEVICE — UTILITY MARKER,BLACK WITH LABELS: Brand: DEVON

## (undated) DEVICE — GLOVE INDICATOR PI UNDERGLOVE SZ 7 BLUE

## (undated) DEVICE — SUT VICRYL 0 UR-6 27 IN J603H

## (undated) DEVICE — SCD SEQUENTIAL COMPRESSION COMFORT SLEEVE MEDIUM KNEE LENGTH: Brand: KENDALL SCD

## (undated) DEVICE — INTENDED FOR TISSUE SEPARATION, AND OTHER PROCEDURES THAT REQUIRE A SHARP SURGICAL BLADE TO PUNCTURE OR CUT.: Brand: BARD-PARKER SAFETY BLADES SIZE 15, STERILE

## (undated) DEVICE — BETHLEHEM UNIVERSAL MINOR GEN: Brand: CARDINAL HEALTH

## (undated) DEVICE — GAUZE SPONGES,16 PLY: Brand: CURITY

## (undated) DEVICE — CHLORAPREP HI-LITE 26ML ORANGE

## (undated) DEVICE — TRANSPOSAL ULTRAFLEX DUO/QUAD ULTRA CART MANIFOLD

## (undated) DEVICE — STERI STRIP 1/2 INCH

## (undated) DEVICE — NEEDLE 18 G X 1 1/2 SAFETY

## (undated) DEVICE — DRESSING MEPILEX AG BORDER 4 X 4 IN

## (undated) DEVICE — BASIC SINGLE BASIN 2-LF: Brand: MEDLINE INDUSTRIES, INC.

## (undated) DEVICE — SUT MONOCRYL 4-0 PS-2 27 IN Y426H

## (undated) DEVICE — TROCAR: Brand: KII SLEEVE

## (undated) DEVICE — GENERAL ENDOSCOPY PACK: Brand: CONVERTORS

## (undated) DEVICE — SPONGE PVP SCRUB WING STERILE

## (undated) DEVICE — DRAPE EQUIPMENT RF WAND

## (undated) DEVICE — [HIGH FLOW INSUFFLATOR,  DO NOT USE IF PACKAGE IS DAMAGED,  KEEP DRY,  KEEP AWAY FROM SUNLIGHT,  PROTECT FROM HEAT AND RADIOACTIVE SOURCES.]: Brand: PNEUMOSURE

## (undated) DEVICE — HEMOSTATIC MATRIX SURGIFLO 8ML W/THROMBIN

## (undated) DEVICE — BETHLEHEM UNIVERSAL SPINE, KIT: Brand: CARDINAL HEALTH

## (undated) DEVICE — GLOVE INDICATOR PI UNDERGLOVE SZ 7.5 BLUE

## (undated) DEVICE — NEEDLE 25G X 1 1/2

## (undated) DEVICE — DRAPE MICROSCOPE ARMATEC 46 X 120IN ANGL SLIM LENS F/LEICA

## (undated) DEVICE — TROCARS: Brand: KII® BALLOON BLUNT TIP SYSTEM

## (undated) DEVICE — IRRIG ENDO FLO TUBING

## (undated) DEVICE — ENDOPATH 5 MM GRASPERS WITH RATCHET HANDLES: Brand: ENDOPATH

## (undated) DEVICE — PENCIL ELECTROSURG E-Z CLEAN -0035H

## (undated) DEVICE — SYRINGE 10ML LL

## (undated) DEVICE — 5 MM CURVED DISSECTORS WITH MONOPOLAR CAUTERY: Brand: ENDOPATH

## (undated) DEVICE — LAPAROSCOPIC SMOKE EVAC TUBING

## (undated) DEVICE — ENDOPOUCH RETRIEVER SPECIMEN RETRIEVAL BAGS: Brand: ENDOPOUCH RETRIEVER

## (undated) DEVICE — SWABSTCK, BENZOIN TINCTURE, 1/PK, STRL: Brand: APLICARE

## (undated) DEVICE — 1820 FOAM BLOCK NEEDLE COUNTER: Brand: DEVON

## (undated) DEVICE — SURGIFLO ENDOSCOPIC APPICATOR: Brand: ETHICON

## (undated) DEVICE — LAPAROTOMY PACK: Brand: CONVERTORS

## (undated) DEVICE — SPONGE LAP 18 X 18 IN

## (undated) DEVICE — LIGAMAX 5 MM ENDOSCOPIC MULTIPLE CLIP APPLIER: Brand: LIGAMAX

## (undated) DEVICE — SYRINGE 3ML LL

## (undated) DEVICE — INTENDED FOR TISSUE SEPARATION, AND OTHER PROCEDURES THAT REQUIRE A SHARP SURGICAL BLADE TO PUNCTURE OR CUT.: Brand: BARD-PARKER ® CARBON RIB-BACK BLADES

## (undated) DEVICE — SNAP KOVER: Brand: UNBRANDED

## (undated) DEVICE — 3M™ TEGADERM™ TRANSPARENT FILM DRESSING FRAME STYLE, 1624W, 2-3/8 IN X 2-3/4 IN (6 CM X 7 CM), 100/CT 4CT/CASE: Brand: 3M™ TEGADERM™

## (undated) DEVICE — 3M™ STERI-STRIP™ REINFORCED ADHESIVE SKIN CLOSURES, R1546, 1/4 IN X 4 IN (6 MM X 100 MM), 10 STRIPS/ENVELOPE: Brand: 3M™ STERI-STRIP™

## (undated) DEVICE — GLOVE SRG BIOGEL 7.5

## (undated) DEVICE — METZENBAUM ADTEC SINGLE USE DISSECTING SCISSORS, SHAFT ONLY, MONOPOLAR, CURVED TO LEFT, WORKING LENGTH: 12 1/4", (310 MM), DIAM. 5 MM, INSULATED, DOUBLE ACTION, STERILE, DISPOSABLE, PACKAGE OF 10 PIECES: Brand: AESCULAP

## (undated) DEVICE — LIGHT GLOVE GREEN

## (undated) DEVICE — TUBING SMOKE EVAC W/FILTRATION DEVICE PLUMEPORT ACTIV

## (undated) DEVICE — PREP SURGICAL PURPREP 26ML

## (undated) DEVICE — ELECTRODE BLADE MOD  E-Z CLEAN 6.5IN -0014M

## (undated) DEVICE — GLOVE PI ULTRA TOUCH SZ.7.0

## (undated) DEVICE — ELECTRODE LAP L WIRE E-Z CLEAN 33CM -0100